# Patient Record
Sex: FEMALE | Race: WHITE | NOT HISPANIC OR LATINO | Employment: OTHER | URBAN - METROPOLITAN AREA
[De-identification: names, ages, dates, MRNs, and addresses within clinical notes are randomized per-mention and may not be internally consistent; named-entity substitution may affect disease eponyms.]

---

## 2017-04-04 ENCOUNTER — OFFICE VISIT (OUTPATIENT)
Dept: FAMILY MEDICINE | Facility: CLINIC | Age: 77
End: 2017-04-04
Payer: COMMERCIAL

## 2017-04-04 VITALS
TEMPERATURE: 97.6 F | HEIGHT: 63 IN | BODY MASS INDEX: 37.1 KG/M2 | DIASTOLIC BLOOD PRESSURE: 84 MMHG | HEART RATE: 72 BPM | WEIGHT: 209.4 LBS | SYSTOLIC BLOOD PRESSURE: 136 MMHG

## 2017-04-04 DIAGNOSIS — I83.92 VARICOSE VEINS OF LEFT LOWER EXTREMITY: Primary | ICD-10-CM

## 2017-04-04 DIAGNOSIS — I10 ESSENTIAL HYPERTENSION, BENIGN: ICD-10-CM

## 2017-04-04 PROCEDURE — 99213 OFFICE O/P EST LOW 20 MIN: CPT | Performed by: FAMILY MEDICINE

## 2017-04-04 RX ORDER — VALSARTAN AND HYDROCHLOROTHIAZIDE 160; 12.5 MG/1; MG/1
1 TABLET, FILM COATED ORAL DAILY
Qty: 90 TABLET | Refills: 2 | Status: SHIPPED | OUTPATIENT
Start: 2017-04-04 | End: 2017-10-27

## 2017-04-04 NOTE — MR AVS SNAPSHOT
After Visit Summary   4/4/2017    Rosie Blackman    MRN: [id number]           Patient Information     Date Of Birth          1940        Visit Information        Provider Department      4/4/2017 1:30 PM Laya Morales MD [facility]        Today's Diagnoses     Varicose veins of left lower extremity    -  1    Essential hypertension, benign           Follow-ups after your visit        Additional Services     VASCULAR SURGERY REFERRAL       Your provider has referred you to: [facility] vascular surgeons     Please be aware that coverage of these services is subject to the terms and limitations of your health insurance plan.  Call member services at your health plan with any benefit or coverage questions.      Please bring the following with you to your appointment:    (1) Any X-Rays, CTs or MRIs which have been performed.  Contact the facility where they were done to arrange for  prior to your scheduled appointment.    (2) List of current medications   (3) This referral request   (4) Any documents/labs given to you for this referral                  Who to contact     Normal or non-critical lab and imaging results will be communicated to you by Georgina Goodmanhart, letter or phone within 4 business days after the clinic has received the results. If you do not hear from us within 7 days, please contact the clinic through Georgina Goodmanhart or phone. If you have a critical or abnormal lab result, we will notify you by phone as soon as possible.  Submit refill requests through [employer] or call your pharmacy and they will forward the refill request to us. Please allow 3 business days for your refill to be completed.          If you need to speak with a  for additional information , please call: [phone]             Additional Information About Your Visit        Georgina Goodmanhart Information     [employer] gives you secure access to your electronic health record. If you see a primary care  "provider, you can also send messages to your care team and make appointments. If you have questions, please call your primary care clinic.  If you do not have a primary care provider, please call [phone] and they will assist you.        Care EveryWhere ID     This is your Care EveryWhere ID. This could be used by other organizations to access your [facility] medical records  [id number]        Your Vitals Were     Pulse Temperature Height BMI (Body Mass Index)          72 97.6  F (36.4  C) (Tympanic) 5' 3\" (1.6 m) 37.09 kg/m2         Blood Pressure from Last 3 Encounters:   04/04/17 (!) 132/94   10/25/16 124/78   11/16/15 120/55    Weight from Last 3 Encounters:   04/04/17 209 lb 6.4 oz (95 kg)   10/25/16 205 lb (93 kg)   11/16/15 195 lb (88.5 kg)              We Performed the Following     VASCULAR SURGERY REFERRAL          Where to get your medicines      These medications were sent to [facility]/pharmacy #[id number] - [place] [address] [place] [address], [facility] [address]     Phone:  [phone]     valsartan-hydrochlorothiazide 160-12.5 MG per tablet          Primary Care Provider Office Phone # Fax #    Laya Morales [phone]       [facility] [address] [facility] [address]        Thank you!     Thank you for choosing [facility]  for your care. Our goal is always to provide you with excellent care. Hearing back from our patients is one way we can continue to improve our services. Please take a few minutes to complete the written survey that you may receive in the mail after your visit with us. Thank you!             Your Updated Medication List - Protect others around you: Learn how to safely use, store and throw away your medicines at www.disposemymeds.org.          This list is accurate as of: 4/4/17  2:22 PM.  Always use your most recent med list.                   Brand Name Dispense Instructions for use    ALEVE PO          ASPIRIN NOT " PRESCRIBED    INTENTIONAL     Reported on 4/4/2017       estradiol 0.5 MG tablet    ESTRACE    90 tablet    Take 1/2 tablet daily       fish oil-omega-3 fatty acids 1000 MG capsule      Take 1 g by mouth daily.       pravastatin 80 MG tablet    PRAVACHOL    90 tablet    Take 1 tablet (80 mg) by mouth daily       THERATEARS 0.25 % Soln   Generic drug:  Carboxymethylcellulose Sodium      BID       triamcinolone 0.1 % cream    KENALOG    80 g    aaa 2 times per day for up to 2 weeks as needed       valsartan-hydrochlorothiazide 160-12.5 MG per tablet    DIOVAN HCT    90 tablet    Take 1 tablet by mouth daily       VIACTIV 500-100-40 MG-UNT-MCG Chew   Generic drug:  Calcium-Vitamin D-Vitamin K      once daily

## 2017-04-04 NOTE — PROGRESS NOTES
SUBJECTIVE:                                                    Rosie Blackman is a 77 year old female who presents to clinic today for the following health issues:      Musculoskeletal problem/pain      Duration: since 2014, worsening in the past 3 months    Description  Location: left calf    Intensity:  severe    Accompanying signs and symptoms: discoloration of left calf, pain is in area of discoloration    History  Previous similar problem: YES-venous closure surgery in August of 2014 at [facility] in [place], [place]  Previous evaluation:  Ultrasound August 2014    Precipitating or alleviating factors:  Trauma or overuse: no   Aggravating factors include: standing and walking    Therapies tried and outcome: compression stockings and Aleve help       Had vein sx 2014   She has had pain in the left leg since that time in the area where there is the spider areas . She does not have the compression socks on right now even though they do help . She is just not sure what she should be doing to help with the legg. It  Has not been acutely red or swollen. It  Is painful but only after wqalking or standing while resting it it fine. Improves with the aleve she does not take it that often.   Her hypertension is well controlled right now she has gretchen taking her medications without side effects no cough no headache no chest pain  No stroke sx      Problem list and histories reviewed & adjusted, as indicated.  Additional history: as documented    Patient Active Problem List   Diagnosis     Essential hypertension     GERD     Personal history of contact with and (suspected) exposure to asbestos     Donor of other blood     Irritable bowel syndrome     ALLERGIC RHINITIS      Chronic airway obstruction (H)     Need for prophylactic hormone replacement therapy (postmenopausal)     UTI (urinary tract infection)     Pes planus     Obesity     Diverticulosis of colon     Colon polyps     Prediabetes     HYPERLIPIDEMIA LDL  GOAL <130     Advanced directives, counseling/discussion     Plantar fasciitis     Health Care Home     Hiatal hernia     Past Surgical History:   Procedure Laterality Date     BREAST BIOPSY, CORE RT/LT       C ANESTH,KNEE VEINS SURGERY  2014     CHOLECYSTECTOMY       COLONOSCOPY  5/10    Diverticulosis, colon polyps; repeat 5 yrs     COLONOSCOPY N/A 2015    Procedure: COLONOSCOPY;  Surgeon: Alejandro Matos MD;  Location: [facility]     Colonoscopy, hyperplastic polyps, repeat in 5 yrs       ESOPHAGOSCOPY, GASTROSCOPY, DUODENOSCOPY (EGD), COMBINED  2013    Procedure: COMBINED ESOPHAGOSCOPY, GASTROSCOPY, DUODENOSCOPY (EGD), BIOPSY SINGLE OR MULTIPLE;  Gastroscopy;  Surgeon: Blaise Gill MD;  Location: [facility]     HC REMOVE TONSILS/ADENOIDS,12+ Y/O      T & A 13+y.o.     HYSTERECTOMY, PAP NO LONGER INDICATED       TVH for DUB, ovaries in         Social History   Substance Use Topics     Smoking status: Never Smoker     Smokeless tobacco: Never Used     Alcohol use No     Family History   Problem Relation Age of Onset     CANCER Mother      uterine cancer,      Respiratory Mother      COPD     Cardiovascular Mother      AAA  age 80     Breast Cancer Maternal Grandmother      CANCER Maternal Grandfather      cancer unknown type     Breast Cancer Sister      Eye Disorder Father      cataracts     Depression Father      Depression Son      Depression Son      Cancer - colorectal No family hx of            Reviewed and updated as needed this visit by clinical staff       Reviewed and updated as needed this visit by Provider     she still has pain but it now comes and goes. Now she has had pain in the lower calf for the last 3 months  She takes aleve and wears her compression stockings     ROS:  C: NEGATIVE for fever, chills, change in weight  E/M: NEGATIVE for ear, mouth and throat problems  R: NEGATIVE for significant cough or SOB  CV: NEGATIVE for chest pain, palpitations or peripheral  "edema    OBJECTIVE:                                                    BP (!) 132/94 (BP Location: Right arm, Patient Position: Chair, Cuff Size: Adult Large)  Pulse 72  Temp 97.6  F (36.4  C) (Tympanic)  Ht 5' 3\" (1.6 m)  Wt 209 lb 6.4 oz (95 kg)  BMI 37.09 kg/m2  Body mass index is 37.09 kg/(m^2).   GENERAL: healthy, alert, well nourished, well hydrated, no distress  HENT: ear canals- normal; TMs- normal; Nose- normal; Mouth- no ulcers, no lesions  NECK: no tenderness, no adenopathy, no asymmetry, no masses, no stiffness; thyroid- normal to palpation  RESP: lungs clear to auscultation - no rales, no rhonchi, no wheezes  CV: regular rates and rhythm, normal S1 S2, no S3 or S4 and no murmur, no click or rub -  ABDOMEN: soft, no tenderness, no  hepatosplenomegaly, no masses, normal bowel sounds  MS: extremities- no gross deformities noted, no edema varicose veins spider veins left leg no erythema non tender     Diagnostic test results:  Diagnostic Test Results:  none      ASSESSMENT/PLAN:                                                    1. Varicose veins of left lower extremity  Refer to vascular for further eval   - VASCULAR SURGERY REFERRAL    2. Essential hypertension, benign  Well controlled   - valsartan-hydrochlorothiazide (DIOVAN HCT) 160-12.5 MG per tablet; Take 1 tablet by mouth daily  Dispense: 90 tablet; Refill: 2        Laya Morales MD  [facility]    "

## 2017-04-04 NOTE — NURSING NOTE
"Chief Complaint   Patient presents with     Leg Pain       Initial BP (!) 132/94 (BP Location: Right arm, Patient Position: Chair, Cuff Size: Adult Large)  Pulse 72  Temp 97.6  F (36.4  C) (Tympanic)  Ht 5' 3\" (1.6 m)  Wt 209 lb 6.4 oz (95 kg)  BMI 37.09 kg/m2 Estimated body mass index is 37.09 kg/(m^2) as calculated from the following:    Height as of this encounter: 5' 3\" (1.6 m).    Weight as of this encounter: 209 lb 6.4 oz (95 kg).  Medication Reconciliation: complete     April LENNY Pressley      "

## 2017-05-11 DIAGNOSIS — I10 ESSENTIAL HYPERTENSION, BENIGN: ICD-10-CM

## 2017-05-11 NOTE — TELEPHONE ENCOUNTER
VALSARTAN-HCTZ 160-12.5 MG TAB        Last Written Prescription Date: 4/4/17  Last Fill Quantity: 90, # refills: 2  Last Office Visit with [facility], P or [facility] prescribing provider: 4/4/17       Potassium   Date Value Ref Range Status   10/19/2016 3.6 3.4 - 5.3 mmol/L Final     Creatinine   Date Value Ref Range Status   10/19/2016 0.75 0.52 - 1.04 mg/dL Final     BP Readings from Last 3 Encounters:   04/04/17 136/84   10/25/16 124/78   11/16/15 120/55

## 2017-05-12 RX ORDER — VALSARTAN AND HYDROCHLOROTHIAZIDE 160; 12.5 MG/1; MG/1
TABLET, FILM COATED ORAL
Qty: 90 TABLET | Refills: 0 | Status: SHIPPED | OUTPATIENT
Start: 2017-05-12 | End: 2017-10-27

## 2017-05-12 NOTE — TELEPHONE ENCOUNTER
Prescription approved per [facility].  Mary France RN

## 2017-06-13 ENCOUNTER — OFFICE VISIT - HEALTHEAST (OUTPATIENT)
Dept: VASCULAR SURGERY | Facility: CLINIC | Age: 77
End: 2017-06-13

## 2017-06-13 ENCOUNTER — RECORDS - HEALTHEAST (OUTPATIENT)
Dept: VASCULAR ULTRASOUND | Facility: CLINIC | Age: 77
End: 2017-06-13

## 2017-06-13 ENCOUNTER — RECORDS - HEALTHEAST (OUTPATIENT)
Dept: ADMINISTRATIVE | Facility: OTHER | Age: 77
End: 2017-06-13

## 2017-06-13 DIAGNOSIS — I83.812 VARICOSE VEINS OF LEFT LOWER EXTREMITIES WITH PAIN: ICD-10-CM

## 2017-06-13 DIAGNOSIS — M79.605 PAIN IN LEFT LEG: ICD-10-CM

## 2017-06-13 DIAGNOSIS — M79.605 LEFT LEG PAIN: ICD-10-CM

## 2017-08-07 ENCOUNTER — TELEPHONE (OUTPATIENT)
Dept: FAMILY MEDICINE | Facility: CLINIC | Age: 77
End: 2017-08-07

## 2017-10-02 ENCOUNTER — HOSPITAL ENCOUNTER (OUTPATIENT)
Dept: MAMMOGRAPHY | Facility: CLINIC | Age: 77
Discharge: HOME | End: 2017-10-02
Attending: FAMILY MEDICINE | Admitting: FAMILY MEDICINE
Payer: MEDICARE

## 2017-10-02 DIAGNOSIS — Z12.31 VISIT FOR SCREENING MAMMOGRAM: ICD-10-CM

## 2017-10-02 PROCEDURE — 77063 BREAST TOMOSYNTHESIS BI: CPT

## 2017-10-12 ENCOUNTER — MYC MEDICAL ADVICE (OUTPATIENT)
Dept: FAMILY MEDICINE | Facility: CLINIC | Age: 77
End: 2017-10-12

## 2017-10-12 DIAGNOSIS — I10 ESSENTIAL HYPERTENSION: ICD-10-CM

## 2017-10-12 DIAGNOSIS — K21.9 GASTROESOPHAGEAL REFLUX DISEASE, ESOPHAGITIS PRESENCE NOT SPECIFIED: ICD-10-CM

## 2017-10-12 DIAGNOSIS — R73.09 ABNORMAL GLUCOSE: ICD-10-CM

## 2017-10-12 DIAGNOSIS — E78.5 HYPERLIPIDEMIA LDL GOAL <130: Primary | ICD-10-CM

## 2017-10-12 DIAGNOSIS — R73.03 PREDIABETES: ICD-10-CM

## 2017-10-12 NOTE — LETTER
[facility]  [address] [address]  [phone]        February 15, 2018    Rosie Blackman  [address] [address] [address] [address]              Dear Rosie Blackman    This is to remind you that your NON FASTING is due.    You may call our office at [phone] to schedule an appointment.    Please disregard this notice if you have already had your labs drawn or made an appointment.        Sincerely,        Laya Morales MD

## 2017-10-12 NOTE — TELEPHONE ENCOUNTER
I have pended lipids and CMP. Did you want an A1C as well? Any other labs you would want for her yearly?  Mary France RN

## 2017-10-18 DIAGNOSIS — E78.5 HYPERLIPIDEMIA LDL GOAL <130: ICD-10-CM

## 2017-10-18 LAB
ALBUMIN SERPL-MCNC: 3.4 G/DL (ref 3.4–5)
ALP SERPL-CCNC: 54 U/L (ref 40–150)
ALT SERPL W P-5'-P-CCNC: 28 U/L (ref 0–50)
ANION GAP SERPL CALCULATED.3IONS-SCNC: 5 MMOL/L (ref 3–14)
AST SERPL W P-5'-P-CCNC: 18 U/L (ref 0–45)
BILIRUB SERPL-MCNC: 0.6 MG/DL (ref 0.2–1.3)
BUN SERPL-MCNC: 18 MG/DL (ref 7–30)
CALCIUM SERPL-MCNC: 8.8 MG/DL (ref 8.5–10.1)
CHLORIDE SERPL-SCNC: 105 MMOL/L (ref 94–109)
CHOLEST SERPL-MCNC: 197 MG/DL
CO2 SERPL-SCNC: 30 MMOL/L (ref 20–32)
CREAT SERPL-MCNC: 0.78 MG/DL (ref 0.52–1.04)
GFR SERPL CREATININE-BSD FRML MDRD: 72 ML/MIN/1.7M2
GLUCOSE SERPL-MCNC: 94 MG/DL (ref 70–99)
HDLC SERPL-MCNC: 99 MG/DL
LDLC SERPL CALC-MCNC: 86 MG/DL
NONHDLC SERPL-MCNC: 98 MG/DL
POTASSIUM SERPL-SCNC: 3.6 MMOL/L (ref 3.4–5.3)
PROT SERPL-MCNC: 7 G/DL (ref 6.8–8.8)
SODIUM SERPL-SCNC: 140 MMOL/L (ref 133–144)
TRIGL SERPL-MCNC: 61 MG/DL

## 2017-10-18 PROCEDURE — 36415 COLL VENOUS BLD VENIPUNCTURE: CPT | Performed by: FAMILY MEDICINE

## 2017-10-18 PROCEDURE — 80053 COMPREHEN METABOLIC PANEL: CPT | Performed by: FAMILY MEDICINE

## 2017-10-18 PROCEDURE — 80061 LIPID PANEL: CPT | Performed by: FAMILY MEDICINE

## 2017-10-27 ENCOUNTER — OFFICE VISIT (OUTPATIENT)
Dept: FAMILY MEDICINE | Facility: CLINIC | Age: 77
End: 2017-10-27
Payer: COMMERCIAL

## 2017-10-27 VITALS
BODY MASS INDEX: 37.39 KG/M2 | SYSTOLIC BLOOD PRESSURE: 132 MMHG | DIASTOLIC BLOOD PRESSURE: 74 MMHG | HEIGHT: 63 IN | TEMPERATURE: 97.6 F | WEIGHT: 211 LBS | HEART RATE: 72 BPM

## 2017-10-27 DIAGNOSIS — I10 ESSENTIAL HYPERTENSION, BENIGN: ICD-10-CM

## 2017-10-27 DIAGNOSIS — N95.1 SYMPTOMATIC MENOPAUSAL OR FEMALE CLIMACTERIC STATES: ICD-10-CM

## 2017-10-27 DIAGNOSIS — Z00.00 MEDICARE ANNUAL WELLNESS VISIT, SUBSEQUENT: Primary | ICD-10-CM

## 2017-10-27 DIAGNOSIS — N39.3 STRESS INCONTINENCE OF URINE: ICD-10-CM

## 2017-10-27 DIAGNOSIS — E78.5 HYPERLIPIDEMIA LDL GOAL <130: ICD-10-CM

## 2017-10-27 LAB
ALBUMIN UR-MCNC: NEGATIVE MG/DL
APPEARANCE UR: CLEAR
BILIRUB UR QL STRIP: NEGATIVE
COLOR UR AUTO: YELLOW
GLUCOSE UR STRIP-MCNC: NEGATIVE MG/DL
HGB UR QL STRIP: NEGATIVE
KETONES UR STRIP-MCNC: NEGATIVE MG/DL
LEUKOCYTE ESTERASE UR QL STRIP: NEGATIVE
NITRATE UR QL: NEGATIVE
PH UR STRIP: 6 PH (ref 5–7)
SOURCE: NORMAL
SP GR UR STRIP: 1.02 (ref 1–1.03)
UROBILINOGEN UR STRIP-ACNC: 0.2 EU/DL (ref 0.2–1)

## 2017-10-27 PROCEDURE — 99213 OFFICE O/P EST LOW 20 MIN: CPT | Mod: 25 | Performed by: FAMILY MEDICINE

## 2017-10-27 PROCEDURE — 99397 PER PM REEVAL EST PAT 65+ YR: CPT | Performed by: FAMILY MEDICINE

## 2017-10-27 PROCEDURE — 81003 URINALYSIS AUTO W/O SCOPE: CPT | Performed by: FAMILY MEDICINE

## 2017-10-27 RX ORDER — VALSARTAN AND HYDROCHLOROTHIAZIDE 160; 12.5 MG/1; MG/1
1 TABLET, FILM COATED ORAL DAILY
Qty: 90 TABLET | Refills: 3 | Status: SHIPPED | OUTPATIENT
Start: 2017-10-27 | End: 2018-11-17

## 2017-10-27 RX ORDER — ESTRADIOL 0.5 MG/1
TABLET ORAL
Qty: 90 TABLET | Refills: 3 | Status: SHIPPED | OUTPATIENT
Start: 2017-10-27 | End: 2018-07-03

## 2017-10-27 RX ORDER — PRAVASTATIN SODIUM 80 MG/1
80 TABLET ORAL DAILY
Qty: 90 TABLET | Refills: 3 | Status: SHIPPED | OUTPATIENT
Start: 2017-10-27 | End: 2018-11-17

## 2017-10-27 NOTE — PROGRESS NOTES
SUBJECTIVE:   Rosie Blackman is a 77 year old female who presents for Preventive Visit.  Are you in the first 12 months of your Medicare Part B coverage?  No    Healthy Habits:    Do you get at least three servings of calcium containing foods daily (dairy, green leafy vegetables, etc.)? yes    Amount of exercise or daily activities, outside of work: 0 day(s) per week    Problems taking medications regularly No    Medication side effects: No    Have you had an eye exam in the past two years? yes    Do you see a dentist twice per year? yes    Do you have sleep apnea, excessive snoring or daytime drowsiness?no    Leaky bladder coming on gradually, more when stands up with a full bladder.   Check spot on the labia, tried triamcinolone daily - helps.   Check spot on upper right thigh.       Reviewed and updated as needed this visit by clinical staff  Tobacco  Allergies  Meds  Med Hx  Surg Hx  Fam Hx  Soc Hx        Reviewed and updated as needed this visit by Provider        Social History   Substance Use Topics     Smoking status: Never Smoker     Smokeless tobacco: Never Used     Alcohol use No       The patient does not drink >3 drinks per day nor >7 drinks per week.    Today's PHQ-2 Score:   PHQ-2 ( 1999 [employer]) 10/27/2017 4/4/2017   Q1: Little interest or pleasure in doing things 0 0   Q2: Feeling down, depressed or hopeless 0 0   PHQ-2 Score 0 0   Q1: Little interest or pleasure in doing things - -   Q2: Feeling down, depressed or hopeless - -   PHQ-2 Score - -         Do you feel safe in your environment - Yes    Do you have a Health Care Directive?: Yes: Advance Directive has been received and scanned.    Current providers sharing in care for this patient include: Patient Care Team:  Laya Morales MD as PCP - General (Family Practice)      Hearing impairment: No    Ability to successfully perform activities of daily living: Yes, no assistance needed     Fall risk:  Fallen 2 or more times in the past  year?: No  Any fall with injury in the past year?: No      Home safety:  none identified    COGNITIVE SCREEN  1) Repeat 3 items (Banana, Sunrise, Chair)    2) Clock draw:  NORMAL  3) 3 item recall: Recalls 3 objects  Results: normal     Mini-CogTM Copyright NEGRITA Freedman. Licensed by the author for use in [facility]; reprinted with permission ([email or web address]). All rights reserved.          The following health maintenance items are reviewed in Epic and correct as of today:  Health Maintenance   Topic Date Due     INFLUENZA VACCINE (SYSTEM ASSIGNED)  09/01/2017     FALL RISK ASSESSMENT  10/25/2017     ADVANCE DIRECTIVE PLANNING Q5 YRS  01/02/2018     MAMMO Q1 YR  10/02/2018     BMP Q1 YR  10/18/2018     LIPID MONITORING Q1 YEAR  10/18/2018     COLONOSCOPY Q5 YR  11/16/2020     TETANUS IMMUNIZATION (SYSTEM ASSIGNED)  11/02/2022     DEXA SCAN SCREENING (SYSTEM ASSIGNED)  Completed     PNEUMOCOCCAL  Completed     BP Readings from Last 3 Encounters:   10/27/17 132/74   04/04/17 136/84   10/25/16 124/78    Wt Readings from Last 3 Encounters:   10/27/17 211 lb (95.7 kg)   04/04/17 209 lb 6.4 oz (95 kg)   10/25/16 205 lb (93 kg)                  Patient Active Problem List   Diagnosis     Essential hypertension     Personal history of contact with and (suspected) exposure to asbestos     Donor of other blood     Irritable bowel syndrome     ALLERGIC RHINITIS      Chronic airway obstruction (H)     Need for prophylactic hormone replacement therapy (postmenopausal)     UTI (urinary tract infection)     Pes planus     Obesity     Diverticulosis of colon     Colon polyps     Prediabetes     HYPERLIPIDEMIA LDL GOAL <130     Advanced directives, counseling/discussion     Plantar fasciitis     Health Care Home     Hiatal hernia     Gastroesophageal reflux disease, esophagitis presence not specified     Past Surgical History:   Procedure Laterality Date     BREAST BIOPSY, CORE RT/LT  1994     C ANESTH,KNEE VEINS SURGERY   2014     CHOLECYSTECTOMY       COLONOSCOPY  5/10    Diverticulosis, colon polyps; repeat 5 yrs     COLONOSCOPY N/A 2015    Procedure: COLONOSCOPY;  Surgeon: Alejandro Matos MD;  Location: [facility]     Colonoscopy, hyperplastic polyps, repeat in 5 yrs       ESOPHAGOSCOPY, GASTROSCOPY, DUODENOSCOPY (EGD), COMBINED  2013    Procedure: COMBINED ESOPHAGOSCOPY, GASTROSCOPY, DUODENOSCOPY (EGD), BIOPSY SINGLE OR MULTIPLE;  Gastroscopy;  Surgeon: Blaise Gill MD;  Location: [facility]     EYE SURGERY      R/L Cataracts     HC REMOVE TONSILS/ADENOIDS,12+ Y/O      T & A 13+y.o.     HYSTERECTOMY, PAP NO LONGER INDICATED       TVH for DUB, ovaries in       VASCULAR SURGERY      Taylor closure       Social History   Substance Use Topics     Smoking status: Never Smoker     Smokeless tobacco: Never Used     Alcohol use No     Family History   Problem Relation Age of Onset     CANCER Mother      uterine cancer,      Respiratory Mother      COPD     Cardiovascular Mother      AAA  age 80     Breast Cancer Maternal Grandmother      CANCER Maternal Grandfather      cancer unknown type     Breast Cancer Sister      Eye Disorder Father      cataracts     Depression Father      Depression Son      Depression Son      Cancer - colorectal No family hx of          Also has a spot on the labia that if she does not put the triamcinolone on the spot she gets burning but it does not go away . She has been having this for the last year. Has not gotten worse has not gotten better  Feels like it is burning if she misses a day or 2 if she does not use the TAC.   Also has a spot on the thigh has been there about 1 week just noticed this asymptomatic   May have been there longer she just noted it and wants to see if it is something to be concerned about  Saw the vein surgeon . Not a candidate for the vein surgery     Mammogram Screening: Patient over age 75, has elected to continue with mammography  "screening.  Leaky bladder she has been doing kegels she has leaking if she has been sitting for a while and gets up   She also will have this at times in the middle of the night   Empties bladder every 2-3 hours during the day   She has to get up once at night but there is times when she leaks before she gets to the restroom  ROS:  Constitutional, HEENT, cardiovascular, pulmonary, gi and gu systems are negative, except as otherwise noted.      OBJECTIVE:   /74  Pulse 72  Temp 97.6  F (36.4  C) (Tympanic)  Ht 5' 3\" (1.6 m)  Wt 211 lb (95.7 kg)  BMI 37.38 kg/m2 Estimated body mass index is 37.38 kg/(m^2) as calculated from the following:    Height as of this encounter: 5' 3\" (1.6 m).    Weight as of this encounter: 211 lb (95.7 kg).  EXAM:   GENERAL: healthy, alert and no distress  HENT: ear canals and TM's normal, nose and mouth without ulcers or lesions  NECK: no adenopathy, no asymmetry, masses, or scars and thyroid normal to palpation  RESP: lungs clear to auscultation - no rales, rhonchi or wheezes  BREAST: normal without masses, tenderness or nipple discharge and no palpable axillary masses or adenopathy  CV: regular rate and rhythm, normal S1 S2, no S3 or S4, no murmur, click or rub, no peripheral edema and peripheral pulses strong  ABDOMEN: soft, nontender, no hepatosplenomegaly, no masses and bowel sounds normal  MS: no gross musculoskeletal defects noted, no edema  SKIN: no suspicious lesions or rashes  NEURO: Normal strength and tone, mentation intact and speech normal  PSYCH: mentation appears normal, affect normal/bright    ASSESSMENT / PLAN:   1. Medicare annual wellness visit, subsequent      2. Essential hypertension, benign    - valsartan-hydrochlorothiazide (DIOVAN-HCT) 160-12.5 MG per tablet; Take 1 tablet by mouth daily  Dispense: 90 tablet; Refill: 3    3. Symptomatic menopausal or female climacteric states    - estradiol (ESTRACE) 0.5 MG tablet; Take 1/2 tablet daily  Dispense: 90 " "tablet; Refill: 3    4. Hyperlipidemia LDL goal <130    - pravastatin (PRAVACHOL) 80 MG tablet; Take 1 tablet (80 mg) by mouth daily  Dispense: 90 tablet; Refill: 3    5. Stress incontinence of urine  Recommend this first u/a was normal will have her start therapy. If not effective can start medicaiton   - *UA reflex to Microscopic and Culture ([place] [facility] (except [place] and [place])  - [facility] PT, HAND, AND CHIROPRACTIC REFERRAL    End of Life Planning:  Patient currently has an advanced directive: No.  I have verified the patient's ablity to prepare an advanced directive/make health care decisions.  Literature was provided to assist patient in preparing an advanced directive.    COUNSELING:  Reviewed preventive health counseling, as reflected in patient instructions        Estimated body mass index is 37.38 kg/(m^2) as calculated from the following:    Height as of this encounter: 5' 3\" (1.6 m).    Weight as of this encounter: 211 lb (95.7 kg).  Weight management plan: Discussed healthy diet and exercise guidelines and patient will follow up in 12 months in clinic to re-evaluate.   reports that she has never smoked. She has never used smokeless tobacco.        Appropriate preventive services were discussed with this patient, including applicable screening as appropriate for cardiovascular disease, diabetes, osteopenia/osteoporosis, and glaucoma.  As appropriate for age/gender, discussed screening for colorectal cancer, prostate cancer, breast cancer, and cervical cancer. Checklist reviewing preventive services available has been given to the patient.    Reviewed patients plan of care and provided an AVS. The Basic Care Plan (routine screening as documented in Health Maintenance) for Rosie meets the Care Plan requirement. This Care Plan has been established and reviewed with the Patient.    Counseling Resources:  ATP IV Guidelines  Pooled Cohorts Equation Calculator  Breast Cancer Risk " Calculator  FRAX Risk Assessment  ICSI Preventive Guidelines  Dietary Guidelines for Americans, 2010  USDA's MyPlate  ASA Prophylaxis  Lung CA Screening    Laya Morales MD  [facility]

## 2017-10-27 NOTE — MR AVS SNAPSHOT
After Visit Summary   10/27/2017    Rosie Blackman    MRN: [id number]           Patient Information     Date Of Birth          1940        Visit Information        Provider Department      10/27/2017 10:00 AM Laya Morales MD [facility] (formerly [facility])        Today's Diagnoses     Medicare annual wellness visit, subsequent    -  1    Essential hypertension, benign        Symptomatic menopausal or female climacteric states        Hyperlipidemia LDL goal <130        Stress incontinence of urine          Care Instructions      Preventive Health Recommendations    Female Ages 65 +    Yearly exam:     See your health care provider every year in order to  o Review health changes.   o Discuss preventive care.    o Review your medicines if your doctor has prescribed any.      You no longer need a yearly Pap test unless you've had an abnormal Pap test in the past 10 years. If you have vaginal symptoms, such as bleeding or discharge, be sure to talk with your provider about a Pap test.      Every 1 to 2 years, have a mammogram.  If you are over 69, talk with your health care provider about whether or not you want to continue having screening mammograms.      Every 10 years, have a colonoscopy. Or, have a yearly FIT test (stool test). These exams will check for colon cancer.       Have a cholesterol test every 5 years, or more often if your doctor advises it.       Have a diabetes test (fasting glucose) every three years. If you are at risk for diabetes, you should have this test more often.       At age 65, have a bone density scan (DEXA) to check for osteoporosis (brittle bone disease).    Shots:    Get a flu shot each year.    Get a tetanus shot every 10 years.    Talk to your doctor about your pneumonia vaccines. There are now two you should receive - Pneumovax (PPSV 23) and Prevnar (PCV 13).    Talk to your doctor about the shingles vaccine.    Talk to your doctor about the hepatitis B  vaccine.    Nutrition:     Eat at least 5 servings of fruits and vegetables each day.      Eat whole-grain bread, whole-wheat pasta and brown rice instead of white grains and rice.      Talk to your provider about Calcium and Vitamin D.     Lifestyle    Exercise at least 150 minutes a week (30 minutes a day, 5 days a week). This will help you control your weight and prevent disease.      Limit alcohol to one drink per day.      No smoking.       Wear sunscreen to prevent skin cancer.       See your dentist twice a year for an exam and cleaning.      See your eye doctor every 1 to 2 years to screen for conditions such as glaucoma, macular degeneration and cataracts.          Follow-ups after your visit        Additional Services     [facility] PT, HAND, AND CHIROPRACTIC REFERRAL       **This order will print in the [facility] Scheduling Office**    Physical Therapy, Hand Therapy and Chiropractic Care are available through:    *[facility]  *[facility]  *[facility]    Call one number to schedule at any of the above locations: [phone].    Your provider has referred you to: Physical Therapy at [facility] or [facility]    Indication/Reason for Referral: Women's Health (Please Complete Special Programs SmartList)  Onset of Illness:   Therapy Orders: Evaluate and Treat  Special Programs: Women's Health: Pelvic Floor Weakness: Incontinence: probably stress and    Special Request: None    Carmen Morales      Additional Comments for the Therapist or Chiropractor:     Please be aware that coverage of these services is subject to the terms and limitations of your health insurance plan.  Call member services at your health plan with any benefit or coverage questions.      Please bring the following to your appointment:    *Your personal calendar for scheduling future appointments  *Comfortable clothing                  Who to contact     Normal or non-critical lab and imaging results will be  "communicated to you by [employer], letter or phone within 4 business days after the clinic has received the results. If you do not hear from us within 7 days, please contact the clinic through [employer] or phone. If you have a critical or abnormal lab result, we will notify you by phone as soon as possible.  Submit refill requests through [employer] or call your pharmacy and they will forward the refill request to us. Please allow 3 business days for your refill to be completed.          If you need to speak with a  for additional information , please call: [phone]             Additional Information About Your Visit        [employer] Information     [employer] gives you secure access to your electronic health record. If you see a primary care provider, you can also send messages to your care team and make appointments. If you have questions, please call your primary care clinic.  If you do not have a primary care provider, please call [phone] and they will assist you.        Care EveryWhere ID     This is your Care EveryWhere ID. This could be used by other organizations to access your [facility] medical records  [id number]        Your Vitals Were     Pulse Temperature Height BMI (Body Mass Index)          72 97.6  F (36.4  C) (Tympanic) 5' 3\" (1.6 m) 37.38 kg/m2         Blood Pressure from Last 3 Encounters:   10/27/17 132/74   04/04/17 136/84   10/25/16 124/78    Weight from Last 3 Encounters:   10/27/17 211 lb (95.7 kg)   04/04/17 209 lb 6.4 oz (95 kg)   10/25/16 205 lb (93 kg)              We Performed the Following     *UA reflex to Microscopic and Culture ([place] and [facility] (except [place] and [place])     [facility] PT, HAND, AND CHIROPRACTIC REFERRAL          Today's Medication Changes          These changes are accurate as of: 10/27/17 11:15 AM.  If you have any questions, ask your nurse or doctor.               These medicines have changed or have updated prescriptions.        " Dose/Directions    valsartan-hydrochlorothiazide 160-12.5 MG per tablet   Commonly known as:  DIOVAN-HCT   This may have changed:  See the new instructions.   Used for:  Essential hypertension, benign   Changed by:  Laya Morales MD        Dose:  1 tablet   Take 1 tablet by mouth daily   Quantity:  90 tablet   Refills:  3            Where to get your medicines      These medications were sent to [facility]/pharmacy #[id number] - [place], [place] - [facility] [address]  [address], [facility] [address]     Phone:  [phone]     estradiol 0.5 MG tablet    pravastatin 80 MG tablet    valsartan-hydrochlorothiazide 160-12.5 MG per tablet                Primary Care Provider Office Phone # Fax #    Laya Morales [phone] [address] [address]        Equal Access to Services     [facility]: Hadii ronel ratliff hadasho Soomaali, waaxda luqadaha, qaybta kaalmada adeegyada, woodrow carrion hayedgar tesfaye . So [facility] [phone].    ATENCIÓN: Si habla español, tiene a castellanos disposición servicios gratuitos de asistencia lingüística. [facility] [phone].    We comply with applicable federal civil rights laws and [place] laws. We do not discriminate on the basis of race, color, national origin, age, disability, sex, sexual orientation, or gender identity.            Thank you!     Thank you for choosing [facility]  for your care. Our goal is always to provide you with excellent care. Hearing back from our patients is one way we can continue to improve our services. Please take a few minutes to complete the written survey that you may receive in the mail after your visit with us. Thank you!             Your Updated Medication List - Protect others around you: Learn how to safely use, store and throw away your medicines at www.disposemymeds.org.          This list is accurate as of: 10/27/17 11:15 AM.  Always use your most recent med list.                   Brand Name  Dispense Instructions for use Diagnosis    ALEVE PO           ASPIRIN NOT PRESCRIBED    INTENTIONAL     Reported on 4/4/2017        estradiol 0.5 MG tablet    ESTRACE    90 tablet    Take 1/2 tablet daily    Symptomatic menopausal or female climacteric states       fish oil-omega-3 fatty acids 1000 MG capsule      Take 1 g by mouth daily.        pravastatin 80 MG tablet    PRAVACHOL    90 tablet    Take 1 tablet (80 mg) by mouth daily    Hyperlipidemia LDL goal <130       THERATEARS 0.25 % Soln   Generic drug:  Carboxymethylcellulose Sodium      BID        triamcinolone 0.1 % cream    KENALOG    80 g    aaa 2 times per day for up to 2 weeks as needed    Eczema       valsartan-hydrochlorothiazide 160-12.5 MG per tablet    DIOVAN-HCT    90 tablet    Take 1 tablet by mouth daily    Essential hypertension, benign       VIACTIV 500-100-40 MG-UNT-MCG Chew   Generic drug:  Calcium-Vitamin D-Vitamin K      once daily

## 2017-11-09 DIAGNOSIS — N95.1 SYMPTOMATIC MENOPAUSAL OR FEMALE CLIMACTERIC STATES: ICD-10-CM

## 2017-11-10 RX ORDER — ESTRADIOL 0.5 MG/1
TABLET ORAL
Qty: 90 TABLET | Refills: 2 | Status: SHIPPED | OUTPATIENT
Start: 2017-11-10 | End: 2019-12-05

## 2017-11-15 ENCOUNTER — HOSPITAL ENCOUNTER (OUTPATIENT)
Dept: PHYSICAL THERAPY | Facility: CLINIC | Age: 77
Setting detail: THERAPIES SERIES
End: 2017-11-15
Attending: FAMILY MEDICINE
Payer: MEDICARE

## 2017-11-15 PROCEDURE — 97110 THERAPEUTIC EXERCISES: CPT | Mod: GP | Performed by: PHYSICAL THERAPIST

## 2017-11-15 PROCEDURE — G8987 SELF CARE CURRENT STATUS: HCPCS | Mod: GP,CL | Performed by: PHYSICAL THERAPIST

## 2017-11-15 PROCEDURE — 40000841 ZZH STATISTIC WOMEN'S HEALTH VISIT: Performed by: PHYSICAL THERAPIST

## 2017-11-15 PROCEDURE — 97161 PT EVAL LOW COMPLEX 20 MIN: CPT | Mod: GP | Performed by: PHYSICAL THERAPIST

## 2017-11-15 PROCEDURE — G8988 SELF CARE GOAL STATUS: HCPCS | Mod: GP,CI | Performed by: PHYSICAL THERAPIST

## 2017-11-15 PROCEDURE — 97535 SELF CARE MNGMENT TRAINING: CPT | Mod: GP | Performed by: PHYSICAL THERAPIST

## 2017-11-16 NOTE — PROGRESS NOTES
Physical Therapy Initial Pelvic Floor Muscle Evaluation   11/15/17 1300   General Information   Type of Visit Initial OP Ortho PT Evaluation   Start of Care Date 11/15/17   Referring Physician Laya Morales MD   Patient/Family Goals Statement Get rid of leaking of urine   Orders Evaluate and Treat   Date of Order 10/27/17   Insurance Type Medicare;Blue Cross   Medical Diagnosis Stress incontinence of urine   Surgical/Medical history reviewed Yes   Precautions/Limitations no known precautions/limitations   General Information Comments PMHx: HTN, Bladder Control, Hysterectomy, Gall Bladder   Body Part(s)   Body Part(s) Pelvic Floor Dysfunction   Presentation and Etiology   Pertinent history of current problem (include personal factors and/or comorbidities that impact the POC) Pt was in for a well check up, mentioned the leaking of urine and was sent to PT. Pt describes her leaking as every day. Notices leaking with rolling over in bed, and making it to the bathroom in the night if has an urge. Does also have occasional leaking with getting  up from chairs, with prolonged coughing (bad cold only).    Impairments F. Decreased strength and endurance;P. Bowel or bladder problems   Functional Limitations perform activities of daily living;perform desired leisure / sports activities   Symptom Location pelvic floor    How/Where did it occur From insidious onset   Onset date of current episode/exacerbation 10/27/17   Chronicity Chronic   Frequency of pain/symptoms B. Intermittent   Pain/symptoms are: Worse during the night  (d/t longer times between voids)   Pain/symptoms exacerbated by M. Other   Pain exacerbation comment strong urges at nite, bad cold, rolling over in bed, holding longer than 3 hours   Pain/symptoms eased by K. Other   Pain eased by comment frequent void times   Progression of symptoms since onset: Worsened   Current Level of Function   Current Community Support Family/friend caregiver   Patient  "role/employment history F. Retired   Living environment [facility]   Fall Risk Screen   Fall screen completed by PT   Have you fallen 2 or more times in the past year? No   Have you fallen and had an injury in the past year? No   Is patient a fall risk? No   Functional Scales   Q1: All nite not leaks   Q1(/10): 0/10   Q2: All Day no leak   Q2( /10): 210   Q3: Regular Void Times    Q3( /10): 510   Pelvic Floor Dysfunction Questions   Regular exercise No   Fluid intake-glasses/day (one glass/cup = 8oz Water = 60oz/day   Caffeinated beverages-glasses/day Occasional soda pop if going out   Alcoholic beverages - glasses/day None   Recent diet change? No   How long can you delay the need to urinate?  Sitting = 30 mins, Standing = immediate   How many times do you wake to urinate at night?   1   How often do you urinate during the day?   Every 1-4 hours   Can you stop the flow of urine when on the toilet?  No   Is the volume of urine passed usually  (varies)   Do you have the sensation that you need to go to the toilet?  Yes   Do you empty your bladder frequently, before you experience the urge to pass urine?  No   Do you have \"triggers\" that make you feel you can't wait to go to the toilet?  No   Number of bladder infections last year?  0   Frequency of bowel movements:  Daily   Consistency of stool?  Soft formed   Do you ignore the urge to defecate?  No   Women's Health Questions   Number of pregnancies  2   Number of vaginal deliveries  2   Number of  section deliveries  0   Weight of largest baby  7lbs 15.5oz   Number of episiotomies  0   Pelvic Floor Dysfunction Objective Findings   Type of Storage Problem stress incontinence;nocturial enuresis   Type of Emptying Problem postvoid dribbling;incomplete emptying   Protection needed None   Power (MMT at [facility]) Not formally assessed today d/t time constraints of session.    Planned Therapy Interventions   Planned Therapy Interventions joint " mobilization;manual therapy;motor coordination training;neuromuscular re-education;strengthening;stretching   Planned Modality Interventions   Planned Modality Interventions Biofeedback;Electrical stimulation   Clinical Impression   Criteria for Skilled Therapeutic Interventions Met yes, treatment indicated   PT Diagnosis Impaired function at the PFM   Influenced by the following impairments weakness (per subjective report of symptoms), poor knowledge of control/coordination of PFM   Functional limitations due to impairments Stress UI, urgency, double voiding   Clinical Presentation Stable/Uncomplicated   Clinical Presentation Rationale Pt states very gradual progression to present state with PFM and leaking. Predictable pattern to leaking. Minimal comorbiditites.   Clinical Decision Making (Complexity) Low complexity   Therapy Frequency 1 time/week   Predicted Duration of Therapy Intervention (days/wks) 8 weeks, weaning to every other week for up to 6 sessions   Risk & Benefits of therapy have been explained Yes   Patient, Family & other staff in agreement with plan of care Yes   Clinical Impression Comments Pt presents with 2+ yr h/o gradually worsening urinary leakage. Primarily leaking at nite when getting up to go to the bathroom. Additionally has mild dribble leaking during the day, and occasional double voids and post void dribbling. Pt could benefit from learning helpful patterns for voiding and fluid intake, and learn formal PFM exercise program to prevent leaking.    Education Assessment   Preferred Learning Style Listening;Reading;Demonstration;Pictures/video   Barriers to Learning No barriers   Ortho Goal 1   Goal Identifier STG   Goal Description 1) Pt will improve fluid intake to dilute urine and report 4/7 nites dry without leaking on way to toilet, in 4 weeks.   Target Date 12/13/17   Ortho Goal 2   Goal Identifier STG   Goal Description 2)Pt will report no postvoid dribbling in 4 weeks.   Target  Date 12/13/17   Ortho Goal 3   Goal Identifier LTG   Goal Description 3)Pt will report void times consistently for every 3-4 hours, in 6 weeks.   Target Date 12/27/17   Ortho Goal 4   Goal Identifier LTG   Goal Description 4)Pt will report no nighttime leaking in 8 weeks.   Target Date 01/10/18   Ortho Goal 5   Goal Identifier LTG   Goal Description 5)Pt will be indep in HEP to prevent return of symptoms in 8 weeks.    Target Date 01/10/18   Total Evaluation Time   Total Evaluation Time 35   Therapy Certification   Certification date from 11/15/17   Certification date to 01/10/18   Medical Diagnosis Female Stress Urinary Incontinence   Thank you for the referral of this patient.  Danni Stubbs, PT, MA  #[id number]

## 2017-11-16 NOTE — PROGRESS NOTES
[facility]          OUTPATIENT PHYSICAL THERAPY ORTHOPEDIC EVALUATION  PLAN OF TREATMENT FOR OUTPATIENT REHABILITATION  (COMPLETE FOR INITIAL CLAIMS ONLY)  Patient's Last Name, First Name, M.I.  YOB: 1940  YehudaRosie  C    Provider s Name:  [facility]   Medical Record No.  [id number]   Start of Care Date:  11/15/17   Onset Date:  10/27/17   Type:     _X__PT   ___OT   ___SLP Medical Diagnosis:  Female Stress Urinary Incontinence     PT Diagnosis:  Impaired function at the PFM   Visits from SOC:  1      _________________________________________________________________________________  Plan of Treatment/Functional Goals:  joint mobilization, manual therapy, motor coordination training, neuromuscular re-education, strengthening, stretching     Biofeedback, Electrical stimulation     Goals  Goal Identifier: STG  Goal Description: 1) Pt will improve fluid intake to dilute urine and report 4/7 nites dry without leaking on way to toilet, in 4 weeks.  Target Date: 12/13/17    Goal Identifier: STG  Goal Description: 2)Pt will report no postvoid dribbling in 4 weeks.  Target Date: 12/13/17    Goal Identifier: LTG  Goal Description: 3)Pt will report void times consistently for every 3-4 hours, in 6 weeks.  Target Date: 12/27/17    Goal Identifier: LTG  Goal Description: 4)Pt will report no nighttime leaking in 8 weeks.  Target Date: 01/10/18    Goal Identifier: LTG  Goal Description: 5)Pt will be indep in HEP to prevent return of symptoms in 8 weeks.   Target Date: 01/10/18       Therapy Frequency:  1 time/week  Predicted Duration of Therapy Intervention:  8 weeks, weaning to every other week for up to 6 sessions    Danni Stubbs, PT                 I CERTIFY THE NEED FOR THESE SERVICES FURNISHED UNDER        THIS PLAN OF TREATMENT AND WHILE UNDER MY CARE     (Physician co-signature of this document indicates review and certification of the therapy plan).                          Certification Date From:  11/15/17   Certification Date To:  01/10/18    Referring Provider:  Laya Morales MD    Initial Assessment        See Epic Evaluation Start of Care Date: 11/15/17

## 2017-11-27 ENCOUNTER — HOSPITAL ENCOUNTER (OUTPATIENT)
Dept: PHYSICAL THERAPY | Facility: CLINIC | Age: 77
Setting detail: THERAPIES SERIES
End: 2017-11-27
Attending: FAMILY MEDICINE
Payer: MEDICARE

## 2017-11-27 PROCEDURE — 90911 ZZHC PT BIOFEEDBACK (PFM): CPT | Mod: GP | Performed by: PHYSICAL THERAPIST

## 2017-11-27 PROCEDURE — 40000841 ZZH STATISTIC WOMEN'S HEALTH VISIT: Performed by: PHYSICAL THERAPIST

## 2017-11-27 PROCEDURE — 97110 THERAPEUTIC EXERCISES: CPT | Mod: GP,59 | Performed by: PHYSICAL THERAPIST

## 2017-12-05 ENCOUNTER — HOSPITAL ENCOUNTER (OUTPATIENT)
Dept: PHYSICAL THERAPY | Facility: CLINIC | Age: 77
Setting detail: THERAPIES SERIES
End: 2017-12-05
Attending: FAMILY MEDICINE
Payer: MEDICARE

## 2017-12-05 PROCEDURE — 97110 THERAPEUTIC EXERCISES: CPT | Mod: GP | Performed by: PHYSICAL THERAPIST

## 2017-12-05 PROCEDURE — 40000841 ZZH STATISTIC WOMEN'S HEALTH VISIT: Performed by: PHYSICAL THERAPIST

## 2017-12-05 PROCEDURE — 90911 ZZHC PT BIOFEEDBACK (PFM): CPT | Mod: GP | Performed by: PHYSICAL THERAPIST

## 2017-12-12 ENCOUNTER — HOSPITAL ENCOUNTER (OUTPATIENT)
Dept: PHYSICAL THERAPY | Facility: CLINIC | Age: 77
Setting detail: THERAPIES SERIES
End: 2017-12-12
Attending: FAMILY MEDICINE
Payer: MEDICARE

## 2017-12-12 PROCEDURE — 40000841 ZZH STATISTIC WOMEN'S HEALTH VISIT: Performed by: PHYSICAL THERAPIST

## 2017-12-12 PROCEDURE — 97110 THERAPEUTIC EXERCISES: CPT | Mod: GP,XU | Performed by: PHYSICAL THERAPIST

## 2017-12-12 PROCEDURE — 90911 ZZHC PT BIOFEEDBACK (PFM): CPT | Mod: GP | Performed by: PHYSICAL THERAPIST

## 2017-12-19 ENCOUNTER — HOSPITAL ENCOUNTER (OUTPATIENT)
Dept: PHYSICAL THERAPY | Facility: CLINIC | Age: 77
Setting detail: THERAPIES SERIES
End: 2017-12-19
Attending: FAMILY MEDICINE
Payer: MEDICARE

## 2017-12-19 PROCEDURE — 97110 THERAPEUTIC EXERCISES: CPT | Mod: GP | Performed by: PHYSICAL THERAPIST

## 2017-12-19 PROCEDURE — 90911 ZZHC PT BIOFEEDBACK (PFM): CPT | Mod: GP | Performed by: PHYSICAL THERAPIST

## 2017-12-19 PROCEDURE — 40000841 ZZH STATISTIC WOMEN'S HEALTH VISIT: Performed by: PHYSICAL THERAPIST

## 2017-12-26 ENCOUNTER — HOSPITAL ENCOUNTER (OUTPATIENT)
Dept: PHYSICAL THERAPY | Facility: CLINIC | Age: 77
Setting detail: THERAPIES SERIES
End: 2017-12-26
Attending: FAMILY MEDICINE
Payer: MEDICARE

## 2017-12-26 PROCEDURE — G8987 SELF CARE CURRENT STATUS: HCPCS | Mod: GP,CJ | Performed by: PHYSICAL THERAPIST

## 2017-12-26 PROCEDURE — 97110 THERAPEUTIC EXERCISES: CPT | Mod: GP | Performed by: PHYSICAL THERAPIST

## 2017-12-26 PROCEDURE — 40000841 ZZH STATISTIC WOMEN'S HEALTH VISIT: Performed by: PHYSICAL THERAPIST

## 2017-12-26 PROCEDURE — G8988 SELF CARE GOAL STATUS: HCPCS | Mod: GP,CI | Performed by: PHYSICAL THERAPIST

## 2017-12-26 PROCEDURE — 90911 ZZHC PT BIOFEEDBACK (PFM): CPT | Mod: GP | Performed by: PHYSICAL THERAPIST

## 2017-12-27 NOTE — PROGRESS NOTES
"Physical Therapy Progress Note and Medicare RECERTIFICATION    Rosie Blackman  1940    Session Number: 6/8 (, [facility]) since start of care.    Reasons for Continuing Treatment:   Pt continues to show steady progress in her symptom report, with less leaking and better control of her urges. Pt could benefit from further progression of HEP and training with BF in clinic to maximize performance of her PFM.    Frequency/Duration  1 times per week for 6 weeks, weaning to every other week, for a total of 3 visits.    Recertification Period  12/26/17 - 1/31/18    Physician Signature:    Date:    X_______________________________________________________    Physician Name: Laya Morales MD    I certify the need for these services furnished under this plan of treatment and while under my care. Physician co-signature of this document indicates review and certification of the therapy plan.  This signature may be written on paper, or electronically signed within EPIC.          12/26/17 1500   Signing Clinician's Name / Credentials   Signing clinician's name / credentials Danni Stubbs, PT MA #[id number]   Session Number   Session Number 6/8 (, [facility])   Progress Note/Recertification   Progress Note Due Date 01/10/18   Recertification Due Date 01/10/18   Self Care   Self Care Current Status,  (eval/re-eval & every progress note) CJ: 20-39% impairment   Current Self Care Modifier Rationale Professional Judgment and BETTY-6/AUA score   Self Care Goal,  (eval/re-eval, every progress note, & discharge) CI: 1-19% impairment   Ortho Goal 1   Goal Identifier STG   Goal Description 1) Pt will improve fluid intake to dilute urine and report 4/7 nites dry without leaking on way to toilet, in 4 weeks. Met: pt states \"no, I am not leaking on the way to the toilet any more.\"   (Cont for 2 more weeks)   Target Date 12/26/17   Date Met 12/26/17   Ortho Goal 2   Goal Identifier STG   Goal Description 2)Pt will report no postvoid " "dribbling in 4 weeks.  Met: not getting leaking/dribbles after she stands.   Target Date 12/13/17   Date Met 12/12/17   Ortho Goal 3   Goal Identifier LTG   Goal Description 3)Pt will report void times consistently for every 3-4 hours, in 6 weeks.  Not Met: is going about every 2-3 hours, unless goes in a car ride then will have to go right when gets to destination.   (Cont for 2 more weeks)   Target Date 01/09/18   Ortho Goal 4   Goal Identifier LTG   Goal Description 4)Pt will report no nighttime leaking in 8 weeks.  Met: it was really just leaking when I was getting to the bathroom at nite or in the early morning. Not happening anymore.   Target Date 01/10/18   Date Met 12/26/17   Ortho Goal 5   Goal Identifier LTG   Goal Description 5)Pt will be indep in HEP to prevent return of symptoms in 8 weeks.  Ongoing Goal: pt is improving, but exers are being adjusted and advanced every session.   Target Date 01/10/18   Subjective Report   Subjective Report Been having a tickly throat the last week and been coughing, so has had some leaking.  \"Not like it's been in the past.\"   Objective Measure 1   Objective Measure Leaking    Details With coughing still, but \"less.\"    Objective Measure 2   Objective Measure Biofeedback   Details Abdominals: not measured. PFM: Max = 32uV (\"I may have held my breath.\"), 24uV when breathing regularly. Avg = 16uV over 10 sec, 17-18uV with 3 sec repeats. Rest = 1uV, and endurance = 10 sec (only 2 reps then quickly fatigues after 4-5 sec.   Objective Measure 3   Objective Measure PFM Strength   Details 4/5   Biofeedback   Minutes 15    Skilled Intervention PFM BF   Patient Response Pt does better with visual cue for \"how good I am doing.\"    Treatment Detail Pt in supported supine position and vaginal probe and abdominal surface EMG electrodes placed. BF administered using the eBuddy MR20 device and Synergy 3D software. Pt guided through quick and hold contractions using BF to enhance " quality and control of muscle contractions.   Therapeutic Procedure/exercise   Minutes 30   Skilled Intervention Exer: strengthening, progression of HEP   Patient Response Pt asking very appropriate questions re: specifics of positioning and sequencing of new exer. Able to demo correct performance of TA in 4-pt.   Treatment Detail Reviewed all of current HEP. INstructed in TA in 4-pt.  Pt required extensive re-direction for fullest excursion of spine, keeping spine still during TA activation and to keep breathing during exer.  Educated on reasoning/purpose of new exer and expected outcomes of strengthening. Completed 3 reps of 10 sec holds with cues for breathing. Completed 10 reps of quicks with cues for breathing and correct peformance.    Plan   Home program Issued sheet on TA in quadruped.   Plan for next session See pt in 1.5 weeks.  Then pt will be on 2 week vacation. Will do 1-2 f/u sessions after vacation and possibly d/c to home program.    Total Session Time   Timed Code Treatment Minutes 30 (2TE)   Total Treatment Time (sum of timed and untimed services) 45 (BF, 2TE)   Thank you for the referral of this patient.  Danni Stubbs, PT, MA  #[id number]

## 2018-01-04 ENCOUNTER — HOSPITAL ENCOUNTER (OUTPATIENT)
Dept: PHYSICAL THERAPY | Facility: CLINIC | Age: 78
Setting detail: THERAPIES SERIES
End: 2018-01-04
Attending: FAMILY MEDICINE
Payer: MEDICARE

## 2018-01-04 PROCEDURE — 97110 THERAPEUTIC EXERCISES: CPT | Mod: GP | Performed by: PHYSICAL THERAPIST

## 2018-01-04 PROCEDURE — 40000841 ZZH STATISTIC WOMEN'S HEALTH VISIT: Performed by: PHYSICAL THERAPIST

## 2018-01-04 PROCEDURE — G8987 SELF CARE CURRENT STATUS: HCPCS | Mod: GP,CI | Performed by: PHYSICAL THERAPIST

## 2018-01-04 PROCEDURE — 90911 ZZHC PT BIOFEEDBACK (PFM): CPT | Mod: GP | Performed by: PHYSICAL THERAPIST

## 2018-01-04 PROCEDURE — G8988 SELF CARE GOAL STATUS: HCPCS | Mod: GP,CI | Performed by: PHYSICAL THERAPIST

## 2018-01-04 NOTE — PROGRESS NOTES
"Physical Therapy Progress Note and Medicare RECERTIFICATION    Rosie Blackman  1940    Session Number: 76/8 (, [facility]) since start of care.    Reasons for Continuing Treatment:   Pt is going on vacation and doesn't want to d/c in case her symptoms worsen with her vacation habits. Will do final recheck and possibly d/c at next session.     Frequency/Duration  1 times per every other week for 4 weeks for a total of 2 visits.    Recertification Period  1/10/18 - 2/10/18    Physician Signature:    Date:    X_______________________________________________________    Physician Name: Laya Morales MD    I certify the need for these services furnished under this plan of treatment and while under my care. Physician co-signature of this document indicates review and certification of the therapy plan.  This signature may be written on paper, or electronically signed within EPIC.          01/04/18 1000   Signing Clinician's Name / Credentials   Signing clinician's name / credentials Danni Stubbs, PT MA #[id number]   Session Number   Session Number 76/8 (, [facility])   Progress Note/Recertification   Progress Note Due Date 01/10/18   Progress Note Completed Date 01/04/18   Recertification Due Date 01/10/18   Self Care   Self Care Current Status,  (eval/re-eval & every progress note) CI: 1-19% impairment   Current Self Care Modifier Rationale Professional Judgment, BETTY-6 score, and AUA score.    Self Care Goal,  (eval/re-eval, every progress note, & discharge) CI: 1-19% impairment   Ortho Goal 1   Goal Identifier STG   Goal Description 1) Pt will improve fluid intake to dilute urine and report 4/7 nites dry without leaking on way to toilet, in 4 weeks. Met: pt states \"no, I am not leaking on the way to the toilet any more.\"   (Cont for 2 more weeks)   Target Date 12/26/17   Date Met 12/26/17   Ortho Goal 2   Goal Identifier STG   Goal Description 2)Pt will report no postvoid dribbling in 4 weeks.  Met: not getting " "leaking/dribbles after she stands.   Target Date 12/13/17   Date Met 12/12/17   Ortho Goal 3   Goal Identifier LTG   Goal Description 3)Pt will report void times consistently for every 3-4 hours, in 6 weeks.  Not Met: is going about every 2-3 hours, unless goes in a car ride then will have to go right when gets to destination.   (Cont for 2 more weeks)   Target Date 01/09/18   Ortho Goal 4   Goal Identifier LTG   Goal Description 4)Pt will report no nighttime leaking in 8 weeks.  Met: it was really just leaking when I was getting to the bathroom at nite or in the early morning. Not happening anymore.   Target Date 01/10/18   Date Met 12/26/17   Ortho Goal 5   Goal Identifier LTG   Goal Description 5)Pt will be indep in HEP to prevent return of symptoms in 8 weeks.  Ongoing Goal: pt is improving, but exers are being adjusted and advanced every session.   Target Date 01/10/18   Subjective Report   Subjective Report States she has definitely made progress, and her symptoms are better. \"Things are good.\" Still sleeping and waking with an urge, but is able to control it and get to the bathroom without a leak.   Objective Measure 1   Objective Measure Leaking    Details Not leaking during the day, her cough has been much better.  No longer have urge leak upon waking.    Objective Measure 2   Objective Measure Biofeedback   Details Abdominals: not monitored today - has shown good level control in past sessions. PFM: Max = 19uV, Avg = 15uV, rest = .1uV, and endurance = 7 sec   Objective Measure 3   Objective Measure PFM Recruitment Strength   Details 3+/5   Biofeedback   Minutes 15    Skilled Intervention PFM BF   Patient Response Pt had a little lower recruitment values today, but states she has been a little lacks on her exercises over the busy holidays. Values do not seem to affect her symptom control at this time.   Treatment Detail Pt in supported supine position and vaginal probe and abdominal surface EMG electrodes " "placed. BF administered using the [employer] MR20 device and Synergy 3D software. Pt guided through quick and hold contractions using BF to enhance quality and control of muscle contractions.   Therapeutic Procedure/exercise   Minutes 40   Skilled Intervention Exer: strengthening, isolation/awareness; progression of HEP   Patient Response Pt is very accurate in recalling her HEP.  Willing to try and make exers more functional, by doing in various positions throughout the day.    Treatment Detail Reviewed TA in 4-pt and upgraded clams to using RTB.  Taught and added standing plies to home program.  Completed quicks with cues to do with \"no detectable body mvmt.\"  (Pt was using more of her gluteals).  Completed 10 reps of 6 sec contractions and 5 reps or 7 sec contractions. Given adequate rest and monitored for correct performance.    Plan   Home program Sheet given for standing plies with PFM contraction.    Plan for next session Pt going on vacation for 2 weeks. Will see upon her return and possibly d/c to home program.   Total Session Time   Timed Code Treatment Minutes 40 (3TE)   Total Treatment Time (sum of timed and untimed services) 55 (BF,,3TE)   Thank you for the referral of this patient.  Danni Stubbs, PT, MA  #[id number]      "

## 2018-01-23 ENCOUNTER — OFFICE VISIT (OUTPATIENT)
Dept: DERMATOLOGY | Facility: CLINIC | Age: 78
End: 2018-01-23
Payer: COMMERCIAL

## 2018-01-23 VITALS — OXYGEN SATURATION: 98 % | SYSTOLIC BLOOD PRESSURE: 128 MMHG | DIASTOLIC BLOOD PRESSURE: 70 MMHG | HEART RATE: 75 BPM

## 2018-01-23 DIAGNOSIS — D22.9 MULTIPLE BENIGN NEVI: ICD-10-CM

## 2018-01-23 DIAGNOSIS — D18.01 CHERRY ANGIOMA: ICD-10-CM

## 2018-01-23 DIAGNOSIS — L57.0 AK (ACTINIC KERATOSIS): ICD-10-CM

## 2018-01-23 DIAGNOSIS — L82.1 SEBORRHEIC KERATOSIS: Primary | ICD-10-CM

## 2018-01-23 DIAGNOSIS — L81.4 LENTIGO: ICD-10-CM

## 2018-01-23 PROCEDURE — 17003 DESTRUCT PREMALG LES 2-14: CPT | Performed by: PHYSICIAN ASSISTANT

## 2018-01-23 PROCEDURE — 99213 OFFICE O/P EST LOW 20 MIN: CPT | Mod: 25 | Performed by: PHYSICIAN ASSISTANT

## 2018-01-23 PROCEDURE — 17000 DESTRUCT PREMALG LESION: CPT | Performed by: PHYSICIAN ASSISTANT

## 2018-01-23 NOTE — MR AVS SNAPSHOT
After Visit Summary   1/23/2018    Rosie Blackman    MRN: [id number]           Patient Information     Date Of Birth          1940        Visit Information        Provider Department      1/23/2018 9:40 AM Nguyen Haq PA-C [facility]        Care Instructions    WOUND CARE INSTRUCTIONS   FOR CRYOSURGERY   This area treated with liquid nitrogen will form a blister. You do not need to bandage the area until after the blister forms and breaks (which may be a few days). When the blister breaks, begin daily dressing changes as follows:   1) Clean and dry the area with tap water using clean Q-tip or sterile gauze pad.   2) Apply Polysporin ointment or Bacitracin ointment over entire wound. Do NOT use Neosporin ointment.   3) Cover the wound with a band-aid or sterile non-stick gauze pad and micropore paper tape.   REPEAT THESE INSTRUCTIONS AT LEAST ONCE A DAY UNTIL THE WOUND HAS COMPLETELY HEALED.   It is an old wives tale that a wound heals better when it is exposed to air and allowed to dry out. The wound will heal faster with a better cosmetic result if it is kept moist with ointment and covered with a bandage.   Do not let the wound dry out.   IMPORTANT INFORMATION ON REVERSE SIDE   Supplies Needed:   *Cotton tipped applicators (Q-tips)   *Polysporin ointment or Bacitracin ointment (NOT NEOSPORIN)   *Band-aids, or non stick gauze pads and micropore paper tape   PATIENT INFORMATION   During the healing process you will notice a number of changes. All wounds develop a small halo of redness surrounding the wound. This means healing is occurring. Severe itching with extensive redness usually indicates sensitivity to the ointment or bandage tape used to dress the wound. You should call our office if this develops.   Swelling and/or discoloration around your surgical site is common, particularly when performed around the eye.   All wounds normally drain. The larger the wound the  more drainage there will be. After 7-10 days, you will notice the wound beginning to shrink and new skin will begin to grow. The wound is healed when you can see skin has formed over the entire area. A healed wound has a healthy, shiny look to the surface and is red to dark pink in color to normalize. Wounds may take approximately 4-6 weeks to heal. Larger wounds may take 6-8 weeks. After the wound is healed you may discontinue dressing changes.   You may experience a sensation of tightness as your wound heals. This is normal and will gradually subside.   Your healed wound may be sensitive to temperature changes. This sensitivity improves with time, but if you re having a lot of discomfort, try to avoid temperature extremes.   Patients frequently experience itching after their wound appears to have healed because of the continue healing under the skin. Plain Vaseline will help relieve the itching.                 Follow-ups after your visit        Your next 10 appointments already scheduled     Jan 25, 2018 10:30 AM CST   Women's Health Treatment with Danni Stubbs PT   [facility] ([facility])    [address] [address]   [phone]            Feb 01, 2018 10:30 AM CST   Women's [facility] Treatment with Danni Stubbs PT   [facility] ([facility])    [address] [address]   [phone]              Who to contact     If you have questions or need follow up information about today's clinic visit or your schedule please contact [facility] directly at [phone].  Normal or non-critical lab and imaging results will be communicated to you by MyChart, letter or phone within 4 business days after the clinic has received the results. If you do not hear from us within 7 days, please contact the clinic through MyChart or phone. If you have a critical or abnormal lab result,  we will notify you by phone as soon as possible.  Submit refill requests through [facility] or call your pharmacy and they will forward the refill request to us. Please allow 3 business days for your refill to be completed.          Additional Information About Your Visit        [employer] Information     [employer] [facility] gives you secure access to your electronic health record. If you see a primary care provider, you can also send messages to your care team and make appointments. If you have questions, please call your primary care clinic.  If you do not have a primary care provider, please call [phone] and they will assist you.        Care EveryWhere ID     This is your Care EveryWhere ID. This could be used by other organizations to access your [place] medical records  [id number]        Your Vitals Were     Pulse Pulse Oximetry                75 98%           Blood Pressure from Last 3 Encounters:   01/23/18 128/70   10/27/17 132/74   04/04/17 136/84    Weight from Last 3 Encounters:   10/27/17 95.7 kg (211 lb)   04/04/17 95 kg (209 lb 6.4 oz)   10/25/16 93 kg (205 lb)              Today, you had the following     No orders found for display       Primary Care Provider Office Phone # Fax #    Laya Morales [phone] [id number] [facility] [address]        Equal Access to Services     RASTA BLANCAS AH: Hadii aad ku hadasho Soomaali, waaxda luqadaha, qaybta kaalmada adeegyada, waxeileen idiin hayaan rox tesfaye . So [facility] [phone].    ATENCIÓN: Si habla español, tiene a castellanos disposición servicios gratuitos de asistencia lingüística. Leifame al [phone].    We comply with applicable federal civil rights laws and [place] laws. We do not discriminate on the basis of race, color, national origin, age, disability, sex, sexual orientation, or gender identity.            Thank you!     Thank you for choosing [facility]  for your care. Our goal is always to provide you with  excellent care. Hearing back from our patients is one way we can continue to improve our services. Please take a few minutes to complete the written survey that you may receive in the mail after your visit with us. Thank you!             Your Updated Medication List - Protect others around you: Learn how to safely use, store and throw away your medicines at www.disposemymeds.org.          This list is accurate as of: 1/23/18 10:05 AM.  Always use your most recent med list.                   Brand Name Dispense Instructions for use Diagnosis    ALEVE PO           ASPIRIN NOT PRESCRIBED    INTENTIONAL     Reported on 4/4/2017        * estradiol 0.5 MG tablet    ESTRACE    90 tablet    Take 1/2 tablet daily    Symptomatic menopausal or female climacteric states       * estradiol 0.5 MG tablet    ESTRACE    90 tablet    TAKE 1/2 TABLET DAILY    Symptomatic menopausal or female climacteric states       fish oil-omega-3 fatty acids 1000 MG capsule      Take 1 g by mouth daily.        pravastatin 80 MG tablet    PRAVACHOL    90 tablet    Take 1 tablet (80 mg) by mouth daily    Hyperlipidemia LDL goal <130       THERATEARS 0.25 % Soln   Generic drug:  Carboxymethylcellulose Sodium      BID        triamcinolone 0.1 % cream    KENALOG    80 g    aaa 2 times per day for up to 2 weeks as needed    Eczema       valsartan-hydrochlorothiazide 160-12.5 MG per tablet    DIOVAN-HCT    90 tablet    Take 1 tablet by mouth daily    Essential hypertension, benign       VIACTIV 500-100-40 MG-UNT-MCG Chew   Generic drug:  Calcium-Vitamin D-Vitamin K      once daily        * Notice:  This list has 2 medication(s) that are the same as other medications prescribed for you. Read the directions carefully, and ask your doctor or other care provider to review them with you.

## 2018-01-23 NOTE — NURSING NOTE
"Initial /70  Pulse 75  SpO2 98% Estimated body mass index is 37.38 kg/(m^2) as calculated from the following:    Height as of 10/27/17: 1.6 m (5' 3\").    Weight as of 10/27/17: 95.7 kg (211 lb). .      "

## 2018-01-23 NOTE — LETTER
2018         RE: Rosie Blackman  [address] [address]        Dear Colleague,    Thank you for referring your patient, Rosie Blackman, to the [facility]. Please see a copy of my visit note below.    Rosie Blackman is a 78 year old year old female patient here today for skin check.  Patient rough area on nose. She reports that she has had precancerous spot on nose and face.  Patient has no other skin complaints today.  Remainder of the HPI, Meds, PMH, Allergies, FH, and SH was reviewed in chart.    Pertinent Hx:   No personal history of skin cancer  Past Medical History:   Diagnosis Date     Chronic airway obstruction (H)      Hypertension      Personal history of contact with and (suspected) exposure to asbestos        Past Surgical History:   Procedure Laterality Date     BREAST BIOPSY, CORE RT/LT       C ANESTH,KNEE VEINS SURGERY  2014     CHOLECYSTECTOMY       COLONOSCOPY  5/10    Diverticulosis, colon polyps; repeat 5 yrs     COLONOSCOPY N/A 2015    Procedure: COLONOSCOPY;  Surgeon: Alejandro Matos MD;  Location: [facility]     Colonoscopy, hyperplastic polyps, repeat in 5 yrs       ESOPHAGOSCOPY, GASTROSCOPY, DUODENOSCOPY (EGD), COMBINED  2013    Procedure: COMBINED ESOPHAGOSCOPY, GASTROSCOPY, DUODENOSCOPY (EGD), BIOPSY SINGLE OR MULTIPLE;  Gastroscopy;  Surgeon: Blaise Gill MD;  Location: [facility]     EYE SURGERY      R/L Cataracts     HC REMOVE TONSILS/ADENOIDS,12+ Y/O      T & A 13+y.o.     HYSTERECTOMY, PAP NO LONGER INDICATED       TVH for DUB, ovaries in       VASCULAR SURGERY      Taylor closure        Family History   Problem Relation Age of Onset     CANCER Mother      uterine cancer,      Respiratory Mother      COPD     Cardiovascular Mother      AAA  age 80     Breast Cancer Maternal Grandmother      CANCER Maternal Grandfather      cancer unknown type     Breast Cancer Sister      Eye Disorder Father       cataracts     Depression Father      Depression Son      Depression Son      Cancer - colorectal No family hx of        Social History     Social History     Marital status:      Spouse name: Nelson Blackman     Number of children: 2     Years of education: 13+     Occupational History           [employer] D[employer]     Social History Main Topics     Smoking status: Never Smoker     Smokeless tobacco: Never Used     Alcohol use No     Drug use: No     Sexual activity: No     Other Topics Concern     Parent/Sibling W/ Cabg, Mi Or Angioplasty Before 65f 55m? No     Social History Narrative       Outpatient Encounter Prescriptions as of 1/23/2018   Medication Sig Dispense Refill     estradiol (ESTRACE) 0.5 MG tablet TAKE 1/2 TABLET DAILY 90 tablet 2     valsartan-hydrochlorothiazide (DIOVAN-HCT) 160-12.5 MG per tablet Take 1 tablet by mouth daily 90 tablet 3     estradiol (ESTRACE) 0.5 MG tablet Take 1/2 tablet daily 90 tablet 3     pravastatin (PRAVACHOL) 80 MG tablet Take 1 tablet (80 mg) by mouth daily 90 tablet 3     Naproxen Sodium (ALEVE PO)        triamcinolone (KENALOG) 0.1 % cream aaa 2 times per day for up to 2 weeks as needed 80 g 2     fish oil-omega-3 fatty acids (OMEGA 3) 1000 MG capsule Take 1 g by mouth daily.       THERATEARS 0.25 % OP SOLN BID       ASPIRIN NOT PRESCRIBED (INTENTIONAL) Reported on 4/4/2017       VIACTIV 500-100-40 OR CHEW once daily       No facility-administered encounter medications on file as of 1/23/2018.              Review Of Systems  Skin: As above  Eyes: negative  Ears/Nose/Throat: negative  Respiratory: No shortness of breath, dyspnea on exertion, cough, or hemoptysis  Cardiovascular: negative  Gastrointestinal: negative  Genitourinary: negative  Musculoskeletal: negative  Neurologic: negative  Psychiatric: negative  Hematologic/Lymphatic/Immunologic: negative  Endocrine: negative      O:   NAD, WDWN, Alert & Oriented, Mood & Affect wnl, Vitals  stable   Here today alone   /70  Pulse 75  SpO2 98%   General appearance normal   Vitals stable   Alert, oriented and in no acute distress      Pink gritty papule on nose x 2   Pink gritty papule on left cheek x 2, right cheek x 2   Brown macules, brown stuck on papules, and red papules on torso and extremities   Brown papules and macules with regular pigment network and borders on torso and extremities       Eyes: Conjunctivae/lids:Normal     ENT: Lips    MSK:Normal    Pulm: Breathing Normal    Neuro/Psych: Orientation:Normal; Mood/Affect:Normal  A/P:  1. Actinic Keratoses on nose x 2, left cheek x 2 and right cheek x 2  LN2:  Treated with LN2 for 5s for 1-2 cycles. Warned risks of blistering, pain, pigment change, scarring, and incomplete resolution.  Advised patient to return if lesions do not completely resolve.  Wound care sheet given.  2. Seborrheic keratosis, lentigo, cherry angioma, benign nevi   BENIGN LESIONS DISCUSSED WITH PATIENT:  I discussed the specifics of tumor, prognosis, and genetics of benign lesions.  I explained that treatment of these lesions would be purely cosmetic and not medically neccessary.  I discussed with patient different removal options including excision, cautery and /or laser.      Nature and genetics of benign skin lesions dicussed with patient.  Signs and Symptoms of skin cancer discussed with patient.  ABCDEs of melanoma reviewed with patient.  Patient encouraged to perform monthly skin exams.  UV precautions reviewed with patient.  Skin care regimen reviewed with patient: Eliminate harsh soaps, i.e. Dial, zest, irsih spring; Mild soaps such as Cetaphil or Dove sensitive skin, avoid hot or cold showers, aggressive use of emollients including vanicream, cetaphil or cerave discussed with patient.    Risks of non-melanoma skin cancer discussed with patient   Return to clinic one year or sooner if needed.       Again, thank you for allowing me to participate in the care of  your patient.        Sincerely,        Nguyen Nieto PA-C

## 2018-01-25 ENCOUNTER — HOSPITAL ENCOUNTER (OUTPATIENT)
Dept: PHYSICAL THERAPY | Facility: CLINIC | Age: 78
Setting detail: THERAPIES SERIES
End: 2018-01-25
Attending: FAMILY MEDICINE
Payer: MEDICARE

## 2018-01-25 PROCEDURE — 90911 ZZHC PT BIOFEEDBACK (PFM): CPT | Mod: GP | Performed by: PHYSICAL THERAPIST

## 2018-01-25 PROCEDURE — 40000841 ZZH STATISTIC WOMEN'S HEALTH VISIT: Performed by: PHYSICAL THERAPIST

## 2018-01-25 PROCEDURE — 97110 THERAPEUTIC EXERCISES: CPT | Mod: GP,XU | Performed by: PHYSICAL THERAPIST

## 2018-02-21 ENCOUNTER — OFFICE VISIT (OUTPATIENT)
Dept: FAMILY MEDICINE | Facility: CLINIC | Age: 78
End: 2018-02-21
Payer: COMMERCIAL

## 2018-02-21 VITALS
DIASTOLIC BLOOD PRESSURE: 81 MMHG | OXYGEN SATURATION: 99 % | HEIGHT: 63 IN | HEART RATE: 86 BPM | TEMPERATURE: 97.5 F | SYSTOLIC BLOOD PRESSURE: 138 MMHG

## 2018-02-21 DIAGNOSIS — T14.8XXA BLISTER: Primary | ICD-10-CM

## 2018-02-21 PROCEDURE — 99213 OFFICE O/P EST LOW 20 MIN: CPT | Performed by: NURSE PRACTITIONER

## 2018-02-21 NOTE — NURSING NOTE
"Chief Complaint   Patient presents with     Toe Injury       Initial /81 (BP Location: Left arm)  Pulse 86  Temp 97.5  F (36.4  C) (Tympanic)  Ht 5' 3\" (1.6 m)  SpO2 99% Estimated body mass index is 37.38 kg/(m^2) as calculated from the following:    Height as of 10/27/17: 5' 3\" (1.6 m).    Weight as of 10/27/17: 211 lb (95.7 kg).  Medication Reconciliation: complete  "

## 2018-02-21 NOTE — PROGRESS NOTES
"  SUBJECTIVE:   Rosie Blackman is a 78 year old female who presents to clinic today for the following health issues:      Concern - large blister on left great toe  Onset: one week    Description:   Patient here today with a large blister on the top of her left great toe that is getting bigger  Has a smaller blister on the 2nd and 3rd toes    Intensity: mild    Progression of Symptoms:  worsening    Accompanying Signs & Symptoms:  Just a little tender  But feels tight    Previous history of similar problem:   no    Precipitating factors:   Worsened by: unknown  No injury  No new shoes  No extra walking - she can't think how she would've gotten the blisters.    Alleviating factors:  Improved by: nothing    Therapies Tried and outcome: nothing        Problem list and histories reviewed & adjusted, as indicated.  Additional history: as documented    Reviewed and updated as needed this visit by clinical staff  Tobacco  Allergies  Meds       Reviewed and updated as needed this visit by Provider         ROS:  Constitutional, HEENT, cardiovascular, pulmonary, gi and gu systems are negative, except as otherwise noted.    OBJECTIVE:     /81 (BP Location: Left arm)  Pulse 86  Temp 97.5  F (36.4  C) (Tympanic)  Ht 5' 3\" (1.6 m)  SpO2 99%  There is no height or weight on file to calculate BMI.  GENERAL: healthy, alert and no distress  SKIN: Left great toe - dorsal surface, below the nail, large superficial blister with clear fluid, similar smaller blisters on 2nd and 3rd toes.    Area cleaned with alcohol wipes. Punctured each blister once with a blade. Clear yellow fluid drained. Bandaids applied. Patient tolerated well without complications.      ASSESSMENT/PLAN:       ICD-10-CM    1. Blister T14.8XXA      Blisters drained in clinic.  Covered with bandaids.  Follow up in clinic if needed.      The risks, benefits and treatment options of prescribed medications or other treatments have been discussed with the " patient. The patient verbalized their understanding and should call or follow up if no improvement or if they develop further problems.    DELMY Jha [facility]

## 2018-02-21 NOTE — PATIENT INSTRUCTIONS
Thank you for choosing [facility].  You may be receiving a survey in the mail from Leticia Manzanares regarding your visit today.  Please take a few minutes to complete and return the survey to let us know how we are doing.      If you have questions or concerns, please contact us via [employer]leaf or you can contact your care team at [phone].    Our Clinic hours are:  Monday 6:40 am  to 7:00 pm  Tuesday -Friday 6:40 am to 5:00 pm    The [place] outpatient lab hours are:  Monday - Friday 6:10 am to 4:45 pm  Saturdays 7:00 am to 11:00 am  Appointments are required, call [phone]    If you have clinical questions after hours or would like to schedule an appointment,  call the clinic at [phone].

## 2018-02-21 NOTE — MR AVS SNAPSHOT
After Visit Summary   2/21/2018    Rosie Blackman    MRN: [id number]           Patient Information     Date Of Birth          1940        Visit Information        Provider Department      2/21/2018 7:40 AM Shannan Rawls APRN [facility]        Today's Diagnoses     Blister    -  1      Care Instructions          Thank you for choosing [facility].  You may be receiving a survey in the mail from [facility] [employer] regarding your visit today.  Please take a few minutes to complete and return the survey to let us know how we are doing.      If you have questions or concerns, please contact us via Float: [place] or you can contact your care team at [phone].    Our Clinic hours are:  Monday 6:40 am  to 7:00 pm  Tuesday -Friday 6:40 am to 5:00 pm    The [place] outpatient lab hours are:  Monday - Friday 6:10 am to 4:45 pm  Saturdays 7:00 am to 11:00 am  Appointments are required, call [phone]    If you have clinical questions after hours or would like to schedule an appointment,  call the clinic at [phone].            Follow-ups after your visit        Who to contact     If you have questions or need follow up information about today's clinic visit or your schedule please contact [facility] directly at [phone].  Normal or non-critical lab and imaging results will be communicated to you by hive01hart, letter or phone within 4 business days after the clinic has received the results. If you do not hear from us within 7 days, please contact the clinic through PlayMobst or phone. If you have a critical or abnormal lab result, we will notify you by phone as soon as possible.  Submit refill requests through Float: [place] or call your pharmacy and they will forward the refill request to us. Please allow 3 business days for your refill to be completed.          Additional Information About Your Visit        hive01[place] Information     [employer]: [place] gives you secure access to  "your electronic health record. If you see a primary care provider, you can also send messages to your care team and make appointments. If you have questions, please call your primary care clinic.  If you do not have a primary care provider, please call [phone] and they will assist you.        Care EveryWhere ID     This is your Care EveryWhere ID. This could be used by other organizations to access your [place] medical records  [id number]        Your Vitals Were     Pulse Temperature Height Pulse Oximetry          86 97.5  F (36.4  C) (Tympanic) 5' 3\" (1.6 m) 99%         Blood Pressure from Last 3 Encounters:   02/21/18 138/81   01/23/18 128/70   10/27/17 132/74    Weight from Last 3 Encounters:   10/27/17 211 lb (95.7 kg)   04/04/17 209 lb 6.4 oz (95 kg)   10/25/16 205 lb (93 kg)              Today, you had the following     No orders found for display       Primary Care Provider Office Phone # Fax #    Laya Morales [phone] [address] [address] [facility] [address]        Equal Access to Services     [facility]: Hadii aad ku hadasho Soomaali, waaxda luqadaha, qaybta kaalmada adeegyada, woodrow carrion haysandyn rox tesfaye . So [facility] [phone].    ATENCIÓN: Si habla español, tiene a castellanos disposición servicios gratuitos de asistencia lingüística. Llame al [phone].    We comply with applicable federal civil rights laws and [place] laws. We do not discriminate on the basis of race, color, national origin, age, disability, sex, sexual orientation, or gender identity.            Thank you!     Thank you for choosing [facility]  for your care. Our goal is always to provide you with excellent care. Hearing back from our patients is one way we can continue to improve our services. Please take a few minutes to complete the written survey that you may receive in the mail after your visit with us. Thank you!             Your Updated Medication List - Protect others " around you: Learn how to safely use, store and throw away your medicines at www.disposemymeds.org.          This list is accurate as of 2/21/18  9:00 AM.  Always use your most recent med list.                   Brand Name Dispense Instructions for use Diagnosis    ALEVE PO           ASPIRIN NOT PRESCRIBED    INTENTIONAL     Reported on 4/4/2017        * estradiol 0.5 MG tablet    ESTRACE    90 tablet    Take 1/2 tablet daily    Symptomatic menopausal or female climacteric states       * estradiol 0.5 MG tablet    ESTRACE    90 tablet    TAKE 1/2 TABLET DAILY    Symptomatic menopausal or female climacteric states       fish oil-omega-3 fatty acids 1000 MG capsule      Take 1 g by mouth daily.        pravastatin 80 MG tablet    PRAVACHOL    90 tablet    Take 1 tablet (80 mg) by mouth daily    Hyperlipidemia LDL goal <130       THERATEARS 0.25 % Soln   Generic drug:  Carboxymethylcellulose Sodium      BID        triamcinolone 0.1 % cream    KENALOG    80 g    aaa 2 times per day for up to 2 weeks as needed    Eczema       valsartan-hydrochlorothiazide 160-12.5 MG per tablet    DIOVAN-HCT    90 tablet    Take 1 tablet by mouth daily    Essential hypertension, benign       VIACTIV 500-100-40 MG-UNT-MCG Chew   Generic drug:  Calcium-Vitamin D-Vitamin K      once daily        * Notice:  This list has 2 medication(s) that are the same as other medications prescribed for you. Read the directions carefully, and ask your doctor or other care provider to review them with you.

## 2018-03-20 ENCOUNTER — TELEPHONE (OUTPATIENT)
Dept: LAB | Facility: CLINIC | Age: 78
End: 2018-03-20

## 2018-04-18 NOTE — PROGRESS NOTES
"PHYSICAL THERAPY DISCHARGE SUMMARY    DATE:  4/18/2018  NAME:  Rosie Blackman           MR#:  [id number]  YOB: 1940  Diagnosis:  Pelvic Floor Dysfunction  Referring Physician:  Laya Morales MD    Patient was seen from 11/15/17 to 1/25/18.    Session Number: 8/10 (DEE, SIRENA)      Subjective Report: \"I am doing very well. I am very pleased to where I have gotten.\"  Feels she is going longer time between voids, and not leaking with a strong urge.        Objective Measurements:  Objective Measure: Leaking  Details: Still having moments of leaking a few drops wiht coughing. Sneezind has not been a problem.   Objective Measure: Biofeedback  Details: Abdominals: 2uV at rest, and went up to 10uV (highest with PFM contraction). PFM: Max = 31uV (initial, but proceeded to decline with subsequent contractions to max of 17uV avg), rest = .1uV, and endurance = 6-7 seconds.  Objective Measure: Void Times  Details: Going about every 2-3 hours           Goals:    Goal Identifier STG   Goal Description 1) Pt will improve fluid intake to dilute urine and report 4/7 nites dry without leaking on way to toilet, in 4 weeks. Met: pt states \"no, I am not leaking on the way to the toilet any more.\"  (Cont for 2 more weeks)   Target Date 12/26/17   Date Met  12/26/17   Progress:     Goal Identifier STG   Goal Description 2)Pt will report no postvoid dribbling in 4 weeks.  Met: not getting leaking/dribbles after she stands.   Target Date 12/13/17   Date Met  12/12/17   Progress:     Goal Identifier LTG   Goal Description 3)Pt will report void times consistently for every 3-4 hours, in 6 weeks.  Not Met: is going about every 2-3 hours, unless goes in a car ride then will have to go right when gets to destination.  (Cont for 2 more weeks)   Target Date 01/09/18   Date Met      Progress:     Goal Identifier LTG   Goal Description 4)Pt will report no nighttime leaking in 8 weeks.  Met: it was really just leaking when I was " getting to the bathroom at nite or in the early morning. Not happening anymore.   Target Date 01/10/18   Date Met  12/26/17   Progress:     Goal Identifier LTG   Goal Description 5)Pt will be indep in HEP to prevent return of symptoms in 8 weeks.  Met   Target Date 01/10/18   Date Met          Progress Toward Goals:   Progress this reporting period: Met all goals, except she has continued higher frequency of voids.    Plan:  Discharge from therapy.  Cont with current HEP on her own.    Reason for Discharge:  Patient chooses to discontinue therapy.      Danni Stubbs, PT, MA  #[id number]

## 2018-04-18 NOTE — ADDENDUM NOTE
Encounter addended by: Danni Stubbs, PT on: 4/18/2018  2:47 PM<BR>     Actions taken: Sign clinical note, Flowsheet accepted, Episode resolved

## 2018-05-25 DIAGNOSIS — I10 ESSENTIAL HYPERTENSION, BENIGN: ICD-10-CM

## 2018-05-25 RX ORDER — VALSARTAN AND HYDROCHLOROTHIAZIDE 160; 12.5 MG/1; MG/1
TABLET, FILM COATED ORAL
Qty: 90 TABLET | Refills: 0 | OUTPATIENT
Start: 2018-05-25

## 2018-05-25 NOTE — TELEPHONE ENCOUNTER
"Requested Prescriptions   Pending Prescriptions Disp Refills     valsartan-hydrochlorothiazide (DIOVAN-HCT) 160-12.5 MG per tablet [Pharmacy Med Name: VALSARTAN-HCTZ 160-12.5 MG TAB] 90 tablet 0    Last Written Prescription Date:  10/27/17  Last Fill Quantity: 90,  # refills: 3   Last office visit: 2/21/2018 with prescribing provider:  2/21/18   Future Office Visit:     Sig: TAKE 1 TABLET BY MOUTH DAILY    Angiotensin-II Receptors Passed    5/25/2018  8:44 AM       Passed - Blood pressure under 140/90 in past 12 months    BP Readings from Last 3 Encounters:   02/21/18 138/81   01/23/18 128/70   10/27/17 132/74                Passed - Recent (12 mo) or future (30 days) visit within the authorizing provider's specialty    Patient had office visit in the last 12 months or has a visit in the next 30 days with authorizing provider or within the authorizing provider's specialty.  See \"Patient Info\" tab in inbasket, or \"Choose Columns\" in Meds & Orders section of the refill encounter.           Passed - Patient is age 18 or older       Passed - No active pregnancy on record       Passed - Normal serum creatinine on file in past 12 months    Recent Labs   Lab Test  10/18/17   0753   CR  0.78            Passed - Normal serum potassium on file in past 12 months    Recent Labs   Lab Test  10/18/17   0753   POTASSIUM  3.6                   Passed - No positive pregnancy test in past 12 months          "

## 2018-07-03 ENCOUNTER — OFFICE VISIT (OUTPATIENT)
Dept: FAMILY MEDICINE | Facility: CLINIC | Age: 78
End: 2018-07-03
Payer: COMMERCIAL

## 2018-07-03 VITALS
BODY MASS INDEX: 37.7 KG/M2 | TEMPERATURE: 97.9 F | WEIGHT: 212.8 LBS | SYSTOLIC BLOOD PRESSURE: 139 MMHG | HEART RATE: 60 BPM | DIASTOLIC BLOOD PRESSURE: 76 MMHG | HEIGHT: 63 IN

## 2018-07-03 DIAGNOSIS — R23.8 VENOUS LAKE: ICD-10-CM

## 2018-07-03 DIAGNOSIS — N95.1 SYMPTOMATIC MENOPAUSAL OR FEMALE CLIMACTERIC STATES: ICD-10-CM

## 2018-07-03 DIAGNOSIS — N89.8 VAGINAL DISCHARGE: Primary | ICD-10-CM

## 2018-07-03 DIAGNOSIS — N90.4 LICHEN SCLEROSUS OF FEMALE GENITALIA: ICD-10-CM

## 2018-07-03 LAB
SPECIMEN SOURCE: NORMAL
WET PREP SPEC: NORMAL

## 2018-07-03 PROCEDURE — 87210 SMEAR WET MOUNT SALINE/INK: CPT | Performed by: FAMILY MEDICINE

## 2018-07-03 PROCEDURE — 99213 OFFICE O/P EST LOW 20 MIN: CPT | Performed by: FAMILY MEDICINE

## 2018-07-03 RX ORDER — BETAMETHASONE DIPROPIONATE 0.5 MG/G
OINTMENT, AUGMENTED TOPICAL
Qty: 45 G | Refills: 0 | Status: SHIPPED | OUTPATIENT
Start: 2018-07-03 | End: 2018-11-28

## 2018-07-03 RX ORDER — ESTRADIOL 0.5 MG/1
TABLET ORAL
Qty: 90 TABLET | Refills: 3 | Status: CANCELLED | OUTPATIENT
Start: 2018-07-03

## 2018-07-03 RX ORDER — OMEGA-3-ACID ETHYL ESTERS 1 G/1
1 CAPSULE, LIQUID FILLED ORAL 2 TIMES DAILY
COMMUNITY
End: 2020-12-15

## 2018-07-03 NOTE — PROGRESS NOTES
SUBJECTIVE:                                                    Rosie Blackman is a 78 year old female who presents to clinic today for the following health issues:    Patient my chart appointment note:   Continued irritation of labia, perineum and new mole in this area      Vaginal Symptoms  Onset: On going, checked last visit 10/27/17    History:   Sexually active: no     Alleviating factors:   A and D - helping        Problem list and histories reviewed & adjusted, as indicated.  Additional history: she went to therapy for lower floor weakness and has had good success   She is now having some tenderness and irritation in the left labial area last time it was looked at there was not anything visible.  She then started having some burning   She has since been using a and d daily in the perineum   She is also having the labial irritation still   There is a dark area in the labia now that she was able to look at with the mirror   It is now irritating her       Patient Active Problem List   Diagnosis     Essential hypertension     Personal history of contact with and (suspected) exposure to asbestos     Donor of other blood     Irritable bowel syndrome     ALLERGIC RHINITIS      Chronic airway obstruction (H)     Need for prophylactic hormone replacement therapy (postmenopausal)     UTI (urinary tract infection)     Pes planus     Obesity     Diverticulosis of colon     Colon polyps     Prediabetes     HYPERLIPIDEMIA LDL GOAL <130     Advanced directives, counseling/discussion     Plantar fasciitis     Health Care Home     Hiatal hernia     Gastroesophageal reflux disease, esophagitis presence not specified     Lichen sclerosus of female genitalia     Past Surgical History:   Procedure Laterality Date     BREAST BIOPSY, CORE RT/LT  1994     C ANESTH,KNEE VEINS SURGERY  8/20/2014     CHOLECYSTECTOMY  1995     COLONOSCOPY  5/10    Diverticulosis, colon polyps; repeat 5 yrs     COLONOSCOPY N/A 11/16/2015    Procedure:  "COLONOSCOPY;  Surgeon: Alejandro Matos MD;  Location: [facility]     Colonoscopy, hyperplastic polyps, repeat in 5 yrs       ESOPHAGOSCOPY, GASTROSCOPY, DUODENOSCOPY (EGD), COMBINED  2013    Procedure: COMBINED ESOPHAGOSCOPY, GASTROSCOPY, DUODENOSCOPY (EGD), BIOPSY SINGLE OR MULTIPLE;  Gastroscopy;  Surgeon: Blasie Gill MD;  Location: [facility]     EYE SURGERY      R/L Cataracts     HC REMOVE TONSILS/ADENOIDS,12+ Y/O      T & A 13+y.o.     HYSTERECTOMY, PAP NO LONGER INDICATED       TVH for DUB, ovaries in       VASCULAR SURGERY      Taylor closure       Social History   Substance Use Topics     Smoking status: Never Smoker     Smokeless tobacco: Never Used     Alcohol use No     Family History   Problem Relation Age of Onset     Cancer Mother      uterine cancer,      Respiratory Mother      COPD     Cardiovascular Mother      AAA  age 80     Breast Cancer Maternal Grandmother      Cancer Maternal Grandfather      cancer unknown type     Breast Cancer Sister      Eye Disorder Father      cataracts     Depression Father      Depression Son      Depression Son      Cancer - colorectal No family hx of            ROS:  Constitutional, HEENT, cardiovascular, pulmonary, gi and gu systems are negative, except as otherwise noted.    OBJECTIVE:                                                    /76  Pulse 60  Temp 97.9  F (36.6  C) (Tympanic)  Ht 5' 3\" (1.6 m)  Wt 212 lb 12.8 oz (96.5 kg)  BMI 37.7 kg/m2 Body mass index is 37.7 kg/(m^2).   GENERAL APPEARANCE: healthy, alert and no distress  LYMPHATICS: inguinal: no adenopathy  ABDOMEN: soft, nontender, without hepatosplenomegaly or masses and bowel sounds normal   (female): left labia majora venous lake present  perineum where the prurutis is skin is whitish no lesions thinner skin no ulcers , no inflammation       ASSESSMENT/PLAN:                                                      1. Symptomatic menopausal or female climacteric " states      2. Vaginal discharge  Negative   - Wet prep    3. Lichen sclerosus of female genitalia  If not improving with this will add premarin cream also consider 3 times weekly   - augmented betamethasone dipropionate (DIPROLENE-AF) 0.05 % ointment; Apply sparingly to affected area twice daily as needed.  Do not apply to face.  Dispense: 45 g; Refill: 0    4. Venous lake  Reassurance      reports that she has never smoked. She has never used smokeless tobacco.      Weight management plan: Discussed healthy diet and exercise guidelines and patient will follow up in 12 months in clinic to re-evaluate.    Laya Morales M.D.  [facility]

## 2018-07-03 NOTE — MR AVS SNAPSHOT
"              After Visit Summary   7/3/2018    Rosie Blackman    MRN: [id number]           Patient Information     Date Of Birth          1940        Visit Information        Provider Department      7/3/2018 11:30 AM Laya Morales MD [facility]        Today's Diagnoses     Vaginal discharge    -  1    Symptomatic menopausal or female climacteric states        Lichen sclerosus of female genitalia        Venous lake           Follow-ups after your visit        Who to contact     Normal or non-critical lab and imaging results will be communicated to you by Telovationshart, letter or phone within 4 business days after the clinic has received the results. If you do not hear from us within 7 days, please contact the clinic through Appvancet or phone. If you have a critical or abnormal lab result, we will notify you by phone as soon as possible.  Submit refill requests through LeanMarket or call your pharmacy and they will forward the refill request to us. Please allow 3 business days for your refill to be completed.          If you need to speak with a  for additional information , please call: [phone]             Additional Information About Your Visit        TelovationsharKap[employer] Security Information     [employer] gives you secure access to your electronic health record. If you see a primary care provider, you can also send messages to your care team and make appointments. If you have questions, please call your primary care clinic.  If you do not have a primary care provider, please call [phone] and they will assist you.        Care EveryWhere ID     This is your Care EveryWhere ID. This could be used by other organizations to access your [place] medical records  [id number]        Your Vitals Were     Pulse Temperature Height BMI (Body Mass Index)          60 97.9  F (36.6  C) (Tympanic) 5' 3\" (1.6 m) 37.7 kg/m2         Blood Pressure from Last 3 Encounters:   07/03/18 139/76   02/21/18 138/81   01/23/18 " 128/70    Weight from Last 3 Encounters:   07/03/18 212 lb 12.8 oz (96.5 kg)   10/27/17 211 lb (95.7 kg)   04/04/17 209 lb 6.4 oz (95 kg)              We Performed the Following     Wet prep          Today's Medication Changes          These changes are accurate as of 7/3/18 12:38 PM.  If you have any questions, ask your nurse or doctor.               Start taking these medicines.        Dose/Directions    augmented betamethasone dipropionate 0.05 % ointment   Commonly known as:  DIPROLENE-AF   Used for:  Lichen sclerosus of female genitalia   Started by:  Laya Morales MD        Apply sparingly to affected area twice daily as needed.  Do not apply to face.   Quantity:  45 g   Refills:  0         Stop taking these medicines if you haven't already. Please contact your care team if you have questions.     fish oil-omega-3 fatty acids 1000 MG capsule   Stopped by:  Laya Morales MD                Where to get your medicines      These medications were sent to [facility]/pharmacy #[id number] - [address] [address]     Phone:  [phone]     augmented betamethasone dipropionate 0.05 % ointment                Primary Care Provider Office Phone # Fax #    Laya Morales [phone] [address] [facility] [address]        Equal Access to Services     [facility]: Hadii ronel ratilff hadkatieo Sokassandraali, waaxda luqadaha, qaybta kaalmada debora, woodrow hernandez. So [facility] [phone].    ATENCIÓN: Si habla español, tiene a castellanos disposición servicios gratuitos de asistencia lingüística. Daphne al [phone].    We comply with applicable federal civil rights laws and [place] laws. We do not discriminate on the basis of race, color, national origin, age, disability, sex, sexual orientation, or gender identity.            Thank you!     Thank you for choosing [facility]  for your care. Our goal is always to  provide you with excellent care. Hearing back from our patients is one way we can continue to improve our services. Please take a few minutes to complete the written survey that you may receive in the mail after your visit with us. Thank you!             Your Updated Medication List - Protect others around you: Learn how to safely use, store and throw away your medicines at www.disposemymeds.org.          This list is accurate as of 7/3/18 12:38 PM.  Always use your most recent med list.                   Brand Name Dispense Instructions for use Diagnosis    ALEVE PO           ASPIRIN NOT PRESCRIBED    INTENTIONAL     Reported on 4/4/2017        augmented betamethasone dipropionate 0.05 % ointment    DIPROLENE-AF    45 g    Apply sparingly to affected area twice daily as needed.  Do not apply to face.    Lichen sclerosus of female genitalia       estradiol 0.5 MG tablet    ESTRACE    90 tablet    TAKE 1/2 TABLET DAILY    Symptomatic menopausal or female climacteric states       omega-3 acid ethyl esters 1 g capsule    Lovaza     Take 1 g by mouth 2 times daily        pravastatin 80 MG tablet    PRAVACHOL    90 tablet    Take 1 tablet (80 mg) by mouth daily    Hyperlipidemia LDL goal <130       THERATEARS 0.25 % Soln   Generic drug:  Carboxymethylcellulose Sodium      BID        triamcinolone 0.1 % cream    KENALOG    80 g    aaa 2 times per day for up to 2 weeks as needed    Eczema       valsartan-hydrochlorothiazide 160-12.5 MG per tablet    DIOVAN-HCT    90 tablet    Take 1 tablet by mouth daily    Essential hypertension, benign       VIACTIV 500-100-40 MG-UNT-MCG Chew   Generic drug:  Calcium-Vitamin D-Vitamin K      once daily

## 2018-10-05 ENCOUNTER — HOSPITAL ENCOUNTER (OUTPATIENT)
Dept: MAMMOGRAPHY | Facility: CLINIC | Age: 78
Discharge: HOME | End: 2018-10-05
Attending: FAMILY MEDICINE | Admitting: FAMILY MEDICINE
Payer: MEDICARE

## 2018-10-05 DIAGNOSIS — Z12.31 VISIT FOR SCREENING MAMMOGRAM: ICD-10-CM

## 2018-10-05 PROCEDURE — 77067 SCR MAMMO BI INCL CAD: CPT

## 2018-10-13 ENCOUNTER — MYC MEDICAL ADVICE (OUTPATIENT)
Dept: FAMILY MEDICINE | Facility: CLINIC | Age: 78
End: 2018-10-13

## 2018-10-16 ENCOUNTER — E-VISIT (OUTPATIENT)
Dept: FAMILY MEDICINE | Facility: CLINIC | Age: 78
End: 2018-10-16
Payer: COMMERCIAL

## 2018-10-16 DIAGNOSIS — N90.4 LICHEN SCLEROSUS ET ATROPHICUS OF THE VULVA: Primary | ICD-10-CM

## 2018-10-16 PROCEDURE — 99444 ZZC PHYSICIAN ONLINE EVALUATION & MANAGEMENT SERVICE: CPT | Performed by: FAMILY MEDICINE

## 2018-10-16 RX ORDER — MOMETASONE FUROATE 1 MG/G
CREAM TOPICAL
Qty: 45 G | Refills: 0 | Status: SHIPPED | OUTPATIENT
Start: 2018-10-16 | End: 2020-12-15

## 2018-10-16 RX ORDER — MOMETASONE FUROATE 1 MG/G
CREAM TOPICAL
Qty: 45 G | Refills: 0 | Status: SHIPPED | OUTPATIENT
Start: 2018-10-16 | End: 2018-10-16

## 2018-10-17 ENCOUNTER — TELEPHONE (OUTPATIENT)
Dept: FAMILY MEDICINE | Facility: CLINIC | Age: 78
End: 2018-10-17

## 2018-10-17 NOTE — TELEPHONE ENCOUNTER
Central Prior Authorization Team   Phone: [phone]      PA Initiation    Medication: Estradiol  Insurance Company: [employer] - Phone [phone] Fax [phone]  Pharmacy Filling the Rx: [id number] - [place], [place] - [address] [place] [address]  Filling Pharmacy Phone: [phone]  Filling Pharmacy Fax:    Start Date: 10/17/2018

## 2018-10-17 NOTE — TELEPHONE ENCOUNTER
Prior Authorization Approval    Authorization Effective Date: 7/19/2018  Authorization Expiration Date: 10/17/2019  Medication: Estradiol  Approved Dose/Quantity:    Reference #:     Insurance Company: [employer] - [facility] [phone] Fax [phone]  Expected CoPay:       CoPay Card Available:      Foundation Assistance Needed:    Which Pharmacy is filling the prescription (Not needed for infusion/clinic administered): [id number] - [place] [address]  Pharmacy Notified: Yes  Patient Notified: Yes

## 2018-10-17 NOTE — TELEPHONE ENCOUNTER
Prior Authorization Retail Medication Request    Medication/Dose: Estradiol  ICD code (if different than what is on RX):  Symptomatic menopausal or female climacteric states [N95.1]  Previously Tried and Failed:    Rationale:      Insurance Name:  [employer]  TRX code: [id number]  I      Pharmacy Information (if different than what is on RX)  [facility]  Phone:  [phone]

## 2018-10-26 ENCOUNTER — TRANSFERRED RECORDS (OUTPATIENT)
Dept: HEALTH INFORMATION MANAGEMENT | Facility: CLINIC | Age: 78
End: 2018-10-26

## 2018-11-09 ENCOUNTER — TRANSFERRED RECORDS (OUTPATIENT)
Dept: HEALTH INFORMATION MANAGEMENT | Facility: CLINIC | Age: 78
End: 2018-11-09

## 2018-11-17 DIAGNOSIS — I10 ESSENTIAL HYPERTENSION, BENIGN: ICD-10-CM

## 2018-11-17 DIAGNOSIS — E78.5 HYPERLIPIDEMIA LDL GOAL <130: ICD-10-CM

## 2018-11-19 RX ORDER — PRAVASTATIN SODIUM 80 MG/1
80 TABLET ORAL DAILY
Qty: 30 TABLET | Refills: 0 | Status: SHIPPED | OUTPATIENT
Start: 2018-11-19 | End: 2018-12-17

## 2018-11-19 RX ORDER — VALSARTAN AND HYDROCHLOROTHIAZIDE 160; 12.5 MG/1; MG/1
1 TABLET, FILM COATED ORAL DAILY
Qty: 30 TABLET | Refills: 0 | Status: SHIPPED | OUTPATIENT
Start: 2018-11-19 | End: 2018-12-17

## 2018-11-19 NOTE — TELEPHONE ENCOUNTER
"PRAVASTATIN SODIUM 80 MG TAB        Last Written Prescription Date:  10/27/17  Last Fill Quantity: 90,   # refills: 3  Last Office Visit: 7/3/18  Future Office visit:       valsartan-hydrochlorothiazide (DIOVAN-HCT) 160-12.5 MG per tablet      Last Written Prescription Date:  10/27/17  Last Fill Quantity: 90,   # refills: 3  Last Office Visit: 7/3/18  Future Office visit:       Requested Prescriptions   Pending Prescriptions Disp Refills     pravastatin (PRAVACHOL) 80 MG tablet [Pharmacy Med Name: PRAVASTATIN SODIUM 80 MG TAB] 90 tablet 3     Sig: TAKE 1 TABLET (80 MG) BY MOUTH DAILY    Statins Protocol Failed    11/17/2018  1:57 AM       Failed - LDL on file in past 12 months    Recent Labs   Lab Test  10/18/17   0753   LDL  86            Passed - No abnormal creatine kinase in past 12 months    No lab results found.            Passed - Recent (12 mo) or future (30 days) visit within the authorizing provider's specialty    Patient had office visit in the last 12 months or has a visit in the next 30 days with authorizing provider or within the authorizing provider's specialty.  See \"Patient Info\" tab in inbasket, or \"Choose Columns\" in Meds & Orders section of the refill encounter.             Passed - Patient is age 18 or older       Passed - No active pregnancy on record       Passed - No positive pregnancy test in past 12 months        valsartan-hydrochlorothiazide (DIOVAN-HCT) 160-12.5 MG per tablet [Pharmacy Med Name: VALSARTAN-HCTZ 160-12.5 MG TAB] 90 tablet 1     Sig: TAKE 1 TABLET BY MOUTH DAILY    Angiotensin-II Receptors Failed    11/17/2018  1:57 AM       Failed - Normal serum creatinine on file in past 12 months    Recent Labs   Lab Test  10/18/17   0753   CR  0.78            Failed - Normal serum potassium on file in past 12 months    Recent Labs   Lab Test  10/18/17   0753   POTASSIUM  3.6                   Passed - Blood pressure under 140/90 in past 12 months    BP Readings from Last 3 Encounters: " "  07/03/18 139/76   02/21/18 138/81   01/23/18 128/70                Passed - Recent (12 mo) or future (30 days) visit within the authorizing provider's specialty    Patient had office visit in the last 12 months or has a visit in the next 30 days with authorizing provider or within the authorizing provider's specialty.  See \"Patient Info\" tab in inbasket, or \"Choose Columns\" in Meds & Orders section of the refill encounter.             Passed - Patient is age 18 or older       Passed - No active pregnancy on record       Passed - No positive pregnancy test in past 12 months          "

## 2018-11-23 ASSESSMENT — ENCOUNTER SYMPTOMS
PARESTHESIAS: 0
NERVOUS/ANXIOUS: 0
ABDOMINAL PAIN: 0
SHORTNESS OF BREATH: 0
WEAKNESS: 0
HEMATOCHEZIA: 0
COUGH: 0
CHILLS: 0
EYE PAIN: 0
JOINT SWELLING: 0
PALPITATIONS: 0
DIZZINESS: 0
FREQUENCY: 0
DYSURIA: 0
FEVER: 0
ARTHRALGIAS: 0
DIARRHEA: 0
SORE THROAT: 0
BREAST MASS: 0
HEARTBURN: 0
HEADACHES: 0
NAUSEA: 0
CONSTIPATION: 0
MYALGIAS: 0
HEMATURIA: 0

## 2018-11-23 ASSESSMENT — ACTIVITIES OF DAILY LIVING (ADL): CURRENT_FUNCTION: NO ASSISTANCE NEEDED

## 2018-11-27 ENCOUNTER — TELEPHONE (OUTPATIENT)
Dept: FAMILY MEDICINE | Facility: CLINIC | Age: 78
End: 2018-11-27

## 2018-11-27 DIAGNOSIS — I10 ESSENTIAL HYPERTENSION: Primary | ICD-10-CM

## 2018-11-27 DIAGNOSIS — I10 ESSENTIAL HYPERTENSION: ICD-10-CM

## 2018-11-27 DIAGNOSIS — E78.5 HYPERLIPIDEMIA LDL GOAL <130: ICD-10-CM

## 2018-11-27 DIAGNOSIS — K44.9 HIATAL HERNIA: ICD-10-CM

## 2018-11-27 DIAGNOSIS — R73.03 PREDIABETES: ICD-10-CM

## 2018-11-27 DIAGNOSIS — R73.09 ABNORMAL GLUCOSE: ICD-10-CM

## 2018-11-27 LAB
ANION GAP SERPL CALCULATED.3IONS-SCNC: 4 MMOL/L (ref 3–14)
BUN SERPL-MCNC: 16 MG/DL (ref 7–30)
CALCIUM SERPL-MCNC: 8.6 MG/DL (ref 8.5–10.1)
CHLORIDE SERPL-SCNC: 107 MMOL/L (ref 94–109)
CHOLEST SERPL-MCNC: 214 MG/DL
CO2 SERPL-SCNC: 29 MMOL/L (ref 20–32)
CREAT SERPL-MCNC: 0.75 MG/DL (ref 0.52–1.04)
ERYTHROCYTE [DISTWIDTH] IN BLOOD BY AUTOMATED COUNT: 13.6 % (ref 10–15)
GFR SERPL CREATININE-BSD FRML MDRD: 75 ML/MIN/1.7M2
GLUCOSE SERPL-MCNC: 107 MG/DL (ref 70–99)
HBA1C MFR BLD: 5.8 % (ref 0–5.6)
HCT VFR BLD AUTO: 43.9 % (ref 35–47)
HDLC SERPL-MCNC: 93 MG/DL
HGB BLD-MCNC: 14 G/DL (ref 11.7–15.7)
LDLC SERPL CALC-MCNC: 111 MG/DL
MCH RBC QN AUTO: 30.7 PG (ref 26.5–33)
MCHC RBC AUTO-ENTMCNC: 31.9 G/DL (ref 31.5–36.5)
MCV RBC AUTO: 96 FL (ref 78–100)
NONHDLC SERPL-MCNC: 121 MG/DL
PLATELET # BLD AUTO: 210 10E9/L (ref 150–450)
POTASSIUM SERPL-SCNC: 4 MMOL/L (ref 3.4–5.3)
RBC # BLD AUTO: 4.56 10E12/L (ref 3.8–5.2)
SODIUM SERPL-SCNC: 140 MMOL/L (ref 133–144)
TRIGL SERPL-MCNC: 49 MG/DL
WBC # BLD AUTO: 5 10E9/L (ref 4–11)

## 2018-11-27 PROCEDURE — 36415 COLL VENOUS BLD VENIPUNCTURE: CPT | Performed by: FAMILY MEDICINE

## 2018-11-27 PROCEDURE — 85027 COMPLETE CBC AUTOMATED: CPT | Performed by: FAMILY MEDICINE

## 2018-11-27 PROCEDURE — 80048 BASIC METABOLIC PNL TOTAL CA: CPT | Performed by: FAMILY MEDICINE

## 2018-11-27 PROCEDURE — 83036 HEMOGLOBIN GLYCOSYLATED A1C: CPT | Performed by: FAMILY MEDICINE

## 2018-11-27 PROCEDURE — 80061 LIPID PANEL: CPT | Performed by: FAMILY MEDICINE

## 2018-11-28 ENCOUNTER — OFFICE VISIT (OUTPATIENT)
Dept: FAMILY MEDICINE | Facility: CLINIC | Age: 78
End: 2018-11-28
Payer: COMMERCIAL

## 2018-11-28 VITALS
BODY MASS INDEX: 36.68 KG/M2 | TEMPERATURE: 97.7 F | DIASTOLIC BLOOD PRESSURE: 60 MMHG | HEART RATE: 80 BPM | SYSTOLIC BLOOD PRESSURE: 114 MMHG | WEIGHT: 207 LBS | HEIGHT: 63 IN

## 2018-11-28 DIAGNOSIS — R73.03 PREDIABETES: ICD-10-CM

## 2018-11-28 DIAGNOSIS — N90.4 LICHEN SCLEROSUS OF FEMALE GENITALIA: ICD-10-CM

## 2018-11-28 DIAGNOSIS — K21.9 GASTROESOPHAGEAL REFLUX DISEASE, ESOPHAGITIS PRESENCE NOT SPECIFIED: ICD-10-CM

## 2018-11-28 DIAGNOSIS — Z00.00 MEDICARE ANNUAL WELLNESS VISIT, SUBSEQUENT: Primary | ICD-10-CM

## 2018-11-28 DIAGNOSIS — I10 ESSENTIAL HYPERTENSION: ICD-10-CM

## 2018-11-28 PROCEDURE — 99397 PER PM REEVAL EST PAT 65+ YR: CPT | Performed by: FAMILY MEDICINE

## 2018-11-28 NOTE — PATIENT INSTRUCTIONS

## 2018-11-28 NOTE — PROGRESS NOTES
"  SUBJECTIVE:   Rosie Blackman is a 78 year old female who presents for Preventive Visit.  Are you in the first 12 months of your Medicare Part B coverage?  No    Answers for HPI/ROS submitted by the patient on 11/23/2018   Annual Exam:  In general, how would you rate your overall physical health?: good  Frequency of exercise:: 2-3 days/week  Do you usually eat at least 4 servings of fruit and vegetables a day, include whole grains & fiber, and avoid regularly eating high fat or \"junk\" foods? : Yes  Taking medications regularly:: Yes  Medication side effects:: None  Activities of Daily Living: no assistance needed  Home safety: no safety concerns identified  Hearing Impairment:: no hearing concerns  In the past 6 months, have you been bothered by leaking of urine?: No  abdominal pain: No  Blood in stool: No  Blood in urine: No  chest pain: No  chills: No  congestion: No  constipation: No  cough: No  diarrhea: No  dizziness: No  ear pain: No  eye pain: No  nervous/anxious: No  fever: No  frequency: No  genital sores: No  headaches: No  hearing loss: No  heartburn: No  arthralgias: No  joint swelling: No  peripheral edema: No  mood changes: No  myalgias: No  nausea: No  dysuria: No  palpitations: No  Skin sensation changes: No  sore throat: No  urgency: No  rash: No  shortness of breath: No  visual disturbance: No  weakness: No  pelvic pain: No  vaginal bleeding: No  vaginal discharge: No  tenderness: No  breast mass: No  breast discharge: No  In general, how would you rate your overall mental or emotional health?: excellent  Additional concerns today:: Yes  PHQ-2 Score: 0  Duration of exercise:: 15-30 minutes        Do you feel safe in your environment? Yes    Do you have a Health Care Directive? Yes: Patient states has Advance Directive and will bring in a copy to clinic.    Additional concerns to address?  YES    Fall risk:  Fallen 2 or more times in the past year?: No  Any fall with injury in the past year?: " No      Cognitive Screenin) Repeat 3 items (Leader, Season, Table)    2) Clock draw: NORMAL  3) 3 item recall: Recalls 2 objects   Results: 3 items recalled: COGNITIVE IMPAIRMENT LESS LIKELY    Mini-CogTM Copyright S Jasmina. Licensed by the author for use in [email or web address]; reprinted with permission (harshil@[facility]). All rights reserved.      Do you have sleep apnea, excessive snoring or daytime drowsiness?: no      Hyperlipidemia Follow-Up    Rate your low fat/cholesterol diet?: good    Taking statin?  Yes, no muscle aches from statin    Other lipid medications/supplements?:  none    BP Readings from Last 2 Encounters:   18 114/60   18 139/76     Hemoglobin A1C (%)   Date Value   2018 5.8 (H)   10/13/2014 5.7     LDL Cholesterol Calculated (mg/dL)   Date Value   2018 111 (H)   10/18/2017 86       Reviewed and updated as needed this visit by clinical staff  Tobacco  Allergies  Meds  Med Hx  Surg Hx  Fam Hx  Soc Hx        Reviewed and updated as needed this visit by Provider        Social History   Substance Use Topics     Smoking status: Never Smoker     Smokeless tobacco: Never Used     Alcohol use No                           Current providers sharing in care for this patient include:   Patient Care Team:  Laya Morales MD as PCP - General (Family Practice)    The following health maintenance items are reviewed in Epic and correct as of today:  Health Maintenance   Topic Date Due     SPIROMETRY ONETIME  1940     COPD ACTION PLAN Q1 YR  1940     ADVANCE DIRECTIVE PLANNING Q5 YRS  2018     INFLUENZA VACCINE (1) 2018     FALL RISK ASSESSMENT  10/27/2018     MAMMO Q1 YR  10/05/2019     BMP Q1 YR  2019     LIPID MONITORING Q1 YEAR  2019     PHQ-2 Q1 YR  2019     COLONOSCOPY Q5 YR  2020     TETANUS IMMUNIZATION (SYSTEM ASSIGNED)  2022     DEXA SCAN SCREENING (SYSTEM ASSIGNED)  Completed     PNEUMOCOCCAL  Completed     BP  "Readings from Last 3 Encounters:   11/28/18 114/60   07/03/18 139/76   02/21/18 138/81    Wt Readings from Last 3 Encounters:   11/28/18 207 lb (93.9 kg)   07/03/18 212 lb 12.8 oz (96.5 kg)   10/27/17 211 lb (95.7 kg)                  Mammogram Screening: Patient over age 75, has elected to continue with mammography screening.    ROS:  Constitutional, HEENT, cardiovascular, pulmonary, gi and gu systems are negative, except as otherwise noted.    OBJECTIVE:   /60  Pulse 80  Temp 97.7  F (36.5  C) (Tympanic)  Ht 5' 2.75\" (1.594 m)  Wt 207 lb (93.9 kg)  BMI 36.96 kg/m2 Estimated body mass index is 36.96 kg/(m^2) as calculated from the following:    Height as of this encounter: 5' 2.75\" (1.594 m).    Weight as of this encounter: 207 lb (93.9 kg).  EXAM:   GENERAL: healthy, alert and no distress  HENT: ear canals and TM's normal, nose and mouth without ulcers or lesions  NECK: no adenopathy, no asymmetry, masses, or scars and thyroid normal to palpation  RESP: lungs clear to auscultation - no rales, rhonchi or wheezes  CV: regular rate and rhythm, normal S1 S2, no S3 or S4, no murmur, click or rub, no peripheral edema and peripheral pulses strong  ABDOMEN: soft, nontender, no hepatosplenomegaly, no masses and bowel sounds normal   (female): normal female external genitalia, normal urethral meatus  and vaginal mucosal atrophy   MS: no gross musculoskeletal defects noted, no edema  NEURO: Normal strength and tone, mentation intact and speech normal  PSYCH: mentation appears normal, affect normal/bright  LYMPH: no cervical, supraclavicular, axillary, or inguinal adenopathy    Diagnostic Test Results:reviewed labs done yesterday   No results found for this or any previous visit (from the past 24 hour(s)).    ASSESSMENT / PLAN:   1. Medicare annual wellness visit, subsequent      2. Lichen sclerosus of female genitalia  cotn with the cream 1-3 times per week .     3. BMI 36.0-36.9,adult      4. Gastroesophageal " "reflux disease, esophagitis presence not specified  Well controlled     5. Prediabetes  a1c 5.8 andhas been stable for several years     6. Essential hypertension  Well controlled       End of Life Planning:  Patient currently has an advanced directive: Yes.  Practitioner is supportive of decision.    COUNSELING:  Reviewed preventive health counseling, as reflected in patient instructions    BP Readings from Last 1 Encounters:   11/28/18 114/60     Estimated body mass index is 36.96 kg/(m^2) as calculated from the following:    Height as of this encounter: 5' 2.75\" (1.594 m).    Weight as of this encounter: 207 lb (93.9 kg).      Weight management plan: Discussed healthy diet and exercise guidelines     reports that she has never smoked. She has never used smokeless tobacco.      Appropriate preventive services were discussed with this patient, including applicable screening as appropriate for cardiovascular disease, diabetes, osteopenia/osteoporosis, and glaucoma.  As appropriate for age/gender, discussed screening for colorectal cancer, prostate cancer, breast cancer, and cervical cancer. Checklist reviewing preventive services available has been given to the patient.    Reviewed patients plan of care and provided an AVS. The Basic Care Plan (routine screening as documented in Health Maintenance) for Rosie meets the Care Plan requirement. This Care Plan has been established and reviewed with the Patient.    Counseling Resources:  ATP IV Guidelines  Pooled Cohorts Equation Calculator  Breast Cancer Risk Calculator  FRAX Risk Assessment  ICSI Preventive Guidelines  Dietary Guidelines for Americans, 2010  USDA's MyPlate  ASA Prophylaxis  Lung CA Screening    Laya Morales MD  [facility]  "

## 2018-11-28 NOTE — MR AVS SNAPSHOT
After Visit Summary   11/28/2018    Rosie Blackman    MRN: [id number]           Patient Information     Date Of Birth          1940        Visit Information        Provider Department      11/28/2018 2:00 PM Laya Morales MD [facility]        Today's Diagnoses     Medicare annual wellness visit, subsequent    -  1      Care Instructions      Preventive Health Recommendations    See your health care provider every year to    Review health changes.     Discuss preventive care.      Review your medicines if your doctor has prescribed any.      You no longer need a yearly Pap test unless you've had an abnormal Pap test in the past 10 years. If you have vaginal symptoms, such as bleeding or discharge, be sure to talk with your provider about a Pap test.      Every 1 to 2 years, have a mammogram.  If you are over 69, talk with your health care provider about whether or not you want to continue having screening mammograms.      Every 10 years, have a colonoscopy. Or, have a yearly FIT test (stool test). These exams will check for colon cancer.       Have a cholesterol test every 5 years, or more often if your doctor advises it.       Have a diabetes test (fasting glucose) every three years. If you are at risk for diabetes, you should have this test more often.       At age 65, have a bone density scan (DEXA) to check for osteoporosis (brittle bone disease).    Shots:    Get a flu shot each year.    Get a tetanus shot every 10 years.    Talk to your doctor about your pneumonia vaccines. There are now two you should receive - Pneumovax (PPSV 23) and Prevnar (PCV 13).    Talk to your pharmacist about the shingles vaccine.    Talk to your doctor about the hepatitis B vaccine.    Nutrition:     Eat at least 5 servings of fruits and vegetables each day.      Eat whole-grain bread, whole-wheat pasta and brown rice instead of white grains and rice.      Get adequate Calcium and Vitamin D.      Lifestyle    Exercise at least 150 minutes a week (30 minutes a day, 5 days a week). This will help you control your weight and prevent disease.      Limit alcohol to one drink per day.      No smoking.       Wear sunscreen to prevent skin cancer.       See your dentist twice a year for an exam and cleaning.      See your eye doctor every 1 to 2 years to screen for conditions such as glaucoma, macular degeneration and cataracts.    Personalized Prevention Plan  You are due for the preventive services outlined below.  Your care team is available to assist you in scheduling these services.  If you have already completed any of these items, please share that information with your care team to update in your medical record.  Health Maintenance Due   Topic Date Due     Breathing Capacity Test  1940     COPD ACTION PLAN Q1 YR  1940     Discuss Advance Directive Planning  01/02/2018     Flu Vaccine (1) 09/01/2018     FALL RISK ASSESSMENT  10/27/2018             Follow-ups after your visit        Who to contact     Normal or non-critical lab and imaging results will be communicated to you by Intervolvet, letter or phone within 4 business days after the clinic has received the results. If you do not hear from us within 7 days, please contact the clinic through Intervolvet or phone. If you have a critical or abnormal lab result, we will notify you by phone as soon as possible.  Submit refill requests through uVore or call your pharmacy and they will forward the refill request to us. Please allow 3 business days for your refill to be completed.          If you need to speak with a  for additional information , please call: [phone]             Additional Information About Your Visit        uVore Information     uVore gives you secure access to your electronic health record. If you see a primary care provider, you can also send messages to your care team and make appointments. If you have  "questions, please call your primary care clinic.  If you do not have a primary care provider, please call [phone] and they will assist you.        Care EveryWhere ID     This is your Care EveryWhere ID. This could be used by other organizations to access your [facility] medical records  [id number]        Your Vitals Were     Pulse Temperature Height BMI (Body Mass Index)          80 97.7  F (36.5  C) (Tympanic) 5' 2.75\" (1.594 m) 36.96 kg/m2         Blood Pressure from Last 3 Encounters:   11/28/18 114/60   07/03/18 139/76   02/21/18 138/81    Weight from Last 3 Encounters:   11/28/18 207 lb (93.9 kg)   07/03/18 212 lb 12.8 oz (96.5 kg)   10/27/17 211 lb (95.7 kg)              Today, you had the following     No orders found for display         Today's Medication Changes          These changes are accurate as of 11/28/18  2:35 PM.  If you have any questions, ask your nurse or doctor.               Stop taking these medicines if you haven't already. Please contact your care team if you have questions.     augmented betamethasone dipropionate 0.05 % external ointment   Commonly known as:  DIPROLENE-AF   Stopped by:  Laya Morales MD           triamcinolone 0.1 % external cream   Commonly known as:  KENALOG   Stopped by:  Laya Morales MD                    Primary Care Provider Office Phone # Fax #    Laya Morales [phone] [id number] [facility] [address]        Equal Access to Services     MAIA BLANCAS AH: Hadii ronel ratliff hadasho Soomaali, waaxda luqadaha, qaybta kaalmada woodrow cazares. So [facility] [phone].    ATENCIÓN: Si habla español, tiene a castellanos disposición servicios gratuitos de asistencia lingüística. Llame al [phone].    We comply with applicable federal civil rights laws and [place] laws. We do not discriminate on the basis of race, color, national origin, age, disability, sex, sexual orientation, or gender " identity.            Thank you!     Thank you for choosing [facility]  for your care. Our goal is always to provide you with excellent care. Hearing back from our patients is one way we can continue to improve our services. Please take a few minutes to complete the written survey that you may receive in the mail after your visit with us. Thank you!             Your Updated Medication List - Protect others around you: Learn how to safely use, store and throw away your medicines at www.disposemymeds.org.          This list is accurate as of 11/28/18  2:35 PM.  Always use your most recent med list.                   Brand Name Dispense Instructions for use Diagnosis    ALEVE PO           ASPIRIN NOT PRESCRIBED    INTENTIONAL     Reported on 4/4/2017        estradiol 0.5 MG tablet    ESTRACE    90 tablet    TAKE 1/2 TABLET DAILY    Symptomatic menopausal or female climacteric states       mometasone 0.1 % external cream    ELOCON    45 g    Apply sparingly to affected area twice daily for 2 weeks then decrease to 1 time daily for 30 days then decrease to 2-3 times per week    Lichen sclerosus et atrophicus of the vulva       omega-3 acid ethyl esters 1 g capsule    Lovaza     Take 1 g by mouth 2 times daily        pravastatin 80 MG tablet    PRAVACHOL    30 tablet    Take 1 tablet (80 mg) by mouth daily (Needs follow-up appointment for this medication)    Hyperlipidemia LDL goal <130       THERATEARS 0.25 % Soln   Generic drug:  Carboxymethylcellulose Sodium      BID        valsartan-hydrochlorothiazide 160-12.5 MG per tablet    DIOVAN-HCT    30 tablet    Take 1 tablet by mouth daily (Needs follow-up appointment for this medication)    Essential hypertension, benign       VIACTIV 500-100-40 MG-UNT-MCG Chew   Generic drug:  Calcium-Vitamin D-Vitamin K      once daily

## 2018-12-17 DIAGNOSIS — E78.5 HYPERLIPIDEMIA LDL GOAL <130: ICD-10-CM

## 2018-12-17 DIAGNOSIS — I10 ESSENTIAL HYPERTENSION, BENIGN: ICD-10-CM

## 2018-12-17 NOTE — TELEPHONE ENCOUNTER
"Requested Prescriptions   Pending Prescriptions Disp Refills     valsartan-hydrochlorothiazide (DIOVAN HCT) 160-12.5 MG tablet [Pharmacy Med Name: VALSARTAN-HCTZ 160-12.5 MG TAB] 30 tablet 0     Sig: TAKE 1 TABLET BY MOUTH DAILY (NEEDS FOLLOW-UP APPOINTMENT FOR THIS MEDICATION)    Angiotensin-II Receptors Passed - 12/17/2018  5:03 PM       Last Written Prescription Date:  11/19/18  Last Fill Quantity: 30,  # refills: 0   Last office visit: 11/28/2018 with prescribing provider:     Future Office Visit:        Passed - Blood pressure under 140/90 in past 12 months    BP Readings from Last 3 Encounters:   11/28/18 114/60   07/03/18 139/76   02/21/18 138/81                Passed - Recent (12 mo) or future (30 days) visit within the authorizing provider's specialty    Patient had office visit in the last 12 months or has a visit in the next 30 days with authorizing provider or within the authorizing provider's specialty.  See \"Patient Info\" tab in inbasket, or \"Choose Columns\" in Meds & Orders section of the refill encounter.             Passed - Patient is age 18 or older       Passed - No active pregnancy on record       Passed - Normal serum creatinine on file in past 12 months    Recent Labs   Lab Test 11/27/18  0820   CR 0.75            Passed - Normal serum potassium on file in past 12 months    Recent Labs   Lab Test 11/27/18  0820   POTASSIUM 4.0                   Passed - No positive pregnancy test in past 12 months        pravastatin (PRAVACHOL) 80 MG tablet [Pharmacy Med Name: PRAVASTATIN SODIUM 80 MG TAB] 30 tablet 0     Sig: TAKE 1 TABLET (80 MG) BY MOUTH DAILY (NEEDS FOLLOW-UP APPOINTMENT FOR THIS MEDICATION)    Statins Protocol Passed - 12/17/2018  5:03 PM       Passed - LDL on file in past 12 months    Recent Labs   Lab Test 11/27/18  0820   *            Passed - No abnormal creatine kinase in past 12 months    No lab results found.            Passed - Recent (12 mo) or future (30 days) visit " "within the authorizing provider's specialty    Patient had office visit in the last 12 months or has a visit in the next 30 days with authorizing provider or within the authorizing provider's specialty.  See \"Patient Info\" tab in inbasket, or \"Choose Columns\" in Meds & Orders section of the refill encounter.             Passed - Patient is age 18 or older       Passed - No active pregnancy on record       Passed - No positive pregnancy test in past 12 months        Last Written Prescription Date:  11/19/18  Last Fill Quantity: 30,  # refills: 0   Last office visit: 11/28/2018 with prescribing provider:     Future Office Visit:      "

## 2018-12-18 RX ORDER — VALSARTAN AND HYDROCHLOROTHIAZIDE 160; 12.5 MG/1; MG/1
1 TABLET, FILM COATED ORAL DAILY
Qty: 30 TABLET | Refills: 0 | Status: SHIPPED | OUTPATIENT
Start: 2018-12-18 | End: 2019-01-21

## 2018-12-18 RX ORDER — PRAVASTATIN SODIUM 80 MG/1
80 TABLET ORAL DAILY
Qty: 30 TABLET | Refills: 0 | Status: SHIPPED | OUTPATIENT
Start: 2018-12-18 | End: 2019-01-21

## 2018-12-18 NOTE — TELEPHONE ENCOUNTER
Routing refill request to provider for review/approval because:  Drug interaction warning  Arvin Ayala, RN

## 2019-01-17 DIAGNOSIS — N95.1 SYMPTOMATIC MENOPAUSAL OR FEMALE CLIMACTERIC STATES: ICD-10-CM

## 2019-01-17 NOTE — TELEPHONE ENCOUNTER
"ESTRADIOL 0.5 MG TABLET      Last Written Prescription Date:  11/10/17  Last Fill Quantity: 90,   # refills: 2  Last Office Visit: 11/28/18  Future Office visit:       Requested Prescriptions   Pending Prescriptions Disp Refills     estradiol (ESTRACE) 0.5 MG tablet [Pharmacy Med Name: ESTRADIOL 0.5 MG TABLET] 90 tablet 2     Sig: TAKE 1/2 TABLET DAILY    Hormone Replacement Therapy Passed - 1/17/2019  4:15 AM       Passed - Blood pressure under 140/90 in past 12 months    BP Readings from Last 3 Encounters:   11/28/18 114/60   07/03/18 139/76   02/21/18 138/81                Passed - Recent (12 mo) or future (30 days) visit within the authorizing provider's specialty    Patient had office visit in the last 12 months or has a visit in the next 30 days with authorizing provider or within the authorizing provider's specialty.  See \"Patient Info\" tab in inbasket, or \"Choose Columns\" in Meds & Orders section of the refill encounter.             Passed - Medication is active on med list       Passed - Patient is 18 years of age or older       Passed - No active pregnancy on record       Passed - No positive pregnancy test on record in past 12 months          "

## 2019-01-18 RX ORDER — ESTRADIOL 0.5 MG/1
TABLET ORAL
Qty: 45 TABLET | Refills: 1 | Status: SHIPPED | OUTPATIENT
Start: 2019-01-18 | End: 2019-07-14

## 2019-01-21 DIAGNOSIS — E78.5 HYPERLIPIDEMIA LDL GOAL <130: ICD-10-CM

## 2019-01-21 DIAGNOSIS — I10 ESSENTIAL HYPERTENSION, BENIGN: ICD-10-CM

## 2019-01-21 NOTE — TELEPHONE ENCOUNTER
"PRAVASTATIN SODIUM 80 MG TAB      Last Written Prescription Date:  12/18/18  Last Fill Quantity: 30,   # refills: 0  Last Office Visit: 11/28/18  Future Office visit:       valsartan-hydrochlorothiazide (DIOVAN HCT) 160-12.5 MG tablet      Last Written Prescription Date:  12/18/18  Last Fill Quantity: 30,   # refills: 0  Last Office Visit: 11/28/18  Future Office visit:       Requested Prescriptions   Pending Prescriptions Disp Refills     pravastatin (PRAVACHOL) 80 MG tablet [Pharmacy Med Name: PRAVASTATIN SODIUM 80 MG TAB] 30 tablet 0     Sig: TAKE 1 TABLET (80 MG) BY MOUTH DAILY (NEEDS FOLLOW-UP APPOINTMENT FOR THIS MEDICATION)    Statins Protocol Passed - 1/21/2019  9:50 AM       Passed - LDL on file in past 12 months    Recent Labs   Lab Test 11/27/18  0820   *            Passed - No abnormal creatine kinase in past 12 months    No lab results found.            Passed - Recent (12 mo) or future (30 days) visit within the authorizing provider's specialty    Patient had office visit in the last 12 months or has a visit in the next 30 days with authorizing provider or within the authorizing provider's specialty.  See \"Patient Info\" tab in inbasket, or \"Choose Columns\" in Meds & Orders section of the refill encounter.             Passed - Medication is active on med list       Passed - Patient is age 18 or older       Passed - No active pregnancy on record       Passed - No positive pregnancy test in past 12 months        valsartan-hydrochlorothiazide (DIOVAN HCT) 160-12.5 MG tablet [Pharmacy Med Name: VALSARTAN-HCTZ 160-12.5 MG TAB] 30 tablet 0     Sig: TAKE 1 TABLET BY MOUTH DAILY (NEEDS FOLLOW-UP APPOINTMENT FOR THIS MEDICATION)    Angiotensin-II Receptors Passed - 1/21/2019  9:50 AM       Passed - Blood pressure under 140/90 in past 12 months    BP Readings from Last 3 Encounters:   11/28/18 114/60   07/03/18 139/76   02/21/18 138/81                Passed - Recent (12 mo) or future (30 days) visit within " "the authorizing provider's specialty    Patient had office visit in the last 12 months or has a visit in the next 30 days with authorizing provider or within the authorizing provider's specialty.  See \"Patient Info\" tab in inbasket, or \"Choose Columns\" in Meds & Orders section of the refill encounter.             Passed - Medication is active on med list       Passed - Patient is age 18 or older       Passed - No active pregnancy on record       Passed - Normal serum creatinine on file in past 12 months    Recent Labs   Lab Test 11/27/18  0820   CR 0.75            Passed - Normal serum potassium on file in past 12 months    Recent Labs   Lab Test 11/27/18  0820   POTASSIUM 4.0                   Passed - No positive pregnancy test in past 12 months          "

## 2019-01-22 RX ORDER — VALSARTAN AND HYDROCHLOROTHIAZIDE 160; 12.5 MG/1; MG/1
1 TABLET, FILM COATED ORAL DAILY
Qty: 90 TABLET | Refills: 1 | Status: SHIPPED | OUTPATIENT
Start: 2019-01-22 | End: 2019-07-14

## 2019-01-22 RX ORDER — PRAVASTATIN SODIUM 80 MG/1
80 TABLET ORAL DAILY
Qty: 90 TABLET | Refills: 1 | Status: SHIPPED | OUTPATIENT
Start: 2019-01-22 | End: 2019-07-14

## 2019-01-25 ENCOUNTER — TRANSFERRED RECORDS (OUTPATIENT)
Dept: HEALTH INFORMATION MANAGEMENT | Facility: CLINIC | Age: 79
End: 2019-01-25

## 2019-03-04 ENCOUNTER — TRANSFERRED RECORDS (OUTPATIENT)
Dept: HEALTH INFORMATION MANAGEMENT | Facility: CLINIC | Age: 79
End: 2019-03-04

## 2019-07-14 DIAGNOSIS — N95.1 SYMPTOMATIC MENOPAUSAL OR FEMALE CLIMACTERIC STATES: ICD-10-CM

## 2019-07-14 DIAGNOSIS — I10 ESSENTIAL HYPERTENSION, BENIGN: ICD-10-CM

## 2019-07-14 DIAGNOSIS — E78.5 HYPERLIPIDEMIA LDL GOAL <130: ICD-10-CM

## 2019-07-15 RX ORDER — PRAVASTATIN SODIUM 80 MG/1
TABLET ORAL
Qty: 90 TABLET | Refills: 1 | Status: SHIPPED | OUTPATIENT
Start: 2019-07-15 | End: 2020-01-08

## 2019-07-15 RX ORDER — ESTRADIOL 0.5 MG/1
TABLET ORAL
Qty: 45 TABLET | Refills: 1 | Status: SHIPPED | OUTPATIENT
Start: 2019-07-15 | End: 2020-04-07

## 2019-07-15 RX ORDER — VALSARTAN AND HYDROCHLOROTHIAZIDE 160; 12.5 MG/1; MG/1
TABLET, FILM COATED ORAL
Qty: 90 TABLET | Refills: 1 | Status: SHIPPED | OUTPATIENT
Start: 2019-07-15 | End: 2020-01-08

## 2019-07-15 NOTE — TELEPHONE ENCOUNTER
"ESTRADIOL 0.5 MG TABLET      Last Written Prescription Date:  1/18/19  Last Fill Quantity: 45,   # refills: 1  Last Office Visit: 11/28/18  Future Office visit:       valsartan-hydrochlorothiazide (DIOVAN HCT) 160-12.5 MG tablet      Last Written Prescription Date:  1/22/19  Last Fill Quantity: 90,   # refills: 1  Last Office Visit: 11/28/18  Future Office visit:       pravastatin (PRAVACHOL) 80 MG tablet      Last Written Prescription Date:  1/22/19  Last Fill Quantity: 90,   # refills: 1  Last Office Visit: 11/28/18  Future Office visit:       Requested Prescriptions   Pending Prescriptions Disp Refills     estradiol (ESTRACE) 0.5 MG tablet [Pharmacy Med Name: ESTRADIOL 0.5 MG TABLET] 45 tablet 1     Sig: TAKE 0.5 TABLET DAILY       Hormone Replacement Therapy Passed - 7/14/2019  7:37 AM        Passed - Blood pressure under 140/90 in past 12 months     BP Readings from Last 3 Encounters:   11/28/18 114/60   07/03/18 139/76   02/21/18 138/81                 Passed - Recent (12 mo) or future (30 days) visit within the authorizing provider's specialty     Patient had office visit in the last 12 months or has a visit in the next 30 days with authorizing provider or within the authorizing provider's specialty.  See \"Patient Info\" tab in inbasket, or \"Choose Columns\" in Meds & Orders section of the refill encounter.              Passed - Medication is active on med list        Passed - Patient is 18 years of age or older        Passed - No active pregnancy on record        Passed - No positive pregnancy test on record in past 12 months        valsartan-hydrochlorothiazide (DIOVAN HCT) 160-12.5 MG tablet [Pharmacy Med Name: VALSARTAN-HCTZ 160-12.5 MG TAB] 90 tablet 1     Sig: TAKE 1 TABLET BY MOUTH EVERY DAY       Angiotensin-II Receptors Passed - 7/14/2019  7:37 AM        Passed - Blood pressure under 140/90 in past 12 months     BP Readings from Last 3 Encounters:   11/28/18 114/60   07/03/18 139/76   02/21/18 138/81 " "                Passed - Recent (12 mo) or future (30 days) visit within the authorizing provider's specialty     Patient had office visit in the last 12 months or has a visit in the next 30 days with authorizing provider or within the authorizing provider's specialty.  See \"Patient Info\" tab in inbasket, or \"Choose Columns\" in Meds & Orders section of the refill encounter.              Passed - Medication is active on med list        Passed - Patient is age 18 or older        Passed - No active pregnancy on record        Passed - Normal serum creatinine on file in past 12 months     Recent Labs   Lab Test 11/27/18  0820   CR 0.75             Passed - Normal serum potassium on file in past 12 months     Recent Labs   Lab Test 11/27/18  0820   POTASSIUM 4.0                    Passed - No positive pregnancy test in past 12 months        pravastatin (PRAVACHOL) 80 MG tablet [Pharmacy Med Name: PRAVASTATIN SODIUM 80 MG TAB] 90 tablet 1     Sig: TAKE 1 TABLET BY MOUTH EVERY DAY       Statins Protocol Passed - 7/14/2019  7:37 AM        Passed - LDL on file in past 12 months     Recent Labs   Lab Test 11/27/18  0820   *             Passed - No abnormal creatine kinase in past 12 months     No lab results found.             Passed - Recent (12 mo) or future (30 days) visit within the authorizing provider's specialty     Patient had office visit in the last 12 months or has a visit in the next 30 days with authorizing provider or within the authorizing provider's specialty.  See \"Patient Info\" tab in inbasket, or \"Choose Columns\" in Meds & Orders section of the refill encounter.              Passed - Medication is active on med list        Passed - Patient is age 18 or older        Passed - No active pregnancy on record        Passed - No positive pregnancy test in past 12 months          "

## 2019-08-26 ENCOUNTER — TRANSFERRED RECORDS (OUTPATIENT)
Dept: HEALTH INFORMATION MANAGEMENT | Facility: CLINIC | Age: 79
End: 2019-08-26

## 2019-10-19 ENCOUNTER — HOSPITAL ENCOUNTER (OUTPATIENT)
Dept: MAMMOGRAPHY | Facility: CLINIC | Age: 79
Discharge: HOME | End: 2019-10-19
Attending: FAMILY MEDICINE | Admitting: FAMILY MEDICINE
Payer: MEDICARE

## 2019-10-19 DIAGNOSIS — Z12.31 VISIT FOR SCREENING MAMMOGRAM: ICD-10-CM

## 2019-10-19 PROCEDURE — 77063 BREAST TOMOSYNTHESIS BI: CPT

## 2019-11-03 ENCOUNTER — HEALTH MAINTENANCE LETTER (OUTPATIENT)
Age: 79
End: 2019-11-03

## 2019-11-19 ENCOUNTER — MYC MEDICAL ADVICE (OUTPATIENT)
Dept: FAMILY MEDICINE | Facility: CLINIC | Age: 79
End: 2019-11-19

## 2019-11-19 DIAGNOSIS — E78.5 HYPERLIPIDEMIA LDL GOAL <130: ICD-10-CM

## 2019-11-19 DIAGNOSIS — I10 ESSENTIAL HYPERTENSION: Primary | ICD-10-CM

## 2019-12-02 DIAGNOSIS — I10 ESSENTIAL HYPERTENSION: ICD-10-CM

## 2019-12-02 DIAGNOSIS — E78.5 HYPERLIPIDEMIA LDL GOAL <130: ICD-10-CM

## 2019-12-02 LAB
ANION GAP SERPL CALCULATED.3IONS-SCNC: <1 MMOL/L (ref 3–14)
BUN SERPL-MCNC: 21 MG/DL (ref 7–30)
CALCIUM SERPL-MCNC: 8.7 MG/DL (ref 8.5–10.1)
CHLORIDE SERPL-SCNC: 110 MMOL/L (ref 94–109)
CHOLEST SERPL-MCNC: 227 MG/DL
CO2 SERPL-SCNC: 30 MMOL/L (ref 20–32)
CREAT SERPL-MCNC: 0.72 MG/DL (ref 0.52–1.04)
GFR SERPL CREATININE-BSD FRML MDRD: 80 ML/MIN/{1.73_M2}
GLUCOSE SERPL-MCNC: 100 MG/DL (ref 70–99)
HDLC SERPL-MCNC: 107 MG/DL
LDLC SERPL CALC-MCNC: 109 MG/DL
NONHDLC SERPL-MCNC: 120 MG/DL
POTASSIUM SERPL-SCNC: 3.6 MMOL/L (ref 3.4–5.3)
SODIUM SERPL-SCNC: 140 MMOL/L (ref 133–144)
TRIGL SERPL-MCNC: 55 MG/DL

## 2019-12-02 PROCEDURE — 80048 BASIC METABOLIC PNL TOTAL CA: CPT | Performed by: FAMILY MEDICINE

## 2019-12-02 PROCEDURE — 80061 LIPID PANEL: CPT | Performed by: FAMILY MEDICINE

## 2019-12-02 PROCEDURE — 36415 COLL VENOUS BLD VENIPUNCTURE: CPT | Performed by: FAMILY MEDICINE

## 2019-12-02 ASSESSMENT — ENCOUNTER SYMPTOMS
PARESTHESIAS: 0
NERVOUS/ANXIOUS: 0
ABDOMINAL PAIN: 0
WEAKNESS: 0
NAUSEA: 0
BREAST MASS: 0
DYSURIA: 0
FREQUENCY: 0
CONSTIPATION: 0
SORE THROAT: 0
PALPITATIONS: 0
COUGH: 0
HEMATOCHEZIA: 0
HEADACHES: 0
EYE PAIN: 0
HEMATURIA: 0
MYALGIAS: 0
DIZZINESS: 0
CHILLS: 0
SHORTNESS OF BREATH: 0
FEVER: 0
DIARRHEA: 0
ARTHRALGIAS: 0
JOINT SWELLING: 0
HEARTBURN: 0

## 2019-12-02 ASSESSMENT — ACTIVITIES OF DAILY LIVING (ADL): CURRENT_FUNCTION: NO ASSISTANCE NEEDED

## 2019-12-05 ENCOUNTER — OFFICE VISIT (OUTPATIENT)
Dept: FAMILY MEDICINE | Facility: CLINIC | Age: 79
End: 2019-12-05
Payer: MEDICARE

## 2019-12-05 VITALS
TEMPERATURE: 98 F | HEART RATE: 74 BPM | DIASTOLIC BLOOD PRESSURE: 70 MMHG | WEIGHT: 214 LBS | SYSTOLIC BLOOD PRESSURE: 116 MMHG | BODY MASS INDEX: 37.92 KG/M2 | HEIGHT: 63 IN

## 2019-12-05 DIAGNOSIS — Z00.00 ENCOUNTER FOR MEDICARE ANNUAL WELLNESS EXAM: Primary | ICD-10-CM

## 2019-12-05 PROCEDURE — G0439 PPPS, SUBSEQ VISIT: HCPCS | Performed by: PHYSICIAN ASSISTANT

## 2019-12-05 ASSESSMENT — MIFFLIN-ST. JEOR: SCORE: 1415.86

## 2019-12-05 ASSESSMENT — PAIN SCALES - GENERAL: PAINLEVEL: NO PAIN (0)

## 2019-12-05 NOTE — PATIENT INSTRUCTIONS
Patient Education   Personalized Prevention Plan  You are due for the preventive services outlined below.  Your care team is available to assist you in scheduling these services.  If you have already completed any of these items, please share that information with your care team to update in your medical record.  Health Maintenance Due   Topic Date Due     Breathing Capacity Test  1940     COPD Action Plan  1940     Discuss Advance Care Planning  01/02/2018     Flu Vaccine (1) 09/01/2019     FALL RISK ASSESSMENT  11/28/2019     Annual Wellness Visit  11/28/2019

## 2019-12-05 NOTE — PROGRESS NOTES
"  SUBJECTIVE:   Rsoie Blackman is a 79 year old female who presents for Preventive Visit.    Are you in the first 12 months of your Medicare Part B coverage?  No    Answers for HPI/ROS submitted by the patient on 12/2/2019   Annual Exam:  In general, how would you rate your overall physical health?: excellent  Frequency of exercise:: 2-3 days/week  Do you usually eat at least 4 servings of fruit and vegetables a day, include whole grains & fiber, and avoid regularly eating high fat or \"junk\" foods? : Yes  Taking medications regularly:: Yes  Medication side effects:: None  Activities of Daily Living: no assistance needed  Home safety: no safety concerns identified  Hearing Impairment:: no hearing concerns  In the past 6 months, have you been bothered by leaking of urine?: No  abdominal pain: No  Blood in stool: No  Blood in urine: No  chest pain: No  chills: No  congestion: No  constipation: No  cough: No  diarrhea: No  dizziness: No  ear pain: No  eye pain: No  nervous/anxious: No  fever: No  frequency: No  genital sores: No  headaches: No  hearing loss: No  heartburn: No  arthralgias: No  joint swelling: No  peripheral edema: No  mood changes: No  myalgias: No  nausea: No  dysuria: No  palpitations: No  Skin sensation changes: No  sore throat: No  urgency: No  rash: No  shortness of breath: No  visual disturbance: No  weakness: No  pelvic pain: No  vaginal bleeding: No  vaginal discharge: No  tenderness: No  breast mass: No  breast discharge: No  In general, how would you rate your overall mental or emotional health?: excellent  Additional concerns today:: No  Duration of exercise:: 15-30 minutes      Do you feel safe in your environment? Yes    Have you ever done Advance Care Planning? (For example, a Health Directive, POLST, or a discussion with a medical provider or your loved ones about your wishes): Yes, advance care planning is on file.    Additional concerns to address?  No    Fall risk:  Fallen 2 or more " times in the past year?: No  Any fall with injury in the past year?: No    Cognitive Screenin) Repeat 3 items (Leader, Season, Table)    2) Clock draw: NORMAL  3) 3 item recall: Recalls 3 objects  Results: 3 items recalled: COGNITIVE IMPAIRMENT LESS LIKELY    Mini-CogTM Copyright NEGRITA Freedman. Licensed by the author for use in [facility]; reprinted with permission (harshil@[facility]). All rights reserved.                  Reviewed and updated as needed this visit by clinical staff  Tobacco  Allergies  Meds  Med Hx  Surg Hx  Fam Hx  Soc Hx        Reviewed and updated as needed this visit by Provider        Social History     Tobacco Use     Smoking status: Never Smoker     Smokeless tobacco: Never Used   Substance Use Topics     Alcohol use: No                           Current providers sharing in care for this patient include:   Patient Care Team:  Laya Morales MD as PCP - General (Family Practice)  Laya Morales MD as Assigned PCP    The following health maintenance items are reviewed in Epic and correct as of today:  Health Maintenance   Topic Date Due     SPIROMETRY  1940     COPD ACTION PLAN  1940     ADVANCE CARE PLANNING  2018     INFLUENZA VACCINE (1) 2019     FALL RISK ASSESSMENT  2019     MEDICARE ANNUAL WELLNESS VISIT  2019     MAMMO SCREENING  10/19/2020     COLONOSCOPY  2020     BMP  2020     LIPID  2020     DTAP/TDAP/TD IMMUNIZATION (3 - Td) 2022     DEXA  Completed     PHQ-2  Completed     PNEUMOCOCCAL IMMUNIZATION 65+ LOW/MEDIUM RISK  Completed     ZOSTER IMMUNIZATION  Completed     IPV IMMUNIZATION  Aged Out     MENINGITIS IMMUNIZATION  Aged Out     BP Readings from Last 3 Encounters:   19 116/70   18 114/60   18 139/76    Wt Readings from Last 3 Encounters:   19 97.1 kg (214 lb)   18 93.9 kg (207 lb)   18 96.5 kg (212 lb 12.8 oz)                  Pneumonia Vaccine: up to  "date    ROS:  Remainder of ROS obtained and found to be negative other than that which was documented above      OBJECTIVE:   /70   Pulse 74   Temp 98  F (36.7  C) (Tympanic)   Ht 1.594 m (5' 2.75\")   Wt 97.1 kg (214 lb)   BMI 38.21 kg/m   Estimated body mass index is 38.21 kg/m  as calculated from the following:    Height as of this encounter: 1.594 m (5' 2.75\").    Weight as of this encounter: 97.1 kg (214 lb).  EXAM:   GENERAL APPEARANCE: healthy, alert and no distress  EYES: Eyes grossly normal to inspection, PERRL and conjunctivae and sclerae normal  HENT: ear canals and TM's normal, nose and mouth without ulcers or lesions, oropharynx clear and oral mucous membranes moist  NECK: no adenopathy, no asymmetry, masses, or scars and thyroid normal to palpation  RESP: lungs clear to auscultation - no rales, rhonchi or wheezes  CV: regular rate and rhythm, normal S1 S2, no S3 or S4, no murmur, click or rub, no peripheral edema and peripheral pulses strong  ABDOMEN: soft, nontender, no hepatosplenomegaly, no masses and bowel sounds normal  MS: no musculoskeletal defects are noted and gait is age appropriate without ataxia  SKIN: no suspicious lesions or rashes  NEURO: Normal strength and tone, sensory exam grossly normal, mentation intact and speech normal  PSYCH: mentation appears normal and affect normal/bright    Diagnostic Test Results:  Reviewed recent labs    ASSESSMENT / PLAN:       ICD-10-CM    1. Encounter for Medicare annual wellness exam Z00.00        COUNSELING:  Reviewed preventive health counseling, as reflected in patient instructions       Regular exercise       Healthy diet/nutrition    Estimated body mass index is 38.21 kg/m  as calculated from the following:    Height as of this encounter: 1.594 m (5' 2.75\").    Weight as of this encounter: 97.1 kg (214 lb).         reports that she has never smoked. She has never used smokeless tobacco.      Appropriate preventive services were discussed " with this patient, including applicable screening as appropriate for cardiovascular disease, diabetes, osteopenia/osteoporosis, and glaucoma.  As appropriate for age/gender, discussed screening for colorectal cancer, prostate cancer, breast cancer, and cervical cancer. Checklist reviewing preventive services available has been given to the patient.    Reviewed patients plan of care and provided an AVS. The Basic Care Plan (routine screening as documented in Health Maintenance) for Rosie meets the Care Plan requirement. This Care Plan has been established and reviewed with the Patient.    Counseling Resources:  ATP IV Guidelines  Pooled Cohorts Equation Calculator  Breast Cancer Risk Calculator  FRAX Risk Assessment  ICSI Preventive Guidelines  Dietary Guidelines for Americans, 2010  USDA's MyPlate  ASA Prophylaxis  Lung CA Screening    Ximena Flor PA-C  [facility] ([facility])

## 2020-01-08 DIAGNOSIS — E78.5 HYPERLIPIDEMIA LDL GOAL <130: ICD-10-CM

## 2020-01-08 DIAGNOSIS — I10 ESSENTIAL HYPERTENSION, BENIGN: ICD-10-CM

## 2020-01-08 RX ORDER — VALSARTAN AND HYDROCHLOROTHIAZIDE 160; 12.5 MG/1; MG/1
TABLET, FILM COATED ORAL
Qty: 90 TABLET | Refills: 3 | Status: SHIPPED | OUTPATIENT
Start: 2020-01-08 | End: 2020-05-05 | Stop reason: ALTCHOICE

## 2020-01-08 RX ORDER — PRAVASTATIN SODIUM 80 MG/1
TABLET ORAL
Qty: 90 TABLET | Refills: 1 | Status: SHIPPED | OUTPATIENT
Start: 2020-01-08 | End: 2020-04-07

## 2020-01-08 NOTE — TELEPHONE ENCOUNTER
"VALSARTAN-HCTZ 160-12.5 MG TAB      Last Written Prescription Date:  7/15/19  Last Fill Quantity: 90,   # refills: 1  Last Office Visit: 12/5/19  Future Office visit:       pravastatin (PRAVACHOL) 80 MG tablet      Last Written Prescription Date:  7/15/19  Last Fill Quantity: 90,   # refills: 1  Last Office Visit: 12/5/19  Future Office visit:       Requested Prescriptions   Pending Prescriptions Disp Refills     valsartan-hydrochlorothiazide (DIOVAN HCT) 160-12.5 MG tablet [Pharmacy Med Name: VALSARTAN-HCTZ 160-12.5 MG TAB] 90 tablet 1     Sig: TAKE 1 TABLET BY MOUTH EVERY DAY       Angiotensin-II Receptors Passed - 1/8/2020  2:54 AM        Passed - Last blood pressure under 140/90 in past 12 months     BP Readings from Last 3 Encounters:   12/05/19 116/70   11/28/18 114/60   07/03/18 139/76                 Passed - Recent (12 mo) or future (30 days) visit within the authorizing provider's specialty     Patient has had an office visit with the authorizing provider or a provider within the authorizing providers department within the previous 12 mos or has a future within next 30 days. See \"Patient Info\" tab in inbasket, or \"Choose Columns\" in Meds & Orders section of the refill encounter.              Passed - Medication is active on med list        Passed - Patient is age 18 or older        Passed - No active pregnancy on record        Passed - Normal serum creatinine on file in past 12 months     Recent Labs   Lab Test 12/02/19  0735   CR 0.72             Passed - Normal serum potassium on file in past 12 months     Recent Labs   Lab Test 12/02/19  0735   POTASSIUM 3.6                    Passed - No positive pregnancy test in past 12 months        pravastatin (PRAVACHOL) 80 MG tablet [Pharmacy Med Name: PRAVASTATIN SODIUM 80 MG TAB] 90 tablet 1     Sig: TAKE 1 TABLET BY MOUTH EVERY DAY       Statins Protocol Passed - 1/8/2020  2:54 AM        Passed - LDL on file in past 12 months     Recent Labs   Lab Test " "12/02/19  0735   *             Passed - No abnormal creatine kinase in past 12 months     No lab results found.             Passed - Recent (12 mo) or future (30 days) visit within the authorizing provider's specialty     Patient has had an office visit with the authorizing provider or a provider within the authorizing providers department within the previous 12 mos or has a future within next 30 days. See \"Patient Info\" tab in inbasket, or \"Choose Columns\" in Meds & Orders section of the refill encounter.              Passed - Medication is active on med list        Passed - Patient is age 18 or older        Passed - No active pregnancy on record        Passed - No positive pregnancy test in past 12 months          "

## 2020-04-06 DIAGNOSIS — N95.1 SYMPTOMATIC MENOPAUSAL OR FEMALE CLIMACTERIC STATES: ICD-10-CM

## 2020-04-06 DIAGNOSIS — E78.5 HYPERLIPIDEMIA LDL GOAL <130: ICD-10-CM

## 2020-04-06 NOTE — TELEPHONE ENCOUNTER
"PRAVASTATIN SODIUM 80 MG TAB      Last Written Prescription Date:  1/8/20  Last Fill Quantity: 90,   # refills: 1  Last Office Visit: 12/5/19  Future Office visit:       estradiol (ESTRACE) 0.5 MG tablet      Last Written Prescription Date:  7/15/19  Last Fill Quantity: 45,   # refills: 1  Last Office Visit: 12/5/19  Future Office visit:       Requested Prescriptions   Pending Prescriptions Disp Refills     pravastatin (PRAVACHOL) 80 MG tablet [Pharmacy Med Name: PRAVASTATIN SODIUM 80 MG TAB] 90 tablet 1     Sig: TAKE 1 TABLET BY MOUTH EVERY DAY       Statins Protocol Passed - 4/6/2020 10:12 AM        Passed - LDL on file in past 12 months     Recent Labs   Lab Test 12/02/19  0735   *             Passed - No abnormal creatine kinase in past 12 months     No lab results found.             Passed - Recent (12 mo) or future (30 days) visit within the authorizing provider's specialty     Patient has had an office visit with the authorizing provider or a provider within the authorizing providers department within the previous 12 mos or has a future within next 30 days. See \"Patient Info\" tab in inbasket, or \"Choose Columns\" in Meds & Orders section of the refill encounter.              Passed - Medication is active on med list        Passed - Patient is age 18 or older        Passed - No active pregnancy on record        Passed - No positive pregnancy test in past 12 months           estradiol (ESTRACE) 0.5 MG tablet [Pharmacy Med Name: ESTRADIOL 0.5 MG TABLET] 45 tablet 1     Sig: TAKE 0.5 TABLET DAILY       Hormone Replacement Therapy Passed - 4/6/2020 10:12 AM        Passed - Blood pressure under 140/90 in past 12 months     BP Readings from Last 3 Encounters:   12/05/19 116/70   11/28/18 114/60   07/03/18 139/76                 Passed - Recent (12 mo) or future (30 days) visit within the authorizing provider's specialty     Patient has had an office visit with the authorizing provider or a provider within the " "authorizing providers department within the previous 12 mos or has a future within next 30 days. See \"Patient Info\" tab in inbasket, or \"Choose Columns\" in Meds & Orders section of the refill encounter.              Passed - Medication is active on med list        Passed - Patient is 18 years of age or older        Passed - No active pregnancy on record        Passed - No positive pregnancy test on record in past 12 months               "

## 2020-04-07 RX ORDER — ESTRADIOL 0.5 MG/1
TABLET ORAL
Qty: 45 TABLET | Refills: 1 | Status: ON HOLD | OUTPATIENT
Start: 2020-04-07 | End: 2020-07-26

## 2020-04-07 RX ORDER — PRAVASTATIN SODIUM 80 MG/1
TABLET ORAL
Qty: 90 TABLET | Refills: 1 | Status: SHIPPED | OUTPATIENT
Start: 2020-04-07 | End: 2020-07-23

## 2020-04-16 ENCOUNTER — TELEPHONE (OUTPATIENT)
Dept: FAMILY MEDICINE | Facility: CLINIC | Age: 80
End: 2020-04-16

## 2020-04-16 DIAGNOSIS — I10 ESSENTIAL HYPERTENSION: Primary | ICD-10-CM

## 2020-04-16 RX ORDER — IRBESARTAN AND HYDROCHLOROTHIAZIDE 300; 12.5 MG/1; MG/1
1 TABLET, FILM COATED ORAL DAILY
Qty: 90 TABLET | Refills: 3 | Status: SHIPPED | OUTPATIENT
Start: 2020-04-16 | End: 2020-05-11 | Stop reason: SINTOL

## 2020-04-16 NOTE — TELEPHONE ENCOUNTER
I sent in an alternative she has been on in the past. Please ask her to check her blood pressure at home for the next few weeks to see how the change is working for her.   She can mychart them in . Laya Morales M.D.

## 2020-04-17 NOTE — TELEPHONE ENCOUNTER
If her blood pressure is well controlled with this medication , I don't see a reason to switch back. The only reason she had it changed in the past was because it wasn't on her insurance formulary. That's why I think this should work Laya Morales M.D.

## 2020-04-17 NOTE — TELEPHONE ENCOUNTER
Called and notified patient of message from provider below. Patient is wondering if she should anticipate a change with blood pressure? Also patient is wondering when the Valsartan comes back in stock should she switch back? Provider please advise.   Leslie Sen LPN

## 2020-05-04 ENCOUNTER — MYC MEDICAL ADVICE (OUTPATIENT)
Dept: FAMILY MEDICINE | Facility: CLINIC | Age: 80
End: 2020-05-04

## 2020-05-05 ENCOUNTER — TELEPHONE (OUTPATIENT)
Dept: FAMILY MEDICINE | Facility: CLINIC | Age: 80
End: 2020-05-05

## 2020-05-05 ENCOUNTER — ALLIED HEALTH/NURSE VISIT (OUTPATIENT)
Dept: FAMILY MEDICINE | Facility: CLINIC | Age: 80
End: 2020-05-05
Payer: MEDICARE

## 2020-05-05 VITALS — HEART RATE: 80 BPM | SYSTOLIC BLOOD PRESSURE: 136 MMHG | DIASTOLIC BLOOD PRESSURE: 84 MMHG

## 2020-05-05 DIAGNOSIS — I10 ESSENTIAL HYPERTENSION: Primary | ICD-10-CM

## 2020-05-05 PROCEDURE — 99207 ZZC NO CHARGE NURSE ONLY: CPT

## 2020-05-05 NOTE — TELEPHONE ENCOUNTER
Rosie notified of all of Dr. Friend's instructions as noted below. Rosie agreed with plans. She said that she will contact her pharmacist and get a monitor. She can report by "[employer]t.  Arvin Ayala RN

## 2020-05-05 NOTE — TELEPHONE ENCOUNTER
Blood pressure reviewed.  Continue with irbsartan-hydrochlorothiazide dosage.  Is there any way for patient to check her blood pressures at home?  (Over the counter blood pressure monitor).  I would like her to monitor it 3 times per week.

## 2020-05-05 NOTE — NURSING NOTE
Rosie Blackman is a 80 year old year old patient who comes in today for a Blood Pressure check because of medication change.  Valsartan/hctz had become difficult for pharmacy to get.  Pt was changed to irbesartan/hctz, 300/12.5, on 4/16/20.  Pt returns today for RN blood pressure recheck.    Vital Signs as repeated by RN:  Pt is at goal today with 136/84, pulse of 80    Patient is taking medication as prescribed  Patient is tolerating medications well.  Patient is not monitoring Blood Pressure at home. No home monitor and no drug store option currently due to COVID19.    Current complaints: none  Disposition:  Routed updated reading to provider.  This is a follow up reading due medication change as noted above.    Cora Romano RN

## 2020-05-11 ENCOUNTER — MYC MEDICAL ADVICE (OUTPATIENT)
Dept: FAMILY MEDICINE | Facility: CLINIC | Age: 80
End: 2020-05-11

## 2020-05-11 DIAGNOSIS — I10 ESSENTIAL HYPERTENSION, BENIGN: ICD-10-CM

## 2020-05-11 RX ORDER — VALSARTAN AND HYDROCHLOROTHIAZIDE 160; 12.5 MG/1; MG/1
1 TABLET, FILM COATED ORAL DAILY
Qty: 90 TABLET | Refills: 1 | Status: SHIPPED | OUTPATIENT
Start: 2020-05-11 | End: 2020-12-15

## 2020-06-04 ENCOUNTER — OFFICE VISIT (OUTPATIENT)
Dept: FAMILY MEDICINE | Facility: CLINIC | Age: 80
End: 2020-06-04
Payer: MEDICARE

## 2020-06-04 VITALS
RESPIRATION RATE: 16 BRPM | TEMPERATURE: 97.4 F | BODY MASS INDEX: 37.21 KG/M2 | OXYGEN SATURATION: 97 % | DIASTOLIC BLOOD PRESSURE: 70 MMHG | HEART RATE: 79 BPM | SYSTOLIC BLOOD PRESSURE: 132 MMHG | WEIGHT: 210 LBS | HEIGHT: 63 IN

## 2020-06-04 DIAGNOSIS — E78.5 HYPERLIPIDEMIA LDL GOAL <130: ICD-10-CM

## 2020-06-04 DIAGNOSIS — M54.41 ACUTE RIGHT-SIDED LOW BACK PAIN WITH RIGHT-SIDED SCIATICA: ICD-10-CM

## 2020-06-04 DIAGNOSIS — M70.62 TROCHANTERIC BURSITIS OF BOTH HIPS: Primary | ICD-10-CM

## 2020-06-04 DIAGNOSIS — M70.61 TROCHANTERIC BURSITIS OF BOTH HIPS: Primary | ICD-10-CM

## 2020-06-04 DIAGNOSIS — I83.812 VARICOSE VEINS OF LEFT LOWER EXTREMITY WITH PAIN: ICD-10-CM

## 2020-06-04 DIAGNOSIS — M21.41 ACQUIRED PES PLANUS OF BOTH FEET: ICD-10-CM

## 2020-06-04 DIAGNOSIS — M21.42 ACQUIRED PES PLANUS OF BOTH FEET: ICD-10-CM

## 2020-06-04 DIAGNOSIS — E66.01 MORBID OBESITY (H): ICD-10-CM

## 2020-06-04 PROCEDURE — 99214 OFFICE O/P EST MOD 30 MIN: CPT | Performed by: INTERNAL MEDICINE

## 2020-06-04 ASSESSMENT — MIFFLIN-ST. JEOR: SCORE: 1388.74

## 2020-06-04 NOTE — PATIENT INSTRUCTIONS
1. Stop the pravastatin x 1 month to see if muscle pains improve go away  2. Follow-up with Dr Morales in 1 month to determine if you should restart the pravastatin  3. Left lower leg pain is likely related to remaining varicose veins and the flat feet.  4. There is swelling in both lower legs.  Wear compression stockings up to 20 mm of compression.  5. There does not appear to be arthritis as the cause of the pain.  It appears you have right sided sciatica +/- bursitis and left sided bursitis.  6. Referral to Sports Medicine for consideration of injection.  7. Referral to physical therapy  8. Work on diet and exercise to help with weight loss for improved joint mobility and less leg swelling       Dr. Escalera's Tips for a Healthy Diet    1. Add more fresh fruits and vegetables to your diet.  The more colorful with the fruit or vegetable (think berries, spinach, carrots, peppers) the healthier it tends to be.  Juice is not a fruit.  Prepare the vegetables in a healthy way - steam, bake.  Avoid batter/breading, butter/oil to cook.  2. Add more fiber to your diet.  Swap out white bread, white rice, white pasta for whole grain versions.  Reduce the portion size and frequency of carbohydrates/starches.  3. Choose healthier fats such as nuts, olive oil, avocado, etc. Stay away from lard, butter.  4. Decrease the frequency and portion size of 'junk food' -pizza, candy, cookies, potato chips, etc.  5. Watch liquid calories such as coffee drinks, juice, soda, teas.  There tends to be excessive sugar in these beverages.  6. Increase protein in your diet.  Eggs, cheese, yogurt, nuts, lentils, chicken, fish are good healthy choices.  Protein keeps you eddy longer, and you are less likely to have blood sugar spikes  7. Eat healthy at least 80% of the time.  It is ok for a special treat (Mom's spaghetti dinner, cake on your birthday) every once in a while, just not every day.  When are you going to indulge (think [employer]  time), be sure you are eating extra healthy the day before and after.      Resources:    1. [facility] for Health and Wellness:https://www.takingcharge.[facility] [place]/dig-yes-foods    2.   [facility] Ways To Wellness:    https://www.[facility]org/services/ways-to-wellness  Ways to Wellness offers:    Nutrition and weight management     Corrective exercise and fitness training     Lifestyle and behavioral change     Healing services  Our team includes registered dietitians, certified personal trainers, life coaches, as well as a Culinary Educator.    3. [facility] for Cardiovascular Disease Prevention  Consider making an appointment at the [facility] if either of the following describes you:    You are an adult who has risk factors for cardiovascular disease. Risk factors include cholesterol elevations, diabetes, high blood pressure, elevated weight, inactive living, and history of tobacco use.     You don t have traditional risk factors but have a strong family history of cardiovascular disease, which may mean you have a higher of risk of cardiovascular disease.

## 2020-06-04 NOTE — PROGRESS NOTES
Subjective     Rosie Blackman is a 80 year old female who presents to clinic today for the following health issues:    HPI   Hypertension Follow-up      Do you check your blood pressure regularly outside of the clinic? Yes 132/70    Are you following a low salt diet? Yes    Are your blood pressures ever more than 140 on the top number (systolic) OR more than 90 on the bottom number (diastolic), for example 140/90? No   Current problem: Been on Valsartan many years, and April/2020 - pharmacy didn't have the medication (shortage), was rx irbesartan than developed a severe back thigh pain, told the PCP and was discontinue the irbesartan and go back to Varsatan, did helped at time but now the pain is MUCH worst, unable to sleep the left side, try to sleep on her back, but uncomfortable . Last Monday could barely walk. Waking up in pain. Severe. Pt is not doing any stretching or exercises.  - Question: Could this be bursitis on the hip, or stenosis taking 2 Aleve (2 tablets) in the Morning and it will help her go through the am and part of the day.    Valsartan switched to irbesartan due to shortage.  Irbesartan caused significant myalgias, so switched back to valsartan    Right Buttock Pain:    However, right buttock pain resumed despite changing back    Right buttock pain radiates down the leg to above the knee    Difficult to sit for any time on a non-padded chair    Left Hip Pain: difficult to lay on left hip at night due to pain.    Ankle pain/flat feet: Reports she torn tendons in both ankles many years ago and have flat feet, over the year had many orthotics inserts problem, and its been painful, had customized ortho cushion and nothing has worked so far. Doesn't wear currently because they worsened the pain.  Has been in [place] ortho specialist    Varicose Veins: Aug/2014 has a vascular surgeon, however still having pain in the lower left leg and went back to the surgeon and was told that it was not a vein  "problem causing the left calf pain.  --wonders if flat feet is worsening left leg pain  --Using heat pad in the legs to help with the pain.      Joint Pain    Onset: weeks    Description:   Location: left hip pain  Character: Sharp, Dull ache and Stabbing    Intensity: severe    Progression of Symptoms: worse    Accompanying Signs & Symptoms:  Other symptoms: radiation of pain to gluteus and back to the knee    History:   Previous similar pain: YES      Precipitating factors:   Trauma or overuse: no     Alleviating factors:Improved by: heat    Therapies Tried and outcome: na    Saw podiatry 8/2019 for right ankle pain - injection had helped, given another.  Given orthotic        Current Outpatient Medications   Medication Sig Dispense Refill     estradiol (ESTRACE) 0.5 MG tablet TAKE 0.5 TABLET DAILY 45 tablet 1     mometasone (ELOCON) 0.1 % cream Apply sparingly to affected area twice daily for 2 weeks then decrease to 1 time daily for 30 days then decrease to 2-3 times per week 45 g 0     Naproxen Sodium (ALEVE PO)        omega-3 acid ethyl esters (LOVAZA) 1 g capsule Take 1 g by mouth 2 times daily       pravastatin (PRAVACHOL) 80 MG tablet TAKE 1 TABLET BY MOUTH EVERY DAY 90 tablet 1     THERATEARS 0.25 % OP SOLN BID       valsartan-hydrochlorothiazide (DIOVAN HCT) 160-12.5 MG tablet Take 1 tablet by mouth daily 90 tablet 1     VIACTIV 500-100-40 OR CHEW once daily       ASPIRIN NOT PRESCRIBED (INTENTIONAL) Reported on 4/4/2017           Reviewed and updated as needed this visit by Provider  Problems         Review of Systems   Constitutional, HEENT, cardiovascular, pulmonary, gi and gu systems are negative, except as otherwise noted.      Objective    /70   Pulse 79   Temp 97.4  F (36.3  C) (Tympanic)   Resp 16   Ht 1.588 m (5' 2.5\")   Wt 95.3 kg (210 lb)   SpO2 97%   Breastfeeding No   BMI 37.80 kg/m    Body mass index is 37.8 kg/m .  Physical Exam   GENERAL APPEARANCE: healthy, alert, no " distress and over weight  ORTHO: Hip Exam: Palpation: Tender:   left greater trochanter, right greater trochanter  Non-tender:  left gluteus medius, right gluteus medius, left ASIS, right ASIS  Range of Motion:  Full ROM, both hips  Strength:  full strength    Lower Leg Exam: Inspection; 1+ pitting edema bilaterally to mid shins.  Varicose veins bilaterally, left greater than right.  Dilated varicose veins superficially over the focal area of pain in the left lateral calf  Palpation:   Non-tender: proximal 1/3 tibia, middle 1/3 tibia, distal 1/3 tibia, proximal fibula, distal fibula, gastroc soleus muscle, anterior tibialis muscle  Strength: All normal    Lumber/Thoracic Spine Exam: Tender:  right sciatic notch  Non-tender:  thoracic spinous processes, left parathoracic muscles, right parathoracic muscles, lumbar spinous processes, left para lumbar muscles, right para lumbar muscles  Range of Motion:  All normal  Strength:  able to heel walk, able to toe walk.  Core muscles weak.  Needs to use arms to go from lying to seated position and seated to standing position  Special tests:  positive right straight leg raise    Bilateral severe pes planus          Assessment & Plan     1. Trochanteric bursitis of both hips = probable based on description and on exam with exquisite tenderness over the greater trochanter.  However due to morbid obesity, exam is limited.  Consider injections and discussed PT.  Continue with home care she has been doing  - Orthopedic & Spine  Referral; Future  - PHYSICAL THERAPY REFERRAL; Future    2. Acute right-sided low back pain with right-sided sciatica -probable sciatica based on symptoms and exam.  - Orthopedic & Spine  Referral; Future  - PHYSICAL THERAPY REFERRAL; Future    3. Varicose veins of left lower extremity with pain -discussed low-salt diet, elevation and compression    4. Acquired pes planus of both feet -likely contributing to the left lower leg pain.  She  "has tried and failed multiple orthotics    5. Hyperlipidemia LDL goal <130 -stop the statin x1 month just to see if this is contributing at all to her multiple pains.  Follow-up with virtual visit in 1 month    6. Morbid obesity (H) -discussed diet and exercise to help leg swelling and overall joint pain       BMI:   Estimated body mass index is 37.8 kg/m  as calculated from the following:    Height as of this encounter: 1.588 m (5' 2.5\").    Weight as of this encounter: 95.3 kg (210 lb).   Weight management plan: Discussed healthy diet and exercise guidelines        Patient Instructions   1. Stop the pravastatin x 1 month to see if muscle pains improve go away  2. Follow-up with Dr Morales in 1 month to determine if you should restart the pravastatin  3. Left lower leg pain is likely related to remaining varicose veins and the flat feet.  4. There is swelling in both lower legs.  Wear compression stockings up to 20 mm of compression.  5. There does not appear to be arthritis as the cause of the pain.  It appears you have right sided sciatica +/- bursitis and left sided bursitis.  6. Referral to Sports Medicine for consideration of injection.  7. Referral to physical therapy  8. Work on diet and exercise to help with weight loss for improved joint mobility and less leg swelling       Dr. Escalera's Tips for a Healthy Diet    1. Add more fresh fruits and vegetables to your diet.  The more colorful with the fruit or vegetable (think berries, spinach, carrots, peppers) the healthier it tends to be.  Juice is not a fruit.  Prepare the vegetables in a healthy way - steam, bake.  Avoid batter/breading, butter/oil to cook.  2. Add more fiber to your diet.  Swap out white bread, white rice, white pasta for whole grain versions.  Reduce the portion size and frequency of carbohydrates/starches.  3. Choose healthier fats such as nuts, olive oil, avocado, etc. Stay away from lard, butter.  4. Decrease the frequency and portion " size of 'junk food' -pizza, candy, cookies, potato chips, etc.  5. Watch liquid calories such as coffee drinks, juice, soda, teas.  There tends to be excessive sugar in these beverages.  6. Increase protein in your diet.  Eggs, cheese, yogurt, nuts, lentils, chicken, fish are good healthy choices.  Protein keeps you eddy longer, and you are less likely to have blood sugar spikes  7. Eat healthy at least 80% of the time.  It is ok for a special treat (Mom's spaghetti dinner, cake on your birthday) every once in a while, just not every day.  When are you going to indulge (think [employer] time), be sure you are eating extra healthy the day before and after.      Resources:    1. [facility] for Health and Wellness:https://www.[employer].edu/dig-yes-foods    2.   [facility] To Well[facility]:    https://www.[facility].org/services/ways-to-wellness  Ways to Wellness offers:    Nutrition and weight management     Corrective exercise and fitness training     Lifestyle and behavioral change     Healing services  Our team includes registered dietitians, certified personal trainers, life coaches, as well as a Culinary Educator.    3. [facility] for Cardiovascular Disease Prevention  Consider making an appointment at the [facility] if either of the following describes you:    You are an adult who has risk factors for cardiovascular disease. Risk factors include cholesterol elevations, diabetes, high blood pressure, elevated weight, inactive living, and history of tobacco use.     You don t have traditional risk factors but have a strong family history of cardiovascular disease, which may mean you have a higher of risk of cardiovascular disease.             Return in about 1 month (around 7/4/2020) for If symptoms don't improve or if they worsen.    Kathya Escalera, DO  [facility]

## 2020-06-11 ENCOUNTER — HOSPITAL ENCOUNTER (OUTPATIENT)
Dept: PHYSICAL THERAPY | Facility: CLINIC | Age: 80
Setting detail: THERAPIES SERIES
End: 2020-06-11
Attending: INTERNAL MEDICINE
Payer: MEDICARE

## 2020-06-11 DIAGNOSIS — M70.62 TROCHANTERIC BURSITIS OF BOTH HIPS: ICD-10-CM

## 2020-06-11 DIAGNOSIS — M70.61 TROCHANTERIC BURSITIS OF BOTH HIPS: ICD-10-CM

## 2020-06-11 DIAGNOSIS — M54.41 ACUTE RIGHT-SIDED LOW BACK PAIN WITH RIGHT-SIDED SCIATICA: ICD-10-CM

## 2020-06-11 PROCEDURE — 97161 PT EVAL LOW COMPLEX 20 MIN: CPT | Mod: GP | Performed by: PHYSICAL THERAPIST

## 2020-06-11 PROCEDURE — 97110 THERAPEUTIC EXERCISES: CPT | Mod: GP | Performed by: PHYSICAL THERAPIST

## 2020-06-12 NOTE — PROGRESS NOTES
"Physical Therapy Initial (B) Hips Evaluation     06/11/20 0800   General Information   Type of Visit Initial OP Ortho PT Evaluation   Start of Care Date 06/11/20   Referring Physician Kathya Escalera MD   Patient/Family Goals Statement Get rid of pains   Orders Evaluate and Treat   Date of Order 06/04/20   Certification Required? Yes   Medical Diagnosis Acute right-sided low back pain with right-sided sciatica; Trochanteric Bursitis of (B) hips   Surgical/Medical history reviewed Yes   Precautions/Limitations no known precautions/limitations   General Information Comments PMHx: HTN, bladder control; ankle injury (R) \"many years ago\", venous stripping for \"similar pain\" 2014   Body Part(s)   Body Part(s) Hip;Lumbar Spine/SI   Presentation and Etiology   Pertinent history of current problem (include personal factors and/or comorbidities that impact the POC) Pt immediately states, \"I don't think this is my back, but more my hips and bursistis.\"  Pt has had long h/o pes planus and has used a lot of orthotics with poor results. So, does not wear orthotics like she was told.  More recently, pt states she was doing okay, and went to refill cholesterol medication, but could not get same kind and was put on a different med.  Her Leg pain increased after this change.  Finally went back on old med and didn't get better.  Is now abstaining from this med for one month to see if the pains will get better.  Was not having bursa pain until this med change. This hip pain is worse if lies on her side to sleep.  Pain is deep in HS/posterior thigh and additionally tender to touch particularly over (B) greater trochanters, but additionally tender at both ant and post thighs (B).  Mentioned pain to PCP and referred to PT.   Impairments A. Pain   Functional Limitations perform activities of daily living;perform desired leisure / sports activities   Symptom Location (B) Posterior thighs   How/Where did it occur From insidious onset  (after " "cholesterol med change)   Onset date of current episode/exacerbation 06/04/20   Chronicity New   Best (/10) 3   Worst (/10) 8   Pain quality A. Sharp;B. Dull;C. Aching   Frequency of pain/symptoms A. Constant   Pain/symptoms are: Worse in the morning   Pain/symptoms exacerbated by A. Sitting;B. Walking;E. Rest;F. Nothing;G. Certain positions   Pain/symptoms eased by A. Sitting;D. Nothing;E. Changing positions;H. Cold;I. OTC medication(s)  (takes 2 Aleve every am)   Progression of symptoms since onset: Improved  (because lying on back to sleep now)   Prior Level of Function   Functional Level Prior Comment Was able to sleep in any positions   Current Level of Function   Current Community Support Family/friend caregiver   Patient role/employment history F. Retired   Living environment [place]   Fall Risk Screen   Fall screen completed by PT   Have you fallen 2 or more times in the past year? No   Have you fallen and had an injury in the past year? No   Is patient a fall risk? No   Abuse Screen (yes response referral indicated)   Feels Unsafe at Home or Work/School no   Feels Threatened by Someone no   Does Anyone Try to Keep You From Having Contact with Others or Doing Things Outside Your Home? no   Physical Signs of Abuse Present no   Hip Objective Findings   Side (if bilateral, select both right and left) Right;Left   Observation Pt is slow in general transitional mvmts and \"thinks\" about all movement in supine before completing.  Sit to stand is labored.   Integumentary  (B) ankle edema with (R) visibly larger than (L); pt states this has been this way for many years   Posture IC's level, moderate to severe pes planus, genu valgus mildly worse (R) vs (L)   Gait/Locomotion Decreased step lengths (B), decreased hip rotation   Hip ROM Comments ROM Symmetrical, no focal asymmetry noted   Lumbar ROM Fingertips 6 inches from floor   Gluteal Tendopathy Test Pain with both glute med and bursitis positions (B)  (?) "   Straight Leg Raise Test Tight (B) no LBP or radicular symptoms   IMELDA Test Tight symmetrically no c/o pain   Neurological Testing Comments Deferred based on symptoms   Palpation Highly tender over (B) greater troch's and ITB's   Right Hip Flexion PROM Tissue approximation (B) at 115*   Right Hip ER PROM 30* (B)   Right Hip IR PROM 10* (B)   Right Hip Flexion Strength 5   Right Knee Flexion Strength 5   Right Knee Extension Strength 5   Obers/ITB Flexibility Tight (B)   Right Hamstring Flexibility 70* (B)   Right Piriformis Flexibility Tight at midline (B)   Left Hip Flexion Strength 5   Left Knee Flexion Strength 5   Left Knee Extension Strength 5   Planned Therapy Interventions   Planned Therapy Interventions balance training;gait training;joint mobilization;manual therapy;motor coordination training;neuromuscular re-education;strengthening;stretching   Planned Modality Interventions   Planned Modality Interventions Cryotherapy;Hot packs   Clinical Impression   Criteria for Skilled Therapeutic Interventions Met yes, treatment indicated   PT Diagnosis Impaired function at (B) hips and ankles, kinetic chain issue   Influenced by the following impairments weakness, pain, decreased flexibility, improper footwear   Functional limitations due to impairments poor ambulatory status on level and stairs, inability to sleep in SL, decreased bending and squatting ability for ADL's, decreased ability to sit for extended periods   Clinical Presentation Stable/Uncomplicated   Clinical Presentation Rationale newer onset of symptoms since med change, predictable patterns to pains, multiple comorbidities   Clinical Decision Making (Complexity) Low complexity   Therapy Frequency 1 time/week   Predicted Duration of Therapy Intervention (days/wks) 8 weeks for up to 8 sessions   Risk & Benefits of therapy have been explained Yes   Patient, Family & other staff in agreement with plan of care Yes   Clinical Impression Comments Pt  presents to PT with c/o (B) hip and Leg/thigh pain that began after changing her cholesterol meds.  Painful to touch in (B) ant/post thighs and highly tender over greater Trochanters (B).  Pt could benefit from skilled PT to improve LE flexibility, ankle strength/support and gait/posture training to reduce pain.   Education Assessment   Preferred Learning Style Listening;Reading;Demonstration;Pictures/video   Barriers to Learning No barriers   Ortho Goal 1   Goal Identifier STG   Goal Description 1)Pt will improve flexibility to allow her to lie on side for exer without pain in Grtr Troch's in 2 weeks.    Target Date 06/25/20   Ortho Goal 2   Goal Identifier STG   Goal Description 2)Pt will report sitting for car rides of up to 1 hour painfree, in 4 weeks.    Target Date 07/09/20   Ortho Goal 3   Goal Identifier LTG   Goal Description 3)Pt will report walking for shopping without pain in 8 weeks.    Target Date 08/06/20   Ortho Goal 4   Goal Identifier LTG   Goal Description 4)Pt will report sleeping in SL without pain waking, in 8 weeks.    Target Date 08/06/20   Ortho Goal 5   Goal Identifier LTG   Goal Description 5)Pt will be indep in HEP to prevent return of (B) hip/thigh pain, in 8 weeks.   Target Date 08/06/20   Total Evaluation Time   PT Eval, Low Complexity Minutes (11416) 30   Therapy Certification   Certification date from 06/11/20   Certification date to 08/06/20   Medical Diagnosis Acute right-sided low back pain with right-sided sciatica; Trochanteric Bursitis of (B) hips   Thank you for the referral of this patient.  Danni Stubbs, PT, MA  #[id number]

## 2020-06-12 NOTE — PROGRESS NOTES
[facility]          OUTPATIENT PHYSICAL THERAPY ORTHOPEDIC EVALUATION  PLAN OF TREATMENT FOR OUTPATIENT REHABILITATION  (COMPLETE FOR INITIAL CLAIMS ONLY)  Patient's Last Name, First Name, M.I.  YOB: 1940  Rosie Blackman    Provider s Name:  [facility]   Medical Record No.  [id number]   Start of Care Date:  06/11/20   Onset Date:  06/04/20   Type:     _X__PT   ___OT   ___SLP Medical Diagnosis:  Acute right-sided low back pain with right-sided sciatica; Trochanteric Bursitis of (B) hips     PT Diagnosis:  Impaired function at (B) hips and ankles, kinetic chain issue   Visits from SOC:  1      _________________________________________________________________________________  Plan of Treatment/Functional Goals:  balance training, gait training, joint mobilization, manual therapy, motor coordination training, neuromuscular re-education, strengthening, stretching     Cryotherapy, Hot packs     Goals  Goal Identifier: STG  Goal Description: 1)Pt will improve flexibility to allow her to lie on side for exer without pain in Grtr Troch's in 2 weeks.   Target Date: 06/25/20    Goal Identifier: STG  Goal Description: 2)Pt will report sitting for car rides of up to 1 hour painfree, in 4 weeks.   Target Date: 07/09/20    Goal Identifier: LTG  Goal Description: 3)Pt will report walking for shopping without pain in 8 weeks.   Target Date: 08/06/20    Goal Identifier: LTG  Goal Description: 4)Pt will report sleeping in SL without pain waking, in 8 weeks.   Target Date: 08/06/20    Goal Identifier: LTG  Goal Description: 5)Pt will be indep in HEP to prevent return of (B) hip/thigh pain, in 8 weeks.  Target Date: 08/06/20          Therapy Frequency:  1 time/week  Predicted Duration of Therapy Intervention:  8 weeks for up to 8 sessions    Danni Stubbs, PT                 I CERTIFY THE NEED FOR THESE SERVICES FURNISHED UNDER        THIS PLAN OF TREATMENT AND WHILE UNDER  MY CARE     (Physician co-signature of this document indicates review and certification of the therapy plan).                       Certification Date From:  06/11/20   Certification Date To:  08/06/20    Referring Provider:  Kathya Escalera MD    Initial Assessment        See Epic Evaluation Start of Care Date: 06/11/20

## 2020-06-18 ENCOUNTER — HOSPITAL ENCOUNTER (OUTPATIENT)
Dept: PHYSICAL THERAPY | Facility: CLINIC | Age: 80
Setting detail: THERAPIES SERIES
End: 2020-06-18
Attending: INTERNAL MEDICINE
Payer: MEDICARE

## 2020-06-18 PROCEDURE — 97110 THERAPEUTIC EXERCISES: CPT | Mod: GP | Performed by: PHYSICAL THERAPIST

## 2020-06-25 ENCOUNTER — HOSPITAL ENCOUNTER (OUTPATIENT)
Dept: PHYSICAL THERAPY | Facility: CLINIC | Age: 80
Setting detail: THERAPIES SERIES
End: 2020-06-25
Attending: INTERNAL MEDICINE
Payer: MEDICARE

## 2020-06-25 PROCEDURE — 97140 MANUAL THERAPY 1/> REGIONS: CPT | Mod: GP | Performed by: PHYSICAL THERAPIST

## 2020-06-25 PROCEDURE — 97110 THERAPEUTIC EXERCISES: CPT | Mod: GP | Performed by: PHYSICAL THERAPIST

## 2020-06-26 ENCOUNTER — OFFICE VISIT (OUTPATIENT)
Dept: ORTHOPEDICS | Facility: CLINIC | Age: 80
End: 2020-06-26
Attending: INTERNAL MEDICINE
Payer: MEDICARE

## 2020-06-26 VITALS
SYSTOLIC BLOOD PRESSURE: 142 MMHG | DIASTOLIC BLOOD PRESSURE: 78 MMHG | HEIGHT: 63 IN | WEIGHT: 210 LBS | BODY MASS INDEX: 37.21 KG/M2

## 2020-06-26 DIAGNOSIS — M70.61 TROCHANTERIC BURSITIS OF BOTH HIPS: ICD-10-CM

## 2020-06-26 DIAGNOSIS — M54.41 ACUTE RIGHT-SIDED LOW BACK PAIN WITH RIGHT-SIDED SCIATICA: ICD-10-CM

## 2020-06-26 DIAGNOSIS — M70.62 TROCHANTERIC BURSITIS OF BOTH HIPS: ICD-10-CM

## 2020-06-26 PROCEDURE — 20611 DRAIN/INJ JOINT/BURSA W/US: CPT | Mod: LT | Performed by: FAMILY MEDICINE

## 2020-06-26 PROCEDURE — 99203 OFFICE O/P NEW LOW 30 MIN: CPT | Mod: 25 | Performed by: FAMILY MEDICINE

## 2020-06-26 RX ORDER — BETAMETHASONE SODIUM PHOSPHATE AND BETAMETHASONE ACETATE 3; 3 MG/ML; MG/ML
6 INJECTION, SUSPENSION INTRA-ARTICULAR; INTRALESIONAL; INTRAMUSCULAR; SOFT TISSUE
Status: DISCONTINUED | OUTPATIENT
Start: 2020-06-26 | End: 2020-07-26

## 2020-06-26 RX ORDER — ROPIVACAINE HYDROCHLORIDE 5 MG/ML
2 INJECTION, SOLUTION EPIDURAL; INFILTRATION; PERINEURAL
Status: DISCONTINUED | OUTPATIENT
Start: 2020-06-26 | End: 2020-07-26

## 2020-06-26 RX ADMIN — ROPIVACAINE HYDROCHLORIDE 2 ML: 5 INJECTION, SOLUTION EPIDURAL; INFILTRATION; PERINEURAL at 11:00

## 2020-06-26 RX ADMIN — BETAMETHASONE SODIUM PHOSPHATE AND BETAMETHASONE ACETATE 6 MG: 3; 3 INJECTION, SUSPENSION INTRA-ARTICULAR; INTRALESIONAL; INTRAMUSCULAR; SOFT TISSUE at 11:00

## 2020-06-26 ASSESSMENT — MIFFLIN-ST. JEOR: SCORE: 1388.74

## 2020-06-26 NOTE — LETTER
6/26/2020         RE: Rosie Blackman  [address] [facility] [id number]        Dear Colleague,    Thank you for referring your patient, Rosie Blackman, to the [facility]. Please see a copy of my visit note below.    ASSESSMENT & PLAN  Rosie was seen today for pain and pain.    Diagnoses and all orders for this visit:    Trochanteric bursitis of both hips  -     Orthopedic & Spine  Referral    Acute right-sided low back pain with right-sided sciatica  -     Orthopedic & Spine  Referral    Other orders  -     Large Joint Injection/Arthocentesis: L greater trochanteric bursa      Patient is a 80 year old female presenting for evaluation of   Chief Complaint   Patient presents with     Right Hip - Pain     Left Hip - Pain      # Lumbar stenosis: Symptoms noted over the past 3 to 4 months with pain going down the legs bilaterally worse with standing/walking improved with sitting.  No significant findings on exam today.  Etiology likely lumbar spinal stenosis.  Patient to continue physical therapy and follow-up if symptoms not improve can consider further imaging for possible interventions such as JEFF.    # Glute tendinopathy: Notably on the left side with pain worse with standing and laying on affected side.  Patient has tenderness to palpation over the greater trochanteric region with pain with ipsilateral 1 legged stand over the greater trochanteric area.  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  Given symptoms are affecting her sleep a GT steroid injection was completed today as below.  Plan otherwise as below follow-up as needed.    Treatment: Activities as tolerated  Physical Therapy continue physical therapy for lumbar stenosis as well as glue tendinopathy, home exercises given today as well   Injection greater trochanteric steroid injection on the left side  Medications  Limited NSAIDs/Tylenol    Concerning signs/sx that would warrant urgent  evaluation were discussed.  All questions were answered, patient understands and agrees with plan.      Return if symptoms worsen or fail to improve.    -----    SUBJECTIVE  Rosie Blackman is a/an 80 year old female who is seen in consultation at the request of  Kathya Escalera D.O. for evaluation of bilateral hip pain. The patient is seen by themselves.    Onset: 3-4 month(s) ago, worsening over last 2 months. Reports insidious onset without acute precipitating event. Saw PCP 6/4/20 who referred her to Sports Medicine and PT.   Location of Pain: bilateral posterior thigh pain, left lateral hip pain radiating to lower leg leg   Rating of Pain at worst: 8/10  Rating of Pain Currently: 5/10  Worsened by: trying to get out of bed, walking   Better with: Aleve  Treatments tried: Aleve,  3 visits of PT  Quality: aching, dull, intense   Associated symptoms: no distal numbness or tingling; denies swelling or warmth  Orthopedic history: YES - Date: steroid injections x4 last one last year hx of pes planus (has tried custom orthotics)   Relevant surgical history: NO  Past Medical History:   Diagnosis Date     Chronic airway obstruction (H)      Hypertension      Personal history of contact with and (suspected) exposure to asbestos      Social History     Socioeconomic History     Marital status:      Spouse name: Nelson Blackman     Number of children: 2     Years of education: 13+     Highest education level: None   Occupational History     Occupation:      Comment: [employer]   Social Needs     Financial resource strain: None     Food insecurity     Worry: None     Inability: None     Transportation needs     Medical: None     Non-medical: None   Tobacco Use     Smoking status: Never Smoker     Smokeless tobacco: Never Used   Substance and Sexual Activity     Alcohol use: No     Drug use: No     Sexual activity: Never     Partners: Male     Birth control/protection: Surgical   Lifestyle  "    Physical activity     Days per week: None     Minutes per session: None     Stress: None   Relationships     Social connections     Talks on phone: None     Gets together: None     Attends Sikh service: None     Active member of club or organization: None     Attends meetings of clubs or organizations: None     Relationship status: None     Intimate partner violence     Fear of current or ex partner: None     Emotionally abused: None     Physically abused: None     Forced sexual activity: None   Other Topics Concern     Parent/sibling w/ CABG, MI or angioplasty before 65F 55M? No   Social History Narrative     None     Patient's past medical, surgical, social, and family histories were reviewed today and no changes are noted.  No family history pertinent to the patient's problem today     REVIEW OF SYSTEMS:  10 point ROS is negative other than symptoms noted above in HPI, Past Medical History or as stated below  Constitutional: NEGATIVE for fever, chills, change in weight  Skin: NEGATIVE for worrisome rashes, moles or lesions  GI/: NEGATIVE for bowel or bladder changes  Neuro: NEGATIVE for weakness, dizziness or paresthesias    OBJECTIVE:  BP (!) 142/78   Ht 1.588 m (5' 2.5\")   Wt 95.3 kg (210 lb)   BMI 37.80 kg/m     General: healthy, alert and in no distress  HEENT: no scleral icterus or conjunctival erythema  Skin: no suspicious lesions or rash. No jaundice.  CV: no pedal edema  Resp: normal respiratory effort without conversational dyspnea   Psych: normal mood and affect  Gait: normal steady gait with appropriate coordination and balance  Neuro: Normal light sensory exam of lower extremity, Achilles and Patella reflexes 1+ bilateral/symmetric  MSK:  BILATERAL HIP  Inspection:    No obvious deformity or asymmetry, level pelvis  Palpation:    Tender about the greater trochanteric region Lt>Rt. Otherwise all other landmarks are nontender.  Active Range of Motion:     Flexion full, IR full, ER  " full  Strength:    Flexion 5/5, adduction 5/5, abduction 5/5  Special Tests:    Positive: Pain over     Negative: Logroll, IMELDA, anterior impingement (FADIR)    THORACIC/LUMBAR SPINE  Inspection:    No redness, swelling, overlying skin change, gross deformity/asymmetry, scapular winging  Palpation:  Nontender.  Range of Motion:     Lumbar flexion limited by tightness    Lumbar extension full    Right side bend full    Left side bend full    Right rotation full    Left rotation full  Strength:    5/5 - quadriceps, hamstrings, tibialis anterior, gastrocsoleus, and extensor hallicus longus  Special Tests:    Positive: None  Negative: straight leg raise (bilateral), slump test (bilateral), IMELDA (bilateral), FADIR (bilateral)          Independent visualization of the below image:  No results found for this or any previous visit (from the past 24 hour(s)).      Large Joint Injection/Arthocentesis: L greater trochanteric bursa    Date/Time: 6/26/2020 11:00 AM  Performed by: Mehran Howell MD  Authorized by: Mehran Howell MD     Indications:  Pain  Needle Size:  22 G  Guidance: ultrasound    Approach:  Lateral  Location:  Hip      Site:  L greater trochanteric bursa  Medications:  6 mg betamethasone acet & sod phos 6 (3-3) MG/ML; 2 mL ropivacaine 5 MG/ML  Outcome:  Tolerated well, no immediate complications  Procedure discussed: discussed risks, benefits, and alternatives    Consent Given by:  Patient  Timeout: timeout called immediately prior to procedure    Prep: patient was prepped and draped in usual sterile fashion     Patient reported no significant improvement of pain after left GT steroid injection.  Aftercare instructions given to patient.  Plan to follow-up as discussed above.     Mehran Howell MD [facility]          Mehran Howell MD, [facility]      Again, thank you for allowing me to participate in the care of your patient.         Sincerely,        Mehran Howell MD

## 2020-06-26 NOTE — PATIENT INSTRUCTIONS
Rosie to follow up with Primary Care provider regarding elevated blood pressure.    Diagnosis: Left gluteal tendinopathy, lumbar stenosis  Image Findings: none  Treatment: Left greater trochanter steroid injection, continue physical therapy/home exercises  Medications: Limited tylenol/ibuprofen for pain for 1-2 weeks  Follow-up: As needed if not improved in 1-2 mon, sooner if worsening    It was great seeing you today!    Mehran Howell    [facility] Injection Discharge Instructions    Procedure: Left greater trochanter steroid injection      You may shower, however avoid swimming, tub baths or hot tubs for 24 hours following your procedure    You may have a mild to moderate increase in pain for several days following the injection.    It may take up to 14 days for the steroid medication to start working although you may feel the effect as early as a few days after the procedure.    You may use ice packs for 10-15 minutes, 3 to 4 times a day at the injection site for comfort    You may use anti-inflammatory medications (such as Ibuprofen or Aleve or Advil) or Tylenol for pain control if necessary    If you were fasting, you may resume your normal diet and medications after the procedure    If you have diabetes, check your blood sugar more frequently than usual as your blood sugar may be higher than normal for 10-14 days following a steroid injection. Contact your doctor who manages your diabetes if your blood sugar is higher than usual      If you experience any of the following, call [facility] @ [phone] or [phone]  -Fever over 100 degree F  -Swelling, bleeding, redness, drainage, warmth at the injection site  - New or worsening pain

## 2020-06-26 NOTE — LETTER
Good Morning Tia,    Saw Rosie today, suspect glute tendinopathy as well as lumbar stenosis.  Gave some glute exercises, f/u with you for further guidance for this and lumbar stenosis.    Thank you for the referral!    Mehran Howell MD

## 2020-07-02 ENCOUNTER — HOSPITAL ENCOUNTER (OUTPATIENT)
Dept: PHYSICAL THERAPY | Facility: CLINIC | Age: 80
Setting detail: THERAPIES SERIES
End: 2020-07-02
Attending: INTERNAL MEDICINE
Payer: MEDICARE

## 2020-07-02 PROCEDURE — 97110 THERAPEUTIC EXERCISES: CPT | Mod: GP | Performed by: PHYSICAL THERAPIST

## 2020-07-03 NOTE — PROGRESS NOTES
"Rosie Blackman is a 80 year old female who is being evaluated via a billable video visit.      The patient has been notified of following:     \"This video visit will be conducted via a call between you and your physician/provider. We have found that certain health care needs can be provided without the need for an in-person physical exam.  This service lets us provide the care you need with a video conversation.  If a prescription is necessary we can send it directly to your pharmacy.  If lab work is needed we can place an order for that and you can then stop by our lab to have the test done at a later time.    Video visits are billed at different rates depending on your insurance coverage.  Please reach out to your insurance provider with any questions.    If during the course of the call the physician/provider feels a video visit is not appropriate, you will not be charged for this service.\"    Patient has given verbal consent for Video visit? {YES-NO  Default Yes:4444::\"Yes\"}  How would you like to obtain your AVS? {AVS Preference:939773}  Patient would like the video invitation sent by: {video visit invitation:682524}  Will anyone else be joining your video visit? {:578363}  {If patient encounters technical issues they should call [phone] :804760}    Subjective     Rosie Blackman is a 80 year old female who presents today via video visit for the following health issues:    HPI  *** Pain    Symptoms ***, states no injury/incident.     States ***.     ***    Increased pain with ***.     Has tried ***. States not helpful.    Rates pain at  ***/10 while at rest, ***/10 at worst.    {PEDS Chronic and Acute Problems (Optional):431477}     Video Start Time: {video visit start/end time for provider to select:756099}    {additonal problems for provider to add (Optional):913463}    {HIST REVIEW/ LINKS 2 (Optional):527497}    Reviewed and updated as needed this visit by Provider         Review of Systems   {ROS " "COMP (Optional):212836}      Objective             Physical Exam     {video visit exam brief selected:675833::\"GENERAL: Healthy, alert and no distress\",\"EYES: Eyes grossly normal to inspection.  No discharge or erythema, or obvious scleral/conjunctival abnormalities.\",\"RESP: No audible wheeze, cough, or visible cyanosis.  No visible retractions or increased work of breathing.  \",\"SKIN: Visible skin clear. No significant rash, abnormal pigmentation or lesions.\",\"NEURO: Cranial nerves grossly intact.  Mentation and speech appropriate for age.\",\"PSYCH: Mentation appears normal, affect normal/bright, judgement and insight intact, normal speech and appearance well-groomed.\"}      {Diagnostic Test Results (Optional):373715::\"Diagnostic Test Results:\",\"Labs reviewed in Epic\"}        {PROVIDER CHARTING PREFERENCE:917514}      Video-Visit Details    Type of service:  Video Visit    Video End Time:{video visit start/end time for provider to select:517326}    Originating Location (pt. Location): {video visit patient location:756877::\"Home\"}    Distant Location (provider location):  [facility]     Platform used for Video Visit: {Virtual Visit Platforms:572580::\"Next Gen Illumination\"}    No follow-ups on file.       {signature options:784609}      "

## 2020-07-07 ENCOUNTER — VIRTUAL VISIT (OUTPATIENT)
Dept: FAMILY MEDICINE | Facility: CLINIC | Age: 80
End: 2020-07-07
Payer: MEDICARE

## 2020-07-07 DIAGNOSIS — E78.5 HYPERLIPIDEMIA LDL GOAL <130: Primary | ICD-10-CM

## 2020-07-07 DIAGNOSIS — M70.62 TROCHANTERIC BURSITIS OF BOTH HIPS: ICD-10-CM

## 2020-07-07 DIAGNOSIS — M54.42 ACUTE LEFT-SIDED LOW BACK PAIN WITH LEFT-SIDED SCIATICA: ICD-10-CM

## 2020-07-07 DIAGNOSIS — M70.61 TROCHANTERIC BURSITIS OF BOTH HIPS: ICD-10-CM

## 2020-07-07 PROCEDURE — 99441 ZZC PHYSICIAN TELEPHONE EVALUATION 5-10 MIN: CPT | Performed by: INTERNAL MEDICINE

## 2020-07-07 RX ORDER — PRAVASTATIN SODIUM 80 MG/1
80 TABLET ORAL DAILY
Qty: 90 TABLET | Refills: 3 | Status: SHIPPED | OUTPATIENT
Start: 2020-07-07 | End: 2020-12-15

## 2020-07-07 NOTE — PROGRESS NOTES
"Rosie Blackman is a 80 year old female who is being evaluated via a billable telephone visit.      The patient has been notified of following:     \"This telephone visit will be conducted via a call between you and your physician/provider. We have found that certain health care needs can be provided without the need for a physical exam.  This service lets us provide the care you need with a short phone conversation.  If a prescription is necessary we can send it directly to your pharmacy.  If lab work is needed we can place an order for that and you can then stop by our lab to have the test done at a later time.    Telephone visits are billed at different rates depending on your insurance coverage. During this emergency period, for some insurers they may be billed the same as an in-person visit.  Please reach out to your insurance provider with any questions.    If during the course of the call the physician/provider feels a telephone visit is not appropriate, you will not be charged for this service.\"    Patient has given verbal consent for Telephone visit?  Yes      How would you like to obtain your AVS? MyChart    Subjective     Rosie Blackman is a 80 year old female who presents via phone visit today for the following health issues:    HPI  Leg Pain  Follow up from 6/04/20 evaluation.    Symptoms 2 months, states no injury/incident.     States that thighs and hip pain continues, was evaluated by Dr. Escalera and Sports Medicine. Has bilateral bursitis in hips (left is severe).    Pain has not decreased since stopping simvastatin.    Has been going to physical therapy and sports medicine for this issue.    States bilateral posterior thigh pain now radiating into left lateral lower leg (does not go into foot or toes). States pain started radiating down leg after steroid injection with Sports Medicine. Has not notified Dr. Howell of this.    Increased pain with weightbearing and going up/down stairs.     Pain is " better when sitting or laying down.    Has tried aleve, physical therapy for 3-4 weeks, steroid injection into left hip with Sports Medicine. States not helpful.    Rates pain at  7/10 while at rest, 9/10 at worst.    --today, she reports physical therapy has not been helpful  --left bursal injection provided minor relief. Near total relief x 1 day, then wore off.  However, the pain is now radiating down into left leg.  It worsens with walking  --2 aleve last all day  --unable to sleep on either side due to pain.           Current Outpatient Medications   Medication Sig Dispense Refill     estradiol (ESTRACE) 0.5 MG tablet TAKE 0.5 TABLET DAILY 45 tablet 1     mometasone (ELOCON) 0.1 % cream Apply sparingly to affected area twice daily for 2 weeks then decrease to 1 time daily for 30 days then decrease to 2-3 times per week 45 g 0     Naproxen Sodium (ALEVE PO)        omega-3 acid ethyl esters (LOVAZA) 1 g capsule Take 1 g by mouth 2 times daily       pravastatin (PRAVACHOL) 80 MG tablet TAKE 1 TABLET BY MOUTH EVERY DAY 90 tablet 1     THERATEARS 0.25 % OP SOLN BID       valsartan-hydrochlorothiazide (DIOVAN HCT) 160-12.5 MG tablet Take 1 tablet by mouth daily 90 tablet 1     VIACTIV 500-100-40 OR CHEW once daily       ASPIRIN NOT PRESCRIBED (INTENTIONAL) Reported on 4/4/2017         Reviewed and updated as needed this visit by Provider         Review of Systems   Constitutional, HEENT, cardiovascular, pulmonary, gi and gu systems are negative, except as otherwise noted.       Objective   Reported vitals:  There were no vitals taken for this visit.   healthy, alert and no distress  PSYCH: Alert and oriented times 3; coherent speech, normal   rate and volume, able to articulate logical thoughts, able   to abstract reason, no tangential thoughts, no hallucinations   or delusions  Her affect is normal  RESP: No cough, no audible wheezing, able to talk in full sentences  Remainder of exam unable to be completed due to  telephone visits          Assessment/Plan:  1. Hyperlipidemia LDL goal <130  - resume med since no improvement in pain off statin  - pravastatin (PRAVACHOL) 80 MG tablet; Take 1 tablet (80 mg) by mouth daily  Dispense: 90 tablet; Refill: 3    2. Trochanteric bursitis of both hips - get MRI - failing conservative management.  May have areas for target injection in the spine.  Would get Dr. FAYE luther involved  - MR Lumbar Spine w/o Contrast; Future  - MR Hip Bilateral; Future    3. Acute left-sided low back pain with left-sided sciatica  - MR Lumbar Spine w/o Contrast; Future  - MR Hip Bilateral; Future    No follow-ups on file.      Phone call duration:  10 minutes    Kathya Escalera DO

## 2020-07-08 ENCOUNTER — HOSPITAL ENCOUNTER (OUTPATIENT)
Dept: MRI IMAGING | Facility: CLINIC | Age: 80
End: 2020-07-08
Attending: INTERNAL MEDICINE
Payer: MEDICARE

## 2020-07-08 DIAGNOSIS — M70.62 TROCHANTERIC BURSITIS OF BOTH HIPS: ICD-10-CM

## 2020-07-08 DIAGNOSIS — M70.61 TROCHANTERIC BURSITIS OF BOTH HIPS: ICD-10-CM

## 2020-07-08 DIAGNOSIS — M54.42 ACUTE LEFT-SIDED LOW BACK PAIN WITH LEFT-SIDED SCIATICA: ICD-10-CM

## 2020-07-08 PROCEDURE — 72148 MRI LUMBAR SPINE W/O DYE: CPT

## 2020-07-08 PROCEDURE — 73721 MRI JNT OF LWR EXTRE W/O DYE: CPT | Mod: 50

## 2020-07-09 ENCOUNTER — TELEPHONE (OUTPATIENT)
Dept: INTERNAL MEDICINE | Facility: CLINIC | Age: 80
End: 2020-07-09

## 2020-07-09 ENCOUNTER — OFFICE VISIT (OUTPATIENT)
Dept: FAMILY MEDICINE | Facility: CLINIC | Age: 80
End: 2020-07-09
Payer: MEDICARE

## 2020-07-09 ENCOUNTER — HOSPITAL ENCOUNTER (OUTPATIENT)
Dept: CT IMAGING | Facility: CLINIC | Age: 80
Discharge: HOME | End: 2020-07-09
Attending: INTERNAL MEDICINE | Admitting: INTERNAL MEDICINE
Payer: MEDICARE

## 2020-07-09 VITALS
BODY MASS INDEX: 37.8 KG/M2 | OXYGEN SATURATION: 98 % | WEIGHT: 210 LBS | HEART RATE: 79 BPM | RESPIRATION RATE: 16 BRPM | DIASTOLIC BLOOD PRESSURE: 80 MMHG | TEMPERATURE: 97.5 F | SYSTOLIC BLOOD PRESSURE: 134 MMHG

## 2020-07-09 VITALS
BODY MASS INDEX: 37.82 KG/M2 | HEART RATE: 79 BPM | TEMPERATURE: 97.5 F | SYSTOLIC BLOOD PRESSURE: 134 MMHG | WEIGHT: 210.1 LBS | DIASTOLIC BLOOD PRESSURE: 80 MMHG | OXYGEN SATURATION: 98 % | RESPIRATION RATE: 16 BRPM

## 2020-07-09 DIAGNOSIS — R19.00 PELVIC MASS: Primary | ICD-10-CM

## 2020-07-09 DIAGNOSIS — M48.061 SPINAL STENOSIS OF LUMBAR REGION, UNSPECIFIED WHETHER NEUROGENIC CLAUDICATION PRESENT: ICD-10-CM

## 2020-07-09 DIAGNOSIS — E78.5 HYPERLIPIDEMIA LDL GOAL <130: ICD-10-CM

## 2020-07-09 DIAGNOSIS — E66.01 CLASS 2 SEVERE OBESITY DUE TO EXCESS CALORIES WITH SERIOUS COMORBIDITY AND BODY MASS INDEX (BMI) OF 37.0 TO 37.9 IN ADULT (H): ICD-10-CM

## 2020-07-09 DIAGNOSIS — S76.019A TEAR OF GLUTEUS MEDIUS TENDON: ICD-10-CM

## 2020-07-09 DIAGNOSIS — I10 ESSENTIAL HYPERTENSION: ICD-10-CM

## 2020-07-09 DIAGNOSIS — Z01.818 PREOP GENERAL PHYSICAL EXAM: Primary | ICD-10-CM

## 2020-07-09 DIAGNOSIS — M16.11 PRIMARY OSTEOARTHRITIS OF RIGHT HIP: ICD-10-CM

## 2020-07-09 DIAGNOSIS — K21.9 GASTROESOPHAGEAL REFLUX DISEASE, ESOPHAGITIS PRESENCE NOT SPECIFIED: ICD-10-CM

## 2020-07-09 DIAGNOSIS — N83.8 OVARIAN MASS: ICD-10-CM

## 2020-07-09 DIAGNOSIS — R97.8 OTHER ABNORMAL TUMOR MARKERS: ICD-10-CM

## 2020-07-09 DIAGNOSIS — E66.812 CLASS 2 SEVERE OBESITY DUE TO EXCESS CALORIES WITH SERIOUS COMORBIDITY AND BODY MASS INDEX (BMI) OF 37.0 TO 37.9 IN ADULT (H): ICD-10-CM

## 2020-07-09 DIAGNOSIS — R19.09 OTHER INTRA-ABDOMINAL AND PELVIC SWELLING, MASS AND LUMP: ICD-10-CM

## 2020-07-09 LAB
ALBUMIN SERPL-MCNC: 3.4 G/DL (ref 3.4–5)
ALP SERPL-CCNC: 51 U/L (ref 40–150)
ALT SERPL W P-5'-P-CCNC: 22 U/L (ref 0–50)
ANION GAP SERPL CALCULATED.3IONS-SCNC: 3 MMOL/L (ref 3–14)
AST SERPL W P-5'-P-CCNC: 15 U/L (ref 0–45)
BASOPHILS # BLD AUTO: 0 10E9/L (ref 0–0.2)
BASOPHILS NFR BLD AUTO: 0.6 %
BILIRUB SERPL-MCNC: 0.7 MG/DL (ref 0.2–1.3)
BUN SERPL-MCNC: 16 MG/DL (ref 7–30)
CALCIUM SERPL-MCNC: 9.2 MG/DL (ref 8.5–10.1)
CANCER AG125 SERPL-ACNC: 108 U/ML (ref 0–30)
CEA SERPL-MCNC: 0.9 UG/L (ref 0–2.5)
CHLORIDE SERPL-SCNC: 110 MMOL/L (ref 94–109)
CO2 SERPL-SCNC: 30 MMOL/L (ref 20–32)
CREAT BLD-MCNC: 0.7 MG/DL (ref 0.52–1.04)
CREAT SERPL-MCNC: 0.64 MG/DL (ref 0.52–1.04)
DIFFERENTIAL METHOD BLD: NORMAL
EOSINOPHIL # BLD AUTO: 0.3 10E9/L (ref 0–0.7)
EOSINOPHIL NFR BLD AUTO: 5.8 %
ERYTHROCYTE [DISTWIDTH] IN BLOOD BY AUTOMATED COUNT: 13.7 % (ref 10–15)
GFR SERPL CREATININE-BSD FRML MDRD: 81 ML/MIN/{1.73_M2}
GFR SERPL CREATININE-BSD FRML MDRD: 85 ML/MIN/{1.73_M2}
GLUCOSE SERPL-MCNC: 129 MG/DL (ref 70–99)
HCT VFR BLD AUTO: 45.1 % (ref 35–47)
HGB BLD-MCNC: 15.1 G/DL (ref 11.7–15.7)
LYMPHOCYTES # BLD AUTO: 0.9 10E9/L (ref 0.8–5.3)
LYMPHOCYTES NFR BLD AUTO: 17.9 %
MCH RBC QN AUTO: 31.6 PG (ref 26.5–33)
MCHC RBC AUTO-ENTMCNC: 33.5 G/DL (ref 31.5–36.5)
MCV RBC AUTO: 94 FL (ref 78–100)
MONOCYTES # BLD AUTO: 0.4 10E9/L (ref 0–1.3)
MONOCYTES NFR BLD AUTO: 7.8 %
NEUTROPHILS # BLD AUTO: 3.3 10E9/L (ref 1.6–8.3)
NEUTROPHILS NFR BLD AUTO: 67.9 %
PLATELET # BLD AUTO: 206 10E9/L (ref 150–450)
POTASSIUM SERPL-SCNC: 3.6 MMOL/L (ref 3.4–5.3)
PROT SERPL-MCNC: 6.9 G/DL (ref 6.8–8.8)
RADIOLOGIST FLAGS: ABNORMAL
RADIOLOGIST FLAGS: NORMAL
RBC # BLD AUTO: 4.78 10E12/L (ref 3.8–5.2)
SODIUM SERPL-SCNC: 143 MMOL/L (ref 133–144)
WBC # BLD AUTO: 4.9 10E9/L (ref 4–11)

## 2020-07-09 PROCEDURE — 25000128 H RX IP 250 OP 636: Performed by: RADIOLOGY

## 2020-07-09 PROCEDURE — 25000125 ZZHC RX 250: Performed by: RADIOLOGY

## 2020-07-09 PROCEDURE — 36415 COLL VENOUS BLD VENIPUNCTURE: CPT | Performed by: INTERNAL MEDICINE

## 2020-07-09 PROCEDURE — 80053 COMPREHEN METABOLIC PANEL: CPT | Performed by: INTERNAL MEDICINE

## 2020-07-09 PROCEDURE — 99207 ZZC NO BILLABLE SERVICE THIS VISIT: CPT | Performed by: INTERNAL MEDICINE

## 2020-07-09 PROCEDURE — 99215 OFFICE O/P EST HI 40 MIN: CPT | Performed by: INTERNAL MEDICINE

## 2020-07-09 PROCEDURE — 86304 IMMUNOASSAY TUMOR CA 125: CPT | Performed by: INTERNAL MEDICINE

## 2020-07-09 PROCEDURE — 85025 COMPLETE CBC W/AUTO DIFF WBC: CPT | Performed by: INTERNAL MEDICINE

## 2020-07-09 PROCEDURE — 93000 ELECTROCARDIOGRAM COMPLETE: CPT | Performed by: INTERNAL MEDICINE

## 2020-07-09 PROCEDURE — 82378 CARCINOEMBRYONIC ANTIGEN: CPT | Performed by: INTERNAL MEDICINE

## 2020-07-09 PROCEDURE — 74177 CT ABD & PELVIS W/CONTRAST: CPT

## 2020-07-09 PROCEDURE — 82565 ASSAY OF CREATININE: CPT

## 2020-07-09 RX ORDER — IOPAMIDOL 755 MG/ML
100 INJECTION, SOLUTION INTRAVASCULAR ONCE
Status: COMPLETED | OUTPATIENT
Start: 2020-07-09 | End: 2020-07-09

## 2020-07-09 RX ADMIN — SODIUM CHLORIDE 66 ML: 9 INJECTION, SOLUTION INTRAVENOUS at 11:06

## 2020-07-09 RX ADMIN — IOPAMIDOL 100 ML: 755 INJECTION, SOLUTION INTRAVENOUS at 11:06

## 2020-07-09 NOTE — PATIENT INSTRUCTIONS
1. Will discuss w/your daughter  2. Will let you know about referral to Gyn/Onc  3. Will eventually refer to Neurosurgery  4. Alarm symptoms of the back (spinal stenosis) - foot drop (weakness of foot, slapping of foot), progressive numbness, loss of bowel or bladder control, new leg weakness.  5. We discussed stronger pain medications.  Let dr. Escalera know if pain is worsening.

## 2020-07-09 NOTE — PATIENT INSTRUCTIONS
Before Your Surgery      Call your surgeon if there is any change in your health. This includes signs of a cold or flu (such as a sore throat, runny nose, cough, rash or fever).    Do not smoke, drink alcohol or take over the counter medicine (unless your surgeon or primary care doctor tells you to) for the 24 hours before and after surgery.    If you take prescribed drugs: Follow your doctor s orders about which medicines to take and which to stop until after surgery.    Eating and drinking prior to surgery: follow the instructions from your surgeon    Take a shower or bath the night before surgery. Use the soap your surgeon gave you to gently clean your skin. If you do not have soap from your surgeon, use your regular soap. Do not shave or scrub the surgery site.  Wear clean pajamas and have clean sheets on your bed.     1. Stop the fish oil now  2. Do not take the blood pressure medication valsartan/ hydrochlorothiazide within 24 hours of surgery (so skip the night before or AM of surgery depending on what time of day you take it)  3. Do not take the aleve within 3 days of surgery.  Can use Tylenol up through surgery

## 2020-07-09 NOTE — TELEPHONE ENCOUNTER
RN received call on URGENT findings on MRIs yesterday.    1.  MRI Lumbar Spine w/o contrast = severe spinal stenosis at L3-L4.  Also the below (#2) finding notes on this MRI in better detail.    2.  MRI RIGHT hip w/o contrast = Incompletely visualized cystic structure in the low pelvis. Small amount of free fluid in the pelvis.  Incompletely visualized large cystic structure in the low pelvis,  better seen on lumbar spine MRI same-day. Recommend CT abdomen pelvis with IV contrast for further evaluation.    Patient has appt today.  CT has already been ordered by provider.    Appears you have seen this already?    Routing to provider.  Gifty BOSWELL RN

## 2020-07-09 NOTE — TELEPHONE ENCOUNTER
Left VM for patient to call back ASAP about imaging results from yesterday.  Needs stat CT today and clinic eval with me right after.  CMA is calling radiology.

## 2020-07-09 NOTE — PROGRESS NOTES
[facility]  [address] [address]  [phone]  Dept: [phone]    PRE-OP EVALUATION:  Today's date: 2020    Rosie Blackman (: 1940) presents for pre-operative evaluation assessment as requested by Dr. MOSES.  She requires evaluation and anesthesia risk assessment prior to undergoing surgery/procedure for treatment of TBD .    Proposed Surgery/ Procedure: tbd - ex lap with removal of ovarian mass  Date of Surgery/ Procedure: carline  Time of Surgery/ Procedure: carline  Hospital/Surgical Facility: [facility]  Fax number for surgical facility: [facility]  Primary Physician: Laya Morales  Type of Anesthesia Anticipated: to be determined    Patient has a Health Care Directive or Living Will:  YES In Epic Full code    1. YES - Do you have a history of heart attack, stroke, stent, bypass or surgery on an artery in the head, neck, heart or legs?  2. NO - Do you ever have any pain or discomfort in your chest?  3. NO - Do you have a history of  Heart Failure?  4. NO - Are you troubled by shortness of breath when: walking on the level, up a slight hill or at night?  5. NO - Do you currently have a cold, bronchitis or other respiratory infection?  6. NO - Do you have a cough, shortness of breath or wheezing?  7. YES  - Do you sometimes get pains in the calves of your legs when you walk?  8. NO - Do you or anyone in your family have previous history of blood clots?  9. NO - Do you or does anyone in your family have a serious bleeding problem such as prolonged bleeding following surgeries or cuts?  10. NO - Have you ever had problems with anemia or been told to take iron pills?  11. NO - Have you had any abnormal blood loss such as black, tarry or bloody stools, or abnormal vaginal bleeding?  12. NO - Have you ever had a blood transfusion?  13. NO - Have you or any of your relatives ever had problems with anesthesia?  14. NO - Do you have sleep apnea, excessive snoring or daytime  "drowsiness?  15. NO - Do you have any prosthetic heart valves?  16. NO - Do you have prosthetic joints?  17. NO - Is there any chance that you may be pregnant?      HPI:     HPI related to upcoming procedure: Newly discovered pelvic mass needing surgery      See problem list for active medical problems.  Problems all longstanding and stable, except as noted/documented.  See ROS for pertinent symptoms related to these conditions.      MEDICAL HISTORY:     Patient Active Problem List    Diagnosis Date Noted     Tear of gluteus medius tendon 07/09/2020     Priority: Medium     Primary osteoarthritis of right hip 07/09/2020     Priority: Medium     Spinal stenosis of lumbar region, unspecified whether neurogenic claudication present 07/09/2020     Priority: Medium     Obesity (BMI 35.0-39.9) with comorbidity (H) 11/28/2018     Priority: Medium     Lichen sclerosus of female genitalia 07/03/2018     Priority: Medium     Gastroesophageal reflux disease, esophagitis presence not specified 10/12/2017     Priority: Medium     Hiatal hernia 10/21/2013     Priority: Medium     Large; found on gastroscopy 10/2013; No GERD sx; chronic throat clearing; + H pylori       Plantar fasciitis 04/02/2012     Priority: Medium     Advanced directives, counseling/discussion 12/08/2011     Priority: Medium     Advance Care Planning:   ACP Review and Resources Provided: Reviewed chart for advance care plan. Rosie Chaidezrossana has an up to date advance care plan on file. See additional documentation in Problem List and click on code status for document details. Confirmed/documented designated decision maker(s). Added by Shima Cruz on 12/08/2011         HYPERLIPIDEMIA LDL GOAL <130 10/31/2010     Priority: Medium     Prediabetes 09/03/2010     Priority: Medium     Blood sugar 122 9/10 working on; has already been to nutrition, weight and wellness and is reading \"healthy for life\"       Diverticulosis of colon 05/24/2010     Priority: Medium    "  Noted on colonoscopy 5/10       Colon polyps 05/24/2010     Priority: Medium     Obesity      Priority: Medium     Problem list name updated by automated process. Provider to review       Pes planus      Priority: Medium     Problem list name updated by automated process. Provider to review       Personal history of contact with and (suspected) exposure to asbestos      Priority: Medium             exposed 12 years in childhood       Donor of other blood      Priority: Medium           has false + syphyllis  Problem list name updated by automated process. Provider to review       Irritable bowel syndrome      Priority: Medium     ALLERGIC RHINITIS       Priority: Medium     Resolving with dietary changes; stopping gluten       Need for prophylactic hormone replacement therapy (postmenopausal)      Priority: Medium     Weaned down to Premarin 0.3 mg. Cannot DC at this point. 4/07       UTI (urinary tract infection)      Priority: Medium             recurrent  Problem list name updated by automated process. Provider to review       Essential hypertension      Priority: Medium           neg celia prot 1997,  Problem list name updated by automated process. Provider to review        Past Medical History:   Diagnosis Date     Chronic airway obstruction (H)      Hypertension      Personal history of contact with and (suspected) exposure to asbestos      Primary osteoarthritis of right hip 7/9/2020     Past Surgical History:   Procedure Laterality Date     BREAST BIOPSY, CORE RT/LT  1994     C ANESTH,KNEE VEINS SURGERY  8/20/2014     CHOLECYSTECTOMY  1995     COLONOSCOPY  5/10    Diverticulosis, colon polyps; repeat 5 yrs     COLONOSCOPY N/A 11/16/2015    Procedure: COLONOSCOPY;  Surgeon: Alejandro Matos MD;  Location: [facility]     Colonoscopy, hyperplastic polyps, repeat in 5 yrs  2004     ESOPHAGOSCOPY, GASTROSCOPY, DUODENOSCOPY (EGD), COMBINED  9/30/2013    Procedure: COMBINED ESOPHAGOSCOPY, GASTROSCOPY, DUODENOSCOPY (EGD),  BIOPSY SINGLE OR MULTIPLE;  Gastroscopy;  Surgeon: Blaise Gill MD;  Location: [facility]     EYE SURGERY  2012    R/L Cataracts     HC REMOVE TONSILS/ADENOIDS,13+ Y/O      T & A 13+y.o.     HYSTERECTOMY, PAP NO LONGER INDICATED       TVH for DUB, ovaries in       VASCULAR SURGERY  2014    Venus closure     Current Outpatient Medications   Medication Sig Dispense Refill     ASPIRIN NOT PRESCRIBED (INTENTIONAL) Reported on 4/4/2017       estradiol (ESTRACE) 0.5 MG tablet TAKE 0.5 TABLET DAILY 45 tablet 1     mometasone (ELOCON) 0.1 % cream Apply sparingly to affected area twice daily for 2 weeks then decrease to 1 time daily for 30 days then decrease to 2-3 times per week 45 g 0     Naproxen Sodium (ALEVE PO)        omega-3 acid ethyl esters (LOVAZA) 1 g capsule Take 1 g by mouth 2 times daily       pravastatin (PRAVACHOL) 80 MG tablet Take 1 tablet (80 mg) by mouth daily 90 tablet 3     pravastatin (PRAVACHOL) 80 MG tablet TAKE 1 TABLET BY MOUTH EVERY DAY 90 tablet 1     THERATEARS 0.25 % OP SOLN BID       valsartan-hydrochlorothiazide (DIOVAN HCT) 160-12.5 MG tablet Take 1 tablet by mouth daily 90 tablet 1     VIACTIV 500-100-40 OR CHEW once daily       OTC products: None, except as noted above    Allergies   Allergen Reactions     Ezetimibe Other (See Comments)     Severe muscle aches     Lisinopril      Prilosec [Omeprazole]      Zestril [Ace Inhibitors] Cough     Atorvastatin Other (See Comments)     Muscle Pain     Irbesartan Cramps     Rosuvastatin Other (See Comments)     Muscle Pain      Latex Allergy: NO    Social History     Tobacco Use     Smoking status: Never Smoker     Smokeless tobacco: Never Used   Substance Use Topics     Alcohol use: No     History   Drug Use No       REVIEW OF SYSTEMS:   Constitutional, neuro, ENT, endocrine, pulmonary, cardiac, gastrointestinal, genitourinary, musculoskeletal, integument and psychiatric systems are negative, except as otherwise noted.    EXAM:   /80    Pulse 79   Temp 97.5  F (36.4  C) (Tympanic)   Resp 16   Wt 95.3 kg (210 lb 1.6 oz)   SpO2 98%   BMI 37.82 kg/m      GENERAL APPEARANCE: healthy, alert and no distress     EYES: EOMI, PERRL     HENT: ear canals and TM's normal and nose and mouth without ulcers or lesions     NECK: no adenopathy, no asymmetry, masses, or scars and thyroid normal to palpation     RESP: lungs clear to auscultation - no rales, rhonchi or wheezes     CV: regular rates and rhythm, normal S1 S2, no S3 or S4 and no murmur, click or rub     ABDOMEN:  soft, nontender, no HSM or masses and bowel sounds normal     SKIN: no suspicious lesions or rashes     PSYCH: mentation appears normal. and affect normal/bright     LYMPHATICS: No cervical adenopathy    DIAGNOSTICS:   EKG: appears normal, NSR, unchanged from previous tracings    Recent Labs   Lab Test 07/09/20  1017 12/02/19  0735 11/27/18  0820  10/13/14  0750   HGB 15.1  --  14.0  --  14.1     --  210  --  223    140 140   < > Unsatisfactory specimen - hemolyzed   Called to  OUTPATIENT BLOOD DRAW FOR REDRAW     POTASSIUM 3.6 3.6 4.0   < > Unsatisfactory specimen - hemolyzed   Called to  OUTPATIENT BLOOD DRAW FOR REDRAW     CR 0.64 0.72 0.75   < > Unsatisfactory specimen - hemolyzed   Called to  OUTPATIENT BLOOD DRAW FOR REDRAW     A1C  --   --  5.8*  --  5.7    < > = values in this interval not displayed.        IMPRESSION:   Reason for surgery/procedure: ovarian tumor removal  Diagnosis/reason for consult: pre-op evaluation    The proposed surgical procedure is considered INTERMEDIATE risk.    REVISED CARDIAC RISK INDEX  The patient has the following serious cardiovascular risks for perioperative complications such as (MI, PE, VFib and 3  AV Block):  No serious cardiac risks  INTERPRETATION: 0 risks: Class I (very low risk - 0.4% complication rate)    The patient has the following additional risks for perioperative complications:  Morbid obesity      ICD-10-CM    1. Preop  general physical exam  Z01.818 EKG 12-lead complete w/read - Clinics   2. Essential hypertension  I10    3. Class 2 severe obesity due to excess calories with serious comorbidity and body mass index (BMI) of 37.0 to 37.9 in adult (H)  E66.01     Z68.37    4. Hyperlipidemia LDL goal <130  E78.5    5. Gastroesophageal reflux disease, esophagitis presence not specified  K21.9    6. Spinal stenosis of lumbar region, unspecified whether neurogenic claudication present  M48.061        RECOMMENDATIONS:     --Consult hospital rounder / IM to assist post-op medical management    Obstructive Sleep Apnea (or suspected sleep apnea)  Hospital staff are advised to monitor for sleep related oxygen desaturations due to suspicion of LALA        Anticoagulant or Antiplatelet Medication Use  NSAIDS: Naproxen (Naprosyn):   Stop 2-3 days prior to surgery        ACE Inhibitor or Angiotensin Receptor Blocker (ARB) Use  Ace inhibitor or Angiotensin Receptor Blocker (ARB) and should HOLD this medication for the 24 hours prior to surgery.      APPROVAL GIVEN to proceed with proposed procedure, without further diagnostic evaluation       Signed Electronically by: Kathya Escalera DO    Copy of this evaluation report is provided to requesting physician.    [facility] Preop Guidelines    Revised Cardiac Risk Index

## 2020-07-09 NOTE — PROGRESS NOTES
Subjective     Rosie Blackman is a 80 year old female who presents to clinic today for the following health issues:    HPI     Chief Complaint   Patient presents with     Results     Pelvic Mass:  --she would like me to call her daughter in law who is OB/GYN    CT  IMPRESSION:   1.  Complex cystic pelvic mass measuring up to 14.5 cm and extending into the right lower quadrant. A cystic ovarian neoplasm is a primary differential consideration. GYN consultation is recommended.  2.  No CT evidence of metastatic disease in the abdomen or pelvis.    Bilateral hip/back pain/Spine MRI:  --having tingling traveling down the left leg, started in the last week or so.  --no leg weakness  --has not noted foot drop  --having mild occasional constipation - treats with prunes  --no other bowel or bladder dysfunction.  --did bladder physical therapy 1 year or so, and that helped with urinary urgency, and stress incontinence.   --no saddle anesthesia.       MRI lumbar spine 7/8 IMPRESSION:  1. Multilevel degenerative disc disease and facet arthropathy of the  lumbar spine, as described. The most significant finding is seen at  the L3-L4 level, where the constellation of degenerative  anterolisthesis of L3 on L4, diffuse disc bulge, severe facet  arthropathy and ligamentum flavum thickening results in severe spinal  stenosis with compression of the traversing cauda equina nerve roots.  2. Large fluid-filled tubular structure with possible complex soft  tissue along its superior margin that is partially visualized in the  right paramedian pelvis. Correlating with the hip MRIs of same date,  there appears to be complete compression of the urinary bladder due to  external mass effect from this structure. This may represent a complex  adnexal mass (in this patient who is status post hysterectomy) versus  a dilated abnormal bowel loop, but this is incompletely evaluated. CT  of the abdomen and pelvis with contrast is recommended for  further  Characterization.    MRI left hip:  Impression:  1. Partial tear of the insertion of the gluteus medius and proximal  attachment of the vastus lateralis on the greater trochanter. Mild  hamstring tendinosis.     2. No MR evidence of significant left hip osteoarthrosis.    MRI right hip  Impression:  1. Incompletely visualized large cystic structure in the low pelvis,  better seen on lumbar spine MRI same-day. Recommend CT abdomen pelvis  with IV contrast for further evaluation.     2. Mild osteoarthrosis of the right hip.     3. Moderate grade tearing of the gluteus minimus and mild tearing of  the gluteus medius at the trochanteric attachments. Partial-thickness  tearing of the proximal hamstrings.  Current Outpatient Medications   Medication Sig Dispense Refill     ASPIRIN NOT PRESCRIBED (INTENTIONAL) Reported on 4/4/2017       estradiol (ESTRACE) 0.5 MG tablet TAKE 0.5 TABLET DAILY 45 tablet 1     mometasone (ELOCON) 0.1 % cream Apply sparingly to affected area twice daily for 2 weeks then decrease to 1 time daily for 30 days then decrease to 2-3 times per week 45 g 0     Naproxen Sodium (ALEVE PO)        omega-3 acid ethyl esters (LOVAZA) 1 g capsule Take 1 g by mouth 2 times daily       pravastatin (PRAVACHOL) 80 MG tablet Take 1 tablet (80 mg) by mouth daily 90 tablet 3     pravastatin (PRAVACHOL) 80 MG tablet TAKE 1 TABLET BY MOUTH EVERY DAY 90 tablet 1     THERATEARS 0.25 % OP SOLN BID       valsartan-hydrochlorothiazide (DIOVAN HCT) 160-12.5 MG tablet Take 1 tablet by mouth daily 90 tablet 1     VIACTIV 500-100-40 OR CHEW once daily           Reviewed and updated as needed this visit by Provider         Review of Systems   Constitutional, HEENT, cardiovascular, pulmonary, GI, , musculoskeletal, neuro, skin, endocrine and psych systems are negative, except as otherwise noted.      Objective    /80   Pulse 79   Temp 97.5  F (36.4  C) (Tympanic)   Resp 16   Wt 95.3 kg (210 lb)   SpO2 98%    Breastfeeding No   BMI 37.80 kg/m    Body mass index is 37.8 kg/m .  Physical Exam   GENERAL APPEARANCE: healthy, alert, no distress and over weight  RESP: lungs clear to auscultation - no rales, rhonchi or wheezes  CV: regular rates and rhythm, normal S1 S2, no S3 or S4 and no murmur, click or rub  ABDOMEN: soft, nontender, without hepatosplenomegaly or masses, bowel sounds normal and obese  Comprehensive back pain exam:  Tenderness of bilat hips over bursa, Pain limits the following motions: flex/ext, Lower extremity strength functional and equal on both sides, Lower extremity reflexes within normal limits bilaterally, Lower extremity sensation normal and equal on both sides and Straight leg positive on  left, indicating possible ipsilateral radiculopathy    Diagnostic Test Results:  Labs reviewed in Epic  Results for orders placed or performed in visit on 07/09/20 (from the past 24 hour(s))   **Comprehensive metabolic panel FUTURE anytime   Result Value Ref Range    Sodium 143 133 - 144 mmol/L    Potassium 3.6 3.4 - 5.3 mmol/L    Chloride 110 (H) 94 - 109 mmol/L    Carbon Dioxide 30 20 - 32 mmol/L    Anion Gap 3 3 - 14 mmol/L    Glucose 129 (H) 70 - 99 mg/dL    Urea Nitrogen 16 7 - 30 mg/dL    Creatinine 0.64 0.52 - 1.04 mg/dL    GFR Estimate 85 >60 mL/min/[1.73_m2]    GFR Estimate If Black >90 >60 mL/min/[1.73_m2]    Calcium 9.2 8.5 - 10.1 mg/dL    Bilirubin Total 0.7 0.2 - 1.3 mg/dL    Albumin 3.4 3.4 - 5.0 g/dL    Protein Total 6.9 6.8 - 8.8 g/dL    Alkaline Phosphatase 51 40 - 150 U/L    ALT 22 0 - 50 U/L    AST 15 0 - 45 U/L   CBC with platelets and differential   Result Value Ref Range    WBC 4.9 4.0 - 11.0 10e9/L    RBC Count 4.78 3.8 - 5.2 10e12/L    Hemoglobin 15.1 11.7 - 15.7 g/dL    Hematocrit 45.1 35.0 - 47.0 %    MCV 94 78 - 100 fl    MCH 31.6 26.5 - 33.0 pg    MCHC 33.5 31.5 - 36.5 g/dL    RDW 13.7 10.0 - 15.0 %    Platelet Count 206 150 - 450 10e9/L    % Neutrophils 67.9 %    % Lymphocytes 17.9  %    % Monocytes 7.8 %    % Eosinophils 5.8 %    % Basophils 0.6 %    Absolute Neutrophil 3.3 1.6 - 8.3 10e9/L    Absolute Lymphocytes 0.9 0.8 - 5.3 10e9/L    Absolute Monocytes 0.4 0.0 - 1.3 10e9/L    Absolute Eosinophils 0.3 0.0 - 0.7 10e9/L    Absolute Basophils 0.0 0.0 - 0.2 10e9/L    Diff Method Automated Method            Assessment & Plan     1. Pelvic mass - probable ovarian cancer.  Needs to see Gyn/Onc.  Will discuss w/her daughter in law who is OB/GYN about preferences for providers. [facility] Gyn[facility]c  - JUST IN CASE  - **Comprehensive metabolic panel FUTURE anytime  - CBC with platelets and differential    2. Tear of gluteus medius tendon     3. Primary osteoarthritis of right hip    4. Spinal stenosis of lumbar region, unspecified whether neurogenic claudication present - severe.  Likely the cause of her radicular symptoms.  No cauda equina symptoms on history or exam.    5. Ovarian mass - needs gyn/onc  -   - Cancer antigen 19-9  - CEA    6. Other intra-abdominal and pelvic swelling, mass and lump   -     7. Other abnormal tumor markers   - Cancer antigen 19-9  - CEA       Greater than 45 minutes was spent face-to-face with the patient by the provider.  Greater than 50% was spent in counseling or coordinating care for this patient.      No follow-ups on file.    Kathya Escalera,   [facility]

## 2020-07-10 ENCOUNTER — TELEPHONE (OUTPATIENT)
Dept: ONCOLOGY | Facility: CLINIC | Age: 80
End: 2020-07-10

## 2020-07-10 LAB — CANCER AG19-9 SERPL-ACNC: 15 U/ML (ref 0–37)

## 2020-07-10 NOTE — TELEPHONE ENCOUNTER
I called Rosie to schedule a gyn onc appt as requested via [employer]. When I spoke with Rosie she stated that she received a phone call from a nurse stating she has an appt scheduled for Thursday 7/16/20 with Dr Corona. I do not see any upcoming appts scheduled within our system. Please advise?

## 2020-07-14 ENCOUNTER — ONCOLOGY VISIT (OUTPATIENT)
Dept: ONCOLOGY | Facility: CLINIC | Age: 80
End: 2020-07-14
Attending: FAMILY MEDICINE
Payer: MEDICARE

## 2020-07-14 ENCOUNTER — ANCILLARY PROCEDURE (OUTPATIENT)
Dept: GENERAL RADIOLOGY | Facility: CLINIC | Age: 80
End: 2020-07-14
Attending: OBSTETRICS & GYNECOLOGY
Payer: MEDICARE

## 2020-07-14 ENCOUNTER — DOCUMENTATION ONLY (OUTPATIENT)
Dept: ONCOLOGY | Facility: CLINIC | Age: 80
End: 2020-07-14

## 2020-07-14 VITALS
SYSTOLIC BLOOD PRESSURE: 136 MMHG | HEART RATE: 79 BPM | TEMPERATURE: 98.6 F | RESPIRATION RATE: 15 BRPM | HEIGHT: 63 IN | DIASTOLIC BLOOD PRESSURE: 81 MMHG | WEIGHT: 209.7 LBS | BODY MASS INDEX: 37.16 KG/M2 | OXYGEN SATURATION: 97 %

## 2020-07-14 DIAGNOSIS — Z11.59 ENCOUNTER FOR SCREENING FOR OTHER VIRAL DISEASES: Primary | ICD-10-CM

## 2020-07-14 DIAGNOSIS — N83.8 OVARIAN MASS: ICD-10-CM

## 2020-07-14 DIAGNOSIS — R97.8 OTHER ABNORMAL TUMOR MARKERS: ICD-10-CM

## 2020-07-14 PROCEDURE — G0463 HOSPITAL OUTPT CLINIC VISIT: HCPCS | Mod: ZF

## 2020-07-14 PROCEDURE — 99205 OFFICE O/P NEW HI 60 MIN: CPT | Mod: ZP | Performed by: OBSTETRICS & GYNECOLOGY

## 2020-07-14 RX ORDER — CEFAZOLIN SODIUM 1 G/50ML
1 INJECTION, SOLUTION INTRAVENOUS SEE ADMIN INSTRUCTIONS
Status: CANCELLED | OUTPATIENT
Start: 2020-07-14

## 2020-07-14 RX ORDER — PHENAZOPYRIDINE HYDROCHLORIDE 200 MG/1
200 TABLET, FILM COATED ORAL ONCE
Status: CANCELLED | OUTPATIENT
Start: 2020-07-14 | End: 2020-07-14

## 2020-07-14 RX ORDER — CEFAZOLIN SODIUM 2 G/50ML
2 SOLUTION INTRAVENOUS
Status: CANCELLED | OUTPATIENT
Start: 2020-07-14

## 2020-07-14 ASSESSMENT — PAIN SCALES - GENERAL: PAINLEVEL: NO PAIN (0)

## 2020-07-14 ASSESSMENT — MIFFLIN-ST. JEOR: SCORE: 1387.38

## 2020-07-14 NOTE — NURSING NOTE
Pre Op Nurse Teaching Template    Relevant Diagnosis: pelvic mass    Teaching Topic:  Laparoscopic removal of pelvic mass, bilateral fallopian tubes and ovaries possible open abdomen and cancer staging    Person(s) involved in teaching :  Patient  & spouse  Motivation Level:  Asks Questions:    Yes      Eager to Learn:     Yes     Cooperative:          Yes    Receptive (willing. Able to accept information):    Yes      Patient and those who are listed above demonstrates understanding of the following:   Reason for the appointment, diagnosis and treatment plan:   Yes   Knowledge of proper use of medications and conditions for which they are ordered (with special attention to potential side effects or drug interactions): Yes   Which situations necessitate calling provider and whom to contact: Yes         Nutritional needs and diet plan:  Yes      Pain management techniques:     Yes, Pain Scale   Diet:   Yes, [facility] Diet Instructions    Teaching Concerns addressed: Yes    Infection Prevention:  Patient and those who are listed above demonstrate understanding of the following:  Pre-Op CHG Bathing Instructions: Yes  Surgical procedure site care taught:   Yes   Signs and symptoms of infection taught: Yes       Instructional Materials Used/Given:  [facility] Before You Surgery Booklet  Showering or Bathing before Surgery Instructions & CHG Product  Hysterectomy Guidelines  Pain Assessment Tool   Home Care after Major Abdominal or Vaginal Surgery  Map  Accommodations Brochure  Phone numbers for [facility] and [facility]  Copy of Surgical Consent    Done Today:   Chest Xray,   Preop Visit not needed   Tests Ordered:  Post Op Visit Scheduled:8/10  Comments:  Discussed need for covid testing 48-72 hours prior to surgery date  Surgery date/time:7/24    Consent to file in medical records  .

## 2020-07-14 NOTE — LETTER
2020         RE: Rosie Blackman  [address] [facility] [id number]        Dear Colleague,    Thank you for referring your patient, Rosie Blackman, to the [facility]. Please see a copy of my visit note below.                            Consult Notes on Referred Patient         Dr. Kathya Escalera, DO  [address] [place] [address]       RE: Rosie Blackman  : 1940  ALEJANDRO: 2020    Dear Dr. Kathya Escalera:    I had the pleasure of seeing your patient Rosie Blackman here at the Gynecologic Cancer Clinic at the [facility] on 2020.  As you know she is a very pleasant 80 year old woman with a recent diagnosis of  Pelvic mass.  Given these findings she was subsequently sent to the Gynecologic Cancer Clinic for new patient consultation.     She recently presented with a complaint of hip pain.  The work-up of this included an MRI which has demonstrated a large pelvic mass.  The patient has been asymptomatic, but her  is elevated (108, CEA 19-9 were normal)      Review of Systems:    Systemic           no weight changes; no fever; no chills; no night sweats; no appetite changes  Skin           no rashes, or lesions  Eye           no irritation; no changes in vision  Isabelle-Laryngeal           no dysphagia; no hoarseness   Pulmonary    no cough; no shortness of breath  Cardiovascular    no chest pain; no palpitations  Gastrointestinal    no diarrhea; no constipation; no abdominal pain; no changes in bowel  habits; no blood in stool  Genitourinary   no urinary frequency; no urinary urgency; no dysuria; no pain; no abnormal vaginal discharge; no abnormal vaginal bleeding  Breast   no breast discharge; no breast changes; no breast pain  Musculoskeletal    no myalgias; no arthralgias; no back pain  Psychiatric           no depressed mood; no anxiety    Hematologic           no tender lymph nodes; no noticeable swellings or lumps   Endocrine     no hot flashes; no heat/cold intolerance         Neurological   no tremor; no numbness and tingling; no headaches; no difficulty  sleeping      Past Medical History:    Past Medical History:   Diagnosis Date     Chronic airway obstruction (H)      Hypertension      Personal history of contact with and (suspected) exposure to asbestos      Primary osteoarthritis of right hip 7/9/2020         Past Surgical History:    Past Surgical History:   Procedure Laterality Date     BREAST BIOPSY, CORE RT/LT  1994     C ANESTH,KNEE VEINS SURGERY  8/20/2014     CHOLECYSTECTOMY  1995     COLONOSCOPY  5/10    Diverticulosis, colon polyps; repeat 5 yrs     COLONOSCOPY N/A 11/16/2015    Procedure: COLONOSCOPY;  Surgeon: Alejandro Matos MD;  Location: [facility]     Colonoscopy, hyperplastic polyps, repeat in 5 yrs  2004     ESOPHAGOSCOPY, GASTROSCOPY, DUODENOSCOPY (EGD), COMBINED  9/30/2013    Procedure: COMBINED ESOPHAGOSCOPY, GASTROSCOPY, DUODENOSCOPY (EGD), BIOPSY SINGLE OR MULTIPLE;  Gastroscopy;  Surgeon: Blaise Gill MD;  Location: [facility]     EYE SURGERY  2012    R/L Cataracts     HC REMOVE TONSILS/ADENOIDS,12+ Y/O      T & A 13+y.o.     HYSTERECTOMY, PAP NO LONGER INDICATED       TVH for DUB, ovaries in       VASCULAR SURGERY  2014    [place]gs closure         Health Maintenance:  There are no preventive care reminders to display for this patient.    Last Pap Smear: None since hysterectomy age 41    Last Mammogram: 10/2019 BIRADS 1    Last Colonoscopy: 2015 (diverticulosis without polyps or cancer)                       Current Medications:     has a current medication list which includes the following prescription(s): aspirin not prescribed, estradiol, mometasone, naproxen sodium, omega-3 acid ethyl esters, pravastatin, pravastatin, theratears, valsartan-hydrochlorothiazide, and viactiv, and the following Facility-Administered Medications: betamethasone acet & sod phos and ropivacaine.       Allergies:        Allergies  "  Allergen Reactions     Ezetimibe Other (See Comments)     Severe muscle aches     Lisinopril      Prilosec [Omeprazole]      Zestril [Ace Inhibitors] Cough     Atorvastatin Other (See Comments)     Muscle Pain     Irbesartan Cramps     Rosuvastatin Other (See Comments)     Muscle Pain         Social History:     Social History     Tobacco Use     Smoking status: Never Smoker     Smokeless tobacco: Never Used   Substance Use Topics     Alcohol use: No       History   Drug Use No           Family History:     The patient's family history is notable for .    Family History   Problem Relation Age of Onset     Cancer Mother         uterine cancer,      Respiratory Mother         COPD     Cardiovascular Mother         AAA  age 80     Breast Cancer Maternal Grandmother      Cancer Maternal Grandfather         cancer unknown type     Breast Cancer Sister      Eye Disorder Father         cataracts     Depression Father      Depression Son      Depression Son      Breast Cancer Sister         X2     Cancer - colorectal No family hx of          Physical Exam:     PS 0  VS: /81   Pulse 79   Temp 98.6  F (37  C) (Oral)   Resp 15   Ht 1.588 m (5' 2.5\")   Wt 95.1 kg (209 lb 11.2 oz)   SpO2 97%   BMI 37.74 kg/m        General Appearance: healthy and alert, no distress     HEENT:  no thyromegaly, no palpable nodules or masses        Cardiovascular: regular rate and rhythm, no gallops, rubs or murmurs     Respiratory: lungs clear, no rales, rhonchi or wheezes, normal diaphragmatic excursion    Musculoskeletal: extremities non tender and without edema    Skin: no lesions or rashes     Neurological: normal gait, no gross defects     Psychiatric: appropriate mood and affect                               Hematological: normal cervical, supraclavicular and inguinal lymph nodes     Gastrointestinal:       abdomen soft, non-tender, non-distended, no organomegaly or masses    Genitourinary: External genitalia and urethral " meatus appears normal.  Vagina is smooth without nodularity or masses.  Cervix appears normal and without lesions.  Bimanual exam reveal no masses, nodularity or fullness.  Recto-vaginal exam confirms these findings.      Assessment:    Rosie Blackman is a 80 year old woman with a new diagnosis of pelvic mass.     A total of 60 minutes was spent with the patient, 40 minutes of which were spent in counseling the patient and/or treatment planning.      Plan:     1.)   We have discussed the clinical and radiologic findings.  I have instructed the patient that malignancy is not excluded.  We have discussed options and she is prepared for a resection of pelvic mass via open surgery or debulking in the setting of malignancy.  Given the findings I think she is potentially eligible for laparoscopy, followed by closed decompression and removal.  I have recommended removal of both tubes and ovaries, and she is prepared for midline laparotomy in the event of malignancy or technical problems (adhsions).  Will begin the pre-operative processes today.      2.) Genetic risk factors were assessed and the patient does not meet the qualifications for a referral.      3.) Labs and/or tests ordered include:  Pre-op CXR; she has other pre-op labs and ecg already done.     4.) Health maintenance issues addressed today include none.    5.) Pre-op teaching was completed today.  Risks of surgery were discussed to include: bleeding, transfusion, infection, unintentional injury to surrounding organs/structures.      Thank you for allowing us to participate in the care of your patient.         Sincerely,        Patient Care Team:  Laya Morales MD as PCP - General (Family Practice)  Murali Escalera,  as Assigned PCP  MURALI ESCALERA      Again, thank you for allowing me to participate in the care of your patient.        Sincerely,        Felipe Corona MD

## 2020-07-14 NOTE — NURSING NOTE
"Oncology Rooming Note    July 14, 2020 7:34 AM   Rosie Blackman is a 80 year old female who presents for:    Chief Complaint   Patient presents with     Oncology Clinic Visit     New; Pelvic Mass     Initial Vitals: /81   Pulse 79   Temp 98.6  F (37  C) (Oral)   Resp 15   Ht 1.588 m (5' 2.5\")   Wt 95.1 kg (209 lb 11.2 oz)   SpO2 97%   BMI 37.74 kg/m   Estimated body mass index is 37.74 kg/m  as calculated from the following:    Height as of this encounter: 1.588 m (5' 2.5\").    Weight as of this encounter: 95.1 kg (209 lb 11.2 oz). Body surface area is 2.05 meters squared.  No Pain (0) Comment: Data Unavailable   No LMP recorded. Patient has had a hysterectomy.  Allergies reviewed: Yes  Medications reviewed: Yes    Medications: Medication refills not needed today.  Pharmacy name entered into [facility]:    [id number] - [place], [place] - [address] [id number] - [place], [place] - [address] [place] [address] - MAIL ORDER MAIN MEDS - NON-EPRESCRIBE    Clinical concerns: No concerns today.        Jade Foote [facility]              "

## 2020-07-14 NOTE — PROGRESS NOTES
Surgery is scheduled with Dr. Corona on 7/24 at [place].  Scheduled per availability.    H&P: to be completed by Dr. Corona.  POST-OP: Will be scheduled by patient/clinic.    The RN completed the education regarding the surgery.     The surgery packet was provided that contains surgical instructions and a map.    Pt is aware that they will be contacted to schedule a COVID-19 test within 3 days of surgery.     They are aware that they will receive a call  ~2 days prior to the scheduled procedure and will be given an exact arrival/start time.    Per communication - I did not need to call the patient to provide any extra additional information. I will follow up if anything changes.

## 2020-07-14 NOTE — PROGRESS NOTES
Consult Notes on Referred Patient         Dr. Kathya Escalera, DO  [address] [place] [address]       RE: Rosie Blackman  : 1940  ALEJANDRO: 2020    Dear Dr. Kathya Escalera:    I had the pleasure of seeing your patient Rosie Blackman here at the Gynecologic Cancer Clinic at the [facility] on 2020.  As you know she is a very pleasant 80 year old woman with a recent diagnosis of  Pelvic mass.  Given these findings she was subsequently sent to the Gynecologic Cancer Clinic for new patient consultation.     She recently presented with a complaint of hip pain.  The work-up of this included an MRI which has demonstrated a large pelvic mass.  The patient has been asymptomatic, but her  is elevated (108, CEA 19-9 were normal)      Review of Systems:    Systemic           no weight changes; no fever; no chills; no night sweats; no appetite changes  Skin           no rashes, or lesions  Eye           no irritation; no changes in vision  Isabelle-Laryngeal           no dysphagia; no hoarseness   Pulmonary    no cough; no shortness of breath  Cardiovascular    no chest pain; no palpitations  Gastrointestinal    no diarrhea; no constipation; no abdominal pain; no changes in bowel  habits; no blood in stool  Genitourinary   no urinary frequency; no urinary urgency; no dysuria; no pain; no abnormal vaginal discharge; no abnormal vaginal bleeding  Breast   no breast discharge; no breast changes; no breast pain  Musculoskeletal    no myalgias; no arthralgias; no back pain  Psychiatric           no depressed mood; no anxiety    Hematologic           no tender lymph nodes; no noticeable swellings or lumps   Endocrine    no hot flashes; no heat/cold intolerance         Neurological   no tremor; no numbness and tingling; no headaches; no difficulty  sleeping      Past Medical History:    Past Medical History:   Diagnosis Date     Chronic airway obstruction (H)       Hypertension      Personal history of contact with and (suspected) exposure to asbestos      Primary osteoarthritis of right hip 7/9/2020         Past Surgical History:    Past Surgical History:   Procedure Laterality Date     BREAST BIOPSY, CORE RT/LT  1994     C ANESTH,KNEE VEINS SURGERY  8/20/2014     CHOLECYSTECTOMY  1995     COLONOSCOPY  5/10    Diverticulosis, colon polyps; repeat 5 yrs     COLONOSCOPY N/A 11/16/2015    Procedure: COLONOSCOPY;  Surgeon: Alejandro Matos MD;  Location: [facility]     Colonoscopy, hyperplastic polyps, repeat in 5 yrs  2004     ESOPHAGOSCOPY, GASTROSCOPY, DUODENOSCOPY (EGD), COMBINED  9/30/2013    Procedure: COMBINED ESOPHAGOSCOPY, GASTROSCOPY, DUODENOSCOPY (EGD), BIOPSY SINGLE OR MULTIPLE;  Gastroscopy;  Surgeon: Blaise Gill MD;  Location: [facility]     EYE SURGERY  2012    R/L Cataracts     HC REMOVE TONSILS/ADENOIDS,12+ Y/O      T & A 13+y.o.     HYSTERECTOMY, PAP NO LONGER INDICATED       TVH for DUB, ovaries in       VASCULAR SURGERY  2014    [place] closure         Health Maintenance:  There are no preventive care reminders to display for this patient.    Last Pap Smear: None since hysterectomy age 41    Last Mammogram: 10/2019 BIRADS 1    Last Colonoscopy: 2015 (diverticulosis without polyps or cancer)                       Current Medications:     has a current medication list which includes the following prescription(s): aspirin not prescribed, estradiol, mometasone, naproxen sodium, omega-3 acid ethyl esters, pravastatin, pravastatin, theratears, valsartan-hydrochlorothiazide, and viactiv, and the following Facility-Administered Medications: betamethasone acet & sod phos and ropivacaine.       Allergies:        Allergies   Allergen Reactions     Ezetimibe Other (See Comments)     Severe muscle aches     Lisinopril      Prilosec [Omeprazole]      Zestril [Ace Inhibitors] Cough     Atorvastatin Other (See Comments)     Muscle Pain     Irbesartan Cramps     Rosuvastatin  "Other (See Comments)     Muscle Pain         Social History:     Social History     Tobacco Use     Smoking status: Never Smoker     Smokeless tobacco: Never Used   Substance Use Topics     Alcohol use: No       History   Drug Use No           Family History:     The patient's family history is notable for .    Family History   Problem Relation Age of Onset     Cancer Mother         uterine cancer,      Respiratory Mother         COPD     Cardiovascular Mother         AAA  age 80     Breast Cancer Maternal Grandmother      Cancer Maternal Grandfather         cancer unknown type     Breast Cancer Sister      Eye Disorder Father         cataracts     Depression Father      Depression Son      Depression Son      Breast Cancer Sister         X2     Cancer - colorectal No family hx of          Physical Exam:     PS 0  VS: /81   Pulse 79   Temp 98.6  F (37  C) (Oral)   Resp 15   Ht 1.588 m (5' 2.5\")   Wt 95.1 kg (209 lb 11.2 oz)   SpO2 97%   BMI 37.74 kg/m        General Appearance: healthy and alert, no distress     HEENT:  no thyromegaly, no palpable nodules or masses        Cardiovascular: regular rate and rhythm, no gallops, rubs or murmurs     Respiratory: lungs clear, no rales, rhonchi or wheezes, normal diaphragmatic excursion    Musculoskeletal: extremities non tender and without edema    Skin: no lesions or rashes     Neurological: normal gait, no gross defects     Psychiatric: appropriate mood and affect                               Hematological: normal cervical, supraclavicular and inguinal lymph nodes     Gastrointestinal:       abdomen soft, non-tender, non-distended, no organomegaly or masses    Genitourinary: External genitalia and urethral meatus appears normal.  Vagina is smooth without nodularity or masses.  Cervix appears normal and without lesions.  Bimanual exam reveal no masses, nodularity or fullness.  Recto-vaginal exam confirms these findings.      Assessment:    Rosie CUTLER" Yehuda is a 80 year old woman with a new diagnosis of pelvic mass.     A total of 60 minutes was spent with the patient, 40 minutes of which were spent in counseling the patient and/or treatment planning.      Plan:     1.)   We have discussed the clinical and radiologic findings.  I have instructed the patient that malignancy is not excluded.  We have discussed options and she is prepared for a resection of pelvic mass via open surgery or debulking in the setting of malignancy.  Given the findings I think she is potentially eligible for laparoscopy, followed by closed decompression and removal.  I have recommended removal of both tubes and ovaries, and she is prepared for midline laparotomy in the event of malignancy or technical problems (adhsions).  Will begin the pre-operative processes today.      2.) Genetic risk factors were assessed and the patient does not meet the qualifications for a referral.      3.) Labs and/or tests ordered include:  Pre-op CXR; she has other pre-op labs and ecg already done.     4.) Health maintenance issues addressed today include none.    5.) Pre-op teaching was completed today.  Risks of surgery were discussed to include: bleeding, transfusion, infection, unintentional injury to surrounding organs/structures.      Thank you for allowing us to participate in the care of your patient.         Sincerely,        Patient Care Team:  Laya Morales MD as PCP - General (Family Practice)  Kathya Schrader DO as Assigned PCP  KATHYA SCHRADER

## 2020-07-17 NOTE — PATIENT INSTRUCTIONS
Surgery scheduled on 7/24/2020  Post operative appointment scheduled on 8/10/2020  Chest xray done today, will be notified of test results

## 2020-07-21 DIAGNOSIS — Z11.59 ENCOUNTER FOR SCREENING FOR OTHER VIRAL DISEASES: ICD-10-CM

## 2020-07-21 PROCEDURE — U0003 INFECTIOUS AGENT DETECTION BY NUCLEIC ACID (DNA OR RNA); SEVERE ACUTE RESPIRATORY SYNDROME CORONAVIRUS 2 (SARS-COV-2) (CORONAVIRUS DISEASE [COVID-19]), AMPLIFIED PROBE TECHNIQUE, MAKING USE OF HIGH THROUGHPUT TECHNOLOGIES AS DESCRIBED BY CMS-2020-01-R: HCPCS | Performed by: OBSTETRICS & GYNECOLOGY

## 2020-07-22 LAB
SARS-COV-2 RNA SPEC QL NAA+PROBE: NOT DETECTED
SPECIMEN SOURCE: NORMAL

## 2020-07-23 ENCOUNTER — ANESTHESIA EVENT (OUTPATIENT)
Dept: SURGERY | Facility: CLINIC | Age: 80
DRG: 737 | End: 2020-07-23
Payer: MEDICARE

## 2020-07-23 RX ORDER — ACETAMINOPHEN 500 MG
500-1000 TABLET ORAL EVERY 4 HOURS PRN
Status: ON HOLD | COMMUNITY
End: 2020-07-26

## 2020-07-24 ENCOUNTER — ANCILLARY PROCEDURE (OUTPATIENT)
Dept: ULTRASOUND IMAGING | Facility: CLINIC | Age: 80
End: 2020-07-24
Payer: MEDICARE

## 2020-07-24 ENCOUNTER — HOSPITAL ENCOUNTER (INPATIENT)
Facility: CLINIC | Age: 80
LOS: 3 days | Discharge: HOME | DRG: 737 | End: 2020-07-27
Attending: OBSTETRICS & GYNECOLOGY | Admitting: OBSTETRICS & GYNECOLOGY
Payer: MEDICARE

## 2020-07-24 ENCOUNTER — ANESTHESIA (OUTPATIENT)
Dept: SURGERY | Facility: CLINIC | Age: 80
DRG: 737 | End: 2020-07-24
Payer: MEDICARE

## 2020-07-24 DIAGNOSIS — N83.8 OVARIAN MASS: ICD-10-CM

## 2020-07-24 DIAGNOSIS — Z98.890 S/P EXPLORATORY LAPAROTOMY: Primary | ICD-10-CM

## 2020-07-24 LAB
ABO + RH BLD: NORMAL
ABO + RH BLD: NORMAL
BLD GP AB SCN SERPL QL: NORMAL
BLOOD BANK CMNT PATIENT-IMP: NORMAL
CREAT SERPL-MCNC: 0.76 MG/DL (ref 0.52–1.04)
GFR SERPL CREATININE-BSD FRML MDRD: 75 ML/MIN/{1.73_M2}
GLUCOSE BLDC GLUCOMTR-MCNC: 90 MG/DL (ref 70–99)
HGB BLD-MCNC: 14.9 G/DL (ref 11.7–15.7)
POTASSIUM SERPL-SCNC: 3.4 MMOL/L (ref 3.4–5.3)
SPECIMEN EXP DATE BLD: NORMAL

## 2020-07-24 PROCEDURE — 25000128 H RX IP 250 OP 636: Performed by: STUDENT IN AN ORGANIZED HEALTH CARE EDUCATION/TRAINING PROGRAM

## 2020-07-24 PROCEDURE — 00000155 ZZHCL STATISTIC H-CELL BLOCK W/STAIN: Performed by: OBSTETRICS & GYNECOLOGY

## 2020-07-24 PROCEDURE — 88305 TISSUE EXAM BY PATHOLOGIST: CPT | Performed by: OBSTETRICS & GYNECOLOGY

## 2020-07-24 PROCEDURE — 00000146 ZZHCL STATISTIC GLUCOSE BY METER IP

## 2020-07-24 PROCEDURE — 88307 TISSUE EXAM BY PATHOLOGIST: CPT | Performed by: OBSTETRICS & GYNECOLOGY

## 2020-07-24 PROCEDURE — 12000001 ZZH R&B MED SURG/OB UMMC

## 2020-07-24 PROCEDURE — 86850 RBC ANTIBODY SCREEN: CPT | Performed by: ANESTHESIOLOGY

## 2020-07-24 PROCEDURE — 37000009 ZZH ANESTHESIA TECHNICAL FEE, EACH ADDTL 15 MIN: Performed by: OBSTETRICS & GYNECOLOGY

## 2020-07-24 PROCEDURE — 07BD0ZX EXCISION OF AORTIC LYMPHATIC, OPEN APPROACH, DIAGNOSTIC: ICD-10-PCS | Performed by: OBSTETRICS & GYNECOLOGY

## 2020-07-24 PROCEDURE — 86900 BLOOD TYPING SEROLOGIC ABO: CPT | Performed by: ANESTHESIOLOGY

## 2020-07-24 PROCEDURE — 25000132 ZZH RX MED GY IP 250 OP 250 PS 637: Mod: GY | Performed by: OBSTETRICS & GYNECOLOGY

## 2020-07-24 PROCEDURE — 25000125 ZZHC RX 250: Performed by: NURSE ANESTHETIST, CERTIFIED REGISTERED

## 2020-07-24 PROCEDURE — 25000566 ZZH SEVOFLURANE, EA 15 MIN: Performed by: OBSTETRICS & GYNECOLOGY

## 2020-07-24 PROCEDURE — 71000015 ZZH RECOVERY PHASE 1 LEVEL 2 EA ADDTL HR: Performed by: OBSTETRICS & GYNECOLOGY

## 2020-07-24 PROCEDURE — 71000014 ZZH RECOVERY PHASE 1 LEVEL 2 FIRST HR: Performed by: OBSTETRICS & GYNECOLOGY

## 2020-07-24 PROCEDURE — 25800030 ZZH RX IP 258 OP 636: Performed by: NURSE ANESTHETIST, CERTIFIED REGISTERED

## 2020-07-24 PROCEDURE — 25000128 H RX IP 250 OP 636: Performed by: OBSTETRICS & GYNECOLOGY

## 2020-07-24 PROCEDURE — 82565 ASSAY OF CREATININE: CPT | Performed by: ANESTHESIOLOGY

## 2020-07-24 PROCEDURE — 85018 HEMOGLOBIN: CPT | Performed by: ANESTHESIOLOGY

## 2020-07-24 PROCEDURE — 88309 TISSUE EXAM BY PATHOLOGIST: CPT | Performed by: OBSTETRICS & GYNECOLOGY

## 2020-07-24 PROCEDURE — 88331 PATH CONSLTJ SURG 1 BLK 1SPC: CPT | Performed by: OBSTETRICS & GYNECOLOGY

## 2020-07-24 PROCEDURE — 25800030 ZZH RX IP 258 OP 636: Performed by: ANESTHESIOLOGY

## 2020-07-24 PROCEDURE — 36415 COLL VENOUS BLD VENIPUNCTURE: CPT | Performed by: ANESTHESIOLOGY

## 2020-07-24 PROCEDURE — 86901 BLOOD TYPING SEROLOGIC RH(D): CPT | Performed by: ANESTHESIOLOGY

## 2020-07-24 PROCEDURE — 37000008 ZZH ANESTHESIA TECHNICAL FEE, 1ST 30 MIN: Performed by: OBSTETRICS & GYNECOLOGY

## 2020-07-24 PROCEDURE — 84132 ASSAY OF SERUM POTASSIUM: CPT | Performed by: ANESTHESIOLOGY

## 2020-07-24 PROCEDURE — 88342 IMHCHEM/IMCYTCHM 1ST ANTB: CPT | Performed by: OBSTETRICS & GYNECOLOGY

## 2020-07-24 PROCEDURE — 36000057 ZZH SURGERY LEVEL 3 1ST 30 MIN - UMMC: Performed by: OBSTETRICS & GYNECOLOGY

## 2020-07-24 PROCEDURE — 0UT70ZZ RESECTION OF BILATERAL FALLOPIAN TUBES, OPEN APPROACH: ICD-10-PCS | Performed by: OBSTETRICS & GYNECOLOGY

## 2020-07-24 PROCEDURE — 0DBU0ZZ EXCISION OF OMENTUM, OPEN APPROACH: ICD-10-PCS | Performed by: OBSTETRICS & GYNECOLOGY

## 2020-07-24 PROCEDURE — 25000132 ZZH RX MED GY IP 250 OP 250 PS 637: Mod: GY | Performed by: STUDENT IN AN ORGANIZED HEALTH CARE EDUCATION/TRAINING PROGRAM

## 2020-07-24 PROCEDURE — 0UT20ZZ RESECTION OF BILATERAL OVARIES, OPEN APPROACH: ICD-10-PCS | Performed by: OBSTETRICS & GYNECOLOGY

## 2020-07-24 PROCEDURE — 88341 IMHCHEM/IMCYTCHM EA ADD ANTB: CPT | Performed by: OBSTETRICS & GYNECOLOGY

## 2020-07-24 PROCEDURE — 25000128 H RX IP 250 OP 636: Performed by: NURSE ANESTHETIST, CERTIFIED REGISTERED

## 2020-07-24 PROCEDURE — 27210794 ZZH OR GENERAL SUPPLY STERILE: Performed by: OBSTETRICS & GYNECOLOGY

## 2020-07-24 PROCEDURE — 36000059 ZZH SURGERY LEVEL 3 EA 15 ADDTL MIN UMMC: Performed by: OBSTETRICS & GYNECOLOGY

## 2020-07-24 PROCEDURE — 25000128 H RX IP 250 OP 636: Performed by: ANESTHESIOLOGY

## 2020-07-24 PROCEDURE — 25000125 ZZHC RX 250: Performed by: STUDENT IN AN ORGANIZED HEALTH CARE EDUCATION/TRAINING PROGRAM

## 2020-07-24 PROCEDURE — 88112 CYTOPATH CELL ENHANCE TECH: CPT | Performed by: OBSTETRICS & GYNECOLOGY

## 2020-07-24 PROCEDURE — 40000170 ZZH STATISTIC PRE-PROCEDURE ASSESSMENT II: Performed by: OBSTETRICS & GYNECOLOGY

## 2020-07-24 PROCEDURE — 07BC0ZX EXCISION OF PELVIS LYMPHATIC, OPEN APPROACH, DIAGNOSTIC: ICD-10-PCS | Performed by: OBSTETRICS & GYNECOLOGY

## 2020-07-24 RX ORDER — FLUMAZENIL 0.1 MG/ML
0.2 INJECTION, SOLUTION INTRAVENOUS
Status: DISCONTINUED | OUTPATIENT
Start: 2020-07-24 | End: 2020-07-24 | Stop reason: HOSPADM

## 2020-07-24 RX ORDER — CEFAZOLIN SODIUM 1 G/3ML
1 INJECTION, POWDER, FOR SOLUTION INTRAMUSCULAR; INTRAVENOUS SEE ADMIN INSTRUCTIONS
Status: DISCONTINUED | OUTPATIENT
Start: 2020-07-24 | End: 2020-07-24 | Stop reason: HOSPADM

## 2020-07-24 RX ORDER — LIDOCAINE 40 MG/G
CREAM TOPICAL
Status: DISCONTINUED | OUTPATIENT
Start: 2020-07-24 | End: 2020-07-27 | Stop reason: HOSPADM

## 2020-07-24 RX ORDER — CEFAZOLIN SODIUM 2 G/100ML
2 INJECTION, SOLUTION INTRAVENOUS
Status: COMPLETED | OUTPATIENT
Start: 2020-07-24 | End: 2020-07-24

## 2020-07-24 RX ORDER — NALOXONE HYDROCHLORIDE 0.4 MG/ML
.1-.4 INJECTION, SOLUTION INTRAMUSCULAR; INTRAVENOUS; SUBCUTANEOUS
Status: DISCONTINUED | OUTPATIENT
Start: 2020-07-24 | End: 2020-07-24 | Stop reason: HOSPADM

## 2020-07-24 RX ORDER — AMOXICILLIN 250 MG
2 CAPSULE ORAL 2 TIMES DAILY PRN
Status: DISCONTINUED | OUTPATIENT
Start: 2020-07-24 | End: 2020-07-27

## 2020-07-24 RX ORDER — DEXAMETHASONE SODIUM PHOSPHATE 10 MG/ML
INJECTION, SOLUTION INTRAMUSCULAR; INTRAVENOUS PRN
Status: DISCONTINUED | OUTPATIENT
Start: 2020-07-24 | End: 2020-07-24

## 2020-07-24 RX ORDER — ACETAMINOPHEN 325 MG/1
650 TABLET ORAL EVERY 6 HOURS
Status: DISCONTINUED | OUTPATIENT
Start: 2020-07-24 | End: 2020-07-27 | Stop reason: HOSPADM

## 2020-07-24 RX ORDER — PROPOFOL 10 MG/ML
INJECTION, EMULSION INTRAVENOUS PRN
Status: DISCONTINUED | OUTPATIENT
Start: 2020-07-24 | End: 2020-07-24

## 2020-07-24 RX ORDER — SODIUM CHLORIDE, SODIUM LACTATE, POTASSIUM CHLORIDE, CALCIUM CHLORIDE 600; 310; 30; 20 MG/100ML; MG/100ML; MG/100ML; MG/100ML
INJECTION, SOLUTION INTRAVENOUS CONTINUOUS
Status: DISCONTINUED | OUTPATIENT
Start: 2020-07-24 | End: 2020-07-24 | Stop reason: HOSPADM

## 2020-07-24 RX ORDER — HYDROMORPHONE HCL/0.9% NACL/PF 0.2MG/0.2
0.2 SYRINGE (ML) INTRAVENOUS
Status: DISCONTINUED | OUTPATIENT
Start: 2020-07-24 | End: 2020-07-25

## 2020-07-24 RX ORDER — DIPHENHYDRAMINE HYDROCHLORIDE 50 MG/ML
12.5 INJECTION INTRAMUSCULAR; INTRAVENOUS EVERY 6 HOURS PRN
Status: DISCONTINUED | OUTPATIENT
Start: 2020-07-24 | End: 2020-07-27 | Stop reason: HOSPADM

## 2020-07-24 RX ORDER — ONDANSETRON 4 MG/1
4 TABLET, ORALLY DISINTEGRATING ORAL EVERY 30 MIN PRN
Status: DISCONTINUED | OUTPATIENT
Start: 2020-07-24 | End: 2020-07-24 | Stop reason: HOSPADM

## 2020-07-24 RX ORDER — LIDOCAINE HYDROCHLORIDE 20 MG/ML
INJECTION, SOLUTION INFILTRATION; PERINEURAL PRN
Status: DISCONTINUED | OUTPATIENT
Start: 2020-07-24 | End: 2020-07-24

## 2020-07-24 RX ORDER — LABETALOL 20 MG/4 ML (5 MG/ML) INTRAVENOUS SYRINGE
10
Status: DISCONTINUED | OUTPATIENT
Start: 2020-07-24 | End: 2020-07-24 | Stop reason: HOSPADM

## 2020-07-24 RX ORDER — DEXAMETHASONE SODIUM PHOSPHATE 4 MG/ML
INJECTION, SOLUTION INTRA-ARTICULAR; INTRALESIONAL; INTRAMUSCULAR; INTRAVENOUS; SOFT TISSUE PRN
Status: DISCONTINUED | OUTPATIENT
Start: 2020-07-24 | End: 2020-07-24

## 2020-07-24 RX ORDER — SODIUM CHLORIDE, SODIUM LACTATE, POTASSIUM CHLORIDE, CALCIUM CHLORIDE 600; 310; 30; 20 MG/100ML; MG/100ML; MG/100ML; MG/100ML
INJECTION, SOLUTION INTRAVENOUS CONTINUOUS
Status: DISCONTINUED | OUTPATIENT
Start: 2020-07-24 | End: 2020-07-25

## 2020-07-24 RX ORDER — NALOXONE HYDROCHLORIDE 0.4 MG/ML
.1-.4 INJECTION, SOLUTION INTRAMUSCULAR; INTRAVENOUS; SUBCUTANEOUS
Status: DISCONTINUED | OUTPATIENT
Start: 2020-07-24 | End: 2020-07-25

## 2020-07-24 RX ORDER — CALCIUM CARBONATE 500 MG/1
1000 TABLET, CHEWABLE ORAL 4 TIMES DAILY PRN
Status: DISCONTINUED | OUTPATIENT
Start: 2020-07-24 | End: 2020-07-27 | Stop reason: HOSPADM

## 2020-07-24 RX ORDER — ONDANSETRON 2 MG/ML
INJECTION INTRAMUSCULAR; INTRAVENOUS PRN
Status: DISCONTINUED | OUTPATIENT
Start: 2020-07-24 | End: 2020-07-24

## 2020-07-24 RX ORDER — FENTANYL CITRATE 50 UG/ML
INJECTION, SOLUTION INTRAMUSCULAR; INTRAVENOUS PRN
Status: DISCONTINUED | OUTPATIENT
Start: 2020-07-24 | End: 2020-07-24

## 2020-07-24 RX ORDER — NALOXONE HYDROCHLORIDE 0.4 MG/ML
.1-.4 INJECTION, SOLUTION INTRAMUSCULAR; INTRAVENOUS; SUBCUTANEOUS
Status: DISCONTINUED | OUTPATIENT
Start: 2020-07-24 | End: 2020-07-27 | Stop reason: HOSPADM

## 2020-07-24 RX ORDER — ONDANSETRON 4 MG/1
4 TABLET, ORALLY DISINTEGRATING ORAL EVERY 8 HOURS PRN
Status: DISCONTINUED | OUTPATIENT
Start: 2020-07-25 | End: 2020-07-27 | Stop reason: HOSPADM

## 2020-07-24 RX ORDER — FENTANYL CITRATE 50 UG/ML
25-50 INJECTION, SOLUTION INTRAMUSCULAR; INTRAVENOUS
Status: DISCONTINUED | OUTPATIENT
Start: 2020-07-24 | End: 2020-07-24 | Stop reason: HOSPADM

## 2020-07-24 RX ORDER — DIPHENHYDRAMINE HCL 12.5MG/5ML
12.5 LIQUID (ML) ORAL EVERY 6 HOURS PRN
Status: DISCONTINUED | OUTPATIENT
Start: 2020-07-24 | End: 2020-07-27 | Stop reason: HOSPADM

## 2020-07-24 RX ORDER — HYDROMORPHONE HYDROCHLORIDE 1 MG/ML
.3-.5 INJECTION, SOLUTION INTRAMUSCULAR; INTRAVENOUS; SUBCUTANEOUS EVERY 5 MIN PRN
Status: DISCONTINUED | OUTPATIENT
Start: 2020-07-24 | End: 2020-07-24 | Stop reason: HOSPADM

## 2020-07-24 RX ORDER — ONDANSETRON 4 MG/1
4 TABLET, ORALLY DISINTEGRATING ORAL EVERY 8 HOURS
Status: COMPLETED | OUTPATIENT
Start: 2020-07-24 | End: 2020-07-25

## 2020-07-24 RX ORDER — BUPIVACAINE HYDROCHLORIDE 2.5 MG/ML
INJECTION, SOLUTION EPIDURAL; INFILTRATION; INTRACAUDAL PRN
Status: DISCONTINUED | OUTPATIENT
Start: 2020-07-24 | End: 2020-07-24

## 2020-07-24 RX ORDER — SIMETHICONE 80 MG
80 TABLET,CHEWABLE ORAL 4 TIMES DAILY PRN
Status: DISCONTINUED | OUTPATIENT
Start: 2020-07-24 | End: 2020-07-27 | Stop reason: HOSPADM

## 2020-07-24 RX ORDER — ONDANSETRON 2 MG/ML
4 INJECTION INTRAMUSCULAR; INTRAVENOUS EVERY 30 MIN PRN
Status: DISCONTINUED | OUTPATIENT
Start: 2020-07-24 | End: 2020-07-24 | Stop reason: HOSPADM

## 2020-07-24 RX ORDER — PRAVASTATIN SODIUM 40 MG
80 TABLET ORAL DAILY
Status: DISCONTINUED | OUTPATIENT
Start: 2020-07-25 | End: 2020-07-27 | Stop reason: HOSPADM

## 2020-07-24 RX ORDER — EPHEDRINE SULFATE 50 MG/ML
INJECTION, SOLUTION INTRAMUSCULAR; INTRAVENOUS; SUBCUTANEOUS PRN
Status: DISCONTINUED | OUTPATIENT
Start: 2020-07-24 | End: 2020-07-24

## 2020-07-24 RX ORDER — LIDOCAINE 40 MG/G
CREAM TOPICAL
Status: DISCONTINUED | OUTPATIENT
Start: 2020-07-24 | End: 2020-07-24 | Stop reason: HOSPADM

## 2020-07-24 RX ORDER — PHENAZOPYRIDINE HYDROCHLORIDE 200 MG/1
200 TABLET, FILM COATED ORAL ONCE
Status: COMPLETED | OUTPATIENT
Start: 2020-07-24 | End: 2020-07-24

## 2020-07-24 RX ORDER — BISACODYL 10 MG
10 SUPPOSITORY, RECTAL RECTAL DAILY
Status: DISCONTINUED | OUTPATIENT
Start: 2020-07-25 | End: 2020-07-27 | Stop reason: HOSPADM

## 2020-07-24 RX ORDER — AMOXICILLIN 250 MG
1 CAPSULE ORAL 2 TIMES DAILY PRN
Status: DISCONTINUED | OUTPATIENT
Start: 2020-07-24 | End: 2020-07-27

## 2020-07-24 RX ORDER — OXYCODONE HYDROCHLORIDE 5 MG/1
5-10 TABLET ORAL EVERY 4 HOURS PRN
Status: DISCONTINUED | OUTPATIENT
Start: 2020-07-24 | End: 2020-07-27 | Stop reason: HOSPADM

## 2020-07-24 RX ADMIN — BUPIVACAINE HYDROCHLORIDE 60 ML: 2.5 INJECTION, SOLUTION EPIDURAL; INFILTRATION; INTRACAUDAL; PERINEURAL at 08:58

## 2020-07-24 RX ADMIN — FENTANYL CITRATE 50 MCG: 50 INJECTION, SOLUTION INTRAMUSCULAR; INTRAVENOUS at 13:39

## 2020-07-24 RX ADMIN — FENTANYL CITRATE 50 MCG: 50 INJECTION, SOLUTION INTRAMUSCULAR; INTRAVENOUS at 10:57

## 2020-07-24 RX ADMIN — FENTANYL CITRATE 25 MCG: 50 INJECTION, SOLUTION INTRAMUSCULAR; INTRAVENOUS at 14:07

## 2020-07-24 RX ADMIN — Medication 40 MCG: at 08:58

## 2020-07-24 RX ADMIN — PHENYLEPHRINE HYDROCHLORIDE 100 MCG: 10 INJECTION INTRAVENOUS at 13:19

## 2020-07-24 RX ADMIN — OXYCODONE HYDROCHLORIDE 5 MG: 5 TABLET ORAL at 14:47

## 2020-07-24 RX ADMIN — SUGAMMADEX 200 MG: 100 INJECTION, SOLUTION INTRAVENOUS at 13:25

## 2020-07-24 RX ADMIN — ROCURONIUM BROMIDE 20 MG: 10 INJECTION INTRAVENOUS at 10:08

## 2020-07-24 RX ADMIN — ACETAMINOPHEN 650 MG: 325 TABLET, FILM COATED ORAL at 20:40

## 2020-07-24 RX ADMIN — SODIUM CHLORIDE, POTASSIUM CHLORIDE, SODIUM LACTATE AND CALCIUM CHLORIDE: 600; 310; 30; 20 INJECTION, SOLUTION INTRAVENOUS at 12:10

## 2020-07-24 RX ADMIN — ACETAMINOPHEN 650 MG: 325 TABLET, FILM COATED ORAL at 14:47

## 2020-07-24 RX ADMIN — ONDANSETRON 4 MG: 4 TABLET, ORALLY DISINTEGRATING ORAL at 23:13

## 2020-07-24 RX ADMIN — BENZOCAINE AND MENTHOL 1 LOZENGE: 15; 3.6 LOZENGE ORAL at 23:13

## 2020-07-24 RX ADMIN — PHENYLEPHRINE HYDROCHLORIDE 100 MCG: 10 INJECTION INTRAVENOUS at 11:57

## 2020-07-24 RX ADMIN — FENTANYL CITRATE 100 MCG: 50 INJECTION, SOLUTION INTRAMUSCULAR; INTRAVENOUS at 09:42

## 2020-07-24 RX ADMIN — LIDOCAINE HYDROCHLORIDE 100 MG: 20 INJECTION, SOLUTION INFILTRATION; PERINEURAL at 09:42

## 2020-07-24 RX ADMIN — MAGNESIUM HYDROXIDE 15 ML: 400 SUSPENSION ORAL at 17:51

## 2020-07-24 RX ADMIN — DEXAMETHASONE SODIUM PHOSPHATE 8 MG: 4 INJECTION, SOLUTION INTRA-ARTICULAR; INTRALESIONAL; INTRAMUSCULAR; INTRAVENOUS; SOFT TISSUE at 09:50

## 2020-07-24 RX ADMIN — PHENYLEPHRINE HYDROCHLORIDE 100 MCG: 10 INJECTION INTRAVENOUS at 11:15

## 2020-07-24 RX ADMIN — FENTANYL CITRATE 50 MCG: 50 INJECTION, SOLUTION INTRAMUSCULAR; INTRAVENOUS at 08:40

## 2020-07-24 RX ADMIN — SODIUM CHLORIDE, POTASSIUM CHLORIDE, SODIUM LACTATE AND CALCIUM CHLORIDE: 600; 310; 30; 20 INJECTION, SOLUTION INTRAVENOUS at 09:32

## 2020-07-24 RX ADMIN — PHENAZOPYRIDINE HYDROCHLORIDE 200 MG: 200 TABLET, FILM COATED ORAL at 08:33

## 2020-07-24 RX ADMIN — OXYCODONE HYDROCHLORIDE 5 MG: 5 TABLET ORAL at 18:41

## 2020-07-24 RX ADMIN — FENTANYL CITRATE 25 MCG: 50 INJECTION, SOLUTION INTRAMUSCULAR; INTRAVENOUS at 15:01

## 2020-07-24 RX ADMIN — PHENYLEPHRINE HYDROCHLORIDE 100 MCG: 10 INJECTION INTRAVENOUS at 12:03

## 2020-07-24 RX ADMIN — PROPOFOL 140 MG: 10 INJECTION, EMULSION INTRAVENOUS at 09:42

## 2020-07-24 RX ADMIN — PHENYLEPHRINE HYDROCHLORIDE 200 MCG: 10 INJECTION INTRAVENOUS at 12:07

## 2020-07-24 RX ADMIN — Medication 10 MG: at 10:21

## 2020-07-24 RX ADMIN — ROCURONIUM BROMIDE 10 MG: 10 INJECTION INTRAVENOUS at 12:50

## 2020-07-24 RX ADMIN — PHENYLEPHRINE HYDROCHLORIDE 100 MCG: 10 INJECTION INTRAVENOUS at 11:52

## 2020-07-24 RX ADMIN — SODIUM CHLORIDE, POTASSIUM CHLORIDE, SODIUM LACTATE AND CALCIUM CHLORIDE: 600; 310; 30; 20 INJECTION, SOLUTION INTRAVENOUS at 12:00

## 2020-07-24 RX ADMIN — CEFAZOLIN 1 G: 1 INJECTION, POWDER, FOR SOLUTION INTRAMUSCULAR; INTRAVENOUS at 11:50

## 2020-07-24 RX ADMIN — ROCURONIUM BROMIDE 10 MG: 10 INJECTION INTRAVENOUS at 11:25

## 2020-07-24 RX ADMIN — Medication 10 MG: at 10:00

## 2020-07-24 RX ADMIN — Medication 2 G: at 09:50

## 2020-07-24 RX ADMIN — OXYCODONE HYDROCHLORIDE 5 MG: 5 TABLET ORAL at 23:13

## 2020-07-24 RX ADMIN — ROCURONIUM BROMIDE 20 MG: 10 INJECTION INTRAVENOUS at 10:36

## 2020-07-24 RX ADMIN — ONDANSETRON 4 MG: 2 INJECTION INTRAMUSCULAR; INTRAVENOUS at 09:48

## 2020-07-24 RX ADMIN — ONDANSETRON 4 MG: 4 TABLET, ORALLY DISINTEGRATING ORAL at 16:41

## 2020-07-24 RX ADMIN — Medication 5 MG: at 10:18

## 2020-07-24 RX ADMIN — ROCURONIUM BROMIDE 50 MG: 10 INJECTION INTRAVENOUS at 09:42

## 2020-07-24 RX ADMIN — ROCURONIUM BROMIDE 20 MG: 10 INJECTION INTRAVENOUS at 11:56

## 2020-07-24 RX ADMIN — DEXAMETHASONE SODIUM PHOSPHATE 2 MG: 10 INJECTION, SOLUTION INTRAMUSCULAR; INTRAVENOUS at 08:58

## 2020-07-24 RX ADMIN — FENTANYL CITRATE 50 MCG: 50 INJECTION, SOLUTION INTRAMUSCULAR; INTRAVENOUS at 11:04

## 2020-07-24 ASSESSMENT — MIFFLIN-ST. JEOR: SCORE: 1368.13

## 2020-07-24 ASSESSMENT — ACTIVITIES OF DAILY LIVING (ADL)
ADLS_ACUITY_SCORE: 13
ADLS_ACUITY_SCORE: 13

## 2020-07-24 ASSESSMENT — PAIN DESCRIPTION - DESCRIPTORS
DESCRIPTORS: TENDER;SORE
DESCRIPTORS: TENDER;SORE

## 2020-07-24 ASSESSMENT — COPD QUESTIONNAIRES: COPD: 0

## 2020-07-24 NOTE — PROGRESS NOTES
Gynecologic Oncology Postoperative Check Note  7/24/2020    S: Patient reports she is doing well postoperatively. Pain is controlled with PO and IV pain medications. Has not ambulated. Lam in place. Tolerating water, no nausea/vomiting. Denies CP, SOB. Events of surgery explained by Dr. Corona, no further questions for me.     O:  Patient Vitals for the past 4 hrs:   BP Temp Temp src Pulse Heart Rate Resp SpO2   07/24/20 1547 (!) 140/61 95.7  F (35.4  C) Oral 104 104 18 94 %   07/24/20 1500 112/59 98.1  F (36.7  C) Oral 89 74 21 94 %   07/24/20 1445 126/71 -- -- -- 102 16 97 %   07/24/20 1439 -- -- -- -- 86 15 98 %   07/24/20 1430 123/59 -- -- 75 82 20 98 %   07/24/20 1415 118/60 -- -- -- 79 8 98 %   07/24/20 1400 116/61 -- -- 72 75 15 98 %   07/24/20 1350 125/65 -- -- 86 85 21 95 %   07/24/20 1345 125/65 -- -- -- 71 -- 97 %   07/24/20 1339 116/70 97.7  F (36.5  C) Axillary 88 86 10 99 %       Gen: NAD  Cardio: RRR, S1/S2, no murmurs  Resp: CTAB, no wheezing or crackles  Abdomen: soft, appropriately tender, incision covered with bandage  Extremities: Non-tender, trace edema    A/P: 80 year old POD#0 s/p Dx LSC, BSO converted to XL, omentectomy, PPALND. Doing as expected post-operatively.    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  Pain: Tylenol, oxycodone, IV dilaudid PRN  Heme: Hgb 14.9> >  CV: HTN: valsartan-HCTZ [HELD]. HLD: pravastatin.  Pulm: No issues  GI: Hiatal hernia, GERD: TUMs PRN. Bowel regimen and antiemetics PRN.  : lam  ID: S/p pre-op antibiotics.   Endo/Psych/Neuro/MSK: No issues  PPX: SCDs, IS  Dispo: Home when meeting goals  Drains/Lines: PIV x2, lam      Dispo: Admitted to     Aliyah Rodriguez MD  Gynecology Oncology PGY4  7/24/2020 4:58 PM

## 2020-07-24 NOTE — ANESTHESIA POSTPROCEDURE EVALUATION
Anesthesia POST Procedure Evaluation    Patient: Rosie Blackman   MRN:     [id number] Gender:   female   Age:    80 year old :      1940        Preoperative Diagnosis: Ovarian mass [N83.8]   Procedure(s):  Laparoscopy, removal or pelvic mass, both tubes and ovaries, omentectomy, anterior culvectomy, pelvic and para aortic lymph node dissection, laparotomy  possible open surgery, possible cancer operation   Postop Comments: No value filed.     Anesthesia Type: General       Disposition: Admission   Postop Pain Control: Uneventful            Sign Out: Well controlled pain   PONV: No   Neuro/Psych: Uneventful            Sign Out: Acceptable/Baseline neuro status   Airway/Respiratory: Uneventful            Sign Out: Acceptable/Baseline resp. status   CV/Hemodynamics: Uneventful            Sign Out: Acceptable CV status   Other NRE: NONE   DID A NON-ROUTINE EVENT OCCUR? No         Last Anesthesia Record Vitals:  CRNA VITALS  2020 1305 - 2020 1405      2020             Pulse:  89    SpO2:  97 %    Resp Rate (observed):  (!) 1          Last PACU Vitals:  Vitals Value Taken Time   /63 2020  2:40 PM   Temp 36.5  C (97.7  F) 2020  1:39 PM   Pulse 85 2020  2:40 PM   Resp 15 2020  2:39 PM   SpO2 96 % 2020  2:48 PM   Temp src     NIBP     Pulse     SpO2     Resp     Temp     Ht Rate     Temp 2     Vitals shown include unvalidated device data.      Electronically Signed By: Wesly Boothe MD, 2020, 2:49 PM

## 2020-07-24 NOTE — ANESTHESIA CARE TRANSFER NOTE
Patient: Rosie Blackman    Procedure(s):  Laparoscopy, removal or pelvic mass, both tubes and ovaries  possible open surgery, possible cancer operation    Diagnosis: Ovarian mass [N83.8]  Diagnosis Additional Information: No value filed.    Anesthesia Type:   General     Note:  Airway :Face Mask  Patient transferred to:PACU  Comments: To PACU on supplemental O2 with + air exchange, placed on monitor. Report given to RN, questions answered, VSS, patient alert and comfortable, pain treated prior to transfer of care.Handoff Report: Identifed the Patient, Identified the Reponsible Provider, Reviewed the pertinent medical history, Discussed the surgical course, Reviewed Intra-OP anesthesia mangement and issues during anesthesia, Set expectations for post-procedure period and Allowed opportunity for questions and acknowledgement of understanding      Vitals: (Last set prior to Anesthesia Care Transfer)    CRNA VITALS  7/24/2020 1305 - 7/24/2020 1341      7/24/2020             Pulse:  89    SpO2:  97 %    Resp Rate (observed):  (!) 1            Electronically Signed By: DELMY Jacobs CRNA  July 24, 2020  1:41 PM

## 2020-07-24 NOTE — ANESTHESIA PREPROCEDURE EVALUATION
"Anesthesia Pre-Procedure Evaluation    Patient: Rosie Blackman   MRN:     [id number] Gender:   female   Age:    80 year old :      1940        Preoperative Diagnosis: Ovarian mass [N83.8]   Procedure(s):  Laparoscopy, removal or pelvic mass, both tubes and ovaries  possible open surgery, possible cancer operation     LABS:  CBC:   Lab Results   Component Value Date    WBC 4.9 2020    WBC 5.0 2018    HGB 14.9 2020    HGB 15.1 2020    HCT 45.1 2020    HCT 43.9 2018     2020     2018     BMP:   Lab Results   Component Value Date     2020     2019    POTASSIUM 3.6 2020    POTASSIUM 3.6 2019    CHLORIDE 110 (H) 2020    CHLORIDE 110 (H) 2019    CO2 30 2020    CO2 30 2019    BUN 16 2020    BUN 21 2019    CR 0.64 2020    CR 0.72 2019     (H) 2020     (H) 2019     COAGS: No results found for: PTT, INR, FIBR  POC:   Lab Results   Component Value Date    BGM 90 2020     OTHER:   Lab Results   Component Value Date    A1C 5.8 (H) 2018    ANISA 9.2 2020    ALBUMIN 3.4 2020    PROTTOTAL 6.9 2020    ALT 22 2020    AST 15 2020    ALKPHOS 51 2020    BILITOTAL 0.7 2020    TSH 3.85 10/01/2004    T4 0.79 10/01/2004    SED 18 10/01/2004        Preop Vitals    BP Readings from Last 3 Encounters:   20 (!) 142/93   20 136/81   20 134/80    Pulse Readings from Last 3 Encounters:   20 99   20 79   20 79      Resp Readings from Last 3 Encounters:   20 16   20 15   20 16    SpO2 Readings from Last 3 Encounters:   20 100%   20 97%   20 98%      Temp Readings from Last 1 Encounters:   20 36.4  C (97.6  F) (Oral)    Ht Readings from Last 1 Encounters:   20 1.6 m (5' 3\")      Wt Readings from Last 1 Encounters:   20 92.4 kg (203 lb " "11.3 oz)    Estimated body mass index is 36.08 kg/m  as calculated from the following:    Height as of this encounter: 1.6 m (5' 3\").    Weight as of this encounter: 92.4 kg (203 lb 11.3 oz).     LDA:        Past Medical History:   Diagnosis Date     Chronic airway obstruction (H)      Gastroesophageal reflux disease      Hiatal hernia      Hypertension      Personal history of contact with and (suspected) exposure to asbestos      Primary osteoarthritis of right hip 7/9/2020      Past Surgical History:   Procedure Laterality Date     BREAST BIOPSY, CORE RT/LT  1994     C ANESTH,KNEE VEINS SURGERY  8/20/2014     CHOLECYSTECTOMY  1995     COLONOSCOPY  5/10    Diverticulosis, colon polyps; repeat 5 yrs     COLONOSCOPY N/A 11/16/2015    Procedure: COLONOSCOPY;  Surgeon: Alejandro Matos MD;  Location: [facility]     Colonoscopy, hyperplastic polyps, repeat in 5 yrs  2004     ESOPHAGOSCOPY, GASTROSCOPY, DUODENOSCOPY (EGD), COMBINED  9/30/2013    Procedure: COMBINED ESOPHAGOSCOPY, GASTROSCOPY, DUODENOSCOPY (EGD), BIOPSY SINGLE OR MULTIPLE;  Gastroscopy;  Surgeon: Blaise Gill MD;  Location: [facility]     EYE SURGERY  2012    R/L Cataracts     HC REMOVE TONSILS/ADENOIDS,13+ Y/O      T & A 13+y.o.     HYSTERECTOMY, PAP NO LONGER INDICATED       TVH for DUB, ovaries in       VASCULAR SURGERY  2014    [place] closure      Allergies   Allergen Reactions     Ezetimibe Other (See Comments)     Severe muscle aches     Lisinopril      Prilosec [Omeprazole]      Zestril [Ace Inhibitors] Cough     Atorvastatin Other (See Comments)     Muscle Pain     Irbesartan Cramps     Rosuvastatin Other (See Comments)     Muscle Pain        Anesthesia Evaluation     . Pt has had prior anesthetic. Type: General    No history of anesthetic complications          ROS/MED HX    ENT/Pulmonary:      (-) COPD and sleep apnea   Neurologic:     (+)other neuro mild LLE radiculopathy, tingling back of hip and thiigh. NO weakness    Cardiovascular:     (+) " hypertension----. : . . . :. . Previous cardiac testing date:results:date: results:ECG reviewed date: results: date: results:         (-) CAD   METS/Exercise Tolerance:  >4 METS   Hematologic:        (-) history of blood clots   Musculoskeletal:   (+) arthritis,  -       GI/Hepatic:     (+) GERD Asymptomatic on medication, hiatal hernia,       Renal/Genitourinary:         Endo:     (+) Obesity, .   (-) Type I DM   Psychiatric:        (-) psychiatric history   Infectious Disease:        (-) Recent Fever   Malignancy:         Other:                         PHYSICAL EXAM:   Mental Status/Neuro: A/A/O   Airway: Facies: Feasible  Mallampati: II  Mouth/Opening: Full  TM distance: > 6 cm  Neck ROM: Full   Respiratory: Auscultation: CTAB      CV: Rhythm: Regular   Comments:      Dental: Normal Dentition                Assessment:   ASA SCORE: 2    H&P: History and physical reviewed and following examination; no interval change.   Smoking Status:  Non-Smoker/Unknown   NPO Status: NPO Appropriate     Plan:   Anes. Type:  General   Pre-Medication: None   Induction:  IV (Standard)   Airway: ETT; Oral   Access/Monitoring: PIV; 2nd PIV   Maintenance: Balanced     Postop Plan:   Postop Pain: Opioids  Postop Sedation/Airway: Not planned  Disposition: Inpatient/Admit     PONV Management:   Adult Risk Factors: Female, Non-Smoker, Postop Opioids   Prevention: Ondansetron     CONSENT: Direct conversation   Plan and risks discussed with: Patient   Blood Products: Consented (ALL Blood Products)       Comments for Plan/Consent:  GA, rsi hiatal hernia even though no symptoms. NPO appropriate. 2nd IV.     Risk fo mace, stroke, n/v, sore throat, pn injury, blood tx all discussed                 Wesly Boothe MD

## 2020-07-24 NOTE — BRIEF OP NOTE
[facility], [place]    Brief Operative Note    Pre-operative diagnosis: Ovarian mass  Post-operative diagnosis: Same, malignant on frozen section    Procedure: Laparoscopy, removal or pelvic mass, both tubes and ovaries, omentectomy, anterior culvectomy, pelvic and para aortic lymph node dissection, laparotomy  Surgeon: Surgeon(s) and Role:     * Felipe Corona MD - Primary     * Aliyah Rodriguez MD - Resident - Assisting     * Teri Benitez MD - Fellow - Assisting  Anesthesia: Combined General with Block   Estimated blood loss: 150mL  Drains: Damian  Specimens:   ID Type Source Tests Collected by Time Destination   1 :  [place] Pelvis CYTOLOGY NON GYN Felipe Corona MD 7/24/2020 10:18 AM    A : LEFT AND RIGHT ADNEXA Tissue Other SURGICAL PATHOLOGY EXAM Felipe Corona MD 7/24/2020 11:01 AM    B : mesenteric biopsy Tissue Mesentary SURGICAL PATHOLOGY EXAM Felipe Corona MD 7/24/2020 11:55 AM    C : omentum Tissue Omentum SURGICAL PATHOLOGY EXAM Felipe Corona MD 7/24/2020 11:59 AM    D : anterior cul-de-sac biopsy Tissue Other SURGICAL PATHOLOGY EXAM Felipe Corona MD 7/24/2020 12:09 PM    E : posterior cul-de-sac biopsy Tissue Other SURGICAL PATHOLOGY EXAM Felipe Corona MD 7/24/2020 12:11 PM    F : left pelvic lymph node Tissue Lymph Node, Left Pelvic SURGICAL PATHOLOGY EXAM Felipe Corona MD 7/24/2020 12:23 PM    G : right pelvic lymph node  Tissue Lymph Node, Right Pelvic SURGICAL PATHOLOGY EXAM Felipe Corona MD 7/24/2020 12:41 PM    H : right para aortic lymph node  Tissue Lymph Node, Right Para Aortic SURGICAL PATHOLOGY EXAM Felipe Corona MD 7/24/2020 12:44 PM      Findings: On bimanual exam, large mobile mass palpated, oblong and spanning from left to right side of pelvis. On laparoscopy, large multicystic mass arising from left ovary. Right ovary small and normal  appearing. Mass unable to be removed with endocatch bag. Laparotomy performed. Frozen section consistent with malignancy. Omentectomy and pelvic and paraaortic lymph node dissection performed.   Complications: None.    Aliyah Rodriguez MD  OBGYN Resident PGY4  07/24/2020 2:02 PM

## 2020-07-24 NOTE — DISCHARGE SUMMARY
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    Patient Active Problem List   Diagnosis     Essential hypertension     Personal history of contact with and (suspected) exposure to asbestos     Donor of other blood     Irritable bowel syndrome     ALLERGIC RHINITIS      Need for prophylactic hormone replacement therapy (postmenopausal)     UTI (urinary tract infection)     Pes planus     Obesity     Diverticulosis of colon     Colon polyps     Prediabetes     HYPERLIPIDEMIA LDL GOAL <130     Advanced directives, counseling/discussion     Plantar fasciitis     Hiatal hernia     Gastroesophageal reflux disease, esophagitis presence not specified     Lichen sclerosus of female genitalia     Obesity (BMI 35.0-39.9) with comorbidity (H)     Tear of gluteus medius tendon     Primary osteoarthritis of right hip     Spinal stenosis of lumbar region, unspecified whether neurogenic claudication present     Ovarian mass     S/P exploratory laparotomy       Procedures:   - Laparoscopy, removal or pelvic mass, both tubes and ovaries, omentectomy, anterior culvectomy, pelvic and para aortic lymph node dissection, laparotomy  - Peripheral nerve block  Prior to Admission Medications:  Facility-Administered Medications Prior to Admission   Medication Dose Route Frequency Provider Last Rate Last Dose     [DISCONTINUED] betamethasone acet & sod phos (CELESTONE) injection 6 mg  6 mg   Mehran Howell MD   6 mg at 06/26/20 1100     [DISCONTINUED] ropivacaine (NAROPIN) injection 2 mL  2 mL   Mehran Howell MD   2 mL at 06/26/20 1100     Medications Prior to Admission   Medication Sig Dispense Refill Last Dose     mometasone (ELOCON) 0.1 %  cream Apply sparingly to affected area twice daily for 2 weeks then decrease to 1 time daily for 30 days then decrease to 2-3 times per week 45 g 0 Past Week at Unknown time     Naproxen Sodium (ALEVE PO)    Past Week at Unknown time     omega-3 acid ethyl esters (LOVAZA) 1 g capsule Take 1 g by mouth 2 times daily   Past Week at Unknown time     pravastatin (PRAVACHOL) 80 MG tablet Take 1 tablet (80 mg) by mouth daily 90 tablet 3 7/23/2020 at 0800     THERATEARS 0.25 % OP SOLN BID   7/24/2020 at 0400     valsartan-hydrochlorothiazide (DIOVAN HCT) 160-12.5 MG tablet Take 1 tablet by mouth daily 90 tablet 1 7/23/2020 at 0800     VIACTIV 500-100-40 OR CHEW once daily   Past Week at Unknown time     [DISCONTINUED] acetaminophen (TYLENOL) 500 MG tablet Take 500-1,000 mg by mouth every 4 hours as needed for mild pain   7/24/2020 at 0400     [DISCONTINUED] estradiol (ESTRACE) 0.5 MG tablet TAKE 0.5 TABLET DAILY 45 tablet 1 7/24/2020 at 0430       Discharge Medications:     Review of your medicines      START taking      Dose / Directions   enoxaparin ANTICOAGULANT 40 MG/0.4ML syringe  Commonly known as:  LOVENOX  Used for:  Ovarian mass      Dose:  40 mg  Inject 0.4 mLs (40 mg) Subcutaneous every 24 hours  Quantity:  25 Syringe  Refills:  0     oxyCODONE 5 MG tablet  Commonly known as:  ROXICODONE      Dose:  5 mg  Take 1 tablet (5 mg) by mouth every 6 hours as needed for pain  Quantity:  10 tablet  Refills:  0     senna-docusate 8.6-50 MG tablet  Commonly known as:  SENOKOT-S/PERICOLACE      Dose:  1 tablet  Take 1 tablet by mouth 2 times daily as needed for constipation  Quantity:  30 tablet  Refills:  0        CONTINUE these medicines which may have CHANGED, or have new prescriptions. If we are uncertain of the size of tablets/capsules you have at home, strength may be listed as something that might have changed.      Dose / Directions   acetaminophen 325 MG tablet  Commonly known as:  TYLENOL  This may have changed:       medication strength    how much to take    when to take this      Dose:  650 mg  Take 2 tablets (650 mg) by mouth every 6 hours as needed for mild pain  Quantity:  48 tablet  Refills:  0        CONTINUE these medicines which have NOT CHANGED      Dose / Directions   ALEVE PO      Refills:  0     mometasone 0.1 % external cream  Commonly known as:  ELOCON  Used for:  Lichen sclerosus et atrophicus of the vulva      Apply sparingly to affected area twice daily for 2 weeks then decrease to 1 time daily for 30 days then decrease to 2-3 times per week  Quantity:  45 g  Refills:  0     omega-3 acid ethyl esters 1 g capsule  Commonly known as:  LOVAZA      Dose:  1 g  Take 1 g by mouth 2 times daily  Refills:  0     pravastatin 80 MG tablet  Commonly known as:  PRAVACHOL  Used for:  Hyperlipidemia LDL goal <130      Dose:  80 mg  Take 1 tablet (80 mg) by mouth daily  Quantity:  90 tablet  Refills:  3     Theratears 0.25 % Soln  Generic drug:  Carboxymethylcellulose Sodium      BID  Refills:  0     valsartan-hydrochlorothiazide 160-12.5 MG tablet  Commonly known as:  Diovan HCT  Used for:  Essential hypertension, benign      Dose:  1 tablet  Take 1 tablet by mouth daily  Quantity:  90 tablet  Refills:  1     Viactiv 500-100-40 MG-UNT-MCG Chew  Generic drug:  Calcium-Vitamin D-Vitamin K      once daily  Refills:  0        STOP taking    estradiol 0.5 MG tablet  Commonly known as:  ESTRACE              Where to get your medicines      These medications were sent to [facility] [place], [place] - [address] [address]    Phone:  [phone]     acetaminophen 325 MG tablet    enoxaparin ANTICOAGULANT 40 MG/0.4ML syringe    senna-docusate 8.6-50 MG tablet     Some of these will need a paper prescription and others can be bought over the counter. Ask your nurse if you have questions.    Bring a paper prescription for each of these medications    oxyCODONE 5 MG  tablet       Consultations:  -Anesthesiology    Brief History of Illness:  From H&P: She initially presented with a complaint of hip pain.  The work-up of this included an MRI which has demonstrated a large pelvic mass.  The patient has been asymptomatic, but her  is elevated (108, CEA 19-9 were normal). She was admitted for laparoscopic BSO and possible cancer staging.    Hospital Course:  Dz:   - Preoperative diagnosis was pelvic mass.  Frozen section at the time of the surgery showed adenocarcinoma, non-mucinous, favoring endometrioid.  Final pathology is pending at the time of discharge.  She will follow-up postoperatively for a care plan.  FEN:   - She was maintained on IVF until POD#1, when her diet was slowly advanced.  By discharge, she was tolerating a regular diet without nausea and vomiting and able to maintain her hydration without IVF supplementation.  Pain:   - Her pain was initially controlled on IV pain medications.  Once tolerating PO pain meds, she was transitioned to a PO pain regimen.  Her pain was well controlled on this and she was discharged home with these medications.  CV:   - She has a history of hypertension and hyperlipidemia. Her antihypertensive was held while in-house and resumed at discharge. Her statin was resumed postoperatively.  Her vital signs were stable while in house and she had no acute CV issues.  PULM:   - She has no history of pulmonary issues.  She was initially given O2 supplementation in to maintain her O2 sats in the immediate postop period and was transitioned off of this without difficulty.  By discharge, her O2 sats were greater than 94% on RA.  She was encouraged to use her bedside IS while in house.  She had no acute pulmonary issues while in house.  HEME:   - She had an appropriate drop in her hemoglobin after surgery and it was stable at the time of discharge.  She had no acute heme issues while in house.  GI:   - She has a history of hiatal hernia and GERD  for which she takes tums as needed. She was made NPO prior to the procedure.  On POD#0, her diet was advanced slowly as tolerated.  At the time of discharge, she was tolerating a regular diet without nausea and vomiting with tums available as needed. She was passing flatus and had a BM prior to discharge. She will be discharged with a bowel regimen to prevent constipation in the postoperative period.  She had no acute GI issues while in house.   :    - A lam catheter was placed at the time of the surgery.  Once ambulating unassisted, the lam catheter was removed.  Prior to discharge, the patient was voiding spontaneously without difficulty.  She had no acute  issues while in house.  ID:   - The patient was AF during her hospitalization.  She received standard preoperative antibiotics without incident.    ENDO:   - No issues  PSYCH/NEURO:   - No issues   PPX:    -  She was given SCDs, IS, and lovenox during her hospital course.  She tolerated these prophylactic interventions without incident. Lovenox was continued for a 28-day course postoperatively, and the other measures were discontinued at the time of her discharge.      Discharge Instructions and Follow up:  Ms. Rosie Blackman was discharged from the hospital with follow up for post-operative appointment and staples removal.    Discharge Diet: Regular  Discharge Activity: Lifting restricted to 15 pounds, no driving while on narcotics, nothing per vagina for 6 weeks.  Discharge Follow up:   8/10/2020 with Dr. Corona  [place] removal to be arranged    Discharge Disposition:  Discharged to home    Discharge Staff: MD Dinora Castellanos MD  Obstetrics & Gynecology, PGY-2  7/27/2020 6:57 AM

## 2020-07-24 NOTE — ANESTHESIA PROCEDURE NOTES
Peripheral Nerve Block Procedure Note  Staff -   Anesthesiologist:  Gary Kuhn MD  Resident/Fellow: Barbara Trent MD    Performed By: resident  Procedure performed by resident/CRNA in presence of a teaching physician.        Location: Pre-op  Procedure Start/Stop TImes:      7/24/2020 8:45 AM     7/24/2020 8:58 AM    patient identified, IV checked, site marked, risks and benefits discussed, informed consent, monitors and equipment checked, pre-op evaluation, at physician/surgeon's request and post-op pain management      Correct Patient: Yes      Correct Position: Yes      Correct Site: Yes      Correct Procedure: Yes      Correct Laterality:  Yes    Site Marked:  Yes  Procedure details:     Procedure:  TAP    Diagnosis:  Post operative pain    Laterality:  Bilateral    Position:  Supine    Sterile Prep: chloraprep, mask and sterile gloves      Local skin infiltration:  None    Needle:  Short bevel    Needle gauge:  21    Needle length (mm):  110    Ultrasound: Yes      Ultrasound used to identify targeted nerve, plexus, or vascular structure and placed a needle adjacent to it      Permanent Image entered into patiient's record      Abnormal pain on injection: No      Blood Aspirated: No      Paresthesias:  No    Bleeding at site: No      Bolus via:  Needle    Infusion Method:  Single Shot    Complications:  None  Assessment/Narrative:     Injection made incrementally with aspirations every (mL):  5     Bilateral classic TAP block

## 2020-07-24 NOTE — PROGRESS NOTES
0840 Patient consented to receiving pre-procedure Block; Block began at 0845, Timeout was completed, Anesthesia MD(s) at bedside as well as RN(writer); Patient received 50mcg Fentanyl IVP, Block completed at 0900; Patient tolerated procedure well; will continue to monitor.

## 2020-07-24 NOTE — PLAN OF CARE
Arrived from PACU around 1545. VSS on 2 LPM oxygen. Pain controlled with tylenol and oxycodone. Denies nausea. No appetite yet. Damian in place with adequate urine output. Midline incision and lap sites covered with primapore dressings. Small amount of drainage marked on midline dressing. Abdominal binder on. Has not been out of bed yet.

## 2020-07-24 NOTE — PROGRESS NOTES
Admitted/transferred from: PACU  2 RN full skin assessment completed by Jane Cotter, CLEMENTINA and Luisa Sutton, RN.  Skin assessment finding: issues found : blanchable erythema behind both ears from oxygen tubing, hard red spot on arch of right foot where patient said she shaved off a callus  Interventions/actions: skin interventions : ordered foam ear protectors     Will continue to monitor.

## 2020-07-25 ENCOUNTER — APPOINTMENT (OUTPATIENT)
Dept: OCCUPATIONAL THERAPY | Facility: CLINIC | Age: 80
DRG: 737 | End: 2020-07-25
Attending: OBSTETRICS & GYNECOLOGY
Payer: MEDICARE

## 2020-07-25 LAB
ANION GAP SERPL CALCULATED.3IONS-SCNC: 4 MMOL/L (ref 3–14)
BUN SERPL-MCNC: 12 MG/DL (ref 7–30)
CALCIUM SERPL-MCNC: 8.3 MG/DL (ref 8.5–10.1)
CHLORIDE SERPL-SCNC: 108 MMOL/L (ref 94–109)
CO2 SERPL-SCNC: 27 MMOL/L (ref 20–32)
CREAT SERPL-MCNC: 0.61 MG/DL (ref 0.52–1.04)
ERYTHROCYTE [DISTWIDTH] IN BLOOD BY AUTOMATED COUNT: 13.9 % (ref 10–15)
GFR SERPL CREATININE-BSD FRML MDRD: 86 ML/MIN/{1.73_M2}
GLUCOSE BLDC GLUCOMTR-MCNC: 103 MG/DL (ref 70–99)
GLUCOSE SERPL-MCNC: 144 MG/DL (ref 70–99)
HCT VFR BLD AUTO: 38.2 % (ref 35–47)
HGB BLD-MCNC: 12.5 G/DL (ref 11.7–15.7)
MCH RBC QN AUTO: 31.6 PG (ref 26.5–33)
MCHC RBC AUTO-ENTMCNC: 32.7 G/DL (ref 31.5–36.5)
MCV RBC AUTO: 97 FL (ref 78–100)
PLATELET # BLD AUTO: 210 10E9/L (ref 150–450)
POTASSIUM SERPL-SCNC: 3.5 MMOL/L (ref 3.4–5.3)
RBC # BLD AUTO: 3.95 10E12/L (ref 3.8–5.2)
SODIUM SERPL-SCNC: 139 MMOL/L (ref 133–144)
WBC # BLD AUTO: 10.4 10E9/L (ref 4–11)

## 2020-07-25 PROCEDURE — 25800030 ZZH RX IP 258 OP 636: Performed by: STUDENT IN AN ORGANIZED HEALTH CARE EDUCATION/TRAINING PROGRAM

## 2020-07-25 PROCEDURE — 12000001 ZZH R&B MED SURG/OB UMMC

## 2020-07-25 PROCEDURE — 80048 BASIC METABOLIC PNL TOTAL CA: CPT | Performed by: STUDENT IN AN ORGANIZED HEALTH CARE EDUCATION/TRAINING PROGRAM

## 2020-07-25 PROCEDURE — 00000146 ZZHCL STATISTIC GLUCOSE BY METER IP

## 2020-07-25 PROCEDURE — 97535 SELF CARE MNGMENT TRAINING: CPT | Mod: GO | Performed by: OCCUPATIONAL THERAPIST

## 2020-07-25 PROCEDURE — 40000893 ZZH STATISTIC PT IP EVAL DEFER

## 2020-07-25 PROCEDURE — 85027 COMPLETE CBC AUTOMATED: CPT | Performed by: STUDENT IN AN ORGANIZED HEALTH CARE EDUCATION/TRAINING PROGRAM

## 2020-07-25 PROCEDURE — 25000128 H RX IP 250 OP 636: Performed by: STUDENT IN AN ORGANIZED HEALTH CARE EDUCATION/TRAINING PROGRAM

## 2020-07-25 PROCEDURE — 36415 COLL VENOUS BLD VENIPUNCTURE: CPT | Performed by: STUDENT IN AN ORGANIZED HEALTH CARE EDUCATION/TRAINING PROGRAM

## 2020-07-25 PROCEDURE — 99024 POSTOP FOLLOW-UP VISIT: CPT | Performed by: OBSTETRICS & GYNECOLOGY

## 2020-07-25 PROCEDURE — 97165 OT EVAL LOW COMPLEX 30 MIN: CPT | Mod: GO | Performed by: OCCUPATIONAL THERAPIST

## 2020-07-25 PROCEDURE — 25000132 ZZH RX MED GY IP 250 OP 250 PS 637: Mod: GY | Performed by: STUDENT IN AN ORGANIZED HEALTH CARE EDUCATION/TRAINING PROGRAM

## 2020-07-25 RX ADMIN — ACETAMINOPHEN 650 MG: 325 TABLET, FILM COATED ORAL at 09:08

## 2020-07-25 RX ADMIN — BISACODYL 10 MG: 10 SUPPOSITORY RECTAL at 13:13

## 2020-07-25 RX ADMIN — ONDANSETRON 4 MG: 4 TABLET, ORALLY DISINTEGRATING ORAL at 07:35

## 2020-07-25 RX ADMIN — ACETAMINOPHEN 650 MG: 325 TABLET, FILM COATED ORAL at 15:21

## 2020-07-25 RX ADMIN — MAGNESIUM HYDROXIDE 15 ML: 400 SUSPENSION ORAL at 09:08

## 2020-07-25 RX ADMIN — PRAVASTATIN SODIUM 80 MG: 40 TABLET ORAL at 07:35

## 2020-07-25 RX ADMIN — OXYCODONE HYDROCHLORIDE 5 MG: 5 TABLET ORAL at 21:11

## 2020-07-25 RX ADMIN — ACETAMINOPHEN 650 MG: 325 TABLET, FILM COATED ORAL at 03:02

## 2020-07-25 RX ADMIN — OXYCODONE HYDROCHLORIDE 5 MG: 5 TABLET ORAL at 22:23

## 2020-07-25 RX ADMIN — ENOXAPARIN SODIUM 40 MG: 40 INJECTION SUBCUTANEOUS at 12:47

## 2020-07-25 RX ADMIN — OXYCODONE HYDROCHLORIDE 5 MG: 5 TABLET ORAL at 12:47

## 2020-07-25 RX ADMIN — OXYCODONE HYDROCHLORIDE 5 MG: 5 TABLET ORAL at 17:10

## 2020-07-25 RX ADMIN — OXYCODONE HYDROCHLORIDE 5 MG: 5 TABLET ORAL at 07:35

## 2020-07-25 RX ADMIN — SODIUM CHLORIDE 500 ML: 9 INJECTION, SOLUTION INTRAVENOUS at 17:58

## 2020-07-25 RX ADMIN — ACETAMINOPHEN 650 MG: 325 TABLET, FILM COATED ORAL at 19:55

## 2020-07-25 RX ADMIN — OXYCODONE HYDROCHLORIDE 5 MG: 5 TABLET ORAL at 03:02

## 2020-07-25 ASSESSMENT — PAIN DESCRIPTION - DESCRIPTORS
DESCRIPTORS: SORE;CRAMPING
DESCRIPTORS: SORE
DESCRIPTORS: SORE;CRAMPING
DESCRIPTORS: SORE
DESCRIPTORS: SORE
DESCRIPTORS: SORE;CRAMPING
DESCRIPTORS: ACHING

## 2020-07-25 ASSESSMENT — ACTIVITIES OF DAILY LIVING (ADL)
ADLS_ACUITY_SCORE: 15
PREVIOUS_RESPONSIBILITIES: MEAL PREP;HOUSEKEEPING;LAUNDRY;SHOPPING;MEDICATION MANAGEMENT;FINANCES;DRIVING
ADLS_ACUITY_SCORE: 17

## 2020-07-25 NOTE — PROGRESS NOTES
Gynecologic Oncology Daily Progress Note  2020    Rosie Blackman  : 1940  MRN: [id number]    POD#2 s/p LSC BSO > XL, omentectomy, PLND, R PALND, peritoneal & mesenteric biopsies  Disease:  Pelvic mass, adenocarcinoma on frozen    24 hour events:   - Damian removed. Low urine output, resolved  - Lovenox started    Subjective: Patient reports poor pain control yesterday. Unable to move much due to pain. This morning feels slightly better, but has not moved much. Voiding spontaneously. Passed flatus, had a small BM but feels distended and complains of gas pain. Ambulating. Denies dizziness, chest pain, shortness of breath or other complaints. She denies pulmonary history, no smoking, asthma, cough or shortness of breath.     Objective:  Patient Vitals for the past 24 hrs:   BP Temp Temp src Heart Rate Resp SpO2   20 2241 133/52 98.1  F (36.7  C) Oral 72 16 93 %   20 1955 -- -- -- -- -- 96 %   20 1527 129/55 97.8  F (36.6  C) Axillary 72 16 97 %   20 0915 -- -- -- -- -- 93 %   20 0913 -- -- -- -- -- (!) 89 %   20 0753 128/56 97.3  F (36.3  C) Oral 99 18 92 %   20 0700 -- -- -- -- -- 92 %     GEN - NAD, sitting comfortably in bed  CV - RRR  PULM - CTAB, no wheezing  ABD - soft, non-distended, appropriately tender  INC - c/d/i. Staples in place. Mild surrounding ecchymoses   EXT - no edema, non-tender, SCDs in place  L/D - PIV x2    I/O last 3 completed shifts:  In: 1460 [P.O.:960; IV Piggyback:500]  Out: 935 [Urine:935]    I/O this shift:  In: -   Out: 100 [Urine:100]    LABS:  BMP  Recent Labs   Lab 20  0350 20  0821     --    POTASSIUM 3.5 3.4   CHLORIDE 108  --    ANISA 8.3*  --    CO2 27  --    BUN 12  --    CR 0.61 0.76   *  --      CBC  Recent Labs   Lab 20  0541 20  0350 20  0821   WBC 8.1 10.4  --    RBC 3.90 3.95  --    HGB 12.0 12.5 14.9   HCT 38.6 38.2  --    MCV 99 97  --    MCH 30.8 31.6  --    MCHC 31.1* 32.7   --    RDW 14.1 13.9  --     210  --      INRNo lab results found in last 7 days.  LFTs No lab results found in last 7 days.    Assessment: Rosie Blackman is a 80 year old POD#2 s/p LSC BSO > XL, omentectomy, PLND, R PALND, peritoneal & mesenteric biopsies. Working on pain control.     Dz: Pelvic mass, adenocarcinoma on frozen  FEN: Reg diet  Pain: Tylenol, oxycodone, IV dilaudid PRN. S/p TAP.  Heme: Hgb 14.9> >12.5> 12.0, lovenox ppx  CV: HTN: valsartan-HCTZ [HELD]. HLD: pravastatin  Pulm: No issues, will remove O2 as she is asymptomatic and was an issue with the capnography machine as when compared with another pulse ox she was >95% while on capno was reporting sat at 89%. On RA.  GI: Hiatal hernia, GERD: TUMs PRN. Bowel regimen and antiemetics PRN.  : s/p lam  ID: S/p pre-op antibiotics.   Endo/Psych/Neuro/MSK: No issues  PPX: SCDs, IS  Dispo: Home when meeting goals likely 7/27  Drains/Lines: PIV x2    Hannah Marcano MD  Gynecology Oncology PGY-2  Pager: [phone]  07/26/2020. 6:08 AM        Attestation:  Physician Attestation   I personally examined the patient and  agree with the resident's findings and plan of care as documented in the note.       I personally reviewed vital signs, medications, labs and imaging.     Teri Benitez MD  Gyn Onc Fellow  [facility]

## 2020-07-25 NOTE — PLAN OF CARE
Assumed care from [id number].  POD1 s/p ex lap followed by laparotomy, BSO, omentectomy, pelvic and periaortic lymph node dissection, anterior culdectomy.     VSS on 4L NC, alert and oriented x4. Capno in place. Dangled and stood at bedside with assist x2, tolerated well. Abd incision midline and lap sites x3 covered with dressings, midline with small amount of marked dried drainage. Complained of abdominal soreness, managed with scheduled tylenol and PRN oxycodone. Damian patent with adequate UOP. On regular diet, denies nausea. No BM or gas since surgery. Right PIV infusing MIVF, left PIV saline locked. PCDs in use. Continue with POC.    Addendum: IVMF discontinued, pt now saline locked on both IVs. Capno discontinued, pt on RA.

## 2020-07-25 NOTE — OP NOTE
Procedure Date: 07/24/2020      PREOPERATIVE DIAGNOSIS:  Pelvic mass.      POSTOPERATIVE DIAGNOSIS:  Ovarian cancer.      PROCEDURES:  Exploratory laparoscopy followed by laparotomy, bilateral salpingo-oophorectomy, omentectomy, pelvic and periaortic lymph node dissection, anterior culdectomy.      ATTENDING:  Felipe Corona MD      ASSISTANT:  Teri Benitez MD      ANESTHESIA:  ET.      ESTIMATED BLOOD LOSS:  150 mL.      IV FLUIDS:  1100 crystalloid.      URINE OUTPUT:  75 mL.      COMPLICATIONS:  None overt.      INTRAOPERATIVE FINDINGS:  Include enlarged left ovary extending across the cul-de-sac into the right ovary.  No clear upper abdominal disease.  Omentum was grossly normal.  There were some inflammatory-appearing changes possibly consistent with small metastasis in the anterior cul-de-sac.      DESCRIPTION OF PROCEDURE:  After obtaining informed consent, the patient was brought to the operating room, placed in supine position.  She underwent induction of general anesthesia, was placed in dorsal lithotomy position.  She was examined under anesthesia and noted to have a large mass with limited mobility.  The abdomen was prepped and draped in the usual sterile fashion.  The umbilicus was everted, incised, and a Veress needle was passed.  Unfortunately, confirmation of position could not be performed, and for this reason, a left upper quadrant incision was made, and the port was placed.  The abdomen was insufflated to 14 mmHg after confirming adequate placement.  The camera was introduced, and the above findings were noted.  Additional ports were then placed in the umbilicus and right and left lower quadrant, and the mass was excised.  This was done by identifying the left IP ligament and transecting this after identifying the ureter.  The left broad ligament was then widely incised, and using the slow sharp dissection, the mass could be moved from the left side.  This was densely adherent to the  vagina, and the right round ligament was embedded within the mass.  Using sharp dissection, the anterior cul-de-sac as well as the specimen was then reflected posteriorly.  Attention was then turned to the right side.  With improved mobility, the right ovary could be identified.  The right ureter was identified.  The IP ligament was isolated, transected and cauterized.  At this point, the mass was entirely free, and a small midline incision was made approximately 3 inches long, extended down to the level of the fascia, and we attempted to place the mass within an EndoCatch bag.  Unfortunately, the oblong shape of the mass did not fit into even our largest bag, and for this reason, the incision was extended for a laparotomy.  The mass was then delivered intact and sent for frozen section.  This unfortunately returned as endometrioid adenocarcinoma, and therefore, the anterior cul-de-sac was removed as mentioned, and the lymph node dissection was performed by incising lateral to the external iliac vessels, reflecting them medially to expose the external iliac vessels as well as the obturator spaces.  The nodes were then peeled in their entirety.  There were enlarged lymph nodes on both sides.  These were not conclusively malignant.  The periaortic areas were then examined by extending the incisions laterally and reflecting the tissue medially.  There were enlarged nodes on the right side but none on the left side.  The right side was then excised.  The left side was not.  The omentectomy was performed in the usual fashion by reflecting the omentum superiorly and dissecting off the transverse colon using the LigaSure device.  The specimen was then delivered, and at this point there was no evidence of disease, gross or microscopic, elsewhere.  The abdomen was copiously irrigated.  There was a small amount of bleeding from the left obturator space, which was controlled with 2 clips and some Surgicel.  At this point, the  sponges were removed, counted and were correct x 2.  The fascia was closed using a PDS suture, subcutaneous tissue was closed using Vicryl, and the skin was closed using staples.  The ports were closed at the level of the skin using 4-0, and the 12 mm port was closed internally with deep stitches of Vicryl prior to closing the abdomen.  The patient was placed back in supine position.         KIKO FLOWER MD             D: 2020   T: 2020   MT: LOR      Name:     CARLITA ROJO   MRN:      [id number]        Account:        [id number]   :      1940           Procedure Date: 2020      Document: [id number]

## 2020-07-25 NOTE — PROGRESS NOTES
Gynecologic Oncology Daily Progress Note  2020    Rosie Blackman  : 1940  MRN: [id number]    POD#1 s/p LSC BSO > XL, omentectomy, PLND, R PALND, peritoneal & mesenteric biopsies  Disease:  Pelvic mass, adenocarcinoma on frozen    24 hour events:   -Surgery   -On 4L overnight due to capnography reading error, discontinued this morning     Subjective: Patient reports she feels well, slept ok. She has not eaten anything yet since surgery since she lately does not have much of an appetite. Will eat something today. She states she passed flatus, no BM. Pain is well controlled, hurts with movement. Stood at bedside and dangled yesterday, Denies dizziness, chest pain, shortness of breath or other complaints. She denies pulmonary history, no smoking, asthma, cough or shortness of breath.     Objective:  Patient Vitals for the past 24 hrs:   BP Temp Temp src Pulse Heart Rate Resp SpO2   20 0700 -- -- -- -- -- -- 92 %   20 0358 114/45 97.7  F (36.5  C) Oral -- 86 23 92 %   20 0023 -- -- -- -- -- -- 92 %   20 2313 -- -- -- -- -- -- 95 %   20 2216 123/53 96.5  F (35.8  C) Oral -- 106 22 93 %   20 1852 133/57 -- -- -- 102 15 91 %   20 1840 126/54 -- -- -- 109 21 91 %   20 1740 (!) 169/61 -- -- -- 109 22 90 %   20 1640 (!) 149/61 -- -- -- 100 19 94 %   20 1610 (!) 158/68 -- -- -- 99 18 93 %   20 1547 (!) 140/61 95.7  F (35.4  C) Oral 104 104 18 94 %   20 1500 112/59 98.1  F (36.7  C) Oral 89 74 21 94 %   20 1445 126/71 -- -- -- 102 16 97 %   20 1439 -- -- -- -- 86 15 98 %   20 1430 123/59 -- -- 75 82 20 98 %   20 1415 118/60 -- -- -- 79 8 98 %   20 1400 116/61 -- -- 72 75 15 98 %   20 1350 125/65 -- -- 86 85 21 95 %   20 1345 125/65 -- -- -- 71 -- 97 %   20 1339 116/70 97.7  F (36.5  C) Axillary 88 86 10 99 %   20 0910 (!) 144/84 -- -- 95 95 18 --   20 0900 (!) 144/75 -- -- 92 92 18 99  %   07/24/20 0855 (!) 147/86 -- -- 87 87 16 99 %   07/24/20 0850 (!) 144/84 -- -- 90 90 16 98 %   07/24/20 0849 (!) 142/93 -- -- 91 91 18 --   07/24/20 0845 (!) 147/73 -- -- 91 91 16 100 %   07/24/20 0822 -- -- -- -- -- -- 100 %       GEN - NAD, sitting comfortably in bed  CV - RRR  PULM - CTAB, no wheezing  ABD - soft, non-distended, appropriately tender  INC - c/d/i  EXT - no edema, non-tender, SCDs in place  L/D - PIV x2 and lam     I/O last 3 completed shifts:  In: 1400 [I.V.:1400]  Out: 860 [Urine:710; Blood:150]    No intake/output data recorded.    UOP: 235 ml since midnight     LABS:  BMP  Recent Labs   Lab 07/25/20  0350 07/24/20  0821     --    POTASSIUM 3.5 3.4   CHLORIDE 108  --    ANISA 8.3*  --    CO2 27  --    BUN 12  --    CR 0.61 0.76   *  --      CBC  Recent Labs   Lab 07/25/20  0350 07/24/20  0821   WBC 10.4  --    RBC 3.95  --    HGB 12.5 14.9   HCT 38.2  --    MCV 97  --    MCH 31.6  --    MCHC 32.7  --    RDW 13.9  --      --      INRNo lab results found in last 7 days.  LFTs No lab results found in last 7 days.      Assessment: Rosie Blackman is a 80 year old POD#1 s/p LSC BSO > XL, omentectomy, PLND, R PALND, peritoneal & mesenteric biopsies    Dz: Pelvic mass, adenocarcinoma on frozen  FEN: Reg diet, 100m/h  Pain: Tylenol, oxycodone, IV dilaudid PRN. S/p TAP.  Heme: Hgb 14.9> >12.5, start lovenox today   CV: HTN: valsartan-HCTZ [HELD]. HLD: pravastatin  Pulm: No issues, will remove O2 as she is asymptomatic and was an issue with the capnography machine as when compared with another pulse ox she was >95% while on capno was reporting sat at 89%  GI: Hiatal hernia, GERD: TUMs PRN. Bowel regimen and antiemetics PRN.  : lam - TOV this morning   ID: S/p pre-op antibiotics.   Endo/Psych/Neuro/MSK: No issues  PPX: SCDs, IS  Dispo: Home when meeting goals  Drains/Lines: PIV dominic, JENNIFER Hoskins, MD  Gynecology Oncology PGY-2  Pager: [phone]  07/25/2020  7:46 AM      Provider Disclosure:   I agree with above History, Review of Systems, Physical exam and Plan. I have reviewed the content of the documentation and have edited it as needed. I have seen and personally performed the services documented here and the documentation accurately represents those services and the decisions I have made.     Electronically signed by:   Harish Francisco MD   Gynecologic Oncology   [facility]

## 2020-07-25 NOTE — PLAN OF CARE
OT 7C    Discharge Planner OT   Patient plan for discharge: Home with assist from   Current status: Evaluation completed, treatment initiated. Pt educated in abdominal precautions and implications for ADL/IADL performance. Pt educated in log roll for bed mobility, demonstrated supine > seated EOB with verbal cues for sequencing. SBA for sit <> stand and ambulating x10 ft <> bathroom with one-hand support on IV pole. Pt steady on her feet. Pt stood at sink x5 min for g/h ADLs, with good balance. SpO2 hovering around 85-90% at rest and during activity on RA. Pt cued for PLB and use of inspirometer and SpO2 up to 97% within 30 sec. Pt encouraged to ambulate with RN x4/day.   Barriers to return to prior living situation: medical status, post-surgical precautions, activity tolerance  Recommendations for discharge: Home with assist for heavier IADLs  Rationale for recommendations: Pt's ADL performance currently below baseline. Pt will benefit from skilled OT to progress activity tolerance and independence, within precautions.        Entered by: Chloe Hernandez 07/25/2020 9:33 AM

## 2020-07-25 NOTE — PROGRESS NOTES
07/25/20 0900   Quick Adds   Type of Visit Initial Occupational Therapy Evaluation   Living Environment   Lives With spouse   Living Arrangements house   Home Accessibility stairs to enter home;stairs within home   Number of Stairs, Main Entrance 4   Number of Stairs, Within Home, Primary   (one flight)   Stair Railings, Within Home, Primary railings safe and in good condition   Transportation Anticipated car, drives self;family or friend will provide   Living Environment Comment Pt lives in a walk-out rambler with her . 4 steps to enter, then pt with needs met on main floor. Pt has a walk-in shower with no AE   Self-Care   Usual Activity Tolerance good   Current Activity Tolerance moderate   Regular Exercise No   Equipment Currently Used at Home walker, standard;cane, straight;raised toilet  (reacher, sock aid. )   Activity/Exercise/Self-Care Comment Pt is IND with all ADL/IADLs. Pt and  share IADL responsibility and he is available to provide assist as needed. Pt has AE above, but does not use at baseline. Pt is retired, enjoys gardening. Reports being an active person, though no formal exercise routine   Functional Level   Ambulation 0-->independent   Transferring 0-->independent   Toileting 0-->independent   Bathing 0-->independent   Dressing 0-->independent   Eating 0-->independent   Communication 0-->understands/communicates without difficulty   Swallowing 0-->swallows foods/liquids without difficulty   Cognition 0 - no cognition issues reported   Fall history within last six months no   Which of the above functional risks had a recent onset or change? none   Prior Functional Level Comment IND with ADL/IADLs and functional mobility   General Information   Onset of Illness/Injury or Date of Surgery - Date 07/24/20   Referring Physician Aliyah Rodriguez MD    Patient/Family Goals Statement To discharge home with    Precautions/Limitations abdominal precautions   Weight-Bearing Status - SHIRAZ  partial weight-bearing (% in comments)  (10 lb restriction)   Weight-Bearing Status - RUE partial weight-bearing (% in comments)  (10 lb restriction)   General Observations Pt pleasant and agreeable to session   General Info Comments Activity: Up in chair, Ambulate with assist, Ambulate   Cognitive Status Examination   Orientation orientation to person, place and time   Cognitive Comment No acute deficits noted   Visual Perception   Visual Perception Wears glasses   Visual Perception Comments No acute concerns   Sensory Examination   Sensory Comments No deficits noted   Pain Assessment   Patient Currently in Pain   (Pt reports soreness in lower abdomen with movement)   Integumentary/Edema   Integumentary/Edema Comments No deficits noted   Range of Motion (ROM)   ROM Comment BUE WFL   Strength   Strength Comments BUE WFL, not formally assesed due to precautions   Hand Strength   Hand Strength Comments WFL   Coordination   Coordination Comments WFL for ADLs   Mobility   Bed Mobility Comments SBA for supine > seated EOB using logroll   Transfer Skill: Bed to Chair/Chair to Bed   Level of [place]: Bed to Chair stand-by assist   Physical Assist/Nonphysical Assist: Bed to Chair verbal cues   Transfer Skill: Sit to Stand   Level of [place]: Sit/Stand stand-by assist   Balance   Balance Comments No acute concerns   Lower Body Dressing   Level of [place]: Dress Lower Body maximum assist (25% patients effort)  (to don socks)   Grooming   Level of [place]: Grooming stand-by assist   Eating/Self Feeding   Level of [place]: Eating independent   Instrumental Activities of Daily Living (IADL)   Previous Responsibilities meal prep;housekeeping;laundry;shopping;medication management;finances;driving   IADL Comments Pt and  share IADL responsibility   Activities of Daily Living Analysis   Impairments Contributing to Impaired Activities of Daily Living post surgical precautions;pain  (Increased O2  "needs)   General Therapy Interventions   Planned Therapy Interventions ADL retraining;IADL retraining;strengthening;transfer training;home program guidelines;progressive activity/exercise;bed mobility training   Clinical Impression   Criteria for Skilled Therapeutic Interventions Met yes, treatment indicated   OT Diagnosis Pt below ADL baseline   Influenced by the following impairments post surgical precautions, pain, decreased activity tolerance   Assessment of Occupational Performance 1-3 Performance Deficits   Identified Performance Deficits LE dressing, bathing, housekeeping   Clinical Decision Making (Complexity) Low complexity   Therapy Frequency Daily   Predicted Duration of Therapy Intervention (days/wks) 4 days   Anticipated Equipment Needs at Discharge   (TBD)   Anticipated Discharge Disposition Home with Assist   Risks and Benefits of Treatment have been explained. Yes   Patient, Family & other staff in agreement with plan of care Yes   Clinical Impression Comments Pt presents today with ADL performance limited by post-surgical precautions, pain, and decreased activity tolerance. Pt will benefit from skilled OT to address above barriers and to maximize ADL/IADL IND and safety within precautions.   [facility] AM-PAC  \"6 Clicks\" Daily Activity Inpatient Short Form   1. Putting on and taking off regular lower body clothing? 2 - A Lot   2. Bathing (including washing, rinsing, drying)? 2 - A Lot   3. Toileting, which includes using toilet, bedpan or urinal? 2 - A Lot   4. Putting on and taking off regular upper body clothing? 3 - A Little   5. Taking care of personal grooming such as brushing teeth? 4 - None   6. Eating meals? 4 - None   Daily Activity Raw Score (Score out of 24.Lower scores equate to lower levels of function) 17   Total Evaluation Time   Total Evaluation Time (Minutes) 10     "

## 2020-07-25 NOTE — PLAN OF CARE
PT Defer-Per discussion with OT, pt SBA for mobility with IV pole for support. No balance deficits noted. No acute PT needs at this time, OT will continue to follow for endurance. PT orders will be completed at this time  Discharge Planner PT   Current status: SBA with IV pole for ambulation  Barriers to return to prior living situation: medical status, decreased activity tolerance  Recommendations for discharge: Defer to OT  Rationale for recommendations: Defer to OT  Entered by: Socorro Schmidt 07/25/2020 9:36 AM

## 2020-07-26 ENCOUNTER — APPOINTMENT (OUTPATIENT)
Dept: OCCUPATIONAL THERAPY | Facility: CLINIC | Age: 80
DRG: 737 | End: 2020-07-26
Attending: OBSTETRICS & GYNECOLOGY
Payer: MEDICARE

## 2020-07-26 LAB
ANION GAP SERPL CALCULATED.3IONS-SCNC: 5 MMOL/L (ref 3–14)
BUN SERPL-MCNC: 13 MG/DL (ref 7–30)
CALCIUM SERPL-MCNC: 8.1 MG/DL (ref 8.5–10.1)
CHLORIDE SERPL-SCNC: 106 MMOL/L (ref 94–109)
CO2 SERPL-SCNC: 26 MMOL/L (ref 20–32)
CREAT SERPL-MCNC: 0.68 MG/DL (ref 0.52–1.04)
ERYTHROCYTE [DISTWIDTH] IN BLOOD BY AUTOMATED COUNT: 14.1 % (ref 10–15)
GFR SERPL CREATININE-BSD FRML MDRD: 83 ML/MIN/{1.73_M2}
GLUCOSE SERPL-MCNC: 104 MG/DL (ref 70–99)
HCT VFR BLD AUTO: 38.6 % (ref 35–47)
HGB BLD-MCNC: 12 G/DL (ref 11.7–15.7)
MCH RBC QN AUTO: 30.8 PG (ref 26.5–33)
MCHC RBC AUTO-ENTMCNC: 31.1 G/DL (ref 31.5–36.5)
MCV RBC AUTO: 99 FL (ref 78–100)
PLATELET # BLD AUTO: 217 10E9/L (ref 150–450)
POTASSIUM SERPL-SCNC: 3.4 MMOL/L (ref 3.4–5.3)
RBC # BLD AUTO: 3.9 10E12/L (ref 3.8–5.2)
SODIUM SERPL-SCNC: 137 MMOL/L (ref 133–144)
WBC # BLD AUTO: 8.1 10E9/L (ref 4–11)

## 2020-07-26 PROCEDURE — 25000128 H RX IP 250 OP 636: Performed by: STUDENT IN AN ORGANIZED HEALTH CARE EDUCATION/TRAINING PROGRAM

## 2020-07-26 PROCEDURE — 80048 BASIC METABOLIC PNL TOTAL CA: CPT | Performed by: STUDENT IN AN ORGANIZED HEALTH CARE EDUCATION/TRAINING PROGRAM

## 2020-07-26 PROCEDURE — 97530 THERAPEUTIC ACTIVITIES: CPT | Mod: GO

## 2020-07-26 PROCEDURE — 25000132 ZZH RX MED GY IP 250 OP 250 PS 637: Mod: GY | Performed by: STUDENT IN AN ORGANIZED HEALTH CARE EDUCATION/TRAINING PROGRAM

## 2020-07-26 PROCEDURE — 12000001 ZZH R&B MED SURG/OB UMMC

## 2020-07-26 PROCEDURE — 36415 COLL VENOUS BLD VENIPUNCTURE: CPT | Performed by: STUDENT IN AN ORGANIZED HEALTH CARE EDUCATION/TRAINING PROGRAM

## 2020-07-26 PROCEDURE — 85027 COMPLETE CBC AUTOMATED: CPT | Performed by: STUDENT IN AN ORGANIZED HEALTH CARE EDUCATION/TRAINING PROGRAM

## 2020-07-26 RX ORDER — OXYCODONE HYDROCHLORIDE 5 MG/1
5 TABLET ORAL EVERY 6 HOURS PRN
Qty: 10 TABLET | Refills: 0 | Status: SHIPPED | OUTPATIENT
Start: 2020-07-26 | End: 2020-07-27

## 2020-07-26 RX ORDER — ACETAMINOPHEN 325 MG/1
650 TABLET ORAL EVERY 6 HOURS PRN
Qty: 48 TABLET | Refills: 0 | Status: SHIPPED | OUTPATIENT
Start: 2020-07-26 | End: 2020-12-15

## 2020-07-26 RX ORDER — AMOXICILLIN 250 MG
1 CAPSULE ORAL 2 TIMES DAILY PRN
Qty: 30 TABLET | Refills: 0 | Status: SHIPPED | OUTPATIENT
Start: 2020-07-26 | End: 2020-10-05

## 2020-07-26 RX ADMIN — ACETAMINOPHEN 650 MG: 325 TABLET, FILM COATED ORAL at 08:37

## 2020-07-26 RX ADMIN — ACETAMINOPHEN 650 MG: 325 TABLET, FILM COATED ORAL at 14:31

## 2020-07-26 RX ADMIN — ACETAMINOPHEN 650 MG: 325 TABLET, FILM COATED ORAL at 02:56

## 2020-07-26 RX ADMIN — OXYCODONE HYDROCHLORIDE 10 MG: 5 TABLET ORAL at 08:37

## 2020-07-26 RX ADMIN — ACETAMINOPHEN 650 MG: 325 TABLET, FILM COATED ORAL at 20:04

## 2020-07-26 RX ADMIN — OXYCODONE HYDROCHLORIDE 5 MG: 5 TABLET ORAL at 22:49

## 2020-07-26 RX ADMIN — CALCIUM CARBONATE (ANTACID) CHEW TAB 500 MG 1000 MG: 500 CHEW TAB at 14:36

## 2020-07-26 RX ADMIN — ENOXAPARIN SODIUM 40 MG: 40 INJECTION SUBCUTANEOUS at 12:47

## 2020-07-26 RX ADMIN — OXYCODONE HYDROCHLORIDE 5 MG: 5 TABLET ORAL at 18:31

## 2020-07-26 RX ADMIN — OXYCODONE HYDROCHLORIDE 5 MG: 5 TABLET ORAL at 12:59

## 2020-07-26 RX ADMIN — OXYCODONE HYDROCHLORIDE 5 MG: 5 TABLET ORAL at 02:56

## 2020-07-26 RX ADMIN — PRAVASTATIN SODIUM 80 MG: 40 TABLET ORAL at 08:37

## 2020-07-26 ASSESSMENT — ACTIVITIES OF DAILY LIVING (ADL)
ADLS_ACUITY_SCORE: 15
ADLS_ACUITY_SCORE: 17
ADLS_ACUITY_SCORE: 17
ADLS_ACUITY_SCORE: 15
ADLS_ACUITY_SCORE: 17
ADLS_ACUITY_SCORE: 17

## 2020-07-26 ASSESSMENT — PAIN DESCRIPTION - DESCRIPTORS
DESCRIPTORS: SORE

## 2020-07-26 ASSESSMENT — MIFFLIN-ST. JEOR: SCORE: 1383.08

## 2020-07-26 NOTE — PLAN OF CARE
VSS on room air. Pain controlled with tylenol and oxycodone. Denies nausea but has poor appetite. Damian removed at 0945, was unable to void at 1500. Encouraged fluids and gave 500 ml bolus. Voided 400 ml afterwards. Midline incision stapled, lap sites covered with dressings. Abdominal binder on. Ambulating with SBA and gait belt.

## 2020-07-26 NOTE — PROGRESS NOTES
"Brief progress note    S: Patient does not feel ready to go home.  She said that she did not tolerate much of her food yesterday because of the \"nasty\" cramping abdominal pain that she was having with eating.  She also does not really have appetite.  Denied nausea or vomiting.  Has passed flatus and a very small bowel movement.  Pain is currently well controlled, but pain was very poorly controlled yesterday.  Asked questions about the timing of her spinal stenosis surgery.    O:  Vitals:    07/25/20 1527 07/25/20 1955 07/25/20 2241 07/26/20 0720   BP: 129/55  133/52 111/48   BP Location: Left arm  Left arm Left arm   Pulse:    95   Resp: 16  16 18   Temp: 97.8  F (36.6  C)  98.1  F (36.7  C) 96.2  F (35.7  C)   TempSrc: Axillary  Oral Oral   SpO2: 97% 96% 93% 92%   Weight:       Height:       Gen: Sitting in chair, not in acute distress  CV: Well perfused  Pulm: Normal respiratory efforts    A/P: 80 year old POD#2 s/p laparoscopy converted to XL, omentectomy, PLND, R PALND, peritoneal & mesenteric biopsies.  Doing well and meeting most postoperative goals. Will continue to work on attempting to eat regular diet. Likely discharge to home tomorrow.     Crystal Rojas MD ([email or web address])  N OBGYN PGY-3  Gyn Onc resident pager: [phone]  07/26/2020         "

## 2020-07-26 NOTE — PLAN OF CARE
VSS on room air. Ambulates with SBA. Complain cramping pain, managed with scheduled tylenol and PRN oxycodone. Abd midline covered with dressing, minimal drainage. Abdominal binder in place. On regular diet, denies nausea. Voids spontaneously with adequate UOP. Had small BM overnight and passing flatus, but continues to reports feeling like she still needs to poop. Left hand PIV saline locked. Continue with POC.

## 2020-07-26 NOTE — PLAN OF CARE
VSS on room air. Pain controlled with tylenol and oxycodone. Still has poor appetite, did not eat anything yet today but did order some dinner. Voiding with adequate urine output. Passing gas but no bowel movement. Midline incision stapled. Abdominal binder on. Ambulating with SBA and gait belt. Possible discharge tomorrow.

## 2020-07-27 VITALS
BODY MASS INDEX: 36.68 KG/M2 | TEMPERATURE: 98.5 F | DIASTOLIC BLOOD PRESSURE: 54 MMHG | WEIGHT: 207 LBS | HEART RATE: 86 BPM | SYSTOLIC BLOOD PRESSURE: 127 MMHG | OXYGEN SATURATION: 90 % | RESPIRATION RATE: 16 BRPM | HEIGHT: 63 IN

## 2020-07-27 LAB — COPATH REPORT: NORMAL

## 2020-07-27 PROCEDURE — 25000132 ZZH RX MED GY IP 250 OP 250 PS 637: Mod: GY | Performed by: STUDENT IN AN ORGANIZED HEALTH CARE EDUCATION/TRAINING PROGRAM

## 2020-07-27 RX ORDER — OXYCODONE HYDROCHLORIDE 5 MG/1
5 TABLET ORAL EVERY 6 HOURS PRN
Qty: 16 TABLET | Refills: 0 | Status: SHIPPED | OUTPATIENT
Start: 2020-07-27 | End: 2020-10-05

## 2020-07-27 RX ORDER — AMOXICILLIN 250 MG
2 CAPSULE ORAL 2 TIMES DAILY
Status: DISCONTINUED | OUTPATIENT
Start: 2020-07-27 | End: 2020-07-27 | Stop reason: HOSPADM

## 2020-07-27 RX ADMIN — OXYCODONE HYDROCHLORIDE 5 MG: 5 TABLET ORAL at 05:08

## 2020-07-27 RX ADMIN — OXYCODONE HYDROCHLORIDE 5 MG: 5 TABLET ORAL at 08:35

## 2020-07-27 RX ADMIN — OXYCODONE HYDROCHLORIDE 5 MG: 5 TABLET ORAL at 10:41

## 2020-07-27 RX ADMIN — ACETAMINOPHEN 650 MG: 325 TABLET, FILM COATED ORAL at 02:12

## 2020-07-27 RX ADMIN — ACETAMINOPHEN 650 MG: 325 TABLET, FILM COATED ORAL at 08:30

## 2020-07-27 RX ADMIN — PRAVASTATIN SODIUM 80 MG: 40 TABLET ORAL at 08:30

## 2020-07-27 ASSESSMENT — PAIN DESCRIPTION - DESCRIPTORS
DESCRIPTORS: SORE

## 2020-07-27 ASSESSMENT — ACTIVITIES OF DAILY LIVING (ADL)
ADLS_ACUITY_SCORE: 15

## 2020-07-27 NOTE — PROGRESS NOTES
Gynecologic Oncology Daily Progress Note  2020  Rosie Blackman  : 1940  MRN: [id number]    POD#3 s/p LSC BSO > XL, omentectomy, PLND, R PALND, peritoneal & mesenteric biopsies  Disease:  Pelvic mass, adenocarcinoma on frozen    24 hour events:   No acute events    Subjective: Patient is feeling well this morning. Was able to get some sleep. She spent most of yesterday up in a chair or walking around. Denies lightheadedness or dizziness. She had her first meal yesterday, soup and tolerated it well. Denies nausea or vomiting. Has had some burping. Has passed gas and had a small bowel movement a few days ago. Voiding spontaneously. Denies bleeding.      Objective:  Patient Vitals for the past 24 hrs:   BP Temp Temp src Pulse Heart Rate Resp SpO2 Weight   20 1609 129/52 99.1  F (37.3  C) Oral -- 72 16 94 % --   20 1156 -- -- -- -- -- -- -- 93.9 kg (207 lb)   20 0720 111/48 96.2  F (35.7  C) Oral 95 -- 18 92 % --   20 2241 133/52 98.1  F (36.7  C) Oral -- 72 16 93 % --   20 1955 -- -- -- -- -- -- 96 % --     GEN - NAD, sitting comfortably in bed  CV - RRR  PULM - CTAB, no wheezing  ABD - soft, non-distended, appropriately tender  INC - c/d/i. Staples in place. Mild ecchymoses surrounding umbilicus. No drainage.  EXT - no edema, non-tender, SCDs in place  L/D - PIV x2    I/Os  (Yesterday // Since Midnight)  PO 720cc // 0 cc  IVF 0cc // 0cc  Urine 800cc // 500cc      LABS:  None    Assessment: Rosie Blackman is a 80 year old POD#3 s/p LSC BSO > XL, omentectomy, PLND, R PALND, peritoneal & mesenteric biopsies. Meeting goals for discharge.    Pelvic mass, adenocarcinoma on frozen  - Outpatient management  - Final pathology pending    Post-operative state:  - Meetings goals for discharge today  - Pain controlled with PO tylenol, oxycodone/ S/p TAP block  - PPX: SCDs, IS, lovenox    Hyperlipidemia:  - Continue PTA pravastatin    Hypertension:  - PTA Valsartan - hydrochlorothiazide  held, will restart at time of discharge    Hiatal hernia, GERD:  - TUMs prn  - Bowel regimen and antiemetics prn    Dispo: Home today. Follow up appointment with Dr. Corona on 8/10/2020    Dinora Hyman MD  Obstetrics & Gynecology, PGY-3  7/27/2020 6:07 AM        I saw and evaluated the patient. I agree with the findings and the plan of care as documented in Dr. Thomas 's note.     Rosalba Ulrich MD  Gynecologic Oncology  Pager [phone]

## 2020-07-27 NOTE — PLAN OF CARE
VSS on room air. Ambulates with SBA. Abd midline incision C/D/I, abdominal binder in place. Pain controlled with scheduled tylenol and PRN oxycodone. Passing flatus, had small BM 7/26. Voids spontaneously with adequate UOP. Left PIV saline locked and was removed prior to discharge. Tolerating regular diet, ate all of dinner last night, denies nausea- is waiting to eat and shower when she gets home. Reviewed all discharge instruction with patient, she verbalized understanding. All belongings sent with pt. Transport took pt to discharge pharmacy via wheelchair, then to front door where spouse is waiting. All education completed.

## 2020-07-27 NOTE — PLAN OF CARE
Occupational Therapy Discharge Summary    Reason for therapy discharge:    Discharged to home.    Progress towards therapy goal(s). See goals on Care Plan in [facility] electronic health record for goal details.  Goals partially met.  Barriers to achieving goals:   discharge from facility.    Therapy recommendation(s):    No further therapy is recommended.

## 2020-07-27 NOTE — PLAN OF CARE
Assumed care from [id number]. VSS on room air. Ambulates with SBA. Abd midline incision C/D/I, abdominal binder in place. Pain controlled with scheduled tylenol and PRN oxycodone. Passing flatus, had small BM 7/26. Voids spontaneously with adequate UOP. Left PIV saline locked. Tolerating regular diet, ate all of dinner last night, denies nausea. Continue with POC. Possible discharge today.

## 2020-07-28 ENCOUNTER — TELEPHONE (OUTPATIENT)
Dept: INTERNAL MEDICINE | Facility: CLINIC | Age: 80
End: 2020-07-28

## 2020-07-28 ENCOUNTER — PATIENT OUTREACH (OUTPATIENT)
Dept: ONCOLOGY | Facility: CLINIC | Age: 80
End: 2020-07-28

## 2020-07-28 NOTE — TELEPHONE ENCOUNTER
Ovarian Mass, S/P Exploratory Laparotomy  [facility]    ED/UC/IP follow up phone call:    RN please call to follow up.    Number of ED visits in past 12 months = 0    Hattie CAT

## 2020-07-28 NOTE — TELEPHONE ENCOUNTER
"ED / Discharge Outreach Protocol    Patient Contact    Attempt # 1    Was call answered?  Yes.  \"May I please speak with <patient name>\"  Is patient available?   Yes    Hospital/TCU/ED for chronic condition Discharge Protocol    \"Hi, my name is Cora Romano RN, a registered nurse, and I am calling from [facility].  I am calling to follow up and see how things are going for you after your recent emergency visit/hospital/TCU stay.\"    Tell me how you are doing now that you are home?\" I think I am making a good recovery.  Surgery went well.  Cancerous mass was encapsulated and rated stage 1.  Waiting for further pathology studies to result.      Discharge Instructions    \"Let's review your discharge instructions.  What is/are the follow-up recommendations?  Reviewed in detail with pt.  Pt. Response: None.  Staples to removed at her Aug 10 appt with surgeon.    \"Has an appointment with your primary care provider been scheduled?\"  No, appt not needed with PCP.   Yes. (confirm), pt has Aug 10 appt pending with surgeon    \"When you see the provider, I would recommend that you bring your medications with you.\"    Medications    \"Tell me what changed about your medicines when you discharged?\"    Changes to chronic meds?    0-1    \"What questions do you have about your medications?\"    None     New diagnoses of heart failure, COPD, diabetes, or MI?    No              Post Discharge Medication Reconciliation Status: discharge medications reconciled, continue medications without change.    Was MTM referral placed (*Make sure to put transitions as reason for referral)?   No    Call Summary    \"What questions or concerns do you have about your recent visit and your follow-up care?\"     none    \"If you have questions or things don't continue to improve, we encourage you contact us through the main clinic number (give number).  Even if the clinic is not open, triage nurses are available 24/7 to help you.     We would like " "you to know that our clinic has extended hours (provide information).  We also have urgent care (provide details on closest location and hours/contact info)\"      \"Thank you for your time and take care!\"               "

## 2020-07-30 DIAGNOSIS — Z98.890 S/P EXPLORATORY LAPAROTOMY: ICD-10-CM

## 2020-07-30 LAB — COPATH REPORT: NORMAL

## 2020-07-30 RX ORDER — OXYCODONE HYDROCHLORIDE 5 MG/1
5 TABLET ORAL EVERY 6 HOURS PRN
Refills: 0 | OUTPATIENT
Start: 2020-07-30

## 2020-07-30 NOTE — TELEPHONE ENCOUNTER
"Refill Request    Date of most recent appointment:  7/24-7/27 In-Pt  Next upcoming appointment:   8/3/20    Medication requested:  oxyCODONE (ROXICODONE) 5 MG tablet  Quantity:  Previously Rx'd 16  Last fill date:  7/27/20  Person requesting refill:  Pt  Notes:  Pt states she has 6 left. Is taking 1 q 6 h. Pt is taking tylenol q 6 h as well. Is concerned about \"going into the weekend\", feels pain is currently well managed.    Prescribing provider(s):  Dr Corona  Refill approved/denied:  Denied    Disposition of prescription:  [facility] #[id number]    "

## 2020-08-03 ENCOUNTER — ALLIED HEALTH/NURSE VISIT (OUTPATIENT)
Dept: ONCOLOGY | Facility: CLINIC | Age: 80
End: 2020-08-03
Attending: OBSTETRICS & GYNECOLOGY
Payer: MEDICARE

## 2020-08-03 VITALS — TEMPERATURE: 97.9 F

## 2020-08-03 DIAGNOSIS — Z98.890 S/P EXPLORATORY LAPAROTOMY: Primary | ICD-10-CM

## 2020-08-03 PROCEDURE — 40001009 ZZH VIDEO/TELEPHONE VISIT; NO CHARGE

## 2020-08-03 NOTE — NURSING NOTE
Pt here for staple removal  Feeling well but a little weapy/emotional at times and pt does not know why  Per pt she had been on estrogen daily but was told to stop this before surgery   reassured pt this is not unusual and likely due to surgery and loss of hormones    Staples removed, incision site looks healthy no signs of infection  Steri strips applied  Discussed with pt how to care for incision    appt for post op is 8/10

## 2020-08-07 ENCOUNTER — MYC MEDICAL ADVICE (OUTPATIENT)
Dept: FAMILY MEDICINE | Facility: CLINIC | Age: 80
End: 2020-08-07

## 2020-08-10 ENCOUNTER — ONCOLOGY VISIT (OUTPATIENT)
Dept: ONCOLOGY | Facility: CLINIC | Age: 80
End: 2020-08-10
Attending: INTERNAL MEDICINE
Payer: MEDICARE

## 2020-08-10 VITALS
BODY MASS INDEX: 37.12 KG/M2 | WEIGHT: 209.5 LBS | HEIGHT: 63 IN | RESPIRATION RATE: 16 BRPM | OXYGEN SATURATION: 98 % | SYSTOLIC BLOOD PRESSURE: 171 MMHG | DIASTOLIC BLOOD PRESSURE: 83 MMHG | TEMPERATURE: 98.4 F | HEART RATE: 76 BPM

## 2020-08-10 DIAGNOSIS — L03.316 CELLULITIS OF UMBILICUS: Primary | ICD-10-CM

## 2020-08-10 PROCEDURE — G0463 HOSPITAL OUTPT CLINIC VISIT: HCPCS

## 2020-08-10 PROCEDURE — 99214 OFFICE O/P EST MOD 30 MIN: CPT | Mod: 24 | Performed by: OBSTETRICS & GYNECOLOGY

## 2020-08-10 RX ORDER — CEPHALEXIN 500 MG/1
500 CAPSULE ORAL 4 TIMES DAILY
Qty: 10 CAPSULE | Refills: 0 | Status: SHIPPED | OUTPATIENT
Start: 2020-08-10 | End: 2020-08-20

## 2020-08-10 ASSESSMENT — PAIN SCALES - GENERAL: PAINLEVEL: MILD PAIN (2)

## 2020-08-10 ASSESSMENT — MIFFLIN-ST. JEOR: SCORE: 1394.29

## 2020-08-10 NOTE — NURSING NOTE
"Oncology Rooming Note    August 10, 2020 9:03 AM   Rosie Blackman is a 80 year old female who presents for:    Chief Complaint   Patient presents with     Oncology Clinic Visit     Return; Ovarian Mass     Initial Vitals: BP (!) 171/83 (BP Location: Right arm, Patient Position: Sitting, Cuff Size: Adult Regular)   Pulse 76   Temp 98.4  F (36.9  C) (Oral)   Resp 16   Ht 1.6 m (5' 2.99\")   Wt 95 kg (209 lb 8 oz)   SpO2 98%   BMI 37.12 kg/m   Estimated body mass index is 37.12 kg/m  as calculated from the following:    Height as of this encounter: 1.6 m (5' 2.99\").    Weight as of this encounter: 95 kg (209 lb 8 oz). Body surface area is 2.05 meters squared.  Mild Pain (2) Comment: Data Unavailable   No LMP recorded. Patient has had a hysterectomy.  Allergies reviewed: Yes  Medications reviewed: Yes    Medications: Medication refills not needed today.  Pharmacy name entered into [employer]:    [id number] - [place], [place] - [address] - MAIL ORDER MAIN MEDS - NON-EPRESCRIBE  [facility] - [place], [place] - [address]    Clinical concerns: Patient states she has trouble controlling her pain. She states that she is currently alternating ibuprofen and tylenol and wants to know if she should continue this. She also states she has a lump on her vulva that she would like examined.       Hannah Simmons, [facility]              "

## 2020-08-10 NOTE — PROGRESS NOTES
Consult Notes on Referred Patient         Dr. Kathya Escalera, DO  [address] [place] [address]       RE: Rosie Blackman  : 1940  ALEJANDRO: 8/10/2020     Dear Dr. Kathya Escalera:    I had the pleasure of seeing your patient Rosie Blackman here at the Gynecologic Cancer Clinic at the [facility] on 2020.  As you know she is a very pleasant 80 year old woman with a recent diagnosis of  Pelvic mass.  Given these findings she was subsequently sent to the Gynecologic Cancer Clinic for new patient consultation.     She recently presented with a complaint of hip pain.  The work-up of this included an MRI which has demonstrated a large pelvic mass.  The patient has been asymptomatic, but her  is elevated (108, CEA 19-9 were normal).    She underwent surgical staging on 20    PATH:  FINAL DIAGNOSIS:   A. OVARIES, LEFT AND RIGHT, BILATERAL OOPHORECTOMY:   - Right ovary with ENDOMETRIOID ADENOCARCINOMA, FIGO grade 1   - Left ovary with benign inclusion cysts, negative for malignancy   - Unremarkable bilateral fallopian tubes, negative for malignancy     B. MESENTERY, BIOPSY:   - Fibrovascular adhesions, negative for malignancy     C. OMENTUM, OMENTECTOMY:   - Adipose tissue, negative for malignancy     D. ANTERIOR CUL-DE-SAC, BIOPSY:   - Fibroadipose tissue with foci of endometriosis   - Negative for malignancy     E. POSTERIOR CUL-DE-SAC, BIOPSY:   - Fibrovascular tissue, negative for malignancy     F. LYMPH NODE, LEFT PELVIC, EXCISION:   - Eleven reactive lymph nodes, negative for malignancy (0/11)     G. LYMPH NODE, RIGHT PELVIC, EXCISION:   - Nine reactive lymph nodes, negative for malignancy (0/9)     H. LYMPH NODE, RIGHT PARA AORTIC, EXCISION:   - Three reactive lymph nodes, negative for malignancy (0/3)       Synoptic Report:     SPECIMEN     Procedure:         - Bilateral salpingo-oophorectomy         - Omentectomy         - Peritoneal  biopsies          - Peritoneal washing     Specimen Integrity of Right Ovary:         - Capsule intact     TUMOR     Tumor Site:         - Right ovary     Histologic Type:         - Endometrioid carcinoma     Histologic Grade:         - G1: Well differentiated     Tumor Size: 24.5 Centimeters (cm)     Ovarian Surface Involvement:         - Absent     Fallopian Tube Surface Involvement:         - Absent     Other Tissue / Organ Involvement:         - Not identified     Peritoneal Ascitic Fluid:         - Negative for malignancy (normal / benign)     Pleural Fluid:         - Not submitted / unknown     Treatment Effect:         - No known presurgical therapy     LYMPH NODES     Regional Lymph Nodes:         - All lymph nodes negative for tumor cells     Number of Lymph Nodes Examined: 23     Site(s):         - Right pelvic         - Left pelvic         - Right para-aortic       FIGO STAGE     FIGO Stage:         - IA     ADDITIONAL FINDINGS     Additional Findings:         - Endometriosis       Review of Systems:    Systemic           no weight changes; no fever; no chills; no night sweats; no appetite changes  Skin           no rashes, or lesions  Eye           no irritation; no changes in vision  Isabelle-Laryngeal           no dysphagia; no hoarseness   Pulmonary    no cough; no shortness of breath  Cardiovascular    no chest pain; no palpitations  Gastrointestinal    no diarrhea; no constipation; no abdominal pain; no changes in bowel  habits; no blood in stool  Genitourinary   no urinary frequency; no urinary urgency; no dysuria; no pain; no abnormal vaginal discharge; no abnormal vaginal bleeding  Breast   no breast discharge; no breast changes; no breast pain  Musculoskeletal    no myalgias; no arthralgias; no back pain  Psychiatric           no depressed mood; no anxiety    Hematologic           no tender lymph nodes; no noticeable swellings or lumps   Endocrine    no hot flashes; no heat/cold intolerance          Neurological   no tremor; no numbness and tingling; no headaches; no difficulty  sleeping      Past Medical History:    Past Medical History:   Diagnosis Date     Chronic airway obstruction (H)      Gastroesophageal reflux disease      Hiatal hernia      Hypertension      Personal history of contact with and (suspected) exposure to asbestos      Primary osteoarthritis of right hip 7/9/2020         Past Surgical History:    Past Surgical History:   Procedure Laterality Date     BREAST BIOPSY, CORE RT/LT  1994     C ANESTH,KNEE VEINS SURGERY  8/20/2014     CHOLECYSTECTOMY  1995     COLONOSCOPY  5/10    Diverticulosis, colon polyps; repeat 5 yrs     COLONOSCOPY N/A 11/16/2015    Procedure: COLONOSCOPY;  Surgeon: Alejandro Matos MD;  Location: [facility]     Colonoscopy, hyperplastic polyps, repeat in 5 yrs  2004     ESOPHAGOSCOPY, GASTROSCOPY, DUODENOSCOPY (EGD), COMBINED  9/30/2013    Procedure: COMBINED ESOPHAGOSCOPY, GASTROSCOPY, DUODENOSCOPY (EGD), BIOPSY SINGLE OR MULTIPLE;  Gastroscopy;  Surgeon: Blaise Gill MD;  Location: [facility]     EYE SURGERY  2012    R/L Cataracts     HC REMOVE TONSILS/ADENOIDS,13+ Y/O      T & A 13+y.o.     HYSTERECTOMY, PAP NO LONGER INDICATED       LAPAROSCOPIC SALPINGO-OOPHORECTOMY N/A 7/24/2020    Procedure: Laparoscopy, removal or pelvic mass, both tubes and ovaries, omentectomy, anterior culvectomy, pelvic and para aortic lymph node dissection, laparotomy;  Surgeon: Felipe Corona MD;  Location: [facility]     [facility] for DUB, ovaries in       VASCULAR SURGERY  2014    Taylor closure         Health Maintenance:  Last Pap Smear: None since hysterectomy age 41    Last Mammogram: 10/2019 BIRADS 1    Last Colonoscopy: 2015 (diverticulosis without polyps or cancer)                       Current Medications:     has a current medication list which includes the following prescription(s): cephalexin, acetaminophen, enoxaparin anticoagulant, mometasone, naproxen sodium, omega-3 acid  "ethyl esters, oxycodone, pravastatin, senna-docusate, theratears, valsartan-hydrochlorothiazide, and viactiv.       Allergies:     Allergies   Allergen Reactions     Ezetimibe Other (See Comments)     Severe muscle aches     Lisinopril      Prilosec [Omeprazole]      Zestril [Ace Inhibitors] Cough     Atorvastatin Other (See Comments)     Muscle Pain     Irbesartan Cramps     Rosuvastatin Other (See Comments)     Muscle Pain         Social History:     Social History     Tobacco Use     Smoking status: Never Smoker     Smokeless tobacco: Never Used   Substance Use Topics     Alcohol use: No       History   Drug Use No           Family History:     The patient's family history is notable for .    Family History   Problem Relation Age of Onset     Cancer Mother         uterine cancer,      Respiratory Mother         COPD     Cardiovascular Mother         AAA  age 80     Breast Cancer Maternal Grandmother      Cancer Maternal Grandfather         cancer unknown type     Breast Cancer Sister      Eye Disorder Father         cataracts     Depression Father      Depression Son      Depression Son      Breast Cancer Sister         X2     Cancer - colorectal No family hx of         no ovarian cancer         Physical Exam:     PS 0  VS: BP (!) 171/83 (BP Location: Right arm, Patient Position: Sitting, Cuff Size: Adult Regular)   Pulse 76   Temp 98.4  F (36.9  C) (Oral)   Resp 16   Ht 1.6 m (5' 2.99\")   Wt 95 kg (209 lb 8 oz)   SpO2 98%   BMI 37.12 kg/m        General Appearance: healthy and alert, no distress     HEENT:  no thyromegaly, no palpable nodules or masses        Cardiovascular: regular rate and rhythm, no gallops, rubs or murmurs     Respiratory: lungs clear, no rales, rhonchi or wheezes, normal diaphragmatic excursion    Musculoskeletal: extremities non tender and without edema    Skin: no lesions or rashes; incision with small area of erythema and induration at the superior edge.  No pus, no " tenderness    Neurological: normal gait, no gross defects     Psychiatric: appropriate mood and affect                               Hematological: normal cervical, supraclavicular and inguinal lymph nodes     Gastrointestinal:       abdomen soft, non-tender, non-distended, no organomegaly or masses    Genitourinary: deferred    Assessment:    Rosie Blackman is a 80 year old woman with a new diagnosis stage I grade 1 OVCA     A total of 25 minutes was spent with the patient, 20 minutes of which were spent in counseling the patient and/or treatment planning.      Plan:     1.)   OVCA - we have discussed the findings and options from observation to chemotherapy.  She is aware that her prognosis is favorable, but not guaranteed in any setting.  I have discussed with her the NCCN guidelines which recommend no additional therapy at this time.  WE have discussed the large size, attachments, and associated endometriosis as at least slightly worrisome co-features, but nevertheless I have recommended observation based on her overall risk profile.  After a discussion of the options she is inclined to observe as well.  Plan for follow-up q3 months for 2 years.  She is aware that recurrence is possible    2.) Possible early cellulitis: I have outlined the area of erythema and prescribed Keflex.  She and her  will relate inf the area of induration worsens, or she develops pain or fever.    3.) Genetic risk factors were assessed and the patient does meet the qualifications for a referral referral placed.      4.) Labs and/or tests ordered include:  Pre-op CXR; she has other pre-op labs and ecg already done.     5.) Health maintenance issues addressed today include none.        Thank you for allowing us to participate in the care of your patient.         Sincerely,    Felipe Corona MD    CC  Patient Care Team:  Kathya Escalera DO as PCP - General (Internal Medicine)  Kathya Escalera DO as Assigned  MURALI MAHMOOD

## 2020-08-10 NOTE — LETTER
8/10/2020         RE: Rosie Blackman  [address] [facility] [address]        Dear Colleague,    Thank you for referring your patient, Rosie Blackman, to the [facility]. Please see a copy of my visit note below.                            Consult Notes on Referred Patient         Dr. Kathya Escalera, DO  [address] [place] [address]       RE: Rosie Blackman  : 1940  ALEJANDRO: 8/10/2020     Dear Dr. Kathya Escalera:    I had the pleasure of seeing your patient Rosie Blackman here at the Gynecologic Cancer Clinic at the [facility] on 2020.  As you know she is a very pleasant 80 year old woman with a recent diagnosis of  Pelvic mass.  Given these findings she was subsequently sent to the Gynecologic Cancer Clinic for new patient consultation.     She recently presented with a complaint of hip pain.  The work-up of this included an MRI which has demonstrated a large pelvic mass.  The patient has been asymptomatic, but her  is elevated (108, CEA 19-9 were normal).    She underwent surgical staging on 20    PATH:  FINAL DIAGNOSIS:   A. OVARIES, LEFT AND RIGHT, BILATERAL OOPHORECTOMY:   - Right ovary with ENDOMETRIOID ADENOCARCINOMA, FIGO grade 1   - Left ovary with benign inclusion cysts, negative for malignancy   - Unremarkable bilateral fallopian tubes, negative for malignancy     B. MESENTERY, BIOPSY:   - Fibrovascular adhesions, negative for malignancy     C. OMENTUM, OMENTECTOMY:   - Adipose tissue, negative for malignancy     D. ANTERIOR CUL-DE-SAC, BIOPSY:   - Fibroadipose tissue with foci of endometriosis   - Negative for malignancy     E. POSTERIOR CUL-DE-SAC, BIOPSY:   - Fibrovascular tissue, negative for malignancy     F. LYMPH NODE, LEFT PELVIC, EXCISION:   - Eleven reactive lymph nodes, negative for malignancy (0/11)     G. LYMPH NODE, RIGHT PELVIC, EXCISION:   - Nine reactive lymph nodes, negative for malignancy (0/9)      H. LYMPH NODE, RIGHT PARA AORTIC, EXCISION:   - Three reactive lymph nodes, negative for malignancy (0/3)       Synoptic Report:     SPECIMEN     Procedure:         - Bilateral salpingo-oophorectomy         - Omentectomy         - Peritoneal  biopsies         - Peritoneal washing     Specimen Integrity of Right Ovary:         - Capsule intact     TUMOR     Tumor Site:         - Right ovary     Histologic Type:         - Endometrioid carcinoma     Histologic Grade:         - G1: Well differentiated     Tumor Size: 24.5 Centimeters (cm)     Ovarian Surface Involvement:         - Absent     Fallopian Tube Surface Involvement:         - Absent     Other Tissue / Organ Involvement:         - Not identified     Peritoneal Ascitic Fluid:         - Negative for malignancy (normal / benign)     Pleural Fluid:         - Not submitted / unknown     Treatment Effect:         - No known presurgical therapy     LYMPH NODES     Regional Lymph Nodes:         - All lymph nodes negative for tumor cells     Number of Lymph Nodes Examined: 23     Site(s):         - Right pelvic         - Left pelvic         - Right para-aortic       FIGO STAGE     FIGO Stage:         - IA     ADDITIONAL FINDINGS     Additional Findings:         - Endometriosis       Review of Systems:    Systemic           no weight changes; no fever; no chills; no night sweats; no appetite changes  Skin           no rashes, or lesions  Eye           no irritation; no changes in vision  Isabelle-Laryngeal           no dysphagia; no hoarseness   Pulmonary    no cough; no shortness of breath  Cardiovascular    no chest pain; no palpitations  Gastrointestinal    no diarrhea; no constipation; no abdominal pain; no changes in bowel  habits; no blood in stool  Genitourinary   no urinary frequency; no urinary urgency; no dysuria; no pain; no abnormal vaginal discharge; no abnormal vaginal bleeding  Breast   no breast discharge; no breast changes; no breast  pain  Musculoskeletal    no myalgias; no arthralgias; no back pain  Psychiatric           no depressed mood; no anxiety    Hematologic           no tender lymph nodes; no noticeable swellings or lumps   Endocrine    no hot flashes; no heat/cold intolerance         Neurological   no tremor; no numbness and tingling; no headaches; no difficulty  sleeping      Past Medical History:    Past Medical History:   Diagnosis Date     Chronic airway obstruction (H)      Gastroesophageal reflux disease      Hiatal hernia      Hypertension      Personal history of contact with and (suspected) exposure to asbestos      Primary osteoarthritis of right hip 7/9/2020         Past Surgical History:    Past Surgical History:   Procedure Laterality Date     BREAST BIOPSY, CORE RT/LT  1994     C ANESTH,KNEE VEINS SURGERY  8/20/2014     CHOLECYSTECTOMY  1995     COLONOSCOPY  5/10    Diverticulosis, colon polyps; repeat 5 yrs     COLONOSCOPY N/A 11/16/2015    Procedure: COLONOSCOPY;  Surgeon: Alejandro Matos MD;  Location: [facility]     Colonoscopy, hyperplastic polyps, repeat in 5 yrs  2004     ESOPHAGOSCOPY, GASTROSCOPY, DUODENOSCOPY (EGD), COMBINED  9/30/2013    Procedure: COMBINED ESOPHAGOSCOPY, GASTROSCOPY, DUODENOSCOPY (EGD), BIOPSY SINGLE OR MULTIPLE;  Gastroscopy;  Surgeon: Blaise Gill MD;  Location: [facility]     EYE SURGERY  2012    R/L Cataracts     HC REMOVE TONSILS/ADENOIDS,13+ Y/O      T & A 13+y.o.     HYSTERECTOMY, PAP NO LONGER INDICATED       LAPAROSCOPIC SALPINGO-OOPHORECTOMY N/A 7/24/2020    Procedure: Laparoscopy, removal or pelvic mass, both tubes and ovaries, omentectomy, anterior culvectomy, pelvic and para aortic lymph node dissection, laparotomy;  Surgeon: Felipe Corona MD;  Location: [facility]     [facility] for DUB, ovaries in       VASCULAR SURGERY  2014    Taylor closure         Health Maintenance:  Last Pap Smear: None since hysterectomy age 41    Last Mammogram: 10/2019 BIRADS 1    Last Colonoscopy: 2015  "(diverticulosis without polyps or cancer)                       Current Medications:     has a current medication list which includes the following prescription(s): cephalexin, acetaminophen, enoxaparin anticoagulant, mometasone, naproxen sodium, omega-3 acid ethyl esters, oxycodone, pravastatin, senna-docusate, theratears, valsartan-hydrochlorothiazide, and viactiv.       Allergies:     Allergies   Allergen Reactions     Ezetimibe Other (See Comments)     Severe muscle aches     Lisinopril      Prilosec [Omeprazole]      Zestril [Ace Inhibitors] Cough     Atorvastatin Other (See Comments)     Muscle Pain     Irbesartan Cramps     Rosuvastatin Other (See Comments)     Muscle Pain         Social History:     Social History     Tobacco Use     Smoking status: Never Smoker     Smokeless tobacco: Never Used   Substance Use Topics     Alcohol use: No       History   Drug Use No           Family History:     The patient's family history is notable for .    Family History   Problem Relation Age of Onset     Cancer Mother         uterine cancer,      Respiratory Mother         COPD     Cardiovascular Mother         AAA  age 80     Breast Cancer Maternal Grandmother      Cancer Maternal Grandfather         cancer unknown type     Breast Cancer Sister      Eye Disorder Father         cataracts     Depression Father      Depression Son      Depression Son      Breast Cancer Sister         X2     Cancer - colorectal No family hx of         no ovarian cancer         Physical Exam:     PS 0  VS: BP (!) 171/83 (BP Location: Right arm, Patient Position: Sitting, Cuff Size: Adult Regular)   Pulse 76   Temp 98.4  F (36.9  C) (Oral)   Resp 16   Ht 1.6 m (5' 2.99\")   Wt 95 kg (209 lb 8 oz)   SpO2 98%   BMI 37.12 kg/m        General Appearance: healthy and alert, no distress     HEENT:  no thyromegaly, no palpable nodules or masses        Cardiovascular: regular rate and rhythm, no gallops, rubs or " murmurs     Respiratory: lungs clear, no rales, rhonchi or wheezes, normal diaphragmatic excursion    Musculoskeletal: extremities non tender and without edema    Skin: no lesions or rashes; incision with small area of erythema and induration at the superior edge.  No pus, no tenderness    Neurological: normal gait, no gross defects     Psychiatric: appropriate mood and affect                               Hematological: normal cervical, supraclavicular and inguinal lymph nodes     Gastrointestinal:       abdomen soft, non-tender, non-distended, no organomegaly or masses    Genitourinary: deferred    Assessment:    Rosie Blackman is a 80 year old woman with a new diagnosis stage I grade 1 OVCA     A total of 25 minutes was spent with the patient, 20 minutes of which were spent in counseling the patient and/or treatment planning.      Plan:     1.)   OVCA - we have discussed the findings and options from observation to chemotherapy.  She is aware that her prognosis is favorable, but not guaranteed in any setting.  I have discussed with her the NCCN guidelines which recommend no additional therapy at this time.  WE have discussed the large size, attachments, and associated endometriosis as at least slightly worrisome co-features, but nevertheless I have recommended observation based on her overall risk profile.  After a discussion of the options she is inclined to observe as well.  Plan for follow-up q3 months for 2 years.  She is aware that recurrence is possible    2.) Possible early cellulitis: I have outlined the area of erythema and prescribed Keflex.  She and her  will relate inf the area of induration worsens, or she develops pain or fever.    3.) Genetic risk factors were assessed and the patient does meet the qualifications for a referral referral placed.      4.) Labs and/or tests ordered include:  Pre-op CXR; she has other pre-op labs and ecg already done.     5.) Health maintenance issues  addressed today include none.        Thank you for allowing us to participate in the care of your patient.         Sincerely,    Felipe Corona MD      Patient Care Team:  Kathya Schrader, DO as PCP - General (Internal Medicine)  Kathya Schrader,  as Assigned PCP  KATHYA SCHRADER      Again, thank you for allowing me to participate in the care of your patient.        Sincerely,        Felipe Corona MD

## 2020-08-13 ENCOUNTER — PATIENT OUTREACH (OUTPATIENT)
Dept: CARE COORDINATION | Facility: CLINIC | Age: 80
End: 2020-08-13

## 2020-08-13 NOTE — PROGRESS NOTES
Oncology Distress Screening Follow-up  Clinical Social Work  [facility]    Identified Concern and Score From Distress Screening:  3. How concerned are you about feeling depressed or very sad?   6Abnormal              4. How concerned are you about feeling anxious or very scared?   6Abnormal                     Date of Distress Screenin/10/2020      Data: Rosie Blackman is a 80 year old woman with a new diagnosis stage I grade 1 OVCA       Intervention/Education Provided:: Phone call to patient about distress screening. No answer - message left. Provided contact information in order to reach writer.       Follow-up Required: Will remain available for support and await patient's return call.          Rosalie BROOKS, LGAVNI  - Oncology  Phone : [phone]  Pager: [phone]

## 2020-08-17 NOTE — PROGRESS NOTES
"PHYSICAL THERAPY DISCHARGE SUMMARY    DATE:  8/17/2020  NAME:  Rosie Blackman           MR#:  [id number]  YOB: 1940  Diagnosis:  LBP/Hip pain  Referring Physician:  Kathya Escalera MD    Patient was seen from 6/11/20 to 7/2/20.    Session Number: 4/8 (DEE, SIRENA)      Subjective Report: Pt states \"not really seeing much change yet with the therapy.\" States MD gave her new exers  States still very sore in (B) LEs with waking in am.  Reports she got the injection in the (L) hip and \"may be a little bit better\" in the hip, but now is noticing a \"raw feeling\" at her lateral (L) low leg/calf.  This pain comes on \"anytime\", saying with walking or sitting. States she has tried a couple of the exers suggested by Dr Howell.  Reports she cannot do the standing ITB stretch as is too painful on her (R) ankle, and she cannot do the crossover fb stretch at all, \"I cannot get back up.\"       Objective Measurements:  Objective Measure: Lumbar ROM  Details: Decreased pelvic tilting/lumbar flex/ext ability in supine and in 4-pt.  Objective Measure: Pain  Details: Not rated, still sore and stiff throughout LE's every am.  Reports \"less, maybe\" (L) hip pain, and \"raw feeling\" intermittently at (L) lateral low leg.        Goals:    Goal Identifier STG   Goal Description 1)Pt will improve flexibility to allow her to lie on side for exer without pain in [place] [place]'s in 2 weeks.    Target Date 06/25/20   Date Met      Progress:     Goal Identifier STG   Goal Description 2)Pt will report sitting for car rides of up to 1 hour painfree, in 4 weeks.    Target Date 07/09/20   Date Met      Progress:     Goal Identifier LTG   Goal Description 3)Pt will report walking for shopping without pain in 8 weeks.    Target Date 08/06/20   Date Met      Progress:     Goal Identifier LTG   Goal Description 4)Pt will report sleeping in SL without pain waking, in 8 weeks.    Target Date 08/06/20   Date Met      Progress:     Goal Identifier " "LTG   Goal Description 5)Pt will be indep in HEP to prevent return of (B) hip/thigh pain, in 8 weeks.   Target Date 08/06/20   Date Met          Progress Toward Goals:   Progress this reporting period: Unable to chart progress, as pt's care was interrupted by \"major abdominal surgery for pelvic mass.\"  Pt will return to PT with new referral when healed from surgery.  She will be re-evaluated at that time.      Plan:  Discharge from therapy.    Reason for Discharge:  Change in medical status.      Danni Stubbs, PT, MA  #[id number]          "

## 2020-08-21 ENCOUNTER — MYC MEDICAL ADVICE (OUTPATIENT)
Dept: FAMILY MEDICINE | Facility: CLINIC | Age: 80
End: 2020-08-21

## 2020-08-25 ENCOUNTER — ONCOLOGY VISIT (OUTPATIENT)
Dept: ONCOLOGY | Facility: CLINIC | Age: 80
End: 2020-08-25
Attending: OBSTETRICS & GYNECOLOGY
Payer: MEDICARE

## 2020-08-25 VITALS
WEIGHT: 201.6 LBS | TEMPERATURE: 97.8 F | HEART RATE: 75 BPM | SYSTOLIC BLOOD PRESSURE: 149 MMHG | DIASTOLIC BLOOD PRESSURE: 82 MMHG | OXYGEN SATURATION: 98 % | BODY MASS INDEX: 35.72 KG/M2

## 2020-08-25 DIAGNOSIS — L03.316 CELLULITIS OF UMBILICUS: Primary | ICD-10-CM

## 2020-08-25 PROCEDURE — G0463 HOSPITAL OUTPT CLINIC VISIT: HCPCS | Mod: ZF

## 2020-08-25 PROCEDURE — 99024 POSTOP FOLLOW-UP VISIT: CPT | Mod: ZP | Performed by: OBSTETRICS & GYNECOLOGY

## 2020-08-25 ASSESSMENT — PAIN SCALES - GENERAL: PAINLEVEL: NO PAIN (0)

## 2020-08-25 NOTE — NURSING NOTE
"Oncology Rooming Note    August 25, 2020 2:27 PM   Rosie Blackman is a 80 year old female who presents for:    Chief Complaint   Patient presents with     RECHECK     S/P exploratory laparotomy     Initial Vitals: BP (!) 149/82   Pulse 75   Temp 97.8  F (36.6  C)   Wt 91.4 kg (201 lb 9.6 oz)   SpO2 98%   BMI 35.72 kg/m   Estimated body mass index is 35.72 kg/m  as calculated from the following:    Height as of 8/10/20: 1.6 m (5' 2.99\").    Weight as of this encounter: 91.4 kg (201 lb 9.6 oz). Body surface area is 2.02 meters squared.  No Pain (0) Comment: Data Unavailable   No LMP recorded. Patient has had a hysterectomy.  Allergies reviewed: Yes  Medications reviewed: Yes    Medications: Medication refills not needed today.  Pharmacy name entered into [employer]:    [id number] - [place], [place] - [address] - MAIL ORDER MAIN MEDS - NON-EPRESCRIBE  [facility] - [place], [place] - [address]  [id number] - [place], [place] - [address] [address]    Clinical concerns: Patient states her incision site has been leaking every since she had surgery. Dr. Corona was notified.      Dain Pulido MA            "

## 2020-08-25 NOTE — LETTER
"    8/25/2020         RE: Rosie Blackman  [address] [facility] [place] [address]        Dear Colleague,    Thank you for referring your patient, Rosie Blackman, to the [facility]. Please see a copy of my visit note below.    Rosie presented to [facility] for evaluation fo her wound.  She reports that yesterday a small amount of fluid egressed from the incision (clear yellow and then a small clot.  Nothing has passed since then and she reports \" she feels a little silly being here\"    BP (!) 149/82   Pulse 75   Temp 97.8  F (36.6  C)   Wt 91.4 kg (201 lb 9.6 oz)   SpO2 98%   BMI 35.72 kg/m      Incision: CDI, there eis a small amount of persistent erythema, but this is is much improved from previous with essentially absent induration.      I have given her instructions from local cares and directions for when/if to return is the problem recurs/woresens.    Felipe Corona MD    Total time 15 mins   time 10 mins        "

## 2020-08-26 ENCOUNTER — TRANSFERRED RECORDS (OUTPATIENT)
Dept: HEALTH INFORMATION MANAGEMENT | Facility: CLINIC | Age: 80
End: 2020-08-26

## 2020-08-27 NOTE — PROGRESS NOTES
"Rosie presented to day for evaluation fo her wound.  She reports that yesterday a small amount of fluid egressed from the incision (clear yellow and then a small clot.  Nothing has passed since then and she reports \" she feels a little silly being here\"    BP (!) 149/82   Pulse 75   Temp 97.8  F (36.6  C)   Wt 91.4 kg (201 lb 9.6 oz)   SpO2 98%   BMI 35.72 kg/m      Incision: CDI, there eis a small amount of persistent erythema, but this is is much improved from previous with essentially absent induration.      I have given her instructions from local cares and directions for when/if to return is the problem recurs/woresens.    Felipe Corona MD    Total time 15 mins   time 10 mins      "

## 2020-09-23 ENCOUNTER — TRANSFERRED RECORDS (OUTPATIENT)
Dept: HEALTH INFORMATION MANAGEMENT | Facility: CLINIC | Age: 80
End: 2020-09-23

## 2020-09-28 ENCOUNTER — TRANSFERRED RECORDS (OUTPATIENT)
Dept: HEALTH INFORMATION MANAGEMENT | Facility: CLINIC | Age: 80
End: 2020-09-28

## 2020-10-05 ENCOUNTER — OFFICE VISIT (OUTPATIENT)
Dept: FAMILY MEDICINE | Facility: CLINIC | Age: 80
End: 2020-10-05
Payer: MEDICARE

## 2020-10-05 VITALS
TEMPERATURE: 97.8 F | RESPIRATION RATE: 13 BRPM | WEIGHT: 199.8 LBS | OXYGEN SATURATION: 95 % | BODY MASS INDEX: 35.4 KG/M2 | SYSTOLIC BLOOD PRESSURE: 130 MMHG | HEART RATE: 99 BPM | DIASTOLIC BLOOD PRESSURE: 72 MMHG

## 2020-10-05 DIAGNOSIS — I10 ESSENTIAL HYPERTENSION: ICD-10-CM

## 2020-10-05 DIAGNOSIS — M99.53 INTERVERTEBRAL DISC STENOSIS OF NEURAL CANAL OF LUMBAR REGION: ICD-10-CM

## 2020-10-05 DIAGNOSIS — D69.8 OTHER SPECIFIED HEMORRHAGIC CONDITIONS (H): ICD-10-CM

## 2020-10-05 DIAGNOSIS — M54.16 LUMBAR RADICULOPATHY: ICD-10-CM

## 2020-10-05 DIAGNOSIS — Z01.818 PRE-OP EXAM: Primary | ICD-10-CM

## 2020-10-05 LAB
HGB BLD-MCNC: 14.5 G/DL (ref 11.7–15.7)
INR PPP: 1.07 (ref 0.86–1.14)

## 2020-10-05 PROCEDURE — 36415 COLL VENOUS BLD VENIPUNCTURE: CPT | Performed by: FAMILY MEDICINE

## 2020-10-05 PROCEDURE — 85610 PROTHROMBIN TIME: CPT | Performed by: FAMILY MEDICINE

## 2020-10-05 PROCEDURE — 85018 HEMOGLOBIN: CPT | Performed by: FAMILY MEDICINE

## 2020-10-05 PROCEDURE — 99215 OFFICE O/P EST HI 40 MIN: CPT | Performed by: FAMILY MEDICINE

## 2020-10-05 RX ORDER — OMEGA-3 FATTY ACIDS/FISH OIL 300-1000MG
1 CAPSULE ORAL DAILY
COMMUNITY
End: 2023-09-21 | Stop reason: ALTCHOICE

## 2020-10-05 RX ORDER — EVE PRIMROSE/LINOLEIC/G-LENIC 1000 MG
1 CAPSULE ORAL DAILY
Status: ON HOLD | COMMUNITY
End: 2024-07-03

## 2020-10-05 NOTE — PROGRESS NOTES
[facility]  [address] [address]  Phone: [phone]  Primary Provider: Kathya Escalera  Pre-op Performing Provider: JACKLYN GONZALEZ    PREOPERATIVE EVALUATION:  Today's date: 10/5/2020    Rosie Blackman is a 80 year old female who presents for a preoperative evaluation.    Surgical Information:  Surgery Details 10/5/2020   Surgery/Procedure: TLIF, posterior instrumental fusion and decompression of the back   Surgery Location: [facility]   Surgeon: Chavo Patel   Surgery Date: 10/13/20   Time of Surgery: Arrive at 8:30, procedure start 10:30   Where patient plans to recover: Other   Additional recovery plan details: TCU or home with family, depending on what the surgery     Fax number for surgical facility: [phone] ( Dr. Patel) and [facility] [phone]  Type of Anesthesia Anticipated: to be determined    Subjective     HPI related to upcoming procedure: TLIF, posterior instrumental fusion land decompression    Patient is a 80 yr old female here for a pre op evaluation. She is having a lumbar decompression and fusion . She has had chronic low back pain for many months with radiation into the lower extremities. She has hypertension which is well controlled on antihypertensives. She had recent surgery to remove an ovarian tumor. She did well after anesthesia. Patient reports no known CAD, she has had no history of DVTS. Allergy medication list was reviewed.   Preop Questions 10/5/2020   1. Have you ever had a heart attack or stroke? No   2. Have you ever had surgery on your heart or blood vessels, such as a stent placement, a coronary artery bypass, or surgery on an artery in your head, neck, heart, or legs? No   3. Do you have chest pain with activity? No   4. Do you have a history of  heart failure? No   5. Do you currently have a cold, bronchitis or symptoms of other infection? No   6. Do you have a cough, shortness of breath, or wheezing? No   7.  Do you or anyone in your family have previous history of blood clots? No   8. Do you or does anyone in your family have a serious bleeding problem such as prolonged bleeding following surgeries or cuts? No   9. Have you ever had problems with anemia or been told to take iron pills? No   10. Have you had any abnormal blood loss such as black, tarry or bloody stools, or abnormal vaginal bleeding? No   11. Have you ever had a blood transfusion? No   Are you willing to have a blood transfusion if it is medically needed before, during, or after your surgery? Yes   13. Have you or any of your relatives ever had problems with anesthesia? No   14. Do you have sleep apnea, excessive snoring or daytime drowsiness? No   15. Do you have any artifical heart valves or other implanted medical devices like a pacemaker, defibrillator, or continuous glucose monitor? No   16. Do you have artificial joints? No   17. Are you allergic to latex? No   18. Is there any chance that you may be pregnant? No       RX monitoring [place] ([place]) reviewed:  reviewed- no concerns    HYPERTENSION - Patient has longstanding history of HTN , currently denies any symptoms referable to elevated blood pressure. Specifically denies chest pain, palpitations, dyspnea, orthopnea, PND or peripheral edema. Blood pressure readings have been in normal range. Current medication regimen is as listed below. Patient denies any side effects of medication.       Review of Systems  CONSTITUTIONAL: NEGATIVE for fever, chills, change in weight  INTEGUMENTARY/SKIN: NEGATIVE for worrisome rashes, moles or lesions  EYES: NEGATIVE for vision changes or irritation  ENT/MOUTH: NEGATIVE for ear, mouth and throat problems  RESP: NEGATIVE for significant cough or SOB  CV: NEGATIVE for chest pain, palpitations or peripheral edema  GI: NEGATIVE for nausea, abdominal pain, heartburn, or change in bowel habits  : NEGATIVE for frequency, dysuria, or  "hematuria  MUSCULOSKELETAL:POSITIVE  for back pain   NEURO: NEGATIVE for weakness, dizziness or paresthesias  HEME: NEGATIVE for bleeding problems  PSYCHIATRIC: NEGATIVE for changes in mood or affect    Patient Active Problem List    Diagnosis Date Noted     S/P exploratory laparotomy 07/24/2020     Priority: Medium     Ovarian mass 07/14/2020     Priority: Medium     Added automatically from request for surgery [id number]       Tear of gluteus medius tendon 07/09/2020     Priority: Medium     Primary osteoarthritis of right hip 07/09/2020     Priority: Medium     Spinal stenosis of lumbar region, unspecified whether neurogenic claudication present 07/09/2020     Priority: Medium     Obesity (BMI 35.0-39.9) with comorbidity (H) 11/28/2018     Priority: Medium     Lichen sclerosus of female genitalia 07/03/2018     Priority: Medium     Gastroesophageal reflux disease, esophagitis presence not specified 10/12/2017     Priority: Medium     IMO Regulatory Load OCT 2020       Hiatal hernia 10/21/2013     Priority: Medium     Large; found on gastroscopy 10/2013; No GERD sx; chronic throat clearing; + H pylori       Plantar fasciitis 04/02/2012     Priority: Medium     Advanced directives, counseling/discussion 12/08/2011     Priority: Medium     Advance Care Planning:   ACP Review and Resources Provided: Reviewed chart for advance care plan. Rosie Blackman has an up to date advance care plan on file. See additional documentation in Problem List and click on code status for document details. Confirmed/documented designated decision maker(s). Added by Shima Cruz on 12/08/2011         HYPERLIPIDEMIA LDL GOAL <130 10/31/2010     Priority: Medium     Prediabetes 09/03/2010     Priority: Medium     Blood sugar 122 9/10 working on; has already been to nutrition, weight and wellness and is reading \"healthy for life\"       Diverticulosis of colon 05/24/2010     Priority: Medium     Noted on colonoscopy 5/10       Colon polyps " 05/24/2010     Priority: Medium     Obesity      Priority: Medium     Problem list name updated by automated process. Provider to review       Pes planus      Priority: Medium     Problem list name updated by automated process. Provider to review       Personal history of contact with and (suspected) exposure to asbestos      Priority: Medium             exposed 12 years in childhood       Donor of other blood      Priority: Medium           has false + syphyllis  Problem list name updated by automated process. Provider to review       Irritable bowel syndrome      Priority: Medium     ALLERGIC RHINITIS       Priority: Medium     Resolving with dietary changes; stopping gluten       Need for prophylactic hormone replacement therapy (postmenopausal)      Priority: Medium     Weaned down to Premarin 0.3 mg. Cannot DC at this point. 4/07       UTI (urinary tract infection)      Priority: Medium             recurrent  Problem list name updated by automated process. Provider to review       Essential hypertension      Priority: Medium           neg celia prot 1997,  Problem list name updated by automated process. Provider to review        Past Medical History:   Diagnosis Date     Chronic airway obstruction (H)      Gastroesophageal reflux disease      Hiatal hernia      Hypertension      Personal history of contact with and (suspected) exposure to asbestos      Primary osteoarthritis of right hip 7/9/2020     Past Surgical History:   Procedure Laterality Date     BREAST BIOPSY, CORE RT/LT  1994     C ANESTH,KNEE VEINS SURGERY  8/20/2014     CHOLECYSTECTOMY  1995     COLONOSCOPY  5/10    Diverticulosis, colon polyps; repeat 5 yrs     COLONOSCOPY N/A 11/16/2015    Procedure: COLONOSCOPY;  Surgeon: Alejandro Matos MD;  Location: [facility]     Colonoscopy, hyperplastic polyps, repeat in 5 yrs  2004     ESOPHAGOSCOPY, GASTROSCOPY, DUODENOSCOPY (EGD), COMBINED  9/30/2013    Procedure: COMBINED ESOPHAGOSCOPY, GASTROSCOPY,  DUODENOSCOPY (EGD), BIOPSY SINGLE OR MULTIPLE;  Gastroscopy;  Surgeon: Blaise Gill MD;  Location: [facility]     EYE SURGERY  2012    R/L Cataracts     HC REMOVE TONSILS/ADENOIDS,13+ Y/O      T & A 13+y.o.     HYSTERECTOMY, PAP NO LONGER INDICATED       LAPAROSCOPIC SALPINGO-OOPHORECTOMY N/A 7/24/2020    Procedure: Laparoscopy, removal or pelvic mass, both tubes and ovaries, omentectomy, anterior culvectomy, pelvic and para aortic lymph node dissection, laparotomy;  Surgeon: Felipe Corona MD;  Location:  OR     [facility] for DUB, ovaries in       VASCULAR SURGERY  2014    Venus closure     Current Outpatient Medications   Medication Sig Dispense Refill     acetaminophen (TYLENOL) 325 MG tablet Take 2 tablets (650 mg) by mouth every 6 hours as needed for mild pain 48 tablet 0     Evening Primrose Oil 1000 MG CAPS One daily       mometasone (ELOCON) 0.1 % cream Apply sparingly to affected area twice daily for 2 weeks then decrease to 1 time daily for 30 days then decrease to 2-3 times per week 45 g 0     omega 3 1000 MG CAPS        pravastatin (PRAVACHOL) 80 MG tablet Take 1 tablet (80 mg) by mouth daily 90 tablet 3     THERATEARS 0.25 % OP SOLN BID       valsartan-hydrochlorothiazide (DIOVAN HCT) 160-12.5 MG tablet Take 1 tablet by mouth daily 90 tablet 1     VIACTIV 500-100-40 OR CHEW once daily       Naproxen Sodium (ALEVE PO)        omega-3 acid ethyl esters (LOVAZA) 1 g capsule Take 1 g by mouth 2 times daily         Allergies   Allergen Reactions     Ezetimibe Other (See Comments)     Severe muscle aches     Lisinopril      Prilosec [Omeprazole]      Zestril [Ace Inhibitors] Cough     Atorvastatin Other (See Comments)     Muscle Pain     Irbesartan Cramps     Rosuvastatin Other (See Comments)     Muscle Pain        Social History     Tobacco Use     Smoking status: Never Smoker     Smokeless tobacco: Never Used   Substance Use Topics     Alcohol use: No     Family History   Problem Relation Age of  Onset     Cancer Mother         uterine cancer,      Respiratory Mother         COPD     Cardiovascular Mother         AAA  age 80     Breast Cancer Maternal Grandmother      Cancer Maternal Grandfather         cancer unknown type     Breast Cancer Sister      Eye Disorder Father         cataracts     Depression Father      Depression Son      Depression Son      Breast Cancer Sister         X2     Cancer - colorectal No family hx of         no ovarian cancer     History   Drug Use No            Objective   /72   Pulse 99   Temp 97.8  F (36.6  C) (Tympanic)   Resp 13   Wt 90.6 kg (199 lb 12.8 oz)   SpO2 95%   BMI 35.40 kg/m    Physical Exam    GENERAL APPEARANCE: healthy, alert and no distress     EYES: EOMI, PERRL     HENT: ear canals and TM's normal and nose and mouth without ulcers or lesions     NECK: no adenopathy, no asymmetry, masses, or scars and thyroid normal to palpation     RESP: lungs clear to auscultation - no rales, rhonchi or wheezes     CV: regular rates and rhythm, normal S1 S2, no S3 or S4 and no murmur, click or rub     ABDOMEN:  soft, nontender, no HSM or masses and bowel sounds normal     MS: extremities normal- no gross deformities noted, no evidence of inflammation in joints, FROM in all extremities.     SKIN: no suspicious lesions or rashes     PSYCH: mentation appears normal. and affect normal/bright     LYMPHATICS: No cervical adenopathy    Recent Labs   Lab Test 20  0541 20  0350 18  0820 18  0820   HGB 12.0 12.5   < > 14.0    210   < > 210    139   < > 140   POTASSIUM 3.4 3.5   < > 4.0   CR 0.68 0.61   < > 0.75   A1C  --   --   --  5.8*    < > = values in this interval not displayed.        PRE-OP Diagnostics:  Recent Results (from the past 24 hour(s))   Hemoglobin    Collection Time: 10/05/20  1:40 PM   Result Value Ref Range    Hemoglobin 14.5 11.7 - 15.7 g/dL   INR    Collection Time: 10/05/20  1:40 PM   Result Value Ref Range     INR 1.07 0.86 - 1.14     EKG: appears normal, NSR, normal axis, normal intervals, no acute ST/T changes c/w ischemia, no LVH by voltage criteria, unchanged from previous tracings done in July of 2020         Assessment & Plan   The proposed surgical procedure is considered INTERMEDIATE risk.    REVISED CARDIAC RISK INDEX  The patient has the following serious cardiovascular risks for perioperative complications:  No serious cardiac risks = 0 points    INTERPRETATION: 0 points: Class I (very low risk - 0.4% complication rate)       Rosie was seen today for pre-op exam.    Diagnoses and all orders for this visit:    Pre-op exam  -     Hemoglobin  -     INR  -     Patient will be notified of results    Other specified hemorrhagic conditions (H)   -     INR    Lumbar radiculopathy  Patient cleared for surgery   Intervertebral disc stenosis of neural canal of lumbar region        The patient has the following additional risks and recommendations for perioperative complications:      CARDIOVASCULAR:   - no cardiovascular risk factors      MEDICATION INSTRUCTIONS:    CARDIAC Medications:   - ACE/ARB: HOLD due to exceptional risk of hypotension during surgery.     NSAIDS:   - IBUPROFEN (Advil, Motrin): HOLD 1 day before surgery.    - NAPROXEN (Aleve, Naprosyn): HOLD 4 days before surgery.     RECOMMENDATION:  APPROVAL GIVEN to proceed with proposed procedure, without further diagnostic evaluation.    Return in about 4 weeks (around 11/2/2020) for Routine preventive.    Signed Electronically by: Ana Porras MD    Copy of this evaluation report is provided to requesting physician.    [facility] Guidelines    Revised Cardiac Risk Index

## 2020-10-05 NOTE — PATIENT INSTRUCTIONS
Hold Valsartan -hydrochlorothiazide on the morning of surgery    You can take the Pravastatin with a tiny sip of water on the morning of surgery    Avoid Ibuprofen/Advil/Alelve /Aspirin 3-5 days before surgery.        Patient Education     Presurgery Checklist  You are scheduled to have surgery. The healthcare staff will try to make your stay comfortable. Use the guidelines below to remind yourself what to do before surgery. Be sure to follow any specific pre-op instructions from your surgeon or nurse.  Preparing for surgery    If you are having abdominal surgery, ask what you need to do to clear your bowel.    Tell your surgeon if you have allergies to any medicines, latex, or foods.    Ask your surgeon if you might need a blood transfusion during surgery and if so, how to prepare for it. In some cases, you can donate blood before surgery. If needed, this blood can be given back (transfused) to you during or after surgery.    Arrange for an adult family member or friend to drive you home after surgery. If possible, have someone ready to help you at home as you recover.    Call the surgeon if you get a cold, fever, sore throat, diarrhea, or other health problem just before surgery. Your surgeon can decide whether or not to postpone the surgery.  Medicines    Tell your surgeon about all medicines you take, including prescription and over-the-counter products such as herbal remedies and vitamins. Ask if you should continue taking them.    If you take ibuprofen, naproxen, or blood thinners (anticoagulants) such as aspirin, clopidogrel, or warfarin, ask your surgeon whether you should stop taking them and how long before surgery you should stop.    You may be told to take antibiotics just before surgery to prevent infection. If so, follow instructions carefully on how to take them.    If you are told to take blood thinners to help prevent blood clots after surgery, be sure to follow the instructions on how to take  them.  Stop smoking  If you smoke, healing may take longer. So at least 2 week(s) before surgery, stop smoking.  Bathing or showering before surgery    If instructed, wash with antibacterial soap. Afterward, do not use lotions, oils, or powders.    If you are having surgery on the head, you may be asked to shampoo with antibacterial soap. Follow instructions for doing so.  Do not remove hair from the surgery site  Do not shave hair from the incision site, unless you are given specific instructions to do so. Usually, if hair needs to be removed, it will be done at the hospital right before surgery.  Don t eat or drink    Follow any directions you are given for not eating or drinking before surgery. If you don't follow instructions about when to stop eating and drinking, your procedure may be postponed or rescheduled for another day. This is a safety issue.    You can brush your teeth and rinse your mouth, but don t swallow any water.  Day of surgery    Don't wear makeup. Don't use perfume, deodorant, or hairspray. Remove nail polish and artificial nails.    Leave jewelry (including rings), watches, and other valuables at home.    Be sure to bring health insurance cards or forms and a photo ID.    Bring a list of your medicines (include the name, dose, how often you take them, and the time last dose was taken).    Arrive on time at the hospital or surgery facility.  Date Last Reviewed: 12/1/2016 2000-2018 [employer]. [address] [place] [address], [place] [address]. All rights reserved. This information is not intended as a substitute for professional medical care. Always follow your healthcare professional's instructions.

## 2020-10-05 NOTE — PROGRESS NOTES
[facility]  [address] [facility] [address]  Phone: [phone]  Primary Provider: Kathya Escalera  {FV AMB Performing Provider (Optional):797238}    PREOPERATIVE EVALUATION:  Today's date: 10/5/2020    Rosie Blackman is a 80 year old female who presents for a preoperative evaluation.    Surgical Information:  No flowsheet data found.  Fax number for surgical facility: {SURGERY FAX NUMBER:[id number]}  Type of Anesthesia Anticipated: {ANESTHESIA:261916}    Subjective     HPI related to upcoming procedure: ***  No flowsheet data found.    RX monitoring program (MNPMP) reviewed: {Mnpmpreport:903730}  {Review MNPMP for all patients per ICSI [email or web address]}  {Chronic problem details (Optional):521232}    Review of Systems  {ROS Preop Choices:300150}    Patient Active Problem List    Diagnosis Date Noted     S/P exploratory laparotomy 07/24/2020     Priority: Medium     Ovarian mass 07/14/2020     Priority: Medium     Added automatically from request for surgery [id number]       Tear of gluteus medius tendon 07/09/2020     Priority: Medium     Primary osteoarthritis of right hip 07/09/2020     Priority: Medium     Spinal stenosis of lumbar region, unspecified whether neurogenic claudication present 07/09/2020     Priority: Medium     Obesity (BMI 35.0-39.9) with comorbidity (H) 11/28/2018     Priority: Medium     Lichen sclerosus of female genitalia 07/03/2018     Priority: Medium     Gastroesophageal reflux disease, esophagitis presence not specified 10/12/2017     Priority: Medium     IMO Regulatory Load OCT 2020       Hiatal hernia 10/21/2013     Priority: Medium     Large; found on gastroscopy 10/2013; No GERD sx; chronic throat clearing; + H pylori       Plantar fasciitis 04/02/2012     Priority: Medium     Advanced directives, counseling/discussion 12/08/2011     Priority: Medium     Advance Care Planning:   ACP Review and Resources Provided:  "Reviewed chart for advance care plan. Rosie Blackman has an up to date advance care plan on file. See additional documentation in Problem List and click on code status for document details. Confirmed/documented designated decision maker(s). Added by Shima Cruz on 12/08/2011         HYPERLIPIDEMIA LDL GOAL <130 10/31/2010     Priority: Medium     Prediabetes 09/03/2010     Priority: Medium     Blood sugar 122 9/10 working on; has already been to nutrition, weight and wellness and is reading \"healthy for life\"       Diverticulosis of colon 05/24/2010     Priority: Medium     Noted on colonoscopy 5/10       Colon polyps 05/24/2010     Priority: Medium     Obesity      Priority: Medium     Problem list name updated by automated process. Provider to review       Pes planus      Priority: Medium     Problem list name updated by automated process. Provider to review       Personal history of contact with and (suspected) exposure to asbestos      Priority: Medium             exposed 12 years in childhood       Donor of other blood      Priority: Medium           has false + syphyllis  Problem list name updated by automated process. Provider to review       Irritable bowel syndrome      Priority: Medium     ALLERGIC RHINITIS       Priority: Medium     Resolving with dietary changes; stopping gluten       Need for prophylactic hormone replacement therapy (postmenopausal)      Priority: Medium     Weaned down to Premarin 0.3 mg. Cannot DC at this point. 4/07       UTI (urinary tract infection)      Priority: Medium             recurrent  Problem list name updated by automated process. Provider to review       Essential hypertension      Priority: Medium           neg celia prot 1997,  Problem list name updated by automated process. Provider to review        Past Medical History:   Diagnosis Date     Chronic airway obstruction (H)      Gastroesophageal reflux disease      Hiatal hernia      Hypertension      Personal " history of contact with and (suspected) exposure to asbestos      Primary osteoarthritis of right hip 7/9/2020     Past Surgical History:   Procedure Laterality Date     BREAST BIOPSY, CORE RT/LT  1994     C ANESTH,KNEE VEINS SURGERY  8/20/2014     CHOLECYSTECTOMY  1995     COLONOSCOPY  5/10    Diverticulosis, colon polyps; repeat 5 yrs     COLONOSCOPY N/A 11/16/2015    Procedure: COLONOSCOPY;  Surgeon: Alejandro Matos MD;  Location: [facility]     Colonoscopy, hyperplastic polyps, repeat in 5 yrs  2004     ESOPHAGOSCOPY, GASTROSCOPY, DUODENOSCOPY (EGD), COMBINED  9/30/2013    Procedure: COMBINED ESOPHAGOSCOPY, GASTROSCOPY, DUODENOSCOPY (EGD), BIOPSY SINGLE OR MULTIPLE;  Gastroscopy;  Surgeon: Blaise Gill MD;  Location: [facility]     EYE SURGERY  2012    R/L Cataracts     HC REMOVE TONSILS/ADENOIDS,13+ Y/O      T & A 13+y.o.     HYSTERECTOMY, PAP NO LONGER INDICATED       LAPAROSCOPIC SALPINGO-OOPHORECTOMY N/A 7/24/2020    Procedure: Laparoscopy, removal or pelvic mass, both tubes and ovaries, omentectomy, anterior culvectomy, pelvic and para aortic lymph node dissection, laparotomy;  Surgeon: Felipe Corona MD;  Location: [facility]     [facility] for DUB, ovaries in       VASCULAR SURGERY  2014    [place] closure     Current Outpatient Medications   Medication Sig Dispense Refill     acetaminophen (TYLENOL) 325 MG tablet Take 2 tablets (650 mg) by mouth every 6 hours as needed for mild pain 48 tablet 0     enoxaparin ANTICOAGULANT (LOVENOX) 40 MG/0.4ML syringe Inject 0.4 mLs (40 mg) Subcutaneous every 24 hours 25 Syringe 0     mometasone (ELOCON) 0.1 % cream Apply sparingly to affected area twice daily for 2 weeks then decrease to 1 time daily for 30 days then decrease to 2-3 times per week 45 g 0     Naproxen Sodium (ALEVE PO)        omega-3 acid ethyl esters (LOVAZA) 1 g capsule Take 1 g by mouth 2 times daily       oxyCODONE (ROXICODONE) 5 MG tablet Take 1 tablet (5 mg) by mouth every 6 hours as needed for pain  "16 tablet 0     pravastatin (PRAVACHOL) 80 MG tablet Take 1 tablet (80 mg) by mouth daily 90 tablet 3     senna-docusate (SENOKOT-S/PERICOLACE) 8.6-50 MG tablet Take 1 tablet by mouth 2 times daily as needed for constipation 30 tablet 0     THERATEARS 0.25 % OP SOLN BID       valsartan-hydrochlorothiazide (DIOVAN HCT) 160-12.5 MG tablet Take 1 tablet by mouth daily 90 tablet 1     VIACTIV 500-100-40 OR CHEW once daily         Allergies   Allergen Reactions     Ezetimibe Other (See Comments)     Severe muscle aches     Lisinopril      Prilosec [Omeprazole]      Zestril [Ace Inhibitors] Cough     Atorvastatin Other (See Comments)     Muscle Pain     Irbesartan Cramps     Rosuvastatin Other (See Comments)     Muscle Pain        Social History     Tobacco Use     Smoking status: Never Smoker     Smokeless tobacco: Never Used   Substance Use Topics     Alcohol use: No     {FAMILY HISTORY (Optional):102403044}  History   Drug Use No            Objective   There were no vitals taken for this visit.  Physical Exam  {EXAM Preop Choices:158370}    Recent Labs   Lab Test 20  0541 20  0350 18  0820 18  0820   HGB 12.0 12.5   < > 14.0    210   < > 210    139   < > 140   POTASSIUM 3.4 3.5   < > 4.0   CR 0.68 0.61   < > 0.75   A1C  --   --   --  5.8*    < > = values in this interval not displayed.        PRE-OP Diagnostics:  {LABS:838137}  {EK}    {Provider  Link to PREOP SmartSet :211836}     Assessment & Plan   The proposed surgical procedure is considered {HIGH=major cardiovascular or procedures requiring prolonged anesthesia >4 hours or large fluid shifts;    INTERMEDIATE=abdominal, most orthopedic and intrathoracic surgery; LOW= endoscopy, cataract and breast surgery:784917} risk.    REVISED CARDIAC RISK INDEX  The patient has the following serious cardiovascular risks for perioperative complications:  {PREOP REVISED CARDIAC RISK INDEX (RCRI):834543::\"No serious cardiac risks = 0 " "points\"}    INTERPRETATION: {REVISED CARDIAC RISK INTERPRETATION:922115}    {Diag Picklist :978224}    The patient has the following additional risks and recommendations for perioperative complications:    {IMPORTANT - Conditions - complete carefully!!:470216}     MEDICATION INSTRUCTIONS:  {IMPORTANT - Medications:194050}    RECOMMENDATION:  {IMPORTANT - Approval:258638::\"APPROVAL GIVEN to proceed with proposed procedure, without further diagnostic evaluation.\"}    No follow-ups on file.    Signed Electronically by: Ana Porras MD    Copy of this evaluation report is provided to requesting physician.    [facility] Guidelines    Revised Cardiac Risk Index  "

## 2020-10-06 NOTE — RESULT ENCOUNTER NOTE
Please inform patient that test result was within normal parameters.   Thank you.     Ana Porras M.D.

## 2020-11-08 NOTE — PROGRESS NOTES
Consult Notes on Referred Patient         Dr. Kathya Escalera, DO  [address] [place] [address]       RE: Rosie Blackman  : 1940  ALEJANDRO: 2020     Dear Dr. Kathya Escalera:    I had the pleasure of seeing your patient Rosie Blackman here at the Gynecologic Cancer Clinic at the [facility] on 2020.  As you know she is a very pleasant 80 year old woman with a diagnosis IAG1 OVCA.  Given these findings she was subsequently sent to the Gynecologic Cancer Clinic for new patient consultation.     She recently presented with a complaint of hip pain.  The work-up of this included an MRI which has demonstrated a large pelvic mass.  The patient has been asymptomatic, but her  is elevated (108, CEA 19-9 were normal).    She underwent surgical staging on 20    PATH:  FINAL DIAGNOSIS:   A. OVARIES, LEFT AND RIGHT, BILATERAL OOPHORECTOMY:   - Right ovary with ENDOMETRIOID ADENOCARCINOMA, FIGO grade 1   - Left ovary with benign inclusion cysts, negative for malignancy   - Unremarkable bilateral fallopian tubes, negative for malignancy     B. MESENTERY, BIOPSY:   - Fibrovascular adhesions, negative for malignancy     C. OMENTUM, OMENTECTOMY:   - Adipose tissue, negative for malignancy     D. ANTERIOR CUL-DE-SAC, BIOPSY:   - Fibroadipose tissue with foci of endometriosis   - Negative for malignancy     E. POSTERIOR CUL-DE-SAC, BIOPSY:   - Fibrovascular tissue, negative for malignancy     F. LYMPH NODE, LEFT PELVIC, EXCISION:   - Eleven reactive lymph nodes, negative for malignancy (0/11)     G. LYMPH NODE, RIGHT PELVIC, EXCISION:   - Nine reactive lymph nodes, negative for malignancy (0/9)     H. LYMPH NODE, RIGHT PARA AORTIC, EXCISION:   - Three reactive lymph nodes, negative for malignancy (0/3)       Synoptic Report:     SPECIMEN     Procedure:         - Bilateral salpingo-oophorectomy         - Omentectomy         - Peritoneal  biopsies         -  Peritoneal washing     Specimen Integrity of Right Ovary:         - Capsule intact     TUMOR     Tumor Site:         - Right ovary     Histologic Type:         - Endometrioid carcinoma     Histologic Grade:         - G1: Well differentiated     Tumor Size: 24.5 Centimeters (cm)     Ovarian Surface Involvement:         - Absent     Fallopian Tube Surface Involvement:         - Absent     Other Tissue / Organ Involvement:         - Not identified     Peritoneal Ascitic Fluid:         - Negative for malignancy (normal / benign)     Pleural Fluid:         - Not submitted / unknown     Treatment Effect:         - No known presurgical therapy     LYMPH NODES     Regional Lymph Nodes:         - All lymph nodes negative for tumor cells     Number of Lymph Nodes Examined: 23     Site(s):         - Right pelvic         - Left pelvic         - Right para-aortic       She has been in observation since that time.  She recently underwent     Review of Systems:    Systemic           no weight changes; no fever; no chills; no night sweats; no appetite changes  Skin           no rashes, or lesions  Eye           no irritation; no changes in vision  Isabelle-Laryngeal           no dysphagia; no hoarseness   Pulmonary    no cough; no shortness of breath  Cardiovascular    no chest pain; no palpitations  Gastrointestinal    no diarrhea; no constipation; no abdominal pain; no changes in bowel  habits; no blood in stool  Genitourinary   no urinary frequency; no urinary urgency; no dysuria; no pain; no abnormal vaginal discharge; no abnormal vaginal bleeding  Breast   no breast discharge; no breast changes; no breast pain  Musculoskeletal    no myalgias; no arthralgias; no back pain  Psychiatric           no depressed mood; no anxiety    Hematologic           no tender lymph nodes; no noticeable swellings or lumps   Endocrine    no hot flashes; no heat/cold intolerance         Neurological   no tremor; no numbness and tingling; no headaches;  no difficulty  sleeping      Past Medical History:    Past Medical History:   Diagnosis Date     Chronic airway obstruction (H)      Gastroesophageal reflux disease      Hiatal hernia      Hypertension      Personal history of contact with and (suspected) exposure to asbestos      Primary osteoarthritis of right hip 7/9/2020         Past Surgical History:    Past Surgical History:   Procedure Laterality Date     BREAST BIOPSY, CORE RT/LT  1994     C ANESTH,KNEE VEINS SURGERY  8/20/2014     CHOLECYSTECTOMY  1995     COLONOSCOPY  5/10    Diverticulosis, colon polyps; repeat 5 yrs     COLONOSCOPY N/A 11/16/2015    Procedure: COLONOSCOPY;  Surgeon: Alejandro Matos MD;  Location: [facility]     Colonoscopy, hyperplastic polyps, repeat in 5 yrs  2004     ESOPHAGOSCOPY, GASTROSCOPY, DUODENOSCOPY (EGD), COMBINED  9/30/2013    Procedure: COMBINED ESOPHAGOSCOPY, GASTROSCOPY, DUODENOSCOPY (EGD), BIOPSY SINGLE OR MULTIPLE;  Gastroscopy;  Surgeon: Blaise Gill MD;  Location: [facility]     EYE SURGERY  2012    R/L Cataracts     HC REMOVE TONSILS/ADENOIDS,13+ Y/O      T & A 13+y.o.     HYSTERECTOMY, PAP NO LONGER INDICATED       LAPAROSCOPIC SALPINGO-OOPHORECTOMY N/A 7/24/2020    Procedure: Laparoscopy, removal or pelvic mass, both tubes and ovaries, omentectomy, anterior culvectomy, pelvic and para aortic lymph node dissection, laparotomy;  Surgeon: Felipe Corona MD;  Location:  OR     [facility] for DUB, ovaries in       VASCULAR SURGERY  2014    [place] closure         Health Maintenance:  Last Pap Smear: None since hysterectomy age 41    Last Mammogram: 10/2019 BIRADS 1    Last Colonoscopy: 2015 (diverticulosis without polyps or cancer)                       Current Medications:     has a current medication list which includes the following prescription(s): acetaminophen, evening primrose oil, mometasone, naproxen sodium, omega 3, omega-3 acid ethyl esters, pravastatin, theratears, valsartan-hydrochlorothiazide, and  "viactiv.       Allergies:     Allergies   Allergen Reactions     Ezetimibe Other (See Comments)     Severe muscle aches     Lisinopril      Prilosec [Omeprazole]      Zestril [Ace Inhibitors] Cough     Atorvastatin Other (See Comments)     Muscle Pain     Irbesartan Cramps     Rosuvastatin Other (See Comments)     Muscle Pain         Social History:     Social History     Tobacco Use     Smoking status: Never Smoker     Smokeless tobacco: Never Used   Substance Use Topics     Alcohol use: No       History   Drug Use No           Family History:     The patient's family history is notable for .    Family History   Problem Relation Age of Onset     Cancer Mother         uterine cancer,      Respiratory Mother         COPD     Cardiovascular Mother         AAA  age 80     Breast Cancer Maternal Grandmother      Cancer Maternal Grandfather         cancer unknown type     Breast Cancer Sister      Eye Disorder Father         cataracts     Depression Father      Depression Son      Depression Son      Breast Cancer Sister         X2     Cancer - colorectal No family hx of         no ovarian cancer         Physical Exam:     PS 0  VS: BP (!) 145/83 (BP Location: Left arm, Patient Position: Sitting, Cuff Size: Adult Regular)   Pulse 69   Temp 98.5  F (36.9  C) (Tympanic)   Ht 1.61 m (5' 3.39\")   Wt 89.4 kg (197 lb)   SpO2 98%   BMI 34.47 kg/m       General Appearance: healthy and alert, no distress     HEENT:  no thyromegaly, no palpable nodules or masses        Cardiovascular: regular rate and rhythm, no gallops, rubs or murmurs     Respiratory: lungs clear, no rales, rhonchi or wheezes, normal diaphragmatic excursion    Musculoskeletal: extremities non tender and without edema    Skin: no lesions or rashes; incision with small area of erythema and induration at the superior edge.  No pus, no tenderness    Neurological: normal gait, no gross defects     Psychiatric: appropriate mood and affect                     "           Hematological: normal cervical, supraclavicular and inguinal lymph nodes     Gastrointestinal:       abdomen soft, non-tender, non-distended, no organomegaly or masses    Genitourinary: deferred    Assessment:    Rosie Blackman is a 80 year old woman with a new diagnosis stage I grade 1 OVCA     A total of 25 minutes was spent with the patient, 20 minutes of which were spent in counseling the patient and/or treatment planning.    Plan:     1.)   OVCA - we have discussed the findings and options from observation to chemotherapy.  She is aware that her prognosis is favorable, but not guaranteed in any setting.  I have discussed with her the NCCN guidelines which recommend no additional therapy at this time.      2.) Genetic risk factors were assessed and the patient does meet the qualifications for a referral referral placed.      3.) Labs and/or tests ordered include:  CA-125     4.) Health maintenance issues addressed today include none.      Thank you for allowing us to participate in the care of your patient.         Sincerely,    Felipe Corona MD    CC  Patient Care Team:  Murali Escalera DO as PCP - General (Internal Medicine)  Murali Escalera DO as Assigned PCP  Mehran Howell MD as Assigned Musculoskeletal Provider  Felipe Corona MD as Assigned Cancer Care Provider  MURALI ESCALERA    Answers for HPI/ROS submitted by the patient on 11/9/2020   General Symptoms: No  Skin Symptoms: No  HENT Symptoms: No  EYE SYMPTOMS: No  HEART SYMPTOMS: No  LUNG SYMPTOMS: No  INTESTINAL SYMPTOMS: No  URINARY SYMPTOMS: No  GYNECOLOGIC SYMPTOMS: No  BREAST SYMPTOMS: No  SKELETAL SYMPTOMS: Yes  BLOOD SYMPTOMS: No  NERVOUS SYSTEM SYMPTOMS: No  MENTAL HEALTH SYMPTOMS: Yes

## 2020-11-09 ENCOUNTER — ONCOLOGY VISIT (OUTPATIENT)
Dept: ONCOLOGY | Facility: CLINIC | Age: 80
End: 2020-11-09
Attending: OBSTETRICS & GYNECOLOGY
Payer: MEDICARE

## 2020-11-09 VITALS
WEIGHT: 197 LBS | SYSTOLIC BLOOD PRESSURE: 145 MMHG | HEART RATE: 69 BPM | HEIGHT: 63 IN | DIASTOLIC BLOOD PRESSURE: 83 MMHG | OXYGEN SATURATION: 98 % | TEMPERATURE: 98.5 F | BODY MASS INDEX: 34.91 KG/M2

## 2020-11-09 DIAGNOSIS — C56.9 OVARIAN CANCER, UNSPECIFIED LATERALITY (H): ICD-10-CM

## 2020-11-09 DIAGNOSIS — C56.9 OVARIAN CANCER, UNSPECIFIED LATERALITY (H): Primary | ICD-10-CM

## 2020-11-09 LAB — CANCER AG125 SERPL-ACNC: 20 U/ML (ref 0–30)

## 2020-11-09 PROCEDURE — 86304 IMMUNOASSAY TUMOR CA 125: CPT | Performed by: PATHOLOGY

## 2020-11-09 PROCEDURE — 99214 OFFICE O/P EST MOD 30 MIN: CPT | Performed by: OBSTETRICS & GYNECOLOGY

## 2020-11-09 PROCEDURE — G0463 HOSPITAL OUTPT CLINIC VISIT: HCPCS

## 2020-11-09 PROCEDURE — 36415 COLL VENOUS BLD VENIPUNCTURE: CPT | Performed by: PATHOLOGY

## 2020-11-09 RX ORDER — GABAPENTIN 300 MG/1
300 CAPSULE ORAL AT BEDTIME
COMMUNITY
End: 2020-12-08

## 2020-11-09 ASSESSMENT — PAIN SCALES - GENERAL: PAINLEVEL: NO PAIN (0)

## 2020-11-09 ASSESSMENT — MIFFLIN-ST. JEOR: SCORE: 1343.84

## 2020-11-09 NOTE — PATIENT INSTRUCTIONS
Follow up appointment in 3 months, scheduling will call you to help make an appointment  ca125 done today, you will be notified via my chart/telephone with test results  Referral for genetic counseling, scheduling will call you to help make an appointment

## 2020-11-09 NOTE — NURSING NOTE
"Oncology Rooming Note    November 9, 2020 8:13 AM   Rosie Blackman is a 80 year old female who presents for:    Chief Complaint   Patient presents with     Oncology Clinic Visit     ovarian cancer     Initial Vitals: BP (!) 145/83 (BP Location: Left arm, Patient Position: Sitting, Cuff Size: Adult Regular)   Pulse 69   Temp 98.5  F (36.9  C) (Tympanic)   Ht 1.61 m (5' 3.39\")   Wt 89.4 kg (197 lb)   BMI 34.47 kg/m   Estimated body mass index is 34.47 kg/m  as calculated from the following:    Height as of this encounter: 1.61 m (5' 3.39\").    Weight as of this encounter: 89.4 kg (197 lb). Body surface area is 2 meters squared.  No Pain (0) Comment: Data Unavailable   No LMP recorded. Patient has had a hysterectomy.  Allergies reviewed: Yes  Medications reviewed: Yes    Medications: Medication refills not needed today.  Pharmacy name entered into WhoSay:    [id number] - [place], [place] - [address] - MAIL ORDER MAINT MEDS - NON-EPRESCRIBE  [facility] - [place], [place] - [address]/[id number] - [place], [place] - [address] [address]    Clinical concerns: none         Kim Cabrera RN              "

## 2020-11-09 NOTE — LETTER
2020         RE: Roise Blackman  [address] [facility] [id number]        Dear Colleague,    Thank you for referring your patient, Rosie Blackman, to the [facility]. Please see a copy of my visit note below.                            Consult Notes on Referred Patient         Dr. Kathya Escalera, DO  [address] [place] [address]       RE: Rosie Blackman  : 1940  ALEJANDRO: 2020     Dear Dr. Kathya Escalera:    I had the pleasure of seeing your patient Rosie Blackman here at the Gynecologic Cancer Clinic at the [facility] on 2020.  As you know she is a very pleasant 80 year old woman with a diagnosis IAG1 OVCA.  Given these findings she was subsequently sent to the Gynecologic Cancer Clinic for new patient consultation.     She recently presented with a complaint of hip pain.  The work-up of this included an MRI which has demonstrated a large pelvic mass.  The patient has been asymptomatic, but her  is elevated (108, CEA 19-9 were normal).    She underwent surgical staging on 20    PATH:  FINAL DIAGNOSIS:   A. OVARIES, LEFT AND RIGHT, BILATERAL OOPHORECTOMY:   - Right ovary with ENDOMETRIOID ADENOCARCINOMA, FIGO grade 1   - Left ovary with benign inclusion cysts, negative for malignancy   - Unremarkable bilateral fallopian tubes, negative for malignancy     B. MESENTERY, BIOPSY:   - Fibrovascular adhesions, negative for malignancy     C. OMENTUM, OMENTECTOMY:   - Adipose tissue, negative for malignancy     D. ANTERIOR CUL-DE-SAC, BIOPSY:   - Fibroadipose tissue with foci of endometriosis   - Negative for malignancy     E. POSTERIOR CUL-DE-SAC, BIOPSY:   - Fibrovascular tissue, negative for malignancy     F. LYMPH NODE, LEFT PELVIC, EXCISION:   - Eleven reactive lymph nodes, negative for malignancy (0/11)     G. LYMPH NODE, RIGHT PELVIC, EXCISION:   - Nine reactive lymph nodes, negative for malignancy (0/9)      H. LYMPH NODE, RIGHT PARA AORTIC, EXCISION:   - Three reactive lymph nodes, negative for malignancy (0/3)       Synoptic Report:     SPECIMEN     Procedure:         - Bilateral salpingo-oophorectomy         - Omentectomy         - Peritoneal  biopsies         - Peritoneal washing     Specimen Integrity of Right Ovary:         - Capsule intact     TUMOR     Tumor Site:         - Right ovary     Histologic Type:         - Endometrioid carcinoma     Histologic Grade:         - G1: Well differentiated     Tumor Size: 24.5 Centimeters (cm)     Ovarian Surface Involvement:         - Absent     Fallopian Tube Surface Involvement:         - Absent     Other Tissue / Organ Involvement:         - Not identified     Peritoneal Ascitic Fluid:         - Negative for malignancy (normal / benign)     Pleural Fluid:         - Not submitted / unknown     Treatment Effect:         - No known presurgical therapy     LYMPH NODES     Regional Lymph Nodes:         - All lymph nodes negative for tumor cells     Number of Lymph Nodes Examined: 23     Site(s):         - Right pelvic         - Left pelvic         - Right para-aortic       She has been in observation since that time.  She recently underwent     Review of Systems:    Systemic           no weight changes; no fever; no chills; no night sweats; no appetite changes  Skin           no rashes, or lesions  Eye           no irritation; no changes in vision  Isabelle-Laryngeal           no dysphagia; no hoarseness   Pulmonary    no cough; no shortness of breath  Cardiovascular    no chest pain; no palpitations  Gastrointestinal    no diarrhea; no constipation; no abdominal pain; no changes in bowel  habits; no blood in stool  Genitourinary   no urinary frequency; no urinary urgency; no dysuria; no pain; no abnormal vaginal discharge; no abnormal vaginal bleeding  Breast   no breast discharge; no breast changes; no breast pain  Musculoskeletal    no myalgias; no arthralgias; no back  pain  Psychiatric           no depressed mood; no anxiety    Hematologic           no tender lymph nodes; no noticeable swellings or lumps   Endocrine    no hot flashes; no heat/cold intolerance         Neurological   no tremor; no numbness and tingling; no headaches; no difficulty  sleeping      Past Medical History:    Past Medical History:   Diagnosis Date     Chronic airway obstruction (H)      Gastroesophageal reflux disease      Hiatal hernia      Hypertension      Personal history of contact with and (suspected) exposure to asbestos      Primary osteoarthritis of right hip 7/9/2020         Past Surgical History:    Past Surgical History:   Procedure Laterality Date     BREAST BIOPSY, CORE RT/LT  1994     C ANESTH,KNEE VEINS SURGERY  8/20/2014     CHOLECYSTECTOMY  1995     COLONOSCOPY  5/10    Diverticulosis, colon polyps; repeat 5 yrs     COLONOSCOPY N/A 11/16/2015    Procedure: COLONOSCOPY;  Surgeon: Alejandro Matos MD;  Location: [facility]     Colonoscopy, hyperplastic polyps, repeat in 5 yrs  2004     ESOPHAGOSCOPY, GASTROSCOPY, DUODENOSCOPY (EGD), COMBINED  9/30/2013    Procedure: COMBINED ESOPHAGOSCOPY, GASTROSCOPY, DUODENOSCOPY (EGD), BIOPSY SINGLE OR MULTIPLE;  Gastroscopy;  Surgeon: Blaise Gill MD;  Location: [facility]     EYE SURGERY  2012    R/L Cataracts     HC REMOVE TONSILS/ADENOIDS,13+ Y/O      T & A 13+y.o.     HYSTERECTOMY, PAP NO LONGER INDICATED       LAPAROSCOPIC SALPINGO-OOPHORECTOMY N/A 7/24/2020    Procedure: Laparoscopy, removal or pelvic mass, both tubes and ovaries, omentectomy, anterior culvectomy, pelvic and para aortic lymph node dissection, laparotomy;  Surgeon: Felipe Corona MD;  Location: [facility]     TV for DUB, ovaries in       VASCULAR SURGERY  2014    [place] closure         Health Maintenance:  Last Pap Smear: None since hysterectomy age 41    Last Mammogram: 10/2019 BIRADS 1    Last Colonoscopy: 2015 (diverticulosis without polyps or cancer)                 "       Current Medications:     has a current medication list which includes the following prescription(s): acetaminophen, evening primrose oil, mometasone, naproxen sodium, omega 3, omega-3 acid ethyl esters, pravastatin, theratears, valsartan-hydrochlorothiazide, and viactiv.       Allergies:     Allergies   Allergen Reactions     Ezetimibe Other (See Comments)     Severe muscle aches     Lisinopril      Prilosec [Omeprazole]      Zestril [Ace Inhibitors] Cough     Atorvastatin Other (See Comments)     Muscle Pain     Irbesartan Cramps     Rosuvastatin Other (See Comments)     Muscle Pain         Social History:     Social History     Tobacco Use     Smoking status: Never Smoker     Smokeless tobacco: Never Used   Substance Use Topics     Alcohol use: No       History   Drug Use No           Family History:     The patient's family history is notable for .    Family History   Problem Relation Age of Onset     Cancer Mother         uterine cancer,      Respiratory Mother         COPD     Cardiovascular Mother         AAA  age 80     Breast Cancer Maternal Grandmother      Cancer Maternal Grandfather         cancer unknown type     Breast Cancer Sister      Eye Disorder Father         cataracts     Depression Father      Depression Son      Depression Son      Breast Cancer Sister         X2     Cancer - colorectal No family hx of         no ovarian cancer         Physical Exam:     PS 0  VS: BP (!) 145/83 (BP Location: Left arm, Patient Position: Sitting, Cuff Size: Adult Regular)   Pulse 69   Temp 98.5  F (36.9  C) (Tympanic)   Ht 1.61 m (5' 3.39\")   Wt 89.4 kg (197 lb)   SpO2 98%   BMI 34.47 kg/m       General Appearance: healthy and alert, no distress     HEENT:  no thyromegaly, no palpable nodules or masses        Cardiovascular: regular rate and rhythm, no gallops, rubs or murmurs     Respiratory: lungs clear, no rales, rhonchi or wheezes, normal diaphragmatic " excursion    Musculoskeletal: extremities non tender and without edema    Skin: no lesions or rashes; incision with small area of erythema and induration at the superior edge.  No pus, no tenderness    Neurological: normal gait, no gross defects     Psychiatric: appropriate mood and affect                               Hematological: normal cervical, supraclavicular and inguinal lymph nodes     Gastrointestinal:       abdomen soft, non-tender, non-distended, no organomegaly or masses    Genitourinary: deferred    Assessment:    Rosie Blackman is a 80 year old woman with a new diagnosis stage I grade 1 OVCA     A total of 25 minutes was spent with the patient, 20 minutes of which were spent in counseling the patient and/or treatment planning.    Plan:     1.)   OVCA - we have discussed the findings and options from observation to chemotherapy.  She is aware that her prognosis is favorable, but not guaranteed in any setting.  I have discussed with her the NCCN guidelines which recommend no additional therapy at this time.      2.) Genetic risk factors were assessed and the patient does meet the qualifications for a referral referral placed.      3.) Labs and/or tests ordered include:  CA-125     4.) Health maintenance issues addressed today include none.      Thank you for allowing us to participate in the care of your patient.         Sincerely,    Felipe Corona MD    CC  Patient Care Team:  Murali Escalera DO as PCP - General (Internal Medicine)  Murali Escalera DO as Assigned PCP  Mehran Howell MD as Assigned Musculoskeletal Provider  Felipe Corona MD as Assigned Cancer Care Provider  MURALI ESCALERA    Answers for HPI/ROS submitted by the patient on 11/9/2020   General Symptoms: No  Skin Symptoms: No  HENT Symptoms: No  EYE SYMPTOMS: No  HEART SYMPTOMS: No  LUNG SYMPTOMS: No  INTESTINAL SYMPTOMS: No  URINARY SYMPTOMS: No  GYNECOLOGIC SYMPTOMS: No  BREAST SYMPTOMS:  No  SKELETAL SYMPTOMS: Yes  BLOOD SYMPTOMS: No  NERVOUS SYSTEM SYMPTOMS: No  MENTAL HEALTH SYMPTOMS: Yes        Again, thank you for allowing me to participate in the care of your patient.        Sincerely,        Felipe Corona MD

## 2020-11-16 ENCOUNTER — HEALTH MAINTENANCE LETTER (OUTPATIENT)
Age: 80
End: 2020-11-16

## 2020-11-25 ENCOUNTER — TRANSFERRED RECORDS (OUTPATIENT)
Dept: HEALTH INFORMATION MANAGEMENT | Facility: CLINIC | Age: 80
End: 2020-11-25

## 2020-12-07 NOTE — PROGRESS NOTES
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  She is s/p BSO, PPLND 2020 which served as her treatment.  She is here today for an acute visit.     Oncology History:  She originally presented with a complaint of hip pain.  The work-up of this included an MRI which has demonstrated a large pelvic mass, her  was elevated (108, CEA 19-9 were normal).     2020: Exploratory laparoscopy followed by laparotomy, bilateral salpingo-oophorectomy, omentectomy, pelvic and periaortic lymph node dissection, anterior culdectomy.    FINAL DIAGNOSIS:   A. OVARIES, LEFT AND RIGHT, BILATERAL OOPHORECTOMY:   - Right ovary with ENDOMETRIOID ADENOCARCINOMA, FIGO grade 1   - Left ovary with benign inclusion cysts, negative for malignancy   - Unremarkable bilateral fallopian tubes, negative for malignancy     B. MESENTERY, BIOPSY:   - Fibrovascular adhesions, negative for malignancy     C. OMENTUM, OMENTECTOMY:   - Adipose tissue, negative for malignancy     D. ANTERIOR CUL-DE-SAC, BIOPSY:   - Fibroadipose tissue with foci of endometriosis   - Negative for malignancy     E. POSTERIOR CUL-DE-SAC, BIOPSY:   - Fibrovascular tissue, negative for malignancy     F. LYMPH NODE, LEFT PELVIC, EXCISION:   - Eleven reactive lymph nodes, negative for malignancy (0/11)     G. LYMPH NODE, RIGHT PELVIC, EXCISION:   - Nine reactive lymph nodes, negative for malignancy (0/9)     H. LYMPH NODE, RIGHT PARA AORTIC, EXCISION:   - Three reactive lymph nodes, negative for malignancy (0/3)     2020:  20.           Today she comes to clinic feeling well but she has developed a lump on the top of her incision that she is concerned about.  She reports that she first noticed the lump about 3 weeks ago  "and it seems to have grown since that time.  Right now it is about the size of an orange.  It is not painful but she can feel that it is there when she moves.  It will flatten when she lays on her back.  It is not hard but is firm.  She has noticed that she seems more \"gassy\" but is having normal bowel movements.  She denies any changes in her bladder habits, no nausea/emesis, no lower extremity edema, and no difficulties eating or sleeping. She denies any abdominal discomfort/bloating, no fevers or chills. She had a L3-L4 spinal fusion and decompression 6 weeks ago and she recently started physical therapy.          Health Maintenance  Colonoscopy-11/16/2015, repeat in 10 years  Mammogram-11/19/2019, scheduled 12/29/2020  Annual physical-12/5/2019, scheduled 12/15/2020      Review of Systems:    Systemic           no weight changes; no fever; no chills; no night sweats; no appetite changes  Skin           no rashes, or lesions  Eye           no irritation; no changes in vision  Isabelle-Laryngeal           no dysphagia; no hoarseness   Pulmonary    no cough; no shortness of breath  Cardiovascular    no chest pain; no palpitations  Gastrointestinal    no diarrhea; no constipation; no abdominal pain; no changes in bowel habits; no blood in stool  Genitourinary   no urinary frequency; no urinary urgency; no dysuria; no pain; no abnormal vaginal discharge; no abnormal vaginal bleeding; +lump by incision  Breast   no breast discharge; no breast changes; no breast pain  Musculoskeletal    no myalgias; no arthralgias; no back pain  Psychiatric           no depressed mood; no anxiety    Hematologic   no tender lymph nodes; no noticeable swellings or lumps   Endocrine    no hot flashes; no heat/cold intolerance         Neurological   no tremor; no numbness and tingling; no headaches; no difficulty sleeping      Past Medical History:    Past Medical History:   Diagnosis Date     Chronic airway obstruction (H)      " Gastroesophageal reflux disease      Hiatal hernia      Hypertension      Personal history of contact with and (suspected) exposure to asbestos      Primary osteoarthritis of right hip 7/9/2020         Past Surgical History:    Past Surgical History:   Procedure Laterality Date     BREAST BIOPSY, CORE RT/LT  1994     C ANESTH,KNEE VEINS SURGERY  8/20/2014     CHOLECYSTECTOMY  1995     COLONOSCOPY  5/10    Diverticulosis, colon polyps; repeat 5 yrs     COLONOSCOPY N/A 11/16/2015    Procedure: COLONOSCOPY;  Surgeon: Alejandro Matos MD;  Location: [facility]     Colonoscopy, hyperplastic polyps, repeat in 5 yrs  2004     ESOPHAGOSCOPY, GASTROSCOPY, DUODENOSCOPY (EGD), COMBINED  9/30/2013    Procedure: COMBINED ESOPHAGOSCOPY, GASTROSCOPY, DUODENOSCOPY (EGD), BIOPSY SINGLE OR MULTIPLE;  Gastroscopy;  Surgeon: Blaise Gill MD;  Location: [facility]     EYE SURGERY  2012    R/L Cataracts     HC REMOVE TONSILS/ADENOIDS,12+ Y/O      T & A 13+y.o.     HYSTERECTOMY, PAP NO LONGER INDICATED       LAPAROSCOPIC SALPINGO-OOPHORECTOMY N/A 7/24/2020    Procedure: Laparoscopy, removal or pelvic mass, both tubes and ovaries, omentectomy, anterior culvectomy, pelvic and para aortic lymph node dissection, laparotomy;  Surgeon: Felipe Corona MD;  Location: [facility]     TVH for DUB, ovaries in       VASCULAR SURGERY  2014    [place] closure         Health Maintenance Due   Topic Date Due     HEPATITIS C SCREENING  01/04/1959     LIPID  12/02/2020     FALL RISK ASSESSMENT  12/05/2020     MAMMO SCREENING  10/19/2020     COLORECTAL CANCER SCREENING  11/16/2020     MEDICARE ANNUAL WELLNESS VISIT  12/05/2020       Current Medications:     Current Outpatient Medications   Medication Sig Dispense Refill     Evening Primrose Oil 1000 MG CAPS One daily       mometasone (ELOCON) 0.1 % cream Apply sparingly to affected area twice daily for 2 weeks then decrease to 1 time daily for 30 days then decrease to 2-3 times per week 45 g 0     Naproxen  "Sodium (ALEVE PO)        omega-3 acid ethyl esters (LOVAZA) 1 g capsule Take 1 g by mouth 2 times daily       pravastatin (PRAVACHOL) 80 MG tablet Take 1 tablet (80 mg) by mouth daily 90 tablet 3     THERATEARS 0.25 % OP SOLN BID       valsartan-hydrochlorothiazide (DIOVAN HCT) 160-12.5 MG tablet Take 1 tablet by mouth daily 90 tablet 1     VIACTIV 500-100-40 OR CHEW once daily       acetaminophen (TYLENOL) 325 MG tablet Take 2 tablets (650 mg) by mouth every 6 hours as needed for mild pain (Patient not taking: Reported on 2020) 48 tablet 0     omega 3 1000 MG CAPS            Allergies:        Allergies   Allergen Reactions     Ezetimibe Other (See Comments)     Severe muscle aches     Lisinopril      Omeprazole      Other reaction(s): *Unknown     Prilosec [Omeprazole]      Zestril [Ace Inhibitors] Cough     Atorvastatin Other (See Comments)     Muscle Pain     Irbesartan Cramps     Rosuvastatin Other (See Comments)     Muscle Pain        Social History:     Social History     Tobacco Use     Smoking status: Never Smoker     Smokeless tobacco: Never Used   Substance Use Topics     Alcohol use: No       History   Drug Use No         Family History:     The patient's family history is notable for:    Family History   Problem Relation Age of Onset     Cancer Mother         uterine cancer,      Respiratory Mother         COPD     Cardiovascular Mother         AAA  age 80     Breast Cancer Maternal Grandmother      Cancer Maternal Grandfather         cancer unknown type     Breast Cancer Sister      Eye Disorder Father         cataracts     Depression Father      Depression Son      Depression Son      Breast Cancer Sister         X2     Cancer - colorectal No family hx of         no ovarian cancer         Physical Exam:     BP (!) 148/79   Pulse 73   Temp 97.7  F (36.5  C) (Tympanic)   Resp 18   Ht 1.6 m (5' 3\")   Wt 91 kg (200 lb 9.6 oz)   SpO2 100%   BMI 35.53 kg/m    Body mass index is 35.53 " kg/m .    General Appearance: healthy and alert, no distress     HEENT: no palpable nodules or masses        Musculoskeletal: extremities non tender and without edema    Skin: no lesions or rashes     Neurological: normal gait, no gross defects     Psychiatric: appropriate mood and affect                               Hematological: normal cervical and supraclavicular lymph nodes     Gastrointestinal:       abdomen soft, non-tender, non-distended, 5-6 cm mass midline extending upward from just below the umbilicus.  Firm while patient is standing but soft and easily reducible while laying down.  No erythema, induration, or ecchymosis present.    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  She is s/p BSO, PPLND 7/24/2020 which served as her treatment.  She is here today for an acute visit.     15 minutes were spent with this patient, over 50% of that time was spent in symptom management, treatment planning and in counseling and coordination of care.       Plan:     1.) Ventral hernia: Discussed with patient that her symptoms and exam are consistent with a reducible ventral hernia. Discussed that the concern with a hernia is that bowel become trapped leading to bowel strangulation and tissue death.  This is less of a concern for her because it is not painful and the hernia is soft and easily reducible.  Would recommend an abdominal CT and follow up with general surgery for their recommendations.  Due to COVID this may need to be delayed until spring or the cases in the community decrease.  She has her annual physical next week and she would prefer to have her PCP order this and refer her to general surgery so she can follow up closer to home if this isn't urgent.  Provided her with an abdominal binder to help support the hernia and keep it reduced.  Recommended maintaining abdominal precautions and wearing  the binder while she is active.  She will also share with her physical therapist that she has a likely hernia so they can make recommendations based on that while she is getting therapy.  If she develops fevers, chills, the site becomes more painful, she develops nausea, vomiting, or stops having bowel movements to report to the ED for further evaluation.     2.) RTC in 2 months with provider visit with Dr. Corona, this is already scheduled.  Plan for surveillance visitsevery 3 months for the first 2 years post treatment (7/2022), then every 6 months for 3 years thereafter (7/2025), then annually.  Reviewed signs and symptoms for when she should contact the clinic or seek additional care.  Patient to contact the clinic with any questions or concerns in the interim.      3.) Genetic risk factors were assessed and she does meet qualifications for referral.  This needs to be scheduled.    4.) Labs and/or tests ordered include:  None.     5.) Health maintenance issues addressed today include annual health maintenance and non-gynecologic issues with PCP.       Kerry Sinclair, DNP, APRN, FNP-C  Nurse Practitioner  Division of Gynecologic Oncology  Pager: [phone]     CC  Patient Care Team:  Kathya Escalera DO as PCP - General (Internal Medicine)  Kathya Escalera DO as Assigned PCP  Mehran Howell MD as Assigned Musculoskeletal Provider  Felipe Corona MD as Assigned Cancer Care Provider

## 2020-12-08 ENCOUNTER — HOSPITAL ENCOUNTER (OUTPATIENT)
Dept: PHYSICAL THERAPY | Facility: CLINIC | Age: 80
Setting detail: THERAPIES SERIES
End: 2020-12-08
Attending: PHYSICIAN ASSISTANT
Payer: MEDICARE

## 2020-12-08 ENCOUNTER — ONCOLOGY VISIT (OUTPATIENT)
Dept: ONCOLOGY | Facility: CLINIC | Age: 80
End: 2020-12-08
Attending: INTERNAL MEDICINE
Payer: MEDICARE

## 2020-12-08 VITALS
SYSTOLIC BLOOD PRESSURE: 148 MMHG | HEIGHT: 63 IN | BODY MASS INDEX: 35.54 KG/M2 | OXYGEN SATURATION: 100 % | RESPIRATION RATE: 18 BRPM | WEIGHT: 200.6 LBS | TEMPERATURE: 97.7 F | DIASTOLIC BLOOD PRESSURE: 79 MMHG | HEART RATE: 73 BPM

## 2020-12-08 DIAGNOSIS — K43.9 VENTRAL HERNIA WITHOUT OBSTRUCTION OR GANGRENE: Primary | ICD-10-CM

## 2020-12-08 PROCEDURE — G0463 HOSPITAL OUTPT CLINIC VISIT: HCPCS

## 2020-12-08 PROCEDURE — 97161 PT EVAL LOW COMPLEX 20 MIN: CPT | Mod: GP | Performed by: PHYSICAL MEDICINE & REHABILITATION

## 2020-12-08 PROCEDURE — 99213 OFFICE O/P EST LOW 20 MIN: CPT | Performed by: NURSE PRACTITIONER

## 2020-12-08 PROCEDURE — 97110 THERAPEUTIC EXERCISES: CPT | Mod: GP | Performed by: PHYSICAL MEDICINE & REHABILITATION

## 2020-12-08 ASSESSMENT — MIFFLIN-ST. JEOR: SCORE: 1354.05

## 2020-12-08 ASSESSMENT — PAIN SCALES - GENERAL: PAINLEVEL: NO PAIN (0)

## 2020-12-08 NOTE — PROGRESS NOTES
[facility]          OUTPATIENT PHYSICAL THERAPY ORTHOPEDIC EVALUATION  PLAN OF TREATMENT FOR OUTPATIENT REHABILITATION  (COMPLETE FOR INITIAL CLAIMS ONLY)  Patient's Last Name, First Name, M.I.  YOB: 1940  Rosie Blackman    Provider s Name:  [facility]   Medical Record No.  [id number]   Start of Care Date:  12/08/20   Onset Date:  10/13/20   Type:     _X__PT   ___OT   ___SLP Medical Diagnosis:  s/p lumbar fusion     PT Diagnosis:  LBP s/p lumbar fusion   Visits from SOC:  1      _________________________________________________________________________________  Plan of Treatment/Functional Goals:  ADL retraining, balance training, bed mobility training, gait training, joint mobilization, manual therapy, motor coordination training, neuromuscular re-education, ROM, strengthening, stretching, transfer training     Cryotherapy, Electrical stimulation, Hot packs, TENS, Traction, Ultrasound  only as needed  Goals  Goal Identifier: 1  Goal Description: Pt will be able to put on socks/shoes without increase in pain.   Target Date: 12/22/20    Goal Identifier: 2  Goal Description: Pt will be able to get into bed without step stool.  Target Date: 01/05/21    Goal Identifier: 3  Goal Description: Pt will be able to ascend/descend stairs without increase in sx in order to decrease difficulty with home navigation.   Target Date: 01/19/21    Goal Identifier: 4  Goal Description: Pt will be independent with HEP in order to self manage symptoms.   Target Date: 02/05/21          Therapy Frequency:  2 times/Week  Predicted Duration of Therapy Intervention:  8 weeks    Temi Leonard PT                 I CERTIFY THE NEED FOR THESE SERVICES FURNISHED UNDER        THIS PLAN OF TREATMENT AND WHILE UNDER MY CARE .             Physician Signature               Date    X_____________________________________________________                             Certification Date From:   12/08/20   Certification Date To:  02/05/21    Referring Provider:  Shaan Rowe PA-C    Initial Assessment        See Epic Evaluation Start of Care Date: 12/08/20

## 2020-12-08 NOTE — PROGRESS NOTES
"   12/08/20 0700   General Information   Type of Visit Initial OP Ortho PT Evaluation   Start of Care Date 12/08/20   Referring Physician Shaan Rowe PA-C   Patient/Family Goals Statement \"return to getting into bed without step stool, be stable on my feet, be able to go up and down stairs easier, be able to put on socks and shoes more easily, decrease soreness, decrease stiffness\"   Orders Evaluate and Treat   Date of Order 11/25/20   Certification Required? Yes  (MC)   Medical Diagnosis s/p lumbar fusion   Surgical/Medical history reviewed Yes   Precautions/Limitations other (see comments)  (hold lifting over 10#)   General Information Comments PMHx per pt report: cancer, high BP, stenosis, tumor   Body Part(s)   Body Part(s) Lumbar Spine/SI   Presentation and Etiology   Pertinent history of current problem (include personal factors and/or comorbidities that impact the POC) Pt arrived to PT today for LBP s/p L3/4 and L4/5 spinal fusion. Notes she was having LE pain prior to surgery but has since gone away. Notes she does now have a constant low back ache that wasn't present prior to surgery, but notes surgeon said this is normal for first 6 months. In the last year has had ovarian cancer and also notes she has an appt later today to discuss bulge found in belly (possible hernia).    Impairments A. Pain;D. Decreased ROM;E. Decreased flexibility;F. Decreased strength and endurance;G. Impaired balance;H. Impaired gait   Functional Limitations perform activities of daily living;perform desired leisure / sports activities   Symptom Location LBP (L>R)   How/Where did it occur Other  (s/p fusion)   Onset date of current episode/exacerbation 10/13/20   Chronicity New   Pain rating (0-10 point scale) Best (/10);Worst (/10)   Best (/10) 3   Worst (/10) 4   Pain quality B. Dull;C. Aching   Frequency of pain/symptoms C. With activity   Pain/symptoms are: Worse during the day   Pain/symptoms exacerbated by B. Walking;C. " "Lifting;D. Carrying;I. Bending   Pain/symptoms eased by A. Sitting;C. Rest   Progression of symptoms since onset: Improved   Current / Previous Interventions   Diagnostic Tests:   (see Epic)   Prior Level of Function   Prior Level of Function-Mobility independent; but states she has a walker at night near bed if she needs it   Prior Level of Function-ADLs independent   Current Level of Function   Current Community Support Family/friend caregiver   Patient role/employment history F. Retired   Living environment House/townhome   Home/community accessibility notes stairs within home but does not have to use them, 3 JANEEN from garage. Has railings   Current equipment-Gait/Locomotion None;Other  (notes she feels like she would need a SPC for long distance)   Fall Risk Screen   Fall screen completed by PT   Have you fallen 2 or more times in the past year? No   Have you fallen and had an injury in the past year? No   Is patient a fall risk? No   Abuse Screen (yes response referral indicated)   Feels Unsafe at Home or Work/School no   Feels Threatened by Someone no   Does Anyone Try to Keep You From Having Contact with Others or Doing Things Outside Your Home? no   Physical Signs of Abuse Present no   System Outcome Measures   Outcome Measures Low Back Pain (see Oswestry and Carmen)   Lumbar Spine/SI Objective Findings   Posture rounded shoulders and forward head   Gait/Locomotion pt amb with increased pes planus, short stride length, decreased veronica, toe out. Pt ascends/descends stairs reciprocally with use of B handrails: B hip drop noted.   Balance/Proprioception (Single Leg Stance) unable   Flexion ROM distal thigh   Right Side Bending ROM mid thigh   Left Side Bending ROM mid thigh   Lumbar ROM Comment noted \"I can feel it with ROM, but not painful\"   Hip Flexion (L2) Strength 4+/5 B   Hip Abduction Strength seated: 5/5 B   Hip Adduction Strength seated: 5/5 B   Knee Flexion Strength 5/5 B   Knee Extension (L3) " Strength 4+/5 B   Ankle Dorsiflexion (L4) Strength 5/5 B   Great Toe Extension (L5) Strength 5/5 B   Ankle Plantar Flexion (S1) Strength 5/5 B   Hamstring Flexibility min limitation B   Hip Flexor Flexibility min limitation B   Quadricep Flexibility min limitation B   Piriformis Flexibility limited IR   Lumbar/SI Flexibility Comments no pain with PROM   SLR no pain   Crossover SLR no pain   Segmental Mobility fusion L2/3, L4/5   Sensation Testing normal dull touch sensation amongst B LE in dermatomal pattern   Palpation noted increased tenderness over L2-L5 spinous processes, paraspinals   Planned Therapy Interventions   Planned Therapy Interventions ADL retraining;balance training;bed mobility training;gait training;joint mobilization;manual therapy;motor coordination training;neuromuscular re-education;ROM;strengthening;stretching;transfer training   Planned Modality Interventions   Planned Modality Interventions Cryotherapy;Electrical stimulation;Hot packs;TENS;Traction;Ultrasound   Planned Modality Interventions Comments only as needed   Clinical Impression   Criteria for Skilled Therapeutic Interventions Met yes, treatment indicated   PT Diagnosis LBP s/p lumbar fusion   Influenced by the following impairments decreased ROM, decreased strength, impaired gait, pain   Functional limitations due to impairments walking, stairs, bed mobility, dressing   Clinical Presentation Stable/Uncomplicated   Clinical Presentation Rationale ALEIDA, Carmen, ROM, strength, gait, clinical judgement   Clinical Decision Making (Complexity) Low complexity   Therapy Frequency 2 times/Week   Predicted Duration of Therapy Intervention (days/wks) 8 weeks   Risk & Benefits of therapy have been explained Yes   Patient, Family & other staff in agreement with plan of care Yes   Clinical Impression Comments Pt is a 80 y.o. female who presented to the PT clinic today with a rehab diagnosis of LBP s/p lumbar fusion as evidenced by decreased ROM,  decreased strength, impaired gait, and pain. Pt is appropriate for skilled PT to address previously listed impairments in order to decrease difficulty with walking, stairs, bed mobility and dressing.    Education Assessment   Preferred Learning Style Listening;Reading;Demonstration;Pictures/video   Barriers to Learning No barriers   ORTHO GOALS   PT Ortho Eval Goals 1;2;3;4   Ortho Goal 1   Goal Identifier 1   Goal Description Pt will be able to put on socks/shoes without increase in pain.    Target Date 12/22/20   Ortho Goal 2   Goal Identifier 2   Goal Description Pt will be able to get into bed without step stool.   Target Date 01/05/21   Ortho Goal 3   Goal Identifier 3   Goal Description Pt will be able to ascend/descend stairs without increase in sx in order to decrease difficulty with home navigation.    Target Date 01/19/21   Ortho Goal 4   Goal Identifier 4   Goal Description Pt will be independent with HEP in order to self manage symptoms.    Target Date 02/05/21   Total Evaluation Time   PT Torsten Low Complexity Minutes (30387) 25   Therapy Certification   Certification date from 12/08/20   Certification date to 02/05/21   Medical Diagnosis s/p lumbar fusion       Please contact me with any questions or concerns.    Thank you for your referral,     Temi Leonard, PT, DPT  Physical Therapist  [address] [address] [phone]

## 2020-12-08 NOTE — LETTER
2020         RE: Rosie Blackman  [id number] [facility] [id number]        Dear Colleague,    Thank you for referring your patient, Rosie Blackman, to the [facility]. Please see a copy of my visit note below.    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  She is s/p BSO, PPLND 2020 which served as her treatment.  She is here today for an acute visit.     Oncology History:  She originally presented with a complaint of hip pain.  The work-up of this included an MRI which has demonstrated a large pelvic mass, her  was elevated (108, CEA 19-9 were normal).     2020: Exploratory laparoscopy followed by laparotomy, bilateral salpingo-oophorectomy, omentectomy, pelvic and periaortic lymph node dissection, anterior culdectomy.    FINAL DIAGNOSIS:   A. OVARIES, LEFT AND RIGHT, BILATERAL OOPHORECTOMY:   - Right ovary with ENDOMETRIOID ADENOCARCINOMA, FIGO grade 1   - Left ovary with benign inclusion cysts, negative for malignancy   - Unremarkable bilateral fallopian tubes, negative for malignancy     B. MESENTERY, BIOPSY:   - Fibrovascular adhesions, negative for malignancy     C. OMENTUM, OMENTECTOMY:   - Adipose tissue, negative for malignancy     D. ANTERIOR CUL-DE-SAC, BIOPSY:   - Fibroadipose tissue with foci of endometriosis   - Negative for malignancy     E. POSTERIOR CUL-DE-SAC, BIOPSY:   - Fibrovascular tissue, negative for malignancy     F. LYMPH NODE, LEFT PELVIC, EXCISION:   - Eleven reactive lymph nodes, negative for malignancy (0/11)     G. LYMPH NODE, RIGHT PELVIC, EXCISION:   - Nine reactive lymph nodes, negative for malignancy (0/9)     H. LYMPH NODE, RIGHT PARA AORTIC, EXCISION:   - Three reactive  "lymph nodes, negative for malignancy (0/3)     11/9/2020:  20.           Today she comes to clinic feeling well but she has developed a lump on the top of her incision that she is concerned about.  She reports that she first noticed the lump about 3 weeks ago and it seems to have grown since that time.  Right now it is about the size of an orange.  It is not painful but she can feel that it is there when she moves.  It will flatten when she lays on her back.  It is not hard but is firm.  She has noticed that she seems more \"gassy\" but is having normal bowel movements.  She denies any changes in her bladder habits, no nausea/emesis, no lower extremity edema, and no difficulties eating or sleeping. She denies any abdominal discomfort/bloating, no fevers or chills. She had a L3-L4 spinal fusion and decompression 6 weeks ago and she recently started physical therapy.          Health Maintenance  Colonoscopy-11/16/2015, repeat in 10 years  Mammogram-11/19/2019, scheduled 12/29/2020  Annual physical-12/5/2019, scheduled 12/15/2020      Review of Systems:    Systemic           no weight changes; no fever; no chills; no night sweats; no appetite changes  Skin           no rashes, or lesions  Eye           no irritation; no changes in vision  Isabelle-Laryngeal           no dysphagia; no hoarseness   Pulmonary    no cough; no shortness of breath  Cardiovascular    no chest pain; no palpitations  Gastrointestinal    no diarrhea; no constipation; no abdominal pain; no changes in bowel habits; no blood in stool  Genitourinary   no urinary frequency; no urinary urgency; no dysuria; no pain; no abnormal vaginal discharge; no abnormal vaginal bleeding; +lump by incision  Breast   no breast discharge; no breast changes; no breast pain  Musculoskeletal    no myalgias; no arthralgias; no back pain  Psychiatric           no depressed mood; no anxiety    Hematologic   no tender lymph nodes; no noticeable swellings or lumps   Endocrine "    no hot flashes; no heat/cold intolerance         Neurological   no tremor; no numbness and tingling; no headaches; no difficulty sleeping      Past Medical History:    Past Medical History:   Diagnosis Date     Chronic airway obstruction (H)      Gastroesophageal reflux disease      Hiatal hernia      Hypertension      Personal history of contact with and (suspected) exposure to asbestos      Primary osteoarthritis of right hip 7/9/2020         Past Surgical History:    Past Surgical History:   Procedure Laterality Date     BREAST BIOPSY, CORE RT/LT  1994     C ANESTH,KNEE VEINS SURGERY  8/20/2014     CHOLECYSTECTOMY  1995     COLONOSCOPY  5/10    Diverticulosis, colon polyps; repeat 5 yrs     COLONOSCOPY N/A 11/16/2015    Procedure: COLONOSCOPY;  Surgeon: Alejandro Matos MD;  Location: [facility]     Colonoscopy, hyperplastic polyps, repeat in 5 yrs  2004     ESOPHAGOSCOPY, GASTROSCOPY, DUODENOSCOPY (EGD), COMBINED  9/30/2013    Procedure: COMBINED ESOPHAGOSCOPY, GASTROSCOPY, DUODENOSCOPY (EGD), BIOPSY SINGLE OR MULTIPLE;  Gastroscopy;  Surgeon: Blaise Gill MD;  Location: [facility]     EYE SURGERY  2012    R/L Cataracts     HC REMOVE TONSILS/ADENOIDS,12+ Y/O      T & A 13+y.o.     HYSTERECTOMY, PAP NO LONGER INDICATED       LAPAROSCOPIC SALPINGO-OOPHORECTOMY N/A 7/24/2020    Procedure: Laparoscopy, removal or pelvic mass, both tubes and ovaries, omentectomy, anterior culvectomy, pelvic and para aortic lymph node dissection, laparotomy;  Surgeon: Felipe Corona MD;  Location:  OR     [facility] for DUB, ovaries in       VASCULAR SURGERY  2014    Kathleen closure         Health Maintenance Due   Topic Date Due     HEPATITIS C SCREENING  01/04/1958     LIPID  12/02/2020     FALL RISK ASSESSMENT  12/05/2020     MAMMO SCREENING  10/19/2020     COLORECTAL CANCER SCREENING  11/16/2020     MEDICARE ANNUAL WELLNESS VISIT  12/05/2020       Current Medications:     Current Outpatient Medications   Medication Sig  Dispense Refill     Evening Primrose Oil 1000 MG CAPS One daily       mometasone (ELOCON) 0.1 % cream Apply sparingly to affected area twice daily for 2 weeks then decrease to 1 time daily for 30 days then decrease to 2-3 times per week 45 g 0     Naproxen Sodium (ALEVE PO)        omega-3 acid ethyl esters (LOVAZA) 1 g capsule Take 1 g by mouth 2 times daily       pravastatin (PRAVACHOL) 80 MG tablet Take 1 tablet (80 mg) by mouth daily 90 tablet 3     THERATEARS 0.25 % OP SOLN BID       valsartan-hydrochlorothiazide (DIOVAN HCT) 160-12.5 MG tablet Take 1 tablet by mouth daily 90 tablet 1     VIACTIV 500-100-40 OR CHEW once daily       acetaminophen (TYLENOL) 325 MG tablet Take 2 tablets (650 mg) by mouth every 6 hours as needed for mild pain (Patient not taking: Reported on 2020) 48 tablet 0     omega 3 1000 MG CAPS            Allergies:        Allergies   Allergen Reactions     Ezetimibe Other (See Comments)     Severe muscle aches     Lisinopril      Omeprazole      Other reaction(s): *Unknown     Prilosec [Omeprazole]      Zestril [Ace Inhibitors] Cough     Atorvastatin Other (See Comments)     Muscle Pain     Irbesartan Cramps     Rosuvastatin Other (See Comments)     Muscle Pain        Social History:     Social History     Tobacco Use     Smoking status: Never Smoker     Smokeless tobacco: Never Used   Substance Use Topics     Alcohol use: No       History   Drug Use No         Family History:     The patient's family history is notable for:    Family History   Problem Relation Age of Onset     Cancer Mother         uterine cancer,      Respiratory Mother         COPD     Cardiovascular Mother         AAA  age 80     Breast Cancer Maternal Grandmother      Cancer Maternal Grandfather         cancer unknown type     Breast Cancer Sister      Eye Disorder Father         cataracts     Depression Father      Depression Son      Depression Son      Breast Cancer Sister         X2     Cancer - colorectal  "No family hx of         no ovarian cancer         Physical Exam:     BP (!) 148/79   Pulse 73   Temp 97.7  F (36.5  C) (Tympanic)   Resp 18   Ht 1.6 m (5' 3\")   Wt 91 kg (200 lb 9.6 oz)   SpO2 100%   BMI 35.53 kg/m    Body mass index is 35.53 kg/m .    General Appearance: healthy and alert, no distress     HEENT: no palpable nodules or masses        Musculoskeletal: extremities non tender and without edema    Skin: no lesions or rashes     Neurological: normal gait, no gross defects     Psychiatric: appropriate mood and affect                               Hematological: normal cervical and supraclavicular lymph nodes     Gastrointestinal:       abdomen soft, non-tender, non-distended, 5-6 cm mass midline extending upward from just below the umbilicus.  Firm while patient is standing but soft and easily reducible while laying down.  No erythema, induration, or ecchymosis present.    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  She is s/p BSO, PPLND 7/24/2020 which served as her treatment.  She is here today for an acute visit.     15 minutes were spent with this patient, over 50% of that time was spent in symptom management, treatment planning and in counseling and coordination of care.       Plan:     1.) Ventral hernia: Discussed with patient that her symptoms and exam are consistent with a reducible ventral hernia. Discussed that the concern with a hernia is that bowel become trapped leading to bowel strangulation and tissue death.  This is less of a concern for her because it is not painful and the hernia is soft and easily reducible.  Would recommend an abdominal CT and follow up with general surgery for their recommendations.  Due to COVID this may need to be delayed until spring or the cases in the community decrease.  She has her annual physical next week and she would prefer to have her PCP " order this and refer her to general surgery so she can follow up closer to home if this isn't urgent.  Provided her with an abdominal binder to help support the hernia and keep it reduced.  Recommended maintaining abdominal precautions and wearing the binder while she is active.  She will also share with her physical therapist that she has a likely hernia so they can make recommendations based on that while she is getting therapy.  If she develops fevers, chills, the site becomes more painful, she develops nausea, vomiting, or stops having bowel movements to report to the ED for further evaluation.     2.) RTC in 2 months with provider visit with Dr. Corona, this is already scheduled.  Plan for surveillance visitsevery 3 months for the first 2 years post treatment (7/2022), then every 6 months for 3 years thereafter (7/2025), then annually.  Reviewed signs and symptoms for when she should contact the clinic or seek additional care.  Patient to contact the clinic with any questions or concerns in the interim.      3.) Genetic risk factors were assessed and she does meet qualifications for referral.  This needs to be scheduled.    4.) Labs and/or tests ordered include:  None.     5.) Health maintenance issues addressed today include annual health maintenance and non-gynecologic issues with PCP.       Kerry Sinclair, DNP, APRN, FNP-C  Nurse Practitioner  Division of Gynecologic Oncology  Pager: [phone]     CC  Patient Care Team:  Kathya Escalera DO as PCP - General (Internal Medicine)  Mehran Howell MD as Assigned Musculoskeletal Provider  Felipe Corona MD as Assigned Cancer Care Provider

## 2020-12-08 NOTE — NURSING NOTE
"Oncology Rooming Note    December 8, 2020 1:48 PM   Rosie Blackman is a 80 year old female who presents for:    Chief Complaint   Patient presents with     Oncology Clinic Visit     WOUND CHECK- POSSIBLE HERNIA      Initial Vitals: BP (!) 148/79   Pulse 73   Temp 97.7  F (36.5  C) (Tympanic)   Resp 18   Wt 91 kg (200 lb 9.6 oz)   SpO2 100%   BMI 35.10 kg/m   Estimated body mass index is 35.1 kg/m  as calculated from the following:    Height as of 11/9/20: 1.61 m (5' 3.39\").    Weight as of this encounter: 91 kg (200 lb 9.6 oz). Body surface area is 2.02 meters squared.  No Pain (0) Comment: Data Unavailable   No LMP recorded. Patient has had a hysterectomy.  Allergies reviewed: Yes  Medications reviewed: Yes    Medications: Medication refills not needed today.  Pharmacy name entered into [employer]:    [id number] - [place], [place] - [address] - MAIL ORDER MAIN MEDS - NON-EPRESCRIBE  [facility] - [place], [place] - [address]  [id number] - [place], [place] - [address] [address]    Clinical concerns: Patient states she has a bulge above naval. Patient first noticed it three weeks ago.       Shannan Carter MA            "

## 2020-12-11 ENCOUNTER — HOSPITAL ENCOUNTER (OUTPATIENT)
Dept: PHYSICAL THERAPY | Facility: CLINIC | Age: 80
Setting detail: THERAPIES SERIES
End: 2020-12-11
Attending: PHYSICIAN ASSISTANT
Payer: MEDICARE

## 2020-12-11 PROCEDURE — 97110 THERAPEUTIC EXERCISES: CPT | Mod: GP | Performed by: PHYSICAL MEDICINE & REHABILITATION

## 2020-12-15 ENCOUNTER — HOSPITAL ENCOUNTER (OUTPATIENT)
Dept: PHYSICAL THERAPY | Facility: CLINIC | Age: 80
Setting detail: THERAPIES SERIES
End: 2020-12-15
Attending: PHYSICIAN ASSISTANT
Payer: MEDICARE

## 2020-12-15 ENCOUNTER — OFFICE VISIT (OUTPATIENT)
Dept: FAMILY MEDICINE | Facility: CLINIC | Age: 80
End: 2020-12-15
Payer: MEDICARE

## 2020-12-15 VITALS
HEART RATE: 78 BPM | OXYGEN SATURATION: 97 % | WEIGHT: 201 LBS | BODY MASS INDEX: 34.31 KG/M2 | HEIGHT: 64 IN | RESPIRATION RATE: 16 BRPM | SYSTOLIC BLOOD PRESSURE: 128 MMHG | DIASTOLIC BLOOD PRESSURE: 62 MMHG | TEMPERATURE: 98.3 F

## 2020-12-15 DIAGNOSIS — E78.5 HYPERLIPIDEMIA LDL GOAL <130: ICD-10-CM

## 2020-12-15 DIAGNOSIS — K43.9 VENTRAL HERNIA WITHOUT OBSTRUCTION OR GANGRENE: Primary | ICD-10-CM

## 2020-12-15 DIAGNOSIS — Z13.220 SCREENING FOR HYPERLIPIDEMIA: ICD-10-CM

## 2020-12-15 DIAGNOSIS — N90.4 LICHEN SCLEROSUS ET ATROPHICUS OF THE VULVA: ICD-10-CM

## 2020-12-15 DIAGNOSIS — R45.86 EMOTIONAL LABILITY: ICD-10-CM

## 2020-12-15 DIAGNOSIS — I10 ESSENTIAL HYPERTENSION, BENIGN: ICD-10-CM

## 2020-12-15 DIAGNOSIS — R73.03 PREDIABETES: ICD-10-CM

## 2020-12-15 DIAGNOSIS — Z11.59 NEED FOR HEPATITIS C SCREENING TEST: ICD-10-CM

## 2020-12-15 LAB
ANION GAP SERPL CALCULATED.3IONS-SCNC: 3 MMOL/L (ref 3–14)
BUN SERPL-MCNC: 16 MG/DL (ref 7–30)
CALCIUM SERPL-MCNC: 9.5 MG/DL (ref 8.5–10.1)
CHLORIDE SERPL-SCNC: 104 MMOL/L (ref 94–109)
CHOLEST SERPL-MCNC: 215 MG/DL
CO2 SERPL-SCNC: 32 MMOL/L (ref 20–32)
CREAT SERPL-MCNC: 0.74 MG/DL (ref 0.52–1.04)
GFR SERPL CREATININE-BSD FRML MDRD: 77 ML/MIN/{1.73_M2}
GLUCOSE SERPL-MCNC: 92 MG/DL (ref 70–99)
HBA1C MFR BLD: 5.6 % (ref 0–5.6)
HDLC SERPL-MCNC: 107 MG/DL
LDLC SERPL CALC-MCNC: 82 MG/DL
NONHDLC SERPL-MCNC: 108 MG/DL
POTASSIUM SERPL-SCNC: 3.5 MMOL/L (ref 3.4–5.3)
SODIUM SERPL-SCNC: 139 MMOL/L (ref 133–144)
TRIGL SERPL-MCNC: 131 MG/DL

## 2020-12-15 PROCEDURE — 36415 COLL VENOUS BLD VENIPUNCTURE: CPT | Performed by: INTERNAL MEDICINE

## 2020-12-15 PROCEDURE — 99214 OFFICE O/P EST MOD 30 MIN: CPT | Performed by: INTERNAL MEDICINE

## 2020-12-15 PROCEDURE — 97110 THERAPEUTIC EXERCISES: CPT | Mod: GP | Performed by: PHYSICAL MEDICINE & REHABILITATION

## 2020-12-15 PROCEDURE — 80061 LIPID PANEL: CPT | Performed by: INTERNAL MEDICINE

## 2020-12-15 PROCEDURE — 80048 BASIC METABOLIC PNL TOTAL CA: CPT | Performed by: INTERNAL MEDICINE

## 2020-12-15 PROCEDURE — 86803 HEPATITIS C AB TEST: CPT | Performed by: INTERNAL MEDICINE

## 2020-12-15 PROCEDURE — 83036 HEMOGLOBIN GLYCOSYLATED A1C: CPT | Performed by: INTERNAL MEDICINE

## 2020-12-15 RX ORDER — PRAVASTATIN SODIUM 80 MG/1
80 TABLET ORAL DAILY
Qty: 90 TABLET | Refills: 3 | Status: SHIPPED | OUTPATIENT
Start: 2020-12-15 | End: 2022-01-13

## 2020-12-15 RX ORDER — VALSARTAN AND HYDROCHLOROTHIAZIDE 160; 12.5 MG/1; MG/1
1 TABLET, FILM COATED ORAL DAILY
Qty: 90 TABLET | Refills: 3 | Status: SHIPPED | OUTPATIENT
Start: 2020-12-15 | End: 2021-11-24

## 2020-12-15 RX ORDER — MOMETASONE FUROATE 1 MG/G
CREAM TOPICAL
Qty: 45 G | Refills: 11 | Status: SHIPPED | OUTPATIENT
Start: 2020-12-15 | End: 2023-01-24

## 2020-12-15 ASSESSMENT — ANXIETY QUESTIONNAIRES
1. FEELING NERVOUS, ANXIOUS, OR ON EDGE: NOT AT ALL
6. BECOMING EASILY ANNOYED OR IRRITABLE: NOT AT ALL
IF YOU CHECKED OFF ANY PROBLEMS ON THIS QUESTIONNAIRE, HOW DIFFICULT HAVE THESE PROBLEMS MADE IT FOR YOU TO DO YOUR WORK, TAKE CARE OF THINGS AT HOME, OR GET ALONG WITH OTHER PEOPLE: NOT DIFFICULT AT ALL
2. NOT BEING ABLE TO STOP OR CONTROL WORRYING: NOT AT ALL
7. FEELING AFRAID AS IF SOMETHING AWFUL MIGHT HAPPEN: NOT AT ALL
3. WORRYING TOO MUCH ABOUT DIFFERENT THINGS: NOT AT ALL
GAD7 TOTAL SCORE: 0
5. BEING SO RESTLESS THAT IT IS HARD TO SIT STILL: NOT AT ALL

## 2020-12-15 ASSESSMENT — PATIENT HEALTH QUESTIONNAIRE - PHQ9
SUM OF ALL RESPONSES TO PHQ QUESTIONS 1-9: 0
5. POOR APPETITE OR OVEREATING: NOT AT ALL

## 2020-12-15 ASSESSMENT — MIFFLIN-ST. JEOR: SCORE: 1363.79

## 2020-12-15 NOTE — PROGRESS NOTES
Subjective     Rosie Blackman is a 80 year old female who presents to clinic today for the following health issues:    HPI         Leg pain and back pain still there (80% better) but pain because fusion      Hot flashes still ongoing. - Use primarose for it      Hyperlipidemia Follow-Up      Are you regularly taking any medication or supplement to lower your cholesterol?   Yes- pravastatin 80 mt    Are you having muscle aches or other side effects that you think could be caused by your cholesterol lowering medication?  No    Hypertension Follow-up      Do you check your blood pressure regularly outside of the clinic? Yes     Are you following a low salt diet? Yes    Are your blood pressures ever more than 140 on the top number (systolic) OR more   than 90 on the bottom number (diastolic), for example 140/90? No    Elevated heart rate - 66 - [facility] told her that she had an elevated heart rate, no palpitations     Muscular: Swelling ankles, torn both ankles years ago and sometimes get swelling ( normal in the morning), since torn it has some aches can be related to other issues listed on the problem list.   --history of flat feet, plantar fascitis.  Long standing history of ankle swelling since injury years ago.  Has seen ortho and worn orthotics.    Breast exam today: no pain but wants to have an exam.  Has appointment for mammo end of month.  Wants routine breast exam today.    Mental health: having a hard time, doesn't want medication.  Feeling more and tearful recently.  --can cry with thinking about grandchildren, or singing a hymm at [employer].  Is bothered by this because it is embarrassing.  Does not feel stressed, anxious or depressed.  This started before her ovarian cancer diagnosis - ongoing for several years      Back surgery 10/13/20 [facility] - ongoing physical therapy.    Surgery done Hysterectomy: 7/9/2020      Ventral Hernia: Need direction about a hernia, doesn't know what is the next step. Was told to  "use a belt, using for support, but was also told that this could cause BM blockage, BM normal. - wonder if that is true.  --the brace is helping  --oncology recommended CT and surgery follow-up.  --mass is only present with standing, but not laying flat      Review of Systems   Constitutional, HEENT, cardiovascular, pulmonary, GI, , musculoskeletal, neuro, skin, endocrine and psych systems are negative, except as otherwise noted.      Objective    /62   Pulse 78   Temp 98.3  F (36.8  C) (Tympanic)   Resp 16   Ht 1.613 m (5' 3.5\")   Wt 91.2 kg (201 lb)   SpO2 97%   Breastfeeding No   BMI 35.05 kg/m    Body mass index is 35.05 kg/m .  Physical Exam   GENERAL APPEARANCE: healthy, alert, no distress and over weight  BREAST: normal without masses, tenderness or nipple discharge and no palpable axillary masses or adenopathy  LYMPHATICS: 1+ pitting edema bilateral legs to knees  ABDOMEN: soft, obese, large ventral hernia easily reducible, above umbilicus   MS: flat feet            Assessment & Plan     Ventral hernia without obstruction or gangrene -continue binder.  Further work-up with CT and surgery evaluation.  - CT Abdomen Pelvis w Contrast; Future  - GENERAL SURG ADULT REFERRAL; Future    Emotional lability - Discussed that this is not pathologic and medications do not typically help with this.  Talk therapy may be beneficial, referral placed    Lichen sclerosus et atrophicus of the vulva -  - stable, refill provided  - mometasone (ELOCON) 0.1 % external cream; Apply sparingly to affected area twice daily for 2 weeks then decrease to 1 time daily for 30 days then decrease to 2-3 times per week    Hyperlipidemia LDL goal <130- stable, refill provided  - pravastatin (PRAVACHOL) 80 MG tablet; Take 1 tablet (80 mg) by mouth daily    Essential hypertension, benign- stable, refill provided  - valsartan-hydrochlorothiazide (DIOVAN HCT) 160-12.5 MG tablet; Take 1 tablet by mouth daily    Screening for " "hyperlipidemia  - Lipid panel reflex to direct LDL Non-fasting  - Basic metabolic panel    Need for hepatitis C screening test  - Hepatitis C Screen Reflex to HCV RNA Quant and Genotype    Prediabetes  - Hemoglobin A1c     BMI:   Estimated body mass index is 35.05 kg/m  as calculated from the following:    Height as of this encounter: 1.613 m (5' 3.5\").    Weight as of this encounter: 91.2 kg (201 lb).   Weight management plan: Discussed healthy diet and exercise guidelines         Patient Instructions   Medications:  1. Refills sent  2. Non-fasting blood work today    Weepin. Consider follow-up with talk therapist.  I am referring you to [facility] [place] for emotions; please make your initial appointment for 1 hour.  The phone number is [phone] or you can schedule at the  on your way out.    Hernia:   1. Order for CT scan  2. Referral to surgeon  3. Wear binding  4. Be seen if severe pain, redness or severe tenderness over hernia site, severe constipation and abdominal pain.    Ankle Swellin. Monitor, no treatment for now.  Continue compression.  2. Watch for cellulitis and non-healing wounds.          Return in about 53 weeks (around 2021) for Annual Wellness Visit.    Kathya Escalera DO  [facility]    "

## 2020-12-15 NOTE — LETTER
December 16, 2020      Rosie Blackman  [address] [address] [address]        Dear ,    We are writing to inform you of your test results.    Cholesterol is improved from 1 year ago.   Kidney function, blood sugar, electrolytes are normal.    The hemoglobin A1c is in the nondiabetic range which is good.    Resulted Orders   Lipid panel reflex to direct LDL Non-fasting   Result Value Ref Range    Cholesterol 215 (H) <200 mg/dL      Comment:      Desirable:       <200 mg/dl    Triglycerides 131 <150 mg/dL    HDL Cholesterol 107 >49 mg/dL    LDL Cholesterol Calculated 82 <100 mg/dL      Comment:      Desirable:       <100 mg/dl    Non HDL Cholesterol 108 <130 mg/dL   Basic metabolic panel   Result Value Ref Range    Sodium 139 133 - 144 mmol/L    Potassium 3.5 3.4 - 5.3 mmol/L    Chloride 104 94 - 109 mmol/L    Carbon Dioxide 32 20 - 32 mmol/L    Anion Gap 3 3 - 14 mmol/L    Glucose 92 70 - 99 mg/dL    Urea Nitrogen 16 7 - 30 mg/dL    Creatinine 0.74 0.52 - 1.04 mg/dL    GFR Estimate 77 >60 mL/min/[1.73_m2]      Comment:      Non  GFR Calc  Starting 12/18/2018, serum creatinine based estimated GFR (eGFR) will be   calculated using the Chronic Kidney Disease Epidemiology Collaboration   (CKD-EPI) equation.      GFR Estimate If Black 89 >60 mL/min/[1.73_m2]      Comment:       GFR Calc  Starting 12/18/2018, serum creatinine based estimated GFR (eGFR) will be   calculated using the Chronic Kidney Disease Epidemiology Collaboration   (CKD-EPI) equation.      Calcium 9.5 8.5 - 10.1 mg/dL   Hemoglobin A1c   Result Value Ref Range    Hemoglobin A1C 5.6 0 - 5.6 %      Comment:      Normal <5.7% Prediabetes 5.7-6.4%  Diabetes 6.5% or higher - adopted from ADA   consensus guidelines.         If you have any questions or concerns, please call the clinic at the number listed above.       Sincerely,      Kathya Escalera, DO

## 2020-12-15 NOTE — PATIENT INSTRUCTIONS
Medications:  1. Refills sent  2. Non-fasting blood work today    Weepin. Consider follow-up with talk therapist.  I am referring you to [facility] [place] for emotions; please make your initial appointment for 1 hour.  The phone number is [phone] or you can schedule at the  on your way out.    Hernia:   1. Order for CT scan  2. Referral to surgeon  3. Wear binding  4. Be seen if severe pain, redness or severe tenderness over hernia site, severe constipation and abdominal pain.    Ankle Swellin. Monitor, no treatment for now.  Continue compression.  2. Watch for cellulitis and non-healing wounds.

## 2020-12-16 LAB — HCV AB SERPL QL IA: NONREACTIVE

## 2020-12-16 ASSESSMENT — ANXIETY QUESTIONNAIRES: GAD7 TOTAL SCORE: 0

## 2020-12-17 ENCOUNTER — HOSPITAL ENCOUNTER (OUTPATIENT)
Dept: CT IMAGING | Facility: CLINIC | Age: 80
Discharge: HOME | End: 2020-12-17
Attending: INTERNAL MEDICINE | Admitting: INTERNAL MEDICINE
Payer: MEDICARE

## 2020-12-17 DIAGNOSIS — K43.9 VENTRAL HERNIA WITHOUT OBSTRUCTION OR GANGRENE: ICD-10-CM

## 2020-12-17 PROCEDURE — 250N000009 HC RX 250: Performed by: INTERNAL MEDICINE

## 2020-12-17 PROCEDURE — 74177 CT ABD & PELVIS W/CONTRAST: CPT

## 2020-12-17 PROCEDURE — 250N000011 HC RX IP 250 OP 636: Performed by: INTERNAL MEDICINE

## 2020-12-17 RX ORDER — IOPAMIDOL 755 MG/ML
98 INJECTION, SOLUTION INTRAVASCULAR ONCE
Status: COMPLETED | OUTPATIENT
Start: 2020-12-17 | End: 2020-12-17

## 2020-12-17 RX ADMIN — SODIUM CHLORIDE 65 ML: 9 INJECTION, SOLUTION INTRAVENOUS at 16:35

## 2020-12-17 RX ADMIN — IOPAMIDOL 98 ML: 755 INJECTION, SOLUTION INTRAVENOUS at 16:35

## 2020-12-18 ENCOUNTER — HOSPITAL ENCOUNTER (OUTPATIENT)
Dept: PHYSICAL THERAPY | Facility: CLINIC | Age: 80
Setting detail: THERAPIES SERIES
End: 2020-12-18
Attending: PHYSICIAN ASSISTANT
Payer: MEDICARE

## 2020-12-18 PROCEDURE — 97110 THERAPEUTIC EXERCISES: CPT | Mod: GP | Performed by: PHYSICAL MEDICINE & REHABILITATION

## 2020-12-21 ENCOUNTER — OFFICE VISIT (OUTPATIENT)
Dept: SURGERY | Facility: CLINIC | Age: 80
End: 2020-12-21
Attending: INTERNAL MEDICINE
Payer: MEDICARE

## 2020-12-21 VITALS
BODY MASS INDEX: 34.31 KG/M2 | DIASTOLIC BLOOD PRESSURE: 70 MMHG | HEIGHT: 64 IN | HEART RATE: 76 BPM | SYSTOLIC BLOOD PRESSURE: 131 MMHG | TEMPERATURE: 98.6 F | WEIGHT: 201 LBS

## 2020-12-21 DIAGNOSIS — K43.2 INCISIONAL HERNIA, WITHOUT OBSTRUCTION OR GANGRENE: Primary | ICD-10-CM

## 2020-12-21 DIAGNOSIS — K43.9 VENTRAL HERNIA WITHOUT OBSTRUCTION OR GANGRENE: ICD-10-CM

## 2020-12-21 PROCEDURE — 99204 OFFICE O/P NEW MOD 45 MIN: CPT | Performed by: SURGERY

## 2020-12-21 ASSESSMENT — MIFFLIN-ST. JEOR: SCORE: 1363.79

## 2020-12-21 NOTE — LETTER
12/21/2020         RE: Rosie Blackman  [address] [address]        Dear Colleague,    Thank you for referring your patient, Rosie Blackman, to the [facility]. Please see a copy of my visit note below.    PCP:  Kathya Escalera    Chief complaint: Incisional hernia    History of Present Illness: Rosie is a 80-year-old female who presents to see me with an incisional hernia that she is known about for about 1 month.  She has had a fairly remarkable year beginning in July when she had an MRI scan of her spine and was discovered to have a large right ovarian mass.  She was seen by gynecology and underwent a laparoscopic bilateral salpingo-oophorectomy which had to be converted to a laparotomy just to get the specimen removed.    The tumor was found to be a stage Ia ovarian cancer.  No further treatment was recommended.  As part of surgery she had an omentectomy and pelvic lymph nodes as well as periaortic lymph nodes sampled.    She recovered from this surgery only to go on and have back surgery.  She had fusion of 3 spinal levels in October and is still on the mend from that surgery.    About a month later she was found to have a hernia that she had not appreciated before she felt a lump that came out around her umbilicus.  A subsequent CT scan revealed a midline ventral hernia.  The patient wears an elastic binder because it makes her feel better.  Otherwise, the hernia does not cause her any pain nor has it caused her any bowel difficulties.  The hernia contains small bowel and has a fairly wide neck of about 4 cm.  Incidentally noted was also a left inguinal hernia.    She is here for consideration of repair.    Histories:  Past Medical History:   Diagnosis Date     Chronic airway obstruction (H)      Gastroesophageal reflux disease      Hiatal hernia      Hypertension      Personal history of contact with and (suspected) exposure to asbestos      Primary  osteoarthritis of right hip 2020       Past Surgical History:   Procedure Laterality Date     BREAST BIOPSY, CORE RT/LT       C ANESTH,KNEE VEINS SURGERY  2014     CHOLECYSTECTOMY       COLONOSCOPY  2010    Diverticulosis, colon polyps; repeat 5 yrs     COLONOSCOPY N/A 2015    Procedure: COLONOSCOPY;  Surgeon: Alejandro Matos MD;  Location: [facility]     Colonoscopy, hyperplastic polyps, repeat in 5 yrs       ESOPHAGOSCOPY, GASTROSCOPY, DUODENOSCOPY (EGD), COMBINED  2013    Procedure: COMBINED ESOPHAGOSCOPY, GASTROSCOPY, DUODENOSCOPY (EGD), BIOPSY SINGLE OR MULTIPLE;  Gastroscopy;  Surgeon: Blaise Gill MD;  Location: [facility]     EYE SURGERY      R/L Cataracts     HC REMOVE TONSILS/ADENOIDS,12+ Y/O      T & A 12+y.o.     HYSTERECTOMY, PAP NO LONGER INDICATED       LAPAROSCOPIC SALPINGO-OOPHORECTOMY N/A 2020    Procedure: Laparoscopy, removal or pelvic mass, both tubes and ovaries, omentectomy, anterior culvectomy, pelvic and para aortic lymph node dissection, laparotomy;  Surgeon: Felipe Corona MD;  Location: [facility] OR     UT ANESTH,LUMBAR SPINE,CORD SURGERY  10/2020    L3-4 L4-5 TRANSFORAMINAL INTERBODY FUSION L3-5, POSTERIOR INSTRUMENTED FUSION AND DECOMPRESSION     TVH for DUB, ovaries in       VASCULAR SURGERY      [place] closure       Family History   Problem Relation Age of Onset     Cancer Mother         uterine cancer,      Respiratory Mother         COPD     Cardiovascular Mother         AAA  age 80     Breast Cancer Maternal Grandmother      Cancer Maternal Grandfather         cancer unknown type     Breast Cancer Sister      Eye Disorder Father         cataracts     Depression Father      Depression Son      Depression Son      Breast Cancer Sister         X2     Cancer - colorectal No family hx of         no ovarian cancer       Social History     Tobacco Use     Smoking status: Never Smoker     Smokeless tobacco: Never Used   Substance Use  Topics     Alcohol use: No       Current Outpatient Medications   Medication Sig Dispense Refill     Evening Primrose Oil 1000 MG CAPS One daily       mometasone (ELOCON) 0.1 % external cream Apply sparingly to affected area twice daily for 2 weeks then decrease to 1 time daily for 30 days then decrease to 2-3 times per week 45 g 11     omega 3 1000 MG CAPS Take by mouth daily        pravastatin (PRAVACHOL) 80 MG tablet Take 1 tablet (80 mg) by mouth daily 90 tablet 3     THERATEARS 0.25 % OP SOLN BID       valsartan-hydrochlorothiazide (DIOVAN HCT) 160-12.5 MG tablet Take 1 tablet by mouth daily 90 tablet 3     VIACTIV 500-100-40 OR CHEW once daily       Naproxen Sodium (ALEVE PO)          Allergies   Allergen Reactions     Ezetimibe Other (See Comments)     Severe muscle aches     Lisinopril      Omeprazole      Other reaction(s): *Unknown     Prilosec [Omeprazole]      Zestril [Ace Inhibitors] Cough     Atorvastatin Other (See Comments)     Muscle Pain     Irbesartan Cramps     Rosuvastatin Other (See Comments)     Muscle Pain       Images:  Recent Results (from the past 744 hour(s))   CT Abdomen Pelvis w Contrast    Narrative    CT ABDOMEN PELVIS W CONTRAST 12/17/2020 4:53 PM    HISTORY: Ventral hernia. Prior ovarian malignancy. Pain.    CONTRAST:  98 ml [id number].    TECHNIQUE: CT of the abdomen and pelvis is performed with IV contrast.    Routine assessed structures include the liver, spleen, pancreas,  adrenal glands, and kidneys. Other assessed structures include the  retroperitoneum, abdominal aorta, visualized gastrointestinal tract,  and abdominal wall. Intrapelvic anatomy is also assessed.    Radiation dose for this scan is reduced using automated exposure  control, adjustment of the mA and/or kV according to patient size, or  iterative reconstruction technique.    COMPARISON: 7/9/2020.    FINDINGS:    Abdomen: At the superior margin all the umbilicus a ventral hernia is  present that contains multiple  "small bowel loops. This is not causing  bowel obstruction at this time. Fatty infiltration all the liver is  present. A 0.9 cm right lobe liver lesion on axial image 13 is stable.  Its stability is supportive evidence for a benign entity. A 2.8 cm  benign cyst is present in the left kidney.    Pelvis:  Sigmoid diverticulosis is present. A fat-containing left  inguinal hernia is unchanged. There is a modest free pelvic fluid, not  present on the prior study. Subcentimeter in short axis dimension  right groin lymph nodes are stable and not discretely enlarged by CT  criteria.      Impression    IMPRESSION:    1. A ventral hernia is present, as described above.  2. There is modest free pelvic fluid which is nonspecific.    ODETTE WINCHESTER MD       Labs:  No results found for any visits on 12/21/20.    ROS:  Constitutional - Denies fevers, weight loss, malaise, lethargy  Neuro - Denies tremors or seizures  Pulmon - Denies SOB, dyspnea, hemoptysis, chronic cough or use of an inhaler  CV - Denies CP, SOB  GI - Denies hematemesis, BRBPR, melena or epigastric pain   - Denies hematuria, difficulty voiding  Hematology - Denies blood clotting disorders, chronic anemias  Dermatology - No melanomas or skin cancers  Rheumatology - No h/o RA  Pysch - Denies depression, bipolar d/o or schizophrenia    /70 (BP Location: Right arm, Patient Position: Sitting, Cuff Size: Adult Regular)   Pulse 76   Temp 98.6  F (37  C) (Tympanic)   Ht 1.613 m (5' 3.5\")   Wt 91.2 kg (201 lb)   BMI 35.05 kg/m      Exam:  General - Alert and Oriented X4, NAD, well nourished  HEENT - Normocephalic, atraumatic  Neck - supple, no LAD  Lungs-respirations on labored, chest wall excursion normal  CV - Heart RRR  Abdomen - Soft, non-tender, healing infraumbilical midline incision with palpable hernia in the retroumbilical area.  Even while standing I could reduce the hernia.  I estimated the defect about 4 to 5 cm.    Neuro -  intact  Extremities - No " cyanosis, clubbing or edema.      Assessment and Plan: Rosie has a modest sized incisional hernia.  It is easily reduced and fairly asymptomatic especially while she is wearing her elastic binder.  She has had no symptoms of bowel obstruction.  I think the likelihood of incarceration is small and given all the other events in her life recently, I think that we can hold off on repairing this for the time being.  Its fairly soon after her ovarian surgery and we know that her omentum is gone.  Therefore, I expect there to be a fair amount of adhesions to the anterior abdominal wall.    In short, she has an asymptomatic low risk hernia at this point and repair, in my opinion, carries a higher risk.  I think it is fine to wait and I would like to see her back in about 2 months to see how she is feeling and also to see how she is doing in terms of her malignancy.  I would hate to put her through the trauma of a repair only to have her have recurrent disease.    She is comfortable with that plan.  I have asked her to call me if there is any questions or problems, or if she becomes symptomatic.  Otherwise, use the elastic band day and night as needed for comfort.  I will see her in 2 months.    Aayush George MD FACS        Aayush George MD               Again, thank you for allowing me to participate in the care of your patient.        Sincerely,        Aayush George MD     Yes

## 2020-12-21 NOTE — PATIENT INSTRUCTIONS
Per physician instructions      If you have questions or concerns on any instructions given to you by your provider today or if you need to schedule an appointment, you can reach us at [phone].

## 2020-12-21 NOTE — NURSING NOTE
"Initial /70 (BP Location: Right arm, Patient Position: Sitting, Cuff Size: Adult Regular)   Pulse 76   Temp 98.6  F (37  C) (Tympanic)   Ht 1.613 m (5' 3.5\")   Wt 91.2 kg (201 lb)   BMI 35.05 kg/m   Estimated body mass index is 35.05 kg/m  as calculated from the following:    Height as of this encounter: 1.613 m (5' 3.5\").    Weight as of this encounter: 91.2 kg (201 lb). .    Krista Francois CMA    "

## 2020-12-21 NOTE — PROGRESS NOTES
PCP:  Kathya Escalera    Chief complaint: Incisional hernia    History of Present Illness: Rosie is a 80-year-old female who presents to see me with an incisional hernia that she is known about for about 1 month.  She has had a fairly remarkable year beginning in July when she had an MRI scan of her spine and was discovered to have a large right ovarian mass.  She was seen by gynecology and underwent a laparoscopic bilateral salpingo-oophorectomy which had to be converted to a laparotomy just to get the specimen removed.    The tumor was found to be a stage Ia ovarian cancer.  No further treatment was recommended.  As part of surgery she had an omentectomy and pelvic lymph nodes as well as periaortic lymph nodes sampled.    She recovered from this surgery only to go on and have back surgery.  She had fusion of 3 spinal levels in October and is still on the mend from that surgery.    About a month later she was found to have a hernia that she had not appreciated before she felt a lump that came out around her umbilicus.  A subsequent CT scan revealed a midline ventral hernia.  The patient wears an elastic binder because it makes her feel better.  Otherwise, the hernia does not cause her any pain nor has it caused her any bowel difficulties.  The hernia contains small bowel and has a fairly wide neck of about 4 cm.  Incidentally noted was also a left inguinal hernia.    She is here for consideration of repair.    Histories:  Past Medical History:   Diagnosis Date     Chronic airway obstruction (H)      Gastroesophageal reflux disease      Hiatal hernia      Hypertension      Personal history of contact with and (suspected) exposure to asbestos      Primary osteoarthritis of right hip 7/9/2020       Past Surgical History:   Procedure Laterality Date     BREAST BIOPSY, CORE RT/LT  1994     C ANESTH,KNEE VEINS SURGERY  08/20/2014     CHOLECYSTECTOMY  1995     COLONOSCOPY  05/2010    Diverticulosis, colon polyps;  repeat 5 yrs     COLONOSCOPY N/A 2015    Procedure: COLONOSCOPY;  Surgeon: Alejandro Matos MD;  Location: [facility]     Colonoscopy, hyperplastic polyps, repeat in 5 yrs       ESOPHAGOSCOPY, GASTROSCOPY, DUODENOSCOPY (EGD), COMBINED  2013    Procedure: COMBINED ESOPHAGOSCOPY, GASTROSCOPY, DUODENOSCOPY (EGD), BIOPSY SINGLE OR MULTIPLE;  Gastroscopy;  Surgeon: Blaise Gill MD;  Location: [facility]     EYE SURGERY      R/L Cataracts     HC REMOVE TONSILS/ADENOIDS,12+ Y/O      T & A 13+y.o.     HYSTERECTOMY, PAP NO LONGER INDICATED       LAPAROSCOPIC SALPINGO-OOPHORECTOMY N/A 2020    Procedure: Laparoscopy, removal or pelvic mass, both tubes and ovaries, omentectomy, anterior culvectomy, pelvic and para aortic lymph node dissection, laparotomy;  Surgeon: Felipe Corona MD;  Location: [facility]     [place] ANESTH,LUMBAR SPINE,CORD SURGERY  10/2020    L3-4 L4-5 TRANSFORAMINAL INTERBODY FUSION L3-5, POSTERIOR INSTRUMENTED FUSION AND DECOMPRESSION     TVH for DUB, ovaries in       VASCULAR SURGERY      Taylor closure       Family History   Problem Relation Age of Onset     Cancer Mother         uterine cancer,      Respiratory Mother         COPD     Cardiovascular Mother         AAA  age 80     Breast Cancer Maternal Grandmother      Cancer Maternal Grandfather         cancer unknown type     Breast Cancer Sister      Eye Disorder Father         cataracts     Depression Father      Depression Son      Depression Son      Breast Cancer Sister         X2     Cancer - colorectal No family hx of         no ovarian cancer       Social History     Tobacco Use     Smoking status: Never Smoker     Smokeless tobacco: Never Used   Substance Use Topics     Alcohol use: No       Current Outpatient Medications   Medication Sig Dispense Refill     Evening Primrose Oil 1000 MG CAPS One daily       mometasone (ELOCON) 0.1 % external cream Apply sparingly to affected area twice daily for 2 weeks then  decrease to 1 time daily for 30 days then decrease to 2-3 times per week 45 g 11     omega 3 1000 MG CAPS Take by mouth daily        pravastatin (PRAVACHOL) 80 MG tablet Take 1 tablet (80 mg) by mouth daily 90 tablet 3     THERATEARS 0.25 % OP SOLN BID       valsartan-hydrochlorothiazide (DIOVAN HCT) 160-12.5 MG tablet Take 1 tablet by mouth daily 90 tablet 3     VIACTIV 500-100-40 OR CHEW once daily       Naproxen Sodium (ALEVE PO)          Allergies   Allergen Reactions     Ezetimibe Other (See Comments)     Severe muscle aches     Lisinopril      Omeprazole      Other reaction(s): *Unknown     Prilosec [Omeprazole]      Zestril [Ace Inhibitors] Cough     Atorvastatin Other (See Comments)     Muscle Pain     Irbesartan Cramps     Rosuvastatin Other (See Comments)     Muscle Pain       Images:  Recent Results (from the past 744 hour(s))   CT Abdomen Pelvis w Contrast    Narrative    CT ABDOMEN PELVIS W CONTRAST 12/17/2020 4:53 PM    HISTORY: Ventral hernia. Prior ovarian malignancy. Pain.    CONTRAST:  98 ml Isovue-[id number].    TECHNIQUE: CT of the abdomen and pelvis is performed with IV contrast.    Routine assessed structures include the liver, spleen, pancreas,  adrenal glands, and kidneys. Other assessed structures include the  retroperitoneum, abdominal aorta, visualized gastrointestinal tract,  and abdominal wall. Intrapelvic anatomy is also assessed.    Radiation dose for this scan is reduced using automated exposure  control, adjustment of the mA and/or kV according to patient size, or  iterative reconstruction technique.    COMPARISON: 7/9/2020.    FINDINGS:    Abdomen: At the superior margin all the umbilicus a ventral hernia is  present that contains multiple small bowel loops. This is not causing  bowel obstruction at this time. Fatty infiltration all the liver is  present. A 0.9 cm right lobe liver lesion on axial image 13 is stable.  Its stability is supportive evidence for a benign entity. A 2.8  "cm  benign cyst is present in the left kidney.    Pelvis:  Sigmoid diverticulosis is present. A fat-containing left  inguinal hernia is unchanged. There is a modest free pelvic fluid, not  present on the prior study. Subcentimeter in short axis dimension  right groin lymph nodes are stable and not discretely enlarged by CT  criteria.      Impression    IMPRESSION:    1. A ventral hernia is present, as described above.  2. There is modest free pelvic fluid which is nonspecific.    ODETTE WINCHESTER MD       Labs:  No results found for any visits on 12/21/20.    ROS:  Constitutional - Denies fevers, weight loss, malaise, lethargy  Neuro - Denies tremors or seizures  Pulmon - Denies SOB, dyspnea, hemoptysis, chronic cough or use of an inhaler  CV - Denies CP, SOB  GI - Denies hematemesis, BRBPR, melena or epigastric pain   - Denies hematuria, difficulty voiding  Hematology - Denies blood clotting disorders, chronic anemias  Dermatology - No melanomas or skin cancers  Rheumatology - No h/o RA  Pysch - Denies depression, bipolar d/o or schizophrenia    /70 (BP Location: Right arm, Patient Position: Sitting, Cuff Size: Adult Regular)   Pulse 76   Temp 98.6  F (37  C) (Tympanic)   Ht 1.613 m (5' 3.5\")   Wt 91.2 kg (201 lb)   BMI 35.05 kg/m      Exam:  General - Alert and Oriented X4, NAD, well nourished  HEENT - Normocephalic, atraumatic  Neck - supple, no LAD  Lungs-respirations on labored, chest wall excursion normal  CV - Heart RRR  Abdomen - Soft, non-tender, healing infraumbilical midline incision with palpable hernia in the retroumbilical area.  Even while standing I could reduce the hernia.  I estimated the defect about 4 to 5 cm.    Neuro -  intact  Extremities - No cyanosis, clubbing or edema.      Assessment and Plan: Rosie has a modest sized incisional hernia.  It is easily reduced and fairly asymptomatic especially while she is wearing her elastic binder.  She has had no symptoms of bowel obstruction.  " I think the likelihood of incarceration is small and given all the other events in her life recently, I think that we can hold off on repairing this for the time being.  Its fairly soon after her ovarian surgery and we know that her omentum is gone.  Therefore, I expect there to be a fair amount of adhesions to the anterior abdominal wall.    In short, she has an asymptomatic low risk hernia at this point and repair, in my opinion, carries a higher risk.  I think it is fine to wait and I would like to see her back in about 2 months to see how she is feeling and also to see how she is doing in terms of her malignancy.  I would hate to put her through the trauma of a repair only to have her have recurrent disease.    She is comfortable with that plan.  I have asked her to call me if there is any questions or problems, or if she becomes symptomatic.  Otherwise, use the elastic band day and night as needed for comfort.  I will see her in 2 months.    Aayush George MD FACS        Aayush George MD

## 2020-12-22 ENCOUNTER — HOSPITAL ENCOUNTER (OUTPATIENT)
Dept: PHYSICAL THERAPY | Facility: CLINIC | Age: 80
Setting detail: THERAPIES SERIES
End: 2020-12-22
Attending: PHYSICIAN ASSISTANT
Payer: MEDICARE

## 2020-12-22 ENCOUNTER — TELEPHONE (OUTPATIENT)
Dept: ONCOLOGY | Facility: CLINIC | Age: 80
End: 2020-12-22

## 2020-12-22 PROCEDURE — 97110 THERAPEUTIC EXERCISES: CPT | Mod: GP | Performed by: PHYSICAL MEDICINE & REHABILITATION

## 2020-12-22 NOTE — TELEPHONE ENCOUNTER
Called patient to let her know that the fluid seen on her abdominal CT was reviewed with Dr. Corona who feels this isn't a concern.  Patient was appreciative of phone call.  DELMY Gagnon CNP

## 2020-12-29 ENCOUNTER — HOSPITAL ENCOUNTER (OUTPATIENT)
Dept: PHYSICAL THERAPY | Facility: CLINIC | Age: 80
Setting detail: THERAPIES SERIES
End: 2020-12-29
Attending: PHYSICIAN ASSISTANT
Payer: MEDICARE

## 2020-12-29 ENCOUNTER — HOSPITAL ENCOUNTER (OUTPATIENT)
Dept: MAMMOGRAPHY | Facility: CLINIC | Age: 80
Discharge: HOME | End: 2020-12-29
Attending: INTERNAL MEDICINE | Admitting: INTERNAL MEDICINE
Payer: MEDICARE

## 2020-12-29 DIAGNOSIS — Z12.31 VISIT FOR SCREENING MAMMOGRAM: ICD-10-CM

## 2020-12-29 PROCEDURE — 97110 THERAPEUTIC EXERCISES: CPT | Mod: GP | Performed by: PHYSICAL MEDICINE & REHABILITATION

## 2020-12-29 PROCEDURE — 77063 BREAST TOMOSYNTHESIS BI: CPT

## 2021-01-03 NOTE — PLAN OF CARE
Discharge Planner OT   Patient plan for discharge: Home w/assist from    Current status: Able to recall 2/3 precautions IND'ly. SBA donning socks w/figure-four technique while seated. Ambulated ~200ft w/CGA and no AE, unsteady on feet and intermittent furniture walking/ Pt ascended/descended 4 steps w/SBA, no LOB noted.   Barriers to return to prior living situation: medical status, post-surgical precautions, activity tolerance  Recommendations for discharge: Home w/Assist for heavier IADLs   Rationale for recommendations: Pt's ADL performance currently below baseline. Pt will benefit from skilled OT to progress activity tolerance and independence, within precautions.        Entered by: Maricruz Moffett 07/26/2020 11:53 AM        yes

## 2021-01-04 ENCOUNTER — VIRTUAL VISIT (OUTPATIENT)
Dept: BEHAVIORAL HEALTH | Facility: CLINIC | Age: 81
End: 2021-01-04
Payer: MEDICARE

## 2021-01-04 DIAGNOSIS — F43.23 ADJUSTMENT DISORDER WITH MIXED ANXIETY AND DEPRESSED MOOD: Primary | ICD-10-CM

## 2021-01-04 PROCEDURE — 90834 PSYTX W PT 45 MINUTES: CPT | Mod: 95 | Performed by: SOCIAL WORKER

## 2021-01-04 NOTE — PROGRESS NOTES
"Rosie Blackman is a 81 year old female who is being evaluated via a telephone visit.      The patient has been notified of the following:     \"We have found that certain health care needs can be provided without the need for a face to face visit.  This service lets us provide the care you need with a short phone conversation.      I will have full access to your [place] medical record during this entire phone call.   I will be taking notes for your medical record.     Since this is like an office visit, we will bill your insurance company for this service.  Please check with your medical insurance if this type of telephone visit/virtual care is covered.  You may be responsible for the cost of this service if insurance coverage is denied.      There are potential benefits and risks of telephone visits (e.g. limits to patient confidentiality) that differ from in-person visits.?  Confidentiality still applies for telephone services, and nobody will record the visit.  It is important to be in a quiet, private space that is free of distractions (including cell phone or other devices) during the visit.??     If during the course of the call I believe a telephone visit is not appropriate, you will not be charged for this service\"    Consent has been obtained for this service by care team member: yes.    Start time: 240 pm    End time: 330 pm    Started with video switched to telephone due to connection issues    [facility] Primary Care: Integrated Behavioral Health  January 4, 2021      Behavioral Health Clinician Progress Note    Patient Name: Rosie Blackman           Service Type: Phone Visit      Service Location:  at the patient's home      Session Length: 38 - 52      Attendees: Patient    Visit Activities (Refresh list every visit): [facility] Only    Diagnostic Assessment Date: not completed  Treatment Plan Review Date: not completed  See Flowsheets for today's PHQ-9 and GEE-7 results  Previous " PHQ-9:   PHQ-9 SCORE 10/25/2016 12/15/2020 1/4/2021   PHQ-9 Total Score 0 0 0     Previous GEE-7:   GEE-7 SCORE 12/15/2020   Total Score 0       BAILEY LEVEL:  BAILEY Score (Last Two) 4/1/2011   BAILEY Raw Score 41   Activation Score 63.2   BAILEY Level 3       DATA  Extended Session (60+ minutes): No  Interactive Complexity: No  Crisis: No    Treatment Objective(s) Addressed in This Session:  Target Behavior(s): disease management/lifestyle changes decrease intensity of emotional responses - when does not fit event    Adjustment Difficulties: will develop coping/problem-solving skills to facilitate more adaptive adjustment  Psychological distress related to Chronic Disease Management    Current Stressors / Issues:  First session with patient. She  Is referred by PCP. Patient reports an increase in uncomfortable emotional responses to what is perceived as non stressful triggers. Patient reports getting tears in her eyes with different memories and mention of different people in her family. Patient has had numerous health issues over the past months and has had surgery. She reports no hx of mental health issues or suicidal/homicidal ideation or plan.  Focused on symptoms using CBT approach. Patient will return in two weeks.       Progress on Treatment Objective(s) / Homework:  none established    Motivational Interviewing    MI Intervention: Expressed Empathy/Understanding, Supported Autonomy, Collaboration, Evocation, Open-ended questions, Reflections: simple and complex, Change talk (evoked) and Reframe     Change Talk Expressed by the Patient: Desire to change Reasons to change Need to change Committment to change    Provider Response to Change Talk: E - Evoked more info from patient about behavior change, A - Affirmed patient's thoughts, decisions, or attempts at behavior change, R - Reflected patient's change talk and S - Summarized patient's change talk statements      Care Plan review completed: No    Medication  Review:  No current psychiatric medications prescribed    Medication Compliance:  NA    Changes in Health Issues:   Yes: Chronic disease management, Associated Psychological Distress    Chemical Use Review:   Substance Use: Chemical use reviewed, no active concerns identified      Tobacco Use: No current tobacco use.      Assessment: Current Emotional / Mental Status (status of significant symptoms):  Risk status (Self / Other harm or suicidal ideation)  Patient denies a history of suicidal ideation, suicide attempts, self-injurious behavior, homicidal ideation, homicidal behavior and and other safety concerns  Patient denies current fears or concerns for personal safety.  Patient denies current or recent suicidal ideation or behaviors.  Patient denies current or recent homicidal ideation or behaviors.  Patient denies current or recent self injurious behavior or ideation.  Patient denies other safety concerns.  A safety and risk management plan has not been developed at this time, however patient was encouraged to call [place] [phone] should there be a change in any of these risk factors.    Appearance:   phone visit   Eye Contact:   phone visit   Psychomotor Behavior: phone visit   Attitude:   Cooperative  Interested Friendly Pleasant Attentive Virgil  Orientation:   All  Speech   Rate / Production: Normal/ Responsive Normal    Volume:  Normal   Mood:    Normal Sad   Affect:    phone visit   Thought Content:  Clear   Thought Form:  Coherent  Logical   Insight:    Good     Diagnoses:  1. Adjustment disorder with mixed anxiety and depressed mood        Collateral Reports Completed:  Communicated with: PCP as needed    Plan: (Homework, other):  Patient was given information about behavioral services and encouraged to schedule a follow up appointment with the clinic [facility] in 2 weeks.  She was also given deep Breathing Strategies to practice when experiencing anxiety and depression.  CD Recommendations: No indications  of CD issues. Tony)Gian,LICSW,[facility]

## 2021-01-05 ENCOUNTER — HOSPITAL ENCOUNTER (OUTPATIENT)
Dept: PHYSICAL THERAPY | Facility: CLINIC | Age: 81
Setting detail: THERAPIES SERIES
End: 2021-01-05
Attending: PHYSICIAN ASSISTANT
Payer: MEDICARE

## 2021-01-05 PROCEDURE — 97110 THERAPEUTIC EXERCISES: CPT | Mod: GP | Performed by: PHYSICAL THERAPIST

## 2021-01-05 ASSESSMENT — COLUMBIA-SUICIDE SEVERITY RATING SCALE - C-SSRS
ATTEMPT LIFETIME: NO
1. IN THE PAST MONTH, HAVE YOU WISHED YOU WERE DEAD OR WISHED YOU COULD GO TO SLEEP AND NOT WAKE UP?: NO
1. IN THE PAST MONTH, HAVE YOU WISHED YOU WERE DEAD OR WISHED YOU COULD GO TO SLEEP AND NOT WAKE UP?: NO
2. HAVE YOU ACTUALLY HAD ANY THOUGHTS OF KILLING YOURSELF?: NO
TOTAL  NUMBER OF ABORTED OR SELF INTERRUPTED ATTEMPTS PAST LIFETIME: NO
6. HAVE YOU EVER DONE ANYTHING, STARTED TO DO ANYTHING, OR PREPARED TO DO ANYTHING TO END YOUR LIFE?: NO
ATTEMPT PAST THREE MONTHS: NO
2. HAVE YOU ACTUALLY HAD ANY THOUGHTS OF KILLING YOURSELF LIFETIME?: NO
TOTAL  NUMBER OF INTERRUPTED ATTEMPTS PAST 3 MONTHS: NO
TOTAL  NUMBER OF ABORTED OR SELF INTERRUPTED ATTEMPTS PAST 3 MONTHS: NO
6. HAVE YOU EVER DONE ANYTHING, STARTED TO DO ANYTHING, OR PREPARED TO DO ANYTHING TO END YOUR LIFE?: NO
TOTAL  NUMBER OF INTERRUPTED ATTEMPTS LIFETIME: NO

## 2021-01-05 ASSESSMENT — PATIENT HEALTH QUESTIONNAIRE - PHQ9
SUM OF ALL RESPONSES TO PHQ QUESTIONS 1-9: 0
5. POOR APPETITE OR OVEREATING: NOT AT ALL

## 2021-01-05 ASSESSMENT — ANXIETY QUESTIONNAIRES
6. BECOMING EASILY ANNOYED OR IRRITABLE: NOT AT ALL
GAD7 TOTAL SCORE: 0
2. NOT BEING ABLE TO STOP OR CONTROL WORRYING: NOT AT ALL
7. FEELING AFRAID AS IF SOMETHING AWFUL MIGHT HAPPEN: NOT AT ALL
IF YOU CHECKED OFF ANY PROBLEMS ON THIS QUESTIONNAIRE, HOW DIFFICULT HAVE THESE PROBLEMS MADE IT FOR YOU TO DO YOUR WORK, TAKE CARE OF THINGS AT HOME, OR GET ALONG WITH OTHER PEOPLE: NOT DIFFICULT AT ALL
1. FEELING NERVOUS, ANXIOUS, OR ON EDGE: NOT AT ALL
3. WORRYING TOO MUCH ABOUT DIFFERENT THINGS: NOT AT ALL
5. BEING SO RESTLESS THAT IT IS HARD TO SIT STILL: NOT AT ALL

## 2021-01-06 ASSESSMENT — ANXIETY QUESTIONNAIRES: GAD7 TOTAL SCORE: 0

## 2021-01-08 ENCOUNTER — HOSPITAL ENCOUNTER (OUTPATIENT)
Dept: PHYSICAL THERAPY | Facility: CLINIC | Age: 81
Setting detail: THERAPIES SERIES
End: 2021-01-08
Attending: PHYSICIAN ASSISTANT
Payer: MEDICARE

## 2021-01-08 PROCEDURE — 97110 THERAPEUTIC EXERCISES: CPT | Mod: GP | Performed by: PHYSICAL THERAPIST

## 2021-01-15 ENCOUNTER — HOSPITAL ENCOUNTER (OUTPATIENT)
Dept: PHYSICAL THERAPY | Facility: CLINIC | Age: 81
Setting detail: THERAPIES SERIES
End: 2021-01-15
Attending: PHYSICIAN ASSISTANT
Payer: MEDICARE

## 2021-01-15 PROCEDURE — 97110 THERAPEUTIC EXERCISES: CPT | Mod: GP | Performed by: PHYSICAL THERAPIST

## 2021-01-18 ENCOUNTER — VIRTUAL VISIT (OUTPATIENT)
Dept: BEHAVIORAL HEALTH | Facility: CLINIC | Age: 81
End: 2021-01-18
Payer: MEDICARE

## 2021-01-18 DIAGNOSIS — F43.23 ADJUSTMENT DISORDER WITH MIXED ANXIETY AND DEPRESSED MOOD: Primary | ICD-10-CM

## 2021-01-18 PROCEDURE — 90834 PSYTX W PT 45 MINUTES: CPT | Mod: 95 | Performed by: SOCIAL WORKER

## 2021-01-18 NOTE — PROGRESS NOTES
"Rosie Blackman is a 81 year old female who is being evaluated via a telephone visit.      The patient has been notified of the following:     \"We have found that certain health care needs can be provided without the need for a face to face visit.  This service lets us provide the care you need with a short phone conversation.      I will have full access to your [place] medical record during this entire phone call.   I will be taking notes for your medical record.     Since this is like an office visit, we will bill your insurance company for this service.  Please check with your medical insurance if this type of telephone visit/virtual care is covered.  You may be responsible for the cost of this service if insurance coverage is denied.      There are potential benefits and risks of telephone visits (e.g. limits to patient confidentiality) that differ from in-person visits.?  Confidentiality still applies for telephone services, and nobody will record the visit.  It is important to be in a quiet, private space that is free of distractions (including cell phone or other devices) during the visit.??     If during the course of the call I believe a telephone visit is not appropriate, you will not be charged for this service\"    Consent has been obtained for this service by care team member: yes.    Start time: 230 pm    End time: 315 pm        [facility] Primary Care: Integrated Behavioral Health  January 18, 2021      Behavioral Health Clinician Progress Note    Patient Name: Rosie Blackman           Service Type: Phone Visit      Service Location:  at the patient's home      Session Length: 38 - 52      Attendees: Patient    Visit Activities (Refresh list every visit): [facility] Only    Diagnostic Assessment Date: not completed  Treatment Plan Review Date: not completed  See Flowsheets for today's PHQ-9 and GEE-7 results  Previous PHQ-9:   PHQ-9 SCORE 12/15/2020 1/4/2021 1/5/2021   PHQ-9 Total Score 0 " Family has been updated.   0 0     Previous GEE-7:   GEE-7 SCORE 12/15/2020 1/5/2021   Total Score 0 0       BAILEY LEVEL:  BAILEY Score (Last Two) 4/1/2011   BAILEY Raw Score 41   Activation Score 63.2   BAILEY Level 3       DATA  Extended Session (60+ minutes): No  Interactive Complexity: No  Crisis: No    Treatment Objective(s) Addressed in This Session:  Target Behavior(s): disease management/lifestyle changes decrease intensity of emotional responses - when does not fit event    Adjustment Difficulties: will develop coping/problem-solving skills to facilitate more adaptive adjustment  Psychological distress related to Chronic Disease Management    Current Stressors / Issues:  Patient reports she continues to experience uncomfortable emotional responses/tearfulness when her response  does not fit the event.   Focused on symptoms using CBT approach. Patient will return in 1 month      Progress on Treatment Objective(s) / Homework:  New Objective established this session - PREPARATION (Decided to change - considering how); Intervened by negotiating a change plan and determining options / strategies for behavior change, identifying triggers, exploring social supports, and working towards setting a date to begin behavior change    Motivational Interviewing    MI Intervention: Expressed Empathy/Understanding, Supported Autonomy, Collaboration, Evocation, Open-ended questions, Reflections: simple and complex, Change talk (evoked) and Reframe     Change Talk Expressed by the Patient: Desire to change Reasons to change Need to change Committment to change    Provider Response to Change Talk: E - Evoked more info from patient about behavior change, A - Affirmed patient's thoughts, decisions, or attempts at behavior change, R - Reflected patient's change talk and S - Summarized patient's change talk statements      Care Plan review completed: No    Medication Review:  No current psychiatric medications prescribed    Medication Compliance:  NA    Changes in  Health Issues:   Yes: Chronic disease management, Associated Psychological Distress    Chemical Use Review:   Substance Use: Chemical use reviewed, no active concerns identified      Tobacco Use: No current tobacco use.      Assessment: Current Emotional / Mental Status (status of significant symptoms):  Risk status (Self / Other harm or suicidal ideation)  Patient denies a history of suicidal ideation, suicide attempts, self-injurious behavior, homicidal ideation, homicidal behavior and and other safety concerns  Patient denies current fears or concerns for personal safety.  Patient denies current or recent suicidal ideation or behaviors.  Patient denies current or recent homicidal ideation or behaviors.  Patient denies current or recent self injurious behavior or ideation.  Patient denies other safety concerns.  A safety and risk management plan has not been developed at this time, however patient was encouraged to call [place] [phone] should there be a change in any of these risk factors.    Appearance:   phone visit   Eye Contact:   phone visit   Psychomotor Behavior: phone visit   Attitude:   Cooperative  Interested Friendly Pleasant Attentive [place]gil  Orientation:   All  Speech   Rate / Production: Normal/ Responsive Normal    Volume:  Normal   Mood:    Normal Sad   Affect:    phone visit   Thought Content:  Clear   Thought Form:  Coherent  Logical   Insight:    Good     Diagnoses:  1. Adjustment disorder with mixed anxiety and depressed mood        Collateral Reports Completed:  Communicated with: PCP as needed    Plan: (Homework, other):  Patient was given information about behavioral services and encouraged to schedule a follow up appointment with the clinic [facility] in 1 month.  She was also given deep Breathing Strategies to practice when experiencing anxiety and depression.  CD Recommendations: No indications of CD issues. White (Mindy),AMIRA,[facility]

## 2021-01-22 ENCOUNTER — HOSPITAL ENCOUNTER (OUTPATIENT)
Dept: PHYSICAL THERAPY | Facility: CLINIC | Age: 81
Setting detail: THERAPIES SERIES
End: 2021-01-22
Attending: PHYSICIAN ASSISTANT
Payer: MEDICARE

## 2021-01-22 PROCEDURE — 97110 THERAPEUTIC EXERCISES: CPT | Mod: GP | Performed by: PHYSICAL THERAPIST

## 2021-01-30 NOTE — PROGRESS NOTES
Consult Notes on Referred Patient         Dr. Kathya Escalera, DO  [address] [place] [address]       RE: Rosie Blackman  : 1940  ALEJANDRO: 2021     Dear Dr. Kathya Escalera:    I had the pleasure of seeing your patient Rosie Blackman here at the Gynecologic Cancer Clinic at the [facility] on 2020.  As you know she is a very pleasant 80 year old woman with a diagnosis IAG1 OVCA.  Given these findings she was subsequently sent to the Gynecologic Cancer Clinic for new patient consultation.     She recently presented with a complaint of hip pain.  The work-up of this included an MRI which has demonstrated a large pelvic mass.  The patient has been asymptomatic, but her  is elevated (108, CEA 19-9 were normal).    She underwent surgical staging on 20    PATH:  FINAL DIAGNOSIS:   A. OVARIES, LEFT AND RIGHT, BILATERAL OOPHORECTOMY:   - Right ovary with ENDOMETRIOID ADENOCARCINOMA, FIGO grade 1   - Left ovary with benign inclusion cysts, negative for malignancy   - Unremarkable bilateral fallopian tubes, negative for malignancy     B. MESENTERY, BIOPSY:   - Fibrovascular adhesions, negative for malignancy     C. OMENTUM, OMENTECTOMY:   - Adipose tissue, negative for malignancy     D. ANTERIOR CUL-DE-SAC, BIOPSY:   - Fibroadipose tissue with foci of endometriosis   - Negative for malignancy     E. POSTERIOR CUL-DE-SAC, BIOPSY:   - Fibrovascular tissue, negative for malignancy     F. LYMPH NODE, LEFT PELVIC, EXCISION:   - Eleven reactive lymph nodes, negative for malignancy (0/11)     G. LYMPH NODE, RIGHT PELVIC, EXCISION:   - Nine reactive lymph nodes, negative for malignancy (0/9)     H. LYMPH NODE, RIGHT PARA AORTIC, EXCISION:   - Three reactive lymph nodes, negative for malignancy (0/3)       Synoptic Report:     SPECIMEN     Procedure:         - Bilateral salpingo-oophorectomy         - Omentectomy         - Peritoneal  biopsies         -  Peritoneal washing     Specimen Integrity of Right Ovary:         - Capsule intact     TUMOR     Tumor Site:         - Right ovary     Histologic Type:         - Endometrioid carcinoma     Histologic Grade:         - G1: Well differentiated     Tumor Size: 24.5 Centimeters (cm)     Ovarian Surface Involvement:         - Absent     Fallopian Tube Surface Involvement:         - Absent     Other Tissue / Organ Involvement:         - Not identified     Peritoneal Ascitic Fluid:         - Negative for malignancy (normal / benign)     Pleural Fluid:         - Not submitted / unknown     Treatment Effect:         - No known presurgical therapy     LYMPH NODES     Regional Lymph Nodes:         - All lymph nodes negative for tumor cells     Number of Lymph Nodes Examined: 23     Site(s):         - Right pelvic         - Left pelvic         - Right para-aortic       She has been in observation since that time.     Component      Latest Ref Rng & Units 7/9/2020 11/9/2020         0 - 30 U/mL 108 (H) 20     Review of Systems:    Systemic           no weight changes; no fever; no chills; no night sweats; no appetite changes  Skin           no rashes, or lesions  Eye           no irritation; no changes in vision  Isabelle-Laryngeal           no dysphagia; no hoarseness   Pulmonary    no cough; no shortness of breath  Cardiovascular    no chest pain; no palpitations  Gastrointestinal    no diarrhea; no constipation; no abdominal pain; no changes in bowel  habits; no blood in stool  Genitourinary   no urinary frequency; no urinary urgency; no dysuria; no pain; no abnormal vaginal discharge; no abnormal vaginal bleeding  Breast   no breast discharge; no breast changes; no breast pain  Musculoskeletal    no myalgias; no arthralgias; no back pain  Psychiatric           no depressed mood; no anxiety    Hematologic           no tender lymph nodes; no noticeable swellings or lumps   Endocrine    no hot flashes; no heat/cold intolerance          Neurological   no tremor; no numbness and tingling; no headaches; no difficulty  sleeping      Past Medical History:    Past Medical History:   Diagnosis Date     Chronic airway obstruction (H)      Gastroesophageal reflux disease      Hiatal hernia      Hypertension      Personal history of contact with and (suspected) exposure to asbestos      Primary osteoarthritis of right hip 7/9/2020         Past Surgical History:    Past Surgical History:   Procedure Laterality Date     BREAST BIOPSY, CORE RT/LT  1994     C ANESTH,KNEE VEINS SURGERY  08/20/2014     CHOLECYSTECTOMY  1995     COLONOSCOPY  05/2010    Diverticulosis, colon polyps; repeat 5 yrs     COLONOSCOPY N/A 11/16/2015    Procedure: COLONOSCOPY;  Surgeon: Alejandro Matos MD;  Location: [facility]     Colonoscopy, hyperplastic polyps, repeat in 5 yrs  2004     ESOPHAGOSCOPY, GASTROSCOPY, DUODENOSCOPY (EGD), COMBINED  09/30/2013    Procedure: COMBINED ESOPHAGOSCOPY, GASTROSCOPY, DUODENOSCOPY (EGD), BIOPSY SINGLE OR MULTIPLE;  Gastroscopy;  Surgeon: Blaise Gill MD;  Location: [facility]     EYE SURGERY  2012    R/L Cataracts     HC REMOVE TONSILS/ADENOIDS,12+ Y/O      T & A 13+y.o.     HYSTERECTOMY, PAP NO LONGER INDICATED       LAPAROSCOPIC SALPINGO-OOPHORECTOMY N/A 07/24/2020    Procedure: Laparoscopy, removal or pelvic mass, both tubes and ovaries, omentectomy, anterior culvectomy, pelvic and para aortic lymph node dissection, laparotomy;  Surgeon: Felipe Corona MD;  Location: [facility]     DE ANESTH,LUMBAR SPINE,CORD SURGERY  10/2020    L3-4 L4-5 TRANSFORAMINAL INTERBODY FUSION L3-5, POSTERIOR INSTRUMENTED FUSION AND DECOMPRESSION     TVH for DUB, ovaries in       VASCULAR SURGERY  2014    [place] closure         Health Maintenance:  Last Pap Smear: None since hysterectomy age 41    Last Mammogram: 10/2019 BIRADS 1    Last Colonoscopy: 2015 (diverticulosis without polyps or cancer)                       Current Medications:     has a current  "medication list which includes the following prescription(s): evening primrose oil, mometasone, naproxen sodium, omega 3, pravastatin, theratears, valsartan-hydrochlorothiazide, and viactiv.       Allergies:     Allergies   Allergen Reactions     Ezetimibe Other (See Comments)     Severe muscle aches     Lisinopril      Omeprazole      Other reaction(s): *Unknown     Prilosec [Omeprazole]      Zestril [Ace Inhibitors] Cough     Atorvastatin Other (See Comments)     Muscle Pain     Irbesartan Cramps     Rosuvastatin Other (See Comments)     Muscle Pain         Social History:     Social History     Tobacco Use     Smoking status: Never Smoker     Smokeless tobacco: Never Used   Substance Use Topics     Alcohol use: No       History   Drug Use No           Family History:     The patient's family history is notable for .    Family History   Problem Relation Age of Onset     Cancer Mother         uterine cancer,      Respiratory Mother         COPD     Cardiovascular Mother         AAA  age 80     Breast Cancer Maternal Grandmother      Cancer Maternal Grandfather         cancer unknown type     Breast Cancer Sister      Eye Disorder Father         cataracts     Depression Father      Depression Son      Depression Son      Breast Cancer Sister         X2     Cancer - colorectal No family hx of         no ovarian cancer         Physical Exam:     PS 0  VS: BP (!) 155/84   Pulse 74   Temp 98.1  F (36.7  C) (Oral)   Resp 18   Ht 1.613 m (5' 3.5\")   Wt 92.7 kg (204 lb 4.8 oz)   SpO2 98%   BMI 35.62 kg/m       General Appearance: healthy and alert, no distress     HEENT:  no thyromegaly, no palpable nodules or masses        Cardiovascular: regular rate and rhythm, no gallops, rubs or murmurs     Respiratory: lungs clear, no rales, rhonchi or wheezes, normal diaphragmatic excursion    Musculoskeletal: extremities non tender and without edema    Skin: no lesions or rashes; incision with small area of erythema and " induration at the superior edge.  No pus, no tenderness    Neurological: normal gait, no gross defects     Psychiatric: appropriate mood and affect                               Hematological: normal cervical, supraclavicular and inguinal lymph nodes     Gastrointestinal:       abdomen soft, non-tender, non-distended, she has an easily reducible hernia which is about 2-3 cm at the os.    Genitourinary: Normal post-hyst vaginal and rectal examination no evidence of cancer    Assessment:    Rosie Blackman is a woman with a new diagnosis stage I grade 1 OVCA     A total of 25 minutes was spent with the patient, 20 minutes of which were spent in counseling the patient and/or treatment planning.    Plan:     1.)   OVCA - we have discussed the findings and options from observation to chemotherapy.  She is aware that her prognosis is favorable, but not guaranteed in any setting.  I have discussed with her the NCCN guidelines which recommend no additional therapy at this time.      2.) Genetic risk factors were assessed and the patient does meet the qualifications for a referral placed (this was previously done, but there was an apparent disconnect betweent he clinic and patient - we assisted in formalizing the visit today)..      3.) Labs and/or tests ordered include:  CA-125     4.) Health maintenance issues addressed today include hernia discussion.  She has been in evaluation for hernia repair.  There is no incarceration but the mass effect has been progressively bothering her and now that her back surgery has been successful she is ready to approach the hernia.    Thank you for allowing us to participate in the care of your patient.         Sincerely,    Felipe Corona MD    CC  Patient Care Team:  Kathya Escalera DO as PCP - General (Internal Medicine)  Kathya Escalera DO as Assigned PCP  Mehran Howell MD as Assigned Musculoskeletal Provider  Felipe Corona MD as Assigned  Cancer Care Provider  Aayush George MD as Assigned Surgical Provider  MURALI SCHRADER

## 2021-02-01 ENCOUNTER — ONCOLOGY VISIT (OUTPATIENT)
Dept: ONCOLOGY | Facility: CLINIC | Age: 81
End: 2021-02-01
Attending: OBSTETRICS & GYNECOLOGY
Payer: MEDICARE

## 2021-02-01 VITALS
HEIGHT: 64 IN | SYSTOLIC BLOOD PRESSURE: 155 MMHG | HEART RATE: 74 BPM | OXYGEN SATURATION: 98 % | DIASTOLIC BLOOD PRESSURE: 84 MMHG | RESPIRATION RATE: 18 BRPM | TEMPERATURE: 98.1 F | BODY MASS INDEX: 34.88 KG/M2 | WEIGHT: 204.3 LBS

## 2021-02-01 DIAGNOSIS — Z85.43 HISTORY OF OVARIAN CANCER: ICD-10-CM

## 2021-02-01 DIAGNOSIS — C56.9 OVARIAN CANCER, UNSPECIFIED LATERALITY (H): Primary | ICD-10-CM

## 2021-02-01 DIAGNOSIS — Z85.43 HISTORY OF OVARIAN CANCER: Primary | ICD-10-CM

## 2021-02-01 LAB — CANCER AG125 SERPL-ACNC: 8 U/ML (ref 0–30)

## 2021-02-01 PROCEDURE — 99214 OFFICE O/P EST MOD 30 MIN: CPT | Performed by: OBSTETRICS & GYNECOLOGY

## 2021-02-01 PROCEDURE — 86304 IMMUNOASSAY TUMOR CA 125: CPT | Performed by: OBSTETRICS & GYNECOLOGY

## 2021-02-01 PROCEDURE — G0463 HOSPITAL OUTPT CLINIC VISIT: HCPCS

## 2021-02-01 PROCEDURE — 36415 COLL VENOUS BLD VENIPUNCTURE: CPT

## 2021-02-01 ASSESSMENT — MIFFLIN-ST. JEOR: SCORE: 1373.76

## 2021-02-01 ASSESSMENT — PAIN SCALES - GENERAL: PAINLEVEL: NO PAIN (0)

## 2021-02-01 NOTE — PATIENT INSTRUCTIONS
Follow up appointment in 3 months, scheduling will call you to help make an appointment  referral to genetic counseling, scheduling will call you to help make an appointment  ca125 done today, you will be notified via my chart/telephone with test results

## 2021-02-01 NOTE — LETTER
2/1/2021         RE: Rosie Blackman  [address] [address]        Dear Colleague,    Thank you for referring your patient, Rosie Blackman, to the [facility]. Please see a copy of my visit note below.    Chief Complaint   Patient presents with     Blood Draw     VPT blood draw only from left arm             Again, thank you for allowing me to participate in the care of your patient.        Sincerely,        [facility] Lab Draw

## 2021-02-01 NOTE — LETTER
2021         RE: Rosie Blackman  [address] [address] [id number]        Dear Colleague,    Thank you for referring your patient, Rosie Blackman, to the [facility] CANCER CLINIC. Please see a copy of my visit note below.                            Consult Notes on Referred Patient         Dr. Kathya Escalera, DO  [address] [facility] [place] [address]       RE: Rosie Blackman  : 1940  ALEJANDRO: 2021     Dear Dr. Kathya Escalera:    I had the pleasure of seeing your patient Rosie Blackman here at the Gynecologic Cancer Clinic at the [facility] on 2020.  As you know she is a very pleasant 80 year old woman with a diagnosis IAG1 OVCA.  Given these findings she was subsequently sent to the Gynecologic Cancer Clinic for new patient consultation.     She recently presented with a complaint of hip pain.  The work-up of this included an MRI which has demonstrated a large pelvic mass.  The patient has been asymptomatic, but her  is elevated (108, CEA 19-9 were normal).    She underwent surgical staging on 20    PATH:  FINAL DIAGNOSIS:   A. OVARIES, LEFT AND RIGHT, BILATERAL OOPHORECTOMY:   - Right ovary with ENDOMETRIOID ADENOCARCINOMA, FIGO grade 1   - Left ovary with benign inclusion cysts, negative for malignancy   - Unremarkable bilateral fallopian tubes, negative for malignancy     B. MESENTERY, BIOPSY:   - Fibrovascular adhesions, negative for malignancy     C. OMENTUM, OMENTECTOMY:   - Adipose tissue, negative for malignancy     D. ANTERIOR CUL-DE-SAC, BIOPSY:   - Fibroadipose tissue with foci of endometriosis   - Negative for malignancy     E. POSTERIOR CUL-DE-SAC, BIOPSY:   - Fibrovascular tissue, negative for malignancy     F. LYMPH NODE, LEFT PELVIC, EXCISION:   - Eleven reactive lymph nodes, negative for malignancy (0/11)     G. LYMPH NODE, RIGHT PELVIC, EXCISION:   - Nine reactive lymph nodes, negative for malignancy (0/9)     H.  LYMPH NODE, RIGHT PARA AORTIC, EXCISION:   - Three reactive lymph nodes, negative for malignancy (0/3)       Synoptic Report:     SPECIMEN     Procedure:         - Bilateral salpingo-oophorectomy         - Omentectomy         - Peritoneal  biopsies         - Peritoneal washing     Specimen Integrity of Right Ovary:         - Capsule intact     TUMOR     Tumor Site:         - Right ovary     Histologic Type:         - Endometrioid carcinoma     Histologic Grade:         - G1: Well differentiated     Tumor Size: 24.5 Centimeters (cm)     Ovarian Surface Involvement:         - Absent     Fallopian Tube Surface Involvement:         - Absent     Other Tissue / Organ Involvement:         - Not identified     Peritoneal Ascitic Fluid:         - Negative for malignancy (normal / benign)     Pleural Fluid:         - Not submitted / unknown     Treatment Effect:         - No known presurgical therapy     LYMPH NODES     Regional Lymph Nodes:         - All lymph nodes negative for tumor cells     Number of Lymph Nodes Examined: 23     Site(s):         - Right pelvic         - Left pelvic         - Right para-aortic       She has been in observation since that time.     Component      Latest Ref Rng & Units 7/9/2020 11/9/2020         0 - 30 U/mL 108 (H) 20     Review of Systems:    Systemic           no weight changes; no fever; no chills; no night sweats; no appetite changes  Skin           no rashes, or lesions  Eye           no irritation; no changes in vision  Isabelle-Laryngeal           no dysphagia; no hoarseness   Pulmonary    no cough; no shortness of breath  Cardiovascular    no chest pain; no palpitations  Gastrointestinal    no diarrhea; no constipation; no abdominal pain; no changes in bowel  habits; no blood in stool  Genitourinary   no urinary frequency; no urinary urgency; no dysuria; no pain; no abnormal vaginal discharge; no abnormal vaginal bleeding  Breast   no breast discharge; no breast changes; no breast  pain  Musculoskeletal    no myalgias; no arthralgias; no back pain  Psychiatric           no depressed mood; no anxiety    Hematologic           no tender lymph nodes; no noticeable swellings or lumps   Endocrine    no hot flashes; no heat/cold intolerance         Neurological   no tremor; no numbness and tingling; no headaches; no difficulty  sleeping      Past Medical History:    Past Medical History:   Diagnosis Date     Chronic airway obstruction (H)      Gastroesophageal reflux disease      Hiatal hernia      Hypertension      Personal history of contact with and (suspected) exposure to asbestos      Primary osteoarthritis of right hip 7/9/2020         Past Surgical History:    Past Surgical History:   Procedure Laterality Date     BREAST BIOPSY, CORE RT/LT  1994     C ANESTH,KNEE VEINS SURGERY  08/20/2014     CHOLECYSTECTOMY  1995     COLONOSCOPY  05/2010    Diverticulosis, colon polyps; repeat 5 yrs     COLONOSCOPY N/A 11/16/2015    Procedure: COLONOSCOPY;  Surgeon: Alejandro Matos MD;  Location: [facility]     Colonoscopy, hyperplastic polyps, repeat in 5 yrs  2004     ESOPHAGOSCOPY, GASTROSCOPY, DUODENOSCOPY (EGD), COMBINED  09/30/2013    Procedure: COMBINED ESOPHAGOSCOPY, GASTROSCOPY, DUODENOSCOPY (EGD), BIOPSY SINGLE OR MULTIPLE;  Gastroscopy;  Surgeon: Blaise Gill MD;  Location: [facility]     EYE SURGERY  2012    R/L Cataracts     HC REMOVE TONSILS/ADENOIDS,12+ Y/O      T & A 13+y.o.     HYSTERECTOMY, PAP NO LONGER INDICATED       LAPAROSCOPIC SALPINGO-OOPHORECTOMY N/A 07/24/2020    Procedure: Laparoscopy, removal or pelvic mass, both tubes and ovaries, omentectomy, anterior culvectomy, pelvic and para aortic lymph node dissection, laparotomy;  Surgeon: Felipe Corona MD;  Location: [facility]     MS ANESTH,LUMBAR SPINE,CORD SURGERY  10/2020    L3-4 L4-5 TRANSFORAMINAL INTERBODY FUSION L3-5, POSTERIOR INSTRUMENTED FUSION AND DECOMPRESSION     TVH for DUB, ovaries in       VASCULAR SURGERY  2014     "[place] closure         Health Maintenance:  Last Pap Smear: None since hysterectomy age 41    Last Mammogram: 10/2019 BIRADS 1    Last Colonoscopy:  (diverticulosis without polyps or cancer)                       Current Medications:     has a current medication list which includes the following prescription(s): evening primrose oil, mometasone, naproxen sodium, omega 3, pravastatin, theratears, valsartan-hydrochlorothiazide, and viactiv.       Allergies:     Allergies   Allergen Reactions     Ezetimibe Other (See Comments)     Severe muscle aches     Lisinopril      Omeprazole      Other reaction(s): *Unknown     Prilosec [Omeprazole]      Zestril [Ace Inhibitors] Cough     Atorvastatin Other (See Comments)     Muscle Pain     Irbesartan Cramps     Rosuvastatin Other (See Comments)     Muscle Pain         Social History:     Social History     Tobacco Use     Smoking status: Never Smoker     Smokeless tobacco: Never Used   Substance Use Topics     Alcohol use: No       History   Drug Use No           Family History:     The patient's family history is notable for .    Family History   Problem Relation Age of Onset     Cancer Mother         uterine cancer,      Respiratory Mother         COPD     Cardiovascular Mother         AAA  age 80     Breast Cancer Maternal Grandmother      Cancer Maternal Grandfather         cancer unknown type     Breast Cancer Sister      Eye Disorder Father         cataracts     Depression Father      Depression Son      Depression Son      Breast Cancer Sister         X2     Cancer - colorectal No family hx of         no ovarian cancer         Physical Exam:     PS 0  VS: BP (!) 155/84   Pulse 74   Temp 98.1  F (36.7  C) (Oral)   Resp 18   Ht 1.613 m (5' 3.5\")   Wt 92.7 kg (204 lb 4.8 oz)   SpO2 98%   BMI 35.62 kg/m       General Appearance: healthy and alert, no distress     HEENT:  no thyromegaly, no palpable nodules or masses        Cardiovascular: regular rate and " rhythm, no gallops, rubs or murmurs     Respiratory: lungs clear, no rales, rhonchi or wheezes, normal diaphragmatic excursion    Musculoskeletal: extremities non tender and without edema    Skin: no lesions or rashes; incision with small area of erythema and induration at the superior edge.  No pus, no tenderness    Neurological: normal gait, no gross defects     Psychiatric: appropriate mood and affect                               Hematological: normal cervical, supraclavicular and inguinal lymph nodes     Gastrointestinal:       abdomen soft, non-tender, non-distended, she has an easily reducible hernia which is about 2-3 cm at the os.    Genitourinary: Normal post-hyst vaginal and rectal examination no evidence of cancer    Assessment:    Rosie Blackman is a woman with a new diagnosis stage I grade 1 OVCA     A total of 25 minutes was spent with the patient, 20 minutes of which were spent in counseling the patient and/or treatment planning.    Plan:     1.)   OVCA - we have discussed the findings and options from observation to chemotherapy.  She is aware that her prognosis is favorable, but not guaranteed in any setting.  I have discussed with her the NCCN guidelines which recommend no additional therapy at this time.      2.) Genetic risk factors were assessed and the patient does meet the qualifications for a referral placed (this was previously done, but there was an apparent disconnect betweent he clinic and patient - we assisted in formalizing the visit today)..      3.) Labs and/or tests ordered include:  CA-125     4.) Health maintenance issues addressed today include hernia discussion.  She has been in evaluation for hernia repair.  There is no incarceration but the mass effect has been progressively bothering her and now that her back surgery has been successful she is ready to approach the hernia.    Thank you for allowing us to participate in the care of your patient.         Sincerely,    Felipe  Juan Corona MD    CC  Patient Care Team:  Kathya Escalera DO as PCP - General (Internal Medicine)  Mehran Howell MD as Assigned Musculoskeletal Provider  Aayush George MD as Assigned Surgical Provider

## 2021-02-01 NOTE — NURSING NOTE
"Oncology Rooming Note    February 1, 2021 10:35 AM   Rosie Blackman is a 81 year old female who presents for:    Chief Complaint   Patient presents with     Oncology Clinic Visit     PELVIC MASS      Initial Vitals: BP (!) 160/85   Pulse 74   Temp 98.1  F (36.7  C) (Oral)   Resp 18   Ht 1.613 m (5' 3.5\")   Wt 92.7 kg (204 lb 4.8 oz)   SpO2 98%   BMI 35.62 kg/m   Estimated body mass index is 35.62 kg/m  as calculated from the following:    Height as of this encounter: 1.613 m (5' 3.5\").    Weight as of this encounter: 92.7 kg (204 lb 4.8 oz). Body surface area is 2.04 meters squared.  No Pain (0) Comment: Data Unavailable   No LMP recorded. Patient has had a hysterectomy.  Allergies reviewed: Yes  Medications reviewed: Yes    Medications: Medication refills not needed today.  Pharmacy name entered into [employer]:    [id number] - [place], [place] - [address] - MAIL ORDER MAIN MEDS - NON-EPRESCRIBE  [facility] - [place], [place] - [address]  [id number] - [place], [place] - [address] [address]    Clinical concerns: Pt states she will like to discuss the new hernia she has.      Shannan Carter MA            "

## 2021-02-01 NOTE — PROGRESS NOTES
Chief Complaint   Patient presents with     Blood Draw     VPT blood draw only from left arm

## 2021-02-04 ENCOUNTER — TRANSCRIBE ORDERS (OUTPATIENT)
Dept: OTHER | Age: 81
End: 2021-02-04

## 2021-02-04 ENCOUNTER — IMMUNIZATION (OUTPATIENT)
Dept: FAMILY MEDICINE | Facility: CLINIC | Age: 81
End: 2021-02-04
Payer: MEDICARE

## 2021-02-04 DIAGNOSIS — Z85.43 HISTORY OF OVARIAN CANCER: Primary | ICD-10-CM

## 2021-02-04 PROCEDURE — 91300 PR COVID VAC PFIZER DIL RECON 30 MCG/0.3 ML IM: CPT

## 2021-02-04 PROCEDURE — 0001A PR COVID VAC PFIZER DIL RECON 30 MCG/0.3 ML IM: CPT

## 2021-02-05 ENCOUNTER — PRE VISIT (OUTPATIENT)
Dept: ONCOLOGY | Facility: CLINIC | Age: 81
End: 2021-02-05

## 2021-02-05 NOTE — TELEPHONE ENCOUNTER
ONCOLOGY INTAKE: Records Information      APPT INFORMATION:  Referring provider:  Dr. Corona  Referring provider s clinic:  [facility]  Reason for visit/diagnosis:  history of ovarian cancer  Has patient been notified of appointment date and time?: yes    RECORDS INFORMATION:  Were the records received with the referral (via [employer])? no    Has patient been seen for any external appt for this diagnosis? no    If yes, where? n/a    Has patient had any imaging or procedures outside of [facility] for this condition? no      If Yes, where? n/a    ADDITIONAL INFORMATION:  none

## 2021-02-15 ENCOUNTER — VIRTUAL VISIT (OUTPATIENT)
Dept: ONCOLOGY | Facility: CLINIC | Age: 81
End: 2021-02-15
Attending: GENETIC COUNSELOR, MS
Payer: MEDICARE

## 2021-02-15 DIAGNOSIS — Z80.3 FAMILY HISTORY OF MALIGNANT NEOPLASM OF BREAST: ICD-10-CM

## 2021-02-15 DIAGNOSIS — Z80.41 FAMILY HISTORY OF MALIGNANT NEOPLASM OF OVARY: ICD-10-CM

## 2021-02-15 DIAGNOSIS — Z85.43 PERSONAL HISTORY OF OVARIAN CANCER: Primary | ICD-10-CM

## 2021-02-15 PROCEDURE — 96040 HC GENETIC COUNSELING, EACH 30 MINUTES: CPT | Mod: GT | Performed by: GENETIC COUNSELOR, MS

## 2021-02-15 NOTE — PROGRESS NOTES
"2/15/2021    Rosie is a 81 year old who is being evaluated via a billable video visit.      How would you like to obtain your AVS? MyChart  If the video visit is dropped, the invitation should be resent by: Text to cell phone: [phone]  Will anyone else be joining your video visit? No    Video-Visit Details  Type of service:  Video Visit  Video Start Time: 9:00am (switched to video + telephone at 9:27am due to poor audio)  Video End Time:10:12am  Originating Location (pt. Location): Home  Distant Location (provider location):  [facility]   Platform used for Video Visit: ChangeYourFlight    Referring Provider: Felipe Corona MD    Presenting Information:   Given concerns regarding the potential for COVID-19 exposure during a clinic visit, Rosie elected for a video genetic counseling visit through the Cancer Risk Management Program to discuss her personal and family history of ovarian and breast cancer. We reviewed this history, cancer screening recommendations, and available genetic testing options.    Personal History:  Rosie is a 81 year old female. 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 She previously had surgery to remove her uterus at age 41 to address endometriosis.    She had her first menstrual period at age 11, her first child at age 22, and went through menopause around age 54. She reports that she used \"very low dose\" hormone replacement therapy from her early 50's until age 80.      Her most recent CA-125 in February 2021 was normal (8). She has regular mammograms and her most recent mammogram in December 2020 was normal. She reports having a prior benign biopsy of her left breast in 1996. Her most recent colonoscopy in November 2015 was normal and follow-up was recommended in 10 years. Prior colonoscopies have identified two tubular adenomas, three hyperplastic polyps, " and two polyps containing normal colonic mucosa with hyperplastic changes. She does not regularly do any other cancer screening at this time.    Family History: (Please see scanned pedigree for detailed family history information)    Rosie's sister is 76 and was diagnosed with breast cancer at age 40, treated with a mastectomy and chemotherapy. She was then diagnosed with a new primary breast cancer at age 46, treated with another surgery and chemotherapy. She has not pursued genetic testing.    Rosie's mother was diagnosed with uterine cancer at age 70 and passed away at age 82.    Her mother was diagnosed with and passed away from breast cancer (specifically surgery complications) in her 40's.    Her father may have had a cancer before passing away in his 70/80's, but further details are unknown.    Rosie's father passed away at age 80 and never had a cancer.    One of his sisters was diagnosed with and passed away from ovarian cancer in her 70/80's.    One of his sisters was diagnosed with breast cancer in her 90's and passed away shortly after.    One of his sisters did not have a cancer, but her daughter was diagnosed with breast cancer in her 50/6's.    One of his sisters did not have a cancer, but two of her daughters were diagnosed with breast cancer in their 50/60's.    One of his brothers did not have a cancer, but his daughter was diagnosed with breast cancer in her 50/60's.    Her maternal ethnicity is Western  and . Her paternal ethnicity is Scandinavian. There is no known Ashkenazi Mandaen ancestry on either side of her family.    Discussion:    Rosie's personal and family history of gynecologic and breast cancers is suggestive of a hereditary cancer syndrome.    We reviewed the features of sporadic, familial, and hereditary cancers. 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    We discussed the natural history and genetics of several hereditary cancer syndromes, including Hereditary Breast and Ovarian Cancer (HBOC) syndrome.     We reviewed that the most common cause of hereditary epithelial ovarian cancer rand breast cancer is HBOC syndrome, which is caused by mutations in the BRCA1 and BRCA2 genes. Individuals with HBOC syndrome are at increased risk for several different cancers, including breast, ovarian, male breast, prostate, melanoma, pancreatic, and possibly uterine cancer.    We discussed that there are additional genes that could cause increased risk for gynecologic, breast, and related cancers. As many of these genes present with overlapping features in a family and accurate cancer risk cannot always be established based upon the pedigree analysis alone, it would be reasonable for Rosie to consider panel genetic testing to analyze multiple genes at once.    Based on her personal and family history, Rosie meets current National Comprehensive Cancer Network (NCCN) criteria for genetic testing of BRCA1, BRCA2, and other high penetrance ovarian and/or breast cancer genes (i.e. ADALID, BRIP1, CHEK2, CDH1, PALB2, RAD51C, RAD51D, PTEN, TP53, etc.).      A detailed handout regarding these genes/syndromes and the information we discussed was provided to Rosie via [employer] and can be found in the after visit summary. Topics included: inheritance pattern, cancer risks, cancer screening recommendations, and also risks, benefits and limitations of testing.    We reviewed genetic testing options for hereditary breast and gynecologic cancers: actionable breast and gynecologic cancer panel (Hereditary Breast/Gyn Cancers Actionable Panel, 19 genes) and expanded breast and  gynecologic cancer panel (Hereditary Breast/Gyn Cancers Expanded Panel, 25 genes). Rosie expressed interest in learning as much information as possible from the testing. She opted for the Hereditary Breast/Gyn Cancers Expanded Panel.    Genetic testing is available for 25 genes associated with hereditary gynecologic, breast, and related cancers: ADALID, BARD1, BRCA1, BRCA2, BRIP1, CDH1, CHEK2, DICER1, EPCAM, MLH1, MRE11, MSH2, MSH6, MUTYH, NBN, NF1, PALB2, PMS2, PTEN, RAD50, RAD51C, RAD51D, SMARCA4, STK11, and TP53.    We discussed that many of the genes in the panel are associated with specific hereditary cancer syndromes and published management guidelines: Hereditary Breast and Ovarian Cancer syndrome (BRCA1, BRCA2), Dowell syndrome (MLH1, MSH2, MSH6, PMS2, EPCAM), Hereditary Diffuse Gastric Cancer (CDH1), Cowden syndrome (PTEN), Li Fraumeni syndrome (TP53), Peutz-Jeghers syndrome (STK11), MUTYH Associated Polyposis (MUTYH), and Neurofibromatosis type 1 (NF1).      Risk-reducing salpingo-oophorectomy can be considered in women with mutations in BRIP1, RAD51C, or RAD51D. Breast and/or other cancer risk management guidelines are available for ADALID, CHEK2, PALB2, and NBN.    The remaining genes (BARD1, DICER1, MRE11, RAD50, and SMARCA4) are associated with increased cancer risk and may allow us to make medical recommendations when mutations are identified.      Consent was obtained over the video. Rosie elected to have her blood drawn on 2/22/2021, as she is already scheduled to be in clinic that day. Once her blood is drawn, genetic testing using the Hereditary Breast/Gyn Cancers Expanded Panel will be sent to the [facility]. Turn around time: 4-6 weeks after insurance pre-authorization is obtained.    Medical Management: For Rosie, we reviewed that the information from genetic testing may determine:    additional cancer screening for which Rosie may qualify (i.e. mammogram and  breast MRI, more frequent colonoscopies, more frequent dermatologic exams, etc.),    options for risk reducing surgeries Rosie could consider (i.e. bilateral mastectomy, etc.),      and targeted chemotherapies if she were to develop certain cancers in the future (i.e. immunotherapy for individuals with Dowell syndrome, PARP inhibitors, etc.).     These recommendations and possible targeted chemotherapies will be discussed in detail once genetic testing is completed.     Plan:  1) Today Rosie elected to proceed with genetic testing using the Hereditary Breast/Gyn Cancers Expanded Panel offered by the [facility]. Her blood will be drawn on 2/22/2021.  2) The results should be available in 4-6 weeks after insurance pre-authorization is obtained.  3) Rosie will be called to discuss the results.    Tiffanie Carter MS, [facility]  Licensed Genetic Counselor  Office: [phone]  Pager: [phone]

## 2021-02-15 NOTE — LETTER
"    2/15/2021         RE: Rosie Blackman  [address] [address] [address]        Dear Colleague,    Thank you for referring your patient, Rosie Blackman, to the [facility]. Please see a copy of my visit note below.    2/15/2021    Rosie is a 81 year old who is being evaluated via a billable video visit.      How would you like to obtain your AVS? MyChart  If the video visit is dropped, the invitation should be resent by: Text to cell phone: [phone]  Will anyone else be joining your video visit? No    Video-Visit Details  Type of service:  Video Visit  Video Start Time: 9:00am (switched to video + telephone at 9:27am due to poor audio)  Video End Time:10:12am  Originating Location (pt. Location): Home  Distant Location (provider location):  [facility]   Platform used for Video Visit: Wikia    Referring Provider: Felipe Corona MD    Presenting Information:   Given concerns regarding the potential for COVID-19 exposure during a clinic visit, Rosie elected for a video genetic counseling visit through the Cancer Risk Management Program to discuss her personal and family history of ovarian and breast cancer. We reviewed this history, cancer screening recommendations, and available genetic testing options.    Personal History:  Rosie is a 81 year old female. 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 She previously had surgery to remove her uterus at age 41 to address endometriosis.    She had her first menstrual period at age 11, her first child at age 22, and went through menopause around age 54. She reports that she used \"very low dose\" hormone replacement therapy from her early 50's until age 80.      Her most recent CA-125 in February 2021 was normal (8). She has regular mammograms and her most recent mammogram in December 2020 was " normal. She reports having a prior benign biopsy of her left breast in 1996. Her most recent colonoscopy in November 2015 was normal and follow-up was recommended in 10 years. Prior colonoscopies have identified two tubular adenomas, three hyperplastic polyps, and two polyps containing normal colonic mucosa with hyperplastic changes. She does not regularly do any other cancer screening at this time.    Family History: (Please see scanned pedigree for detailed family history information)    Rosie's sister is 76 and was diagnosed with breast cancer at age 40, treated with a mastectomy and chemotherapy. She was then diagnosed with a new primary breast cancer at age 46, treated with another surgery and chemotherapy. She has not pursued genetic testing.    Rosie's mother was diagnosed with uterine cancer at age 70 and passed away at age 82.    Her mother was diagnosed with and passed away from breast cancer (specifically surgery complications) in her 40's.    Her father may have had a cancer before passing away in his 70/80's, but further details are unknown.    Rosie's father passed away at age 80 and never had a cancer.    One of his sisters was diagnosed with and passed away from ovarian cancer in her 70/80's.    One of his sisters was diagnosed with breast cancer in her 90's and passed away shortly after.    One of his sisters did not have a cancer, but her daughter was diagnosed with breast cancer in her 50/6's.    One of his sisters did not have a cancer, but two of her daughters were diagnosed with breast cancer in their 50/60's.    One of his brothers did not have a cancer, but his daughter was diagnosed with breast cancer in her 50/60's.    Her maternal ethnicity is Western  and . Her paternal ethnicity is Scandinavian. There is no known Ashkenazi Advent ancestry on either side of her family.    Discussion:    Rosie's personal and family history of gynecologic and breast cancers is  suggestive of a hereditary cancer syndrome.    We reviewed the features of sporadic, familial, and hereditary cancers. In looking at Rosie's family history, it is possible that a cancer susceptibility gene is present as Rosie has been diagnosed with an epithelial ovarian cancer and she has multiple close relatives on both sides of her family diagnosed with related cancers in three generations; several of her relatives were also diagnosed under age 50 and/or with multiple primary cancers.    We discussed the natural history and genetics of several hereditary cancer syndromes, including Hereditary Breast and Ovarian Cancer (HBOC) syndrome.     We reviewed that the most common cause of hereditary epithelial ovarian cancer rand breast cancer is HBOC syndrome, which is caused by mutations in the BRCA1 and BRCA2 genes. Individuals with HBOC syndrome are at increased risk for several different cancers, including breast, ovarian, male breast, prostate, melanoma, pancreatic, and possibly uterine cancer.    We discussed that there are additional genes that could cause increased risk for gynecologic, breast, and related cancers. As many of these genes present with overlapping features in a family and accurate cancer risk cannot always be established based upon the pedigree analysis alone, it would be reasonable for Rosie to consider panel genetic testing to analyze multiple genes at once.    Based on her personal and family history, Rosie meets current National Comprehensive Cancer Network (NCCN) criteria for genetic testing of BRCA1, BRCA2, and other high penetrance ovarian and/or breast cancer genes (i.e. ADALID, BRIP1, CHEK2, CDH1, PALB2, RAD51C, RAD51D, PTEN, TP53, etc.).      A detailed handout regarding these genes/syndromes and the information we discussed was provided to Rosie via [employer] and can be found in the after visit summary. Topics included: inheritance pattern, cancer risks, cancer screening recommendations, and  also risks, benefits and limitations of testing.    We reviewed genetic testing options for hereditary breast and gynecologic cancers: actionable breast and gynecologic cancer panel (Hereditary Breast/Gyn Cancers Actionable Panel, 19 genes) and expanded breast and gynecologic cancer panel (Hereditary Breast/Gyn Cancers Expanded Panel, 25 genes). Rosie expressed interest in learning as much information as possible from the testing. She opted for the Hereditary Breast/Gyn Cancers Expanded Panel.    Genetic testing is available for 25 genes associated with hereditary gynecologic, breast, and related cancers: ADALID, BARD1, BRCA1, BRCA2, BRIP1, CDH1, CHEK2, DICER1, EPCAM, MLH1, MRE11, MSH2, MSH6, MUTYH, NBN, NF1, PALB2, PMS2, PTEN, RAD50, RAD51C, RAD51D, SMARCA4, STK11, and TP53.    We discussed that many of the genes in the panel are associated with specific hereditary cancer syndromes and published management guidelines: Hereditary Breast and Ovarian Cancer syndrome (BRCA1, BRCA2), Dowell syndrome (MLH1, MSH2, MSH6, PMS2, EPCAM), Hereditary Diffuse Gastric Cancer (CDH1), Cowden syndrome (PTEN), Li Fraumeni syndrome (TP53), Peutz-Jeghers syndrome (STK11), MUTYH Associated Polyposis (MUTYH), and Neurofibromatosis type 1 (NF1).      Risk-reducing salpingo-oophorectomy can be considered in women with mutations in BRIP1, RAD51C, or RAD51D. Breast and/or other cancer risk management guidelines are available for ADALID, CHEK2, PALB2, and NBN.    The remaining genes (BARD1, DICER1, MRE11, RAD50, and SMARCA4) are associated with increased cancer risk and may allow us to make medical recommendations when mutations are identified.      Consent was obtained over the video. Rosie elected to have her blood drawn on 2/22/2021, as she is already scheduled to be in clinic that day. Once her blood is drawn, genetic testing using the Hereditary Breast/Gyn Cancers Expanded Panel will be sent to the [facility]. Turn around time: 4-6 weeks after insurance pre-authorization is obtained.    Medical Management: For Rosie, we reviewed that the information from genetic testing may determine:    additional cancer screening for which Rosie may qualify (i.e. mammogram and breast MRI, more frequent colonoscopies, more frequent dermatologic exams, etc.),    options for risk reducing surgeries Rosie could consider (i.e. bilateral mastectomy, etc.),      and targeted chemotherapies if she were to develop certain cancers in the future (i.e. immunotherapy for individuals with Dowell syndrome, PARP inhibitors, etc.).     These recommendations and possible targeted chemotherapies will be discussed in detail once genetic testing is completed.     Plan:  1) Today Rosie elected to proceed with genetic testing using the Hereditary Breast/Gyn Cancers Expanded Panel offered by the [facility]. Her blood will be drawn on 2/22/2021.  2) The results should be available in 4-6 weeks after insurance pre-authorization is obtained.  3) Rosie will be called to discuss the results.    Tiffanie Carter MS, [facility]  Licensed Genetic Counselor  Office: [phone]  Pager: [phone]      Again, thank you for allowing me to participate in the care of your patient.        Sincerely,        Tiffanie Carter GC

## 2021-02-18 ENCOUNTER — VIRTUAL VISIT (OUTPATIENT)
Dept: BEHAVIORAL HEALTH | Facility: CLINIC | Age: 81
End: 2021-02-18
Payer: MEDICARE

## 2021-02-18 ENCOUNTER — TELEPHONE (OUTPATIENT)
Dept: INTERNAL MEDICINE | Facility: CLINIC | Age: 81
End: 2021-02-18

## 2021-02-18 DIAGNOSIS — F43.23 ADJUSTMENT DISORDER WITH MIXED ANXIETY AND DEPRESSED MOOD: Primary | ICD-10-CM

## 2021-02-18 NOTE — TELEPHONE ENCOUNTER
Patient Quality Outreach Summary      Summary:    Patient is due/failing the following:   BP check    Type of outreach:    Sent [employer] message.    Questions for provider review:    None                                                                                                                    Leydi Lujan CMA (MAIDA)   (aka: Kami Lujan)       Chart routed to Care Team.

## 2021-02-22 ENCOUNTER — HOSPITAL ENCOUNTER (OUTPATIENT)
Dept: LAB | Facility: CLINIC | Age: 81
Discharge: HOME | End: 2021-02-22
Attending: GENETIC COUNSELOR, MS | Admitting: COLON & RECTAL SURGERY
Payer: MEDICARE

## 2021-02-22 ENCOUNTER — TELEPHONE (OUTPATIENT)
Dept: INTERNAL MEDICINE | Facility: CLINIC | Age: 81
End: 2021-02-22

## 2021-02-22 ENCOUNTER — OFFICE VISIT (OUTPATIENT)
Dept: SURGERY | Facility: CLINIC | Age: 81
End: 2021-02-22
Payer: MEDICARE

## 2021-02-22 VITALS — SYSTOLIC BLOOD PRESSURE: 134 MMHG | DIASTOLIC BLOOD PRESSURE: 75 MMHG | TEMPERATURE: 98.2 F | HEART RATE: 84 BPM

## 2021-02-22 DIAGNOSIS — Z85.43 PERSONAL HISTORY OF OVARIAN CANCER: ICD-10-CM

## 2021-02-22 DIAGNOSIS — Z80.41 FAMILY HISTORY OF MALIGNANT NEOPLASM OF OVARY: ICD-10-CM

## 2021-02-22 DIAGNOSIS — Z80.3 FAMILY HISTORY OF MALIGNANT NEOPLASM OF BREAST: ICD-10-CM

## 2021-02-22 DIAGNOSIS — K43.2 INCISIONAL HERNIA OF ANTERIOR ABDOMINAL WALL WITHOUT OBSTRUCTION OR GANGRENE: Primary | ICD-10-CM

## 2021-02-22 PROCEDURE — 36415 COLL VENOUS BLD VENIPUNCTURE: CPT | Performed by: COLON & RECTAL SURGERY

## 2021-02-22 PROCEDURE — 999N001104 HC STATISTIC DNA ISOL HIGH PURITY: Performed by: COLON & RECTAL SURGERY

## 2021-02-22 PROCEDURE — 99214 OFFICE O/P EST MOD 30 MIN: CPT | Mod: CS | Performed by: SURGERY

## 2021-02-22 NOTE — PROGRESS NOTES
Rosie is in for a recheck.  I saw her on December 21 for a consultation regarding an incisional hernia.  We did not schedule anything at that time and instead had her return today for a recheck and to find out if she was interested in proceeding with repair.  Please refer to my note dated December 21 for details of her history.    Exam today was unchanged.    She has had a lot of time to think about this and would like to proceed with repair.  She wears a binder but would rather not have to do that.  I spent about 30 minutes with her discussing the techniques of repair.  My plan would be to approach this from an anterior incision and reduce it from above.  Hopefully I can easily undermine the fascia for placement of mesh.  Then I would close the fascia over the top of the mesh.  This would be done as an outpatient hopefully.  I would keep her overnight, if necessary.    Risks, benefits, and alternatives were discussed in detail.  We have tentatively scheduled her for March 24.  She will obtain Covid testing and an H&P prior to surgery.  All of her questions were answered.  I told her to feel free to call with any further questions.    Time spent with patient was 30 minutes.    Aayush George MD FACS

## 2021-02-22 NOTE — NURSING NOTE
"Initial /75 (BP Location: Right arm, Patient Position: Sitting, Cuff Size: Adult Regular)   Pulse 84   Temp 98.2  F (36.8  C) (Tympanic)  Estimated body mass index is 35.62 kg/m  as calculated from the following:    Height as of 2/1/21: 1.613 m (5' 3.5\").    Weight as of 2/1/21: 92.7 kg (204 lb 4.8 oz). .    Anushka Peña MA on 2/22/2021 at 9:58 AM    "

## 2021-02-22 NOTE — LETTER
2/22/2021         RE: Rosie Blackman  [address] [address]        Dear Colleague,    Thank you for referring your patient, Rosie Blackman, to the [facility]. Please see a copy of my visit note below.    Rosie is in for a recheck.  I saw her on December 21 for a consultation regarding an incisional hernia.  We did not schedule anything at that time and instead had her return today for a recheck and to find out if she was interested in proceeding with repair.  Please refer to my note dated December 21 for details of her history.    Exam today was unchanged.    She has had a lot of time to think about this and would like to proceed with repair.  She wears a binder but would rather not have to do that.  I spent about 30 minutes with her discussing the techniques of repair.  My plan would be to approach this from an anterior incision and reduce it from above.  Hopefully I can easily undermine the fascia for placement of mesh.  Then I would close the fascia over the top of the mesh.  This would be done as an outpatient hopefully.  I would keep her overnight, if necessary.    Risks, benefits, and alternatives were discussed in detail.  We have tentatively scheduled her for March 24.  She will obtain Covid testing and an H&P prior to surgery.  All of her questions were answered.  I told her to feel free to call with any further questions.    Time spent with patient was 30 minutes.    Aayush George MD FACS            Again, thank you for allowing me to participate in the care of your patient.        Sincerely,        Aayush George MD

## 2021-02-22 NOTE — TELEPHONE ENCOUNTER
"----- Message from Anushka Peña MA sent at 2/22/2021  9:58 AM CST -----  Regarding: BP  Initial /75 (BP Location: Right arm, Patient Position: Sitting, Cuff Size: Adult Regular)   Pulse 84   Temp 98.2  F (36.8  C) (Tympanic)   Resp 18   Ht 1.613 m (5' 3.5\")   Wt 92.7 kg (204 lb 4.8 oz)   SpO2 98%   Breastfeeding No   BMI 35.62 kg/m   Estimated body mass index is 35.62 kg/m  as calculated from the following:    Height as of 2/1/21: 1.613 m (5' 3.5\").    Weight as of 2/1/21: 92.7 kg (204 lb 4.8 oz).     She wanted me to send this to you instead of doing the nurse visit for a BP check.    "

## 2021-02-23 NOTE — TELEPHONE ENCOUNTER
Patient notified of provider recommendations below with understanding voiced.  It appears BP value below that MA took is entered into Epic, so closing encounter at this time.     Rachel Hathaway RN  [facility]

## 2021-02-23 NOTE — PATIENT INSTRUCTIONS
Assessing Cancer Risk  Only about 5-10% of cancers are thought to be due to an inherited cancer susceptibility gene.    These families often have:    Several people with the same or related types of cancer    Cancers diagnosed at a young age (before age 50)    Individuals with more than one primary cancer    Multiple generations of the family affected with cancer    Some people may be candidates for genetic testing of more than one gene.  For these families, genetic testing using a cancer panel may be offered.  These panels will test different genes known to increase the risk for breast, ovarian, uterine, and/or other cancers. All of the genes discussed below have published clinical management guidelines for individuals who are found to carry a mutation. The purpose of this handout is to serve as a brief summary of the genes analyzed by the panels used to inquire about hereditary breast and gynecologic cancer:  ADALID, BRCA1, BRCA2, BRIP1, CDH1, CHEK2, MLH1, MSH2, MSH6, PMS2, EPCAM, PTEN, PALB2, RAD51C, RAD51D, and TP53.  ______________________________________________________________________________  Hereditary Breast and Ovarian Cancer Syndrome   (BRCA1 and BRCA2)  A single mutation in one of the copies of BRCA1 or BRCA2 increases the risk for breast and ovarian cancer, among others.  The risk for pancreatic cancer and melanoma may also be slightly increased in some families.  The chart below shows the chance that someone with a BRCA mutation would develop cancer in his or her lifetime1,2,3,4.        A person s ethnic background is also important to consider, as individuals of [employer]kenazi [employer] ancestry have a higher chance of having a BRCA gene mutation.  There are three BRCA mutations that occur more frequently in this population.    Dowell Syndrome   (MLH1, MSH2, MSH6, PMS2, and EPCAM)  Currently five genes are known to cause Dowell Syndrome: MLH1, MSH2, MSH6, PMS2, and EPCAM.  A single mutation in one of the  Dowell Syndrome genes increases the risk for colon, endometrial, ovarian, and stomach cancers.  Other cancers that occur less commonly in Dowell Syndrome include urinary tract, skin, and brain cancers.  The chart below shows the chance that a person with Dowell syndrome would develop cancer in his or her lifetime5.      *Cancer risk varies depending on Dowell syndrome gene found    Cowden Syndrome   (PTEN)  Cowden syndrome is a hereditary condition that increases the risk for breast, thyroid, endometrial, colon, and kidney cancer.  Cowden syndrome is caused by a mutation in the PTEN gene.  A single mutation in one of the copies of PTEN causes Cowden syndrome and increases cancer risk.  The chart below shows the chance that someone with a PTEN mutation would develop cancer in their lifetime6,7.  Other benign features seen in some individuals with Cowden syndrome include benign skin lesions (facial papules, keratoses, lipomas), learning disability, autism, thyroid nodules, colon polyps, and larger head size.      *One recent study found breast cancer risk to be increased to 85%    Li-Fraumeni Syndrome   (TP53)  Li-Fraumeni Syndrome (LFS) is a cancer predisposition syndrome caused by a mutation in the TP53 gene. A single mutation in one of the copies of TP53 increases the risk for multiple cancers. Individuals with LFS are at an increased risk for developing cancer at a young age. The lifetime risk for development of a LFS-associated cancer is 50% by age 30 and 90% by age 60.   Core Cancers: Sarcomas, Breast, Brain, Lung, Leukemias/Lymphomas, Adrenocortical carcinomas  Other Cancers: Gastrointestinal, Thyroid, Skin, Genitourinary    Hereditary Diffuse Gastric Cancer   (CDH1)  Currently, one gene is known to cause hereditary diffuse gastric cancer (HDGC): CDH1.  Individuals with HDGC are at increased risk for diffuse gastric cancer and lobular breast cancer. Of people diagnosed with HDGC, 30-50% have a mutation in the CDH1  gene.  This suggests there are likely other genes that may cause HDGC that have not been identified yet.      Lifetime Cancer Risks    General Population HDGC    Diffuse Gastric  <1% ~80%   Breast 12% 39-52%         Additional Genes  ADALID  ADALID is a moderate-risk breast cancer gene. Women who have a mutation in ADALID can have between a 2-4 fold increased risk for breast cancer compared to the general population8. ADALID mutations have also been associated with increased risk for pancreatic cancer, however an estimate of this cancer risk is not well understood9. Individuals who inherit two ADALID mutations have a condition called ataxia-telangiectasia (AT).  This rare autosomal recessive condition affects the nervous system and immune system, and is associated with progressive cerebellar ataxia beginning in childhood.  Individuals with ataxia-telangiectasia often have a weakened immune system and have an increased risk for childhood cancers.    PALB2  Mutations in PALB2 have been shown to increase the risk of breast cancer up to 33-58% in some families; where individuals fall within this risk range is dependent upon family [id number]. PALB2 mutations have also been associated with increased risk for pancreatic cancer, although this risk has not been quantified yet.  Individuals who inherit two PALB2 mutations--one from their mother and one from their father--have a condition called Fanconi Anemia.  This rare autosomal recessive condition is associated with short stature, developmental delay, bone marrow failure, and increased risk for childhood cancers.    CHEK2   CHEK2 is a moderate-risk breast cancer gene.  Women who have a mutation in CHEK2 have around a 2-fold increased risk for breast cancer compared to the general population, and this risk may be higher depending upon family history.11,12,13 Mutations in CHEK2 have also been shown to increase the risk of a number of other cancers, including colon and prostate, however  these cancer risks are currently not well understood.    BRIP1, RAD51C and RAD51D  Mutations in BRIP1, RAD51C, and RAD51D have been shown to increase the risk of ovarian cancer and possibly female breast cancer as well14,15 .       Lifetime Cancer Risk    General Population BRIP1 RAD51C RAD51D   Ovarian 1-2% ~5-8% ~5-9% ~7-15%           Inheritance  All of the cancer syndromes reviewed above are inherited in an autosomal dominant pattern.  This means that if a parent has a mutation, each of his or her children will have a 50% chance of inheriting that same mutation.  Therefore, each child--male or female--would have a 50% chance of being at increased risk for developing cancer.      Image obtained from Genetics Home Reference, 2013     Mutations in some genes can occur de adelia, which means that a person s mutation occurred for the first time in them and was not inherited from a parent.  Now that they have the mutation, however, it can be passed on to future generations.    Genetic Testing  Genetic testing involves a blood test and will look at the genetic information in the ADALID, BRCA1, BRCA2, BRIP1, CDH1, CHEK2, MLH1, MSH2, MSH6, PMS2, EPCAM, PTEN, PALB2, RAD51C, RAD51D, and TP53 genes for any harmful mutations that are associated with increased cancer risk.  If possible, it is recommended that the person(s) who has had cancer be tested before other family members.  That person will give us the most useful information about whether or not a specific gene is associated with the cancer in the family.    Results  There are three possible results of genetic testing:    Positive--a harmful mutation was identified in one or more of the genes    Negative--no mutation was identified in any of the genes on this panel    Variant of unknown significance--a variation in one of the genes was identified, but it is unclear how this impacts cancer risk in the family    Advantages and Disadvantages   There are advantages and  disadvantages to genetic testing.    Advantages    May clarify your cancer risk    Can help you make medical decisions    May explain the cancers in your family    May give useful information to your family members (if you share your results)    Disadvantages    Possible negative emotional impact of learning about inherited cancer risk    Uncertainty in interpreting a negative test result in some situations    Possible genetic discrimination concerns (see below)    Genetic Information Nondiscrimination Act (GENA)  GENA is a federal law that protects individuals from health insurance or employment discrimination based on a genetic test result alone.  Although rare, there are currently no legal discrimination protections in terms of life insurance, long term care, or disability insurances.  Visit the [employer] [facility] website to learn more.    Reducing Cancer Risk  All of the genes described above have nationally recognized cancer screening guidelines that would be recommended for individuals who test positive.  In addition to increased cancer screening, surgeries may be offered or recommended to reduce cancer risk.  Recommendations are based upon an individual s genetic test result as well as their personal and family history of cancer.    Questions to Think About Regarding Genetic Testing:    What effect will the test result have on me and my relationship with my family members if I have an inherited gene mutation?  If I don t have a gene mutation?    Should I share my test results, and how will my family react to this news, which may also affect them?    Are my children ready to learn new information that may one day affect their own health?    Hereditary Cancer Resources    FORCE: Facing Our Risk of Cancer Empowered facingourrisk.org   Bright Pink bebrightpink.org   Li-Fraumeni Syndrome Association lfsassociation.org   PTEN World PTENworld.com   No stomach for cancer, Inc.  nostomachforcancer.org   Stomach cancer relief network Scrnet.org   Collaborative Group of the Americas on Inherited Colorectal Cancer (CGA) cgaicc.com    Cancer Care cancercare.org   American Cancer Society (ACS) cancer.org   National Cancer [place] (NCI) cancer.gov     Please call us if you have any questions or concerns.   Cancer Risk Management Program [phone] ([phone])  ? Ariel Savage, MS, [facility] [phone]  ? Dora Bennett, MS, [facility]  [phone]  ? Jade Boo, MS, [facility]  [phone]  ? Tiffanie Carter, MS, [facility] [phone]  ? Esperanza Svetlana, MS, [facility] [phone]  ? Romy Gunn, MS, [facility]  [phone]    References  1. Monica A, Sharon PDP, Sailaja S, Lauren NICOLAS, Quiana JE, Alma JL, Lidia N, Amanda H, Kaylen O, Cassandra A, Vonda B, Zora P, Mannathaly S, Jorge A DM, Lorenzana N, Sergei E, Angeles H, Jalil E, Faizan J, Gronhenri J, Noemi B, Madison H, Thorlacius S, Eerola H, Katharine H, Keiry K, Dusty OP. Average risks of breast and ovarian cancer associated with BRCA1 or BRCA2 mutations detected in case series unselected for family history: a combined analysis of 222 studies. Am J Hum Carmela. 2003;72:1117-30.  2. Cecilia HUSTON, Amanda M, Jose G.  BRCA1 and BRCA2 Hereditary Breast and Ovarian Cancer. Gene Reviews online. 2013.  3. James YC, Forrest S, Crissy G, Rojas S. Breast cancer risk among male BRCA1 and BRCA2 mutation carriers. J Natl Cancer Inst. 2007;99:1811-4.  4. Wyatt GUARDADO, Vernon I, Yong J, Cassia E, Paul ER, Nguyen F. Risk of breast cancer in male BRCA2 carriers. J Med Carmela. 2010;47:710-1.  5. National Comprehensive Cancer Network. Clinical practice guidelines in oncology, colorectal cancer screening. Available online (registration required). 2015.  6. Mariano CAROLINA, Deidra J, Chelly J, Giacomo LA, Kumar RAINEY, Joe C. Lifetime cancer risks in individuals with germline PTEN mutations. Clin Cancer Res. 2012;18:400-7.  7. Venkata SANCHEZ. Cowden Syndrome: A Critical Review of the  Clinical Literature. J Carmela . 2009:18:13-27.  8. Beto A, Alejandro D, Cyn S, Rosalie P, Linh T, Denise M, Marquise B, Keirsten H, Alison R, Marielle K, Stephanie L, Wyatt DG, Jorge A D, Nilesh DF, Daija MR, The Breast Cancer Susceptibility Collaboration () & Jean HUSTON. ADALID mutations that cause ataxia-telangiectasia are breast cancer susceptibility alleles. Nature Genetics. 2006;38:873-875  9. Simón N , Yesenia Y, Pao J, Crys L, Deepika GM , Rachael ML, Gallinger S, Hopkins AG, Syngal S, Jaci ML, Bryan J , Kalina R, Padimni SZ, Estela JR, Carmen VE, Delfina M, Vobg B, Ambar N, Bill RH, Kamla KW, and Rin AP. ADALID mutations in patients with hereditary pancreatic cancer. Cancer Discover. 2012;2:41-46  10. Monica MEDINA, et al. Breast-Cancer Risk in Families with Mutations in PALB2. NEJM. 2014; 371(6):497-506.  11. CHEK2 Breast Cancer Case-Control Consortium. CHEK2*1100delC and susceptibility to breast cancer: A collaborative analysis involving 10,860 breast cancer cases and 9,065 controls from 10 studies. Am J Hum Carmela, 74 (2004), pp. 7581-2455  12. Saba T, Amando S, Laura K, et al. Spectrum of Mutations in BRCA1, BRCA2, CHEK2, and TP53 in Families at High Risk of Breast Cancer. YAEL. 2006;295(12):6909-0206.   13. Lorena C, Steve D, Digna A, et al. Risk of breast cancer in women with a CHEK2 mutation with and without a family history of breast cancer. J Clin Oncol. 2011;29:0790-6180.  14. Albert H, Alexandr E, Kevin SJ, et al. Contribution of germline mutations in the RAD51B, RAD51C, and RAD51D genes to ovarian cancer in the population. J Clin Oncol. 2015;33(26):0206-2660. Doi:10.1200/JCO.2015.61.2408.  15. Grace T, Hazel MARRERO, Modesto P, et al. Mutations in BRIP1 confer high risk of ovarian cancer. Mague Carmela. 2011;43(11):7082-6053. doi:10.1038/ng.955.

## 2021-02-24 NOTE — PROGRESS NOTES
"   OUTPATIENT PHYSICAL THERAPY DISCHARGE SUMMARY   Shaan Rowe PA-C 12/8/20 to 01/22/21 1000   Signing Clinician's Name / Credentials   Signing clinician's name / credentials Renee Bazanbouchra, PT [id number]   Session Number   Session Number 10    Ortho Goal 1   Goal Identifier 1   Goal Description Pt will be able to put on socks/shoes without increase in pain.   1/22/21 puts on socks/ shoes but does not tie shoes--pain 4/10   Target Date 12/22/20   Ortho Goal 2   Goal Identifier 2   Goal Description Pt will be able to get into bed without step stool.   Target Date 01/05/21   Date Met 12/22/20   Ortho Goal 3   Goal Identifier 3   Goal Description Pt will be able to ascend/descend stairs without increase in sx in order to decrease difficulty with home navigation.   1/22/21 Pt notes she goes slow on stairs , marked time notes that is her normal for 20 years.   Target Date 01/19/21   Ortho Goal 4   Goal Identifier 4   Goal Description Pt will be independent with HEP in order to self manage symptoms.   1/15/21 MET   Target Date 02/05/21   Subjective Report   Subjective Report Pt states she is doing good.   Pt states she doesn't move @ night so stiff in am.  Back pain 1-2/10.  Pt notes back irritation w/ having arms up overhead to do her hair   Objective Measures    LROM flex 90%    MMT:  Hip abd R 5-/5,  L 4+/5 (tested 1/15/21)    slight limp w/o device.     Therapeutic Procedure/exercise   Treatment Detail   Seated TA set (cuing to avoid shoulder hike).  Supine TA set w/ march x 12 B.  Clams X 15 B   sit to stand from chair w/o UE assist X 10.   Modified tandem stand B.  6 \" fwd step up w/ B UE support X 15 B.  sidestepping w/ Green tubing 2 steps X 15 B.     Plan   Homework see exercises above   Plan  Pt to cont on own w/ HEP.  Discharge from physical therapy   Comments   Comments Progress towards goals as noted above     "

## 2021-02-25 ENCOUNTER — IMMUNIZATION (OUTPATIENT)
Dept: FAMILY MEDICINE | Facility: CLINIC | Age: 81
End: 2021-02-25
Attending: FAMILY MEDICINE
Payer: MEDICARE

## 2021-02-25 PROCEDURE — 0002A PR COVID VAC PFIZER DIL RECON 30 MCG/0.3 ML IM: CPT

## 2021-02-25 PROCEDURE — 91300 PR COVID VAC PFIZER DIL RECON 30 MCG/0.3 ML IM: CPT

## 2021-02-25 NOTE — PROGRESS NOTES
Called patient regarding today's appointment. Patient reports she is doing well and   Focusing on other issues at this time.  She set up another appt as needed.  White (Mindy),MALKA,[facility]

## 2021-03-02 ENCOUNTER — TELEPHONE (OUTPATIENT)
Dept: CONSULT | Facility: CLINIC | Age: 81
End: 2021-03-02

## 2021-03-02 LAB — COPATH REPORT: NORMAL

## 2021-03-02 NOTE — TELEPHONE ENCOUNTER
Notified Rosie that no prior authorization is required for genetic testing. But she meets  medical necessity guidelines and criteria.     Explained that insurance benefits may still apply, therefore, there could be an out of pocket cost. Provided Rosie with estimated out of pocket cost.     Rosie expressed understanding and stated that she wants to proceed with testing. We will call when results are available. Rosie had no further questions.        JANETH Denise  Genomics Billing    [facility]   Molecular Diagnostic Lab  [address] [place] [id number] [phone]

## 2021-03-09 DIAGNOSIS — Z80.41 FAMILY HISTORY OF MALIGNANT NEOPLASM OF OVARY: ICD-10-CM

## 2021-03-09 DIAGNOSIS — Z80.3 FAMILY HISTORY OF MALIGNANT NEOPLASM OF BREAST: ICD-10-CM

## 2021-03-09 DIAGNOSIS — Z85.43 PERSONAL HISTORY OF OVARIAN CANCER: Primary | ICD-10-CM

## 2021-03-09 PROCEDURE — 81408 MOPATH PROCEDURE LEVEL 9: CPT | Mod: 59 | Performed by: COLON & RECTAL SURGERY

## 2021-03-09 PROCEDURE — 81403 MOPATH PROCEDURE LEVEL 4: CPT | Performed by: COLON & RECTAL SURGERY

## 2021-03-09 PROCEDURE — 81300 MSH6 GENE DUP/DELETE VARIANT: CPT | Performed by: COLON & RECTAL SURGERY

## 2021-03-09 PROCEDURE — 81351 TP53 GENE FULL GENE SEQUENCE: CPT | Performed by: COLON & RECTAL SURGERY

## 2021-03-09 PROCEDURE — 81479 UNLISTED MOLECULAR PATHOLOGY: CPT | Performed by: COLON & RECTAL SURGERY

## 2021-03-09 PROCEDURE — 81292 MLH1 GENE FULL SEQ: CPT | Performed by: COLON & RECTAL SURGERY

## 2021-03-09 PROCEDURE — 81317 PMS2 GENE FULL SEQ ANALYSIS: CPT | Performed by: COLON & RECTAL SURGERY

## 2021-03-09 PROCEDURE — 81297 MSH2 GENE DUP/DELETE VARIANT: CPT | Performed by: COLON & RECTAL SURGERY

## 2021-03-09 PROCEDURE — G0452 MOLECULAR PATHOLOGY INTERPR: HCPCS | Mod: 26 | Performed by: PATHOLOGY

## 2021-03-09 PROCEDURE — 81162 BRCA1&2 GEN FULL SEQ DUP/DEL: CPT | Performed by: COLON & RECTAL SURGERY

## 2021-03-09 PROCEDURE — 81321 PTEN GENE FULL SEQUENCE: CPT | Performed by: COLON & RECTAL SURGERY

## 2021-03-09 PROCEDURE — 81405 MOPATH PROCEDURE LEVEL 6: CPT | Mod: GZ | Performed by: COLON & RECTAL SURGERY

## 2021-03-09 PROCEDURE — 81307 PALB2 GENE FULL GENE SEQ: CPT | Performed by: COLON & RECTAL SURGERY

## 2021-03-09 PROCEDURE — 81404 MOPATH PROCEDURE LEVEL 5: CPT | Mod: GZ | Performed by: COLON & RECTAL SURGERY

## 2021-03-09 PROCEDURE — 81323 PTEN GENE DUP/DELET VARIANT: CPT | Performed by: COLON & RECTAL SURGERY

## 2021-03-09 PROCEDURE — 81406 MOPATH PROCEDURE LEVEL 7: CPT | Performed by: COLON & RECTAL SURGERY

## 2021-03-09 PROCEDURE — 81294 MLH1 GENE DUP/DELETE VARIANT: CPT | Mod: XU | Performed by: COLON & RECTAL SURGERY

## 2021-03-09 PROCEDURE — 81319 PMS2 GENE DUP/DELET VARIANTS: CPT | Performed by: COLON & RECTAL SURGERY

## 2021-03-09 PROCEDURE — 81295 MSH2 GENE FULL SEQ: CPT | Performed by: COLON & RECTAL SURGERY

## 2021-03-09 PROCEDURE — 81408 MOPATH PROCEDURE LEVEL 9: CPT | Performed by: COLON & RECTAL SURGERY

## 2021-03-09 PROCEDURE — 81298 MSH6 GENE FULL SEQ: CPT | Performed by: COLON & RECTAL SURGERY

## 2021-03-10 ENCOUNTER — OFFICE VISIT (OUTPATIENT)
Dept: FAMILY MEDICINE | Facility: CLINIC | Age: 81
End: 2021-03-10
Payer: MEDICARE

## 2021-03-10 VITALS
BODY MASS INDEX: 34.66 KG/M2 | WEIGHT: 203 LBS | OXYGEN SATURATION: 95 % | HEART RATE: 72 BPM | HEIGHT: 64 IN | DIASTOLIC BLOOD PRESSURE: 78 MMHG | SYSTOLIC BLOOD PRESSURE: 126 MMHG | RESPIRATION RATE: 16 BRPM | TEMPERATURE: 98.2 F

## 2021-03-10 DIAGNOSIS — K21.9 GASTROESOPHAGEAL REFLUX DISEASE WITHOUT ESOPHAGITIS: ICD-10-CM

## 2021-03-10 DIAGNOSIS — K58.0 IRRITABLE BOWEL SYNDROME WITH DIARRHEA: ICD-10-CM

## 2021-03-10 DIAGNOSIS — E66.812 CLASS 2 SEVERE OBESITY DUE TO EXCESS CALORIES WITH SERIOUS COMORBIDITY AND BODY MASS INDEX (BMI) OF 37.0 TO 37.9 IN ADULT (H): ICD-10-CM

## 2021-03-10 DIAGNOSIS — C56.9 OVARIAN CANCER, UNSPECIFIED LATERALITY (H): ICD-10-CM

## 2021-03-10 DIAGNOSIS — K43.2 INCISIONAL HERNIA OF ANTERIOR ABDOMINAL WALL WITHOUT OBSTRUCTION OR GANGRENE: ICD-10-CM

## 2021-03-10 DIAGNOSIS — E66.01 CLASS 2 SEVERE OBESITY DUE TO EXCESS CALORIES WITH SERIOUS COMORBIDITY AND BODY MASS INDEX (BMI) OF 37.0 TO 37.9 IN ADULT (H): ICD-10-CM

## 2021-03-10 DIAGNOSIS — I10 ESSENTIAL HYPERTENSION: ICD-10-CM

## 2021-03-10 DIAGNOSIS — Z01.818 PREOP GENERAL PHYSICAL EXAM: Primary | ICD-10-CM

## 2021-03-10 DIAGNOSIS — Z12.11 SCREEN FOR COLON CANCER: ICD-10-CM

## 2021-03-10 PROBLEM — N83.8 OVARIAN MASS: Status: RESOLVED | Noted: 2020-07-14 | Resolved: 2021-03-10

## 2021-03-10 LAB
ANION GAP SERPL CALCULATED.3IONS-SCNC: 4 MMOL/L (ref 3–14)
BUN SERPL-MCNC: 16 MG/DL (ref 7–30)
CALCIUM SERPL-MCNC: 9.4 MG/DL (ref 8.5–10.1)
CHLORIDE SERPL-SCNC: 105 MMOL/L (ref 94–109)
CO2 SERPL-SCNC: 32 MMOL/L (ref 20–32)
CREAT SERPL-MCNC: 0.68 MG/DL (ref 0.52–1.04)
GFR SERPL CREATININE-BSD FRML MDRD: 82 ML/MIN/{1.73_M2}
GLUCOSE SERPL-MCNC: 88 MG/DL (ref 70–99)
POTASSIUM SERPL-SCNC: 3.6 MMOL/L (ref 3.4–5.3)
SODIUM SERPL-SCNC: 141 MMOL/L (ref 133–144)

## 2021-03-10 PROCEDURE — 99214 OFFICE O/P EST MOD 30 MIN: CPT | Performed by: INTERNAL MEDICINE

## 2021-03-10 PROCEDURE — 93000 ELECTROCARDIOGRAM COMPLETE: CPT | Performed by: INTERNAL MEDICINE

## 2021-03-10 PROCEDURE — 80048 BASIC METABOLIC PNL TOTAL CA: CPT | Performed by: INTERNAL MEDICINE

## 2021-03-10 PROCEDURE — 36415 COLL VENOUS BLD VENIPUNCTURE: CPT | Performed by: INTERNAL MEDICINE

## 2021-03-10 ASSESSMENT — MIFFLIN-ST. JEOR: SCORE: 1367.86

## 2021-03-10 NOTE — PROGRESS NOTES
[facility]  [address] [address]  Phone: [phone]  Primary Provider: Murali Schrader  Pre-op Performing Provider: MURALI SCHRADER      PREOPERATIVE EVALUATION:  Today's date: 3/10/2021    Rosie Blackman is a 81 year old female who presents for a preoperative evaluation.    Surgical Information:  Surgery/Procedure: Incisional hernia of anterior abdominal wall without obstruction or gangrene  Surgery Location: [facility]  Surgeon: Aayush George MD  Surgery Date: 3/24/21  Time of Surgery: 7:30 am  Where patient plans to recover: At home with family  Fax number for surgical facility: Note does not need to be faxed, will be available electronically in Epic.    Type of Anesthesia Anticipated: General    Assessment & Plan     The proposed surgical procedure is considered INTERMEDIATE risk.    Preop general physical exam    Incisional hernia of anterior abdominal wall without obstruction or gangrene    Essential hypertension - skip med day of surgery  - Basic metabolic panel  - EKG 12-lead complete w/read - Clinics    Ovarian cancer, unspecified laterality (H)    Class 2 severe obesity due to excess calories with serious comorbidity and body mass index (BMI) of 37.0 to 37.9 in adult (H)      Screen for colon cancer    - GASTROENTEROLOGY ADULT REF PROCEDURE ONLY; Future    Gastroesophageal reflux disease without esophagitis    Irritable bowel syndrome with diarrhea           Risks and Recommendations:  The patient has the following additional risks and recommendations for perioperative complications:   - No identified additional risk factors other than previously addressed    Medication Instructions:  Patient is to take all scheduled medications on the day of surgery EXCEPT for modifications listed below:   - ACE/ARB: HOLD due to risk of hypotension during surgery.     RECOMMENDATION:  APPROVAL GIVEN to proceed with proposed  procedure, without further diagnostic evaluation.      1. Stop over the counter pills 1 week before surgery.  Ok to continue eye drops  2. Skip blood pressure med the AM of surgery  3. Blood work today  4. EKG today      Subjective     HPI related to upcoming procedure: Incisional hernia of anterior abdominal wall without obstruction or gangrene      Preop Questions 3/10/2021   1. Have you ever had a heart attack or stroke? No   2. Have you ever had surgery on your heart or blood vessels, such as a stent placement, a coronary artery bypass, or surgery on an artery in your head, neck, heart, or legs? YES - vein stripping in legs   3. Do you have chest pain with activity? No   4. Do you have a history of  heart failure? No   5. Do you currently have a cold, bronchitis or symptoms of other infection? No   6. Do you have a cough, shortness of breath, or wheezing? No   7. Do you or anyone in your family have previous history of blood clots? No   8. Do you or does anyone in your family have a serious bleeding problem such as prolonged bleeding following surgeries or cuts? No   9. Have you ever had problems with anemia or been told to take iron pills? No   10. Have you had any abnormal blood loss such as black, tarry or bloody stools, or abnormal vaginal bleeding? No   11. Have you ever had a blood transfusion? No   12. Are you willing to have a blood transfusion if it is medically needed before, during, or after your surgery? Yes   13. Have you or any of your relatives ever had problems with anesthesia? YES - mother had problems waking up with anesthesia.   14. Do you have sleep apnea, excessive snoring or daytime drowsiness? No   15. Do you have any artifical heart valves or other implanted medical devices like a pacemaker, defibrillator, or continuous glucose monitor? No   16. Do you have artificial joints? No   17. Are you allergic to latex? No   18. Is there any chance that you may be pregnant? -     Health Care  Directive:  Patient has a Health Care Directive on file      Preoperative Review of :   reviewed - controlled substances reflected in medication list.      Status of Chronic Conditions:  HYPERTENSION - Patient has longstanding history of HTN , currently denies any symptoms referable to elevated blood pressure. Specifically denies chest pain, palpitations, dyspnea, orthopnea, PND or peripheral edema. Blood pressure readings have been in normal range. Current medication regimen is as listed below. Patient denies any side effects of medication.       Review of Systems  Constitutional, neuro, ENT, endocrine, pulmonary, cardiac, gastrointestinal, genitourinary, musculoskeletal, integument and psychiatric systems are negative, except as otherwise noted.    Patient Active Problem List    Diagnosis Date Noted     Ovarian cancer, unspecified laterality (H) 03/10/2021     Priority: Medium     Incisional hernia of anterior abdominal wall without obstruction or gangrene 02/22/2021     Priority: Medium     Added automatically from request for surgery [id number]       S/P exploratory laparotomy 07/24/2020     Priority: Medium     Tear of gluteus medius tendon 07/09/2020     Priority: Medium     Primary osteoarthritis of right hip 07/09/2020     Priority: Medium     Spinal stenosis of lumbar region, unspecified whether neurogenic claudication present 07/09/2020     Priority: Medium     Obesity (BMI 35.0-39.9) with comorbidity (H) 11/28/2018     Priority: Medium     Lichen sclerosus of female genitalia 07/03/2018     Priority: Medium     Gastroesophageal reflux disease, esophagitis presence not specified 10/12/2017     Priority: Medium     Hiatal hernia 10/21/2013     Priority: Medium     Large; found on gastroscopy 10/2013; No GERD sx; chronic throat clearing; + H pylori       Plantar fasciitis 04/02/2012     Priority: Medium     Advanced directives, counseling/discussion 12/08/2011     Priority: Medium     Advance Care  "Planning:   ACP Review and Resources Provided: Reviewed chart for advance care plan. Rosie Blackman has an up to date advance care plan on file. See additional documentation in Problem List and click on code status for document details. Confirmed/documented designated decision maker(s). Added by Shima Cruz on 12/08/2011         HYPERLIPIDEMIA LDL GOAL <130 10/31/2010     Priority: Medium     Prediabetes 09/03/2010     Priority: Medium     Blood sugar 122 9/10 working on; has already been to nutrition, weight and wellness and is reading \"healthy for life\"       Diverticulosis of colon 05/24/2010     Priority: Medium     Noted on colonoscopy 5/10       Colon polyps 05/24/2010     Priority: Medium     Pes planus      Priority: Medium     Problem list name updated by automated process. Provider to review       Personal history of contact with and (suspected) exposure to asbestos      Priority: Medium             exposed 12 years in childhood       Donor of other blood      Priority: Medium           has false + syphyllis  Problem list name updated by automated process. Provider to review       Irritable bowel syndrome      Priority: Medium     ALLERGIC RHINITIS       Priority: Medium     Resolving with dietary changes; stopping gluten       Need for prophylactic hormone replacement therapy (postmenopausal)      Priority: Medium     Weaned down to Premarin 0.3 mg. Cannot DC at this point. 4/07       UTI (urinary tract infection)      Priority: Medium             recurrent  Problem list name updated by automated process. Provider to review       Essential hypertension      Priority: Medium      Past Medical History:   Diagnosis Date     Chronic airway obstruction (H)      Gastroesophageal reflux disease      Hiatal hernia      Hypertension      Ovarian cancer, unspecified laterality (H) 3/10/2021     Personal history of contact with and (suspected) exposure to asbestos      Primary osteoarthritis of right hip 7/9/2020 "     Past Surgical History:   Procedure Laterality Date     BREAST BIOPSY, CORE RT/LT  1994     C ANESTH,KNEE VEINS SURGERY  08/20/2014     CHOLECYSTECTOMY  1995     COLONOSCOPY  05/2010    Diverticulosis, colon polyps; repeat 5 yrs     COLONOSCOPY N/A 11/16/2015    Procedure: COLONOSCOPY;  Surgeon: Alejandro Matos MD;  Location: [facility]     Colonoscopy, hyperplastic polyps, repeat in 5 yrs  2004     ESOPHAGOSCOPY, GASTROSCOPY, DUODENOSCOPY (EGD), COMBINED  09/30/2013    Procedure: COMBINED ESOPHAGOSCOPY, GASTROSCOPY, DUODENOSCOPY (EGD), BIOPSY SINGLE OR MULTIPLE;  Gastroscopy;  Surgeon: Blaise Gill MD;  Location: [facility]     EYE SURGERY  2012    R/L Cataracts     HC REMOVE TONSILS/ADENOIDS,13+ Y/O      T & A 13+y.o.     HYSTERECTOMY, PAP NO LONGER INDICATED       LAPAROSCOPIC SALPINGO-OOPHORECTOMY N/A 07/24/2020    Procedure: Laparoscopy, removal or pelvic mass, both tubes and ovaries, omentectomy, anterior culvectomy, pelvic and para aortic lymph node dissection, laparotomy;  Surgeon: Felipe Corona MD;  Location: [facility]     VT ANESTH,LUMBAR SPINE,CORD SURGERY  10/2020    L3-4 L4-5 TRANSFORAMINAL INTERBODY FUSION L3-5, POSTERIOR INSTRUMENTED FUSION AND DECOMPRESSION     TVH for DUB, ovaries in       VASCULAR SURGERY  2014    Venus closure     Current Outpatient Medications   Medication Sig Dispense Refill     Evening Primrose Oil 1000 MG CAPS One daily       mometasone (ELOCON) 0.1 % external cream Apply sparingly to affected area twice daily for 2 weeks then decrease to 1 time daily for 30 days then decrease to 2-3 times per week 45 g 11     omega 3 1000 MG CAPS Take by mouth daily        pravastatin (PRAVACHOL) 80 MG tablet Take 1 tablet (80 mg) by mouth daily 90 tablet 3     THERATEARS 0.25 % OP SOLN BID       valsartan-hydrochlorothiazide (DIOVAN HCT) 160-12.5 MG tablet Take 1 tablet by mouth daily 90 tablet 3     VIACTIV 500-100-40 OR CHEW once daily         Allergies   Allergen Reactions      "Ezetimibe Other (See Comments)     Severe muscle aches     Lisinopril      Omeprazole      Other reaction(s): *Unknown     Prilosec [Omeprazole]      Zestril [Ace Inhibitors] Cough     Atorvastatin Other (See Comments)     Muscle Pain     Irbesartan Cramps     Rosuvastatin Other (See Comments)     Muscle Pain        Social History     Tobacco Use     Smoking status: Never Smoker     Smokeless tobacco: Never Used   Substance Use Topics     Alcohol use: No     Family History   Problem Relation Age of Onset     Cancer Mother         uterine cancer,      Respiratory Mother         COPD     Cardiovascular Mother         AAA  age 80     Breast Cancer Maternal Grandmother      Cancer Maternal Grandfather         cancer unknown type     Breast Cancer Sister      Eye Disorder Father         cataracts     Depression Father      Depression Son      Depression Son      Breast Cancer Sister         X2     Cancer - colorectal No family hx of         no ovarian cancer     History   Drug Use No         Objective     /78   Pulse 72   Temp 98.2  F (36.8  C) (Tympanic)   Resp 16   Ht 1.613 m (5' 3.5\")   Wt 92.1 kg (203 lb)   SpO2 95%   Breastfeeding No   BMI 35.40 kg/m      Physical Exam    GENERAL APPEARANCE: healthy, alert and no distress     EYES: EOMI, PERRL     HENT: ear canals and TM's normal and nose and mouth without ulcers or lesions     NECK: no adenopathy, no asymmetry, masses, or scars and thyroid normal to palpation     RESP: lungs clear to auscultation - no rales, rhonchi or wheezes     CV: no murmur, click or rub and frequent extasystoles with compensatory pause     ABDOMEN: soft, nontender and hernia present     MS: extremities normal- no gross deformities noted, no evidence of inflammation in joints, FROM in all extremities.     SKIN: no suspicious lesions or rashes     NEURO: Normal strength and tone, sensory exam grossly normal, mentation intact and speech normal     PSYCH: mentation appears " normal. and affect normal/bright     LYMPHATICS: no cervical adenopathy  1+ bilateral pitting edema    Recent Labs   Lab Test 12/15/20  1515 10/05/20  1340 07/26/20  0541 07/25/20  0350   HGB  --  14.5 12.0 12.5   PLT  --   --  217 210   INR  --  1.07  --   --      --  137 139   POTASSIUM 3.5  --  3.4 3.5   CR 0.74  --  0.68 0.61   A1C 5.6  --   --   --         Diagnostics:  Labs pending at this time.  Results will be reviewed when available.   EKG: appears normal, NSR, normal axis, normal intervals, no acute ST/T changes c/w ischemia, no LVH by voltage criteria, Premature Atrial Contractions (PAC) noted, unchanged from previous tracings    Revised Cardiac Risk Index (RCRI):  The patient has the following serious cardiovascular risks for perioperative complications:   - No serious cardiac risks = 0 points     RCRI Interpretation: 0 points: Class I (very low risk - 0.4% complication rate)           Signed Electronically by: Kathya Escalera DO  Copy of this evaluation report is provided to requesting physician.

## 2021-03-10 NOTE — PATIENT INSTRUCTIONS

## 2021-03-10 NOTE — H&P (VIEW-ONLY)
[facility]  [address] [address]  Phone: [phone]  Primary Provider: Murali Schrader  Pre-op Performing Provider: MURALI SCHRADER      PREOPERATIVE EVALUATION:  Today's date: 3/10/2021    Rosie Blackman is a 81 year old female who presents for a preoperative evaluation.    Surgical Information:  Surgery/Procedure: Incisional hernia of anterior abdominal wall without obstruction or gangrene  Surgery Location: [facility]  Surgeon: Aayush George MD  Surgery Date: 3/24/21  Time of Surgery: 7:30 am  Where patient plans to recover: At home with family  Fax number for surgical facility: Note does not need to be faxed, will be available electronically in Epic.    Type of Anesthesia Anticipated: General    Assessment & Plan     The proposed surgical procedure is considered INTERMEDIATE risk.    Preop general physical exam    Incisional hernia of anterior abdominal wall without obstruction or gangrene    Essential hypertension - skip med day of surgery  - Basic metabolic panel  - EKG 12-lead complete w/read - Clinics    Ovarian cancer, unspecified laterality (H)    Class 2 severe obesity due to excess calories with serious comorbidity and body mass index (BMI) of 37.0 to 37.9 in adult (H)      Screen for colon cancer    - GASTROENTEROLOGY ADULT REF PROCEDURE ONLY; Future    Gastroesophageal reflux disease without esophagitis    Irritable bowel syndrome with diarrhea           Risks and Recommendations:  The patient has the following additional risks and recommendations for perioperative complications:   - No identified additional risk factors other than previously addressed    Medication Instructions:  Patient is to take all scheduled medications on the day of surgery EXCEPT for modifications listed below:   - ACE/ARB: HOLD due to risk of hypotension during surgery.     RECOMMENDATION:  APPROVAL GIVEN to proceed with proposed  procedure, without further diagnostic evaluation.      1. Stop over the counter pills 1 week before surgery.  Ok to continue eye drops  2. Skip blood pressure med the AM of surgery  3. Blood work today  4. EKG today      Subjective     HPI related to upcoming procedure: Incisional hernia of anterior abdominal wall without obstruction or gangrene      Preop Questions 3/10/2021   1. Have you ever had a heart attack or stroke? No   2. Have you ever had surgery on your heart or blood vessels, such as a stent placement, a coronary artery bypass, or surgery on an artery in your head, neck, heart, or legs? YES - vein stripping in legs   3. Do you have chest pain with activity? No   4. Do you have a history of  heart failure? No   5. Do you currently have a cold, bronchitis or symptoms of other infection? No   6. Do you have a cough, shortness of breath, or wheezing? No   7. Do you or anyone in your family have previous history of blood clots? No   8. Do you or does anyone in your family have a serious bleeding problem such as prolonged bleeding following surgeries or cuts? No   9. Have you ever had problems with anemia or been told to take iron pills? No   10. Have you had any abnormal blood loss such as black, tarry or bloody stools, or abnormal vaginal bleeding? No   11. Have you ever had a blood transfusion? No   12. Are you willing to have a blood transfusion if it is medically needed before, during, or after your surgery? Yes   13. Have you or any of your relatives ever had problems with anesthesia? YES - mother had problems waking up with anesthesia.   14. Do you have sleep apnea, excessive snoring or daytime drowsiness? No   15. Do you have any artifical heart valves or other implanted medical devices like a pacemaker, defibrillator, or continuous glucose monitor? No   16. Do you have artificial joints? No   17. Are you allergic to latex? No   18. Is there any chance that you may be pregnant? -     Health Care  Directive:  Patient has a Health Care Directive on file      Preoperative Review of :   reviewed - controlled substances reflected in medication list.      Status of Chronic Conditions:  HYPERTENSION - Patient has longstanding history of HTN , currently denies any symptoms referable to elevated blood pressure. Specifically denies chest pain, palpitations, dyspnea, orthopnea, PND or peripheral edema. Blood pressure readings have been in normal range. Current medication regimen is as listed below. Patient denies any side effects of medication.       Review of Systems  Constitutional, neuro, ENT, endocrine, pulmonary, cardiac, gastrointestinal, genitourinary, musculoskeletal, integument and psychiatric systems are negative, except as otherwise noted.    Patient Active Problem List    Diagnosis Date Noted     Ovarian cancer, unspecified laterality (H) 03/10/2021     Priority: Medium     Incisional hernia of anterior abdominal wall without obstruction or gangrene 02/22/2021     Priority: Medium     Added automatically from request for surgery [id number]       S/P exploratory laparotomy 07/24/2020     Priority: Medium     Tear of gluteus medius tendon 07/09/2020     Priority: Medium     Primary osteoarthritis of right hip 07/09/2020     Priority: Medium     Spinal stenosis of lumbar region, unspecified whether neurogenic claudication present 07/09/2020     Priority: Medium     Obesity (BMI 35.0-39.9) with comorbidity (H) 11/28/2018     Priority: Medium     Lichen sclerosus of female genitalia 07/03/2018     Priority: Medium     Gastroesophageal reflux disease, esophagitis presence not specified 10/12/2017     Priority: Medium     Hiatal hernia 10/21/2013     Priority: Medium     Large; found on gastroscopy 10/2013; No GERD sx; chronic throat clearing; + H pylori       Plantar fasciitis 04/02/2012     Priority: Medium     Advanced directives, counseling/discussion 12/08/2011     Priority: Medium     Advance Care  "Planning:   ACP Review and Resources Provided: Reviewed chart for advance care plan. Rosie Blackman has an up to date advance care plan on file. See additional documentation in Problem List and click on code status for document details. Confirmed/documented designated decision maker(s). Added by Shima Cruz on 12/08/2011         HYPERLIPIDEMIA LDL GOAL <130 10/31/2010     Priority: Medium     Prediabetes 09/03/2010     Priority: Medium     Blood sugar 122 9/10 working on; has already been to nutrition, weight and wellness and is reading \"healthy for life\"       Diverticulosis of colon 05/24/2010     Priority: Medium     Noted on colonoscopy 5/10       Colon polyps 05/24/2010     Priority: Medium     Pes planus      Priority: Medium     Problem list name updated by automated process. Provider to review       Personal history of contact with and (suspected) exposure to asbestos      Priority: Medium             exposed 12 years in childhood       Donor of other blood      Priority: Medium           has false + syphyllis  Problem list name updated by automated process. Provider to review       Irritable bowel syndrome      Priority: Medium     ALLERGIC RHINITIS       Priority: Medium     Resolving with dietary changes; stopping gluten       Need for prophylactic hormone replacement therapy (postmenopausal)      Priority: Medium     Weaned down to Premarin 0.3 mg. Cannot DC at this point. 4/07       UTI (urinary tract infection)      Priority: Medium             recurrent  Problem list name updated by automated process. Provider to review       Essential hypertension      Priority: Medium      Past Medical History:   Diagnosis Date     Chronic airway obstruction (H)      Gastroesophageal reflux disease      Hiatal hernia      Hypertension      Ovarian cancer, unspecified laterality (H) 3/10/2021     Personal history of contact with and (suspected) exposure to asbestos      Primary osteoarthritis of right hip 7/9/2020 "     Past Surgical History:   Procedure Laterality Date     BREAST BIOPSY, CORE RT/LT  1994     C ANESTH,KNEE VEINS SURGERY  08/20/2014     CHOLECYSTECTOMY  1995     COLONOSCOPY  05/2010    Diverticulosis, colon polyps; repeat 5 yrs     COLONOSCOPY N/A 11/16/2015    Procedure: COLONOSCOPY;  Surgeon: Alejandro Matos MD;  Location: [facility]     Colonoscopy, hyperplastic polyps, repeat in 5 yrs  2004     ESOPHAGOSCOPY, GASTROSCOPY, DUODENOSCOPY (EGD), COMBINED  09/30/2013    Procedure: COMBINED ESOPHAGOSCOPY, GASTROSCOPY, DUODENOSCOPY (EGD), BIOPSY SINGLE OR MULTIPLE;  Gastroscopy;  Surgeon: Blaise Gill MD;  Location: [facility]     EYE SURGERY  2012    R/L Cataracts     HC REMOVE TONSILS/ADENOIDS,13+ Y/O      T & A 13+y.o.     HYSTERECTOMY, PAP NO LONGER INDICATED       LAPAROSCOPIC SALPINGO-OOPHORECTOMY N/A 07/24/2020    Procedure: Laparoscopy, removal or pelvic mass, both tubes and ovaries, omentectomy, anterior culvectomy, pelvic and para aortic lymph node dissection, laparotomy;  Surgeon: Felipe Corona MD;  Location: [facility]     MT ANESTH,LUMBAR SPINE,CORD SURGERY  10/2020    L3-4 L4-5 TRANSFORAMINAL INTERBODY FUSION L3-5, POSTERIOR INSTRUMENTED FUSION AND DECOMPRESSION     TVH for DUB, ovaries in       VASCULAR SURGERY  2014    Venus closure     Current Outpatient Medications   Medication Sig Dispense Refill     Evening Primrose Oil 1000 MG CAPS One daily       mometasone (ELOCON) 0.1 % external cream Apply sparingly to affected area twice daily for 2 weeks then decrease to 1 time daily for 30 days then decrease to 2-3 times per week 45 g 11     omega 3 1000 MG CAPS Take by mouth daily        pravastatin (PRAVACHOL) 80 MG tablet Take 1 tablet (80 mg) by mouth daily 90 tablet 3     THERATEARS 0.25 % OP SOLN BID       valsartan-hydrochlorothiazide (DIOVAN HCT) 160-12.5 MG tablet Take 1 tablet by mouth daily 90 tablet 3     VIACTIV 500-100-40 OR CHEW once daily         Allergies   Allergen Reactions      "Ezetimibe Other (See Comments)     Severe muscle aches     Lisinopril      Omeprazole      Other reaction(s): *Unknown     Prilosec [Omeprazole]      Zestril [Ace Inhibitors] Cough     Atorvastatin Other (See Comments)     Muscle Pain     Irbesartan Cramps     Rosuvastatin Other (See Comments)     Muscle Pain        Social History     Tobacco Use     Smoking status: Never Smoker     Smokeless tobacco: Never Used   Substance Use Topics     Alcohol use: No     Family History   Problem Relation Age of Onset     Cancer Mother         uterine cancer,      Respiratory Mother         COPD     Cardiovascular Mother         AAA  age 80     Breast Cancer Maternal Grandmother      Cancer Maternal Grandfather         cancer unknown type     Breast Cancer Sister      Eye Disorder Father         cataracts     Depression Father      Depression Son      Depression Son      Breast Cancer Sister         X2     Cancer - colorectal No family hx of         no ovarian cancer     History   Drug Use No         Objective     /78   Pulse 72   Temp 98.2  F (36.8  C) (Tympanic)   Resp 16   Ht 1.613 m (5' 3.5\")   Wt 92.1 kg (203 lb)   SpO2 95%   Breastfeeding No   BMI 35.40 kg/m      Physical Exam    GENERAL APPEARANCE: healthy, alert and no distress     EYES: EOMI, PERRL     HENT: ear canals and TM's normal and nose and mouth without ulcers or lesions     NECK: no adenopathy, no asymmetry, masses, or scars and thyroid normal to palpation     RESP: lungs clear to auscultation - no rales, rhonchi or wheezes     CV: no murmur, click or rub and frequent extasystoles with compensatory pause     ABDOMEN: soft, nontender and hernia present     MS: extremities normal- no gross deformities noted, no evidence of inflammation in joints, FROM in all extremities.     SKIN: no suspicious lesions or rashes     NEURO: Normal strength and tone, sensory exam grossly normal, mentation intact and speech normal     PSYCH: mentation appears " normal. and affect normal/bright     LYMPHATICS: no cervical adenopathy  1+ bilateral pitting edema    Recent Labs   Lab Test 12/15/20  1515 10/05/20  1340 07/26/20  0541 07/25/20  0350   HGB  --  14.5 12.0 12.5   PLT  --   --  217 210   INR  --  1.07  --   --      --  137 139   POTASSIUM 3.5  --  3.4 3.5   CR 0.74  --  0.68 0.61   A1C 5.6  --   --   --         Diagnostics:  Labs pending at this time.  Results will be reviewed when available.   EKG: appears normal, NSR, normal axis, normal intervals, no acute ST/T changes c/w ischemia, no LVH by voltage criteria, Premature Atrial Contractions (PAC) noted, unchanged from previous tracings    Revised Cardiac Risk Index (RCRI):  The patient has the following serious cardiovascular risks for perioperative complications:   - No serious cardiac risks = 0 points     RCRI Interpretation: 0 points: Class I (very low risk - 0.4% complication rate)           Signed Electronically by: Kathya Escalera DO  Copy of this evaluation report is provided to requesting physician.

## 2021-03-13 ENCOUNTER — MYC MEDICAL ADVICE (OUTPATIENT)
Dept: FAMILY MEDICINE | Facility: CLINIC | Age: 81
End: 2021-03-13

## 2021-03-21 DIAGNOSIS — K43.2 INCISIONAL HERNIA OF ANTERIOR ABDOMINAL WALL WITHOUT OBSTRUCTION OR GANGRENE: ICD-10-CM

## 2021-03-21 LAB
COPATH REPORT: NORMAL
SARS-COV-2 RNA RESP QL NAA+PROBE: NORMAL
SPECIMEN SOURCE: NORMAL

## 2021-03-21 PROCEDURE — U0005 INFEC AGEN DETEC AMPLI PROBE: HCPCS | Performed by: SURGERY

## 2021-03-21 PROCEDURE — U0003 INFECTIOUS AGENT DETECTION BY NUCLEIC ACID (DNA OR RNA); SEVERE ACUTE RESPIRATORY SYNDROME CORONAVIRUS 2 (SARS-COV-2) (CORONAVIRUS DISEASE [COVID-19]), AMPLIFIED PROBE TECHNIQUE, MAKING USE OF HIGH THROUGHPUT TECHNOLOGIES AS DESCRIBED BY CMS-2020-01-R: HCPCS | Performed by: SURGERY

## 2021-03-22 LAB
LABORATORY COMMENT REPORT: NORMAL
SARS-COV-2 RNA RESP QL NAA+PROBE: NEGATIVE
SPECIMEN SOURCE: NORMAL

## 2021-03-23 ENCOUNTER — ANESTHESIA EVENT (OUTPATIENT)
Dept: SURGERY | Facility: CLINIC | Age: 81
End: 2021-03-23
Payer: MEDICARE

## 2021-03-24 ENCOUNTER — HOSPITAL ENCOUNTER (OUTPATIENT)
Facility: CLINIC | Age: 81
Discharge: HOME | End: 2021-03-24
Attending: SURGERY | Admitting: SURGERY
Payer: MEDICARE

## 2021-03-24 ENCOUNTER — ANESTHESIA (OUTPATIENT)
Dept: SURGERY | Facility: CLINIC | Age: 81
End: 2021-03-24
Payer: MEDICARE

## 2021-03-24 VITALS
RESPIRATION RATE: 16 BRPM | WEIGHT: 203 LBS | OXYGEN SATURATION: 93 % | SYSTOLIC BLOOD PRESSURE: 137 MMHG | HEIGHT: 64 IN | TEMPERATURE: 97.9 F | DIASTOLIC BLOOD PRESSURE: 58 MMHG | BODY MASS INDEX: 34.66 KG/M2 | HEART RATE: 80 BPM

## 2021-03-24 DIAGNOSIS — K43.2 INCISIONAL HERNIA OF ANTERIOR ABDOMINAL WALL WITHOUT OBSTRUCTION OR GANGRENE: ICD-10-CM

## 2021-03-24 PROCEDURE — 250N000013 HC RX MED GY IP 250 OP 250 PS 637: Performed by: NURSE ANESTHETIST, CERTIFIED REGISTERED

## 2021-03-24 PROCEDURE — 250N000009 HC RX 250: Performed by: SURGERY

## 2021-03-24 PROCEDURE — 49568 PR REPAIR HERNIA WITH MESH: CPT | Mod: AS | Performed by: PHYSICIAN ASSISTANT

## 2021-03-24 PROCEDURE — 360N000075 HC SURGERY LEVEL 2, PER MIN: Performed by: SURGERY

## 2021-03-24 PROCEDURE — 250N000011 HC RX IP 250 OP 636: Performed by: NURSE ANESTHETIST, CERTIFIED REGISTERED

## 2021-03-24 PROCEDURE — 88304 TISSUE EXAM BY PATHOLOGIST: CPT | Mod: TC | Performed by: SURGERY

## 2021-03-24 PROCEDURE — 258N000003 HC RX IP 258 OP 636: Performed by: SURGERY

## 2021-03-24 PROCEDURE — 370N000017 HC ANESTHESIA TECHNICAL FEE, PER MIN: Performed by: SURGERY

## 2021-03-24 PROCEDURE — 49560 PR REPAIR INCISIONAL HERNIA,REDUCIBLE: CPT | Performed by: SURGERY

## 2021-03-24 PROCEDURE — 272N000001 HC OR GENERAL SUPPLY STERILE: Performed by: SURGERY

## 2021-03-24 PROCEDURE — 88302 TISSUE EXAM BY PATHOLOGIST: CPT | Mod: 26 | Performed by: PATHOLOGY

## 2021-03-24 PROCEDURE — 250N000009 HC RX 250: Performed by: NURSE ANESTHETIST, CERTIFIED REGISTERED

## 2021-03-24 PROCEDURE — 49568 PR REPAIR HERNIA WITH MESH: CPT | Performed by: SURGERY

## 2021-03-24 PROCEDURE — 999N000141 HC STATISTIC PRE-PROCEDURE NURSING ASSESSMENT: Performed by: SURGERY

## 2021-03-24 PROCEDURE — 710N000012 HC RECOVERY PHASE 2, PER MINUTE: Performed by: SURGERY

## 2021-03-24 PROCEDURE — 49560 PR REPAIR INCISIONAL HERNIA,REDUCIBLE: CPT | Mod: AS | Performed by: PHYSICIAN ASSISTANT

## 2021-03-24 PROCEDURE — C1781 MESH (IMPLANTABLE): HCPCS | Performed by: SURGERY

## 2021-03-24 PROCEDURE — 250N000011 HC RX IP 250 OP 636: Performed by: SURGERY

## 2021-03-24 DEVICE — MESH VENTRALEX HERNIA 3.2" CIRCLE LG W/STRAP 5950009: Type: IMPLANTABLE DEVICE | Site: ABDOMEN | Status: FUNCTIONAL

## 2021-03-24 RX ORDER — NALOXONE HYDROCHLORIDE 0.4 MG/ML
0.4 INJECTION, SOLUTION INTRAMUSCULAR; INTRAVENOUS; SUBCUTANEOUS
Status: DISCONTINUED | OUTPATIENT
Start: 2021-03-24 | End: 2021-03-24 | Stop reason: HOSPADM

## 2021-03-24 RX ORDER — GLYCOPYRROLATE 0.2 MG/ML
INJECTION, SOLUTION INTRAMUSCULAR; INTRAVENOUS PRN
Status: DISCONTINUED | OUTPATIENT
Start: 2021-03-24 | End: 2021-03-24

## 2021-03-24 RX ORDER — ONDANSETRON 2 MG/ML
4 INJECTION INTRAMUSCULAR; INTRAVENOUS EVERY 30 MIN PRN
Status: DISCONTINUED | OUTPATIENT
Start: 2021-03-24 | End: 2021-03-24 | Stop reason: HOSPADM

## 2021-03-24 RX ORDER — ONDANSETRON 2 MG/ML
INJECTION INTRAMUSCULAR; INTRAVENOUS PRN
Status: DISCONTINUED | OUTPATIENT
Start: 2021-03-24 | End: 2021-03-24

## 2021-03-24 RX ORDER — NALOXONE HYDROCHLORIDE 0.4 MG/ML
0.2 INJECTION, SOLUTION INTRAMUSCULAR; INTRAVENOUS; SUBCUTANEOUS
Status: DISCONTINUED | OUTPATIENT
Start: 2021-03-24 | End: 2021-03-24 | Stop reason: HOSPADM

## 2021-03-24 RX ORDER — DEXAMETHASONE SODIUM PHOSPHATE 10 MG/ML
4 INJECTION, SOLUTION INTRAMUSCULAR; INTRAVENOUS ONCE
Status: DISCONTINUED | OUTPATIENT
Start: 2021-03-24 | End: 2021-03-24 | Stop reason: HOSPADM

## 2021-03-24 RX ORDER — MEPERIDINE HYDROCHLORIDE 25 MG/ML
12.5 INJECTION INTRAMUSCULAR; INTRAVENOUS; SUBCUTANEOUS
Status: DISCONTINUED | OUTPATIENT
Start: 2021-03-24 | End: 2021-03-24 | Stop reason: HOSPADM

## 2021-03-24 RX ORDER — FENTANYL CITRATE 50 UG/ML
INJECTION, SOLUTION INTRAMUSCULAR; INTRAVENOUS PRN
Status: DISCONTINUED | OUTPATIENT
Start: 2021-03-24 | End: 2021-03-24

## 2021-03-24 RX ORDER — CEFAZOLIN SODIUM 2 G/100ML
2 INJECTION, SOLUTION INTRAVENOUS SEE ADMIN INSTRUCTIONS
Status: DISCONTINUED | OUTPATIENT
Start: 2021-03-24 | End: 2021-03-24 | Stop reason: HOSPADM

## 2021-03-24 RX ORDER — DEXAMETHASONE SODIUM PHOSPHATE 4 MG/ML
INJECTION, SOLUTION INTRA-ARTICULAR; INTRALESIONAL; INTRAMUSCULAR; INTRAVENOUS; SOFT TISSUE PRN
Status: DISCONTINUED | OUTPATIENT
Start: 2021-03-24 | End: 2021-03-24

## 2021-03-24 RX ORDER — PROPOFOL 10 MG/ML
INJECTION, EMULSION INTRAVENOUS CONTINUOUS PRN
Status: DISCONTINUED | OUTPATIENT
Start: 2021-03-24 | End: 2021-03-24

## 2021-03-24 RX ORDER — SODIUM CHLORIDE, SODIUM LACTATE, POTASSIUM CHLORIDE, CALCIUM CHLORIDE 600; 310; 30; 20 MG/100ML; MG/100ML; MG/100ML; MG/100ML
INJECTION, SOLUTION INTRAVENOUS CONTINUOUS
Status: DISCONTINUED | OUTPATIENT
Start: 2021-03-24 | End: 2021-03-24 | Stop reason: HOSPADM

## 2021-03-24 RX ORDER — KETOROLAC TROMETHAMINE 30 MG/ML
INJECTION, SOLUTION INTRAMUSCULAR; INTRAVENOUS PRN
Status: DISCONTINUED | OUTPATIENT
Start: 2021-03-24 | End: 2021-03-24

## 2021-03-24 RX ORDER — OXYCODONE HYDROCHLORIDE 5 MG/1
5 TABLET ORAL EVERY 4 HOURS PRN
Status: DISCONTINUED | OUTPATIENT
Start: 2021-03-24 | End: 2021-03-24 | Stop reason: HOSPADM

## 2021-03-24 RX ORDER — LIDOCAINE HYDROCHLORIDE 10 MG/ML
INJECTION, SOLUTION INFILTRATION; PERINEURAL PRN
Status: DISCONTINUED | OUTPATIENT
Start: 2021-03-24 | End: 2021-03-24

## 2021-03-24 RX ORDER — HYDROCODONE BITARTRATE AND ACETAMINOPHEN 5; 325 MG/1; MG/1
1 TABLET ORAL EVERY 4 HOURS PRN
Qty: 15 TABLET | Refills: 0 | Status: SHIPPED | OUTPATIENT
Start: 2021-03-24 | End: 2022-01-04

## 2021-03-24 RX ORDER — ONDANSETRON 4 MG/1
4 TABLET, ORALLY DISINTEGRATING ORAL EVERY 30 MIN PRN
Status: DISCONTINUED | OUTPATIENT
Start: 2021-03-24 | End: 2021-03-24 | Stop reason: HOSPADM

## 2021-03-24 RX ORDER — HYDROMORPHONE HYDROCHLORIDE 1 MG/ML
.3-.5 INJECTION, SOLUTION INTRAMUSCULAR; INTRAVENOUS; SUBCUTANEOUS EVERY 10 MIN PRN
Status: DISCONTINUED | OUTPATIENT
Start: 2021-03-24 | End: 2021-03-24 | Stop reason: HOSPADM

## 2021-03-24 RX ORDER — ACETAMINOPHEN 325 MG/1
975 TABLET ORAL ONCE
Status: DISCONTINUED | OUTPATIENT
Start: 2021-03-24 | End: 2021-03-24 | Stop reason: HOSPADM

## 2021-03-24 RX ORDER — BUPIVACAINE HYDROCHLORIDE AND EPINEPHRINE 5; 5 MG/ML; UG/ML
INJECTION, SOLUTION PERINEURAL PRN
Status: DISCONTINUED | OUTPATIENT
Start: 2021-03-24 | End: 2021-03-24 | Stop reason: HOSPADM

## 2021-03-24 RX ORDER — PROPOFOL 10 MG/ML
INJECTION, EMULSION INTRAVENOUS PRN
Status: DISCONTINUED | OUTPATIENT
Start: 2021-03-24 | End: 2021-03-24

## 2021-03-24 RX ORDER — CEFAZOLIN SODIUM 2 G/100ML
2 INJECTION, SOLUTION INTRAVENOUS
Status: COMPLETED | OUTPATIENT
Start: 2021-03-24 | End: 2021-03-24

## 2021-03-24 RX ADMIN — DEXAMETHASONE SODIUM PHOSPHATE 4 MG: 4 INJECTION, SOLUTION INTRA-ARTICULAR; INTRALESIONAL; INTRAMUSCULAR; INTRAVENOUS; SOFT TISSUE at 07:45

## 2021-03-24 RX ADMIN — GLYCOPYRROLATE 0.1 MG: 0.2 INJECTION, SOLUTION INTRAMUSCULAR; INTRAVENOUS at 07:39

## 2021-03-24 RX ADMIN — SODIUM CHLORIDE, POTASSIUM CHLORIDE, SODIUM LACTATE AND CALCIUM CHLORIDE 1000 ML: 600; 310; 30; 20 INJECTION, SOLUTION INTRAVENOUS at 07:33

## 2021-03-24 RX ADMIN — FENTANYL CITRATE 50 MCG: 50 INJECTION, SOLUTION INTRAMUSCULAR; INTRAVENOUS at 07:41

## 2021-03-24 RX ADMIN — MIDAZOLAM HYDROCHLORIDE 1 MG: 1 INJECTION, SOLUTION INTRAMUSCULAR; INTRAVENOUS at 08:28

## 2021-03-24 RX ADMIN — MIDAZOLAM HYDROCHLORIDE 1 MG: 1 INJECTION, SOLUTION INTRAMUSCULAR; INTRAVENOUS at 07:44

## 2021-03-24 RX ADMIN — GLYCOPYRROLATE 0.1 MG: 0.2 INJECTION, SOLUTION INTRAMUSCULAR; INTRAVENOUS at 07:41

## 2021-03-24 RX ADMIN — HYDROMORPHONE HYDROCHLORIDE 0.5 MG: 1 INJECTION, SOLUTION INTRAMUSCULAR; INTRAVENOUS; SUBCUTANEOUS at 09:15

## 2021-03-24 RX ADMIN — PROPOFOL 50 MCG/KG/MIN: 10 INJECTION, EMULSION INTRAVENOUS at 07:44

## 2021-03-24 RX ADMIN — PROPOFOL 20 MG: 10 INJECTION, EMULSION INTRAVENOUS at 08:22

## 2021-03-24 RX ADMIN — ONDANSETRON 4 MG: 2 INJECTION INTRAMUSCULAR; INTRAVENOUS at 07:45

## 2021-03-24 RX ADMIN — MIDAZOLAM HYDROCHLORIDE 1 MG: 1 INJECTION, SOLUTION INTRAMUSCULAR; INTRAVENOUS at 07:39

## 2021-03-24 RX ADMIN — MIDAZOLAM HYDROCHLORIDE 1 MG: 1 INJECTION, SOLUTION INTRAMUSCULAR; INTRAVENOUS at 07:41

## 2021-03-24 RX ADMIN — MIDAZOLAM HYDROCHLORIDE 1 MG: 1 INJECTION, SOLUTION INTRAMUSCULAR; INTRAVENOUS at 08:14

## 2021-03-24 RX ADMIN — OXYCODONE HYDROCHLORIDE 5 MG: 5 TABLET ORAL at 11:19

## 2021-03-24 RX ADMIN — CEFAZOLIN SODIUM 2 G: 2 INJECTION, SOLUTION INTRAVENOUS at 07:39

## 2021-03-24 RX ADMIN — KETOROLAC TROMETHAMINE 15 MG: 30 INJECTION, SOLUTION INTRAMUSCULAR at 08:49

## 2021-03-24 RX ADMIN — PROPOFOL 20 MG: 10 INJECTION, EMULSION INTRAVENOUS at 08:14

## 2021-03-24 RX ADMIN — PROPOFOL 20 MG: 10 INJECTION, EMULSION INTRAVENOUS at 08:46

## 2021-03-24 RX ADMIN — LIDOCAINE HYDROCHLORIDE 100 MG: 10 INJECTION, SOLUTION INFILTRATION; PERINEURAL at 07:44

## 2021-03-24 RX ADMIN — FENTANYL CITRATE 50 MCG: 50 INJECTION, SOLUTION INTRAMUSCULAR; INTRAVENOUS at 07:45

## 2021-03-24 RX ADMIN — HYDROMORPHONE HYDROCHLORIDE 0.5 MG: 1 INJECTION, SOLUTION INTRAMUSCULAR; INTRAVENOUS; SUBCUTANEOUS at 09:21

## 2021-03-24 ASSESSMENT — MIFFLIN-ST. JEOR: SCORE: 1367.86

## 2021-03-24 ASSESSMENT — COPD QUESTIONNAIRES
COPD: 1
CAT_SEVERITY: MILD

## 2021-03-24 NOTE — BRIEF OP NOTE
"[facility]    Brief Operative Note    Pre-operative diagnosis: Incisional hernia of anterior abdominal wall without obstruction or gangrene [K43.2]  Post-operative diagnosis Five cm midline ventral hernia, primary mesh repair (Ventralex ST patch), EBL minimal    Procedure: Procedure(s):  HERNIORRHAPHY, INCISIONAL, OPEN WITH MESH  Surgeon: Surgeon(s) and Role:     * Aayush George MD - Primary     * Steffen Prakash PA-C - Assisting  Anesthesia: Combined MAC with Local   Estimated blood loss: Minimal  Drains: Jere-Ernandez  Specimens:   ID Type Source Tests Collected by Time Destination   A :  Tissue Hernia Sac SURGICAL PATHOLOGY EXAM Aayush George MD 3/24/2021  8:23 AM      Findings:   Five centimeter midline hernia containing small bowel.  Sac excised, umbilicus saved. JOSE drain.  Complications: None.  Implants:   Implant Name Type Inv. Item Serial No.  Lot No. LRB No. Used Action   MESH VENTRALEX HERNIA 3.2\" [employer] W/STRAP [id number] Mesh MESH VENTRALEX HERNIA 3.2\" [employer] W/STRAP [id number]  CR [employer] [facility] [id number] N/A 1 Implanted           "

## 2021-03-24 NOTE — DISCHARGE INSTRUCTIONS
Same Day Surgery Discharge Instructions  Special Precautions After Surgery - Adult    1. It is not unusual to feel lightheaded or faint, up to 24 hours after surgery or while taking pain medication.  If you have these symptoms; sit for a few minutes before standing and have someone assist you when getting up.  2. You should rest and relax for the next 24 hours and must have someone stay with you for at least 24 hours after your discharge.  3. DO NOT DRIVE any vehicle or operate mechanical equipment for 24 hours following the end of your surgery.  DO NOT DRIVE while taking narcotic pain medications that have been prescribed by your physician.  If you had a limb operated on, you must be able to use it fully to drive.  4. DO NOT drink alcoholic beverages for 24 hours following surgery or while taking prescription pain medication.  5. Drink clear liquids (apple juice, ginger ale, broth, 7-Up, etc.).  Progress to your regular diet as you feel able.  6. Any questions call your physician and do not make important decisions for 24 hours.    ACTIVITY  ? Resume activity as tolerated  ? No lifting more than 30 pounds until seen by dr. coto     INCISIONAL CARE  ? May shower starting tomorrow  ? Wear abdominal binder for support  ? May use ice for comfort  ? Empty drain every 8 hours, sooner if bulb is full and record amount, bring this record to appointment with dr coto on Monday  Be alert for signs of infection: fever , foul smelling or pus like drainage from incisionsWhen to call your healthcare provider  Call if you have any of the following:  You can t keep food or fluids down  You have not urinated within the time your healthcare team noted  You have not had a bowel movement within the time your healthcare team noted  Your bandage soaks through (some bleeding and leakage is normal)  Your pain gets worse and is not eased by pain medicine  Call if you have any of these signs of infection:  Fever of  100.4 F (38 C) or higher, or as directed  Bleeding or swelling that increases  Bad smell, warmth, or green or yellow discharge from the cut (incision)  A red, hard, hot, or painful area around the cut or on your legs  Shortness of breath  ? Chest pain     Call for an appointment to return to the clinic    call to make a postop appointment for Monday, march 29 with dr. coto    Medications:  ? Oxycodone 5mg taken at 11:00 next pain medicine could be taken at 3:00pm  ? Ibuprofen next dose could be taken at 2:30 pm  ? Stool softeners daily to prevent constipation from narcotics     __________________________________________________________________________________________________________________________________  IMPORTANT NUMBERS:    [facility] [place]:  [phone], [phone]  Pharmacy:  [phone]  Same Day Surgery:  [phone], Monday - Friday until 8:30 p.m.  Urgent Care:  [phone]  Emergency Room:  [phone]      [facility]:  [phone]                                                                             [facility]:  [phone] option 1  [facility]:  [phone]     [facility]:  [phone]   [facility]:  [phone]   Home Medical Equipment: [phone]  [facility]:  [phone]    Wash incision daily with soap and water. Some mild redness or swelling is expected. If draining, cover with dry gauze.  Avoid getting the drain site wet.  Sponge-bathe only until drain is removed.    No heavy lifting (less than 30 pounds) for 1 month. Use abdominal binder for comfort.      Okay to use ice pack over wound as necessary for comfort.    Use pain medication as necessary but avoid constipating side effects. Ibuprofen okay but avoid Tylenol as your pain medication already contains this.    Diet as tolerated. No restrictions.    Follow up with Dr George in 5 days (March 29th).    Do not drive while taking narcotic pain  medications.

## 2021-03-24 NOTE — ANESTHESIA CARE TRANSFER NOTE
Patient: Rosie Blackman    Procedure(s):  HERNIORRHAPHY, INCISIONAL, OPEN WITH MESH    Diagnosis: Incisional hernia of anterior abdominal wall without obstruction or gangrene [K43.2]  Diagnosis Additional Information: No value filed.    Anesthesia Type:   MAC     Note:    Oropharynx: oropharynx clear of all foreign objects  Level of Consciousness: drowsy  Oxygen Supplementation: nasal cannula    Independent Airway: airway patency satisfactory and stable  Dentition: dentition unchanged  Vital Signs Stable: post-procedure vital signs reviewed and stable  Report to RN Given: handoff report given  Patient transferred to: Phase II    Handoff Report: Identifed the Patient, Identified the Reponsible Provider, Reviewed the pertinent medical history, Discussed the surgical course, Reviewed Intra-OP anesthesia mangement and issues during anesthesia, Set expectations for post-procedure period and Allowed opportunity for questions and acknowledgement of understanding      Vitals: (Last set prior to Anesthesia Care Transfer)  CRNA VITALS  3/24/2021 0852 - 3/24/2021 0937      3/24/2021             Pulse:  59    SpO2:  99 %        Electronically Signed By: DELMY Chavez CRNA  March 24, 2021  9:37 AM

## 2021-03-24 NOTE — PROGRESS NOTES
"SPIRITUAL HEALTH SERVICES  [facility]    Referral Source: Pt request during pre-op phone call    - Rosie was sitting in her chair after waking up from surgery for hernia repair.  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  She has had three recent surgeries and is hoping this is the last one.    - She spoke of her son (57 yr) recently having COVID after his 16 year old daughter brought it home from school.  He had to be hospitalized and intubated but thankfully is OK and back home.  She also spoke of her grandson in [employer] school going through \"[employer] school\" this week, in the water with the snakes and alligators.    - Rosie expressed appreciation for her  and the care he provides for her while she's healing.  She welcomed prayer also for the healing process and for her family.     Plan: No follow up because of discharge today.    Steffen Arriola M.A., Psychiatric  Lead   [employer]  Office: [phone]    "

## 2021-03-24 NOTE — ANESTHESIA POSTPROCEDURE EVALUATION
Patient: Rosie Blackman    Procedure(s):  HERNIORRHAPHY, INCISIONAL, OPEN WITH MESH    Diagnosis:Incisional hernia of anterior abdominal wall without obstruction or gangrene [K43.2]  Diagnosis Additional Information: No value filed.    Anesthesia Type:  MAC    Note:  Disposition: Outpatient   Postop Pain Control: Uneventful            Sign Out: Well controlled pain   PONV: No   Neuro/Psych: Uneventful            Sign Out: Acceptable/Baseline neuro status   Airway/Respiratory: Uneventful            Sign Out: Acceptable/Baseline resp. status   CV/Hemodynamics: Uneventful            Sign Out: Acceptable CV status   Other NRE: NONE   DID A NON-ROUTINE EVENT OCCUR?          Last vitals:  Vitals:    03/24/21 1125 03/24/21 1130 03/24/21 1150   BP:      Pulse:      Resp: 16 16    Temp:      SpO2: 90% 94% 93%       Last vitals prior to Anesthesia Care Transfer:  CRNA VITALS  3/24/2021 0852 - 3/24/2021 0952      3/24/2021             Pulse:  59    SpO2:  99 %          Electronically Signed By: DELMY Saldana CRNA  March 24, 2021  3:22 PM

## 2021-03-24 NOTE — ANESTHESIA PREPROCEDURE EVALUATION
Anesthesia Pre-Procedure Evaluation    Patient: Rosie Blackman   MRN: [id number] : 1940        Preoperative Diagnosis: Incisional hernia of anterior abdominal wall without obstruction or gangrene [K43.2]   Procedure : Procedure(s):  HERNIORRHAPHY, INCISIONAL, OPEN WITH MESH     Past Medical History:   Diagnosis Date     Chronic airway obstruction (H)      Gastroesophageal reflux disease      Hiatal hernia      Hypertension      Ovarian cancer, unspecified laterality (H) 3/10/2021     Personal history of contact with and (suspected) exposure to asbestos      Primary osteoarthritis of right hip 2020      Past Surgical History:   Procedure Laterality Date     BREAST BIOPSY, CORE RT/LT       C ANESTH,KNEE VEINS SURGERY  2014     CHOLECYSTECTOMY       COLONOSCOPY  2010    Diverticulosis, colon polyps; repeat 5 yrs     COLONOSCOPY N/A 2015    Procedure: COLONOSCOPY;  Surgeon: Alejandro Matos MD;  Location: [facility]     Colonoscopy, hyperplastic polyps, repeat in 5 yrs       ESOPHAGOSCOPY, GASTROSCOPY, DUODENOSCOPY (EGD), COMBINED  2013    Procedure: COMBINED ESOPHAGOSCOPY, GASTROSCOPY, DUODENOSCOPY (EGD), BIOPSY SINGLE OR MULTIPLE;  Gastroscopy;  Surgeon: Blaise Gill MD;  Location: [facility]     EYE SURGERY      R/L Cataracts     HC REMOVE TONSILS/ADENOIDS,13+ Y/O      T & A 13+y.o.     HYSTERECTOMY, PAP NO LONGER INDICATED       LAPAROSCOPIC SALPINGO-OOPHORECTOMY N/A 2020    Procedure: Laparoscopy, removal or pelvic mass, both tubes and ovaries, omentectomy, anterior culvectomy, pelvic and para aortic lymph node dissection, laparotomy;  Surgeon: Felipe Corona MD;  Location: [facility]     ME ANESTH,LUMBAR SPINE,CORD SURGERY  10/2020    L3-4 L4-5 TRANSFORAMINAL INTERBODY FUSION L3-5, POSTERIOR INSTRUMENTED FUSION AND DECOMPRESSION     TVH for DUB, ovaries in       VASCULAR SURGERY      [place] closure      Allergies   Allergen Reactions     Ezetimibe  Other (See Comments)     Severe muscle aches     Lisinopril      Omeprazole      Other reaction(s): *Unknown     Prilosec [Omeprazole]      Zestril [Ace Inhibitors] Cough     Atorvastatin Other (See Comments)     Muscle Pain     Irbesartan Cramps     Rosuvastatin Other (See Comments)     Muscle Pain      Social History     Tobacco Use     Smoking status: Never Smoker     Smokeless tobacco: Never Used   Substance Use Topics     Alcohol use: No      Wt Readings from Last 1 Encounters:   03/24/21 92.1 kg (203 lb)        Anesthesia Evaluation   Pt has had prior anesthetic. Type: MAC and General.    No history of anesthetic complications       ROS/MED HX  ENT/Pulmonary: Comment: Asbestos exposure    (+) allergic rhinitis, mild,  COPD,     Neurologic:  - neg neurologic ROS     Cardiovascular:     (+) Dyslipidemia hypertension-----Previous cardiac testing   Echo: Date: Results:    Stress Test: Date: Results:    ECG Reviewed: Date: 3-2021 Results:  Sinus Rhythm - occasional PAC     # PACs = 1.  WITHIN NORMAL LIMITS    Cath: Date: Results:      METS/Exercise Tolerance:     Hematologic:  - neg hematologic  ROS     Musculoskeletal: Comment: S/p lumbar fusion  (+) arthritis,     GI/Hepatic: Comment: IBS  diverticulosis    (+) GERD, Other, hiatal hernia,     Renal/Genitourinary:  - neg Renal ROS     Endo: Comment: Prediabetes  Morbid obesity    (+) Obesity,     Psychiatric/Substance Use:  - neg psychiatric ROS     Infectious Disease:       Malignancy:   (+) Malignancy, History of Other.Other CA ovarian status post Surgery.    Other:  - neg other ROS          Physical Exam    Airway        Mallampati: II   TM distance: > 3 FB   Neck ROM: limited   Mouth opening: < 3 cm    Respiratory Devices and Support         Dental       (+) caps and lower retainer      Cardiovascular   cardiovascular exam normal          Pulmonary   pulmonary exam normal                OUTSIDE LABS:  CBC:   Lab Results   Component Value Date    WBC 8.1  07/26/2020    WBC 10.4 07/25/2020    HGB 14.5 10/05/2020    HGB 12.0 07/26/2020    HCT 38.6 07/26/2020    HCT 38.2 07/25/2020     07/26/2020     07/25/2020     BMP:   Lab Results   Component Value Date     03/10/2021     12/15/2020    POTASSIUM 3.6 03/10/2021    POTASSIUM 3.5 12/15/2020    CHLORIDE 105 03/10/2021    CHLORIDE 104 12/15/2020    CO2 32 03/10/2021    CO2 32 12/15/2020    BUN 16 03/10/2021    BUN 16 12/15/2020    CR 0.68 03/10/2021    CR 0.74 12/15/2020    GLC 88 03/10/2021    GLC 92 12/15/2020     COAGS:   Lab Results   Component Value Date    INR 1.07 10/05/2020     POC:   Lab Results   Component Value Date     (H) 07/25/2020     HEPATIC:   Lab Results   Component Value Date    ALBUMIN 3.4 07/09/2020    PROTTOTAL 6.9 07/09/2020    ALT 22 07/09/2020    AST 15 07/09/2020    ALKPHOS 51 07/09/2020    BILITOTAL 0.7 07/09/2020     OTHER:   Lab Results   Component Value Date    A1C 5.6 12/15/2020    ANISA 9.4 03/10/2021    TSH 3.85 10/01/2004    T4 0.79 10/01/2004    SED 18 10/01/2004       Anesthesia Plan    ASA Status:  3   NPO Status:  NPO Appropriate    Anesthesia Type: MAC.     - Reason for MAC: straight local not clinically adequate      Maintenance: Balanced.        Consents    Anesthesia Plan(s) and associated risks, benefits, and realistic alternatives discussed. Questions answered and patient/representative(s) expressed understanding.     - Discussed with:  Patient         Postoperative Care    Pain management: IV analgesics, Oral pain medications.   PONV prophylaxis: Ondansetron (or other 5HT-3), Dexamethasone or Solumedrol     Comments:                DELMY Chavez CRNA

## 2021-03-24 NOTE — OP NOTE
Procedure Date: 03/24/2021      PREOPERATIVE DIAGNOSIS:  Midline incisional hernia.      POSTOPERATIVE DIAGNOSIS:  Midline incisional hernia.      PROCEDURE PERFORMED:  Open repair of incisional hernia with mesh.      SURGEON:  Aayush George MD      ASSISTANT:  FOUZIA Cameron.  His assistance was necessary for retraction, hemostasis, and dealing with prosthetic materials.      ANESTHESIA:  Local MAC.      INDICATIONS FOR PROCEDURE:  This 81-year-old female has recently been treated for ovarian cancer and that involved a hysterectomy with omentectomy.  This was done through a midline incision.  About a month later, she was found to have hernia, likely due to the surgery.  She presented to my office and asked about repair.  Prior to the surgery, I discussed in detail the risks, benefits and alternatives of surgery including but not limited to bleeding, infection, recurrence, bowel injury, etc.  She agreed to proceed.  All of her questions were answered.      DESCRIPTION OF PROCEDURE:  The patient was brought to the operating room and placed on the table in the supine position.  After sedation by Anesthesia, the patient's abdomen was prepped and draped in the usual sterile fashion.  A surgical timeout was performed to identify both the patient and the proposed procedure.  All present were in agreement.      A midline incision was marked on the skin coming down the left side of the umbilicus essentially following the tract of her previous incision.  This incision line was then infiltrated with local anesthetic, being very careful to not go through the skin because we knew small bowel was directly underneath.  We started on the superior end of the marked incision and opened it with a knife.  Then we went carefully inferiorly until we saw what we believed to be a sac.  The sac was opened and that revealed the small bowel that we knew was present.  There appeared to be no adhesions to the anterior abdominal wall or to  the sac itself.  The rest of the incision was then widely opened, knowing that we could stay away from bowel.  The sac was excised by retracting it away from the skin all the way back to the fascia.  This was done with cautery and then the sac was sent for pathology.  This left us with about a 5 cm hernia.  There was no bowel adhesed to the abdominal wall circumferentially.  We elected to use a piece of Ventralex ST patch to do the repair.  The mesh was brought into the field (8 cm diameter) and soaked in irrigation.  The mesh was oriented and then we started placing sutures.  Using 0 Ethibond sutures, I went back about 2-3 cm from the edge of the fascia and went full thickness through the abdominal wall and then went through the mesh, came back out through the mesh and then back through the abdominal wall.  Sutures were placed at 12, 3, 6 and 9 o'clock positions.  They were not tied at this point.  Additional sutures were placed at 1:30, 4:30, 7:30 and 10:30.  This left gaps of about 1 cm, which I thought was adequate.  Once all the sutures were placed, the mesh was placed in the intraperitoneal location and very carefully the sutures were tied, ensuring that there was no bowel getting underneath.  We checked before we tied each suture.  A total of 8 sutures were placed.  The wound was then irrigated copiously with saline.  No bleeding was seen.  We then placed a small Jere-Ernandez drain through a stab incision in the right lower quadrant.  The drain was secured with nylon.  We then closed the fascia over the mesh with a series of interrupted figure-of-eight 0 Vicryl sutures.  In this way, we used 2 layers of tissue to fix the defect.  The umbilicus was spared and so we used 1 suture to tie the umbilicus back down to the fascia.  We then used 2-0 Vicryl sutures to approximate the skin throughout the length of the incision.  Then, the skin was closed with a running 4-0 Monocryl suture placed in a subcuticular  fashion.  Surgical glue was applied to this incision.  A drain sponge was applied to the drain.  The patient was then awakened from her sedation and taken back to the same-day surgery area.  At the time of this dictation, she was awake, alert and in stable condition.  She tolerated the procedure well.  There were no complications.  Needle, sponge, and instrument counts were correct x2.      ESTIMATED BLOOD LOSS:  Less than 5 mL         DANIELLA COLEMAN MD             D: 2021   T: 2021   MT: MISHA      Name:     CARLITA ROJO   MRN:      [id number]        Account:        [id number]   :      1940           Procedure Date: 2021      Document: [id number]

## 2021-03-25 LAB — COPATH REPORT: NORMAL

## 2021-03-26 ENCOUNTER — TELEPHONE (OUTPATIENT)
Dept: SURGERY | Facility: CLINIC | Age: 81
End: 2021-03-26

## 2021-03-26 ENCOUNTER — OFFICE VISIT (OUTPATIENT)
Dept: SURGERY | Facility: CLINIC | Age: 81
End: 2021-03-26
Payer: MEDICARE

## 2021-03-26 VITALS
RESPIRATION RATE: 20 BRPM | HEART RATE: 76 BPM | SYSTOLIC BLOOD PRESSURE: 138 MMHG | DIASTOLIC BLOOD PRESSURE: 74 MMHG | TEMPERATURE: 97.8 F

## 2021-03-26 DIAGNOSIS — Z98.890 POSTOPERATIVE STATE: Primary | ICD-10-CM

## 2021-03-26 PROCEDURE — 99024 POSTOP FOLLOW-UP VISIT: CPT | Performed by: SURGERY

## 2021-03-26 NOTE — CONFIDENTIAL NOTE
Spoke with patient and she had abdominal surgery hernia repair on Wednesday and she woke up this morning and had bloody drainage from where the drain is coming out of her skin. She changed the bandage and it is now wet again with bloody drainage.Last night the drain had 10 mls that was removed. Drain was empty this morning and now has maybe a teaspoon in the drain.  Pt is wondering if she needs to come to clinic today. Please advise.  Baylee SANTIAGO RN BSN PHN  [facility]

## 2021-03-26 NOTE — PROGRESS NOTES
Chief Complaint   Patient presents with     Surgical Followup     Drain having minimal drainage, blood noted on sheet this am, and leaking around drain       Vitals:    03/26/21 1305   BP: 138/74   BP Location: Right arm   Patient Position: Sitting   Cuff Size: Adult Regular   Pulse: 76   Resp: 20   Temp: 97.8  F (36.6  C)   TempSrc: Tympanic     Wt Readings from Last 1 Encounters:   03/24/21 92.1 kg (203 lb)       Sukhjinder HERNANDEZ RN   [facility]ty Clin[facility]

## 2021-03-26 NOTE — TELEPHONE ENCOUNTER
Reason for call:  Patient reporting a symptom    Symptom or request: Pt had surgery on Wed - having a lot of blood from the area where the drain goes in - feels like it needs attention.  Changed the gauze and now wet and bloody again.    Duration (how long have symptoms been present): this morning    Have you been treated for this before? No    Additional comments:     Phone Number patient can be reached at:  Home number on file [phone] (home)    Best Time:      Can we leave a detailed message on this number:  YES    Call taken on 3/26/2021 at 11:05 AM by Sarahy Ruth

## 2021-03-26 NOTE — LETTER
3/26/2021         RE: Rosie Blackman  [address] [address]        Dear Colleague,    Thank you for referring your patient, Rosie Blackman, to the [facility]. Please see a copy of my visit note below.    Chief Complaint   Patient presents with     Surgical Followup     Drain having minimal drainage, blood noted on sheet this am, and leaking around drain       Vitals:    03/26/21 1305   BP: 138/74   BP Location: Right arm   Patient Position: Sitting   Cuff Size: Adult Regular   Pulse: 76   Resp: 20   Temp: 97.8  F (36.6  C)   TempSrc: Tympanic     Wt Readings from Last 1 Encounters:   03/24/21 92.1 kg (203 lb)       Sukhjinder HERNANDEZ RN   Specialty Clinics       Rosie is postop day #2 from a ventral hernia repair.  She phoned this morning saying that she had excessive drainage from her wound drain site in the right lower quadrant.  She saturated a couple of dressings within an hour.  I advised her to come in and be seen today.  She reports no other fevers and is feeling pretty good otherwise.    On exam: The wound is healing nicely without sign of seroma formation.  All of the dressings were removed and the drain was stripped several times and some clots were removed.  At that point all I saw in the drain was serosanguineous fluid.  No further drainage was seen from the drain site.    I obtained another abdominal binder for her so that she could wash the first 1.  I replaced all of her dressings.  I would like her to keep her appointment on Monday and contact me over the weekend if there are any questions or problems.    Aayush George MD FACS      Again, thank you for allowing me to participate in the care of your patient.        Sincerely,        Aayush George MD

## 2021-03-26 NOTE — TELEPHONE ENCOUNTER
Aayush George MD Riley, Patricia, RN; P Tiara Anaya Rn Pool   Caller: Unspecified (Today, 11:04 AM)             Have her come at 1:00.   Thanks

## 2021-03-26 NOTE — PROGRESS NOTES
Rosie is postop day #2 from a ventral hernia repair.  She phoned this morning saying that she had excessive drainage from her wound drain site in the right lower quadrant.  She saturated a couple of dressings within an hour.  I advised her to come in and be seen today.  She reports no other fevers and is feeling pretty good otherwise.    On exam: The wound is healing nicely without sign of seroma formation.  All of the dressings were removed and the drain was stripped several times and some clots were removed.  At that point all I saw in the drain was serosanguineous fluid.  No further drainage was seen from the drain site.    I obtained another abdominal binder for her so that she could wash the first 1.  I replaced all of her dressings.  I would like her to keep her appointment on Monday and contact me over the weekend if there are any questions or problems.    Aayush George MD FACS

## 2021-03-29 ENCOUNTER — OFFICE VISIT (OUTPATIENT)
Dept: SURGERY | Facility: CLINIC | Age: 81
End: 2021-03-29
Payer: MEDICARE

## 2021-03-29 VITALS
HEIGHT: 64 IN | HEART RATE: 80 BPM | BODY MASS INDEX: 34.66 KG/M2 | SYSTOLIC BLOOD PRESSURE: 139 MMHG | TEMPERATURE: 98.1 F | DIASTOLIC BLOOD PRESSURE: 65 MMHG | WEIGHT: 203 LBS

## 2021-03-29 DIAGNOSIS — Z98.890 POSTOPERATIVE STATE: Primary | ICD-10-CM

## 2021-03-29 PROCEDURE — 99024 POSTOP FOLLOW-UP VISIT: CPT | Performed by: SURGERY

## 2021-03-29 ASSESSMENT — MIFFLIN-ST. JEOR: SCORE: 1367.86

## 2021-03-29 NOTE — NURSING NOTE
"Initial /65 (BP Location: Right arm, Patient Position: Sitting, Cuff Size: Adult Regular)   Pulse 80   Temp 98.1  F (36.7  C) (Tympanic)   Ht 1.613 m (5' 3.5\")   Wt 92.1 kg (203 lb)   BMI 35.40 kg/m   Estimated body mass index is 35.4 kg/m  as calculated from the following:    Height as of this encounter: 1.613 m (5' 3.5\").    Weight as of this encounter: 92.1 kg (203 lb). .    Krista Francois CMA    "

## 2021-03-29 NOTE — PATIENT INSTRUCTIONS
Per physician instructions      If you have questions or concerns on any instructions given to you by your provider today or if you need to schedule an appointment, you can reach us at [phone].

## 2021-03-29 NOTE — LETTER
3/29/2021         RE: Rosie Blackman  [address] [id number]        Dear Colleague,    Thank you for referring your patient, Rosie Blackman, to the [facility]. Please see a copy of my visit note below.    Rosie comes in for a recheck.  Over the weekend the drain actually fell out, but she has had no problems because of it.  The drain site itself is dry and the suture was removed.  The incision is clean dry and intact without evidence of seroma formation.    She reports no other symptoms and is gradually getting better.  She feels like the binder is helping so I advised her to keep using that as long as she feels it is useful.    I would like to see her back in 2 weeks, or sooner if she has any problems or concerns.    Aayush George MD FACS      Again, thank you for allowing me to participate in the care of your patient.        Sincerely,        Aayush George MD

## 2021-03-29 NOTE — PROGRESS NOTES
Rosie comes in for a recheck.  Over the weekend the drain actually fell out, but she has had no problems because of it.  The drain site itself is dry and the suture was removed.  The incision is clean dry and intact without evidence of seroma formation.    She reports no other symptoms and is gradually getting better.  She feels like the binder is helping so I advised her to keep using that as long as she feels it is useful.    I would like to see her back in 2 weeks, or sooner if she has any problems or concerns.    Aayush George MD FACS

## 2021-03-31 ENCOUNTER — MYC MEDICAL ADVICE (OUTPATIENT)
Dept: FAMILY MEDICINE | Facility: CLINIC | Age: 81
End: 2021-03-31

## 2021-04-01 ENCOUNTER — VIRTUAL VISIT (OUTPATIENT)
Dept: ONCOLOGY | Facility: CLINIC | Age: 81
End: 2021-04-01
Attending: GENETIC COUNSELOR, MS
Payer: MEDICARE

## 2021-04-01 DIAGNOSIS — Z80.3 FAMILY HISTORY OF MALIGNANT NEOPLASM OF BREAST: ICD-10-CM

## 2021-04-01 DIAGNOSIS — Z85.43 PERSONAL HISTORY OF OVARIAN CANCER: Primary | ICD-10-CM

## 2021-04-01 DIAGNOSIS — Z80.41 FAMILY HISTORY OF MALIGNANT NEOPLASM OF OVARY: ICD-10-CM

## 2021-04-01 PROCEDURE — 96040 HC GENETIC COUNSELING, EACH 30 MINUTES: CPT | Mod: GT | Performed by: GENETIC COUNSELOR, MS

## 2021-04-01 NOTE — LETTER
4/1/2021         RE: Rosie Blackman  [address] [facility] [address]        Dear Colleague,    Thank you for referring your patient, Rosie Blackman, to the [facility]. Please see a copy of my visit note below.    4/1/2021    Rosie is a 81 year old who is being evaluated via a billable video visit.      How would you like to obtain your AVS? MyChart  If the video visit is dropped, the invitation should be resent by: Text to cell phone: [phone]  Will anyone else be joining your video visit? No    Video-Visit Details  Type of service:  Video Visit  Video Start Time: 10:15am (patient connected to video visit at 10:22am)  Video End Time:10:41am  Originating Location (pt. Location): Home  Distant Location (provider location):  [facility]   Platform used for Video Visit: Sweeper[employer]    Referring Provider: Felipe Corona MD    Presenting Information:  I spoke to Rosie by video today to discuss her genetic testing results. We first met on 2/15/2021 and her blood was drawn on 2/22/2021. The Hereditary Breast and Gyn Cancers Actionable Panel was ordered from the [facility]. This testing was done because of Rosie's personal and family history of ovarian, breast, and uterine cancer.    Genetic Testing Result: NEGATIVE  Rosie is negative for mutations in the ADALID, BRCA1, BRCA2, BRIP1, CDH1, CHEK2, EPCAM, MLH1, MSH2, MSH6, NBN, NF1, PALB2, PMS2, PTEN, RAD51C, RAD51D, STK11, and TP53 genes. No mutations were found in any of the 19 genes analyzed. This test involved sequencing and deletion/duplication analysis of all genes with the exception of EPCAM (deletions/duplications only).    Testing did not detect an identifiable mutation associated with Hereditary Breast and Ovarian Cancer syndrome (BRCA1, BRCA2), Hereditary Diffuse Gastric Cancer (CDH1), Dowell syndrome (EPCAM, MLH1, MSH2, MSH6, PMS2), Li Fraumeni syndrome  "(TP53), Peutz-Jeghers syndrome (STK11), Neurofibromatosis type 1 (NF1), or Cowden syndrome (PTEN).      A copy of the test report can be found in the Laboratory tab, dated 3/9/2021, and named \"HEREDITARY CANCER BREAST GYN ACTIONABLE\".     Interpretation:  We discussed several different interpretations of this negative test result.    1. One explanation may be that there is a different gene or combination of genes and environment that are associated with the cancers in this family that are not identifiable using this test. As such, Rosie is encouraged to contact me regularly to review any new genetic testing options that may be appropriate for her.  2. Another explanation may be that her other relatives with cancer, such as her sister and/or extended relatives with cancer, do have a mutation in one of these 19 genes and Rosie did not inherit it. If that is the case, Georgess ovarian cancer may be sporadic and caused by random cellular changes.  3. There is also a small possibility that there is a mutation in one of these genes, and the testing laboratory could not find it with their current testing methods.       Screening:  Based on this negative test result, it is important for Rosie and her relatives to refer back to the family history for appropriate cancer screening.      Rosie should continue to follow all ovarian cancer treatment and follow-up recommendations as made by her medical providers.     Due to Rosie's personal and family history of ovarian cancer, her close female relatives remain at slightly increased risk for ovarian cancer. We discussed available ovarian cancer screening (pelvic exams, CA-125 blood tests, and transvaginal ultrasounds) as well as the significant limitations of this screening. As such, this screening is not typically recommended. That being said, women in this family should discuss this screening and the signs and symptoms of ovarian cancer with their primary OB/GYN provider, as they " may have individualized recommendations.    Rosie and her close female relatives also remain at increased risk for breast cancer given their family history. Breast cancer screening is generally recommended to begin approximately 10 years younger than the earliest age of breast cancer diagnosis in the family, or at age 40, whichever comes first. In this family, screening may begin at age 40. Breast screening options should be discussed with an individual's primary care provider and a genetic counselor, to determine at what age to begin screening, what screening is appropriate, and if additional screening (such as breast MRI) is necessary based on personal/family history factors. Rosie herself should continue with regular breast screening as recommended by her primary care provider.    Other population cancer screening options, such as those recommended by the American Cancer Society and the National Comprehensive Cancer Network (NCCN), are also appropriate for Rosie and her family. These screening recommendations may change if there are changes to Rosie's personal and/or family history of cancer. Final screening recommendations should be made by each individual's managing physician.     Inheritance:  We reviewed the autosomal dominant inheritance of mutations in these 19 genes. We discussed that Rosie did not pass on an identifiable mutation in these genes to her children based on this test result. Mutations in these genes do not skip generations.      Additional Testing Considerations:  It is possible Rosie does carry a currently identifiable gene or combination of genes and environment that increase her risk for certain cancers. We again reviewed the option of larger gene panels covering more moderate risk genes associated with hereditary cancer. Rosie is encouraged to contact me if she wishes to readdress these larger gene panel options.     Also, although Rosie's genetic testing result was negative, other  relatives may still carry a gene mutation associated with hereditary cancer. Genetic counseling is recommended for her sister and paternal cousins with cancer to discuss their genetic testing options. Rosie's other maternal and paternal extended relatives could also consider meeting with a genetic counselor. If any of these relatives do pursue genetic testing, Rosie is encouraged to contact me so that we may review the impact of their test results on her.    Summary:  We do not have an explanation for Rosie's ovarian cancer. While no genetic changes were identified, Rosie may still be at risk for certain cancers due to family history, environmental factors, or other genetic causes not identified by this test.  Because of that, it is important that she continue with cancer screening based on her personal and family history as discussed above.    Genetic testing is rapidly advancing, and new cancer susceptibility genes will most likely be identified in the future. Therefore, I encouraged Rosie to contact me regularly or if there are changes in her personal or family history. This may change how we assess her cancer risk, screening, and the testing we would offer.    Plan:  1.  I provided Rosie with a copy of her test results via [facility].  2. She plans to follow-up with her medical providers, as needed.  3. She should contact me regularly or sooner if her family history changes.    If Rosie has any further questions, I encouraged her to contact me at [phone].    Tiffanie Carter MS, [facility]  Licensed Genetic Counselor  Office: [phone]  Pager: [phone]    Addendum: I called Rosie on 4/23/2021 to discuss her updated genetic testing results. I explained that her prior testing did not include six genes (BARD1, DICER1, MRE11, MUTYH, RAD50, SMARCA4) that we had previously discussed testing for when we first met on 2/15/2021. As such, the laboratory completed testing of those additional genes.    This testing was  negative. No mutations were identified in these six additional genes. Given that this testing also did not identify any mutations, the results will not change the interpretation of her test results, follow-up recommendations, implications for family members, or cancer screening recommendations as discussed on 4/1/2021. Rosie verbalized understanding.    We reviewed that it remains important for Rosie to contact me regularly, if she has any questions, and if there are any updates to her personal/family history of cancer. Rosie again verbalized understanding and denied having any other questions at this time.      Again, thank you for allowing me to participate in the care of your patient.        Sincerely,        Tiffanie Carter, GC

## 2021-04-01 NOTE — PROGRESS NOTES
"4/1/2021    Rosie is a 81 year old who is being evaluated via a billable video visit.      How would you like to obtain your AVS? MyChart  If the video visit is dropped, the invitation should be resent by: Text to cell phone: [phone]  Will anyone else be joining your video visit? No    Video-Visit Details  Type of service:  Video Visit  Video Start Time: 10:15am (patient connected to video visit at 10:22am)  Video End Time:10:41am  Originating Location (pt. Location): Home  Distant Location (provider location):  [facility]   Platform used for Video Visit: [employer]    Referring Provider: Felipe Corona MD    Presenting Information:  I spoke to Rosie by video today to discuss her genetic testing results. We first met on 2/15/2021 and her blood was drawn on 2/22/2021. The Hereditary Breast and Gyn Cancers Actionable Panel was ordered from the [facility]. This testing was done because of Rosie's personal and family history of ovarian, breast, and uterine cancer.    Genetic Testing Result: NEGATIVE  Rosie is negative for mutations in the ADALID, BRCA1, BRCA2, BRIP1, CDH1, CHEK2, EPCAM, MLH1, MSH2, MSH6, NBN, NF1, PALB2, PMS2, PTEN, RAD51C, RAD51D, STK11, and TP53 genes. No mutations were found in any of the 19 genes analyzed. This test involved sequencing and deletion/duplication analysis of all genes with the exception of EPCAM (deletions/duplications only).    Testing did not detect an identifiable mutation associated with Hereditary Breast and Ovarian Cancer syndrome (BRCA1, BRCA2), Hereditary Diffuse Gastric Cancer (CDH1), Dowell syndrome (EPCAM, MLH1, MSH2, MSH6, PMS2), Li Fraumeni syndrome (TP53), Peutz-Jeghers syndrome (STK11), Neurofibromatosis type 1 (NF1), or Cowden syndrome (PTEN).      A copy of the test report can be found in the Laboratory tab, dated 3/9/2021, and named \"HEREDITARY CANCER BREAST GYN ACTIONABLE\".     Interpretation:  We " discussed several different interpretations of this negative test result.    1. One explanation may be that there is a different gene or combination of genes and environment that are associated with the cancers in this family that are not identifiable using this test. As such, Rosie is encouraged to contact me regularly to review any new genetic testing options that may be appropriate for her.  2. Another explanation may be that her other relatives with cancer, such as her sister and/or extended relatives with cancer, do have a mutation in one of these 19 genes and Rosie did not inherit it. If that is the case, Rosie's ovarian cancer may be sporadic and caused by random cellular changes.  3. There is also a small possibility that there is a mutation in one of these genes, and the testing laboratory could not find it with their current testing methods.       Screening:  Based on this negative test result, it is important for Rosie and her relatives to refer back to the family history for appropriate cancer screening.      Rosie should continue to follow all ovarian cancer treatment and follow-up recommendations as made by her medical providers.     Due to Rosie's personal and family history of ovarian cancer, her close female relatives remain at slightly increased risk for ovarian cancer. We discussed available ovarian cancer screening (pelvic exams, CA-125 blood tests, and transvaginal ultrasounds) as well as the significant limitations of this screening. As such, this screening is not typically recommended. That being said, women in this family should discuss this screening and the signs and symptoms of ovarian cancer with their primary OB/GYN provider, as they may have individualized recommendations.    Rosie and her close female relatives also remain at increased risk for breast cancer given their family history. Breast cancer screening is generally recommended to begin approximately 10 years younger than the  earliest age of breast cancer diagnosis in the family, or at age 40, whichever comes first. In this family, screening may begin at age 40. Breast screening options should be discussed with an individual's primary care provider and a genetic counselor, to determine at what age to begin screening, what screening is appropriate, and if additional screening (such as breast MRI) is necessary based on personal/family history factors. Rosie herself should continue with regular breast screening as recommended by her primary care provider.    Other population cancer screening options, such as those recommended by the American Cancer Society and the National Comprehensive Cancer Network (NCCN), are also appropriate for Rosie and her family. These screening recommendations may change if there are changes to Rosie's personal and/or family history of cancer. Final screening recommendations should be made by each individual's managing physician.     Inheritance:  We reviewed the autosomal dominant inheritance of mutations in these 19 genes. We discussed that Rosie did not pass on an identifiable mutation in these genes to her children based on this test result. Mutations in these genes do not skip generations.      Additional Testing Considerations:  It is possible Rosie does carry a currently identifiable gene or combination of genes and environment that increase her risk for certain cancers. We again reviewed the option of larger gene panels covering more moderate risk genes associated with hereditary cancer. Rosie is encouraged to contact me if she wishes to readdress these larger gene panel options.     Also, although Rosie's genetic testing result was negative, other relatives may still carry a gene mutation associated with hereditary cancer. Genetic counseling is recommended for her sister and paternal cousins with cancer to discuss their genetic testing options. Rosie's other maternal and paternal extended relatives could  also consider meeting with a genetic counselor. If any of these relatives do pursue genetic testing, Rosie is encouraged to contact me so that we may review the impact of their test results on her.    Summary:  We do not have an explanation for Rosie's ovarian cancer. While no genetic changes were identified, Rosie may still be at risk for certain cancers due to family history, environmental factors, or other genetic causes not identified by this test.  Because of that, it is important that she continue with cancer screening based on her personal and family history as discussed above.    Genetic testing is rapidly advancing, and new cancer susceptibility genes will most likely be identified in the future. Therefore, I encouraged Rosie to contact me regularly or if there are changes in her personal or family history. This may change how we assess her cancer risk, screening, and the testing we would offer.    Plan:  1.  I provided Rosie with a copy of her test results via [employer].  2. She plans to follow-up with her medical providers, as needed.  3. She should contact me regularly or sooner if her family history changes.    If Rosie has any further questions, I encouraged her to contact me at [phone].    Tiffanie Carter MS, [facility]  Licensed Genetic Counselor  Office: [phone]  Pager: [phone]    Addendum: I called Rosie on 4/23/2021 to discuss her updated genetic testing results. I explained that her prior testing did not include six genes (BARD1, DICER1, MRE11, MUTYH, RAD50, SMARCA4) that we had previously discussed testing for when we first met on 2/15/2021. As such, the laboratory completed testing of those additional genes.    This testing was negative. No mutations were identified in these six additional genes. Given that this testing also did not identify any mutations, the results will not change the interpretation of her test results, follow-up recommendations, implications for family members, or  cancer screening recommendations as discussed on 4/1/2021. Rosie verbalized understanding.    We reviewed that it remains important for Rosie to contact me regularly, if she has any questions, and if there are any updates to her personal/family history of cancer. Rosie again verbalized understanding and denied having any other questions at this time.

## 2021-04-01 NOTE — LETTER
Cancer Risk Management  Program Locations    [facility]  [facility]  [facility]  [facility]  Mailing Address  Cancer Risk Management Program  [facility]  [address]  [place] [address]    New patient appointments  [phone]  April 23, 2021    Rosie Blackman  [address] [address]      Dear Rosie,    It was a pleasure speaking with you recently regarding your genetic testing results. Here is a copy of the progress note summarizing our conversation. If you have any additional questions, please feel free to call.    4/1/2021    Rosie is a 81 year old who is being evaluated via a billable video visit.      How would you like to obtain your AVS? MyChart  If the video visit is dropped, the invitation should be resent by: Text to cell phone: [phone]  Will anyone else be joining your video visit? No    Video-Visit Details  Type of service:  Video Visit  Video Start Time: 10:15am (patient connected to video visit at 10:22am)  Video End Time:10:41am  Originating Location (pt. Location): Home  Distant Location (provider location):  [facility]   Platform used for Video Visit: Fatuma    Referring Provider: Felipe Corona MD    Presenting Information:  I spoke to Rosie by video today to discuss her genetic testing results. We first met on 2/15/2021 and her blood was drawn on 2/22/2021. The Hereditary Breast and Gyn Cancers Actionable Panel was ordered from the [facility]. This testing was done because of Rosie's personal and family history of ovarian, breast, and uterine cancer.    Genetic Testing Result: NEGATIVE  Rosie is negative for mutations in the ADALID, BRCA1, BRCA2, BRIP1, CDH1, CHEK2, EPCAM, MLH1, MSH2, MSH6, NBN, NF1, PALB2, PMS2, PTEN, RAD51C,  "RAD51D, STK11, and TP53 genes. No mutations were found in any of the 19 genes analyzed. This test involved sequencing and deletion/duplication analysis of all genes with the exception of EPCAM (deletions/duplications only).    Testing did not detect an identifiable mutation associated with Hereditary Breast and Ovarian Cancer syndrome (BRCA1, BRCA2), Hereditary Diffuse Gastric Cancer (CDH1), Dowell syndrome (EPCAM, MLH1, MSH2, MSH6, PMS2), Li Fraumeni syndrome (TP53), Peutz-Jeghers syndrome (STK11), Neurofibromatosis type 1 (NF1), or Cowden syndrome (PTEN).      A copy of the test report can be found in the Laboratory tab, dated 3/9/2021, and named \"HEREDITARY CANCER BREAST GYN ACTIONABLE\".     Interpretation:  We discussed several different interpretations of this negative test result.    1. One explanation may be that there is a different gene or combination of genes and environment that are associated with the cancers in this family that are not identifiable using this test. As such, Rosie is encouraged to contact me regularly to review any new genetic testing options that may be appropriate for her.  2. Another explanation may be that her other relatives with cancer, such as her sister and/or extended relatives with cancer, do have a mutation in one of these 19 genes and Rosie did not inherit it. If that is the case, Rosie's ovarian cancer may be sporadic and caused by random cellular changes.  3. There is also a small possibility that there is a mutation in one of these genes, and the testing laboratory could not find it with their current testing methods.       Screening:  Based on this negative test result, it is important for Rosie and her relatives to refer back to the family history for appropriate cancer screening.      Rosie should continue to follow all ovarian cancer treatment and follow-up recommendations as made by her medical providers.     Due to Rosie's personal and family history of ovarian " cancer, her close female relatives remain at slightly increased risk for ovarian cancer. We discussed available ovarian cancer screening (pelvic exams, CA-125 blood tests, and transvaginal ultrasounds) as well as the significant limitations of this screening. As such, this screening is not typically recommended. That being said, women in this family should discuss this screening and the signs and symptoms of ovarian cancer with their primary OB/GYN provider, as they may have individualized recommendations.    Rosie and her close female relatives also remain at increased risk for breast cancer given their family history. Breast cancer screening is generally recommended to begin approximately 10 years younger than the earliest age of breast cancer diagnosis in the family, or at age 40, whichever comes first. In this family, screening may begin at age 40. Breast screening options should be discussed with an individual's primary care provider and a genetic counselor, to determine at what age to begin screening, what screening is appropriate, and if additional screening (such as breast MRI) is necessary based on personal/family history factors. Rosie herself should continue with regular breast screening as recommended by her primary care provider.    Other population cancer screening options, such as those recommended by the American Cancer Society and the National Comprehensive Cancer Network (NCCN), are also appropriate for Rosie and her family. These screening recommendations may change if there are changes to Rosie's personal and/or family history of cancer. Final screening recommendations should be made by each individual's managing physician.     Inheritance:  We reviewed the autosomal dominant inheritance of mutations in these 19 genes. We discussed that Rosie did not pass on an identifiable mutation in these genes to her children based on this test result. Mutations in these genes do not skip generations.       Additional Testing Considerations:  It is possible Rosie does carry a currently identifiable gene or combination of genes and environment that increase her risk for certain cancers. We again reviewed the option of larger gene panels covering more moderate risk genes associated with hereditary cancer. Rosie is encouraged to contact me if she wishes to readdress these larger gene panel options.     Also, although Rosie's genetic testing result was negative, other relatives may still carry a gene mutation associated with hereditary cancer. Genetic counseling is recommended for her sister and paternal cousins with cancer to discuss their genetic testing options. Rosie's other maternal and paternal extended relatives could also consider meeting with a genetic counselor. If any of these relatives do pursue genetic testing, Rosie is encouraged to contact me so that we may review the impact of their test results on her.    Summary:  We do not have an explanation for Rosie's ovarian cancer. While no genetic changes were identified, Rosie may still be at risk for certain cancers due to family history, environmental factors, or other genetic causes not identified by this test.  Because of that, it is important that she continue with cancer screening based on her personal and family history as discussed above.    Genetic testing is rapidly advancing, and new cancer susceptibility genes will most likely be identified in the future. Therefore, I encouraged Rosie to contact me regularly or if there are changes in her personal or family history. This may change how we assess her cancer risk, screening, and the testing we would offer.    Plan:  1.  I provided Rosie with a copy of her test results via [employer] ([employer]  2. She plans to follow-up with her medical providers, as needed.  3. She should contact me regularly or sooner if her family history changes.    If Rosie has any further questions, I encouraged her to contact me at  [phone].    Tiffanie Carter MS, [facility]  Licensed Genetic Counselor  Office: [phone]  Pager: [phone]    Addendum: I called Rosie on 4/23/2021 to discuss her updated genetic testing results. I explained that her prior testing did not include six genes (BARD1, DICER1, MRE11, MUTYH, RAD50, SMARCA4) that we had previously discussed testing for when we first met on 2/15/2021. As such, the laboratory completed testing of those additional genes.    This testing was negative. No mutations were identified in these six additional genes. Given that this testing also did not identify any mutations, the results will not change the interpretation of her test results, follow-up recommendations, implications for family members, or cancer screening recommendations as discussed on 4/1/2021. Rosie verbalized understanding.    We reviewed that it remains important for Rosie to contact me regularly, if she has any questions, and if there are any updates to her personal/family history of cancer. Rosie again verbalized understanding and denied having any other questions at this time.

## 2021-04-10 NOTE — PATIENT INSTRUCTIONS
Negative Genetic Test Result    Genetic Testing  You had a blood test that looked at the genetic information in one or more genes associated with increased cancer risk.  The testing looked for any harmful changes that would stop this particular gene from working like it should. If an individual does not have any harmful changes or variants of unknown significance found from their blood test, their genetic test result is reported as negative.       Results  The genetic test did not identify any pathogenic (harmful) changes in the genes that were tested. There are several possible explanations for a negative test result. Without knowing the gene mutation in your family, the cause of the cancer in you or your relatives is still unknown. Your genetic counselor can help interpret the result for you and your relatives. In this case, there are several reasons that may explain the negative test result:    There may be a gene mutation in the family that you did not inherit.     You may have a gene mutation in a different gene that was not included in the test, or has not yet been discovered.     The cancers in you or your family may be due to a combination of genetic factors and environment (multifactorial/familial).    The cancers in you or your family may be sporadic/random cancers.    There is very small chance that a mutation was not found by current testing methods.  As testing technology evolves over time, it may still be possible to identify a mutation in a gene that was not found on this test.    It is important to note which genes were included in your test. A list of these genes can be found on your test result.    Screening Recommendations  Due to this negative test result, cancer screening recommendations should be based on your personal and family history. This may include increased cancer screening for you and/or your family members. Your genetic counselor and health care provider can help make  appropriate recommendations.      Please call us if you have any questions or concerns.   Cancer Risk Management Program [phone]-CANCER ([phone])  ? Ariel Savage, MS, [facility] [phone]  ? Dora Bennett, MS, [facility]  [phone]  ? Jade Boo, MS, [facility]  [phone]  ? Tiffanie Carter, MS, [facility] [phone]  ? Esperanza Mota, MS, [facility] [phone]  ? Romy Gunn, MS, [facility]  [phone]

## 2021-04-12 ENCOUNTER — OFFICE VISIT (OUTPATIENT)
Dept: SURGERY | Facility: CLINIC | Age: 81
End: 2021-04-12
Payer: MEDICARE

## 2021-04-12 VITALS — DIASTOLIC BLOOD PRESSURE: 73 MMHG | SYSTOLIC BLOOD PRESSURE: 133 MMHG | HEART RATE: 69 BPM | TEMPERATURE: 98.1 F

## 2021-04-12 DIAGNOSIS — Z98.890 POSTOPERATIVE STATE: Primary | ICD-10-CM

## 2021-04-12 PROCEDURE — 99024 POSTOP FOLLOW-UP VISIT: CPT | Performed by: SURGERY

## 2021-04-12 NOTE — NURSING NOTE
"Initial /73   Pulse 69   Temp 98.1  F (36.7  C) (Tympanic)  Estimated body mass index is 35.4 kg/m  as calculated from the following:    Height as of 3/29/21: 1.613 m (5' 3.5\").    Weight as of 3/29/21: 92.1 kg (203 lb). .    Anushka Peña MA on 4/12/2021 at 9:55 AM    "

## 2021-04-12 NOTE — PROGRESS NOTES
Rosie returns for a final postop check.  It has been 3 weeks now since her incisional hernia repair.  She has no complaints at this time.  She continues to wear the binder primarily for comfort.    On exam: The incision is healing beautifully.  The rest of the glue was removed revealing a perfectly normal healing wound.    At this point I have no concerns.  I answered all of her questions.  She can increase her activity as tolerated.  I will see her back as needed.  I told her to take it easy for the next 3 weeks or so, but really her activity is only limited by her pain.    I will see her again as needed.    Aayush George MD FACS

## 2021-04-12 NOTE — LETTER
4/12/2021         RE: Rosie Blackman  [address] [address]        Dear Colleague,    Thank you for referring your patient, Rosie Blackman, to the [facility]. Please see a copy of my visit note below.    Rosie returns for a final postop check.  It has been 3 weeks now since her incisional hernia repair.  She has no complaints at this time.  She continues to wear the binder primarily for comfort.    On exam: The incision is healing beautifully.  The rest of the glue was removed revealing a perfectly normal healing wound.    At this point I have no concerns.  I answered all of her questions.  She can increase her activity as tolerated.  I will see her back as needed.  I told her to take it easy for the next 3 weeks or so, but really her activity is only limited by her pain.    I will see her again as needed.    Aayush George MD FACS      Again, thank you for allowing me to participate in the care of your patient.        Sincerely,        Aayush George MD

## 2021-04-13 DIAGNOSIS — Z80.41 FAMILY HISTORY OF MALIGNANT NEOPLASM OF OVARY: ICD-10-CM

## 2021-04-13 DIAGNOSIS — Z80.3 FAMILY HISTORY OF MALIGNANT NEOPLASM OF BREAST: ICD-10-CM

## 2021-04-13 DIAGNOSIS — Z85.43 PERSONAL HISTORY OF OVARIAN CANCER: Primary | ICD-10-CM

## 2021-04-21 LAB — COPATH REPORT: NORMAL

## 2021-05-17 ENCOUNTER — ONCOLOGY VISIT (OUTPATIENT)
Dept: ONCOLOGY | Facility: CLINIC | Age: 81
End: 2021-05-17
Attending: OBSTETRICS & GYNECOLOGY
Payer: MEDICARE

## 2021-05-17 ENCOUNTER — APPOINTMENT (OUTPATIENT)
Dept: LAB | Facility: CLINIC | Age: 81
End: 2021-05-17
Attending: OBSTETRICS & GYNECOLOGY
Payer: MEDICARE

## 2021-05-17 VITALS
HEIGHT: 64 IN | WEIGHT: 205.4 LBS | RESPIRATION RATE: 16 BRPM | HEART RATE: 70 BPM | SYSTOLIC BLOOD PRESSURE: 144 MMHG | BODY MASS INDEX: 35.07 KG/M2 | OXYGEN SATURATION: 98 % | TEMPERATURE: 97.6 F | DIASTOLIC BLOOD PRESSURE: 81 MMHG

## 2021-05-17 DIAGNOSIS — C56.9 OVARIAN CANCER, UNSPECIFIED LATERALITY (H): ICD-10-CM

## 2021-05-17 LAB — CANCER AG125 SERPL-ACNC: 7 U/ML (ref 0–30)

## 2021-05-17 PROCEDURE — G0463 HOSPITAL OUTPT CLINIC VISIT: HCPCS

## 2021-05-17 PROCEDURE — 36415 COLL VENOUS BLD VENIPUNCTURE: CPT

## 2021-05-17 PROCEDURE — 86304 IMMUNOASSAY TUMOR CA 125: CPT | Performed by: OBSTETRICS & GYNECOLOGY

## 2021-05-17 PROCEDURE — 99214 OFFICE O/P EST MOD 30 MIN: CPT | Performed by: OBSTETRICS & GYNECOLOGY

## 2021-05-17 ASSESSMENT — MIFFLIN-ST. JEOR: SCORE: 1378.75

## 2021-05-17 ASSESSMENT — PAIN SCALES - GENERAL: PAINLEVEL: MILD PAIN (3)

## 2021-05-17 NOTE — LETTER
2021         RE: Rosie Blackman  [address] [address]        Dear Colleague,    Thank you for referring your patient, Rosie Blackman, to the [facility]. Please see a copy of my visit note below.                            Consult Notes on Referred Patient         Dr. Kathya Escalera, DO  [address] [place] [address]       RE: Rosie Blackman  : 1940  ALEJANDRO: 2021     Dear Dr. Kathya Escalera:    I had the pleasure of seeing your patient Rosie Blackman here at the Gynecologic Cancer Clinic at the [facility] on 2020.  As you know she is a very pleasant 80 year old woman with a diagnosis IAG1 OVCA.  Given these findings she was subsequently sent to the Gynecologic Cancer Clinic for new patient consultation.     She recently presented with a complaint of hip pain.  The work-up of this included an MRI which has demonstrated a large pelvic mass.  The patient has been asymptomatic, but her  is elevated (108, CEA 19-9 were normal).    She underwent surgical staging on 20    PATH:  FINAL DIAGNOSIS:   A. OVARIES, LEFT AND RIGHT, BILATERAL OOPHORECTOMY:   - Right ovary with ENDOMETRIOID ADENOCARCINOMA, FIGO grade 1   - Left ovary with benign inclusion cysts, negative for malignancy   - Unremarkable bilateral fallopian tubes, negative for malignancy     B. MESENTERY, BIOPSY:   - Fibrovascular adhesions, negative for malignancy     C. OMENTUM, OMENTECTOMY:   - Adipose tissue, negative for malignancy     D. ANTERIOR CUL-DE-SAC, BIOPSY:   - Fibroadipose tissue with foci of endometriosis   - Negative for malignancy     E. POSTERIOR CUL-DE-SAC, BIOPSY:   - Fibrovascular tissue, negative for malignancy     F. LYMPH NODE, LEFT PELVIC, EXCISION:   - Eleven reactive lymph nodes, negative for malignancy (0/11)     G. LYMPH NODE, RIGHT PELVIC, EXCISION:   - Nine reactive lymph nodes, negative for malignancy (0/9)      H. LYMPH NODE, RIGHT PARA AORTIC, EXCISION:   - Three reactive lymph nodes, negative for malignancy (0/3)       Synoptic Report:     SPECIMEN     Procedure:         - Bilateral salpingo-oophorectomy         - Omentectomy         - Peritoneal  biopsies         - Peritoneal washing     Specimen Integrity of Right Ovary:         - Capsule intact     TUMOR     Tumor Site:         - Right ovary     Histologic Type:         - Endometrioid carcinoma     Histologic Grade:         - G1: Well differentiated     Tumor Size: 24.5 Centimeters (cm)     Ovarian Surface Involvement:         - Absent     Fallopian Tube Surface Involvement:         - Absent     Other Tissue / Organ Involvement:         - Not identified     Peritoneal Ascitic Fluid:         - Negative for malignancy (normal / benign)     Pleural Fluid:         - Not submitted / unknown     Treatment Effect:         - No known presurgical therapy     LYMPH NODES     Regional Lymph Nodes:         - All lymph nodes negative for tumor cells     Number of Lymph Nodes Examined: 23     Site(s):         - Right pelvic         - Left pelvic         - Right para-aortic       She has been in observation since that time.     Component      Latest Ref Rng & Units 7/9/2020 11/9/2020 2/1/2021         0 - 30 U/mL 108 (H) 20 8       Review of Systems:    Systemic           no weight changes; no fever; no chills; no night sweats; no appetite changes  Skin           no rashes, or lesions  Eye           no irritation; no changes in vision  Isabelle-Laryngeal           no dysphagia; no hoarseness   Pulmonary    no cough; no shortness of breath  Cardiovascular    no chest pain; no palpitations  Gastrointestinal    no diarrhea; no constipation; no abdominal pain; no changes in bowel  habits; no blood in stool  Genitourinary   no urinary frequency; no urinary urgency; no dysuria; no pain; no abnormal vaginal discharge; no abnormal vaginal bleeding  Breast   no breast discharge; no  breast changes; no breast pain  Musculoskeletal    no myalgias; no arthralgias; no back pain  Psychiatric           no depressed mood; no anxiety    Hematologic           no tender lymph nodes; no noticeable swellings or lumps   Endocrine    no hot flashes; no heat/cold intolerance         Neurological   no tremor; no numbness and tingling; no headaches; no difficulty  sleeping      Past Medical History:    Past Medical History:   Diagnosis Date     Chronic airway obstruction (H)      Gastroesophageal reflux disease      Hiatal hernia      Hypertension      Ovarian cancer, unspecified laterality (H) 3/10/2021     Personal history of contact with and (suspected) exposure to asbestos      Primary osteoarthritis of right hip 7/9/2020         Past Surgical History:    Past Surgical History:   Procedure Laterality Date     BREAST BIOPSY, CORE RT/LT  1994     C ANESTH,KNEE VEINS SURGERY  08/20/2014     CHOLECYSTECTOMY  1995     COLONOSCOPY  05/2010    Diverticulosis, colon polyps; repeat 5 yrs     COLONOSCOPY N/A 11/16/2015    Procedure: COLONOSCOPY;  Surgeon: Alejandro Matos MD;  Location: [facility]     Colonoscopy, hyperplastic polyps, repeat in 5 yrs  2004     ESOPHAGOSCOPY, GASTROSCOPY, DUODENOSCOPY (EGD), COMBINED  09/30/2013    Procedure: COMBINED ESOPHAGOSCOPY, GASTROSCOPY, DUODENOSCOPY (EGD), BIOPSY SINGLE OR MULTIPLE;  Gastroscopy;  Surgeon: Blaise Gill MD;  Location: [facility]     EYE SURGERY  2012    R/L Cataracts     HC REMOVE TONSILS/ADENOIDS,13+ Y/O      T & A 13+y.o.     HERNIORRHAPHY INCISIONAL (LOCATION) N/A 3/24/2021    Procedure: HERNIORRHAPHY, INCISIONAL, OPEN WITH MESH;  Surgeon: Aayush George MD;  Location: [facility]     HYSTERECTOMY, PAP NO LONGER INDICATED       LAPAROSCOPIC SALPINGO-OOPHORECTOMY N/A 07/24/2020    Procedure: Laparoscopy, removal or pelvic mass, both tubes and ovaries, omentectomy, anterior culvectomy, pelvic and para aortic lymph node dissection, laparotomy;  Surgeon: Chloe  Felipe Martinez MD;  Location: [facility]     WA ANESTH,LUMBAR SPINE,CORD SURGERY  10/2020    L3-4 L4-5 TRANSFORAMINAL INTERBODY FUSION L3-5, POSTERIOR INSTRUMENTED FUSION AND DECOMPRESSION     TVH for DUB, ovaries in       VASCULAR SURGERY      [place] closure         Health Maintenance:  Last Pap Smear: None since hysterectomy age 41    Last Mammogram: 10/2019 BIRADS 1    Last Colonoscopy:  (diverticulosis without polyps or cancer)                       Current Medications:     has a current medication list which includes the following prescription(s): evening primrose oil, hydrocodone-acetaminophen, mometasone, omega 3, pravastatin, theratears, valsartan-hydrochlorothiazide, and viactiv.       Allergies:     Allergies   Allergen Reactions     Ezetimibe Other (See Comments)     Severe muscle aches     Lisinopril      Omeprazole      Other reaction(s): *Unknown     Prilosec [Omeprazole]      Zestril [Ace Inhibitors] Cough     Atorvastatin Other (See Comments)     Muscle Pain     Irbesartan Cramps     Rosuvastatin Other (See Comments)     Muscle Pain         Social History:     Social History     Tobacco Use     Smoking status: Never Smoker     Smokeless tobacco: Never Used   Substance Use Topics     Alcohol use: No       History   Drug Use No           Family History:     The patient's family history is notable for .    Family History   Problem Relation Age of Onset     Cancer Mother         uterine cancer,      Respiratory Mother         COPD     Cardiovascular Mother         AAA  age 80     Breast Cancer Maternal Grandmother      Cancer Maternal Grandfather         cancer unknown type     Breast Cancer Sister      Eye Disorder Father         cataracts     Depression Father      Depression Son      Depression Son      Breast Cancer Sister         X2     Cancer - colorectal No family hx of         no ovarian cancer         Physical Exam:     PS 0  VS: BP (!) 144/81   Pulse 70   Temp 97.6  F (36.4  C) (Oral)  "  Resp 16   Ht 1.613 m (5' 3.5\")   Wt 93.2 kg (205 lb 6.4 oz)   SpO2 98%   BMI 35.81 kg/m      General Appearance: healthy and alert, no distress     HEENT:  no thyromegaly, no palpable nodules or masses        Cardiovascular: regular rate and rhythm, no gallops, rubs or murmurs     Respiratory: lungs clear, no rales, rhonchi or wheezes, normal diaphragmatic excursion    Musculoskeletal: extremities non tender and without edema    Skin: no lesions or rashes; incision with small area of erythema and induration at the superior edge.  No pus, no tenderness    Neurological: normal gait, no gross defects     Psychiatric: appropriate mood and affect                               Hematological: normal cervical, supraclavicular and inguinal lymph nodes     Gastrointestinal:       abdomen soft, non-tender, non-distended, she has an easily reducible hernia which is about 2-3 cm at the os.    Genitourinary: Normal post-hyst vaginal and rectal examination no evidence of cancer    Assessment:    Rosie Blackman is a woman with a new diagnosis stage I grade 1 OVCA     A total of 25 minutes was spent with the patient, 20 minutes of which were spent in counseling the patient and/or treatment planning.    Plan:     1.)   OVCA - She continues to do remarkably well overall.  She has lingering back pian which inhibits her mobility mildly, but is otherwise in excellent health and spirits.  She is aware that her prognosis is favorable, but not guaranteed in any setting.  Will continue her current surveillance plan.    2.) Genetic risk factors were assessed and the patient does meet the qualifications for a referral placed (this was previously done, but there was an apparent disconnect betweent he clinic and patient - we assisted in formalizing the visit today)..      3.) Labs and/or tests ordered include:  CA-125 pending     4.) Health maintenance issues addressed today include: none - She is interested in FIT testing versus " colonoscopy and I think she is a good candidate despite her age.    Thank you for allowing us to participate in the care of your patient.         Sincerely,    Felipe Corona MD    CC  Patient Care Team:  Kathya Escalera DO as PCP - General (Internal Medicine)  Mehran Howell MD as Assigned Musculoskeletal Provider  Aayush George MD as Assigned Surgical Provider

## 2021-05-17 NOTE — NURSING NOTE
"Oncology Rooming Note    May 17, 2021 9:49 AM   Rosie Blackman is a 81 year old female who presents for:    Chief Complaint   Patient presents with     Blood Draw     Labs drawn from  by RN in lab. Vitals taken. Checked into next appointment.      Oncology Clinic Visit     ovarian cancer      Initial Vitals: BP (!) 144/81   Pulse 70   Temp 97.6  F (36.4  C) (Oral)   Resp 16   Ht 1.613 m (5' 3.5\")   Wt 93.2 kg (205 lb 6.4 oz)   SpO2 98%   BMI 35.81 kg/m   Estimated body mass index is 35.81 kg/m  as calculated from the following:    Height as of this encounter: 1.613 m (5' 3.5\").    Weight as of this encounter: 93.2 kg (205 lb 6.4 oz). Body surface area is 2.04 meters squared.  Mild Pain (3) Comment: Data Unavailable   No LMP recorded. Patient has had a hysterectomy.  Allergies reviewed: Yes  Medications reviewed: Yes    Medications: Medication refills not needed today.  Pharmacy name entered into [employer]:    [id number] - [place], [place] - [address] - MAIL ORDER MAINT MEDS - NON-EPRESCRIBE  [facility] - [place], [place] - [address] [id number] - [place], [place] - [address] [place] [address]    Clinical concerns: Patient is still having hot flashes.  Dr Corona  was NOT notified.      Ines Deshpande            "

## 2021-05-17 NOTE — NURSING NOTE
Chief Complaint   Patient presents with     Blood Draw     Labs drawn from  by RN in lab. Vitals taken. Checked into next appointment.      Labs drawn by venipuncture by RN in lab.  Vital signs taken.  Pt checked in to next appointment.    Shima Perez RN

## 2021-05-17 NOTE — PROGRESS NOTES
Consult Notes on Referred Patient         Dr. Kathya Escalera, DO  [address] [place] [address]       RE: Rosie Blackman  : 1940  ALEJANDRO: 2021     Dear Dr. Kathya Escalera:    I had the pleasure of seeing your patient Rosie Blackman here at the Gynecologic Cancer Clinic at the [facility] on 2020.  As you know she is a very pleasant 80 year old woman with a diagnosis IAG1 OVCA.  Given these findings she was subsequently sent to the Gynecologic Cancer Clinic for new patient consultation.     She recently presented with a complaint of hip pain.  The work-up of this included an MRI which has demonstrated a large pelvic mass.  The patient has been asymptomatic, but her  is elevated (108, CEA 19-9 were normal).    She underwent surgical staging on 20    PATH:  FINAL DIAGNOSIS:   A. OVARIES, LEFT AND RIGHT, BILATERAL OOPHORECTOMY:   - Right ovary with ENDOMETRIOID ADENOCARCINOMA, FIGO grade 1   - Left ovary with benign inclusion cysts, negative for malignancy   - Unremarkable bilateral fallopian tubes, negative for malignancy     B. MESENTERY, BIOPSY:   - Fibrovascular adhesions, negative for malignancy     C. OMENTUM, OMENTECTOMY:   - Adipose tissue, negative for malignancy     D. ANTERIOR CUL-DE-SAC, BIOPSY:   - Fibroadipose tissue with foci of endometriosis   - Negative for malignancy     E. POSTERIOR CUL-DE-SAC, BIOPSY:   - Fibrovascular tissue, negative for malignancy     F. LYMPH NODE, LEFT PELVIC, EXCISION:   - Eleven reactive lymph nodes, negative for malignancy (0/11)     G. LYMPH NODE, RIGHT PELVIC, EXCISION:   - Nine reactive lymph nodes, negative for malignancy (0/9)     H. LYMPH NODE, RIGHT PARA AORTIC, EXCISION:   - Three reactive lymph nodes, negative for malignancy (0/3)       Synoptic Report:     SPECIMEN     Procedure:         - Bilateral salpingo-oophorectomy         - Omentectomy         - Peritoneal  biopsies         -  Peritoneal washing     Specimen Integrity of Right Ovary:         - Capsule intact     TUMOR     Tumor Site:         - Right ovary     Histologic Type:         - Endometrioid carcinoma     Histologic Grade:         - G1: Well differentiated     Tumor Size: 24.5 Centimeters (cm)     Ovarian Surface Involvement:         - Absent     Fallopian Tube Surface Involvement:         - Absent     Other Tissue / Organ Involvement:         - Not identified     Peritoneal Ascitic Fluid:         - Negative for malignancy (normal / benign)     Pleural Fluid:         - Not submitted / unknown     Treatment Effect:         - No known presurgical therapy     LYMPH NODES     Regional Lymph Nodes:         - All lymph nodes negative for tumor cells     Number of Lymph Nodes Examined: 23     Site(s):         - Right pelvic         - Left pelvic         - Right para-aortic       She has been in observation since that time.     Component      Latest Ref Rng & Units 7/9/2020 11/9/2020 2/1/2021         0 - 30 U/mL 108 (H) 20 8       Review of Systems:    Systemic           no weight changes; no fever; no chills; no night sweats; no appetite changes  Skin           no rashes, or lesions  Eye           no irritation; no changes in vision  Isabelle-Laryngeal           no dysphagia; no hoarseness   Pulmonary    no cough; no shortness of breath  Cardiovascular    no chest pain; no palpitations  Gastrointestinal    no diarrhea; no constipation; no abdominal pain; no changes in bowel  habits; no blood in stool  Genitourinary   no urinary frequency; no urinary urgency; no dysuria; no pain; no abnormal vaginal discharge; no abnormal vaginal bleeding  Breast   no breast discharge; no breast changes; no breast pain  Musculoskeletal    no myalgias; no arthralgias; no back pain  Psychiatric           no depressed mood; no anxiety    Hematologic           no tender lymph nodes; no noticeable swellings or lumps   Endocrine    no hot flashes; no heat/cold  intolerance         Neurological   no tremor; no numbness and tingling; no headaches; no difficulty  sleeping      Past Medical History:    Past Medical History:   Diagnosis Date     Chronic airway obstruction (H)      Gastroesophageal reflux disease      Hiatal hernia      Hypertension      Ovarian cancer, unspecified laterality (H) 3/10/2021     Personal history of contact with and (suspected) exposure to asbestos      Primary osteoarthritis of right hip 7/9/2020         Past Surgical History:    Past Surgical History:   Procedure Laterality Date     BREAST BIOPSY, CORE RT/LT  1994     C ANESTH,KNEE VEINS SURGERY  08/20/2014     CHOLECYSTECTOMY  1995     COLONOSCOPY  05/2010    Diverticulosis, colon polyps; repeat 5 yrs     COLONOSCOPY N/A 11/16/2015    Procedure: COLONOSCOPY;  Surgeon: Alejandro Matos MD;  Location: [facility]     Colonoscopy, hyperplastic polyps, repeat in 5 yrs  2004     ESOPHAGOSCOPY, GASTROSCOPY, DUODENOSCOPY (EGD), COMBINED  09/30/2013    Procedure: COMBINED ESOPHAGOSCOPY, GASTROSCOPY, DUODENOSCOPY (EGD), BIOPSY SINGLE OR MULTIPLE;  Gastroscopy;  Surgeon: Blaise Gill MD;  Location: [facility]     EYE SURGERY  2012    R/L Cataracts     HC REMOVE TONSILS/ADENOIDS,13+ Y/O      T & A 13+y.o.     HERNIORRHAPHY INCISIONAL (LOCATION) N/A 3/24/2021    Procedure: HERNIORRHAPHY, INCISIONAL, OPEN WITH MESH;  Surgeon: Aayush George MD;  Location: [facility]     HYSTERECTOMY, PAP NO LONGER INDICATED       LAPAROSCOPIC SALPINGO-OOPHORECTOMY N/A 07/24/2020    Procedure: Laparoscopy, removal or pelvic mass, both tubes and ovaries, omentectomy, anterior culvectomy, pelvic and para aortic lymph node dissection, laparotomy;  Surgeon: Felipe Corona MD;  Location: [facility]     SD ANESTH,LUMBAR SPINE,CORD SURGERY  10/2020    L3-4 L4-5 TRANSFORAMINAL INTERBODY FUSION L3-5, POSTERIOR INSTRUMENTED FUSION AND DECOMPRESSION     TVH for DUB, ovaries in       VASCULAR SURGERY  2014    [facility]  "Maintenance:  Last Pap Smear: None since hysterectomy age 41    Last Mammogram: 10/2019 BIRADS 1    Last Colonoscopy:  (diverticulosis without polyps or cancer)                       Current Medications:     has a current medication list which includes the following prescription(s): evening primrose oil, hydrocodone-acetaminophen, mometasone, omega 3, pravastatin, theratears, valsartan-hydrochlorothiazide, and viactiv.       Allergies:     Allergies   Allergen Reactions     Ezetimibe Other (See Comments)     Severe muscle aches     Lisinopril      Omeprazole      Other reaction(s): *Unknown     Prilosec [Omeprazole]      Zestril [Ace Inhibitors] Cough     Atorvastatin Other (See Comments)     Muscle Pain     Irbesartan Cramps     Rosuvastatin Other (See Comments)     Muscle Pain         Social History:     Social History     Tobacco Use     Smoking status: Never Smoker     Smokeless tobacco: Never Used   Substance Use Topics     Alcohol use: No       History   Drug Use No           Family History:     The patient's family history is notable for .    Family History   Problem Relation Age of Onset     Cancer Mother         uterine cancer,      Respiratory Mother         COPD     Cardiovascular Mother         AAA  age 80     Breast Cancer Maternal Grandmother      Cancer Maternal Grandfather         cancer unknown type     Breast Cancer Sister      Eye Disorder Father         cataracts     Depression Father      Depression Son      Depression Son      Breast Cancer Sister         X2     Cancer - colorectal No family hx of         no ovarian cancer         Physical Exam:     PS 0  VS: BP (!) 144/81   Pulse 70   Temp 97.6  F (36.4  C) (Oral)   Resp 16   Ht 1.613 m (5' 3.5\")   Wt 93.2 kg (205 lb 6.4 oz)   SpO2 98%   BMI 35.81 kg/m      General Appearance: healthy and alert, no distress     HEENT:  no thyromegaly, no palpable nodules or masses        Cardiovascular: regular rate and rhythm, no gallops, rubs " or murmurs     Respiratory: lungs clear, no rales, rhonchi or wheezes, normal diaphragmatic excursion    Musculoskeletal: extremities non tender and without edema    Skin: no lesions or rashes; incision with small area of erythema and induration at the superior edge.  No pus, no tenderness    Neurological: normal gait, no gross defects     Psychiatric: appropriate mood and affect                               Hematological: normal cervical, supraclavicular and inguinal lymph nodes     Gastrointestinal:       abdomen soft, non-tender, non-distended, she has an easily reducible hernia which is about 2-3 cm at the os.    Genitourinary: Normal post-hyst vaginal and rectal examination no evidence of cancer    Assessment:    Rosie Blackman is a woman with a new diagnosis stage I grade 1 OVCA     A total of 25 minutes was spent with the patient, 20 minutes of which were spent in counseling the patient and/or treatment planning.    Plan:     1.)   OVCA - She continues to do remarkably well overall.  She has lingering back pian which inhibits her mobility mildly, but is otherwise in excellent health and spirits.  She is aware that her prognosis is favorable, but not guaranteed in any setting.  Will continue her current surveillance plan.    2.) Genetic risk factors were assessed and the patient does meet the qualifications for a referral placed (this was previously done, but there was an apparent disconnect betweent he clinic and patient - we assisted in formalizing the visit today)..      3.) Labs and/or tests ordered include:  CA-125 pending     4.) Health maintenance issues addressed today include: none - She is interested in FIT testing versus colonoscopy and I think she is a good candidate despite her age.    Thank you for allowing us to participate in the care of your patient.         Sincerely,    Felipe Corona MD    CC  Patient Care Team:  Kathya Escalera DO as PCP - General (Internal  Medicine)  Murali Escalera DO as Assigned PCP  Mehran Howell MD as Assigned Musculoskeletal Provider  Felipe Corona MD as Assigned Cancer Care Provider  Aayush George MD as Assigned Surgical Provider  MURALI ESCALERA

## 2021-06-05 ENCOUNTER — RECORDS - HEALTHEAST (OUTPATIENT)
Dept: VASCULAR SURGERY | Facility: CLINIC | Age: 81
End: 2021-06-05

## 2021-06-05 DIAGNOSIS — I83.899 VARICOSE VEINS OF LOWER EXTREMITY WITH OTHER COMPLICATION: ICD-10-CM

## 2021-06-05 DIAGNOSIS — I83.90 ASYMPTOMATIC VARICOSE VEINS: ICD-10-CM

## 2021-06-07 ENCOUNTER — TRANSFERRED RECORDS (OUTPATIENT)
Dept: HEALTH INFORMATION MANAGEMENT | Facility: CLINIC | Age: 81
End: 2021-06-07

## 2021-06-11 NOTE — PROGRESS NOTES
HPI: Pt is here for follow up of a leg pain.  She had a closure in 2015 this is of her left leg she had good results with the no pain for quite some time and decrease in size her veins but now has lateral leg pain again. She wears her stockings on a regular basis.  She is doing well. Her appetite is good, and bowel function regular.  No fevers or chills. Ambulating without problems.       There were no vitals taken for this visit.      EXAM: This is a  77 y.o. WOMAN in no distress  GENERAL: Appears well  CHEST clear  CVS S1S2 NSR  ABDOMEN: Soft, non-tender.   EXT: warm, moves without difficulty, minimal varicose veins bilaterally some spider veins noted no open ulcers or sores are noted and no phlebitis or clots are noted in her legs.    US:  US Venous Insufficency Leg Left (Order [id number])   Imaging   Date: 6/13/2017 Department: [facility] Ultrasound [place] Released By: Wily Del Castillo RDMS, RVT Authorizing: Marco A Banuelos MD   Study Result   [facility]  6/13/2017     EXAM: LEFT LOWER EXTREMITY DEEP AND SUPERFICIAL VENOUS DUPLEX ULTRASOUND WITH PHYSIOLOGIC TESTING     INDICATION: Symptomatic varicose veins with left leg pain and swelling. Assess for incompetent veins.     TECHNIQUE: Supine and upright ultrasound of the deep and superficial veins with Valsalva and compression augmentation maneuvers. Duplex imaging is performed utilizing gray-scale, two-dimensional images, color-flow imaging, Doppler waveform analysis, and   spectral Doppler imaging.      INCOMPETENCY CRITERIA: Deep vein reflux reported when greater than 1,000 ms flow reversal.  Superficial vein reflux reported when greater than 600 ms flow reversal.  vein reflux reported as greater than 350 ms flow reversal.     DEEP VEIN FINDINGS:     LEFT LEG: The common femoral, profunda femoral, femoral, popliteal, and visualized calf veins are patent and compressible.  The popliteal vein is incompetent.     LEFT  SUPERFICIAL VEIN FINDINGS:  GREAT SAPHENOUS VEIN: The GSV is incompetent only at the SFJ where it measures 4 mm.     SMALL SAPHENOUS VEIN: Competent from the saphenopopliteal junction to the mid calf.     No incompetent perforating veins or abnormal accessory veins identified.     For comparison, the right common femoral vein is patent and compressible with normal venous waveforms and augmentation.     IMPRESSION:   CONCLUSION:   1.  No deep venous thrombosis of the left lower extremity.  2.  LEFT LEG: Localized GSV incompetency at the SFJ. The SSV is competent.         Assessment/Plan:   1. Left leg pain  I do not think this is related to her venous disease though it could be some of the lateral varicose vein branches that she has in the left leg.  She states though that wearing socks actually makes it worse which she would think would make it better.  With these findings at this time point we are going to continue sock use as much as she can and possibly if this does not improve return for possible stab avulsions.  I told her also that I would think someone about maybe back issues or issues causing compression or nerve kind of problems as a source of the pain  - US Venous Insufficency Leg Left; Future  - Compression stockings    2. 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    Marco A Banuelos MD  [facility] Department of Surgery

## 2021-06-11 NOTE — PROGRESS NOTES
Patient here for left leg pain for about 6 months. Patient unsure if the pain is from her feet or actual veins.  Patient underwent left rfa on 2015.

## 2021-07-14 NOTE — INTERVAL H&P NOTE
I have reviewed the surgical (or preoperative) H&P that is linked to this encounter, and examined the patient. There are no significant changes   ID band present and verified.

## 2021-07-20 DIAGNOSIS — Z11.59 ENCOUNTER FOR SCREENING FOR OTHER VIRAL DISEASES: ICD-10-CM

## 2021-08-22 DIAGNOSIS — Z11.59 ENCOUNTER FOR SCREENING FOR OTHER VIRAL DISEASES: ICD-10-CM

## 2021-09-14 ENCOUNTER — LAB (OUTPATIENT)
Dept: LAB | Facility: CLINIC | Age: 81
End: 2021-09-14
Payer: MEDICARE

## 2021-09-14 DIAGNOSIS — Z11.59 ENCOUNTER FOR SCREENING FOR OTHER VIRAL DISEASES: ICD-10-CM

## 2021-09-14 LAB — SARS-COV-2 RNA RESP QL NAA+PROBE: NEGATIVE

## 2021-09-14 PROCEDURE — U0005 INFEC AGEN DETEC AMPLI PROBE: HCPCS

## 2021-09-14 PROCEDURE — U0003 INFECTIOUS AGENT DETECTION BY NUCLEIC ACID (DNA OR RNA); SEVERE ACUTE RESPIRATORY SYNDROME CORONAVIRUS 2 (SARS-COV-2) (CORONAVIRUS DISEASE [COVID-19]), AMPLIFIED PROBE TECHNIQUE, MAKING USE OF HIGH THROUGHPUT TECHNOLOGIES AS DESCRIBED BY CMS-2020-01-R: HCPCS

## 2021-09-16 ENCOUNTER — ANESTHESIA EVENT (OUTPATIENT)
Dept: GASTROENTEROLOGY | Facility: CLINIC | Age: 81
End: 2021-09-16
Payer: MEDICARE

## 2021-09-16 ASSESSMENT — COPD QUESTIONNAIRES: COPD: 1

## 2021-09-16 NOTE — ANESTHESIA PREPROCEDURE EVALUATION
Anesthesia Pre-Procedure Evaluation    Patient: Rosie Blackman   MRN: [id number] : 1940        Preoperative Diagnosis: Screen for colon cancer [Z12.11]   Procedure : Procedure(s):  COLONOSCOPY     Past Medical History:   Diagnosis Date     Chronic airway obstruction (H)      Gastroesophageal reflux disease      Hiatal hernia      Hypertension      Ovarian cancer, unspecified laterality (H) 3/10/2021     Personal history of contact with and (suspected) exposure to asbestos      Primary osteoarthritis of right hip 2020      Past Surgical History:   Procedure Laterality Date     BREAST BIOPSY, CORE RT/LT       C ANESTH,KNEE VEINS SURGERY  2014     CHOLECYSTECTOMY       COLONOSCOPY  2010    Diverticulosis, colon polyps; repeat 5 yrs     COLONOSCOPY N/A 2015    Procedure: COLONOSCOPY;  Surgeon: Alejandro Matos MD;  Location: [facility]     Colonoscopy, hyperplastic polyps, repeat in 5 yrs       ESOPHAGOSCOPY, GASTROSCOPY, DUODENOSCOPY (EGD), COMBINED  2013    Procedure: COMBINED ESOPHAGOSCOPY, GASTROSCOPY, DUODENOSCOPY (EGD), BIOPSY SINGLE OR MULTIPLE;  Gastroscopy;  Surgeon: Blaise Gill MD;  Location: [facility]     EYE SURGERY      R/L Cataracts     HC REMOVE TONSILS/ADENOIDS,12+ Y/O      T & A 13+y.o.     HERNIORRHAPHY INCISIONAL (LOCATION) N/A 3/24/2021    Procedure: HERNIORRHAPHY, INCISIONAL, OPEN WITH MESH;  Surgeon: Aayush George MD;  Location: [facility]     HYSTERECTOMY, PAP NO LONGER INDICATED       LAPAROSCOPIC SALPINGO-OOPHORECTOMY N/A 2020    Procedure: Laparoscopy, removal or pelvic mass, both tubes and ovaries, omentectomy, anterior culvectomy, pelvic and para aortic lymph node dissection, laparotomy;  Surgeon: Felipe Corona MD;  Location: [facility]     [place] ANESTH,LUMBAR SPINE,CORD SURGERY  10/2020    L3-4 L4-5 TRANSFORAMINAL INTERBODY FUSION L3-5, POSTERIOR INSTRUMENTED FUSION AND DECOMPRESSION     TVH for DUB, ovaries in       VASCULAR SURGERY   2014    [place] closure      Allergies   Allergen Reactions     Ezetimibe Other (See Comments)     Severe muscle aches     Lisinopril      Omeprazole      Other reaction(s): *Unknown     Prilosec [Omeprazole]      Zestril [Ace Inhibitors] Cough     Atorvastatin Other (See Comments)     Muscle Pain     Irbesartan Cramps     Rosuvastatin Other (See Comments)     Muscle Pain      Social History     Tobacco Use     Smoking status: Never Smoker     Smokeless tobacco: Never Used   Substance Use Topics     Alcohol use: No      Wt Readings from Last 1 Encounters:   05/17/21 93.2 kg (205 lb 6.4 oz)        Anesthesia Evaluation   Pt has had prior anesthetic. Type: MAC and General.    No history of anesthetic complications       ROS/MED HX  ENT/Pulmonary:     (+) allergic rhinitis, COPD,     Neurologic:  - neg neurologic ROS     Cardiovascular:     (+) Dyslipidemia hypertension-----Previous cardiac testing   Echo: Date: Results:    Stress Test: Date: Results:    ECG Reviewed: Date: 3/10/21 Results:  Sinus Rhythm - occasional PAC   Cath: Date: Results:      METS/Exercise Tolerance:     Hematologic:  - neg hematologic  ROS     Musculoskeletal: Comment: Lumbar spinal stenosis  (+) arthritis,     GI/Hepatic: Comment: IBS  diverticulosis    (+) GERD, hiatal hernia,     Renal/Genitourinary:  - neg Renal ROS     Endo: Comment: Morbid obesity    (+) Obesity,     Psychiatric/Substance Use:  - neg psychiatric ROS     Infectious Disease:  - neg infectious disease ROS     Malignancy:   (+) Malignancy, History of Other.Other CA ovarian status post Surgery.    Other:  - neg other ROS          Physical Exam    Airway  airway exam normal      Mallampati: II       Respiratory Devices and Support         Dental  no notable dental history         Cardiovascular   cardiovascular exam normal          Pulmonary   pulmonary exam normal                OUTSIDE LABS:  CBC:   Lab Results   Component Value Date    WBC 8.1 07/26/2020    WBC 10.4 07/25/2020     HGB 14.5 10/05/2020    HGB 12.0 07/26/2020    HCT 38.6 07/26/2020    HCT 38.2 07/25/2020     07/26/2020     07/25/2020     BMP:   Lab Results   Component Value Date     03/10/2021     12/15/2020    POTASSIUM 3.6 03/10/2021    POTASSIUM 3.5 12/15/2020    CHLORIDE 105 03/10/2021    CHLORIDE 104 12/15/2020    CO2 32 03/10/2021    CO2 32 12/15/2020    BUN 16 03/10/2021    BUN 16 12/15/2020    CR 0.68 03/10/2021    CR 0.74 12/15/2020    GLC 88 03/10/2021    GLC 92 12/15/2020     COAGS:   Lab Results   Component Value Date    INR 1.07 10/05/2020     POC:   Lab Results   Component Value Date     (H) 07/25/2020     HEPATIC:   Lab Results   Component Value Date    ALBUMIN 3.4 07/09/2020    PROTTOTAL 6.9 07/09/2020    ALT 22 07/09/2020    AST 15 07/09/2020    ALKPHOS 51 07/09/2020    BILITOTAL 0.7 07/09/2020     OTHER:   Lab Results   Component Value Date    A1C 5.6 12/15/2020    ANISA 9.4 03/10/2021    TSH 3.85 10/01/2004    T4 0.79 10/01/2004    SED 18 10/01/2004       Anesthesia Plan    ASA Status:  3   NPO Status:  NPO Appropriate    Anesthesia Type: General.     - Airway: Native airway      Maintenance: Balanced.        Consents    Anesthesia Plan(s) and associated risks, benefits, and realistic alternatives discussed. Questions answered and patient/representative(s) expressed understanding.     - Discussed with:  Patient         Postoperative Care            Comments:                DELMY Dickerson CRNA

## 2021-09-17 ENCOUNTER — HOSPITAL ENCOUNTER (OUTPATIENT)
Facility: CLINIC | Age: 81
Discharge: HOME | End: 2021-09-17
Attending: SURGERY | Admitting: SURGERY
Payer: MEDICARE

## 2021-09-17 ENCOUNTER — ANESTHESIA (OUTPATIENT)
Dept: GASTROENTEROLOGY | Facility: CLINIC | Age: 81
End: 2021-09-17
Payer: MEDICARE

## 2021-09-17 VITALS
HEIGHT: 64 IN | RESPIRATION RATE: 18 BRPM | WEIGHT: 205 LBS | TEMPERATURE: 97.8 F | BODY MASS INDEX: 35 KG/M2 | DIASTOLIC BLOOD PRESSURE: 64 MMHG | HEART RATE: 73 BPM | OXYGEN SATURATION: 97 % | SYSTOLIC BLOOD PRESSURE: 131 MMHG

## 2021-09-17 LAB — COLONOSCOPY: NORMAL

## 2021-09-17 PROCEDURE — 45378 DIAGNOSTIC COLONOSCOPY: CPT | Performed by: SURGERY

## 2021-09-17 PROCEDURE — G0121 COLON CA SCRN NOT HI RSK IND: HCPCS | Performed by: SURGERY

## 2021-09-17 PROCEDURE — 258N000003 HC RX IP 258 OP 636: Performed by: SURGERY

## 2021-09-17 PROCEDURE — 250N000009 HC RX 250: Performed by: NURSE ANESTHETIST, CERTIFIED REGISTERED

## 2021-09-17 PROCEDURE — 370N000017 HC ANESTHESIA TECHNICAL FEE, PER MIN: Performed by: SURGERY

## 2021-09-17 PROCEDURE — 250N000011 HC RX IP 250 OP 636: Performed by: NURSE ANESTHETIST, CERTIFIED REGISTERED

## 2021-09-17 PROCEDURE — 250N000009 HC RX 250: Performed by: SURGERY

## 2021-09-17 RX ORDER — PROPOFOL 10 MG/ML
INJECTION, EMULSION INTRAVENOUS CONTINUOUS PRN
Status: DISCONTINUED | OUTPATIENT
Start: 2021-09-17 | End: 2021-09-17

## 2021-09-17 RX ORDER — GLYCOPYRROLATE 0.2 MG/ML
INJECTION, SOLUTION INTRAMUSCULAR; INTRAVENOUS PRN
Status: DISCONTINUED | OUTPATIENT
Start: 2021-09-17 | End: 2021-09-17

## 2021-09-17 RX ORDER — ONDANSETRON 2 MG/ML
4 INJECTION INTRAMUSCULAR; INTRAVENOUS
Status: DISCONTINUED | OUTPATIENT
Start: 2021-09-17 | End: 2021-09-17 | Stop reason: HOSPADM

## 2021-09-17 RX ORDER — LIDOCAINE HYDROCHLORIDE 10 MG/ML
INJECTION, SOLUTION INFILTRATION; PERINEURAL PRN
Status: DISCONTINUED | OUTPATIENT
Start: 2021-09-17 | End: 2021-09-17

## 2021-09-17 RX ORDER — SODIUM CHLORIDE, SODIUM LACTATE, POTASSIUM CHLORIDE, CALCIUM CHLORIDE 600; 310; 30; 20 MG/100ML; MG/100ML; MG/100ML; MG/100ML
INJECTION, SOLUTION INTRAVENOUS CONTINUOUS
Status: DISCONTINUED | OUTPATIENT
Start: 2021-09-17 | End: 2021-09-17 | Stop reason: HOSPADM

## 2021-09-17 RX ORDER — LIDOCAINE 40 MG/G
CREAM TOPICAL
Status: DISCONTINUED | OUTPATIENT
Start: 2021-09-17 | End: 2021-09-17 | Stop reason: HOSPADM

## 2021-09-17 RX ADMIN — PROPOFOL 200 MCG/KG/MIN: 10 INJECTION, EMULSION INTRAVENOUS at 10:48

## 2021-09-17 RX ADMIN — SODIUM CHLORIDE, POTASSIUM CHLORIDE, SODIUM LACTATE AND CALCIUM CHLORIDE: 600; 310; 30; 20 INJECTION, SOLUTION INTRAVENOUS at 10:37

## 2021-09-17 RX ADMIN — GLYCOPYRROLATE 0.1 MG: 0.2 INJECTION, SOLUTION INTRAMUSCULAR; INTRAVENOUS at 10:48

## 2021-09-17 RX ADMIN — LIDOCAINE HYDROCHLORIDE 30 MG: 10 INJECTION, SOLUTION INFILTRATION; PERINEURAL at 10:48

## 2021-09-17 RX ADMIN — GLYCOPYRROLATE 0.1 MG: 0.2 INJECTION, SOLUTION INTRAMUSCULAR; INTRAVENOUS at 10:47

## 2021-09-17 RX ADMIN — LIDOCAINE HYDROCHLORIDE 0.1 ML: 10 INJECTION, SOLUTION EPIDURAL; INFILTRATION; INTRACAUDAL; PERINEURAL at 10:37

## 2021-09-17 ASSESSMENT — MIFFLIN-ST. JEOR: SCORE: 1376.93

## 2021-09-17 NOTE — ANESTHESIA POSTPROCEDURE EVALUATION
Patient: Rosie Blackman    Procedure(s):  COLONOSCOPY    Diagnosis:Screen for colon cancer [Z12.11]  Diagnosis Additional Information: No value filed.    Anesthesia Type:  General    Note:  Disposition: Outpatient   Postop Pain Control: Uneventful            Sign Out: Well controlled pain   PONV: No   Neuro/Psych: Uneventful            Sign Out: Acceptable/Baseline neuro status   Airway/Respiratory: Uneventful            Sign Out: Acceptable/Baseline resp. status   CV/Hemodynamics: Uneventful            Sign Out: Acceptable CV status; No obvious hypovolemia; No obvious fluid overload   Other NRE: NONE   DID A NON-ROUTINE EVENT OCCUR? No           Last vitals:  Vitals Value Taken Time   /61 09/17/21 1116   Temp     Pulse 72 09/17/21 1116   Resp     SpO2 90 % 09/17/21 1125   Vitals shown include unvalidated device data.    Electronically Signed By: DELMY Chavez CRNA  September 17, 2021  11:26 AM

## 2021-09-17 NOTE — ANESTHESIA CARE TRANSFER NOTE
Patient: Rosie Blackman    Procedure(s):  COLONOSCOPY    Diagnosis: Screen for colon cancer [Z12.11]  Diagnosis Additional Information: No value filed.    Anesthesia Type:   General     Note:    Oropharynx: oropharynx clear of all foreign objects and spontaneously breathing  Level of Consciousness: drowsy  Oxygen Supplementation: room air    Independent Airway: airway patency satisfactory and stable  Dentition: dentition unchanged  Vital Signs Stable: post-procedure vital signs reviewed and stable  Report to RN Given: handoff report given  Patient transferred to: Phase II    Handoff Report: Identifed the Patient, Identified the Reponsible Provider, Reviewed the pertinent medical history, Discussed the surgical course, Reviewed Intra-OP anesthesia mangement and issues during anesthesia, Set expectations for post-procedure period and Allowed opportunity for questions and acknowledgement of understanding      Vitals:  Vitals Value Taken Time   BP     Temp     Pulse     Resp     SpO2         Electronically Signed By: DELMY Chavez CRNA  September 17, 2021  11:10 AM

## 2021-09-17 NOTE — BRIEF OP NOTE
[facility]   Brief Operative Note    Pre-operative diagnosis: Screen for colon cancer [Z12.11]   Post-operative diagnosis diverticulosis, otherwise normal     Procedure: Procedure(s):  COLONOSCOPY   Surgeon(s): Surgeon(s) and Role:     * Aayush George MD - Primary   Estimated blood loss: 0 mL    Specimens: * No specimens in log *   Findings: 1. Mild sigmoid diverticulosis  2. Internal hemorrhoids   3. Colon otherwise normal

## 2021-09-17 NOTE — H&P
81 year old year old female here for colonoscopy for screening.    Patient Active Problem List   Diagnosis     Essential hypertension     Personal history of contact with and (suspected) exposure to asbestos     Donor of other blood     Irritable bowel syndrome     ALLERGIC RHINITIS      Need for prophylactic hormone replacement therapy (postmenopausal)     UTI (urinary tract infection)     Pes planus     Diverticulosis of colon     Colon polyps     Prediabetes     HYPERLIPIDEMIA LDL GOAL <130     Advanced directives, counseling/discussion     Plantar fasciitis     Hiatal hernia     Gastroesophageal reflux disease, esophagitis presence not specified     Lichen sclerosus of female genitalia     Obesity (BMI 35.0-39.9) with comorbidity (H)     Tear of gluteus medius tendon     Primary osteoarthritis of right hip     Spinal stenosis of lumbar region, unspecified whether neurogenic claudication present     S/P exploratory laparotomy     Incisional hernia of anterior abdominal wall without obstruction or gangrene     Ovarian cancer, unspecified laterality (H)       Past Medical History:   Diagnosis Date     Chronic airway obstruction (H)      Gastroesophageal reflux disease      Hiatal hernia      Hypertension      Ovarian cancer, unspecified laterality (H) 3/10/2021     Personal history of contact with and (suspected) exposure to asbestos      Primary osteoarthritis of right hip 7/9/2020       Past Surgical History:   Procedure Laterality Date     BREAST BIOPSY, CORE RT/LT  1994     C ANESTH,KNEE VEINS SURGERY  08/20/2014     CHOLECYSTECTOMY  1995     COLONOSCOPY  05/2010    Diverticulosis, colon polyps; repeat 5 yrs     COLONOSCOPY N/A 11/16/2015    Procedure: COLONOSCOPY;  Surgeon: Alejandro Matos MD;  Location: [facility]     Colonoscopy, hyperplastic polyps, repeat in 5 yrs  2004     ESOPHAGOSCOPY, GASTROSCOPY, DUODENOSCOPY (EGD), COMBINED  09/30/2013    Procedure: COMBINED ESOPHAGOSCOPY, GASTROSCOPY, DUODENOSCOPY (EGD),  BIOPSY SINGLE OR MULTIPLE;  Gastroscopy;  Surgeon: Blaise Gill MD;  Location: [facility]     EYE SURGERY  2012    R/L Cataracts     HC REMOVE TONSILS/ADENOIDS,12+ Y/O      T & A 13+y.o.     HERNIORRHAPHY INCISIONAL (LOCATION) N/A 3/24/2021    Procedure: HERNIORRHAPHY, INCISIONAL, OPEN WITH MESH;  Surgeon: Aayush George MD;  Location: [facility]     HYSTERECTOMY, PAP NO LONGER INDICATED       LAPAROSCOPIC SALPINGO-OOPHORECTOMY N/A 07/24/2020    Procedure: Laparoscopy, removal or pelvic mass, both tubes and ovaries, omentectomy, anterior culvectomy, pelvic and para aortic lymph node dissection, laparotomy;  Surgeon: Felipe Corona MD;  Location: [facility]     CT ANESTH,LUMBAR SPINE,CORD SURGERY  10/2020    L3-4 L4-5 TRANSFORAMINAL INTERBODY FUSION L3-5, POSTERIOR INSTRUMENTED FUSION AND DECOMPRESSION     TVH for DUB, ovaries in       VASCULAR SURGERY  2014    [place]us closure       @[facility]@    No current outpatient medications on file.       Allergies   Allergen Reactions     Ezetimibe Other (See Comments)     Severe muscle aches     Lisinopril      Omeprazole      Other reaction(s): *Unknown     Prilosec [Omeprazole]      Zestril [Ace Inhibitors] Cough     Atorvastatin Other (See Comments)     Muscle Pain     Irbesartan Cramps     Rosuvastatin Other (See Comments)     Muscle Pain       Pt reports that she has never smoked. She has never used smokeless tobacco. She reports that she does not drink alcohol and does not use drugs.    Exam:  There were no vitals taken for this visit.    Awake, Alert OX3  Lungs - CTA bilaterally  CV - RRR, no murmurs, distal pulses intact  Abd - soft, non-distended, non-tender, +BS  Extr - No cyanosis or edema    A/P 81 year old year old female in need of colonoscopy for screening. Risks, benefits, alternatives, and complications were discussed including the possibility of perforation and the patient agreed to proceed    Aayush George MD

## 2021-09-18 ENCOUNTER — HEALTH MAINTENANCE LETTER (OUTPATIENT)
Age: 81
End: 2021-09-18

## 2021-09-19 NOTE — PROGRESS NOTES
Consult Notes on Referred Patient    Dr. Kathya Escalera, DO  [address] [place] [address]     RE: Rosie Blackman  : 1940  ALEJANDRO: 2021     Dear Dr. Kathya Escalera:    I had the pleasure of seeing your patient Rosie Blackman here at the Gy[facility]ologic Cancer [facility] at the [facility].  As you know she is a very pleasant 81 year old woman with a diagnosis IAG1 OVCA.  Given these findings she was subsequently sent to the Gynecologic Cancer Clinic for new patient consultation.     She recently presented with a complaint of hip pain.  The work-up of this included an MRI which has demonstrated a large pelvic mass.  The patient has been asymptomatic, but her  is elevated (108, CEA 19-9 were normal).    She underwent surgical staging on 20    PATH:  FINAL DIAGNOSIS:   A. OVARIES, LEFT AND RIGHT, BILATERAL OOPHORECTOMY:   - Right ovary with ENDOMETRIOID ADENOCARCINOMA, FIGO grade 1   - Left ovary with benign inclusion cysts, negative for malignancy   - Unremarkable bilateral fallopian tubes, negative for malignancy     B. MESENTERY, BIOPSY:   - Fibrovascular adhesions, negative for malignancy     C. OMENTUM, OMENTECTOMY:   - Adipose tissue, negative for malignancy     D. ANTERIOR CUL-DE-SAC, BIOPSY:   - Fibroadipose tissue with foci of endometriosis   - Negative for malignancy     E. POSTERIOR CUL-DE-SAC, BIOPSY:   - Fibrovascular tissue, negative for malignancy     F. LYMPH NODE, LEFT PELVIC, EXCISION:   - Eleven reactive lymph nodes, negative for malignancy (0/11)     G. LYMPH NODE, RIGHT PELVIC, EXCISION:   - Nine reactive lymph nodes, negative for malignancy (0/9)     H. LYMPH NODE, RIGHT PARA AORTIC, EXCISION:   - Three reactive lymph nodes, negative for malignancy (0/3)       Synoptic Report:     SPECIMEN     Procedure:         - Bilateral salpingo-oophorectomy         - Omentectomy         - Peritoneal  biopsies         - Peritoneal washing      Specimen Integrity of Right Ovary:         - Capsule intact     TUMOR     Tumor Site:         - Right ovary     Histologic Type:         - Endometrioid carcinoma     Histologic Grade:         - G1: Well differentiated     Tumor Size: 24.5 Centimeters (cm)     Ovarian Surface Involvement:         - Absent     Fallopian Tube Surface Involvement:         - Absent     Other Tissue / Organ Involvement:         - Not identified     Peritoneal Ascitic Fluid:         - Negative for malignancy (normal / benign)     Pleural Fluid:         - Not submitted / unknown     Treatment Effect:         - No known presurgical therapy     LYMPH NODES     Regional Lymph Nodes:         - All lymph nodes negative for tumor cells     Number of Lymph Nodes Examined: 23     Site(s):         - Right pelvic         - Left pelvic         - Right para-aortic       She has been in observation since that time.     Component      Latest Ref Rng & Units 7/9/2020 11/9/2020 2/1/2021 5/17/2021         0 - 30 U/mL 108 (H) 20 8 7         Review of Systems:    Systemic           no weight changes; no fever; no chills; no night sweats; no appetite changes  Skin           no rashes, or lesions  Eye           no irritation; no changes in vision  Isabelle-Laryngeal           no dysphagia; no hoarseness   Pulmonary    no cough; no shortness of breath  Cardiovascular    no chest pain; no palpitations  Gastrointestinal    no diarrhea; no constipation; no abdominal pain; no changes in bowel  habits; no blood in stool  Genitourinary   no urinary frequency; no urinary urgency; no dysuria; no pain; no abnormal vaginal discharge; no abnormal vaginal bleeding  Breast   no breast discharge; no breast changes; no breast pain  Musculoskeletal    no myalgias; no arthralgias; no back pain  Psychiatric           no depressed mood; no anxiety    Hematologic           no tender lymph nodes; no noticeable swellings or lumps   Endocrine    no hot flashes; no heat/cold  intolerance         Neurological   no tremor; no numbness and tingling; no headaches; no difficulty  sleeping      Past Medical History:    Past Medical History:   Diagnosis Date     Chronic airway obstruction (H)      Gastroesophageal reflux disease      Hiatal hernia      Hypertension      Ovarian cancer, unspecified laterality (H) 3/10/2021     Personal history of contact with and (suspected) exposure to asbestos      Primary osteoarthritis of right hip 7/9/2020         Past Surgical History:    Past Surgical History:   Procedure Laterality Date     BREAST BIOPSY, CORE RT/LT  1994     C ANESTH,KNEE VEINS SURGERY  08/20/2014     CHOLECYSTECTOMY  1995     COLONOSCOPY  05/2010    Diverticulosis, colon polyps; repeat 5 yrs     COLONOSCOPY N/A 11/16/2015    Procedure: COLONOSCOPY;  Surgeon: Alejandro Matos MD;  Location: [facility]     Colonoscopy, hyperplastic polyps, repeat in 5 yrs  2004     ESOPHAGOSCOPY, GASTROSCOPY, DUODENOSCOPY (EGD), COMBINED  09/30/2013    Procedure: COMBINED ESOPHAGOSCOPY, GASTROSCOPY, DUODENOSCOPY (EGD), BIOPSY SINGLE OR MULTIPLE;  Gastroscopy;  Surgeon: Blaise Gill MD;  Location: [facility]     EYE SURGERY  2012    R/L Cataracts     HC REMOVE TONSILS/ADENOIDS,12+ Y/O      T & A 13+y.o.     HERNIORRHAPHY INCISIONAL (LOCATION) N/A 3/24/2021    Procedure: HERNIORRHAPHY, INCISIONAL, OPEN WITH MESH;  Surgeon: Aayush George MD;  [facility]: [facility]     HYSTERECTOMY, PAP NO LONGER INDICATED       LAPAROSCOPIC SALPINGO-OOPHORECTOMY N/A 07/24/2020    Procedure: Laparoscopy, removal or pelvic mass, both tubes and ovaries, omentectomy, anterior culvectomy, pelvic and para aortic lymph node dissection, laparotomy;  Surgeon: Felipe Corona MD;  Location: [facility]     TX ANESTH,LUMBAR SPINE,CORD SURGERY  10/2020    L3-4 L4-5 TRANSFORAMINAL INTERBODY FUSION L3-5, POSTERIOR INSTRUMENTED FUSION AND DECOMPRESSION     TVH for DUB, ovaries in       VASCULAR SURGERY  2014    [facility]  Maintenance:  Last Pap Smear: None since hysterectomy age 41    Last Mammogram: 10/2019 BIRADS 1    Last Colonoscopy:  - negative                       Current Medications:     has a current medication list which includes the following prescription(s): evening primrose oil, hydrocodone-acetaminophen, mometasone, omega 3, pravastatin, theratears, valsartan-hydrochlorothiazide, and viactiv.       Allergies:     Allergies   Allergen Reactions     Ezetimibe Other (See Comments)     Severe muscle aches     Lisinopril      Omeprazole      Other reaction(s): *Unknown     Prilosec [Omeprazole]      Zestril [Ace Inhibitors] Cough     Atorvastatin Other (See Comments)     Muscle Pain     Irbesartan Cramps     Rosuvastatin Other (See Comments)     Muscle Pain         Social History:     Social History     Tobacco Use     Smoking status: Never Smoker     Smokeless tobacco: Never Used   Substance Use Topics     Alcohol use: No       History   Drug Use No           Family History:     The patient's family history is notable for .    Family History   Problem Relation Age of Onset     Cancer Mother         uterine cancer,      Respiratory Mother         COPD     Cardiovascular Mother         AAA  age 80     Breast Cancer Maternal Grandmother      Cancer Maternal Grandfather         cancer unknown type     Breast Cancer Sister      Eye Disorder Father         cataracts     Depression Father      Depression Son      Depression Son      Breast Cancer Sister         X2     Cancer - colorectal No family hx of         no ovarian cancer         Physical Exam:     PS 0  VS: RR14 P 88    General Appearance: healthy and alert, no distress     HEENT:  no thyromegaly, no palpable nodules or masses        Cardiovascular: regular rate and rhythm, no gallops, rubs or murmurs     Respiratory: lungs clear, no rales, rhonchi or wheezes, normal diaphragmatic excursion    Musculoskeletal: extremities non tender and without edema    Skin: no  lesions or rashes; incision with small area of erythema and induration at the superior edge.  No pus, no tenderness    Neurological: normal gait, no gross defects     Psychiatric: appropriate mood and affect                               Hematological: normal cervical, supraclavicular and inguinal lymph nodes     Gastrointestinal:       abdomen soft, non-tender, non-distended, she has an easily reducible hernia which is about 2-3 cm at the os.    Genitourinary: Normal post-hyst vaginal and rectal examination no evidence of cancer    Assessment:    Rosie Blackman is a woman with a new diagnosis stage I grade 1 OVCA     A total of 25 minutes was spent with the patient, 20 minutes of which were spent in counseling the patient and/or treatment planning.    Plan:     1.)   OVCA - She continues to do remarkably well overall. She has largely recovered from her back surgery and is generally well with the exception of her feet which are a lingering problem.  She is having these addressed soon    2.) Genetic risk factors were assessed and she is tested negative.    3.) Labs and/or tests ordered include:  CA-125 pending     4.) Health maintenance issues addressed today include: none - She is interested in FIT testing versus colonoscopy and I think she is a good candidate despite her age.    Thank you for allowing us to participate in the care of your patient.         Sincerely,    Felipe Corona MD    CC  Patient Care Team:  Murali Escalera DO as PCP - General (Internal Medicine)  Murali Escalera DO as Assigned PCP  Mehran Howell MD as Assigned Musculoskeletal Provider  Felipe Corona MD as Assigned Cancer Care Provider  Aayush George MD as Assigned Surgical Provider  MURALI ESCALERA

## 2021-09-20 ENCOUNTER — ONCOLOGY VISIT (OUTPATIENT)
Dept: ONCOLOGY | Facility: CLINIC | Age: 81
End: 2021-09-20
Attending: OBSTETRICS & GYNECOLOGY
Payer: MEDICARE

## 2021-09-20 ENCOUNTER — LAB (OUTPATIENT)
Dept: LAB | Facility: CLINIC | Age: 81
End: 2021-09-20
Payer: MEDICARE

## 2021-09-20 DIAGNOSIS — C56.9 OVARIAN CANCER, UNSPECIFIED LATERALITY (H): ICD-10-CM

## 2021-09-20 DIAGNOSIS — C56.9 OVARIAN CANCER, UNSPECIFIED LATERALITY (H): Primary | ICD-10-CM

## 2021-09-20 LAB — CANCER AG125 SERPL-ACNC: <5 U/ML (ref 0–30)

## 2021-09-20 PROCEDURE — 36415 COLL VENOUS BLD VENIPUNCTURE: CPT | Performed by: PATHOLOGY

## 2021-09-20 PROCEDURE — 999N000109 HC STATISTIC OP CR VISIT

## 2021-09-20 PROCEDURE — 86304 IMMUNOASSAY TUMOR CA 125: CPT | Performed by: OBSTETRICS & GYNECOLOGY

## 2021-09-20 PROCEDURE — 99214 OFFICE O/P EST MOD 30 MIN: CPT | Performed by: OBSTETRICS & GYNECOLOGY

## 2021-09-20 NOTE — LETTER
2021         RE: Rosie Blackman  [address] [facility] [id number]        Dear Colleague,    Thank you for referring your patient, Rosie Blackman, to the [facility]. Please see a copy of my visit note below.                            Consult Notes on Referred Patient    Dr. Kathya Escalera, DO  [address] [place] [address]     RE: Rosie Blackman  : 1940  ALEJANDRO: 2021     Dear Dr. Kathya Escalera:    I had the pleasure of seeing your patient Rosie Blackman here at the Gynecologic Cancer Clinic at the [facility].  As you know she is a very pleasant 81 year old woman with a diagnosis IAG1 OVCA.  Given these findings she was subsequently sent to the Gynecologic Cancer Clinic for new patient consultation.     She recently presented with a complaint of hip pain.  The work-up of this included an MRI which has demonstrated a large pelvic mass.  The patient has been asymptomatic, but her  is elevated (108, CEA 19-9 were normal).    She underwent surgical staging on 20    PATH:  FINAL DIAGNOSIS:   A. OVARIES, LEFT AND RIGHT, BILATERAL OOPHORECTOMY:   - Right ovary with ENDOMETRIOID ADENOCARCINOMA, FIGO grade 1   - Left ovary with benign inclusion cysts, negative for malignancy   - Unremarkable bilateral fallopian tubes, negative for malignancy     B. MESENTERY, BIOPSY:   - Fibrovascular adhesions, negative for malignancy     C. OMENTUM, OMENTECTOMY:   - Adipose tissue, negative for malignancy     D. ANTERIOR CUL-DE-SAC, BIOPSY:   - Fibroadipose tissue with foci of endometriosis   - Negative for malignancy     E. POSTERIOR CUL-DE-SAC, BIOPSY:   - Fibrovascular tissue, negative for malignancy     F. LYMPH NODE, LEFT PELVIC, EXCISION:   - Eleven reactive lymph nodes, negative for malignancy (0/11)     G. LYMPH NODE, RIGHT PELVIC, EXCISION:   - Nine reactive lymph nodes, negative for malignancy (0/9)     H. LYMPH NODE, RIGHT  PARA AORTIC, EXCISION:   - Three reactive lymph nodes, negative for malignancy (0/3)       Synoptic Report:     SPECIMEN     Procedure:         - Bilateral salpingo-oophorectomy         - Omentectomy         - Peritoneal  biopsies         - Peritoneal washing     Specimen Integrity of Right Ovary:         - Capsule intact     TUMOR     Tumor Site:         - Right ovary     Histologic Type:         - Endometrioid carcinoma     Histologic Grade:         - G1: Well differentiated     Tumor Size: 24.5 Centimeters (cm)     Ovarian Surface Involvement:         - Absent     Fallopian Tube Surface Involvement:         - Absent     Other Tissue / Organ Involvement:         - Not identified     Peritoneal Ascitic Fluid:         - Negative for malignancy (normal / benign)     Pleural Fluid:         - Not submitted / unknown     Treatment Effect:         - No known presurgical therapy     LYMPH NODES     Regional Lymph Nodes:         - All lymph nodes negative for tumor cells     Number of Lymph Nodes Examined: 23     Site(s):         - Right pelvic         - Left pelvic         - Right para-aortic       She has been in observation since that time.     Component      Latest Ref Rng & Units 7/9/2020 11/9/2020 2/1/2021 5/17/2021         0 - 30 U/mL 108 (H) 20 8 7         Review of Systems:    Systemic           no weight changes; no fever; no chills; no night sweats; no appetite changes  Skin           no rashes, or lesions  Eye           no irritation; no changes in vision  Isabelle-Laryngeal           no dysphagia; no hoarseness   Pulmonary    no cough; no shortness of breath  Cardiovascular    no chest pain; no palpitations  Gastrointestinal    no diarrhea; no constipation; no abdominal pain; no changes in bowel  habits; no blood in stool  Genitourinary   no urinary frequency; no urinary urgency; no dysuria; no pain; no abnormal vaginal discharge; no abnormal vaginal bleeding  Breast   no breast discharge; no breast changes;  no breast pain  Musculoskeletal    no myalgias; no arthralgias; no back pain  Psychiatric           no depressed mood; no anxiety    Hematologic           no tender lymph nodes; no noticeable swellings or lumps   Endocrine    no hot flashes; no heat/cold intolerance         Neurological   no tremor; no numbness and tingling; no headaches; no difficulty  sleeping      Past Medical History:    Past Medical History:   Diagnosis Date     Chronic airway obstruction (H)      Gastroesophageal reflux disease      Hiatal hernia      Hypertension      Ovarian cancer, unspecified laterality (H) 3/10/2021     Personal history of contact with and (suspected) exposure to asbestos      Primary osteoarthritis of right hip 7/9/2020         Past Surgical History:    Past Surgical History:   Procedure Laterality Date     BREAST BIOPSY, CORE RT/LT  1994     C ANESTH,KNEE VEINS SURGERY  08/20/2014     CHOLECYSTECTOMY  1995     COLONOSCOPY  05/2010    Diverticulosis, colon polyps; repeat 5 yrs     COLONOSCOPY N/A 11/16/2015    Procedure: COLONOSCOPY;  Surgeon: Alejandro Matos MD;  Location: [facility]     Colonoscopy, hyperplastic polyps, repeat in 5 yrs  2004     ESOPHAGOSCOPY, GASTROSCOPY, DUODENOSCOPY (EGD), COMBINED  09/30/2013    Procedure: COMBINED ESOPHAGOSCOPY, GASTROSCOPY, DUODENOSCOPY (EGD), BIOPSY SINGLE OR MULTIPLE;  Gastroscopy;  Surgeon: Blaise Gill MD;  Location: [facility]     EYE SURGERY  2012    R/L Cataracts     HC REMOVE TONSILS/ADENOIDS,12+ Y/O      T & A 13+y.o.     HERNIORRHAPHY INCISIONAL (LOCATION) N/A 3/24/2021    Procedure: HERNIORRHAPHY, INCISIONAL, OPEN WITH MESH;  Surgeon: Aayush George MD;  Location: [facility]     HYSTERECTOMY, PAP NO LONGER INDICATED       LAPAROSCOPIC SALPINGO-OOPHORECTOMY N/A 07/24/2020    Procedure: Laparoscopy, removal or pelvic mass, both tubes and ovaries, omentectomy, anterior culvectomy, pelvic and para aortic lymph node dissection, laparotomy;  Surgeon: Felipe Corona MD;   Location: [facility]     AR ANESTH,LUMBAR SPINE,CORD SURGERY  10/2020    L3-4 L4-5 TRANSFORAMINAL INTERBODY FUSION L3-5, POSTERIOR INSTRUMENTED FUSION AND DECOMPRESSION     TVH for DUB, ovaries in       VASCULAR SURGERY  2014    [place] closure         Health Maintenance:  Last Pap Smear: None since hysterectomy age 41    Last Mammogram: 10/2019 BIRADS 1    Last Colonoscopy:  - negative                       Current Medications:     has a current medication list which includes the following prescription(s): evening primrose oil, hydrocodone-acetaminophen, mometasone, omega 3, pravastatin, theratears, valsartan-hydrochlorothiazide, and viactiv.       Allergies:     Allergies   Allergen Reactions     Ezetimibe Other (See Comments)     Severe muscle aches     Lisinopril      Omeprazole      Other reaction(s): *Unknown     Prilosec [Omeprazole]      Zestril [Ace Inhibitors] Cough     Atorvastatin Other (See Comments)     Muscle Pain     Irbesartan Cramps     Rosuvastatin Other (See Comments)     Muscle Pain         Social History:     Social History     Tobacco Use     Smoking status: Never Smoker     Smokeless tobacco: Never Used   Substance Use Topics     Alcohol use: No       History   Drug Use No           Family History:     The patient's family history is notable for .    Family History   Problem Relation Age of Onset     Cancer Mother         uterine cancer,      Respiratory Mother         COPD     Cardiovascular Mother         AAA  age 80     Breast Cancer Maternal Grandmother      Cancer Maternal Grandfather         cancer unknown type     Breast Cancer Sister      Eye Disorder Father         cataracts     Depression Father      Depression Son      Depression Son      Breast Cancer Sister         X2     Cancer - colorectal No family hx of         no ovarian cancer         Physical Exam:     PS 0  VS: RR14 P 88    General Appearance: healthy and alert, no distress     HEENT:  no thyromegaly, no palpable  nodules or masses        Cardiovascular: regular rate and rhythm, no gallops, rubs or murmurs     Respiratory: lungs clear, no rales, rhonchi or wheezes, normal diaphragmatic excursion    Musculoskeletal: extremities non tender and without edema    Skin: no lesions or rashes; incision with small area of erythema and induration at the superior edge.  No pus, no tenderness    Neurological: normal gait, no gross defects     Psychiatric: appropriate mood and affect                               Hematological: normal cervical, supraclavicular and inguinal lymph nodes     Gastrointestinal:       abdomen soft, non-tender, non-distended, she has an easily reducible hernia which is about 2-3 cm at the os.    Genitourinary: Normal post-hyst vaginal and rectal examination no evidence of cancer    Assessment:    Rosie Blackman is a woman with a new diagnosis stage I grade 1 OVCA     A total of 25 minutes was spent with the patient, 20 minutes of which were spent in counseling the patient and/or treatment planning.    Plan:     1.)   OVCA - She continues to do remarkably well overall. She has largely recovered from her back surgery and is generally well with the exception of her feet which are a lingering problem.  She is having these addressed soon    2.) Genetic risk factors were assessed and she is tested negative.    3.) Labs and/or tests ordered include:  CA-125 pending     4.) Health maintenance issues addressed today include: none - She is interested in FIT testing versus colonoscopy and I think she is a good candidate despite her age.    Thank you for allowing us to participate in the care of your patient.         Sincerely,    Felipe Corona MD    CC  Patient Care Team:  Kathya Escalera DO as PCP - General (Internal Medicine)  Mehran Howell MD as Assigned Musculoskeletal Provider  Aayush George MD as Assigned Surgical Provider

## 2021-10-15 ENCOUNTER — TRANSFERRED RECORDS (OUTPATIENT)
Dept: HEALTH INFORMATION MANAGEMENT | Facility: CLINIC | Age: 81
End: 2021-10-15

## 2021-11-01 ENCOUNTER — TRANSFERRED RECORDS (OUTPATIENT)
Dept: HEALTH INFORMATION MANAGEMENT | Facility: CLINIC | Age: 81
End: 2021-11-01
Payer: MEDICARE

## 2021-11-22 DIAGNOSIS — I10 ESSENTIAL HYPERTENSION, BENIGN: ICD-10-CM

## 2021-11-24 RX ORDER — VALSARTAN AND HYDROCHLOROTHIAZIDE 160; 12.5 MG/1; MG/1
TABLET, FILM COATED ORAL
Qty: 90 TABLET | Refills: 0 | Status: SHIPPED | OUTPATIENT
Start: 2021-11-24 | End: 2022-01-13 | Stop reason: DRUGHIGH

## 2021-12-04 ENCOUNTER — APPOINTMENT (OUTPATIENT)
Dept: CT IMAGING | Facility: CLINIC | Age: 81
End: 2021-12-04
Attending: EMERGENCY MEDICINE
Payer: MEDICARE

## 2021-12-04 ENCOUNTER — HOSPITAL ENCOUNTER (EMERGENCY)
Facility: CLINIC | Age: 81
Discharge: HOME | End: 2021-12-05
Attending: EMERGENCY MEDICINE | Admitting: EMERGENCY MEDICINE
Payer: MEDICARE

## 2021-12-04 DIAGNOSIS — G45.4 TRANSIENT GLOBAL AMNESIA: ICD-10-CM

## 2021-12-04 LAB
ANION GAP SERPL CALCULATED.3IONS-SCNC: 5 MMOL/L (ref 3–14)
APTT PPP: 26 SECONDS (ref 22–38)
BASOPHILS # BLD AUTO: 0.1 10E3/UL (ref 0–0.2)
BASOPHILS NFR BLD AUTO: 1 %
BUN SERPL-MCNC: 22 MG/DL (ref 7–30)
CALCIUM SERPL-MCNC: 9.6 MG/DL (ref 8.5–10.1)
CHLORIDE BLD-SCNC: 109 MMOL/L (ref 94–109)
CO2 SERPL-SCNC: 29 MMOL/L (ref 20–32)
CREAT SERPL-MCNC: 0.67 MG/DL (ref 0.52–1.04)
EOSINOPHIL # BLD AUTO: 0.2 10E3/UL (ref 0–0.7)
EOSINOPHIL NFR BLD AUTO: 3 %
ERYTHROCYTE [DISTWIDTH] IN BLOOD BY AUTOMATED COUNT: 13.2 % (ref 10–15)
GFR SERPL CREATININE-BSD FRML MDRD: 83 ML/MIN/1.73M2
GLUCOSE BLD-MCNC: 115 MG/DL (ref 70–99)
GLUCOSE BLDC GLUCOMTR-MCNC: 114 MG/DL (ref 70–99)
HCT VFR BLD AUTO: 47.4 % (ref 35–47)
HGB BLD-MCNC: 15 G/DL (ref 11.7–15.7)
IMM GRANULOCYTES # BLD: 0 10E3/UL
IMM GRANULOCYTES NFR BLD: 0 %
INR PPP: 1.02 (ref 0.85–1.15)
LYMPHOCYTES # BLD AUTO: 1.3 10E3/UL (ref 0.8–5.3)
LYMPHOCYTES NFR BLD AUTO: 25 %
MCH RBC QN AUTO: 30.3 PG (ref 26.5–33)
MCHC RBC AUTO-ENTMCNC: 31.6 G/DL (ref 31.5–36.5)
MCV RBC AUTO: 96 FL (ref 78–100)
MONOCYTES # BLD AUTO: 0.8 10E3/UL (ref 0–1.3)
MONOCYTES NFR BLD AUTO: 14 %
NEUTROPHILS # BLD AUTO: 3 10E3/UL (ref 1.6–8.3)
NEUTROPHILS NFR BLD AUTO: 57 %
NRBC # BLD AUTO: 0 10E3/UL
NRBC BLD AUTO-RTO: 0 /100
PLATELET # BLD AUTO: 219 10E3/UL (ref 150–450)
POTASSIUM BLD-SCNC: 3.9 MMOL/L (ref 3.4–5.3)
RBC # BLD AUTO: 4.95 10E6/UL (ref 3.8–5.2)
SODIUM SERPL-SCNC: 143 MMOL/L (ref 133–144)
TROPONIN I SERPL HS-MCNC: 10 NG/L
WBC # BLD AUTO: 5.4 10E3/UL (ref 4–11)

## 2021-12-04 PROCEDURE — 250N000011 HC RX IP 250 OP 636: Performed by: EMERGENCY MEDICINE

## 2021-12-04 PROCEDURE — 70450 CT HEAD/BRAIN W/O DYE: CPT | Mod: ME,XS

## 2021-12-04 PROCEDURE — 85730 THROMBOPLASTIN TIME PARTIAL: CPT | Mod: GZ | Performed by: EMERGENCY MEDICINE

## 2021-12-04 PROCEDURE — 83036 HEMOGLOBIN GLYCOSYLATED A1C: CPT | Performed by: EMERGENCY MEDICINE

## 2021-12-04 PROCEDURE — 80048 BASIC METABOLIC PNL TOTAL CA: CPT | Performed by: EMERGENCY MEDICINE

## 2021-12-04 PROCEDURE — 82465 ASSAY BLD/SERUM CHOLESTEROL: CPT | Performed by: EMERGENCY MEDICINE

## 2021-12-04 PROCEDURE — 96374 THER/PROPH/DIAG INJ IV PUSH: CPT | Mod: 59 | Performed by: EMERGENCY MEDICINE

## 2021-12-04 PROCEDURE — 84484 ASSAY OF TROPONIN QUANT: CPT | Performed by: EMERGENCY MEDICINE

## 2021-12-04 PROCEDURE — 99285 EMERGENCY DEPT VISIT HI MDM: CPT | Mod: 25 | Performed by: EMERGENCY MEDICINE

## 2021-12-04 PROCEDURE — 250N000009 HC RX 250: Performed by: EMERGENCY MEDICINE

## 2021-12-04 PROCEDURE — 36415 COLL VENOUS BLD VENIPUNCTURE: CPT | Performed by: EMERGENCY MEDICINE

## 2021-12-04 PROCEDURE — 93005 ELECTROCARDIOGRAM TRACING: CPT | Performed by: EMERGENCY MEDICINE

## 2021-12-04 PROCEDURE — 85025 COMPLETE CBC W/AUTO DIFF WBC: CPT | Performed by: EMERGENCY MEDICINE

## 2021-12-04 PROCEDURE — 70498 CT ANGIOGRAPHY NECK: CPT | Mod: MG

## 2021-12-04 PROCEDURE — 85610 PROTHROMBIN TIME: CPT | Mod: GZ | Performed by: EMERGENCY MEDICINE

## 2021-12-04 PROCEDURE — 93010 ELECTROCARDIOGRAM REPORT: CPT | Performed by: EMERGENCY MEDICINE

## 2021-12-04 RX ORDER — LABETALOL HYDROCHLORIDE 5 MG/ML
20 INJECTION, SOLUTION INTRAVENOUS ONCE
Status: COMPLETED | OUTPATIENT
Start: 2021-12-04 | End: 2021-12-04

## 2021-12-04 RX ORDER — IOPAMIDOL 755 MG/ML
70 INJECTION, SOLUTION INTRAVASCULAR ONCE
Status: COMPLETED | OUTPATIENT
Start: 2021-12-04 | End: 2021-12-04

## 2021-12-04 RX ADMIN — LABETALOL HYDROCHLORIDE 20 MG: 5 INJECTION, SOLUTION INTRAVENOUS at 23:13

## 2021-12-04 RX ADMIN — SODIUM CHLORIDE 100 ML: 9 INJECTION, SOLUTION INTRAVENOUS at 22:50

## 2021-12-04 RX ADMIN — IOPAMIDOL 70 ML: 755 INJECTION, SOLUTION INTRAVENOUS at 22:49

## 2021-12-04 ASSESSMENT — ENCOUNTER SYMPTOMS
FEVER: 0
SHORTNESS OF BREATH: 0
ABDOMINAL PAIN: 0

## 2021-12-05 ENCOUNTER — APPOINTMENT (OUTPATIENT)
Dept: MRI IMAGING | Facility: CLINIC | Age: 81
End: 2021-12-05
Attending: EMERGENCY MEDICINE
Payer: MEDICARE

## 2021-12-05 VITALS
DIASTOLIC BLOOD PRESSURE: 79 MMHG | TEMPERATURE: 97.8 F | BODY MASS INDEX: 34.87 KG/M2 | HEART RATE: 72 BPM | WEIGHT: 200 LBS | OXYGEN SATURATION: 99 % | SYSTOLIC BLOOD PRESSURE: 160 MMHG | RESPIRATION RATE: 18 BRPM

## 2021-12-05 LAB
CHOLEST SERPL-MCNC: 225 MG/DL
HBA1C MFR BLD: 5.8 % (ref 0–5.6)
HDLC SERPL-MCNC: 112 MG/DL
HOLD SPECIMEN: NORMAL
LDLC SERPL CALC-MCNC: 96 MG/DL
NONHDLC SERPL-MCNC: 113 MG/DL
TRIGL SERPL-MCNC: 85 MG/DL

## 2021-12-05 PROCEDURE — 250N000013 HC RX MED GY IP 250 OP 250 PS 637: Performed by: EMERGENCY MEDICINE

## 2021-12-05 PROCEDURE — G0426 INPT/ED TELECONSULT50: HCPCS | Performed by: PSYCHIATRY & NEUROLOGY

## 2021-12-05 PROCEDURE — 255N000002 HC RX 255 OP 636: Performed by: FAMILY MEDICINE

## 2021-12-05 PROCEDURE — 70553 MRI BRAIN STEM W/O & W/DYE: CPT | Mod: ME

## 2021-12-05 PROCEDURE — A9585 GADOBUTROL INJECTION: HCPCS | Performed by: FAMILY MEDICINE

## 2021-12-05 RX ORDER — GADOBUTROL 604.72 MG/ML
9 INJECTION INTRAVENOUS ONCE
Status: COMPLETED | OUTPATIENT
Start: 2021-12-05 | End: 2021-12-05

## 2021-12-05 RX ORDER — PRAVASTATIN SODIUM 20 MG
80 TABLET ORAL DAILY
Status: DISCONTINUED | OUTPATIENT
Start: 2021-12-05 | End: 2021-12-05 | Stop reason: HOSPADM

## 2021-12-05 RX ORDER — ASPIRIN 325 MG
325 TABLET ORAL ONCE
Status: COMPLETED | OUTPATIENT
Start: 2021-12-05 | End: 2021-12-05

## 2021-12-05 RX ADMIN — PRAVASTATIN SODIUM 80 MG: 20 TABLET ORAL at 08:42

## 2021-12-05 RX ADMIN — VALSARTAN: 160 TABLET, FILM COATED ORAL at 08:42

## 2021-12-05 RX ADMIN — GADOBUTROL 9 ML: 604.72 INJECTION INTRAVENOUS at 07:25

## 2021-12-05 RX ADMIN — ASPIRIN 325 MG ORAL TABLET 325 MG: 325 PILL ORAL at 00:54

## 2021-12-05 NOTE — ED PROVIDER NOTES
81-year-old female presented with a history of memory impairment.  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  Each time he would tell her anytime she would not remember.  She did not exhibit word finding difficulty.  She had no slurred speech.  She did not complain of weakness or numbness in the extremities.  She came to the emergency department where work-up was initiated.  Her blood pressure was elevated and she received a single dose of IV labetalol.  She received her usual home antihypertensive medications.  She has subsequently cleared her symptoms completely and feels back to normal.  She is now making new memories.  She is not having any headache or focal neurologic symptoms.  I assumed care at shift change awaiting results of MRI scan of the brain.    9:15 AM: MRI results reviewed.  Discussed with Dr. Anderson, and stroke neurology.  I believe the history is consistent with transient global amnesia.  He concurs.  He reports seeing evidence of restricted diffusion in the hippocampus consistent with this diagnosis.  He recommends against beginning aspirin therapy.  He would like the patient to follow-up for recheck in general neurology.  I communicated all this information to the patient and her .  They expressed understanding.  They are comfortable with discharge home.  I have asked them to return if the symptoms recur or new concerning symptoms develop including aphasia, slurred speech, headache, weakness or numbness in the extremities, vision loss, or other worrisome symptoms.  Otherwise they will follow-up in general neurology and I placed a referral order.    Final diagnoses:   Transient global amnesia          Presley Luke MD  12/05/21 8920

## 2021-12-05 NOTE — DISCHARGE INSTRUCTIONS
You will be called to schedule follow-up in general neurology.  You do not need to begin taking aspirin daily.  Be sure to keep taking your blood pressure medications as prescribed.  Return to the emergency department if the symptoms recur or you develop new concerning symptoms such as difficulty finding words to say, slurred speech, weakness or numbness in arm or leg, vision loss, headache, or other worrisome symptoms.

## 2021-12-05 NOTE — PROGRESS NOTES
"[facility]– [place]    Stroke Consult Note    Reason for Consult: Stroke Code     Chief Complaint: Altered Mental Status (sudden onset confusion 2130. Forgot date, time and what she was doing. Forgetful now. Denies headache. Denies vision changes. Denies pain. )      HPI  Rosie Blackman is a 81 year old female with HTN, HLD presenting with acute onset of confusion.  Symptom onset was stated to 845 this evening.  Patient had gotten up from the couch, and subsequently went to different area of the house where she was working with desk type activities, and was noted by her  to be tearing up checks, and subsequently he asked her what she was doing. She seemed confused as to what day it was, and what she was actually performing.   stated that patient was just sitting there tearing on multiple checks, and different papers.    Imaging Findings  CTH with no acute findings  CTA with no LVO    Thrombolytic Treatment   Not given due to minor/isolated/quickly resolving symptoms.    Endovascular Treatment  Not initiated due to absence of proximal vessel occlusion    Impression   Possible Transient ischemic attack vs malignant hypertension      Recommendations  - aspirin 325 mg x1 tonight. If MRI brain showed an infarct, will start asa 81 mg for secondary stroke prevention.   - Statin: cont pravastatin for now. Pending LDL  - MRI Brain with and without contrast vs outpatient follow up with Neurology  - 24-hour Telemetry  - Bedside Glucose Monitoring  - A1c, Lipid Panel    Patient Follow-up    - final recommendation pending work-up    Thank you for this consult. No further stroke evaluation is recommended, so we will sign off. Please contact us with any additional questions.    Arabella Nolan MD   Neurocritical Care  To page me or covering stroke neurology team member, click here: [email or web address]   Choose \"On Call\" tab at top, then search dropdown box for \"Neurology Adult\", select location, press " Enter, then look for stroke/neuro ICU/telestroke.    ______________________________________________________    Clinically Significant Risk Factors Present on Admission                   Past Medical History   Past Medical History:   Diagnosis Date     Chronic airway obstruction (H)      Gastroesophageal reflux disease      Hiatal hernia      Hypertension      Ovarian cancer, unspecified laterality (H) 3/10/2021     Personal history of contact with and (suspected) exposure to asbestos      Primary osteoarthritis of right hip 7/9/2020     Past Surgical History   Past Surgical History:   Procedure Laterality Date     BREAST BIOPSY, CORE RT/LT  1994     C ANESTH,KNEE VEINS SURGERY  08/20/2014     CHOLECYSTECTOMY  1995     COLONOSCOPY  05/2010    Diverticulosis, colon polyps; repeat 5 yrs     COLONOSCOPY N/A 11/16/2015    Procedure: COLONOSCOPY;  Surgeon: Alejandro Matos MD;  Location: [facility]     COLONOSCOPY N/A 9/17/2021    Procedure: COLONOSCOPY;  Surgeon: Aayush George MD;  Location: [facility]     Colonoscopy, hyperplastic polyps, repeat in 5 yrs  2004     ESOPHAGOSCOPY, GASTROSCOPY, DUODENOSCOPY (EGD), COMBINED  09/30/2013    Procedure: COMBINED ESOPHAGOSCOPY, GASTROSCOPY, DUODENOSCOPY (EGD), BIOPSY SINGLE OR MULTIPLE;  Gastroscopy;  Surgeon: Blaise Gill MD;  Location: [facility]     EYE SURGERY  2012    R/L Cataracts     HC REMOVE TONSILS/ADENOIDS,12+ Y/O      T & A 13+y.o.     HERNIORRHAPHY INCISIONAL (LOCATION) N/A 3/24/2021    Procedure: HERNIORRHAPHY, INCISIONAL, OPEN WITH MESH;  Surgeon: Aayush George MD;  Location: [facility]     HYSTERECTOMY, PAP NO LONGER INDICATED       LAPAROSCOPIC SALPINGO-OOPHORECTOMY N/A 07/24/2020    Procedure: Laparoscopy, removal or pelvic mass, both tubes and ovaries, omentectomy, anterior culvectomy, pelvic and para aortic lymph node dissection, laparotomy;  Surgeon: Felipe Corona MD;  Location: [facility]     MS ANESTH,LUMBAR SPINE,CORD SURGERY  10/2020    L3-4 L4-5  TRANSFORAMINAL INTERBODY FUSION L3-5, POSTERIOR INSTRUMENTED FUSION AND DECOMPRESSION     TVH for DUB, ovaries in       VASCULAR SURGERY      Taylor closure     Medications   Home Meds  Prior to Admission medications    Medication Sig Start Date End Date Taking? Authorizing Provider   Evening Primrose Oil 1000 MG CAPS One daily    Reported, Patient   HYDROcodone-acetaminophen (NORCO) 5-325 MG tablet Take 1 tablet by mouth every 4 hours as needed for moderate to severe pain 3/24/21   Aayush George MD   mometasone (ELOCON) 0.1 % external cream Apply sparingly to affected area twice daily for 2 weeks then decrease to 1 time daily for 30 days then decrease to 2-3 times per week 12/15/20   Kathya Escalera,    omega 3 1000 MG CAPS Take by mouth daily     Reported, Patient   pravastatin (PRAVACHOL) 80 MG tablet Take 1 tablet (80 mg) by mouth daily 12/15/20   Kathya Escalear DO   THERATEARS 0.25 % OP SOLN BID    Reported, Patient   valsartan-hydrochlorothiazide (DIOVAN HCT) 160-12.5 MG tablet TAKE 1 TABLET BY MOUTH EVERY DAY 21   Kathya Escalera,    VIACTIV 500-100-40 OR CHEW once daily    Reported, Patient       Scheduled Meds      Infusion Meds      PRN Meds      Allergies   Allergies   Allergen Reactions     Ezetimibe Other (See Comments)     Severe muscle aches     Lisinopril      Omeprazole      Other reaction(s): *Unknown     Prilosec [Omeprazole]      Zestril [Ace Inhibitors] Cough     Atorvastatin Other (See Comments)     Muscle Pain     Irbesartan Cramps     Rosuvastatin Other (See Comments)     Muscle Pain     Family History   Family History   Problem Relation Age of Onset     Cancer Mother         uterine cancer,      Respiratory Mother         COPD     Cardiovascular Mother         AAA  age 80     Breast Cancer Maternal Grandmother      Cancer Maternal Grandfather         cancer unknown type     Breast Cancer Sister      Eye Disorder Father         cataracts     Depression  Father      Depression Son      Depression Son      Breast Cancer Sister         X2     Cancer - colorectal No family hx of         no ovarian cancer     Social History   Social History     Tobacco Use     Smoking status: Never Smoker     Smokeless tobacco: Never Used   Substance Use Topics     Alcohol use: No     Drug use: No       Review of Systems   The 10 point Review of Systems is negative other than noted in the HPI or here.        PHYSICAL EXAMINATION  Pulse:  [80-89] 84  Resp:  [11-24] 17  BP: (151-194)/() 151/64  SpO2:  [73 %-98 %] 96 %     General Exam  General:  patient lying in bed without any acute distress    HEENT:  normocephalic/atraumatic  Cardio:  RRR   Pulmonary:  no respiratory distress      Neuro Exam  Mental Status:  alert, oriented x 3, follows commands, speech clear and fluent, naming and repetition normal  Cranial Nerves:  visual fields intact (tested by nurse), EOMI with normal smooth pursuit, facial sensation intact and symmetric (tested by nurse), facial movements symmetric, hearing not formally tested but intact to conversation, no dysarthria, shoulder shrug equal bilaterally, tongue protrusion midline  Motor:  no abnormal movements, able to move all limbs antigravity spontaneously with no signs of hemiparesis observed, no pronator drift   Reflexes:  unable to test (telestroke)  Sensory:  light touch sensation intact and symmetric throughout upper and lower extremities (assessed by nurse), no extinction on double simultaneous stimulation (assessed by nurse)  Coordination:  normal finger-to-nose and heel-to-shin bilaterally without dysmetria  Station/Gait:  unable to test due to telestroke    Dysphagia Screen  Per Nursing    Stroke Scales    NIHSS  Interval baseline (12/04/21 6187)   Interval Comments     1a. Level of Consciousness 0-->Alert, keenly responsive   1b. LOC Questions 0-->Answers both questions correctly   1c. LOC Commands 0-->Performs both tasks correctly   2.   Best  Gaze 0-->Normal   3.   Visual 0-->No visual loss   4.   Facial Palsy 0-->Normal symmetrical movements   5a. Motor Arm, Left 0-->No drift, limb holds 90 (or 45) degrees for full 10 secs   5b. Motor Arm, Right 0-->No drift, limb holds 90 (or 45) degrees for full 10 secs   6a. Motor Leg, Left 0-->No drift, leg holds 30 degree position for full 5 secs   6b. Motor Leg, right 0-->No drift, leg holds 30 degree position for full 5 secs   7.   Limb Ataxia 0-->Absent   8.   Sensory 0-->Normal, no sensory loss   9.   Best Language 0-->No aphasia, normal   10. Dysarthria 0-->Normal   11. Extinction and Inattention  0-->No abnormality   Total 0 (12/04/21 2337)       Modified Baca Score (Pre-morbid)  0-No deficits    Imaging  I personally reviewed all imaging; relevant findings per HPI.     Lab Results Data   CBC  Recent Labs   Lab 12/04/21  2244   WBC 5.4   RBC 4.95   HGB 15.0   HCT 47.4*        Basic Metabolic Panel    Recent Labs   Lab 12/04/21  2244 12/04/21  2243     --    POTASSIUM 3.9  --    CHLORIDE 109  --    CO2 29  --    BUN 22  --    CR 0.67  --    * 114*   ANISA 9.6  --      Liver Panel  No results for input(s): PROTTOTAL, ALBUMIN, BILITOTAL, ALKPHOS, AST, ALT, BILIDIRECT in the last 168 hours.  INR    Recent Labs   Lab Test 12/04/21  2245 10/05/20  1340   INR 1.02 1.07      Lipid Profile    Recent Labs   Lab Test 12/15/20  1515 12/02/19  0735 11/27/18  0820 10/19/16  0745 10/19/15  0754 04/14/14  0746   CHOL 215* 227* 214*   < > 194 200*    107 93   < > 88 84   LDL 82 109* 111*   < > 92 102   TRIG 131 55 49   < > 70 72   CHOLHDLRATIO  --   --   --   --  2.2 2.0    < > = values in this interval not displayed.     A1C    Recent Labs   Lab Test 12/15/20  1515 11/27/18  0820 10/13/14  0750   A1C 5.6 5.8* 5.7     Troponin I  No results for input(s): TROPONIN, GHTROP in the last 168 hours.       Stroke Code Data Data   Stroke Code Data  (for stroke code with tele)  Stroke code activated 12/04/21    2242   First stroke provider response 12/05/21   2247   Video start time 12/04/21   2333   Video end time 12/04/21   2350   Last known normal 12/04/21 2045   Time of discovery  (or onset of symptoms)  12/04/21 2045   Head CT read by Stroke Neuro Dr/Provider 12/04/21 2254   Was stroke code de-escalated? Yes 12/04/21 2241           Telestroke Service Details  Type of service telemedicine diagnostic assessment of acute neurological changes   Reason telemedicine is appropriate patient requires assessment with a specialist for diagnosis and treatment of neurological symptoms   Mode of transmission secure interactive audio and video communication per Alejandro   Originating site (patient location) [facility]    Distant site (provider location) Provider remote site       I have personally spent a total of 50 minutes providing care and consulting with this patient's medical providers today, with more than 50% of this time spent in consultation, coordination of care, and discussion with the patient and/or family regarding diagnostic results, prognosis, symptom management, risks and benefits of management options, and development of plan of care.

## 2021-12-05 NOTE — ED TRIAGE NOTES
sudden onset confusion 2130. Forgot date, time and what she was doing. Forgetful now. Denies headache. Denies vision changes. Denies pain. Vaccinated against covid.

## 2021-12-05 NOTE — ED PROVIDER NOTES
History     Chief Complaint   Patient presents with     Altered Mental Status     sudden onset confusion 2130. Forgot date, time and what she was doing. Forgetful now. Denies headache. Denies vision changes. Denies pain.      HPI  Rosie Blackman is a 81 year old female who presents to the emergency department with  for concerns regarding confusion.  Symptom onset was stated to 845 this evening.  Patient had gotten up from the couch, and subsequently went to different area of the house where she was working with desk type activities, and was noted by her  to be tearing up checks, and subsequently he asked her what she was doing.  She seemed confused as to what day it was, and what she was actually performing.   stated that patient was just sitting there tearing on multiple checks, and different papers.  Patient was unaware of what the reason was for performing this.  Patient subsequently asked multiple times, greater than 10 times what day it was.  She is normally alert, oriented, without significant mental status changes, and no history of dementia.    Patient denies any focal extremity weakness.  No severe headache.  Not on blood thinning medications.  No prior history of stroke.  No numbness or tingling.    Allergies:  Allergies   Allergen Reactions     Ezetimibe Other (See Comments)     Severe muscle aches     Lisinopril      Omeprazole      Other reaction(s): *Unknown     Prilosec [Omeprazole]      Zestril [Ace Inhibitors] Cough     Atorvastatin Other (See Comments)     Muscle Pain     Irbesartan Cramps     Rosuvastatin Other (See Comments)     Muscle Pain       Problem List:    Patient Active Problem List    Diagnosis Date Noted     Ovarian cancer, unspecified laterality (H) 03/10/2021     Priority: Medium     Complete prior to 1.4 LJ       Incisional hernia of anterior abdominal wall without obstruction or gangrene 02/22/2021     Priority: Medium     Added automatically from request  "for surgery [id number]       S/P exploratory laparotomy 07/24/2020     Priority: Medium     Tear of gluteus medius tendon 07/09/2020     Priority: Medium     Primary osteoarthritis of right hip 07/09/2020     Priority: Medium     Spinal stenosis of lumbar region, unspecified whether neurogenic claudication present 07/09/2020     Priority: Medium     Obesity (BMI 35.0-39.9) with comorbidity (H) 11/28/2018     Priority: Medium     Lichen sclerosus of female genitalia 07/03/2018     Priority: Medium     Gastroesophageal reflux disease, esophagitis presence not specified 10/12/2017     Priority: Medium     Hiatal hernia 10/21/2013     Priority: Medium     Large; found on gastroscopy 10/2013; No GERD sx; chronic throat clearing; + H pylori       Plantar fasciitis 04/02/2012     Priority: Medium     Advanced directives, counseling/discussion 12/08/2011     Priority: Medium     Advance Care Planning:   ACP Review and Resources Provided: Reviewed chart for advance care plan. Rosie Blackman has an up to date advance care plan on file. See additional documentation in Problem List and click on code status for document details. Confirmed/documented designated decision maker(s). Added by Shima Cruz on 12/08/2011         HYPERLIPIDEMIA LDL GOAL <130 10/31/2010     Priority: Medium     Prediabetes 09/03/2010     Priority: Medium     Blood sugar 122 9/10 working on; has already been to nutrition, weight and wellness and is reading \"healthy for life\"       Diverticulosis of colon 05/24/2010     Priority: Medium     Noted on colonoscopy 5/10       Colon polyps 05/24/2010     Priority: Medium     Pes planus      Priority: Medium     Problem list name updated by automated process. Provider to review       Personal history of contact with and (suspected) exposure to asbestos      Priority: Medium             exposed 12 years in childhood       Donor of other blood      Priority: Medium           has false + syphyllis  Problem list name " updated by automated process. Provider to review       Irritable bowel syndrome      Priority: Medium     ALLERGIC RHINITIS       Priority: Medium     Resolving with dietary changes; stopping gluten       Need for prophylactic hormone replacement therapy (postmenopausal)      Priority: Medium     Weaned down to Premarin 0.3 mg. Cannot DC at this point. 4/07       UTI (urinary tract infection)      Priority: Medium             recurrent  Problem list name updated by automated process. Provider to review       Essential hypertension      Priority: Medium        Past Medical History:    Past Medical History:   Diagnosis Date     Chronic airway obstruction (H)      Gastroesophageal reflux disease      Hiatal hernia      Hypertension      Ovarian cancer, unspecified laterality (H) 3/10/2021     Personal history of contact with and (suspected) exposure to asbestos      Primary osteoarthritis of right hip 7/9/2020       Past Surgical History:    Past Surgical History:   Procedure Laterality Date     BREAST BIOPSY, CORE RT/LT  1994     C ANESTH,KNEE VEINS SURGERY  08/20/2014     CHOLECYSTECTOMY  1995     COLONOSCOPY  05/2010    Diverticulosis, colon polyps; repeat 5 yrs     COLONOSCOPY N/A 11/16/2015    Procedure: COLONOSCOPY;  Surgeon: Alejandro Matos MD;  Location: [facility]     COLONOSCOPY N/A 9/17/2021    Procedure: COLONOSCOPY;  Surgeon: Aayush George MD;  Location: [facility]     Colonoscopy, hyperplastic polyps, repeat in 5 yrs  2004     ESOPHAGOSCOPY, GASTROSCOPY, DUODENOSCOPY (EGD), COMBINED  09/30/2013    Procedure: COMBINED ESOPHAGOSCOPY, GASTROSCOPY, DUODENOSCOPY (EGD), BIOPSY SINGLE OR MULTIPLE;  Gastroscopy;  Surgeon: Blasie Gill MD;  Location: [facility]     EYE SURGERY  2012    R/L Cataracts     HC REMOVE TONSILS/ADENOIDS,13+ Y/O      T & A 13+y.o.     HERNIORRHAPHY INCISIONAL (LOCATION) N/A 3/24/2021    Procedure: HERNIORRHAPHY, INCISIONAL, OPEN WITH MESH;  Surgeon: Aayush George MD;  Location: [facility]      HYSTERECTOMY, PAP NO LONGER INDICATED       LAPAROSCOPIC SALPINGO-OOPHORECTOMY N/A 2020    Procedure: Laparoscopy, removal or pelvic mass, both tubes and ovaries, omentectomy, anterior culvectomy, pelvic and para aortic lymph node dissection, laparotomy;  Surgeon: Felipe Corona MD;  Location: [facility]     NE ANESTH,LUMBAR SPINE,CORD SURGERY  10/2020    L3-4 L4-5 TRANSFORAMINAL INTERBODY FUSION L3-5, POSTERIOR INSTRUMENTED FUSION AND DECOMPRESSION     TVH for DUB, ovaries in       VASCULAR SURGERY  2014    Taylor closure       Family History:    Family History   Problem Relation Age of Onset     Cancer Mother         uterine cancer,      Respiratory Mother         COPD     Cardiovascular Mother         AAA  age 80     Breast Cancer Maternal Grandmother      Cancer Maternal Grandfather         cancer unknown type     Breast Cancer Sister      Eye Disorder Father         cataracts     Depression Father      Depression Son      Depression Son      Breast Cancer Sister         X2     Cancer - colorectal No family hx of         no ovarian cancer       Social History:  Marital Status:   [2]  Social History     Tobacco Use     Smoking status: Never Smoker     Smokeless tobacco: Never Used   Substance Use Topics     Alcohol use: No     Drug use: No        Medications:    Evening Primrose Oil 1000 MG CAPS  HYDROcodone-acetaminophen (NORCO) 5-325 MG tablet  mometasone (ELOCON) 0.1 % external cream  omega 3 1000 MG CAPS  pravastatin (PRAVACHOL) 80 MG tablet  THERATEARS 0.25 % OP SOLN  valsartan-hydrochlorothiazide (DIOVAN HCT) 160-12.5 MG tablet  VIACTIV 500-100-40 OR CHEW          Review of Systems   Constitutional: Negative for fever.   Respiratory: Negative for shortness of breath.    Cardiovascular: Negative for chest pain.   Gastrointestinal: Negative for abdominal pain.   All other systems reviewed and are negative.      Physical Exam   BP: (!) 194/102  Pulse: 80  Temp: 97.8  F (36.6  C)  Resp:  16  Weight: 90.7 kg (200 lb)  SpO2: 92 %      Physical Exam  BP (!) 156/67   Pulse 80   Temp 97.8  F (36.6  C)   Resp 18   Wt 90.7 kg (200 lb)   SpO2 93%   BMI 34.87 kg/m    General: alert, interactive, in no apparent distress  Head: atraumatic  Nose: no rhinorrhea or epistaxis  Ears: no external auditory canal discharge or bleeding.    Eyes: Sclera nonicteric. Conjunctiva noninjected. PERRL, EOMI  Mouth: no tonsillar erythema, edema, or exudate  Neck: supple, no palp LAD  Lungs: CTAB  CV: RRR, S1/S2; peripheral pulses palpable and symmetric  Abdomen: soft, nt, nd, no guarding or rebound. Positive bowel sounds  Extremities: no cyanosis or edema  Skin: no rash or diaphoresis  Neuro: CN II-XII grossly intact, strength 5/5 in UE and LEs bilaterally, sensation intact to light touch in UE and LEs bilaterally; does have confusion, and is oriented to person, and place.  Oriented to year, and month      ED Course                 Procedures         EKG, reviewed by myself shows normal sinus rhythm.  Rate 83 bpm.  Normal intervals.  No acute ischemic appearing changes.  No ectopy.    Critical Care time:  none     The patient has stroke symptoms:         ED Stroke specific documentation           NIHSS PDF     Patient last known well time: 845pm  ED Provider first to bedside at: arrival  CT Results received at: 2306    Thrombolytics:   Not given due to minor/isolated/quickly resolving symptoms.    If treating with thrombolytics: Ensure SBP<180 and DBP<105 prior to treatment with thrombolytics.  Administering thrombolytics after treatment with IV labetalol, hydralazine, or nicardipine is reasonable once BP control is established.    Endovascular Retrieval:  Not initiated due to absence of proximal vessel occlusion    National [employer] Stroke Scale (Baseline)  Time Performed:      Score    Level of consciousness: (0)   Alert, keenly responsive    LOC questions: (0)   Answers both questions correctly    LOC  commands: (0)   Performs both tasks correctly    Best gaze: (0)   Normal    Visual: (0)   No visual loss    Facial palsy: (0)   Normal symmetrical movements    Motor arm (left): (0)   No drift    Motor arm (right): (0)   No drift    Motor leg (left): (0)   No drift    Motor leg (right): (0)   No drift    Limb ataxia: (0)   Absent    Sensory: (0)   Normal- no sensory loss    Best language: (0)   Normal- no aphasia    Dysarthria: (0)   Normal    Extinction and inattention: (0)   No abnormality        Total Score:  0        Stroke Mimics were considered (including migraine headache, seizure disorder, hypoglycemia (or hyperglycemia), head or spinal trauma, CNS infection, Toxin ingestion and shock state (e.g. sepsis) .                    Results for orders placed or performed during the hospital encounter of 12/04/21 (from the past 24 hour(s))   Glucose by meter   Result Value Ref Range    GLUCOSE BY METER POCT 114 (H) 70 - 99 mg/dL   [place] Draw    Narrative    The following orders were created for panel order [place] Draw.  Procedure                               Abnormality         Status                     ---------                               -----------         ------                     Extra Blue Top Tube[[id number]]                              Final result               Extra Red Top Tube[[id number]]                               Final result               Extra Green Top (Lithium...[[id number]]                      Final result               Extra Purple Top Tube[[id number]]                            Final result                 Please view results for these tests on the individual orders.   Extra Red Top Tube   Result Value Ref Range    Hold Specimen JIC    Extra Green Top (Lithium Heparin) Tube   Result Value Ref Range    Hold Specimen JIC    Extra Purple Top Tube   Result Value Ref Range    Hold Specimen JIC    CBC with Platelets & Differential    Narrative    The following orders were created for panel  order CBC with Platelets & Differential.  Procedure                               Abnormality         Status                     ---------                               -----------         ------                     CBC with platelets and d...[[id number]]  Abnormal            Final result                 Please view results for these tests on the individual orders.   Basic metabolic panel   Result Value Ref Range    Sodium 143 133 - 144 mmol/L    Potassium 3.9 3.4 - 5.3 mmol/L    Chloride 109 94 - 109 mmol/L    Carbon Dioxide (CO2) 29 20 - 32 mmol/L    Anion Gap 5 3 - 14 mmol/L    Urea Nitrogen 22 7 - 30 mg/dL    Creatinine 0.67 0.52 - 1.04 mg/dL    Calcium 9.6 8.5 - 10.1 mg/dL    Glucose 115 (H) 70 - 99 mg/dL    GFR Estimate 83 >60 mL/min/1.73m2   Troponin I   Result Value Ref Range    Troponin I High Sensitivity 10 <54 ng/L   CBC with platelets and differential   Result Value Ref Range    WBC Count 5.4 4.0 - 11.0 10e3/uL    RBC Count 4.95 3.80 - 5.20 10e6/uL    Hemoglobin 15.0 11.7 - 15.7 g/dL    Hematocrit 47.4 (H) 35.0 - 47.0 %    MCV 96 78 - 100 fL    MCH 30.3 26.5 - 33.0 pg    MCHC 31.6 31.5 - 36.5 g/dL    RDW 13.2 10.0 - 15.0 %    Platelet Count 219 150 - 450 10e3/uL    % Neutrophils 57 %    % Lymphocytes 25 %    % Monocytes 14 %    % Eosinophils 3 %    % Basophils 1 %    % Immature Granulocytes 0 %    NRBCs per 100 WBC 0 <1 /100    Absolute Neutrophils 3.0 1.6 - 8.3 10e3/uL    Absolute Lymphocytes 1.3 0.8 - 5.3 10e3/uL    Absolute Monocytes 0.8 0.0 - 1.3 10e3/uL    Absolute Eosinophils 0.2 0.0 - 0.7 10e3/uL    Absolute Basophils 0.1 0.0 - 0.2 10e3/uL    Absolute Immature Granulocytes 0.0 <=0.4 10e3/uL    Absolute NRBCs 0.0 10e3/uL   Hemaglobin A1C   Result Value Ref Range    Hemoglobin A1C 5.8 (H) 0.0 - 5.6 %   Extra Blue Top Tube   Result Value Ref Range    Hold Specimen JIC    INR   Result Value Ref Range    INR 1.02 0.85 - 1.15   Partial thromboplastin time   Result Value Ref Range    aPTT 26 22 - 38  Seconds   CT Head w/o Contrast    Narrative    EXAM: CT HEAD W/O CONTRAST, CTA  HEAD NECK WITH CONTRAST  LOCATION: [facility]  DATE/TIME: 12/4/2021 10:50 PM    INDICATION: Mental status change, unknown cause, acute onset of confusion.  COMPARISON: None.  CONTRAST: 70 mL Isovue 370  TECHNIQUE: Head and neck CT angiogram with IV contrast. Noncontrast head CT followed by axial helical CT images of the head and neck vessels obtained during the arterial phase of intravenous contrast administration. Axial 2D reconstructed images and   multiplanar 3D MIP reconstructed images of the head and neck vessels were performed by the technologist. Dose reduction techniques were used. All stenosis measurements made according to NASCET criteria unless otherwise specified.    FINDINGS:   NONCONTRAST HEAD CT:   INTRACRANIAL CONTENTS: No intracranial hemorrhage, extraaxial collection, or mass effect. No CT evidence of acute infarct. Mild volume loss and presumed chronic small vessel ischemia.    VISUALIZED ORBITS/SINUSES/MASTOIDS: No intraorbital abnormality. No paranasal sinus mucosal disease. No middle ear or mastoid effusion.    BONES/SOFT TISSUES: No acute abnormality.    HEAD CTA:  ANTERIOR CIRCULATION: No stenosis/occlusion, aneurysm, or high flow vascular malformation.    POSTERIOR CIRCULATION: No stenosis/occlusion, aneurysm, or high flow vascular malformation. Dominant left and smaller right vertebral arteries are widely patent and supply a normal caliber basilar artery.    DURAL VENOUS SINUSES: Expected enhancement of the major dural venous sinuses.    NECK CTA:  RIGHT CAROTID: No measurable stenosis or dissection.    LEFT CAROTID: No measurable stenosis or dissection.    VERTEBRAL ARTERIES: Patchy and segmental areas of mild narrowing of both vertebral arteries throughout their cervical courses are favored to be atheromatous in nature.     AORTIC ARCH: Classic aortic arch anatomy with no  significant stenosis at the origin of the great vessels.    NONVASCULAR STRUCTURES: Unremarkable.      Impression    IMPRESSION:   HEAD CT:  1.  No acute intracranial abnormality.    2.  Mild age-related change.     HEAD CTA:   1.  Normal CTA [place].    NECK CTA:  1.  Mild scattered atheromatous disease without high-grade stenosis of the carotid or vertebral arteries or dissection.    CT head findings discussed with Dr. Foote at 11:06 PM and CTA findings discussed with Dr. Owen at 11:19 PM 12/4/2021.   CTA Head Neck with Contrast    Narrative    EXAM: CT HEAD W/O CONTRAST, CTA  HEAD NECK WITH CONTRAST  LOCATION: [facility]  DATE/TIME: 12/4/2021 10:50 PM    INDICATION: Mental status change, unknown cause, acute onset of confusion.  COMPARISON: None.  CONTRAST: 70 mL Isovue 370  TECHNIQUE: Head and neck CT angiogram with IV contrast. Noncontrast head CT followed by axial helical CT images of the head and neck vessels obtained during the arterial phase of intravenous contrast administration. Axial 2D reconstructed images and   multiplanar 3D MIP reconstructed images of the head and neck vessels were performed by the technologist. Dose reduction techniques were used. All stenosis measurements made according to NASCET criteria unless otherwise specified.    FINDINGS:   NONCONTRAST HEAD CT:   INTRACRANIAL CONTENTS: No intracranial hemorrhage, extraaxial collection, or mass effect. No CT evidence of acute infarct. Mild volume loss and presumed chronic small vessel ischemia.    VISUALIZED ORBITS/SINUSES/MASTOIDS: No intraorbital abnormality. No paranasal sinus mucosal disease. No middle ear or mastoid effusion.    BONES/SOFT TISSUES: No acute abnormality.    HEAD CTA:  ANTERIOR CIRCULATION: No stenosis/occlusion, aneurysm, or high flow vascular malformation.    POSTERIOR CIRCULATION: No stenosis/occlusion, aneurysm, or high flow vascular malformation. Dominant left and smaller right  vertebral arteries are widely patent and supply a normal caliber basilar artery.    DURAL VENOUS SINUSES: Expected enhancement of the major dural venous sinuses.    NECK CTA:  RIGHT CAROTID: No measurable stenosis or dissection.    LEFT CAROTID: No measurable stenosis or dissection.    VERTEBRAL ARTERIES: Patchy and segmental areas of mild narrowing of both vertebral arteries throughout their cervical courses are favored to be atheromatous in nature.     AORTIC ARCH: Classic aortic arch anatomy with no significant stenosis at the origin of the great vessels.    NONVASCULAR STRUCTURES: Unremarkable.      Impression    IMPRESSION:   HEAD CT:  1.  No acute intracranial abnormality.    2.  Mild age-related change.     HEAD CTA:   1.  Normal CTA [place].    NECK CTA:  1.  Mild scattered atheromatous disease without high-grade stenosis of the carotid or vertebral arteries or dissection.    CT head findings discussed with Dr. Foote at 11:06 PM and CTA findings discussed with Dr. Owen at 11:19 PM 12/4/2021.       Medications   iopamidol (ISOVUE-370) solution 70 mL (70 mLs Intravenous Given 12/4/21 8689)   sodium chloride 0.9 % bag 500mL for CT scan flush use (100 mLs Intravenous Given 12/4/21 2250)   labetalol (NORMODYNE/TRANDATE) injection 20 mg (20 mg Intravenous Given 12/4/21 2313)   aspirin (ASA) tablet 325 mg (325 mg Oral Given 12/5/21 0054)       Assessments & Plan (with Medical Decision Making)  81 year old female presenting to the emergency department with  for episode of confusion.  This began this evening around 8:45 PM.  Patient had gone into a different room, and he she was noted to be tearing up checks, and tearing up papers, asking repetitive questions, especially with regards to what day it was today.  Patient is oriented to month, and year.  She does not recall the events of the evening.  Denies any recent infectious symptoms.  Has otherwise been healthy recently.  No prior history of  stroke.    Tier 1 stroke code called upon arrival for patient's altered mental status.  She has otherwise nonfocal neurologic exam.  Does arrive significantly hypertensive with blood pressures in the 190s.  Differential does include stroke, TIA, hypertensive urgency/emergency.    CT scan of the head with CT angiogram showing no significant acute abnormalities.  I had discussion with stroke neurologist.  I reviewed note from stroke neurology.  Recommendations are for MRI.  Consideration for stroke versus hypertensive urgency.    Patient has received 325 mg aspirin.  Will obtain MRI when available on Sunday morning/afternoon.  Patient has remained otherwise hemodynamically stable, with otherwise nonfocal neurologic exam with slight confusion which is noted.  Patient will be signed out at 6 AM pending MRI to be performed.  May require discussion with stroke neurology depending on MRI results.  Lipid panel is also pending.     I have reviewed the nursing notes.    I have reviewed the findings, diagnosis, plan and need for follow up with the patient.       New Prescriptions    No medications on file       Final diagnoses:   Confusion   Benign essential hypertension       12/4/2021   [facility] EMERGENCY DEPT     Aayush Foote MD  12/05/21 0427

## 2021-12-05 NOTE — CONSULTS
"    [facility]    Stroke Telephone Note    I was called by  on 12/05/21 regarding patient Rosie Blackman. 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 Please see earlier note by Dr Nolan    Follow up MRI reported as negative. 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 These findings are commonly seen in patients with TGA    Lam C, Keegan F, Bradly P, Shameka S, Aimee R, Bety F, Juve F. Transient global amnesia: diffusion-weighted imaging lesions and cerebrovascular disease. Stroke. 2008 Aug;39(8):2219-25. doi: 10.1161/[id number]. Epub 2008 Jun 26. PMID: [id number].    Lilia DE LA ROSA, Mary C, Arianne JI, Yandel R, Bryan M, Karlene A, Kay M, Charity A. Diffusion-weighted MRI in transient global amnesia and its diagnostic implications. Neurology. 2020 Jul 14;95(2):h928-k493. doi: 10.1212/WNL.[id number]. Epub 2020 Jun 12. PMID: [id number].        Impression  TGA    No need to continue aspirin from stroke stand point  Follow up with general neurology as outpatient    My recommendations are based on the information provided over the phone by Rosie Blackman's in-person providers. They are not intended to replace the clinical judgment of her in-person providers. I was not requested to personally see or examine the patient at this time.    Krishan Anderson MD  Vascular Neurology  To page me or covering stroke neurology team member, click here: [facility]COM   Choose \"On Call\" tab at top, then search dropdown box for \"Neurology Adult\", select location, press Enter, then look for stroke/neuro ICU/telestroke.           "
calm

## 2022-01-04 ENCOUNTER — ONCOLOGY VISIT (OUTPATIENT)
Dept: ONCOLOGY | Facility: CLINIC | Age: 82
End: 2022-01-04
Attending: NURSE PRACTITIONER
Payer: MEDICARE

## 2022-01-04 ENCOUNTER — LAB (OUTPATIENT)
Dept: LAB | Facility: CLINIC | Age: 82
End: 2022-01-04
Attending: NURSE PRACTITIONER
Payer: MEDICARE

## 2022-01-04 VITALS
HEART RATE: 71 BPM | BODY MASS INDEX: 36.39 KG/M2 | OXYGEN SATURATION: 100 % | DIASTOLIC BLOOD PRESSURE: 83 MMHG | WEIGHT: 208.7 LBS | TEMPERATURE: 97.5 F | SYSTOLIC BLOOD PRESSURE: 146 MMHG

## 2022-01-04 DIAGNOSIS — C56.9 OVARIAN CANCER, UNSPECIFIED LATERALITY (H): ICD-10-CM

## 2022-01-04 DIAGNOSIS — Z85.43 ENCOUNTER FOR FOLLOW-UP SURVEILLANCE OF OVARIAN CANCER: ICD-10-CM

## 2022-01-04 DIAGNOSIS — Z08 ENCOUNTER FOR FOLLOW-UP SURVEILLANCE OF OVARIAN CANCER: ICD-10-CM

## 2022-01-04 DIAGNOSIS — C56.9 OVARIAN CANCER, UNSPECIFIED LATERALITY (H): Primary | ICD-10-CM

## 2022-01-04 LAB — CANCER AG125 SERPL-ACNC: 6 U/ML (ref 0–30)

## 2022-01-04 PROCEDURE — G0463 HOSPITAL OUTPT CLINIC VISIT: HCPCS

## 2022-01-04 PROCEDURE — 99215 OFFICE O/P EST HI 40 MIN: CPT | Performed by: NURSE PRACTITIONER

## 2022-01-04 PROCEDURE — 86304 IMMUNOASSAY TUMOR CA 125: CPT

## 2022-01-04 PROCEDURE — 36415 COLL VENOUS BLD VENIPUNCTURE: CPT

## 2022-01-04 RX ORDER — MELOXICAM 15 MG/1
15 TABLET ORAL DAILY
COMMUNITY
Start: 2021-11-22 | End: 2022-08-18

## 2022-01-04 ASSESSMENT — PAIN SCALES - GENERAL: PAINLEVEL: NO PAIN (0)

## 2022-01-04 NOTE — NURSING NOTE
Chief Complaint   Patient presents with     Blood Draw     Labs drawn via  by RN in lab.      Kerry Grimes RN

## 2022-01-04 NOTE — LETTER
2022         RE: Rosie Blackman  [id number] [facility] [id number]        Dear Colleague,    Thank you for referring your patient, Rosie Blackman, to the [facility]. Please see a copy of my visit note below.    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  She is s/p BSO, PPLND 2020 which served as her treatment.  She is here today for a surveillance visit.      Oncology History:  She originally presented with a complaint of hip pain.  The work-up of this included an MRI which has demonstrated a large pelvic mass, her  was elevated (108, CEA 19-9 were normal).     2020: Exploratory laparoscopy followed by laparotomy, bilateral salpingo-oophorectomy, omentectomy, pelvic and periaortic lymph node dissection, anterior culdectomy.    FINAL DIAGNOSIS:   A. OVARIES, LEFT AND RIGHT, BILATERAL OOPHORECTOMY:   - Right ovary with ENDOMETRIOID ADENOCARCINOMA, FIGO grade 1   - Left ovary with benign inclusion cysts, negative for malignancy   - Unremarkable bilateral fallopian tubes, negative for malignancy     B. MESENTERY, BIOPSY:   - Fibrovascular adhesions, negative for malignancy     C. OMENTUM, OMENTECTOMY:   - Adipose tissue, negative for malignancy     D. ANTERIOR CUL-DE-SAC, BIOPSY:   - Fibroadipose tissue with foci of endometriosis   - Negative for malignancy     E. POSTERIOR CUL-DE-SAC, BIOPSY:   - Fibrovascular tissue, negative for malignancy     F. LYMPH NODE, LEFT PELVIC, EXCISION:   - Eleven reactive lymph nodes, negative for malignancy (0/11)     G. LYMPH NODE, RIGHT PELVIC, EXCISION:   - Nine reactive lymph nodes, negative for malignancy (0/9)     H. LYMPH NODE, RIGHT PARA AORTIC, EXCISION:   - Three  reactive lymph nodes, negative for malignancy (0/3)     11/9/2020:  20.  2/1/21:  8.  5/17/21:  7.  9/20/21:  <5.  1/4/21:  pending.                      Today she reports feeling well overall and is without concern.  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  She has been having significant hot flashes since she went through menopause many years ago.  She recently started evening primrose and now her hot flashes are significantly decreased.  She has about at night and 3 during the day.  She is very pleased with this response.  She does check her BP at home and usually this runs 130s/70s.  She denies any vaginal bleeding, no changes in her bowel or bladder habits, no nausea/emesis, no lower extremity edema, and no difficulties eating or sleeping. She denies any abdominal discomfort/bloating, no fevers or chills, and no chest pain or shortness of breath.  She is scheduled for her annual physical this month, is current with her colon cancer screening, due for her mammogram, and she is fully vaccinated against COVID.              Health Maintenance  Colonoscopy: 11/16/15, repeat in 10 years  Mammogram: 12/29/2020   Annual physical: 12/15/2020, scheduled 1/13/22  COVID vaccine: 2/4/21, 2/25/21, 9/30/21 [employer]      Review of Systems:    Systemic           no weight changes; no fever; no chills; no night sweats; no appetite changes  Skin           no rashes, or lesions  Eye           no irritation; no changes in vision  Isabelle-Laryngeal           no dysphagia; no hoarseness   Pulmonary    no cough; no shortness of breath  Cardiovascular    no chest pain; no palpitations  Gastrointestinal    no diarrhea; no constipation; no abdominal pain; no changes in bowel habits; no blood in stool  Genitourinary   no urinary frequency; no urinary urgency; no dysuria; no pain; no abnormal vaginal  discharge; no abnormal vaginal bleeding  Breast   no breast discharge; no breast changes; no breast pain  Musculoskeletal    no myalgias; no arthralgias; no back pain  Psychiatric           no depressed mood; no anxiety    Hematologic   no tender lymph nodes; no noticeable swellings or lumps   Endocrine    no hot flashes; no heat/cold intolerance         Neurological   no tremor; no numbness and tingling; no headaches; no difficulty sleeping      Past Medical History:    Past Medical History:   Diagnosis Date     Chronic airway obstruction (H)      Gastroesophageal reflux disease      Hiatal hernia      Hypertension      Ovarian cancer, unspecified laterality (H) 3/10/2021     Personal history of contact with and (suspected) exposure to asbestos      Primary osteoarthritis of right hip 7/9/2020         Past Surgical History:    Past Surgical History:   Procedure Laterality Date     BREAST BIOPSY, CORE RT/LT  1994     CHOLECYSTECTOMY  1995     COLONOSCOPY  05/2010    Diverticulosis, colon polyps; repeat 5 yrs     COLONOSCOPY N/A 11/16/2015    Procedure: COLONOSCOPY;  Surgeon: Alejandro Matos MD;  Location: [facility]     COLONOSCOPY N/A 9/17/2021    Procedure: COLONOSCOPY;  Surgeon: Aayush George MD;  Location: [facility]     Colonoscopy, hyperplastic polyps, repeat in 5 yrs  2004     ESOPHAGOSCOPY, GASTROSCOPY, DUODENOSCOPY (EGD), COMBINED  09/30/2013    Procedure: COMBINED ESOPHAGOSCOPY, GASTROSCOPY, DUODENOSCOPY (EGD), BIOPSY SINGLE OR MULTIPLE;  Gastroscopy;  Surgeon: Blaise Gill MD;  Location: [facility]     EYE SURGERY  2012    R/L Cataracts     HC REMOVE TONSILS/ADENOIDS,13+ Y/O      T & A 13+y.o.     HERNIORRHAPHY INCISIONAL (LOCATION) N/A 3/24/2021    Procedure: HERNIORRHAPHY, INCISIONAL, OPEN WITH MESH;  Surgeon: Aayush George MD;  Location: [facility]     HYSTERECTOMY, PAP NO LONGER INDICATED       LAPAROSCOPIC SALPINGO-OOPHORECTOMY N/A 07/24/2020    Procedure: Laparoscopy, removal or pelvic mass, both tubes and  ovaries, omentectomy, anterior culvectomy, pelvic and para aortic lymph node dissection, laparotomy;  Surgeon: Felipe Corona MD;  Location: [facility]     SD ANESTH,LUMBAR SPINE,CORD SURGERY  10/2020    L3-4 L4-5 TRANSFORAMINAL INTERBODY FUSION L3-5, POSTERIOR INSTRUMENTED FUSION AND DECOMPRESSION     TVH for DUB, ovaries in       VASCULAR SURGERY  2014    Taylor closure     ZZC ANESTH,KNEE VEINS SURGERY  08/20/2014         Health Maintenance Due   Topic Date Due     FALL RISK ASSESSMENT  12/05/2020     PHQ-2  01/01/2022     MEDICARE ANNUAL WELLNESS VISIT  12/15/2021     MAMMO SCREENING  12/29/2021       Current Medications:     Current Outpatient Medications   Medication Sig Dispense Refill     Evening Primrose Oil 1000 MG CAPS One daily       meloxicam (MOBIC) 15 MG tablet        mometasone (ELOCON) 0.1 % external cream Apply sparingly to affected area twice daily for 2 weeks then decrease to 1 time daily for 30 days then decrease to 2-3 times per week 45 g 11     omega 3 1000 MG CAPS Take by mouth daily        pravastatin (PRAVACHOL) 80 MG tablet Take 1 tablet (80 mg) by mouth daily 90 tablet 3     THERATEARS 0.25 % OP SOLN BID       valsartan-hydrochlorothiazide (DIOVAN HCT) 160-12.5 MG tablet TAKE 1 TABLET BY MOUTH EVERY DAY 90 tablet 0     VIACTIV 500-100-40 OR CHEW once daily           Allergies:        Allergies   Allergen Reactions     Ezetimibe Other (See Comments)     Severe muscle aches     Lisinopril      Omeprazole      Other reaction(s): *Unknown     Prilosec [Omeprazole]      Zestril [Ace Inhibitors] Cough     Atorvastatin Other (See Comments)     Muscle Pain     Irbesartan Cramps     Rosuvastatin Other (See Comments)     Muscle Pain        Social History:     Social History     Tobacco Use     Smoking status: Never Smoker     Smokeless tobacco: Never Used   Substance Use Topics     Alcohol use: No       History   Drug Use No         Family History:     The patient's family history is notable  for:    Family History   Problem Relation Age of Onset     Cancer Mother         uterine cancer,      Respiratory Mother         COPD     Cardiovascular Mother         AAA  age 80     Breast Cancer Maternal Grandmother      Cancer Maternal Grandfather         cancer unknown type     Breast Cancer Sister      Eye Disorder Father         cataracts     Depression Father      Depression Son      Depression Son      Breast Cancer Sister         X2     Cancer - colorectal No family hx of         no ovarian cancer         Physical Exam:     BP (!) 146/83   Pulse 71   Temp 97.5  F (36.4  C) (Oral)   Wt 94.7 kg (208 lb 11.2 oz)   SpO2 100%   BMI 36.39 kg/m    Body mass index is 36.39 kg/m .    General Appearance: healthy and alert, no distress     HEENT: no palpable nodules or masses        Cardiovascular: regular rate and rhythm, no gallops, rubs or murmurs     Respiratory: lungs clear, no rales, rhonchi or wheezes    Musculoskeletal: extremities non tender and without edema    Skin: no lesions or rashes     Neurological: normal gait, no gross defects     Psychiatric: appropriate mood and affect                               Hematological: normal cervical, supraclavicular and inguinal lymph nodes     Gastrointestinal:       abdomen soft, non-tender, non-distended, no organomegaly or masses    Genitourinary: External genitalia and urethral meatus appears normal.  Vagina is smooth without nodularity or masses.  Cervix is not easily visualized.  Bimanual exam reveal no masses, nodularity or fullness.  Recto-vaginal exam confirms these findings.      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  She is s/p BSO, PPLND 2020 which served as her treatment.  She is here today for a surveillance visit.        40 minutes spent on the date of the encounter doing chart review, history and exam, documentation, and further activities as noted  above.      Plan:     1.) Ovarian cancer:  IAN on exam.  RTC in 3 months for next surveillance visit and .  Plan for surveillance visits and  levels every 3 months for the first 2 years post treatment (7/2022), then every 6 months for 3 years thereafter (7/2025), then annually.  Reviewed signs and symptoms for when she should contact the clinic or seek additional care.  Patient to contact the clinic with any questions or concerns in the interim.        Genetic risk factors were assessed and she is negative for mutations in the ADALID, BRCA1, BRCA2, BRIP1, CDH1, CHEK2, EPCAM, MLH1, MSH2, MSH6, NBN, NF1, PALB2, PMS2, PTEN, RAD51C, RAD51D, STK11, and TP53 genes.      Labs and/or tests ordered include:  .     2.) Health maintenance:  Issues addressed today include following up with PCP for annual health maintenance and non-gynecologic issues.  Encouraged to schedule her mammogram.     3.) HTN: Encouraged to check her BP at home and follow up with her PCP if her readings remain >130/80.     4.) Oncology treatment summary:  Her oncology treatment summary was reviewed with her today and she was provided a paper and electronic copy through My Chart.       Kerry Sinclair, DNP, APRN, FNP-C  Nurse Practitioner  Division of Gynecologic Oncology  Pager: [phone]     CC  Patient Care Team:  Kathya Escalera DO as PCP - General (Internal Medicine)  Felipe Corona MD as Assigned Cancer Care Provider  Aayush George MD as Assigned Surgical Provider  Tiffanie Carter GC as Assigned OBGYN Provider  Fatimah Clement MD as MD (Neurology)

## 2022-01-04 NOTE — NURSING NOTE
"Oncology Rooming Note    January 4, 2022 10:22 AM   Rosie Blackman is a 82 year old female who presents for:    Chief Complaint   Patient presents with     Oncology Clinic Visit     ovarian cancer     Initial Vitals: BP (!) 146/83   Pulse 71   Temp 97.5  F (36.4  C) (Oral)   Wt 94.7 kg (208 lb 11.2 oz)   SpO2 100%   BMI 36.39 kg/m   Estimated body mass index is 36.39 kg/m  as calculated from the following:    Height as of 9/17/21: 1.613 m (5' 3.5\").    Weight as of this encounter: 94.7 kg (208 lb 11.2 oz). Body surface area is 2.06 meters squared.  No Pain (0) Comment: Data Unavailable   No LMP recorded. Patient has had a hysterectomy.  Allergies reviewed: Yes  Medications reviewed: Yes    Medications: Medication refills not needed today.  Pharmacy name entered into [employer]:    [id number] - [place], [place] - [address] - MAIL ORDER MAIN MEDS - NON-EPRESCRIBE  [facility] - [place], [place] - [address]  [id number] - [place], [place] - [address] [address]    Clinical concerns: hot flashes down to about 6 times a day instead of 24/7! :-)       Lorrie Gomez CMA            "

## 2022-01-08 ASSESSMENT — ENCOUNTER SYMPTOMS
HEMATOCHEZIA: 0
SHORTNESS OF BREATH: 0
FREQUENCY: 0
JOINT SWELLING: 1
HEMATURIA: 0
NAUSEA: 0
BREAST MASS: 0
NERVOUS/ANXIOUS: 0
FEVER: 0
COUGH: 0
HEARTBURN: 0
WEAKNESS: 0
ABDOMINAL PAIN: 0
CONSTIPATION: 0
PARESTHESIAS: 0
EYE PAIN: 0
PALPITATIONS: 0
HEADACHES: 0
DYSURIA: 0
DIARRHEA: 0
MYALGIAS: 0
CHILLS: 0
DIZZINESS: 0
SORE THROAT: 0
ARTHRALGIAS: 0

## 2022-01-08 ASSESSMENT — ACTIVITIES OF DAILY LIVING (ADL): CURRENT_FUNCTION: NO ASSISTANCE NEEDED

## 2022-01-13 ENCOUNTER — OFFICE VISIT (OUTPATIENT)
Dept: FAMILY MEDICINE | Facility: CLINIC | Age: 82
End: 2022-01-13
Payer: MEDICARE

## 2022-01-13 VITALS
HEART RATE: 68 BPM | BODY MASS INDEX: 36.86 KG/M2 | HEIGHT: 63 IN | OXYGEN SATURATION: 98 % | TEMPERATURE: 97.5 F | WEIGHT: 208 LBS | SYSTOLIC BLOOD PRESSURE: 158 MMHG | DIASTOLIC BLOOD PRESSURE: 88 MMHG | RESPIRATION RATE: 16 BRPM

## 2022-01-13 DIAGNOSIS — M79.89 LEG SWELLING: ICD-10-CM

## 2022-01-13 DIAGNOSIS — R10.84 ABDOMINAL PAIN, GENERALIZED: ICD-10-CM

## 2022-01-13 DIAGNOSIS — Z00.00 ENCOUNTER FOR MEDICARE ANNUAL WELLNESS EXAM: Primary | ICD-10-CM

## 2022-01-13 DIAGNOSIS — E66.812 CLASS 2 SEVERE OBESITY DUE TO EXCESS CALORIES WITH SERIOUS COMORBIDITY AND BODY MASS INDEX (BMI) OF 37.0 TO 37.9 IN ADULT (H): ICD-10-CM

## 2022-01-13 DIAGNOSIS — E66.01 CLASS 2 SEVERE OBESITY DUE TO EXCESS CALORIES WITH SERIOUS COMORBIDITY AND BODY MASS INDEX (BMI) OF 37.0 TO 37.9 IN ADULT (H): ICD-10-CM

## 2022-01-13 DIAGNOSIS — I10 ESSENTIAL HYPERTENSION, BENIGN: ICD-10-CM

## 2022-01-13 DIAGNOSIS — E78.5 HYPERLIPIDEMIA LDL GOAL <130: ICD-10-CM

## 2022-01-13 PROBLEM — D69.8: Status: RESOLVED | Noted: 2022-01-13 | Resolved: 2022-01-13

## 2022-01-13 PROBLEM — D69.8: Status: ACTIVE | Noted: 2022-01-13

## 2022-01-13 PROCEDURE — G0439 PPPS, SUBSEQ VISIT: HCPCS | Performed by: INTERNAL MEDICINE

## 2022-01-13 PROCEDURE — 99214 OFFICE O/P EST MOD 30 MIN: CPT | Mod: 25 | Performed by: INTERNAL MEDICINE

## 2022-01-13 RX ORDER — PRAVASTATIN SODIUM 80 MG/1
80 TABLET ORAL DAILY
Qty: 90 TABLET | Refills: 3 | Status: SHIPPED | OUTPATIENT
Start: 2022-01-13 | End: 2023-01-24

## 2022-01-13 RX ORDER — VALSARTAN AND HYDROCHLOROTHIAZIDE 320; 25 MG/1; MG/1
1 TABLET, FILM COATED ORAL DAILY
Qty: 90 TABLET | Refills: 3 | Status: SHIPPED | OUTPATIENT
Start: 2022-01-13 | End: 2023-01-24

## 2022-01-13 RX ORDER — VALSARTAN AND HYDROCHLOROTHIAZIDE 160; 12.5 MG/1; MG/1
1 TABLET, FILM COATED ORAL DAILY
Qty: 90 TABLET | Refills: 0 | Status: CANCELLED | OUTPATIENT
Start: 2022-01-13

## 2022-01-13 ASSESSMENT — ENCOUNTER SYMPTOMS
FEVER: 0
HEARTBURN: 0
SHORTNESS OF BREATH: 0
NERVOUS/ANXIOUS: 0
EYE PAIN: 0
SORE THROAT: 0
MYALGIAS: 0
ARTHRALGIAS: 0
WEAKNESS: 0
DIARRHEA: 0
NAUSEA: 0
ABDOMINAL PAIN: 0
PARESTHESIAS: 0
HEMATURIA: 0
COUGH: 0
CHILLS: 0
HEMATOCHEZIA: 0
JOINT SWELLING: 1
DIZZINESS: 0
HEADACHES: 0
DYSURIA: 0
BREAST MASS: 0
FREQUENCY: 0
CONSTIPATION: 0
PALPITATIONS: 0

## 2022-01-13 ASSESSMENT — MIFFLIN-ST. JEOR: SCORE: 1373.64

## 2022-01-13 ASSESSMENT — ACTIVITIES OF DAILY LIVING (ADL): CURRENT_FUNCTION: NO ASSISTANCE NEEDED

## 2022-01-13 NOTE — PATIENT INSTRUCTIONS
Patient Education   Personalized Prevention Plan  You are due for the preventive services outlined below.  Your care team is available to assist you in scheduling these services.  If you have already completed any of these items, please share that information with your care team to update in your medical record.  Health Maintenance Due   Topic Date Due     ANNUAL REVIEW OF HM ORDERS  Never done     FALL RISK ASSESSMENT  12/05/2020     Mammogram  12/29/2021           Dr. Escalera's Tips for a Healthy Diet    1. Add more fresh fruits and vegetables to your diet.  The more colorful with the fruit or vegetable (think berries, spinach, carrots, peppers) the healthier it tends to be.  Juice is not a fruit.  Prepare the vegetables in a healthy way - steam, bake.  Avoid batter/breading, butter/oil to cook.  2. Add more fiber to your diet.  Swap out white bread, white rice, white pasta for whole grain versions.  Reduce the portion size and frequency of carbohydrates/starches.  3. Choose healthier fats such as nuts, olive oil, avocado, etc. Stay away from lard, butter.  4. Decrease the frequency and portion size of 'junk food' -pizza, candy, cookies, potato chips, etc.  5. Watch liquid calories such as coffee drinks, juice, soda, teas.  There tends to be excessive sugar in these beverages.  6. Increase protein in your diet.  Eggs, cheese, yogurt, nuts, lentils, chicken, fish are good healthy choices.  Protein keeps you eddy longer, and you are less likely to have blood sugar spikes  7. Eat healthy at least 80% of the time.  It is ok for a special treat (Mom's spaghetti dinner, cake on your birthday) every once in a while, just not every day.  When are you going to indulge (think [employer] time), be sure you are eating extra healthy the day before and after.      Resources:    1. [employer] Resources for Health and Wellness:https://www.takingchargissel.cs.[facility].edu/dig-yes-foods    2.   [employer] Ways To Wellness:     https://www.[facility].org/services/ways-to-wellness  Ways to Wellness offers:    Nutrition and weight management     Corrective exercise and fitness training     Lifestyle and behavioral change     Healing services  Our team includes registered dietitians, certified personal trainers, life coaches, as well as a Culinary Educator.    3. [facility] for Cardiovascular Disease Prevention  Consider making an appointment at the [facility] if either of the following describes you:    You are an adult who has risk factors for cardiovascular disease. Risk factors include cholesterol elevations, diabetes, high blood pressure, elevated weight, inactive living, and history of tobacco use.     You don t have traditional risk factors but have a strong family history of cardiovascular disease, which may mean you have a higher of risk of cardiovascular disease.     4. Atomic Habits by Alejandro Watts.  This a good book that looks into our habits and how to sustain good healthy habits and get rid of bad habits.        Leg swellin. Compression, diuretic, low salt diet, elevation  2. Goal salt is 2000 mg or less    Hypertension:  1. Increase valsartan/ hydrochlorothiazide to 2 pills once daily.  When out of this supply,  higher MG dose at pharmacy  RN blood pressure check 2 weeks  2. If leg swelling is still present, may try Furosemide 20 mg daily; would not RN blood pressure check and non-fasting labs 1 week after starting.      Patient Education     Low-Salt Diet  This diet removes foods that are high in salt. It also limits the amount of salt you use when cooking. It is most often used for people with high blood pressure, fluid retention (edema), and kidney, liver, or heart disease.   Table salt has the mineral sodium. Your body needs sodium to work normally. But too much sodium can make your health problems worse. Your healthcare provider advises a low-salt (low-sodium) diet for you. Your total daily allowed  amount of salt is 1,500 to 2,300 milligrams (mg). This is less than 1 teaspoon of table salt. This means you can have only about 500 to 700 mg of sodium at each meal. People with certain health problems should limit salt intake to the lower end of the advised range.     When you cook, don t add much salt. If you can cook without using salt, even better. Don t add salt to your food at the table.   When shopping, read food labels. Salt is often called sodium on the label. Choose foods that are salt-free, low salt, or very low salt. Note that foods with reduced salt may not lower your salt intake enough.     Beans, potatoes, and pasta  OK: Dry beans, split peas, lentils, potatoes, rice, macaroni, pasta, spaghetti with no added salt, canned beans with no added salt   Avoid: Salted potato chips; regular (salt added) canned beans   Breads and grains  OK: Low-sodium breads, rolls, cereals, and cakes; low-salt crackers, matzo crackers   Avoid: Salted crackers, pretzels, tortilla chips, popcorn, and other salty snacks; Welsh toast, pancakes, muffins, regular bread   Dairy  OK: Milk, chocolate milk, hot chocolate mix, low-salt cheeses, yogurt   Avoid: Processed cheese and cheese spreads; Roquefort, Camembert, and cottage cheese; buttermilk, instant breakfast drink   Desserts  OK: Ice cream, frozen yogurt, juice bars, gelatin, cookies and pies, sugar, honey, jelly, hard candy   Avoid: Most pies, cakes and cookies made with salt; instant pudding   Drinks  OK: Tea, coffee, fizzy (carbonated) drinks, juices   Avoid: Flavored coffees, electrolyte replacement drinks, sports drinks   Meats  OK: All fresh meat, fish, poultry, low-salt tuna, eggs, egg substitute   Avoid: Smoked, pickled, brine-cured, or salted meats and fish, and processed poultry injected with salt or marinade. This includes lovett, chipped beef, corned beef, hot dogs, deli meats, ham, kosher meats, salt pork, sausage, canned tuna, salted codfish, smoked salmon,  herring, sardines, and anchovies.   Seasonings  OK: Most seasonings are okay. Good substitutes for salt include: fresh herb blends, hot sauce, lemon, garlic, diaz, vinegar, dry mustard, parsley, cilantro, horseradish, tomato paste, margarine, mayonnaise, unsalted butter, cream cheese, vegetable and olive oil, cream, low-salt salad dressing and gravy.   Avoid: Regular ketchup, relishes, pickles, soy sauce, teriyaki sauce, Worcestershire sauce, BBQ sauce, tartar sauce, meat tenderizer, chili sauce, regular gravy, regular salad dressing, salted butter   Soups  OK: Low-salt soups and broths made with allowed foods   Avoid: Bouillon cubes, soups with smoked or salted meats, regular soup and broth   Vegetables  OK: Most vegetables are okay, including canned vegetables with no salt added, and plain frozen vegetables; low-salt tomato and vegetable juices   Avoid: Sauerkraut and other brined vegetables; pickles and pickled vegetables; tomato juice, olives, canned vegetables with salt, frozen vegetables with sauces   Stop Being Watched last reviewed this educational content on 2/1/2020 2000-2021 [employer]. All rights reserved. This information is not intended as a substitute for professional medical care. Always follow your healthcare professional's instructions.

## 2022-01-13 NOTE — PROGRESS NOTES
"SUBJECTIVE:   Rosie Blackman is a 82 year old female who presents for Preventive Visit.    Patient has been advised of split billing requirements and indicates understanding: Yes   Are you in the first 12 months of your Medicare coverage?  No    Healthy Habits:     In general, how would you rate your overall health?  Excellent    Frequency of exercise:  2-3 days/week    Duration of exercise:  15-30 minutes    Do you usually eat at least 4 servings of fruit and vegetables a day, include whole grains    & fiber and avoid regularly eating high fat or \"junk\" foods?  Yes    Taking medications regularly:  Yes    Barriers to taking medications:  None    Medication side effects:  None    Ability to successfully perform activities of daily living:  No assistance needed    Home Safety:  No safety concerns identified    Hearing Impairment:  No hearing concerns    In the past 6 months, have you been bothered by leaking of urine?  No    In general, how would you rate your overall mental or emotional health?  Excellent      PHQ-2 Total Score: 0    Additional concerns today:  Yes (just need med refills on file.)    Do you feel safe in your environment? Yes    Have you ever done Advance Care Planning? (For example, a Health Directive, POLST, or a discussion with a medical provider or your loved ones about your wishes): Yes, advance care planning is on file.       Fall risk  Fallen 2 or more times in the past year?: No  Any fall with injury in the past year?: No    Cognitive Screening   1) Repeat 3 items (Leader, Season, Table)    2) Clock draw: NORMAL  3) 3 item recall: Recalls 3 objects  Results: 3 items recalled: COGNITIVE IMPAIRMENT LESS LIKELY    Mini-CogTM Placido Freedman. Licensed by the author for use in [facility]; reprinted with permission ([email or web address]). All rights reserved.      Do you have sleep apnea, excessive snoring or daytime drowsiness?: no    Reviewed and updated as needed this visit by clinical " staff  Tobacco  Allergies  Meds   Med Hx  Surg Hx  Fam Hx  Soc Hx       Reviewed and updated as needed this visit by Provider               Social History     Tobacco Use     Smoking status: Never Smoker     Smokeless tobacco: Never Used   Substance Use Topics     Alcohol use: No     If you drink alcohol do you typically have >3 drinks per day or >7 drinks per week? No    Alcohol Use 1/13/2022   Prescreen: >3 drinks/day or >7 drinks/week? -   Prescreen: >3 drinks/day or >7 drinks/week? No           Hyperlipidemia Follow-Up      Are you regularly taking any medication or supplement to lower your cholesterol?   Yes- pravastatin     Are you having muscle aches or other side effects that you think could be caused by your cholesterol lowering medication?  No     Fish Oil for her eyes, not hyperlipidemia     Hypertension Follow-up      Do you check your blood pressure regularly outside of the clinic? Yes     Are you following a low salt diet? Yes    Are your blood pressures ever more than 140 on the top number (systolic) OR more than 90 on the bottom number (diastolic), for example 140/90? Yes occasionally.     Noted blood pressure high since being on meloxicam      BP Readings from Last 6 Encounters:   01/13/22 (!) 158/88   01/04/22 (!) 146/83   12/05/21 (!) 160/79   09/17/21 131/64   05/17/21 (!) 144/81   04/12/21 133/73         Swelling;  --noting swelling in bilateral feet and ankles  --ongoing for years.  --has used compression stockings but has not helped much.  Due to back surgery, it is hard for her to don them;  is not much help    Abdominal pain:  --1 week ago, bilateral lower; felt like 'gas pain'; was intermittent when present  --since resolved for 3-4 days  --lasted a few days; would have pain with getting out of chair and having bowel movement alleviated the pain  --bowel movement were normal at the time; no fevers, N/V, blood in the stool, not waking up at night.    ER visit 12/4  -for  transient global amneisa.  Aspirin was NOT recommended to start, but she has not started  --recommend to see Neurology, has appointment 1/28    Current providers sharing in care for this patient include:   Patient Care Team:  Kathya Escalera DO as PCP - General (Internal Medicine)  Kathya Escalera DO as Assigned PCP  Felipe Corona MD as Assigned Cancer Care Provider  Aayush George MD as Assigned Surgical Provider  Tiffanie Carter GC as Assigned OBGYN Provider  Fatimah Clement MD as MD (Neurology)    The following health maintenance items are reviewed in Epic and correct as of today:  Health Maintenance Due   Topic Date Due     ANNUAL REVIEW OF HM ORDERS  Never done     FALL RISK ASSESSMENT  12/05/2020     MAMMO SCREENING  12/29/2021     Current Outpatient Medications   Medication Sig Dispense Refill     Evening Primrose Oil 1000 MG CAPS One daily       meloxicam (MOBIC) 15 MG tablet        mometasone (ELOCON) 0.1 % external cream Apply sparingly to affected area twice daily for 2 weeks then decrease to 1 time daily for 30 days then decrease to 2-3 times per week 45 g 11     omega 3 1000 MG CAPS Take by mouth daily        pravastatin (PRAVACHOL) 80 MG tablet Take 1 tablet (80 mg) by mouth daily 90 tablet 3     THERATEARS 0.25 % OP SOLN BID       valsartan-hydrochlorothiazide (DIOVAN HCT) 160-12.5 MG tablet TAKE 1 TABLET BY MOUTH EVERY DAY 90 tablet 0     VIACTIV 500-100-40 OR CHEW once daily           Review of Systems   Constitutional: Negative for chills and fever.   HENT: Negative for congestion, ear pain, hearing loss and sore throat.    Eyes: Negative for pain and visual disturbance.   Respiratory: Negative for cough and shortness of breath.    Cardiovascular: Positive for peripheral edema. Negative for chest pain and palpitations.   Gastrointestinal: Negative for abdominal pain, constipation, diarrhea, heartburn, hematochezia and nausea.   Breasts:  Negative for  "tenderness, breast mass and discharge.   Genitourinary: Negative for dysuria, frequency, genital sores, hematuria, pelvic pain, urgency, vaginal bleeding and vaginal discharge.   Musculoskeletal: Positive for joint swelling. Negative for arthralgias and myalgias.   Skin: Negative for rash.   Neurological: Negative for dizziness, weakness, headaches and paresthesias.   Psychiatric/Behavioral: Negative for mood changes. The patient is not nervous/anxious.          OBJECTIVE:   BP (!) 158/88   Pulse 68   Temp 97.5  F (36.4  C) (Tympanic)   Resp 16   Ht 1.594 m (5' 2.75\")   Wt 94.3 kg (208 lb)   SpO2 98%   BMI 37.14 kg/m   Estimated body mass index is 37.14 kg/m  as calculated from the following:    Height as of this encounter: 1.594 m (5' 2.75\").    Weight as of this encounter: 94.3 kg (208 lb).  Physical Exam  GENERAL: healthy, alert and no distress  EYES: Eyes grossly normal to inspection, PERRL and conjunctivae and sclerae normal  HENT: ear canals and TM's normal, nose and mouth without ulcers or lesions  NECK: no adenopathy, no asymmetry, masses, or scars and thyroid normal to palpation  RESP: lungs clear to auscultation - no rales, rhonchi or wheezes  CV: regular rates and rhythm, normal S1 S2, no S3 or S4, no murmur, click or rub, peripheral pulses strong and 2+ bilateral lower extremity pitting edema to ankle    ABDOMEN: soft, nontender, no hepatosplenomegaly, no masses and bowel sounds normal  MS: no gross musculoskeletal defects noted, no edema  SKIN: no suspicious lesions or rashes  NEURO: Normal strength and tone, mentation intact and speech normal  PSYCH: mentation appears normal, affect normal/bright        ASSESSMENT / PLAN:   (Z00.00) Encounter for Medicare annual wellness exam  (primary encounter diagnosis)  Comment:   Plan:     (I10) Essential hypertension, benign  Comment: uncontrolled.  Increase med from 160/12.5-3 20/25.  RN BP check 2 weeks.  Plan: valsartan-hydrochlorothiazide (DIOVAN HCT) " "        320-25 MG tablet            (E78.5) Hyperlipidemia LDL goal <130  Comment:  - stable, refill provided  Plan: pravastatin (PRAVACHOL) 80 MG tablet            (E66.01,  Z68.37) Class 2 severe obesity due to excess calories with serious comorbidity and body mass index (BMI) of 37.0 to 37.9 in adult (H)  Comment: Discussed healthy diet  Plan:     (R10.84) Abdominal pain, generalized  Comment: May be related to certain food or adhesions causing a partial small bowel obstruction?  Because it is resolved and there is no alarm features, no work-up is needed at this time.  If it recurs, would consider CT  Plan:     (M79.89) Leg swelling  Comment: Benign and related to obesity and venous stasis.  The higher dose of HCTZ may help.  If it does not, would initiate Lasix 20 mg once daily, RN BP check and BMP 1 week after.  Not able to use compression due to physical limitations from back surgery  Plan:     Patient has been advised of split billing requirements and indicates understanding: Yes  COUNSELING:  Reviewed preventive health counseling, as reflected in patient instructions    Estimated body mass index is 37.14 kg/m  as calculated from the following:    Height as of this encounter: 1.594 m (5' 2.75\").    Weight as of this encounter: 94.3 kg (208 lb).    Weight management plan: Discussed healthy diet and exercise guidelines    She reports that she has never smoked. She has never used smokeless tobacco.      Appropriate preventive services were discussed with this patient, including applicable screening as appropriate for cardiovascular disease, diabetes, osteopenia/osteoporosis, and glaucoma.  As appropriate for age/gender, discussed screening for colorectal cancer, prostate cancer, breast cancer, and cervical cancer. Checklist reviewing preventive services available has been given to the patient.    Reviewed patients plan of care and provided an AVS. The Complex Care Plan (for patients with higher acuity and " needing more deliberate coordination of services) for Rosie meets the Care Plan requirement. This Care Plan has been established and reviewed with the Patient.    Counseling Resources:  ATP IV Guidelines  Pooled Cohorts Equation Calculator  Breast Cancer Risk Calculator  Breast Cancer: Medication to Reduce Risk  FRAX Risk Assessment  ICSI Preventive Guidelines  Dietary Guidelines for Americans, 2010  USDA's MyPlate  ASA Prophylaxis  Lung CA Screening    Kathya Escalera [facility]    Identified Health Risks:

## 2022-01-27 ENCOUNTER — TELEPHONE (OUTPATIENT)
Dept: FAMILY MEDICINE | Facility: CLINIC | Age: 82
End: 2022-01-27

## 2022-01-27 ENCOUNTER — ALLIED HEALTH/NURSE VISIT (OUTPATIENT)
Dept: FAMILY MEDICINE | Facility: CLINIC | Age: 82
End: 2022-01-27
Payer: MEDICARE

## 2022-01-27 VITALS — DIASTOLIC BLOOD PRESSURE: 78 MMHG | HEART RATE: 76 BPM | SYSTOLIC BLOOD PRESSURE: 150 MMHG

## 2022-01-27 DIAGNOSIS — I10 ESSENTIAL HYPERTENSION, BENIGN: Primary | ICD-10-CM

## 2022-01-27 DIAGNOSIS — I10 ESSENTIAL HYPERTENSION: Primary | ICD-10-CM

## 2022-01-27 PROCEDURE — 99207 PR NO CHARGE NURSE ONLY: CPT

## 2022-01-27 RX ORDER — ATENOLOL 25 MG/1
25 TABLET ORAL DAILY
Qty: 90 TABLET | Refills: 3 | Status: SHIPPED | OUTPATIENT
Start: 2022-01-27 | End: 2022-08-18

## 2022-01-27 NOTE — TELEPHONE ENCOUNTER
Blood pressure is improved but there is still some room for improvement.  Continue with current medication.  She was supposed to have BMP today due to the higher dose of the HCTZ.  Recommend to add atenolol 25 mg once daily to current blood pressure regimen.  RN BP check and nonfasting labs in 2 weeks.

## 2022-01-27 NOTE — PROGRESS NOTES
"Rosie Blackman is a 82 year old year old patient who comes in today for a Blood Pressure check because of   1/13/22: increased valsartan-hydrochlorothiazide   *pt states since the increase in diuretic, the leg/feet swelling has \"greatly reduced\".    Vital Signs as repeated by RN   148/76 p76  150/78 p76    Patient is taking medication as prescribed  Patient is tolerating medications well.  Patient is monitoring Blood Pressure at home.  Average readings if yes are   120-160's/80's-pt did not bring in BP readings. Says BP is \"all over the place\"  Says yesterday afternoon 130/67. States last few days in the afternoon, BP has been consistently around 130/60's.   Current complaints: none  Disposition:  Routed to provider for review.     preferred pharmacy-[facility], dheeraj Leon RN        "

## 2022-01-27 NOTE — TELEPHONE ENCOUNTER
"Rosie Fuentes is a 82 year old year old patient who comes in today for a Blood Pressure check because of   1/13/22: increased valsartan-hydrochlorothiazide   *pt states since the increase in diuretic, the leg/feet swelling has \"greatly reduced\".     Vital Signs as repeated by RN   148/76 p76  150/78 p76     Patient is taking medication as prescribed  Patient is tolerating medications well.  Patient is monitoring Blood Pressure at home.  Average readings if yes are   120-160's/80's-pt did not bring in BP readings. Says BP is \"all over the place\"  Says yesterday afternoon 130/67. States last few days in the afternoon, BP has been consistently around 130/60's.   Current complaints: none  Disposition:  Routed to provider for review.      preferred pharmacy-[facility], dheeraj Leon RN  "

## 2022-01-28 ENCOUNTER — VIRTUAL VISIT (OUTPATIENT)
Dept: NEUROLOGY | Facility: CLINIC | Age: 82
End: 2022-01-28
Attending: FAMILY MEDICINE
Payer: MEDICARE

## 2022-01-28 DIAGNOSIS — G45.4 TRANSIENT GLOBAL AMNESIA: ICD-10-CM

## 2022-01-28 PROCEDURE — 99204 OFFICE O/P NEW MOD 45 MIN: CPT | Mod: 95 | Performed by: PSYCHIATRY & NEUROLOGY

## 2022-01-28 NOTE — PROGRESS NOTES
Rosie is a 82 year old who is being evaluated via a billable video visit.      How would you like to obtain your AVS? MyChart  If the video visit is dropped, the invitation should be resent by: Text to cell phone: [phone]  Will anyone else be joining your video visit? No      Video Start Time: 10:37  Video-Visit Details    Type of service:  Video Visit    Video End Time: 11:00    Originating Location (pt. Location): Home    Distant Location (provider location):  [facility] [place]   Platform used for Video Visit: Variable     Neurology History and Physical Exam  [facility]      Patient:Rosie Blackman  : 1940  Age: 82 year old  Today's Visit: 2022      History of present illness:   Mrs. Rosie Blackman is a very pleasant 82-year-old right-handed woman who was referred for evaluation of an episode of transient memory loss.  She is alone in this visit today.      Mrs. Blackman describes what happened on that date mainly based on what she was told.  She can remember what happened prior to the event.   This event happened on 2021.  She was sitting with her  watching a hockey game. 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 She took a shower and went back to continue watching the hockey game. Her  noted she got up and went back to open the envelopes that she just closed.  Her  pointed that the envelopes were fine and ready to go and he did not see any reason to open up the envelopes.   She kept asking what day was it, her  answered and she repeated; but she asked again.  This was repeated several times. She had no recall of that. He did not notice slurred speech, word-finding difficulty or talking incoherently. She did complain of weakness or numbness.  Her  got concerned and told her she needed to go to ER, but she said she didn't need to. He called her son and daughter-in-law to come for encouragement. She went to ER and she was  answering questions right at that time.  The event started 8 pm and she couldn't remember anything until 11.45. she could answer questions at ER, but has no recollection of that.  Her blood pressure was elevated at ER and she received a single dose of IV labetalol. She was back to baseline at ER.   Her  told her she called her granddaughter and talked to her for about half an hour and she has no recollection of that. She called her the next day and asked her if she was fine while talking to her the other day and her granddaughter who is a nurse reassured her that she was talking right. Mrs. Newsome states that one of the things they talked about was that her granddaughter had to let her dog go.  She says that they are both dog lovers and this was very emotional for her.      She never had any other episodes similar to this.  She denies confusional episodes or lapses in time or seizure-like activities.  Her paternal uncle had epilepsy and had seizures since birth.  She denies history of febrile seizures, meningitis, known stroke or tumor.    She had a brain MRI on 12/5/2021 which I reviewed and discussed the results with the patient.  That showed mild generalized cerebral atrophy, age-related and microvascular ischemic changes.  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    Past Medical History: HTN, HLD    Social History: Denies drinking or smoking, she worked in a dental office as . , has 2 children.        Current Outpatient Medications   Medication Sig Dispense Refill     Evening Primrose Oil 1000 MG CAPS One daily       meloxicam (MOBIC) 15 MG tablet Take 15 mg by mouth daily        mometasone (ELOCON) 0.1 % external cream Apply sparingly to affected area twice daily for 2 weeks then decrease to 1 time daily for 30 days then decrease to 2-3 times per week 45 g 11     omega 3 1000 MG CAPS Take by mouth daily        pravastatin (PRAVACHOL) 80 MG tablet  Take 1 tablet (80 mg) by mouth daily 90 tablet 3     THERATEARS 0.25 % OP SOLN BID       valsartan-hydrochlorothiazide (DIOVAN HCT) 320-25 MG tablet Take 1 tablet by mouth daily 90 tablet 3     VIACTIV 500-100-40 OR CHEW once daily       atenolol (TENORMIN) 25 MG tablet Take 1 tablet (25 mg) by mouth daily (Patient not taking: Reported on 1/28/2022) 90 tablet 3     Exam:    Wt Readings from Last 5 Encounters:   01/13/22 94.3 kg (208 lb)   01/04/22 94.7 kg (208 lb 11.2 oz)   12/04/21 90.7 kg (200 lb)   09/17/21 93 kg (205 lb)   05/17/21 93.2 kg (205 lb 6.4 oz)     Alert and oriented x4, NAD, no aphasia or dysarthria, EOMI, face is symmetric, moves upper extremities against gravity, normal finger tapping.    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  The patient has no recollection of ~4 hours period on December 11,2021 and asked questions repeatedly during that time.  She had no other similar episodes.  Her brain MRI was unremarkable except age-related atrophy and microvascular ischemic changes.  She has not no confusional episodes or seizure-like activities or risk factors for epilepsy other than her uncle who has epilepsy.  I reassured the patient that transient global amnesia typically happens only once in life and rarely can happen 2 times but is very unusual to happen more than twice in life, in that case, patient should be evaluated for other causes such as TIA or seizures.  For now she does not need any further evaluation at this point.  I would not make a follow-up appointment for her.  She may follow-up as needed if any new concerns arise.      As described above, I met with the patient for 23 minutes and during this time counseling was greater than 50% of the visit time.  I spent an additional 25 minutes on chart review, reviewing images and documentation.This note was created in part by the use of Dragon voice recognition system.  Inadvertent grammatical errors and typographical errors may still exist.  Fatimah Clement MD

## 2022-01-28 NOTE — LETTER
2022         RE: Rosie Blackman  [address] [facility] [address]        Dear Colleague,    Thank you for referring your patient, Rosie Blackman, to the [facility]UR[facility]. Please see a copy of my visit note below.          Rosie is a 82 year old who is being evaluated via a billable video visit.      How would you like to obtain your AVS? MyChart  If the video visit is dropped, the invitation should be resent by: Text to cell phone: [phone]  Will anyone else be joining your video visit? No      Video Start Time: 10:37  Video-Visit Details    Type of service:  Video Visit    Video End Time: 11:00    Originating Location (pt. Location): Home    Distant Location (provider location):  [facility]UR[facility]   Platform used for Video Visit: [employer]     Neurology History and Physical Exam  [facility]      Patient:Rosie Blackman  : 1940  Age: 82 year old  Today's Visit: 2022      History of present illness:   Mrs. Rosie Blackman is a very pleasant 82-year-old right-handed woman who was referred for evaluation of an episode of transient memory loss.  She is alone in this visit today.      Mrs. Blackman describes what happened on that date mainly based on what she was told.  She can remember what happened prior to the event.   This event happened on 2021.  She was sitting with her  watching a hockey game. 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 She took a shower and went back to continue watching the hockey game. Her  noted she got up and went back to open the envelopes that she just closed.  Her  pointed that the envelopes were fine and ready to go and he did not see any reason to open up the envelopes.   She kept asking what day was it, her  answered and she repeated; but she asked again.  This was repeated several times. She had no recall of that. He did not notice slurred speech,  word-finding difficulty or talking incoherently. She did complain of weakness or numbness.  Her  got concerned and told her she needed to go to ER, but she said she didn't need to. He called her son and daughter-in-law to come for encouragement. She went to ER and she was answering questions right at that time.  The event started 8 pm and she couldn't remember anything until 11.45. she could answer questions at ER, but has no recollection of that.  Her blood pressure was elevated at ER and she received a single dose of IV labetalol. She was back to baseline at ER.   Her  told her she called her granddaughter and talked to her for about half an hour and she has no recollection of that. She called her the next day and asked her if she was fine while talking to her the other day and her granddaughter who is a nurse reassured her that she was talking right. Mrs. Newsome states that one of the things they talked about was that her granddaughter had to let her dog go.  She says that they are both dog lovers and this was very emotional for her.      She never had any other episodes similar to this.  She denies confusional episodes or lapses in time or seizure-like activities.  Her paternal uncle had epilepsy and had seizures since birth.  She denies history of febrile seizures, meningitis, known stroke or tumor.    She had a brain MRI on 12/5/2021 which I reviewed and discussed the results with the patient.  That showed mild generalized cerebral atrophy, age-related and microvascular ischemic changes.  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    Past Medical History: HTN, HLD    Social History: Denies drinking or smoking, she worked in a dental office as . , has 2 children.        Current Outpatient Medications   Medication Sig Dispense Refill     Evening Primrose Oil 1000 MG CAPS One daily       meloxicam (MOBIC) 15 MG tablet Take 15 mg by mouth daily         mometasone (ELOCON) 0.1 % external cream Apply sparingly to affected area twice daily for 2 weeks then decrease to 1 time daily for 30 days then decrease to 2-3 times per week 45 g 11     omega 3 1000 MG CAPS Take by mouth daily        pravastatin (PRAVACHOL) 80 MG tablet Take 1 tablet (80 mg) by mouth daily 90 tablet 3     THERATEARS 0.25 % OP SOLN BID       valsartan-hydrochlorothiazide (DIOVAN HCT) 320-25 MG tablet Take 1 tablet by mouth daily 90 tablet 3     VIACTIV 500-100-40 OR CHEW once daily       atenolol (TENORMIN) 25 MG tablet Take 1 tablet (25 mg) by mouth daily (Patient not taking: Reported on 1/28/2022) 90 tablet 3     Exam:    Wt Readings from Last 5 Encounters:   01/13/22 94.3 kg (208 lb)   01/04/22 94.7 kg (208 lb 11.2 oz)   12/04/21 90.7 kg (200 lb)   09/17/21 93 kg (205 lb)   05/17/21 93.2 kg (205 lb 6.4 oz)     Alert and oriented x4, NAD, no aphasia or dysarthria, EOMI, face is symmetric, moves upper extremities against gravity, normal finger tapping.    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  The patient has no recollection of ~4 hours period on December 11,2021 and asked questions repeatedly during that time.  She had no other similar episodes.  Her brain MRI was unremarkable except age-related atrophy and microvascular ischemic changes.  She has not no confusional episodes or seizure-like activities or risk factors for epilepsy other than her uncle who has epilepsy.  I reassured the patient that transient global amnesia typically happens only once in life and rarely can happen 2 times but is very unusual to happen more than twice in life, in that case, patient should be evaluated for other causes such as TIA or seizures.  For now she does not need any further evaluation at this point.  I would not make a follow-up appointment for her.  She may follow-up as needed if any new concerns arise.      As described above, I  met with the patient for 23 minutes and during this time counseling was greater than 50% of the visit time.  I spent an additional 25 minutes on chart review, reviewing images and documentation.This note was created in part by the use of Dragon voice recognition system. Inadvertent grammatical errors and typographical errors may still exist.  Fatimah Clement MD        Again, thank you for allowing me to participate in the care of your patient.        Sincerely,        Fatimah Clement MD

## 2022-02-11 ENCOUNTER — TELEPHONE (OUTPATIENT)
Dept: FAMILY MEDICINE | Facility: CLINIC | Age: 82
End: 2022-02-11

## 2022-02-11 ENCOUNTER — ALLIED HEALTH/NURSE VISIT (OUTPATIENT)
Dept: FAMILY MEDICINE | Facility: CLINIC | Age: 82
End: 2022-02-11
Payer: MEDICARE

## 2022-02-11 ENCOUNTER — HOSPITAL ENCOUNTER (OUTPATIENT)
Dept: MAMMOGRAPHY | Facility: CLINIC | Age: 82
Discharge: HOME | End: 2022-02-11
Attending: INTERNAL MEDICINE | Admitting: INTERNAL MEDICINE
Payer: MEDICARE

## 2022-02-11 ENCOUNTER — LAB (OUTPATIENT)
Dept: LAB | Facility: CLINIC | Age: 82
End: 2022-02-11

## 2022-02-11 VITALS — HEART RATE: 54 BPM | SYSTOLIC BLOOD PRESSURE: 116 MMHG | DIASTOLIC BLOOD PRESSURE: 64 MMHG | OXYGEN SATURATION: 99 %

## 2022-02-11 DIAGNOSIS — I10 ESSENTIAL HYPERTENSION: Primary | ICD-10-CM

## 2022-02-11 DIAGNOSIS — I10 ESSENTIAL HYPERTENSION: ICD-10-CM

## 2022-02-11 DIAGNOSIS — Z12.31 VISIT FOR SCREENING MAMMOGRAM: ICD-10-CM

## 2022-02-11 LAB
ANION GAP SERPL CALCULATED.3IONS-SCNC: 5 MMOL/L (ref 3–14)
BUN SERPL-MCNC: 19 MG/DL (ref 7–30)
CALCIUM SERPL-MCNC: 9.4 MG/DL (ref 8.5–10.1)
CHLORIDE BLD-SCNC: 103 MMOL/L (ref 94–109)
CO2 SERPL-SCNC: 32 MMOL/L (ref 20–32)
CREAT SERPL-MCNC: 0.75 MG/DL (ref 0.52–1.04)
GFR SERPL CREATININE-BSD FRML MDRD: 80 ML/MIN/1.73M2
GLUCOSE BLD-MCNC: 79 MG/DL (ref 70–99)
POTASSIUM BLD-SCNC: 3.5 MMOL/L (ref 3.4–5.3)
SODIUM SERPL-SCNC: 140 MMOL/L (ref 133–144)

## 2022-02-11 PROCEDURE — 80048 BASIC METABOLIC PNL TOTAL CA: CPT

## 2022-02-11 PROCEDURE — 99207 PR NO CHARGE NURSE ONLY: CPT

## 2022-02-11 PROCEDURE — 36415 COLL VENOUS BLD VENIPUNCTURE: CPT

## 2022-02-11 PROCEDURE — 77067 SCR MAMMO BI INCL CAD: CPT

## 2022-02-11 NOTE — TELEPHONE ENCOUNTER
See note below from RN visit for BP check today:    Rosie Blackman is a 82 year old year old patient who comes in today for a Blood Pressure check because of new medication. Added atenolol 25mg daily on 1/31/22.  Vital Signs as repeated by RN: 132/64, P-60. Repeated 8 minutes later, 116/64 and P-54.  Patient is taking medication as prescribed  Patient is tolerating medications well.  Patient is monitoring Blood Pressure at home.  Average readings if yes are:   1/28: 122/94, P-80  1/29: 147/85, P-74  1/30: 145/79, P-69  1/31: 138/79, P-75  2/1: 129/76, P-74  2/2: 136/81, P-70  2/3: 129/72, P-63  2/4: 125/67, P-66  2/5: 125/70, P61  2/6: 128/69, P-53  2/7: 121/72, P-54  2/8: 139/70, P-60  2/9: 116/64, P-55  2/10: 125/71, P-66  Current complaints: none  Disposition:  Routed to PCP for review and recommendation. Patient also wanted to update PCP that she's taking her meloxicam as needed only now, says that she uses it no more than every 3 to 5 days. Says that she was started on it after back surgery last year.    Leslie Ace RN  [facility]

## 2022-02-11 NOTE — NURSING NOTE
Rosie Blackman is a 82 year old year old patient who comes in today for a Blood Pressure check because of new medication. Added atenolol 25mg daily on 1/31/22.  Vital Signs as repeated by RN: 132/64, P-60. Repeated 8 minutes later, 116/64 and P-54.  Patient is taking medication as prescribed  Patient is tolerating medications well.  Patient is monitoring Blood Pressure at home.  Average readings if yes are:   1/28: 122/94, P-80  1/29: 147/85, P-74  1/30: 145/79, P-69  1/31: 138/79, P-75  2/1: 129/76, P-74  2/2: 136/81, P-70  2/3: 129/72, P-63  2/4: 125/67, P-66  2/5: 125/70, P61  2/6: 128/69, P-53  2/7: 121/72, P-54  2/8: 139/70, P-60  2/9: 116/64, P-55  2/10: 125/71, P-66  Current complaints: none  Disposition:  Routed to PCP for review and recommendation. Patient also wanted to update PCP that she's taking her meloxicam as needed only now, says that she uses it no more than every 3 to 5 days. Says that she was started on it after back surgery last year.    Leslie Ace RN  [facility]

## 2022-02-22 ENCOUNTER — TRANSFERRED RECORDS (OUTPATIENT)
Dept: HEALTH INFORMATION MANAGEMENT | Facility: CLINIC | Age: 82
End: 2022-02-22
Payer: MEDICARE

## 2022-03-21 ENCOUNTER — MYC MEDICAL ADVICE (OUTPATIENT)
Dept: FAMILY MEDICINE | Facility: CLINIC | Age: 82
End: 2022-03-21
Payer: MEDICARE

## 2022-03-28 NOTE — PROGRESS NOTES
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  She is s/p BSO, PPLND 2020 which served as her treatment.  She is here today for a surveillance visit.      Oncology History:  She originally presented with a complaint of hip pain.  The work-up of this included an MRI which has demonstrated a large pelvic mass, her  was elevated (108, CEA 19-9 were normal).     2020: Exploratory laparoscopy followed by laparotomy, bilateral salpingo-oophorectomy, omentectomy, pelvic and periaortic lymph node dissection, anterior culdectomy.    FINAL DIAGNOSIS:   A. OVARIES, LEFT AND RIGHT, BILATERAL OOPHORECTOMY:   - Right ovary with ENDOMETRIOID ADENOCARCINOMA, FIGO grade 1   - Left ovary with benign inclusion cysts, negative for malignancy   - Unremarkable bilateral fallopian tubes, negative for malignancy     B. MESENTERY, BIOPSY:   - Fibrovascular adhesions, negative for malignancy     C. OMENTUM, OMENTECTOMY:   - Adipose tissue, negative for malignancy     D. ANTERIOR CUL-DE-SAC, BIOPSY:   - Fibroadipose tissue with foci of endometriosis   - Negative for malignancy     E. POSTERIOR CUL-DE-SAC, BIOPSY:   - Fibrovascular tissue, negative for malignancy     F. LYMPH NODE, LEFT PELVIC, EXCISION:   - Eleven reactive lymph nodes, negative for malignancy (0/11)     G. LYMPH NODE, RIGHT PELVIC, EXCISION:   - Nine reactive lymph nodes, negative for malignancy (0/9)     H. LYMPH NODE, RIGHT PARA AORTIC, EXCISION:   - Three reactive lymph nodes, negative for malignancy (0/3)     2020:  20.  2/:  8.  5/:  7.  9:  <5.  1/:  6.   4:  pending.                  Today she comes to clinic feeling well overall.  While on  vacation in [place] on 3/21 she developed right sided tingling and arm weakness, speech changes, facial droop, and aphasia.  Episode lasted 20 to 30 seconds.  She reached out to her PCP via [employer] who advised reporting to the ED for further evaluation.  She chose not to do this as her symptoms have resolved.  She has follow-up scheduled with her PCP in a couple weeks.  From a gynecologic perspective she denies any vaginal bleeding, no changes in her bowel or bladder habits though admits since surgery she does not feel an urge to urinate.  This is not changed.  No nausea/emesis.  No difficulties eating or sleeping.  She does have BLE swelling which has been her baseline for many years.  She denies any abdominal discomfort/bloating, no fevers or chills, and no chest pain or shortness of breath.  She is current with her annual physical, colon cancer screening, mammogram, and she is fully vaccinated against COVID.               Health Maintenance  Colonoscopy: 11/16/15, repeat in 10 years  Mammogram: 2/11/22  Annual physical: 1/13/22  COVID vaccine: 2/4/21, 2/25/21, 9/30/21 Pfizer         Review of Systems     Constitutional:  Negative for fever, chills, weight loss, weight gain, fatigue, decreased appetite, night sweats, recent stressors, height gain, height loss, post-operative complications, incisional pain, hallucinations, increased energy, hyperactivity and confused.   HENT:  Negative for ear pain, hearing loss, tinnitus, nosebleeds, trouble swallowing, hoarse voice, mouth sores, sore throat, ear discharge, tooth pain, gum tenderness, taste disturbance, smell disturbance, hearing aid, bleeding gums, dry mouth, sinus pain, sinus congestion and neck mass.    Eyes:  Negative for double vision, pain, redness, eye pain, decreased vision, eye watering, eye bulging, eye dryness, flashing lights, spots, floaters, strabismus, tunnel vision, jaundice and eye irritation.   Respiratory:   Negative for cough, hemoptysis,  sputum production, shortness of breath, wheezing, sleep disturbances due to breathing, snores loudly, respiratory pain, dyspnea on exertion, cough disturbing sleep and postural dyspnea.    Cardiovascular:  Negative for chest pain, dyspnea on exertion, palpitations, orthopnea, claudication, leg swelling, fingers/toes turn blue, hypertension, hypotension, syncope, history of heart murmur, chest pain on exertion, chest pain at rest, pacemaker, few scattered varicosities, leg pain, sleep disturbances due to breathing, tachycardia, light-headedness, exercise intolerance and edema.   Gastrointestinal:  Negative for heartburn, nausea, vomiting, abdominal pain, diarrhea, constipation, blood in stool, melena, rectal pain, bloating, hemorrhoids, bowel incontinence, jaundice, rectal bleeding, coffee ground emesis and change in stool.   Genitourinary:  Negative for bladder incontinence, dysuria, urgency, hematuria, flank pain, vaginal discharge, difficulty urinating, genital sores, dyspareunia, decreased libido, nocturia, voiding less frequently, arousal difficulty, abnormal vaginal bleeding, excessive menstruation, menstrual changes, hot flashes, vaginal dryness and postmenopausal bleeding.   Musculoskeletal:  Negative for myalgias, back pain, joint swelling, arthralgias, stiffness, muscle cramps, neck pain, bone pain, muscle weakness and fracture.   Skin:  Negative for nail changes, itching, poor wound healing, rash, hair changes, skin changes, acne, warts, poor wound healing, scarring, flaky skin, Raynaud's phenomenon, sensitivity to sunlight and skin thickening.   Neurological:  Positive for tingling, speech change, weakness and paralysis. Negative for dizziness, tremors, seizures, loss of consciousness, light-headedness, headaches, disturbances in coordination, memory loss and difficulty walking.   Endo/Heme:  Negative for anemia, swollen glands and bruises/bleeds easily.   Psychiatric/Behavioral:  Negative for depression,  hallucinations, memory loss, decreased concentration, mood swings and panic attacks.    Breast:  Negative for breast discharge, breast mass, breast pain and nipple retraction.   Endocrine:  Negative for altered temperature regulation, polyphagia, polydipsia, unwanted hair growth and change in facial hair.          Past Medical History:    Past Medical History:   Diagnosis Date     Chronic airway obstruction (H)      Gastroesophageal reflux disease      Hiatal hernia      Hypertension      Malignant neoplasm of ovary (H)      Ovarian cancer, unspecified laterality (H) 3/10/2021     Personal history of contact with and (suspected) exposure to asbestos      Primary osteoarthritis of right hip 7/9/2020         Past Surgical History:    Past Surgical History:   Procedure Laterality Date     BIOPSY BREAST Left      BREAST BIOPSY, CORE RT/LT  1994     CHOLECYSTECTOMY  1995     COLONOSCOPY  05/2010    Diverticulosis, colon polyps; repeat 5 yrs     COLONOSCOPY N/A 11/16/2015    Procedure: COLONOSCOPY;  Surgeon: Alejandro Matos MD;  Location: [facility]     COLONOSCOPY N/A 9/17/2021    Procedure: COLONOSCOPY;  Surgeon: Aayush George MD;  Location: [facility]     Colonoscopy, hyperplastic polyps, repeat in 5 yrs  2004     ESOPHAGOSCOPY, GASTROSCOPY, DUODENOSCOPY (EGD), COMBINED  09/30/2013    Procedure: COMBINED ESOPHAGOSCOPY, GASTROSCOPY, DUODENOSCOPY (EGD), BIOPSY SINGLE OR MULTIPLE;  Gastroscopy;  Surgeon: Blaise Gill MD;  Location: [facility]     EYE SURGERY  2012    R/L Cataracts     HC REMOVE TONSILS/ADENOIDS,12+ Y/O      T & A 13+y.o.     HERNIORRHAPHY INCISIONAL (LOCATION) N/A 3/24/2021    Procedure: HERNIORRHAPHY, INCISIONAL, OPEN WITH MESH;  Surgeon: Aayush George MD;  Location: [facility]     HYSTERECTOMY, PAP NO LONGER INDICATED       LAPAROSCOPIC SALPINGO-OOPHORECTOMY N/A 07/24/2020    Procedure: Laparoscopy, removal or pelvic mass, both tubes and ovaries, omentectomy, anterior culvectomy, pelvic and para aortic lymph  node dissection, laparotomy;  Surgeon: Felipe Corona MD;  Location: [facility]     MO ANESTH,LUMBAR SPINE,CORD SURGERY  10/2020    L3-4 L4-5 TRANSFORAMINAL INTERBODY FUSION L3-5, POSTERIOR INSTRUMENTED FUSION AND DECOMPRESSION     TVH for DUB, ovaries in       VASCULAR SURGERY  2014    [place] closure     ZZC ANESTH,KNEE VEINS SURGERY  08/20/2014         Health Maintenance Due   Topic Date Due     DEXA  04/27/2022       Current Medications:     Current Outpatient Medications   Medication Sig Dispense Refill     atenolol (TENORMIN) 25 MG tablet Take 1 tablet (25 mg) by mouth daily 90 tablet 3     Evening Primrose Oil 1000 MG CAPS One daily       meloxicam (MOBIC) 15 MG tablet Take 15 mg by mouth daily  (Patient not taking: Reported on 2/11/2022)       mometasone (ELOCON) 0.1 % external cream Apply sparingly to affected area twice daily for 2 weeks then decrease to 1 time daily for 30 days then decrease to 2-3 times per week 45 g 11     omega 3 1000 MG CAPS Take by mouth daily        pravastatin (PRAVACHOL) 80 MG tablet Take 1 tablet (80 mg) by mouth daily 90 tablet 3     THERATEARS 0.25 % OP SOLN BID       valsartan-hydrochlorothiazide (DIOVAN HCT) 320-25 MG tablet Take 1 tablet by mouth daily 90 tablet 3     VIACTIV 500-100-40 OR CHEW once daily           Allergies:        Allergies   Allergen Reactions     Ezetimibe Other (See Comments)     Severe muscle aches     Lisinopril      Omeprazole      Other reaction(s): *Unknown     Prilosec [Omeprazole]      Zestril [Ace Inhibitors] Cough     Atorvastatin Other (See Comments)     Muscle Pain     Irbesartan Cramps     Rosuvastatin Other (See Comments)     Muscle Pain        Social History:     Social History     Tobacco Use     Smoking status: Never Smoker     Smokeless tobacco: Never Used   Substance Use Topics     Alcohol use: No       History   Drug Use No         Family History:     The patient's family history is notable for:    Family History   Problem  "Relation Age of Onset     Cancer Mother         uterine cancer,      Respiratory Mother         COPD     Cardiovascular Mother         AAA  age 80     Breast Cancer Maternal Grandmother      Cancer Maternal Grandfather         cancer unknown type     Breast Cancer Sister      Eye Disorder Father         cataracts     Depression Father      Depression Son      Depression Son      Breast Cancer Sister         X2     Cancer - colorectal No family hx of         no ovarian cancer         Physical Exam:     /78 (BP Location: Right arm, Patient Position: Sitting, Cuff Size: Adult Large)   Pulse 57   Temp 97.6  F (36.4  C) (Oral)   Ht 1.594 m (5' 2.76\")   Wt 94.8 kg (208 lb 14.4 oz)   SpO2 100%   BMI 37.29 kg/m    Body mass index is 37.29 kg/m .    General Appearance: healthy and alert, no distress     HEENT: no palpable nodules or masses        Cardiovascular: regular rate and rhythm, no gallops, rubs or murmurs     Respiratory: lungs clear, no rales, rhonchi or wheezes    Musculoskeletal: extremities non tender and without edema    Skin: no lesions or rashes     Neurological: normal gait, no gross defects     Psychiatric: appropriate mood and affect                               Hematological: normal cervical, supraclavicular and inguinal lymph nodes     Gastrointestinal:       abdomen soft, non-tender, non-distended, no organomegaly or masses    Genitourinary: External genitalia and urethral meatus appears normal.  Vagina is smooth without nodularity or masses.  Cervix is surgically absent.  Bimanual exam reveal no masses, nodularity or fullness.  Recto-vaginal exam confirms these findings.      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  She is s/p BSO, PPLND 2020 which served as her treatment.  She is here today for a surveillance visit.        40 minutes spent on the date of the encounter doing chart review, " history and exam, documentation, and further activities as noted above.      Plan:     1.)        Ovarian cancer:  IAN on exam.  RTC in 3 months for next surveillance visit and .  At this point she will be 2 years out from treatment and can begin extending her visits to every 6 months for an additional 3 years (7/2025), then annually.  Reviewed signs and symptoms for when she should contact the clinic or seek additional care.  Patient to contact the clinic with any questions or concerns in the interim.        Genetic risk factors were assessed and she is negative for mutations in the ADALID, BRCA1, BRCA2, BRIP1, CDH1, CHEK2, EPCAM, MLH1, MSH2, MSH6, NBN, NF1, PALB2, PMS2, PTEN, RAD51C, RAD51D, STK11, and TP53 genes.       Labs and/or tests ordered include:  .                2.)        Health maintenance:  Issues addressed today include following up with PCP for annual health maintenance and non-gynecologic issues.  Encouraged to schedule her mammogram.      3.)          Neurological changes: Discussed what she experienced may have been a TIA.  Would strongly advise reporting to the ED for further evaluation is she has a recurrence of these symptoms.  Encouraged her to follow-up with her PCP as scheduled or to contact them to see if she can be squeezed in sooner.        Kerry Sinclair, DNP, APRN, FNP-C  Nurse Practitioner  Division of Gynecologic Oncology  Pager: [phone]     CC  Patient Care Team:  Kathya Escalera DO as PCP - General (Internal Medicine)  Kathya Escalera DO as Assigned PCP  Feilpe Corona MD as Assigned Cancer Care Provider  Aayush George MD as Assigned Surgical Provider  Fatimah Clement MD as MD (Neurology)  Fatimah Clement MD as Assigned Neuroscience Provider  SELF, REFERRED

## 2022-04-02 ASSESSMENT — ENCOUNTER SYMPTOMS
TREMORS: 0
PARALYSIS: 1
WEAKNESS: 1
SEIZURES: 0
SPEECH CHANGE: 1
MEMORY LOSS: 0
DISTURBANCES IN COORDINATION: 0
LOSS OF CONSCIOUSNESS: 0
TINGLING: 1
DIZZINESS: 0
HEADACHES: 0

## 2022-04-05 ENCOUNTER — ONCOLOGY VISIT (OUTPATIENT)
Dept: ONCOLOGY | Facility: CLINIC | Age: 82
End: 2022-04-05
Attending: NURSE PRACTITIONER
Payer: MEDICARE

## 2022-04-05 ENCOUNTER — LAB (OUTPATIENT)
Dept: LAB | Facility: CLINIC | Age: 82
End: 2022-04-05
Attending: NURSE PRACTITIONER
Payer: MEDICARE

## 2022-04-05 VITALS
OXYGEN SATURATION: 100 % | HEART RATE: 57 BPM | HEIGHT: 63 IN | DIASTOLIC BLOOD PRESSURE: 78 MMHG | BODY MASS INDEX: 37.02 KG/M2 | WEIGHT: 208.9 LBS | TEMPERATURE: 97.6 F | SYSTOLIC BLOOD PRESSURE: 123 MMHG

## 2022-04-05 DIAGNOSIS — C56.9 OVARIAN CANCER, UNSPECIFIED LATERALITY (H): ICD-10-CM

## 2022-04-05 DIAGNOSIS — Z08 ENCOUNTER FOR FOLLOW-UP SURVEILLANCE OF OVARIAN CANCER: ICD-10-CM

## 2022-04-05 DIAGNOSIS — C56.9 OVARIAN CANCER, UNSPECIFIED LATERALITY (H): Primary | ICD-10-CM

## 2022-04-05 DIAGNOSIS — Z85.43 ENCOUNTER FOR FOLLOW-UP SURVEILLANCE OF OVARIAN CANCER: ICD-10-CM

## 2022-04-05 LAB — CANCER AG125 SERPL-ACNC: 6 U/ML (ref 0–30)

## 2022-04-05 PROCEDURE — G0463 HOSPITAL OUTPT CLINIC VISIT: HCPCS

## 2022-04-05 PROCEDURE — 99215 OFFICE O/P EST HI 40 MIN: CPT | Performed by: NURSE PRACTITIONER

## 2022-04-05 PROCEDURE — 36415 COLL VENOUS BLD VENIPUNCTURE: CPT

## 2022-04-05 PROCEDURE — 86304 IMMUNOASSAY TUMOR CA 125: CPT

## 2022-04-05 ASSESSMENT — ENCOUNTER SYMPTOMS
SLEEP DISTURBANCES DUE TO BREATHING: 0
POOR WOUND HEALING: 0
BLOOD IN STOOL: 0
FATIGUE: 0
JAUNDICE: 0
PANIC: 0
HEMOPTYSIS: 0
ALTERED TEMPERATURE REGULATION: 0
EYE IRRITATION: 0
HYPERTENSION: 0
COUGH DISTURBING SLEEP: 0
RECTAL PAIN: 0
RECTAL BLEEDING: 0
LOSS OF CONSCIOUSNESS: 0
DISTURBANCES IN COORDINATION: 0
INCREASED ENERGY: 0
TASTE DISTURBANCE: 0
DECREASED APPETITE: 0
HOT FLASHES: 0
POLYDIPSIA: 0
TINGLING: 1
LEG SWELLING: 0
INSOMNIA: 0
SKIN CHANGES: 0
STIFFNESS: 0
SNORES LOUDLY: 0
PALPITATIONS: 0
WHEEZING: 0
DYSPNEA ON EXERTION: 0
SEIZURES: 0
HEARTBURN: 0
DIZZINESS: 0
DECREASED CONCENTRATION: 0
ABDOMINAL PAIN: 0
MUSCLE CRAMPS: 0
NAIL CHANGES: 0
CONSTIPATION: 0
HEMATURIA: 0
BREAST MASS: 0
SMELL DISTURBANCE: 0
COUGH: 0
SHORTNESS OF BREATH: 0
HYPOTENSION: 0
NECK PAIN: 0
EYE PAIN: 0
NECK MASS: 0
EXERCISE INTOLERANCE: 0
PARALYSIS: 1
CLAUDICATION: 0
DOUBLE VISION: 0
HOARSE VOICE: 0
FLANK PAIN: 0
TACHYCARDIA: 0
EYE REDNESS: 0
POLYPHAGIA: 0
LIGHT-HEADEDNESS: 0
HALLUCINATIONS: 0
SYNCOPE: 0
JOINT SWELLING: 0
DYSURIA: 0
WEAKNESS: 1
DEPRESSION: 0
SINUS CONGESTION: 0
ARTHRALGIAS: 0
NIGHT SWEATS: 0
BLOATING: 0
BOWEL INCONTINENCE: 0
TROUBLE SWALLOWING: 0
EYE WATERING: 0
RESPIRATORY PAIN: 0
POSTURAL DYSPNEA: 0
VOMITING: 0
SPUTUM PRODUCTION: 0
NERVOUS/ANXIOUS: 0
BACK PAIN: 0
HEADACHES: 0
MUSCLE WEAKNESS: 0
SWOLLEN GLANDS: 0
SINUS PAIN: 0
MYALGIAS: 0
FEVER: 0
BREAST PAIN: 0
DIFFICULTY URINATING: 0
DECREASED LIBIDO: 0
SPEECH CHANGE: 1
MEMORY LOSS: 0
DIARRHEA: 0
SORE THROAT: 0
BRUISES/BLEEDS EASILY: 0
CHILLS: 0
WEIGHT LOSS: 0
ORTHOPNEA: 0
WEIGHT GAIN: 0
NAUSEA: 0
LEG PAIN: 0
TREMORS: 0

## 2022-04-05 ASSESSMENT — PAIN SCALES - GENERAL: PAINLEVEL: EXTREME PAIN (8)

## 2022-04-05 NOTE — LETTER
2022         RE: Rosie Blackman  [id number] [facility] [id number]        Dear Colleague,    Thank you for referring your patient, Rosie Blackman, to the [facility]. Please see a copy of my visit note below.    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  She is s/p BSO, PPLND 2020 which served as her treatment.  She is here today for a surveillance visit.      Oncology History:  She originally presented with a complaint of hip pain.  The work-up of this included an MRI which has demonstrated a large pelvic mass, her  was elevated (108, CEA 19-9 were normal).     2020: Exploratory laparoscopy followed by laparotomy, bilateral salpingo-oophorectomy, omentectomy, pelvic and periaortic lymph node dissection, anterior culdectomy.    FINAL DIAGNOSIS:   A. OVARIES, LEFT AND RIGHT, BILATERAL OOPHORECTOMY:   - Right ovary with ENDOMETRIOID ADENOCARCINOMA, FIGO grade 1   - Left ovary with benign inclusion cysts, negative for malignancy   - Unremarkable bilateral fallopian tubes, negative for malignancy     B. MESENTERY, BIOPSY:   - Fibrovascular adhesions, negative for malignancy     C. OMENTUM, OMENTECTOMY:   - Adipose tissue, negative for malignancy     D. ANTERIOR CUL-DE-SAC, BIOPSY:   - Fibroadipose tissue with foci of endometriosis   - Negative for malignancy     E. POSTERIOR CUL-DE-SAC, BIOPSY:   - Fibrovascular tissue, negative for malignancy     F. LYMPH NODE, LEFT PELVIC, EXCISION:   - Eleven reactive lymph nodes, negative for malignancy (0/11)     G. LYMPH NODE, RIGHT PELVIC, EXCISION:   - Nine reactive lymph nodes, negative for malignancy (0/9)     H. LYMPH NODE, RIGHT PARA AORTIC, EXCISION:   - Three  reactive lymph nodes, negative for malignancy (0/3)     11/9/2020:  20.  2/1/21:  8.  5/17/21:  7.  9/20/21:  <5.  1/4/22:  6.   4/5/22:  pending.                  Today she comes to clinic feeling well overall.  While on vacation in [place] on 3/21 she developed right sided tingling and arm weakness, speech changes, facial droop, and aphasia.  Episode lasted 20 to 30 seconds.  She reached out to her PCP via [email or web address] who advised reporting to the ED for further evaluation.  She chose not to do this as her symptoms have resolved.  She has follow-up scheduled with her PCP in a couple weeks.  From a gynecologic perspective she denies any vaginal bleeding, no changes in her bowel or bladder habits though admits since surgery she does not feel an urge to urinate.  This is not changed.  No nausea/emesis.  No difficulties eating or sleeping.  She does have BLE swelling which has been her baseline for many years.  She denies any abdominal discomfort/bloating, no fevers or chills, and no chest pain or shortness of breath.  She is current with her annual physical, colon cancer screening, mammogram, and she is fully vaccinated against COVID.               Health Maintenance  Colonoscopy: 11/16/15, repeat in 10 years  Mammogram: 2/11/22  Annual physical: 1/13/22  COVID vaccine: 2/4/21, 2/25/21, 9/30/21 Pfizer         Review of Systems     Constitutional:  Negative for fever, chills, weight loss, weight gain, fatigue, decreased appetite, night sweats, recent stressors, height gain, height loss, post-operative complications, incisional pain, hallucinations, increased energy, hyperactivity and confused.   HENT:  Negative for ear pain, hearing loss, tinnitus, nosebleeds, trouble swallowing, hoarse voice, mouth sores, sore throat, ear discharge, tooth pain, gum tenderness, taste disturbance, smell disturbance, hearing aid, bleeding gums, dry mouth, sinus pain, sinus congestion and neck mass.     Eyes:  Negative for double vision, pain, redness, eye pain, decreased vision, eye watering, eye bulging, eye dryness, flashing lights, spots, floaters, strabismus, tunnel vision, jaundice and eye irritation.   Respiratory:   Negative for cough, hemoptysis, sputum production, shortness of breath, wheezing, sleep disturbances due to breathing, snores loudly, respiratory pain, dyspnea on exertion, cough disturbing sleep and postural dyspnea.    Cardiovascular:  Negative for chest pain, dyspnea on exertion, palpitations, orthopnea, claudication, leg swelling, fingers/toes turn blue, hypertension, hypotension, syncope, history of heart murmur, chest pain on exertion, chest pain at rest, pacemaker, few scattered varicosities, leg pain, sleep disturbances due to breathing, tachycardia, light-headedness, exercise intolerance and edema.   Gastrointestinal:  Negative for heartburn, nausea, vomiting, abdominal pain, diarrhea, constipation, blood in stool, melena, rectal pain, bloating, hemorrhoids, bowel incontinence, jaundice, rectal bleeding, coffee ground emesis and change in stool.   Genitourinary:  Negative for bladder incontinence, dysuria, urgency, hematuria, flank pain, vaginal discharge, difficulty urinating, genital sores, dyspareunia, decreased libido, nocturia, voiding less frequently, arousal difficulty, abnormal vaginal bleeding, excessive menstruation, menstrual changes, hot flashes, vaginal dryness and postmenopausal bleeding.   Musculoskeletal:  Negative for myalgias, back pain, joint swelling, arthralgias, stiffness, muscle cramps, neck pain, bone pain, muscle weakness and fracture.   Skin:  Negative for nail changes, itching, poor wound healing, rash, hair changes, skin changes, acne, warts, poor wound healing, scarring, flaky skin, Raynaud's phenomenon, sensitivity to sunlight and skin thickening.   Neurological:  Positive for tingling, speech change, weakness and paralysis. Negative for dizziness,  tremors, seizures, loss of consciousness, light-headedness, headaches, disturbances in coordination, memory loss and difficulty walking.   Endo/Heme:  Negative for anemia, swollen glands and bruises/bleeds easily.   Psychiatric/Behavioral:  Negative for depression, hallucinations, memory loss, decreased concentration, mood swings and panic attacks.    Breast:  Negative for breast discharge, breast mass, breast pain and nipple retraction.   Endocrine:  Negative for altered temperature regulation, polyphagia, polydipsia, unwanted hair growth and change in facial hair.          Past Medical History:    Past Medical History:   Diagnosis Date     Chronic airway obstruction (H)      Gastroesophageal reflux disease      Hiatal hernia      Hypertension      Malignant neoplasm of ovary (H)      Ovarian cancer, unspecified laterality (H) 3/10/2021     Personal history of contact with and (suspected) exposure to asbestos      Primary osteoarthritis of right hip 7/9/2020         Past Surgical History:    Past Surgical History:   Procedure Laterality Date     BIOPSY BREAST Left      BREAST BIOPSY, CORE RT/LT  1994     CHOLECYSTECTOMY  1995     COLONOSCOPY  05/2010    Diverticulosis, colon polyps; repeat 5 yrs     COLONOSCOPY N/A 11/16/2015    Procedure: COLONOSCOPY;  Surgeon: Alejandro Matos MD;  Location: [facility]     COLONOSCOPY N/A 9/17/2021    Procedure: COLONOSCOPY;  Surgeon: Aayush George MD;  Location: [facility]     Colonoscopy, hyperplastic polyps, repeat in 5 yrs  2004     ESOPHAGOSCOPY, GASTROSCOPY, DUODENOSCOPY (EGD), COMBINED  09/30/2013    Procedure: COMBINED ESOPHAGOSCOPY, GASTROSCOPY, DUODENOSCOPY (EGD), BIOPSY SINGLE OR MULTIPLE;  Gastroscopy;  Surgeon: Blaise Gill MD;  Location: [facility]     EYE SURGERY  2012    R/L Cataracts     HC REMOVE TONSILS/ADENOIDS,12+ Y/O      T & A 13+y.o.     HERNIORRHAPHY INCISIONAL (LOCATION) N/A 3/24/2021    Procedure: HERNIORRHAPHY, INCISIONAL, OPEN WITH MESH;  Surgeon: Doris  Aayush MENESES MD;  Location: [facility]     HYSTERECTOMY, PAP NO LONGER INDICATED       LAPAROSCOPIC SALPINGO-OOPHORECTOMY N/A 07/24/2020    Procedure: Laparoscopy, removal or pelvic mass, both tubes and ovaries, omentectomy, anterior culvectomy, pelvic and para aortic lymph node dissection, laparotomy;  Surgeon: Felipe Corona MD;  Location: [facility]     MN ANESTH,LUMBAR SPINE,CORD SURGERY  10/2020    L3-4 L4-5 TRANSFORAMINAL INTERBODY FUSION L3-5, POSTERIOR INSTRUMENTED FUSION AND DECOMPRESSION     TVH for DUB, ovaries in       VASCULAR SURGERY  2014    Delaplaine closure     ZZC ANESTH,KNEE VEINS SURGERY  08/20/2014         Health Maintenance Due   Topic Date Due     DEXA  04/27/2022       Current Medications:     Current Outpatient Medications   Medication Sig Dispense Refill     atenolol (TENORMIN) 25 MG tablet Take 1 tablet (25 mg) by mouth daily 90 tablet 3     Evening Primrose Oil 1000 MG CAPS One daily       meloxicam (MOBIC) 15 MG tablet Take 15 mg by mouth daily  (Patient not taking: Reported on 2/11/2022)       mometasone (ELOCON) 0.1 % external cream Apply sparingly to affected area twice daily for 2 weeks then decrease to 1 time daily for 30 days then decrease to 2-3 times per week 45 g 11     omega 3 1000 MG CAPS Take by mouth daily        pravastatin (PRAVACHOL) 80 MG tablet Take 1 tablet (80 mg) by mouth daily 90 tablet 3     THERATEARS 0.25 % OP SOLN BID       valsartan-hydrochlorothiazide (DIOVAN HCT) 320-25 MG tablet Take 1 tablet by mouth daily 90 tablet 3     VIACTIV 500-100-40 OR CHEW once daily           Allergies:        Allergies   Allergen Reactions     Ezetimibe Other (See Comments)     Severe muscle aches     Lisinopril      Omeprazole      Other reaction(s): *Unknown     Prilosec [Omeprazole]      Zestril [Ace Inhibitors] Cough     Atorvastatin Other (See Comments)     Muscle Pain     Irbesartan Cramps     Rosuvastatin Other (See Comments)     Muscle Pain        Social History:     Social  "History     Tobacco Use     Smoking status: Never Smoker     Smokeless tobacco: Never Used   Substance Use Topics     Alcohol use: No       History   Drug Use No         Family History:     The patient's family history is notable for:    Family History   Problem Relation Age of Onset     Cancer Mother         uterine cancer,      Respiratory Mother         COPD     Cardiovascular Mother         AAA  age 80     Breast Cancer Maternal Grandmother      Cancer Maternal Grandfather         cancer unknown type     Breast Cancer Sister      Eye Disorder Father         cataracts     Depression Father      Depression Son      Depression Son      Breast Cancer Sister         X2     Cancer - colorectal No family hx of         no ovarian cancer         Physical Exam:     /78 (BP Location: Right arm, Patient Position: Sitting, Cuff Size: Adult Large)   Pulse 57   Temp 97.6  F (36.4  C) (Oral)   Ht 1.594 m (5' 2.76\")   Wt 94.8 kg (208 lb 14.4 oz)   SpO2 100%   BMI 37.29 kg/m    Body mass index is 37.29 kg/m .    General Appearance: healthy and alert, no distress     HEENT: no palpable nodules or masses        Cardiovascular: regular rate and rhythm, no gallops, rubs or murmurs     Respiratory: lungs clear, no rales, rhonchi or wheezes    Musculoskeletal: extremities non tender and without edema    Skin: no lesions or rashes     Neurological: normal gait, no gross defects     Psychiatric: appropriate mood and affect                               Hematological: normal cervical, supraclavicular and inguinal lymph nodes     Gastrointestinal:       abdomen soft, non-tender, non-distended, no organomegaly or masses    Genitourinary: External genitalia and urethral meatus appears normal.  Vagina is smooth without nodularity or masses.  Cervix is surgically absent.  Bimanual exam reveal no masses, nodularity or fullness.  Recto-vaginal exam confirms these findings.      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  She is s/p BSO, PPLND 7/24/2020 which served as her treatment.  She is here today for a surveillance visit.        40 minutes spent on the date of the encounter doing chart review, history and exam, documentation, and further activities as noted above.      Plan:     1.)        Ovarian cancer:  IAN on exam.  RTC in 3 months for next surveillance visit and .  At this point she will be 2 years out from treatment and can begin extending her visits to every 6 months for an additional 3 years (7/2025), then annually.  Reviewed signs and symptoms for when she should contact the clinic or seek additional care.  Patient to contact the clinic with any questions or concerns in the interim.        Genetic risk factors were assessed and she is negative for mutations in the ADALID, BRCA1, BRCA2, BRIP1, CDH1, CHEK2, EPCAM, MLH1, MSH2, MSH6, NBN, NF1, PALB2, PMS2, PTEN, RAD51C, RAD51D, STK11, and TP53 genes.       Labs and/or tests ordered include:  .                2.)        Health maintenance:  Issues addressed today include following up with PCP for annual health maintenance and non-gynecologic issues.  Encouraged to schedule her mammogram.      3.)          Neurological changes: Discussed what she experienced may have been a TIA.  Would strongly advise reporting to the ED for further evaluation is she has a recurrence of these symptoms.  Encouraged her to follow-up with her PCP as scheduled or to contact them to see if she can be squeezed in sooner.        Kerry Sinclair, DNP, APRN, FNP-C  Nurse Practitioner  Division of Gynecologic Oncology  Pager: [phone]     CC  Patient Care Team:  Kathya Escalera DO as PCP - General (Internal Medicine)  Felipe Corona MD as Assigned Cancer Care Provider  Aayush George MD as Assigned Surgical Provider  Fatimah Clement MD as MD (Neurology)

## 2022-04-05 NOTE — NURSING NOTE
"Oncology Rooming Note    April 5, 2022 10:24 AM   Rosie Blackman is a 82 year old female who presents for:    Chief Complaint   Patient presents with     Oncology Clinic Visit     Ovarian cancer     Initial Vitals: /78 (BP Location: Right arm, Patient Position: Sitting, Cuff Size: Adult Large)   Pulse 57   Temp 97.6  F (36.4  C) (Oral)   Ht 1.594 m (5' 2.76\")   Wt 94.8 kg (208 lb 14.4 oz)   SpO2 100%   BMI 37.29 kg/m   Estimated body mass index is 37.29 kg/m  as calculated from the following:    Height as of this encounter: 1.594 m (5' 2.76\").    Weight as of this encounter: 94.8 kg (208 lb 14.4 oz). Body surface area is 2.05 meters squared.  Extreme Pain (8) Comment: Data Unavailable   No LMP recorded. Patient has had a hysterectomy.  Allergies reviewed: Yes  Medications reviewed: Yes    Medications: Medication refills not needed today.  Pharmacy name entered into enercast:    [id number] - [place], [place] - [address] - MAIL ORDER MAINT MEDS - NON-EPRESCRIBE  [facility] - [place], [place] - [address]  [id number] - [place], [place] - [address] [place] [address]    Clinical concerns: None.      Patrick Washington CMA            "

## 2022-04-05 NOTE — NURSING NOTE
Chief Complaint   Patient presents with     Blood Draw     lab only appointment.  labs drawn by venipuncture by rn in lab.      Hattie Almeida RN

## 2022-04-27 ENCOUNTER — OFFICE VISIT (OUTPATIENT)
Dept: FAMILY MEDICINE | Facility: CLINIC | Age: 82
End: 2022-04-27
Payer: MEDICARE

## 2022-04-27 VITALS
SYSTOLIC BLOOD PRESSURE: 124 MMHG | DIASTOLIC BLOOD PRESSURE: 80 MMHG | RESPIRATION RATE: 16 BRPM | HEIGHT: 62 IN | WEIGHT: 208.8 LBS | TEMPERATURE: 97.7 F | BODY MASS INDEX: 38.42 KG/M2 | HEART RATE: 61 BPM | OXYGEN SATURATION: 97 %

## 2022-04-27 DIAGNOSIS — E78.5 HYPERLIPIDEMIA LDL GOAL <130: ICD-10-CM

## 2022-04-27 DIAGNOSIS — G45.9 TIA (TRANSIENT ISCHEMIC ATTACK): Primary | ICD-10-CM

## 2022-04-27 PROCEDURE — 99215 OFFICE O/P EST HI 40 MIN: CPT | Performed by: INTERNAL MEDICINE

## 2022-04-27 ASSESSMENT — PAIN SCALES - GENERAL: PAINLEVEL: NO PAIN (0)

## 2022-04-27 NOTE — PATIENT INSTRUCTIONS
TIA:  You need to have a cardiology test done - 3 day heart monitor and echocardiogram Please call [phone] to schedule.   Order for MRI of head/neck. Radiology test was ordered.  Please call [phone] to schedule.  Start Aspirin 81 mg daily  Ok to continue Meloxicam - can increase bleeding/bruising on aspirin  Continue pravastatin - has not tolerated other statins due to muscle aches  Go to ER if symptoms recur

## 2022-04-27 NOTE — PROGRESS NOTES
Chief Complaint   Patient presents with     Transient Ischemic Attack     March 21 2022 , did not go to the hospital was in [place] when this happen        Assessment & Plan   Problem List Items Addressed This Visit     Hyperlipidemia LDL goal <130    TIA (transient ischemic attack) - Primary    Relevant Medications    aspirin (ASA) 81 MG EC tablet    Other Relevant Orders    REVIEW OF HEALTH MAINTENANCE PROTOCOL ORDERS (Completed)    MRA Neck (Carotids) w Contrast    MRA Brain ([place]) w Contrast    Echocardiogram Complete    Adult Leadless EKG Monitor 3 to 7 Days         Her symptoms certainly sound like a TIA.  There is no way to prove that at this time since she did not have medical evaluation.  Discussed FAST acronym if she were to develop stroke symptoms again.  Since the symptoms were remote, she does not need emergency evaluation at this time.  Discussed full work-up including head and neck vessel imaging, echo and Zio patch.  Discussed possible risk factors of TIA including A. fib or vascular disease.  Advised to start an aspirin.  Not able to change to more potent statin due to intolerance.  Blood pressure well controlled.  She is not diabetes, she does not smoke.          42 minutes spent on the date of the encounter doing chart review, review of test results, patient visit and documentation        Patient Instructions   TIA:  1. You need to have a cardiology test done - 3 day heart monitor and echocardiogram Please call [phone] to schedule.   2. Order for MRI of head/neck. Radiology test was ordered.  Please call [phone] to schedule.  3. Start Aspirin 81 mg daily  4. Ok to continue Meloxicam - can increase bleeding/bruising on aspirin  5. Continue pravastatin - has not tolerated other statins due to muscle aches  6. Go to ER if symptoms recur        No follow-ups on file.    Kathya Escalera, [facility]    Elle Velez is a 82 year old who presents for  the following health issues  accompanied by her self.    History of Present Illness       Reason for visit:  Do I need follow up tests  Symptom onset:  More than a month  Symptoms include:  Weakness right hand loss of speech drooping left side of mouth  Symptom intensity:  Severe  Symptom progression:  Improving  Had these symptoms before:  No  What makes it worse:  No  What makes it better:  No    She eats 2-3 servings of fruits and vegetables daily.She consumes 0 sweetened beverage(s) daily.She exercises with enough effort to increase her heart rate 10 to 19 minutes per day.  She exercises with enough effort to increase her heart rate 5 days per week.   She is taking medications regularly.       Cerebrovascular Follow-up      Patient history: TIA    Residual symptoms: None, Facial droop on the left, Headache (location: top of head) just now but not then, Slurred speech and Weakness in the arm on the right    Worsened or new symptoms since last visit: No    Daily aspirin use: No    Hypertension controlled: Yes    She doesn't take ASA due to excessive bruising    She is not headache prone, but since this episode she feels slight headache at times    My Chart message 3/21/22  I am in [place] on vacation. I just experienced what May have been a TIA. Weakness in right arm, drooping  left mouth and couldn t speak. No loss of vision or blurring. No dizziness. This lasted about 20-30 seconds. Am feeling fine now. All symptoms have disappeared. Do I need to go to Urgent Care?  --she reports sudden onset left mouth drooping, right hand weakness, trouble speaking, lasted 30 sec;  She sent a My Chart message but did not receive a response for 2 days from the care team.  She then made appointment but first appointment with me was not for 1 month; she never called to talk with care team  --she never sought medical care in [place]   --uses Mobic 1 x week for back pain    Hyperlipidemia:  --has been on atorvastatin, rosuvastatin,  "simvastastin all in 2013 - PCP wondered about causing myalgia - but was having hip/buttock pain, we now know she has/had significant spinal stenosis;  However, there was also generalized muscle aches on both atorvastatin and simvastatin, rosuvastatin      Current Outpatient Medications   Medication Sig Dispense Refill     atenolol (TENORMIN) 25 MG tablet Take 1 tablet (25 mg) by mouth daily 90 tablet 3     Evening Primrose Oil 1000 MG CAPS One daily       meloxicam (MOBIC) 15 MG tablet Take 15 mg by mouth daily       mometasone (ELOCON) 0.1 % external cream Apply sparingly to affected area twice daily for 2 weeks then decrease to 1 time daily for 30 days then decrease to 2-3 times per week 45 g 11     omega 3 1000 MG CAPS Take by mouth daily        pravastatin (PRAVACHOL) 80 MG tablet Take 1 tablet (80 mg) by mouth daily 90 tablet 3     THERATEARS 0.25 % OP SOLN BID       valsartan-hydrochlorothiazide (DIOVAN HCT) 320-25 MG tablet Take 1 tablet by mouth daily 90 tablet 3     VIACTIV 500-100-40 OR CHEW once daily           Review of Systems   Constitutional, HEENT, cardiovascular, pulmonary, gi and gu systems are negative, except as otherwise noted.      Objective    /80 (BP Location: Right arm, Patient Position: Sitting, Cuff Size: Adult Large)   Pulse 61   Temp 97.7  F (36.5  C) (Tympanic)   Resp 16   Ht 1.585 m (5' 2.4\")   Wt 94.7 kg (208 lb 12.8 oz)   SpO2 97%   BMI 37.70 kg/m    Body mass index is 37.7 kg/m .  Physical Exam   GENERAL APPEARANCE: healthy, alert, no distress and over weight  NECK: no adenopathy, no asymmetry, masses, or scars, thyroid normal to palpation and no bruits  RESP: lungs clear to auscultation - no rales, rhonchi or wheezes  CV: regular rates and rhythm, normal S1 S2, no S3 or S4 and no murmur, click or rub  NEURO: Normal strength and tone, mentation intact, speech normal, DTR symmetrically normal in lower extremities, gait normal including heel/toe/tandem walking, cranial " nerves 2-12 intact and rapid alternating movements normal

## 2022-05-06 ENCOUNTER — APPOINTMENT (OUTPATIENT)
Dept: CT IMAGING | Facility: CLINIC | Age: 82
End: 2022-05-06
Attending: FAMILY MEDICINE
Payer: MEDICARE

## 2022-05-06 ENCOUNTER — HOSPITAL ENCOUNTER (EMERGENCY)
Facility: CLINIC | Age: 82
Discharge: HOME | End: 2022-05-06
Attending: FAMILY MEDICINE | Admitting: FAMILY MEDICINE
Payer: MEDICARE

## 2022-05-06 ENCOUNTER — MYC MEDICAL ADVICE (OUTPATIENT)
Dept: FAMILY MEDICINE | Facility: CLINIC | Age: 82
End: 2022-05-06

## 2022-05-06 VITALS
HEIGHT: 64 IN | OXYGEN SATURATION: 99 % | DIASTOLIC BLOOD PRESSURE: 72 MMHG | WEIGHT: 200 LBS | RESPIRATION RATE: 11 BRPM | HEART RATE: 82 BPM | TEMPERATURE: 98.8 F | BODY MASS INDEX: 34.15 KG/M2 | SYSTOLIC BLOOD PRESSURE: 179 MMHG

## 2022-05-06 DIAGNOSIS — S16.1XXA CERVICAL STRAIN, INITIAL ENCOUNTER: ICD-10-CM

## 2022-05-06 LAB
ANION GAP SERPL CALCULATED.3IONS-SCNC: 7 MMOL/L (ref 3–14)
BASOPHILS # BLD AUTO: 0.1 10E3/UL (ref 0–0.2)
BASOPHILS NFR BLD AUTO: 1 %
BUN SERPL-MCNC: 18 MG/DL (ref 7–30)
CALCIUM SERPL-MCNC: 9 MG/DL (ref 8.5–10.1)
CHLORIDE BLD-SCNC: 107 MMOL/L (ref 94–109)
CO2 SERPL-SCNC: 30 MMOL/L (ref 20–32)
CREAT SERPL-MCNC: 0.77 MG/DL (ref 0.52–1.04)
EOSINOPHIL # BLD AUTO: 0.3 10E3/UL (ref 0–0.7)
EOSINOPHIL NFR BLD AUTO: 4 %
ERYTHROCYTE [DISTWIDTH] IN BLOOD BY AUTOMATED COUNT: 13.6 % (ref 10–15)
GFR SERPL CREATININE-BSD FRML MDRD: 77 ML/MIN/1.73M2
GLUCOSE BLD-MCNC: 112 MG/DL (ref 70–99)
HCT VFR BLD AUTO: 42.2 % (ref 35–47)
HGB BLD-MCNC: 13.7 G/DL (ref 11.7–15.7)
HOLD SPECIMEN: NORMAL
HOLD SPECIMEN: NORMAL
IMM GRANULOCYTES # BLD: 0 10E3/UL
IMM GRANULOCYTES NFR BLD: 0 %
LYMPHOCYTES # BLD AUTO: 1.2 10E3/UL (ref 0.8–5.3)
LYMPHOCYTES NFR BLD AUTO: 18 %
MCH RBC QN AUTO: 31 PG (ref 26.5–33)
MCHC RBC AUTO-ENTMCNC: 32.5 G/DL (ref 31.5–36.5)
MCV RBC AUTO: 96 FL (ref 78–100)
MONOCYTES # BLD AUTO: 1 10E3/UL (ref 0–1.3)
MONOCYTES NFR BLD AUTO: 15 %
NEUTROPHILS # BLD AUTO: 4.4 10E3/UL (ref 1.6–8.3)
NEUTROPHILS NFR BLD AUTO: 62 %
NRBC # BLD AUTO: 0 10E3/UL
NRBC BLD AUTO-RTO: 0 /100
PLATELET # BLD AUTO: 203 10E3/UL (ref 150–450)
POTASSIUM BLD-SCNC: 3.7 MMOL/L (ref 3.4–5.3)
RBC # BLD AUTO: 4.42 10E6/UL (ref 3.8–5.2)
SODIUM SERPL-SCNC: 144 MMOL/L (ref 133–144)
WBC # BLD AUTO: 6.9 10E3/UL (ref 4–11)

## 2022-05-06 PROCEDURE — 250N000011 HC RX IP 250 OP 636: Performed by: FAMILY MEDICINE

## 2022-05-06 PROCEDURE — 72125 CT NECK SPINE W/O DYE: CPT

## 2022-05-06 PROCEDURE — 99285 EMERGENCY DEPT VISIT HI MDM: CPT | Mod: 25 | Performed by: FAMILY MEDICINE

## 2022-05-06 PROCEDURE — 70498 CT ANGIOGRAPHY NECK: CPT

## 2022-05-06 PROCEDURE — 96374 THER/PROPH/DIAG INJ IV PUSH: CPT | Mod: 59 | Performed by: FAMILY MEDICINE

## 2022-05-06 PROCEDURE — 36415 COLL VENOUS BLD VENIPUNCTURE: CPT | Performed by: FAMILY MEDICINE

## 2022-05-06 PROCEDURE — 250N000009 HC RX 250: Performed by: FAMILY MEDICINE

## 2022-05-06 PROCEDURE — 99284 EMERGENCY DEPT VISIT MOD MDM: CPT | Performed by: FAMILY MEDICINE

## 2022-05-06 PROCEDURE — 70496 CT ANGIOGRAPHY HEAD: CPT

## 2022-05-06 PROCEDURE — 85025 COMPLETE CBC W/AUTO DIFF WBC: CPT | Performed by: FAMILY MEDICINE

## 2022-05-06 PROCEDURE — G1004 CDSM NDSC: HCPCS

## 2022-05-06 PROCEDURE — 258N000003 HC RX IP 258 OP 636: Performed by: FAMILY MEDICINE

## 2022-05-06 PROCEDURE — 96361 HYDRATE IV INFUSION ADD-ON: CPT | Performed by: FAMILY MEDICINE

## 2022-05-06 PROCEDURE — 80048 BASIC METABOLIC PNL TOTAL CA: CPT | Performed by: FAMILY MEDICINE

## 2022-05-06 RX ORDER — IOPAMIDOL 755 MG/ML
75 INJECTION, SOLUTION INTRAVASCULAR ONCE
Status: COMPLETED | OUTPATIENT
Start: 2022-05-06 | End: 2022-05-06

## 2022-05-06 RX ORDER — HYDROCODONE BITARTRATE AND ACETAMINOPHEN 5; 325 MG/1; MG/1
1-2 TABLET ORAL EVERY 8 HOURS PRN
Qty: 12 TABLET | Refills: 0 | Status: SHIPPED | OUTPATIENT
Start: 2022-05-06 | End: 2022-05-09

## 2022-05-06 RX ORDER — KETOROLAC TROMETHAMINE 15 MG/ML
15 INJECTION, SOLUTION INTRAMUSCULAR; INTRAVENOUS ONCE
Status: COMPLETED | OUTPATIENT
Start: 2022-05-06 | End: 2022-05-06

## 2022-05-06 RX ADMIN — KETOROLAC TROMETHAMINE 15 MG: 15 INJECTION, SOLUTION INTRAMUSCULAR; INTRAVENOUS at 07:24

## 2022-05-06 RX ADMIN — SODIUM CHLORIDE 500 ML: 900 INJECTION, SOLUTION INTRAVENOUS at 07:24

## 2022-05-06 RX ADMIN — IOPAMIDOL 75 ML: 755 INJECTION, SOLUTION INTRAVENOUS at 07:46

## 2022-05-06 RX ADMIN — SODIUM CHLORIDE 100 ML: 9 INJECTION, SOLUTION INTRAVENOUS at 07:46

## 2022-05-06 NOTE — DISCHARGE INSTRUCTIONS
RETURN TO THE EMERGENCY ROOM FOR THE FOLLOWING:    Severely worsened pain, new one-sided weakness or incoordination, new difficulty with speech, new facial droop, new vertigo, or at anytime for any concern.    FOLLOW UP:    Consider follow-up with your primary for further evaluation.  Consider physical therapy follow-up.  I placed a referral order at the time of your discharge.  Expect a phone call within the next 2 business days to help with scheduling.    TREATMENT RECOMMENDATIONS:    Alternate ibuprofen and Tylenol for comfort, as needed.  Use meloxicam as previously prescribed.  Norco 1-2 tabs every 8 hours as needed for pain not relieved by the above.  Note, each tablet of Norco has 325 mg of Tylenol in it.  Do not exceed 3000 mg of Tylenol over the course of 24 hours.  MiraLax OTC.  Titrate the medicine to achieve a soft, easy stool every 1-2 days.    NURSE ADVICE LINE:  [phone] or [phone]

## 2022-05-06 NOTE — ED PROVIDER NOTES
HPI   The patient is an 82-year-old female presenting with neck pain.  She provides a concerning and convincing history of probable TIA occurring in March, 2022.  She was in [place] at the time and had a facial droop, right hand weakness, and trouble with speech.  She did not seek medical care at that time.  She has an MRI scheduled for this coming week.  However, starting 3 days ago she had sudden onset of neck pain.  Her pain has worsened since onset.  It is constant.  The pain is specifically more severe with movement or palpation, left greater than right.  She denies radiating pain into the extremities.  No paresthesia.  Her pain wakes her up at night.  It is currently severe.  No new neurological deficits described.  No headache.  No fever.  No weight loss.  No obvious trauma or injury.        Allergies:  Allergies   Allergen Reactions     Ezetimibe Other (See Comments)     Severe muscle aches     Lisinopril      Omeprazole      Other reaction(s): *Unknown     Prilosec [Omeprazole]      Zestril [Ace Inhibitors] Cough     Atorvastatin Other (See Comments)     Muscle Pain     Irbesartan Cramps     Rosuvastatin Other (See Comments)     Muscle Pain     Problem List:    Patient Active Problem List    Diagnosis Date Noted     TIA (transient ischemic attack) 04/27/2022     Priority: Medium     Probable 3/22. sudden onset left mouth drooping, right hand weakness, trouble speaking, lasted 30 sec.  Did not seek medical care.       Ovarian cancer, unspecified laterality (H) 03/10/2021     Priority: Medium     Complete prior to 1.4 LJ       Incisional hernia of anterior abdominal wall without obstruction or gangrene 02/22/2021     Priority: Medium     Added automatically from request for surgery [id number]       S/P exploratory laparotomy 07/24/2020     Priority: Medium     Tear of gluteus medius tendon 07/09/2020     Priority: Medium     Primary osteoarthritis of right hip 07/09/2020     Priority: Medium     Spinal  "stenosis of lumbar region, unspecified whether neurogenic claudication present 07/09/2020     Priority: Medium     Obesity (BMI 35.0-39.9) with comorbidity (H) 11/28/2018     Priority: Medium     Lichen sclerosus of female genitalia 07/03/2018     Priority: Medium     Gastroesophageal reflux disease, esophagitis presence not specified 10/12/2017     Priority: Medium     Hiatal hernia 10/21/2013     Priority: Medium     Large; found on gastroscopy 10/2013; No GERD sx; chronic throat clearing; + H pylori       Plantar fasciitis 04/02/2012     Priority: Medium     Advanced directives, counseling/discussion 12/08/2011     Priority: Medium     Advance Care Planning:   ACP Review and Resources Provided: Reviewed chart for advance care plan. Rosie Blackman has an up to date advance care plan on file. See additional documentation in Problem List and click on code status for document details. Confirmed/documented designated decision maker(s). Added by Shima Cruz on 12/08/2011         Hyperlipidemia LDL goal <130 10/31/2010     Priority: Medium     Intolerant to all other statins (2013)       Prediabetes 09/03/2010     Priority: Medium     Blood sugar 122 9/10 working on; has already been to nutrition, weight and wellness and is reading \"healthy for life\"       Diverticulosis of colon 05/24/2010     Priority: Medium     Noted on colonoscopy 5/10       Colon polyps 05/24/2010     Priority: Medium     Pes planus      Priority: Medium     Personal history of contact with and (suspected) exposure to asbestos      Priority: Medium             exposed 12 years in childhood       Donor of other blood      Priority: Medium           has false + syphyllis  Problem list name updated by automated process. Provider to review       Irritable bowel syndrome      Priority: Medium     ALLERGIC RHINITIS       Priority: Medium     Resolving with dietary changes; stopping gluten       History of recurrent UTIs      Priority: Medium          "    recurrent  Problem list name updated by automated process. Provider to review       Essential hypertension      Priority: Medium      Past Medical History:    Past Medical History:   Diagnosis Date     Chronic airway obstruction (H)      Gastroesophageal reflux disease      Hiatal hernia      Hypertension      Malignant neoplasm of ovary (H)      Ovarian cancer, unspecified laterality (H) 3/10/2021     Personal history of contact with and (suspected) exposure to asbestos      Primary osteoarthritis of right hip 7/9/2020     Past Surgical History:    Past Surgical History:   Procedure Laterality Date     BIOPSY BREAST Left      BREAST BIOPSY, CORE RT/LT  1994     CHOLECYSTECTOMY  1995     COLONOSCOPY  05/2010    Diverticulosis, colon polyps; repeat 5 yrs     COLONOSCOPY N/A 11/16/2015    Procedure: COLONOSCOPY;  Surgeon: Alejandro Matos MD;  Location: [facility]     COLONOSCOPY N/A 9/17/2021    Procedure: COLONOSCOPY;  Surgeon: Aayush George MD;  Location: [facility]     Colonoscopy, hyperplastic polyps, repeat in 5 yrs  2004     ESOPHAGOSCOPY, GASTROSCOPY, DUODENOSCOPY (EGD), COMBINED  09/30/2013    Procedure: COMBINED ESOPHAGOSCOPY, GASTROSCOPY, DUODENOSCOPY (EGD), BIOPSY SINGLE OR MULTIPLE;  Gastroscopy;  Surgeon: Blaise Gill MD;  Location: [facility]     EYE SURGERY  2012    R/L Cataracts     HC REMOVE TONSILS/ADENOIDS,13+ Y/O      T & A 13+y.o.     HERNIORRHAPHY INCISIONAL (LOCATION) N/A 3/24/2021    Procedure: HERNIORRHAPHY, INCISIONAL, OPEN WITH MESH;  Surgeon: Aayush George MD;  Location: [facility]     HYSTERECTOMY, PAP NO LONGER INDICATED       LAPAROSCOPIC SALPINGO-OOPHORECTOMY N/A 07/24/2020    Procedure: Laparoscopy, removal or pelvic mass, both tubes and ovaries, omentectomy, anterior culvectomy, pelvic and para aortic lymph node dissection, laparotomy;  Surgeon: Felipe Corona MD;  Location: [facility]     NV ANESTH,LUMBAR SPINE,CORD SURGERY  10/2020    L3-4 L4-5 TRANSFORAMINAL INTERBODY FUSION  "L3-5, POSTERIOR INSTRUMENTED FUSION AND DECOMPRESSION     TVH for DUB, ovaries in       VASCULAR SURGERY  2014    [place] closure     C ANESTH,KNEE VEINS SURGERY  2014     Family History:    Family History   Problem Relation Age of Onset     Cancer Mother         uterine cancer,      Respiratory Mother         COPD     Cardiovascular Mother         AAA  age 80     Breast Cancer Maternal Grandmother      Cancer Maternal Grandfather         cancer unknown type     Breast Cancer Sister      Eye Disorder Father         cataracts     Depression Father      Depression Son      Depression Son      Breast Cancer Sister         X2     Cancer - colorectal No family hx of         no ovarian cancer     Social History:  Marital Status:   [2]  Social History     Tobacco Use     Smoking status: Never Smoker     Smokeless tobacco: Never Used   Vaping Use     Vaping Use: Never used   Substance Use Topics     Alcohol use: No     Drug use: No      Medications:    HYDROcodone-acetaminophen (NORCO) 5-325 MG tablet  aspirin (ASA) 81 MG EC tablet  atenolol (TENORMIN) 25 MG tablet  Evening Primrose Oil 1000 MG CAPS  meloxicam (MOBIC) 15 MG tablet  mometasone (ELOCON) 0.1 % external cream  omega 3 1000 MG CAPS  pravastatin (PRAVACHOL) 80 MG tablet  THERATEARS 0.25 % OP SOLN  valsartan-hydrochlorothiazide (DIOVAN HCT) 320-25 MG tablet  VIACTIV 500-100-40 OR CHEW      Review of Systems   All other systems reviewed and are negative.      PE   BP: (!) 167/76  Pulse: 88  Temp: 98.8  F (37.1  C)  Resp: 11  Height: 162.6 cm (5' 4\")  Weight: 90.7 kg (200 lb)  SpO2: 97 %  Physical Exam  Vitals reviewed.   Constitutional:       Appearance: She is well-developed.      Comments: The patient is lying in bed and obviously uncomfortable with any movement.  She is cooperative and answering questions appropriately.   HENT:      Head: Normocephalic and atraumatic.      Right Ear: External ear normal.      Left Ear: External ear normal.      " Nose: Nose normal.      Mouth/Throat:      Mouth: Mucous membranes are moist.      Pharynx: Oropharynx is clear.   Eyes:      Extraocular Movements: Extraocular movements intact.      Conjunctiva/sclera: Conjunctivae normal.      Pupils: Pupils are equal, round, and reactive to light.   Neck:      Comments: Tender within the soft tissue of her left neck.  No midline tenderness.  Trapezius tenderness bilaterally, left greater than right.  Cardiovascular:      Rate and Rhythm: Normal rate and regular rhythm.   Pulmonary:      Effort: Pulmonary effort is normal.      Breath sounds: Normal breath sounds.   Abdominal:      Palpations: Abdomen is soft.      Tenderness: There is no abdominal tenderness.   Musculoskeletal:         General: Normal range of motion.   Skin:     General: Skin is warm and dry.   Neurological:      General: No focal deficit present.      Mental Status: She is alert and oriented to person, place, and time.   Psychiatric:         Behavior: Behavior normal.         ED COURSE and [facility]   0707.  Patient has neck pain as described above.  Recent TIA likely.  No medical work-up has been obtained as of yet.  Most likely this is mechanical, musculoskeletal related problem.  However, given her recent TIA, given the fact that she does not have neck problems on a regular basis, and given the fact that her pain came on abruptly and has been constant and worsening since onset I think a work-up is appropriate, including CT imaging as ordered.  Lab values pending.  Toradol.  Fluid bolus.    1057.  The patient is moderately improved with Toradol.  CT results are reviewed with the patient.  Low concern for vascular source of pain.  No evidence for recent stroke.  A musculoskeletal source of pain is likely.  Alternate ibuprofen and Tylenol as able.  She has meloxicam at home and will use this in addition.  A prescription for Norco was given, 12 tablets.  Physical therapy referral order will be placed.  Follow-up  discussed.  Return for worsening.    LABS  Labs Ordered and Resulted from Time of ED Arrival to Time of ED Departure   BASIC METABOLIC PANEL - Abnormal       Result Value    Sodium 144      Potassium 3.7      Chloride 107      Carbon Dioxide (CO2) 30      Anion Gap 7      Urea Nitrogen 18      Creatinine 0.77      Calcium 9.0      Glucose 112 (*)     GFR Estimate 77     CBC WITH PLATELETS AND DIFFERENTIAL    WBC Count 6.9      RBC Count 4.42      Hemoglobin 13.7      Hematocrit 42.2      MCV 96      MCH 31.0      MCHC 32.5      RDW 13.6      Platelet Count 203      % Neutrophils 62      % Lymphocytes 18      % Monocytes 15      % Eosinophils 4      % Basophils 1      % Immature Granulocytes 0      NRBCs per 100 WBC 0      Absolute Neutrophils 4.4      Absolute Lymphocytes 1.2      Absolute Monocytes 1.0      Absolute Eosinophils 0.3      Absolute Basophils 0.1      Absolute Immature Granulocytes 0.0      Absolute NRBCs 0.0         IMAGING  Images reviewed by me.  Radiology report also reviewed.  CT Cervical Spine w/o Contrast   Preliminary Result   IMPRESSION:   1. No acute fracture or posttraumatic malalignment.   2. Advanced multilevel degenerative changes of the cervical spine, as   described, including multilevel degenerative spondylolisthesis,   reversal of the normal cervical lordosis, and mild levoconvex   curvature.   3. Interlobular septal thickening seen in the lung apices,   nonspecific, potentially related to interstitial edema. Recommend   clinical correlation.       CTA Head Neck with Contrast   Preliminary Result   IMPRESSION:   1. No high-grade stenosis or large vessel occlusion identified   involving the major intracranial arteries.   2. No significant change in a moderate focal stenosis at the junction   of the V2 and V3 segments of the left vertebral artery, nonspecific as   to etiology. Otherwise, the cervical carotid and vertebral arteries   demonstrate no significant stenosis. If there is  ongoing concern for   recent dissection, MR angiogram can be considered to evaluate for   subtle nonflow-limiting dissection/recent intramural hematoma.       CT Head w/o Contrast   Preliminary Result   IMPRESSION:      1. No evidence of acute intracranial hemorrhage, mass, or herniation.   2. Mild diffuse parenchymal volume loss and white matter changes   likely due to chronic microvascular ischemic disease.          Procedures    Medications   0.9% sodium chloride BOLUS (500 mLs Intravenous New Bag 5/6/22 0724)   ketorolac (TORADOL) injection 15 mg (15 mg Intravenous Given 5/6/22 0724)   iopamidol (ISOVUE-370) solution 75 mL (75 mLs Intravenous Given 5/6/22 0746)   sodium chloride 0.9 % bag 500mL for CT scan flush use (100 mLs Intravenous Given 5/6/22 0746)         IMPRESSION       ICD-10-CM    1. Cervical strain, initial encounter  S16.1XXA             Medication List      Started    HYDROcodone-acetaminophen 5-325 MG tablet  Commonly known as: NORCO  1-2 tablets, Oral, EVERY 8 HOURS PRN                          Kobe Flores MD  05/06/22 1057

## 2022-05-06 NOTE — ED TRIAGE NOTES
3 days of left sided neck pain. This is worse this morning and woke her up. No changes her pain over these 3 days. Did report a TIA in [place] 6 weeks ago and reports a MRI of head and neck upcoming on Tuesday.     Now no other symptoms then the neck pain today. No neuro deficients noted on exam. Pain worse with turning of there head

## 2022-05-09 NOTE — TELEPHONE ENCOUNTER
Dr. Escalera,    Patient sends my chart message asking if she should still do her MRI brain after her CTA in ER done last Friday. Viridiana KNOWLES RN

## 2022-05-10 ENCOUNTER — HOSPITAL ENCOUNTER (OUTPATIENT)
Dept: MRI IMAGING | Facility: CLINIC | Age: 82
Discharge: HOME | End: 2022-05-10
Attending: INTERNAL MEDICINE
Payer: MEDICARE

## 2022-05-10 ENCOUNTER — HOSPITAL ENCOUNTER (OUTPATIENT)
Dept: CARDIOLOGY | Facility: CLINIC | Age: 82
Discharge: HOME | End: 2022-05-10
Attending: INTERNAL MEDICINE
Payer: MEDICARE

## 2022-05-10 DIAGNOSIS — G45.9 TIA (TRANSIENT ISCHEMIC ATTACK): ICD-10-CM

## 2022-05-10 PROCEDURE — 93242 EXT ECG>48HR<7D RECORDING: CPT

## 2022-05-10 PROCEDURE — 93244 EXT ECG>48HR<7D REV&INTERPJ: CPT | Performed by: INTERNAL MEDICINE

## 2022-05-10 PROCEDURE — G1004 CDSM NDSC: HCPCS

## 2022-05-10 PROCEDURE — 70544 MR ANGIOGRAPHY HEAD W/O DYE: CPT | Mod: MG

## 2022-05-10 PROCEDURE — 255N000002 HC RX 255 OP 636: Performed by: INTERNAL MEDICINE

## 2022-05-10 PROCEDURE — A9585 GADOBUTROL INJECTION: HCPCS | Performed by: INTERNAL MEDICINE

## 2022-05-10 PROCEDURE — 258N000003 HC RX IP 258 OP 636: Performed by: INTERNAL MEDICINE

## 2022-05-10 RX ORDER — GADOBUTROL 604.72 MG/ML
10 INJECTION INTRAVENOUS ONCE
Status: COMPLETED | OUTPATIENT
Start: 2022-05-10 | End: 2022-05-10

## 2022-05-10 RX ADMIN — SODIUM CHLORIDE 50 ML: 9 INJECTION, SOLUTION INTRAVENOUS at 14:27

## 2022-05-10 RX ADMIN — GADOBUTROL 10 ML: 604.72 INJECTION INTRAVENOUS at 14:26

## 2022-05-18 NOTE — RESULT ENCOUNTER NOTE
The heart monitor is essentially normal.  There are a couple abnormal/extra beats, but nothing dangerous or concerning

## 2022-05-25 ENCOUNTER — HOSPITAL ENCOUNTER (OUTPATIENT)
Dept: PHYSICAL THERAPY | Facility: CLINIC | Age: 82
Setting detail: THERAPIES SERIES
Discharge: HOME | End: 2022-05-25
Attending: FAMILY MEDICINE
Payer: MEDICARE

## 2022-05-25 DIAGNOSIS — S16.1XXA CERVICAL STRAIN, INITIAL ENCOUNTER: ICD-10-CM

## 2022-05-25 PROCEDURE — 97140 MANUAL THERAPY 1/> REGIONS: CPT | Mod: GP | Performed by: PHYSICAL THERAPIST

## 2022-05-25 PROCEDURE — 97161 PT EVAL LOW COMPLEX 20 MIN: CPT | Mod: GP | Performed by: PHYSICAL THERAPIST

## 2022-05-25 PROCEDURE — 97110 THERAPEUTIC EXERCISES: CPT | Mod: GP | Performed by: PHYSICAL THERAPIST

## 2022-05-25 NOTE — PROGRESS NOTES
05/25/22 0900   General Information   Type of Visit Initial OP Ortho PT Evaluation   Start of Care Date 05/25/22   Referring Physician Dr. Escalera   Patient/Family Goals Statement decrease neck pain. improve ROM.   Orders Evaluate and Treat   Date of Order 05/06/22   Certification Required? Yes   Medical Diagnosis Cervical strain, initial encounter (S16.1XXA)   Body Part(s)   Body Part(s) Cervical Spine   Presentation and Etiology   Pertinent history of current problem (include personal factors and/or comorbidities that impact the POC) The pt noted one morning walking to unbearable pain to the area. She had to raise her head off the pillow and went to the ER. She reported pain up to the ears and out to the edge of shoulders. no numbness/tingling. No headaches. Pt has since gone to a massage therapist and completed her exercises from before and has since reduced her Sx. The pt reports lumbar fusion about 18 months ago. no other orthopedic surgeries to the UE and no chest surgeries.   Impairments A. Pain;D. Decreased ROM;E. Decreased flexibility   Functional Limitations perform activities of daily living   Symptom Location neck   How/Where did it occur From insidious onset   Onset date of current episode/exacerbation 05/03/22   Chronicity New   Pain rating (0-10 point scale) Best (/10);Worst (/10)   Best (/10) 2   Worst (/10) 10   Pain quality A. Sharp;B. Dull;C. Aching   Frequency of pain/symptoms A. Constant   Pain/symptoms are: Worse during the day   Pain/symptoms exacerbated by M. Other   Pain exacerbation comment movement   Pain/symptoms eased by A. Sitting   Progression of symptoms since onset: Improved   Current / Previous Interventions   Diagnostic Tests: CT scan;MRI   MRI Results Results   MRI results Vertebrals: The vertebral arteries bilaterally are patent without  stenosis and demonstrate antegrade flow. Narrowing at the V2 V3  junction of the left vertebral artery is much less conspicuous on the   current exam than on the comparison CTA. No evidence for dissection of  either vertebral artery   CT Results Results   CT results 1. No acute fracture or posttraumatic malalignment.  2. Advanced multilevel degenerative changes of the cervical spine, as  described, including multilevel degenerative spondylolisthesis,  reversal of the normal cervical lordosis, and mild levoconvex  curvature.  3. Interlobular septal thickening seen in the lung apices,  nonspecific, potentially related to interstitial edema. Recommend  clinical correlation.   Current Level of Function   Patient role/employment history F. Retired   Living environment [place]   Fall Risk Screen   Fall screen completed by PT   Have you fallen 2 or more times in the past year? No   Have you fallen and had an injury in the past year? No   Is patient a fall risk? No   Abuse Screen (yes response referral indicated)   Feels Unsafe at Home or Work/School no   Feels Threatened by Someone no   Does Anyone Try to Keep You From Having Contact with Others or Doing Things Outside Your Home? no   Physical Signs of Abuse Present no   Cervical Spine   Cervical Left Side Bending ROM 50% of motion with discomfort   Cervical Right Rotation ROM 50% of motion   Cervical Left Rotation ROM 60% of motion   Cervical Flexion %   Cervical Extension ROM 50%, limited in direction   Cervical Right Side Bending ROM 50% of motion with discomfort   Thoracic Extension ROM limited in motion   Shoulder AROM Screen flex- 140 degrees; abd- 120 degrees   Shoulder Shrug (C2-C4) Strength 5   Shoulder Abd (C5) Strength 5   Shoulder ER (C5, C6) Strength 4   Shoulder IR (C5, C6) Strength 5   Elbow Flexion (C5, C6) Strength 5   Elbow Extension (C7) Strength 5   Shoulder/Wrist/Hand Strength Comments good  strength   Upper Trapezius Flexibility tight   Pectoralis Minor Flexibility tight   Vertebral Artery Test imaging of the area is listed above without notable blockage   Cervical  Distraction Test -   Neer Impingement Test -   Matthews Impingement Test -   Segmental Mobility-Cervical limited in side glides and rot   Palpation TrP to UT, sub-occipitals, and neck extensors   Dermatome/Sensory Testing -   Neurological Testing Comments - radiating Sx   Posture Scans states degenerative changes wtih a reverse in cervical spine direction.   Planned Therapy Interventions   Planned Therapy Interventions balance training;joint mobilization;manual therapy;neuromuscular re-education;ROM;strengthening;stretching   Planned Modality Interventions   Planned Modality Interventions Hot packs;Electrical stimulation;TENS;Traction;Ultrasound   Clinical Impression   Criteria for Skilled Therapeutic Interventions Met yes, treatment indicated   PT Diagnosis neck stiffness   Influenced by the following impairments decreased ROM, tightness, muscle tension, joint restriction   Functional limitations due to impairments turning head when driving   Clinical Presentation Stable/Uncomplicated   Clinical Presentation Rationale current status; pt has a reduction in Sx to the area since treatment to the area with   Clinical Decision Making (Complexity) Low complexity   Therapy Frequency 2 times/Week   Predicted Duration of Therapy Intervention (days/wks) 6   Risk & Benefits of therapy have been explained Yes   Patient, Family & other staff in agreement with plan of care Yes   ORTHO GOALS   PT Ortho Eval Goals 1;2;3   Ortho Goal 1   Goal Identifier ST HEP goal   Goal Description Pt will be independent with her HEP in 2 weeks to be able to continue to maintain ROM and decrease Sx.   Target Date 06/08/22   Ortho Goal 2   Goal Identifier LT ROM goal   Goal Description Pt will have 75% of cervical rotation in 6 weeks to be able to turn and look in the car when driving.   Target Date 07/06/22   Ortho Goal 3   Goal Identifier LT pain goal   Goal Description Pt will be painfree (0/10) for 3 days a week in 6 weeks to decrease Sx and be  able to comfortably move her head.   Target Date 07/06/22   Total Evaluation Time   PT [place], Low Complexity Minutes (64845) 15   Therapy Certification   Certification date from 05/25/22   Certification date to 08/23/22   Medical Diagnosis Cervical strain, initial encounter (S16.1XXA)     Alicia Johnson, PT, DPT

## 2022-05-25 NOTE — PROGRESS NOTES
[facility]    OUTPATIENT PHYSICAL THERAPY ORTHOPEDIC EVALUATION  PLAN OF TREATMENT FOR OUTPATIENT REHABILITATION  (COMPLETE FOR INITIAL CLAIMS ONLY)  Patient's Last Name, First Name, M.I.  YOB: 1940  Rosie Blackman    Provider s Name:  [facility]   Medical Record No.  [id number]   Start of Care Date:  05/25/22   Onset Date:  05/03/22   Type:     _X__PT   ___OT   ___SLP Medical Diagnosis:  Cervical strain, initial encounter (S16.1XXA)     PT Diagnosis:  neck stiffness   Visits from SOC:  1      _________________________________________________________________________________  Plan of Treatment/Functional Goals:  balance training, joint mobilization, manual therapy, neuromuscular re-education, ROM, strengthening, stretching     Hot packs, Electrical stimulation, TENS, Traction, Ultrasound     Goals  Goal Identifier: ST HEP goal  Goal Description: Pt will be independent with her HEP in 2 weeks to be able to continue to maintain ROM and decrease Sx.  Target Date: 06/08/22    Goal Identifier: LT ROM goal  Goal Description: Pt will have 75% of cervical rotation in 6 weeks to be able to turn and look in the car when driving.  Target Date: 07/06/22    Goal Identifier: LT pain goal  Goal Description: Pt will be painfree (0/10) for 3 days a week in 6 weeks to decrease Sx and be able to comfortably move her head.  Target Date: 07/06/22                                       Therapy Frequency:  2 times/Week  Predicted Duration of Therapy Intervention:  6    Alicia Johnson PT                 I CERTIFY THE NEED FOR THESE SERVICES FURNISHED UNDER        THIS PLAN OF TREATMENT AND WHILE UNDER MY CARE     (Physician co-signature of this document indicates review and certification of the therapy plan).                       Certification Date From:  05/25/22   Certification Date  To:  08/23/22    Referring Provider:  Dr. Escalera    Initial Assessment        See Epic Evaluation Start of Care Date: 05/25/22

## 2022-06-06 ENCOUNTER — HOSPITAL ENCOUNTER (OUTPATIENT)
Dept: PHYSICAL THERAPY | Facility: CLINIC | Age: 82
Setting detail: THERAPIES SERIES
Discharge: HOME | End: 2022-06-06
Attending: FAMILY MEDICINE
Payer: MEDICARE

## 2022-06-06 PROCEDURE — 97140 MANUAL THERAPY 1/> REGIONS: CPT | Mod: GP | Performed by: PHYSICAL THERAPIST

## 2022-06-06 PROCEDURE — 97110 THERAPEUTIC EXERCISES: CPT | Mod: GP | Performed by: PHYSICAL THERAPIST

## 2022-06-14 ENCOUNTER — HOSPITAL ENCOUNTER (OUTPATIENT)
Dept: PHYSICAL THERAPY | Facility: CLINIC | Age: 82
Setting detail: THERAPIES SERIES
Discharge: HOME | End: 2022-06-14
Attending: FAMILY MEDICINE
Payer: MEDICARE

## 2022-06-14 PROCEDURE — 97140 MANUAL THERAPY 1/> REGIONS: CPT | Mod: GP | Performed by: PHYSICAL THERAPIST

## 2022-06-14 PROCEDURE — 97110 THERAPEUTIC EXERCISES: CPT | Mod: GP | Performed by: PHYSICAL THERAPIST

## 2022-06-20 ENCOUNTER — HOSPITAL ENCOUNTER (OUTPATIENT)
Dept: PHYSICAL THERAPY | Facility: CLINIC | Age: 82
Setting detail: THERAPIES SERIES
Discharge: HOME | End: 2022-06-20
Attending: FAMILY MEDICINE
Payer: MEDICARE

## 2022-06-20 ENCOUNTER — MYC MEDICAL ADVICE (OUTPATIENT)
Dept: FAMILY MEDICINE | Facility: CLINIC | Age: 82
End: 2022-06-20
Payer: MEDICARE

## 2022-06-20 DIAGNOSIS — G45.9 TIA (TRANSIENT ISCHEMIC ATTACK): ICD-10-CM

## 2022-06-20 PROCEDURE — 97140 MANUAL THERAPY 1/> REGIONS: CPT | Mod: GP | Performed by: PHYSICAL THERAPIST

## 2022-06-20 PROCEDURE — 97110 THERAPEUTIC EXERCISES: CPT | Mod: GP | Performed by: PHYSICAL THERAPIST

## 2022-06-27 DIAGNOSIS — Z85.43 ENCOUNTER FOR FOLLOW-UP SURVEILLANCE OF OVARIAN CANCER: ICD-10-CM

## 2022-06-27 DIAGNOSIS — C56.9 OVARIAN CANCER, UNSPECIFIED LATERALITY (H): Primary | ICD-10-CM

## 2022-06-27 DIAGNOSIS — Z08 ENCOUNTER FOR FOLLOW-UP SURVEILLANCE OF OVARIAN CANCER: ICD-10-CM

## 2022-06-27 NOTE — PROGRESS NOTES
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  She is s/p BSO, PPLND 2020 which served as her treatment.  She is here today for a surveillance visit.      Oncology History:  She originally presented with a complaint of hip pain.  The work-up of this included an MRI which has demonstrated a large pelvic mass, her  was elevated (108, CEA 19-9 were normal).     2020: Exploratory laparoscopy followed by laparotomy, bilateral salpingo-oophorectomy, omentectomy, pelvic and periaortic lymph node dissection, anterior culdectomy.    FINAL DIAGNOSIS:   A. OVARIES, LEFT AND RIGHT, BILATERAL OOPHORECTOMY:   - Right ovary with ENDOMETRIOID ADENOCARCINOMA, FIGO grade 1   - Left ovary with benign inclusion cysts, negative for malignancy   - Unremarkable bilateral fallopian tubes, negative for malignancy     B. MESENTERY, BIOPSY:   - Fibrovascular adhesions, negative for malignancy     C. OMENTUM, OMENTECTOMY:   - Adipose tissue, negative for malignancy     D. ANTERIOR CUL-DE-SAC, BIOPSY:   - Fibroadipose tissue with foci of endometriosis   - Negative for malignancy     E. POSTERIOR CUL-DE-SAC, BIOPSY:   - Fibrovascular tissue, negative for malignancy     F. LYMPH NODE, LEFT PELVIC, EXCISION:   - Eleven reactive lymph nodes, negative for malignancy (0/11)     G. LYMPH NODE, RIGHT PELVIC, EXCISION:   - Nine reactive lymph nodes, negative for malignancy (0/9)     H. LYMPH NODE, RIGHT PARA AORTIC, EXCISION:   - Three reactive lymph nodes, negative for malignancy (0/3)     2020:  20.  2/:  8.  5/:  7.  9:  <5.  1/:  6.   4/:  6.  7:  pending.                Today she comes to clinic feeling well overall  and is without concern.  She was seen in the ED for neck pain.  She has since started on PT which has helped.  She is also being worked up by cardiology for her TIA in March.  She is checking her BP at home and reports this yesterday was 109/70.  She denies any vaginal bleeding, no changes in her bowel or bladder habits, no nausea/emesis, no lower extremity edema, and no difficulties eating or sleeping. She denies any abdominal discomfort/bloating, no fevers or chills, and no chest pain or shortness of breath.  She is current with her annual physical, colon cancer screening, mammogram, and she is fully vaccinated against COVID.                 Health Maintenance  Colonoscopy: 11/16/15, repeat in 10 years  Mammogram: 2/11/22  Annual physical: 1/13/22  COVID vaccine: 2/4/21, 2/25/21, 9/30/21, 4/8/22 Pfizer      Review of Systems:    Systemic           no weight changes; no fever; no chills; no night sweats; no appetite changes  Skin           no rashes, or lesions  Eye           no irritation; no changes in vision  Isabelle-Laryngeal           no dysphagia; no hoarseness   Pulmonary    no cough; no shortness of breath  Cardiovascular    no chest pain; no palpitations  Gastrointestinal    no diarrhea; no constipation; no abdominal pain; no changes in bowel habits; no blood in stool  Genitourinary   no urinary frequency; no urinary urgency; no dysuria; no pain; no abnormal vaginal discharge; no abnormal vaginal bleeding  Breast   no breast discharge; no breast changes; no breast pain  Musculoskeletal    no myalgias; no arthralgias; no back pain  Psychiatric           no depressed mood; no anxiety    Hematologic   no tender lymph nodes; no noticeable swellings or lumps   Endocrine    no hot flashes; no heat/cold intolerance         Neurological   no tremor; no numbness and tingling; no headaches; no difficulty sleeping      Past Medical History:    Past Medical History:   Diagnosis Date     Chronic airway obstruction (H)       Diastolic dysfunction 6/28/2022     Gastroesophageal reflux disease      Hiatal hernia      Hypertension      Malignant neoplasm of ovary (H)      Ovarian cancer, unspecified laterality (H) 3/10/2021     Personal history of contact with and (suspected) exposure to asbestos      Primary osteoarthritis of right hip 7/9/2020         Past Surgical History:    Past Surgical History:   Procedure Laterality Date     BIOPSY BREAST Left      BREAST BIOPSY, CORE RT/LT  1994     CHOLECYSTECTOMY  1995     COLONOSCOPY  05/2010    Diverticulosis, colon polyps; repeat 5 yrs     COLONOSCOPY N/A 11/16/2015    Procedure: COLONOSCOPY;  Surgeon: Alejandro Matos MD;  Location: [facility]     COLONOSCOPY N/A 9/17/2021    Procedure: COLONOSCOPY;  Surgeon: Aayush George MD;  Location: [facility]     Colonoscopy, hyperplastic polyps, repeat in 5 yrs  2004     ESOPHAGOSCOPY, GASTROSCOPY, DUODENOSCOPY (EGD), COMBINED  09/30/2013    Procedure: COMBINED ESOPHAGOSCOPY, GASTROSCOPY, DUODENOSCOPY (EGD), BIOPSY SINGLE OR MULTIPLE;  Gastroscopy;  Surgeon: Blaise Gill MD;  Location: [facility]     EYE SURGERY  2012    R/L Cataracts     HC REMOVE TONSILS/ADENOIDS,12+ Y/O      T & A 13+y.o.     HERNIORRHAPHY INCISIONAL (LOCATION) N/A 3/24/2021    Procedure: HERNIORRHAPHY, INCISIONAL, OPEN WITH MESH;  Surgeon: Aayush George MD;  Location: [facility]     HYSTERECTOMY, PAP NO LONGER INDICATED       LAPAROSCOPIC SALPINGO-OOPHORECTOMY N/A 07/24/2020    Procedure: Laparoscopy, removal or pelvic mass, both tubes and ovaries, omentectomy, anterior culvectomy, pelvic and para aortic lymph node dissection, laparotomy;  Surgeon: Felipe Corona MD;  Location: [facility]     ID ANESTH,LUMBAR SPINE,CORD SURGERY  10/2020    L3-4 L4-5 TRANSFORAMINAL INTERBODY FUSION L3-5, POSTERIOR INSTRUMENTED FUSION AND DECOMPRESSION     TVH for DUB, ovaries in       VASCULAR SURGERY  2014    [place] closure     C ANESTH,KNEE VEINS SURGERY  08/20/2014         Health Maintenance Due    Topic Date Due     DEXA  2022       Current Medications:     Current Outpatient Medications   Medication Sig Dispense Refill     atenolol (TENORMIN) 25 MG tablet Take 1 tablet (25 mg) by mouth daily 90 tablet 3     Evening Primrose Oil 1000 MG CAPS One daily       meloxicam (MOBIC) 15 MG tablet Take 15 mg by mouth daily       mometasone (ELOCON) 0.1 % external cream Apply sparingly to affected area twice daily for 2 weeks then decrease to 1 time daily for 30 days then decrease to 2-3 times per week 45 g 11     omega 3 1000 MG CAPS Take by mouth daily        pravastatin (PRAVACHOL) 80 MG tablet Take 1 tablet (80 mg) by mouth daily 90 tablet 3     THERATEARS 0.25 % OP SOLN BID       valsartan-hydrochlorothiazide (DIOVAN HCT) 320-25 MG tablet Take 1 tablet by mouth daily 90 tablet 3     VIACTIV 500-100-40 OR CHEW once daily           Allergies:        Allergies   Allergen Reactions     Ezetimibe Other (See Comments)     Severe muscle aches     Lisinopril      Omeprazole      Other reaction(s): *Unknown     Prilosec [Omeprazole]      Zestril [Ace Inhibitors] Cough     Atorvastatin Other (See Comments)     Muscle Pain     Irbesartan Cramps     Rosuvastatin Other (See Comments)     Muscle Pain        Social History:     Social History     Tobacco Use     Smoking status: Never Smoker     Smokeless tobacco: Never Used   Substance Use Topics     Alcohol use: No       History   Drug Use No         Family History:     The patient's family history is notable for:    Family History   Problem Relation Age of Onset     Cancer Mother         uterine cancer,      Respiratory Mother         COPD     Cardiovascular Mother         AAA  age 80     Breast Cancer Maternal Grandmother      Cancer Maternal Grandfather         cancer unknown type     Breast Cancer Sister      Eye Disorder Father         cataracts     Depression Father      Depression Son      Depression Son      Breast Cancer Sister         X2     Cancer -  colorectal No family hx of         no ovarian cancer         Physical Exam:     BP (!) 153/76 (BP Location: Right arm, Patient Position: Sitting, Cuff Size: Adult Large)   Pulse 57   Temp 98  F (36.7  C) (Oral)   Wt 95.6 kg (210 lb 11.2 oz)   SpO2 99%   BMI 36.17 kg/m    Body mass index is 36.17 kg/m .    General Appearance: healthy and alert, no distress     HEENT: no palpable nodules or masses        Cardiovascular: regular rate and rhythm, no gallops, rubs or murmurs     Respiratory: lungs clear, no rales, rhonchi or wheezes    Musculoskeletal: extremities non tender and without edema    Skin: no lesions or rashes     Neurological: normal gait, no gross defects     Psychiatric: appropriate mood and affect                               Hematological: normal cervical, supraclavicular and inguinal lymph nodes     Gastrointestinal:       abdomen soft, non-tender, non-distended, no organomegaly or masses    Genitourinary: External genitalia and urethral meatus appears normal.  Vagina is smooth without nodularity or masses.  Cervix is surgically absent.  Bimanual exam reveal no masses, nodularity or fullness.  Recto-vaginal exam confirms these findings.      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  She is s/p BSO, PPLND 7/24/2020 which served as her treatment.  She is here today for a surveillance visit.        20 minutes spent on the date of the encounter doing chart review, history and exam, documentation, and further activities as noted above.      Plan:     1.)        Ovarian cancer:  IAN on exam.  As she is now 2 years out from treatment she can begin extending her visits to every 6 months.  RTC in 6 months for next surveillance visit and .  Plan for surveillance visits every 6 months until she is 5 years out from treatment (7/2025), then annually.  Reviewed signs and symptoms for when she should contact the clinic or  seek additional care.  Patient to contact the clinic with any questions or concerns in the interim.        Genetic risk factors were assessed and she is negative for mutations in the ADALID, BRCA1, BRCA2, BRIP1, CDH1, CHEK2, EPCAM, MLH1, MSH2, MSH6, NBN, NF1, PALB2, PMS2, PTEN, RAD51C, RAD51D, STK11, and TP53 genes.       Labs and/or tests ordered include:  .                2.)        Health maintenance:  Issues addressed today include following up with PCP for annual health maintenance and non-gynecologic issues.       3.)          Elevated BP:   She should continue to check her BP at home and should notify her PCP if her BP is > 140/90.      Kerry Sinclair, DNP, APRN, FNP-C  Nurse Practitioner  Division of Gynecologic Oncology  Pager: [phone]     CC  Patient Care Team:  Kathya Escalera DO as PCP - General (Internal Medicine)  Kathya Escalera DO as Assigned PCP  Aayush George MD as Assigned Surgical Provider  Fatimah Clement MD as MD (Neurology)  Fatimah Clement MD as Assigned Neuroscience Provider  Felipe Corona MD as Assigned Cancer Care Provider  SELF, REFERRED

## 2022-06-28 ENCOUNTER — HOSPITAL ENCOUNTER (OUTPATIENT)
Dept: CARDIOLOGY | Facility: CLINIC | Age: 82
Discharge: HOME | End: 2022-06-28
Attending: INTERNAL MEDICINE | Admitting: INTERNAL MEDICINE
Payer: MEDICARE

## 2022-06-28 DIAGNOSIS — G45.9 TIA (TRANSIENT ISCHEMIC ATTACK): ICD-10-CM

## 2022-06-28 PROBLEM — I51.89 DIASTOLIC DYSFUNCTION: Status: ACTIVE | Noted: 2022-06-28

## 2022-06-28 LAB — LVEF ECHO: NORMAL

## 2022-06-28 PROCEDURE — 93306 TTE W/DOPPLER COMPLETE: CPT | Mod: 26 | Performed by: INTERNAL MEDICINE

## 2022-06-28 PROCEDURE — 999N000208 ECHOCARDIOGRAM COMPLETE

## 2022-06-28 PROCEDURE — 255N000002 HC RX 255 OP 636: Performed by: INTERNAL MEDICINE

## 2022-06-28 RX ADMIN — HUMAN ALBUMIN MICROSPHERES AND PERFLUTREN 2 ML: 10; .22 INJECTION, SOLUTION INTRAVENOUS at 09:42

## 2022-06-28 NOTE — RESULT ENCOUNTER NOTE
The echo does not show any reason for a stroke.  It does show that the heart has a harder time relaxing to allow blood to fully fill the ventricle.  This leads to elevated left ventricular filling pressures (leading to diastolic dysfunction).  This is directly due to hypertension which causes stiffening of the heart muscle leading to difficulties with the heart muscle relaxing.  No specific treatment except managing high blood pressure.  Most recent blood pressure in clinic was elevated.  Recommend RN BP check in the next few weeks to ensure blood pressure is well controlled

## 2022-06-29 ENCOUNTER — HOSPITAL ENCOUNTER (OUTPATIENT)
Dept: PHYSICAL THERAPY | Facility: CLINIC | Age: 82
Setting detail: THERAPIES SERIES
Discharge: HOME | End: 2022-06-29
Attending: FAMILY MEDICINE
Payer: MEDICARE

## 2022-06-29 PROCEDURE — 97140 MANUAL THERAPY 1/> REGIONS: CPT | Mod: GP | Performed by: PHYSICAL THERAPIST

## 2022-06-29 NOTE — PROGRESS NOTES
[facility]    Outpatient Physical Therapy Discharge Note  Patient: Rosie Blackman  : 1940    Beginning/End Dates of Reporting Period:  22 to 22    Referring Provider: Dr. Escalera    Therapy Diagnosis: neck stiffness     Client Self Report: Pt notes she is doing well and wishes to continue at home with her exercises. She notes no pain to the area except only when she is completing her exercises and receives a stretch/pain to the area.    Objective Measurements:  Objective Measure: ROM  Details: ~80% of rotation  Objective Measure: pain  Details: 22- before getting out of bed 0/10, pain only occurs with stretching         Goals:  Goal Identifier ST HEP goal   Goal Description Pt will be independent with her HEP in 2 weeks to be able to continue to maintain ROM and decrease Sx.   Target Date 22   Date Met  22   Progress (detail required for progress note):       Goal Identifier LT ROM goal   Goal Description Pt will have 75% of cervical rotation in 6 weeks to be able to turn and look in the car when driving.   Target Date 22   Date Met  22   Progress (detail required for progress note):       Goal Identifier LT pain goal   Goal Description Pt will be painfree (0/10) for 3 days a week in 6 weeks to decrease Sx and be able to comfortably move her head.   Target Date 22   Date Met  22   Progress (detail required for progress note): met at this time-  she notes pain with her exercises and a stretch to the area. No other pain to the neck. She notes a decrease in Sx to the area.         Plan:  Discharge from therapy.    Discharge:    Reason for Discharge: Patient has met all goals and wishes to continue independently by herself to maintain ROM and flexibility to decrease morning stiffness.    Equipment Issued: none    Discharge Plan: Patient to continue home  program.

## 2022-07-05 ENCOUNTER — LAB (OUTPATIENT)
Dept: LAB | Facility: CLINIC | Age: 82
End: 2022-07-05
Attending: NURSE PRACTITIONER
Payer: MEDICARE

## 2022-07-05 ENCOUNTER — ONCOLOGY VISIT (OUTPATIENT)
Dept: ONCOLOGY | Facility: CLINIC | Age: 82
End: 2022-07-05
Attending: NURSE PRACTITIONER
Payer: MEDICARE

## 2022-07-05 VITALS
HEART RATE: 57 BPM | BODY MASS INDEX: 36.17 KG/M2 | SYSTOLIC BLOOD PRESSURE: 153 MMHG | WEIGHT: 210.7 LBS | TEMPERATURE: 98 F | OXYGEN SATURATION: 99 % | DIASTOLIC BLOOD PRESSURE: 76 MMHG

## 2022-07-05 DIAGNOSIS — Z08 ENCOUNTER FOR FOLLOW-UP SURVEILLANCE OF OVARIAN CANCER: ICD-10-CM

## 2022-07-05 DIAGNOSIS — Z85.43 ENCOUNTER FOR FOLLOW-UP SURVEILLANCE OF OVARIAN CANCER: ICD-10-CM

## 2022-07-05 DIAGNOSIS — C56.9 OVARIAN CANCER, UNSPECIFIED LATERALITY (H): Primary | ICD-10-CM

## 2022-07-05 DIAGNOSIS — C56.9 OVARIAN CANCER, UNSPECIFIED LATERALITY (H): ICD-10-CM

## 2022-07-05 LAB — CANCER AG125 SERPL-ACNC: 9 U/ML

## 2022-07-05 PROCEDURE — G0463 HOSPITAL OUTPT CLINIC VISIT: HCPCS

## 2022-07-05 PROCEDURE — 99213 OFFICE O/P EST LOW 20 MIN: CPT | Performed by: NURSE PRACTITIONER

## 2022-07-05 PROCEDURE — 86304 IMMUNOASSAY TUMOR CA 125: CPT

## 2022-07-05 PROCEDURE — 36415 COLL VENOUS BLD VENIPUNCTURE: CPT

## 2022-07-05 ASSESSMENT — PAIN SCALES - GENERAL: PAINLEVEL: NO PAIN (0)

## 2022-07-05 NOTE — NURSING NOTE
Chief Complaint   Patient presents with     Labs Only     Labs drawn via  by RN.      Labs collected from venipuncture by RN.      Fatemeh Mares RN

## 2022-07-05 NOTE — NURSING NOTE
"Oncology Rooming Note    July 5, 2022 10:09 AM   Rosie Blackman is a 82 year old female who presents for:    Chief Complaint   Patient presents with     Oncology Clinic Visit     Ovarian Cancer      Initial Vitals: BP (!) 153/76 (BP Location: Right arm, Patient Position: Sitting, Cuff Size: Adult Large)   Pulse 57   Temp 98  F (36.7  C) (Oral)   Wt 95.6 kg (210 lb 11.2 oz)   SpO2 99%   BMI 36.17 kg/m   Estimated body mass index is 36.17 kg/m  as calculated from the following:    Height as of 5/6/22: 1.626 m (5' 4\").    Weight as of this encounter: 95.6 kg (210 lb 11.2 oz). Body surface area is 2.08 meters squared.  No Pain (0) Comment: Data Unavailable   No LMP recorded. Patient has had a hysterectomy.  Allergies reviewed: Yes  Medications reviewed: Yes    Medications: Medication refills not needed today.  Pharmacy name entered into [employer]:    [id number] - [place], [place] - [address] - MAIL ORDER MAINT MEDS - NON-EPRESCRIBE  [facility] - [place], [place] - [address]/[id number] - [place], [place] [address]    Clinical concerns: None       Armando Black            "

## 2022-07-05 NOTE — LETTER
2022         RE: Rosie Blackman  [id number] [facility] [address]        Dear Colleague,    Thank you for referring your patient, Rosie Blackman, to the [facility]. Please see a copy of my visit note below.    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  She is s/p BSO, PPLND 2020 which served as her treatment.  She is here today for a surveillance visit.      Oncology History:  She originally presented with a complaint of hip pain.  The work-up of this included an MRI which has demonstrated a large pelvic mass, her  was elevated (108, CEA 19-9 were normal).     2020: Exploratory laparoscopy followed by laparotomy, bilateral salpingo-oophorectomy, omentectomy, pelvic and periaortic lymph node dissection, anterior culdectomy.    FINAL DIAGNOSIS:   A. OVARIES, LEFT AND RIGHT, BILATERAL OOPHORECTOMY:   - Right ovary with ENDOMETRIOID ADENOCARCINOMA, FIGO grade 1   - Left ovary with benign inclusion cysts, negative for malignancy   - Unremarkable bilateral fallopian tubes, negative for malignancy     B. MESENTERY, BIOPSY:   - Fibrovascular adhesions, negative for malignancy     C. OMENTUM, OMENTECTOMY:   - Adipose tissue, negative for malignancy     D. ANTERIOR CUL-DE-SAC, BIOPSY:   - Fibroadipose tissue with foci of endometriosis   - Negative for malignancy     E. POSTERIOR CUL-DE-SAC, BIOPSY:   - Fibrovascular tissue, negative for malignancy     F. LYMPH NODE, LEFT PELVIC, EXCISION:   - Eleven reactive lymph nodes, negative for malignancy (0/11)     G. LYMPH NODE, RIGHT PELVIC, EXCISION:   - Nine reactive lymph nodes, negative for malignancy (0/9)     H. LYMPH NODE, RIGHT PARA AORTIC, EXCISION:   - Three  reactive lymph nodes, negative for malignancy (0/3)     11/9/2020:  20.  2/1/21:  8.  5/17/21:  7.  9/20/21:  <5.  1/4/22:  6.   4/5/22:  6.  7/5/22:  pending.                Today she comes to clinic feeling well overall and is without concern.  She was seen in the ED for neck pain.  She has since started on PT which has helped.  She is also being worked up by cardiology for her TIA in March.  She is checking her BP at home and reports this yesterday was 109/70.  She denies any vaginal bleeding, no changes in her bowel or bladder habits, no nausea/emesis, no lower extremity edema, and no difficulties eating or sleeping. She denies any abdominal discomfort/bloating, no fevers or chills, and no chest pain or shortness of breath.  She is current with her annual physical, colon cancer screening, mammogram, and she is fully vaccinated against COVID.                 Health Maintenance  Colonoscopy: 11/16/15, repeat in 10 years  Mammogram: 2/11/22  Annual physical: 1/13/22  COVID vaccine: 2/4/21, 2/25/21, 9/30/21, 4/8/22 Pfizer      Review of Systems:    Systemic           no weight changes; no fever; no chills; no night sweats; no appetite changes  Skin           no rashes, or lesions  Eye           no irritation; no changes in vision  Isabelle-Laryngeal           no dysphagia; no hoarseness   Pulmonary    no cough; no shortness of breath  Cardiovascular    no chest pain; no palpitations  Gastrointestinal    no diarrhea; no constipation; no abdominal pain; no changes in bowel habits; no blood in stool  Genitourinary   no urinary frequency; no urinary urgency; no dysuria; no pain; no abnormal vaginal discharge; no abnormal vaginal bleeding  Breast   no breast discharge; no breast changes; no breast pain  Musculoskeletal    no myalgias; no arthralgias; no back pain  Psychiatric           no depressed mood; no anxiety    Hematologic   no tender lymph nodes; no noticeable swellings or  lumps   Endocrine    no hot flashes; no heat/cold intolerance         Neurological   no tremor; no numbness and tingling; no headaches; no difficulty sleeping      Past Medical History:    Past Medical History:   Diagnosis Date     Chronic airway obstruction (H)      Diastolic dysfunction 6/28/2022     Gastroesophageal reflux disease      Hiatal hernia      Hypertension      Malignant neoplasm of ovary (H)      Ovarian cancer, unspecified laterality (H) 3/10/2021     Personal history of contact with and (suspected) exposure to asbestos      Primary osteoarthritis of right hip 7/9/2020         Past Surgical History:    Past Surgical History:   Procedure Laterality Date     BIOPSY BREAST Left      BREAST BIOPSY, CORE RT/LT  1994     CHOLECYSTECTOMY  1995     COLONOSCOPY  05/2010    Diverticulosis, colon polyps; repeat 5 yrs     COLONOSCOPY N/A 11/16/2015    Procedure: COLONOSCOPY;  Surgeon: Alejandro Matos MD;  Location: [facility]     COLONOSCOPY N/A 9/17/2021    Procedure: COLONOSCOPY;  Surgeon: Aayush George MD;  Location: [facility]     Colonoscopy, hyperplastic polyps, repeat in 5 yrs  2004     ESOPHAGOSCOPY, GASTROSCOPY, DUODENOSCOPY (EGD), COMBINED  09/30/2013    Procedure: COMBINED ESOPHAGOSCOPY, GASTROSCOPY, DUODENOSCOPY (EGD), BIOPSY SINGLE OR MULTIPLE;  Gastroscopy;  Surgeon: Blaise Gill MD;  Location: [facility]     EYE SURGERY  2012    R/L Cataracts     HC REMOVE TONSILS/ADENOIDS,13+ Y/O      T & A 13+y.o.     HERNIORRHAPHY INCISIONAL (LOCATION) N/A 3/24/2021    Procedure: HERNIORRHAPHY, INCISIONAL, OPEN WITH MESH;  Surgeon: Aayush George MD;  Location: [facility]     HYSTERECTOMY, PAP NO LONGER INDICATED       LAPAROSCOPIC SALPINGO-OOPHORECTOMY N/A 07/24/2020    Procedure: Laparoscopy, removal or pelvic mass, both tubes and ovaries, omentectomy, anterior culvectomy, pelvic and para aortic lymph node dissection, laparotomy;  Surgeon: Felipe Corona MD;  Location: [facility]     IL ANESTH,LUMBAR SPINE,CORD  SURGERY  10/2020    L3-4 L4-5 TRANSFORAMINAL INTERBODY FUSION L3-5, POSTERIOR INSTRUMENTED FUSION AND DECOMPRESSION     TVH for DUB, ovaries in       VASCULAR SURGERY      Taylor closure     ZZC ANESTH,KNEE VEINS SURGERY  2014         Health Maintenance Due   Topic Date Due     DEXA  2022       Current Medications:     Current Outpatient Medications   Medication Sig Dispense Refill     atenolol (TENORMIN) 25 MG tablet Take 1 tablet (25 mg) by mouth daily 90 tablet 3     Evening Primrose Oil 1000 MG CAPS One daily       meloxicam (MOBIC) 15 MG tablet Take 15 mg by mouth daily       mometasone (ELOCON) 0.1 % external cream Apply sparingly to affected area twice daily for 2 weeks then decrease to 1 time daily for 30 days then decrease to 2-3 times per week 45 g 11     omega 3 1000 MG CAPS Take by mouth daily        pravastatin (PRAVACHOL) 80 MG tablet Take 1 tablet (80 mg) by mouth daily 90 tablet 3     THERATEARS 0.25 % OP SOLN BID       valsartan-hydrochlorothiazide (DIOVAN HCT) 320-25 MG tablet Take 1 tablet by mouth daily 90 tablet 3     VIACTIV 500-100-40 OR CHEW once daily           Allergies:        Allergies   Allergen Reactions     Ezetimibe Other (See Comments)     Severe muscle aches     Lisinopril      Omeprazole      Other reaction(s): *Unknown     Prilosec [Omeprazole]      Zestril [Ace Inhibitors] Cough     Atorvastatin Other (See Comments)     Muscle Pain     Irbesartan Cramps     Rosuvastatin Other (See Comments)     Muscle Pain        Social History:     Social History     Tobacco Use     Smoking status: Never Smoker     Smokeless tobacco: Never Used   Substance Use Topics     Alcohol use: No       History   Drug Use No         Family History:     The patient's family history is notable for:    Family History   Problem Relation Age of Onset     Cancer Mother         uterine cancer,      Respiratory Mother         COPD     Cardiovascular Mother         AAA  age 80     Breast Cancer  Maternal Grandmother      Cancer Maternal Grandfather         cancer unknown type     Breast Cancer Sister      Eye Disorder Father         cataracts     Depression Father      Depression Son      Depression Son      Breast Cancer Sister         X2     Cancer - colorectal No family hx of         no ovarian cancer         Physical Exam:     BP (!) 153/76 (BP Location: Right arm, Patient Position: Sitting, Cuff Size: Adult Large)   Pulse 57   Temp 98  F (36.7  C) (Oral)   Wt 95.6 kg (210 lb 11.2 oz)   SpO2 99%   BMI 36.17 kg/m    Body mass index is 36.17 kg/m .    General Appearance: healthy and alert, no distress     HEENT: no palpable nodules or masses        Cardiovascular: regular rate and rhythm, no gallops, rubs or murmurs     Respiratory: lungs clear, no rales, rhonchi or wheezes    Musculoskeletal: extremities non tender and without edema    Skin: no lesions or rashes     Neurological: normal gait, no gross defects     Psychiatric: appropriate mood and affect                               Hematological: normal cervical, supraclavicular and inguinal lymph nodes     Gastrointestinal:       abdomen soft, non-tender, non-distended, no organomegaly or masses    Genitourinary: External genitalia and urethral meatus appears normal.  Vagina is smooth without nodularity or masses.  Cervix is surgically absent.  Bimanual exam reveal no masses, nodularity or fullness.  Recto-vaginal exam confirms these findings.      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  She is s/p BSO, PPLND 7/24/2020 which served as her treatment.  She is here today for a surveillance visit.        20 minutes spent on the date of the encounter doing chart review, history and exam, documentation, and further activities as noted above.      Plan:     1.)        Ovarian cancer:  IAN on exam.  As she is now 2 years out from treatment she can begin extending her  visits to every 6 months.  RTC in 6 months for next surveillance visit and .  Plan for surveillance visits every 6 months until she is 5 years out from treatment (7/2025), then annually.  Reviewed signs and symptoms for when she should contact the clinic or seek additional care.  Patient to contact the clinic with any questions or concerns in the interim.        Genetic risk factors were assessed and she is negative for mutations in the ADALID, BRCA1, BRCA2, BRIP1, CDH1, CHEK2, EPCAM, MLH1, MSH2, MSH6, NBN, NF1, PALB2, PMS2, PTEN, RAD51C, RAD51D, STK11, and TP53 genes.       Labs and/or tests ordered include:  .                2.)        Health maintenance:  Issues addressed today include following up with PCP for annual health maintenance and non-gynecologic issues.       3.)          Elevated BP:   She should continue to check her BP at home and should notify her PCP if her BP is > 140/90.      Kerry Sinclair, DNP, APRN, FNP-C  Nurse Practitioner  Division of Gynecologic Oncology  Pager: [phone]     CC  Patient Care Team:  Kathya Escalera DO as PCP - General (Internal Medicine)  Aayush George MD as Assigned Surgical Provider  Fatimah Clement MD as MD (Neurology)  Felipe Corona MD as Assigned Cancer Care Provider

## 2022-07-13 ENCOUNTER — TELEPHONE (OUTPATIENT)
Dept: FAMILY MEDICINE | Facility: CLINIC | Age: 82
End: 2022-07-13

## 2022-07-13 NOTE — TELEPHONE ENCOUNTER
Patient Quality Outreach    Patient is due for the following:   Hypertension -  BP check    NEXT STEPS:   Schedule a nurse only visit for BP check     Type of outreach:    Sent letter.      Questions for provider review:    None     Maricruz Siegel MA

## 2022-07-14 ENCOUNTER — ALLIED HEALTH/NURSE VISIT (OUTPATIENT)
Dept: FAMILY MEDICINE | Facility: CLINIC | Age: 82
End: 2022-07-14
Payer: MEDICARE

## 2022-07-14 ENCOUNTER — TELEPHONE (OUTPATIENT)
Dept: FAMILY MEDICINE | Facility: CLINIC | Age: 82
End: 2022-07-14

## 2022-07-14 VITALS — SYSTOLIC BLOOD PRESSURE: 140 MMHG | DIASTOLIC BLOOD PRESSURE: 72 MMHG | HEART RATE: 58 BPM

## 2022-07-14 VITALS — DIASTOLIC BLOOD PRESSURE: 69 MMHG | SYSTOLIC BLOOD PRESSURE: 124 MMHG

## 2022-07-14 DIAGNOSIS — I10 ESSENTIAL HYPERTENSION: Primary | ICD-10-CM

## 2022-07-14 PROCEDURE — 99207 PR NO CHARGE NURSE ONLY: CPT

## 2022-07-14 NOTE — PROGRESS NOTES
Rosie Blackman is a 82 year old year old patient who comes in today for a Blood Pressure check because of ongoing blood pressure monitoring.    Vital Signs as repeated by RN   142/72 p52  140/72 p52    Patient is taking medication as prescribed  Patient is tolerating medications well.  Patient is monitoring Blood Pressure at home.    7/2: 111/69 [68  7/4 109/68 p60  7/11:  106/62 p54  7/12 127/69 p57  7/13: 124/69 p53    Current complaints: none  Disposition:  Routed to provider for review.  Preferred pharmacy: [facility] RN

## 2022-07-14 NOTE — TELEPHONE ENCOUNTER
Pt was called & read providers message. Pt verbalized understanding. Pt wants to come back in in 2 weeks to recheck BP & will bring home BP machine with to check. Pt will call for appt.    Olivia Leon RN

## 2022-07-14 NOTE — TELEPHONE ENCOUNTER
BP was borderline high today, since home BP readings are normal I do not recommend any changes in treatment plan. Continue current treatment plan and monitor BP at home.     DELMY Clark CNP, covering for PCP

## 2022-07-14 NOTE — TELEPHONE ENCOUNTER
Rosie Fuentes is a 82 year old year old patient who comes in today for a Blood Pressure check because of ongoing blood pressure monitoring.     Vital Signs as repeated by RN   142/72 p52  140/72 p52     Patient is taking medication as prescribed  Patient is tolerating medications well.  Patient is monitoring Blood Pressure at home.    7/2: 111/69 [68  7/4 109/68 p60  7/11:  106/62 p54  7/12 127/69 p57  7/13: 124/69 p53    Current complaints: none  Disposition:  Routed to provider for review.  Preferred pharmacy: [facility] RN

## 2022-07-28 ENCOUNTER — ALLIED HEALTH/NURSE VISIT (OUTPATIENT)
Dept: FAMILY MEDICINE | Facility: CLINIC | Age: 82
End: 2022-07-28

## 2022-07-28 ENCOUNTER — TELEPHONE (OUTPATIENT)
Dept: FAMILY MEDICINE | Facility: CLINIC | Age: 82
End: 2022-07-28

## 2022-07-28 VITALS — HEART RATE: 64 BPM | SYSTOLIC BLOOD PRESSURE: 122 MMHG | DIASTOLIC BLOOD PRESSURE: 64 MMHG

## 2022-07-28 DIAGNOSIS — I10 ESSENTIAL HYPERTENSION: Primary | ICD-10-CM

## 2022-07-28 PROCEDURE — 99207 PR NO CHARGE NURSE ONLY: CPT

## 2022-07-28 NOTE — PROGRESS NOTES
Rosie Blackman is a 82 year old year old patient who comes in today for a Blood Pressure check because of ongoing blood pressure monitoring.    Vital Signs as repeated by /64 HR 64    Patient is taking medication as prescribed  Patient is tolerating medications well.    Patient is monitoring Blood Pressure at home.  Average readings if yes are as follows:    7/14/2022- 130/72 HR 70 3 pm  7/15/2022- 116/91  2 pm  7/16/2022 95/66 HR 63 10 am  7/18/2022 113/70 HR 57 11 am  7/20/2022 106/67 HR 59 2 pm  7/27/2022 140/75 HR 61 1030 pm  7/28/2022 149/70 HR 62 730 am    Patient is questioning accuracy of home BP machine. Also checks at different times of the day. Encouraged patient to check in am prior to taking am meds for consistency. She would also like to check in the evening.    BP with home machine in clinic was 124/68 HR 58    Current complaints: none  Disposition:  patient instructed to await providers response.    Madelyn Dominique RN

## 2022-07-28 NOTE — TELEPHONE ENCOUNTER
Called pt and gave message    Ted Goldman RN    
Covering for Dr Escalera    BP well controlled, continue to monitor BP at home twice daily, its seems that her BP home monitor is accurate.     DELMY Clark CNP  
Rosie Blackman is a 82 year old year old patient who comes in today for a Blood Pressure check because of ongoing blood pressure monitoring.     Vital Signs as repeated by /64 HR 64     Patient is taking medication as prescribed  Patient is tolerating medications well.    Patient is monitoring Blood Pressure at home.  Average readings if yes are as follows:     7/14/2022- 130/72 HR 70 3 pm  7/15/2022- 116/91  2 pm  7/16/2022 95/66 HR 63 10 am  7/18/2022 113/70 HR 57 11 am  7/20/2022 106/67 HR 59 2 pm  7/27/2022 140/75 HR 61 1030 pm  7/28/2022 149/70 HR 62 730 am     Patient is questioning accuracy of home BP machine. Also checks at different times of the day. Encouraged patient to check in am prior to taking am meds for consistency. She would also like to check in the evening.     BP with home machine in clinic was 124/68 HR 58    Current complaints: none  Disposition:  patient instructed to await providers response.     Madelyn Dominique RN  
normal

## 2022-08-15 ENCOUNTER — MYC MEDICAL ADVICE (OUTPATIENT)
Dept: FAMILY MEDICINE | Facility: CLINIC | Age: 82
End: 2022-08-15

## 2022-08-15 DIAGNOSIS — R00.2 PALPITATIONS: Primary | ICD-10-CM

## 2022-08-16 ENCOUNTER — NURSE TRIAGE (OUTPATIENT)
Dept: FAMILY MEDICINE | Facility: CLINIC | Age: 82
End: 2022-08-16

## 2022-08-16 NOTE — TELEPHONE ENCOUNTER
Pt was notified, and verbalized understanding. Appointment scheduled with PCP on 8/18/22. Patient asked to keep her previously scheduled appointment 9/28/22, and she'll call to cancel if it's not needed.    Leslie Ace RN  [facility]

## 2022-08-16 NOTE — TELEPHONE ENCOUNTER
"    Reason for Disposition    Heart beating very rapidly (e.g., > 140 / minute) and present now  (Exception: During exercise.)     Denies symptoms at this time.  Last had symptoms a week ago.    Additional Information    Negative: Passed out (i.e., lost consciousness, collapsed and was not responding)    Negative: Shock suspected (e.g., cold/pale/clammy skin, too weak to stand, low BP, rapid pulse)    Negative: Difficult to awaken or acting confused (e.g., disoriented, slurred speech)    Negative: Visible sweat on face or sweat dripping down face    Negative: Unable to walk, or can only walk with assistance (e.g., requires support)    Negative: Received SHOCK from implantable cardiac defibrillator and has persisting symptoms (i.e., palpitations, lightheadedness)    Negative: Dizziness, lightheadedness, or weakness and heart beating very rapidly (e.g., > 140 / minute)    Negative: Dizziness, lightheadedness, or weakness and heart beating very slowly (e.g., < 50 / minute)    Negative: Sounds like a life-threatening emergency to the triager    Negative: Chest pain    Negative: Difficulty breathing    Negative: Dizziness, lightheadedness, or weakness    Answer Assessment - Initial Assessment Questions  1. DESCRIPTION: \"Please describe your heart rate or heartbeat that you are having\" (e.g., fast/slow, regular/irregular, skipped or extra beats, \"palpitations\")      Pt reports that has had 2 episodes in the past month of waking with a rapid heart rate from her sleep.  Most recent was a week ago.    2. ONSET: \"When did it start?\" (Minutes, hours or days)       One week ago.  3. DURATION: \"How long does it last\" (e.g., seconds, minutes, hours)      10-15 min  4. PATTERN \"Does it come and go, or has it been constant since it started?\"  \"Does it get worse with exertion?\"   \"Are you feeling it now?\"      Constant for the 10-15 min  5. TAP: \"Using your hand, can you tap out what you are feeling on a chair or table in front of " "you, so that I can hear?\" (Note: not all patients can do this)        Heart rate now is 70.  Pt says that she checked it a few minutes before calling.  She checked it this morning and it was 61.    6. HEART RATE: \"Can you tell me your heart rate?\" \"How many beats in 15 seconds?\"  (Note: not all patients can do this)        70 prior to calling  7. RECURRENT SYMPTOM: \"Have you ever had this before?\" If Yes, ask: \"When was the last time?\" and \"What happened that time?\"       2 episodes in the past month  8. CAUSE: \"What do you think is causing the palpitations?\"      Concerned it is cardiac  9. CARDIAC HISTORY: \"Do you have any history of heart disease?\" (e.g., heart attack, angina, bypass surgery, angioplasty, arrhythmia)       TIA in March  10. OTHER SYMPTOMS: \"Do you have any other symptoms?\" (e.g., dizziness, chest pain, sweating, difficulty breathing)        Denies dizziness, chest pain, difficulty breathing, exhausted feeling.  11. PREGNANCY: \"Is there any chance you are pregnant?\" \"When was your last menstrual period?\"        NA    Protocols used: HEART RATE AND HEARTBEAT [id number]      "

## 2022-08-16 NOTE — TELEPHONE ENCOUNTER
I placed an order for 7-day heart monitor.  We just did an evaluation due to the TIA that she had several months ago, so the monitor may not show anything.  Okay for same-day appointment this week either with myself or another provider.  Agree with triage advice given.  If the episode is lasting longer than 15 minutes or there is other associated symptoms as outlined, shortness of breath, chest pain, dizziness, low blood pressure, recommend ER

## 2022-08-16 NOTE — TELEPHONE ENCOUNTER
Routed to provider.  Please see [employer] message from pt for her details.    Pt called in because she had not gotten a reply from her [employer] message from yesterday.    S-(situation): Pt informs that twice in the past month she has awakened from sleep with episodes of rapid heart rate.  The episodes lasted 10-15 min each time.  Pt reports her heart rate ranged from 131 to 152 with each episode.    Most recent episode was about a week ago.    Denies symptoms at this time.    Pt checked pulse just before calling pulse was 70 and regular.  It was 61 upon waking this morning.    Pt denies feeling light-headed or dizzy during the episodes or ever.  Denies chest pain or pressure.  Denies difficulty breathing.    Denies feeling exhausted.  Denies any other symptoms during the episodes or since.    B-(background): TIA in March.  HTN and hyperlipidemia.  Pre-diabetic.    A-(assessment): 2 episodes of rapid heart rate lasting 10-15 minutes each in the last month.  Denies symptoms now.      R-(recommendations): Pt made appt 9/28/22 for further follow up.  Asks if could be worked in sooner?    Also, advised ED if symptoms recur.  911 if chest pain or breathing difficulties.    Cora Romano RN

## 2022-08-17 ENCOUNTER — HOSPITAL ENCOUNTER (OUTPATIENT)
Facility: CLINIC | Age: 82
Setting detail: OBSERVATION
Discharge: HOME | End: 2022-08-18
Attending: EMERGENCY MEDICINE | Admitting: FAMILY MEDICINE
Payer: MEDICARE

## 2022-08-17 DIAGNOSIS — E87.6 HYPOKALEMIA: ICD-10-CM

## 2022-08-17 DIAGNOSIS — Z11.52 ENCOUNTER FOR SCREENING LABORATORY TESTING FOR SEVERE ACUTE RESPIRATORY SYNDROME CORONAVIRUS 2 (SARS-COV-2): ICD-10-CM

## 2022-08-17 DIAGNOSIS — I48.91 ATRIAL FIBRILLATION WITH RVR (H): Primary | ICD-10-CM

## 2022-08-17 DIAGNOSIS — I48.0 PAROXYSMAL ATRIAL FIBRILLATION (H): ICD-10-CM

## 2022-08-17 LAB
ALBUMIN SERPL-MCNC: 3.3 G/DL (ref 3.4–5)
ALP SERPL-CCNC: 102 U/L (ref 40–150)
ALT SERPL W P-5'-P-CCNC: 47 U/L (ref 0–50)
ANION GAP SERPL CALCULATED.3IONS-SCNC: 5 MMOL/L (ref 3–14)
AST SERPL W P-5'-P-CCNC: 35 U/L (ref 0–45)
BASOPHILS # BLD AUTO: 0.1 10E3/UL (ref 0–0.2)
BASOPHILS NFR BLD AUTO: 1 %
BILIRUB SERPL-MCNC: 0.5 MG/DL (ref 0.2–1.3)
BUN SERPL-MCNC: 19 MG/DL (ref 7–30)
CALCIUM SERPL-MCNC: 9.3 MG/DL (ref 8.5–10.1)
CHLORIDE BLD-SCNC: 105 MMOL/L (ref 94–109)
CO2 SERPL-SCNC: 31 MMOL/L (ref 20–32)
CREAT SERPL-MCNC: 0.74 MG/DL (ref 0.52–1.04)
EOSINOPHIL # BLD AUTO: 0.2 10E3/UL (ref 0–0.7)
EOSINOPHIL NFR BLD AUTO: 4 %
ERYTHROCYTE [DISTWIDTH] IN BLOOD BY AUTOMATED COUNT: 13.1 % (ref 10–15)
GFR SERPL CREATININE-BSD FRML MDRD: 81 ML/MIN/1.73M2
GLUCOSE BLD-MCNC: 97 MG/DL (ref 70–99)
HCT VFR BLD AUTO: 43.3 % (ref 35–47)
HGB BLD-MCNC: 14 G/DL (ref 11.7–15.7)
IMM GRANULOCYTES # BLD: 0 10E3/UL
IMM GRANULOCYTES NFR BLD: 0 %
LYMPHOCYTES # BLD AUTO: 1.6 10E3/UL (ref 0.8–5.3)
LYMPHOCYTES NFR BLD AUTO: 26 %
MCH RBC QN AUTO: 30.4 PG (ref 26.5–33)
MCHC RBC AUTO-ENTMCNC: 32.3 G/DL (ref 31.5–36.5)
MCV RBC AUTO: 94 FL (ref 78–100)
MONOCYTES # BLD AUTO: 0.9 10E3/UL (ref 0–1.3)
MONOCYTES NFR BLD AUTO: 14 %
NEUTROPHILS # BLD AUTO: 3.4 10E3/UL (ref 1.6–8.3)
NEUTROPHILS NFR BLD AUTO: 55 %
NRBC # BLD AUTO: 0 10E3/UL
NRBC BLD AUTO-RTO: 0 /100
PLATELET # BLD AUTO: 219 10E3/UL (ref 150–450)
POTASSIUM BLD-SCNC: 3.3 MMOL/L (ref 3.4–5.3)
PROT SERPL-MCNC: 7.1 G/DL (ref 6.8–8.8)
RBC # BLD AUTO: 4.61 10E6/UL (ref 3.8–5.2)
SODIUM SERPL-SCNC: 141 MMOL/L (ref 133–144)
TSH SERPL DL<=0.005 MIU/L-ACNC: 4.66 MU/L (ref 0.4–4)
WBC # BLD AUTO: 6.1 10E3/UL (ref 4–11)

## 2022-08-17 PROCEDURE — C9803 HOPD COVID-19 SPEC COLLECT: HCPCS | Performed by: EMERGENCY MEDICINE

## 2022-08-17 PROCEDURE — 85025 COMPLETE CBC W/AUTO DIFF WBC: CPT | Performed by: EMERGENCY MEDICINE

## 2022-08-17 PROCEDURE — 92960 CARDIOVERSION ELECTRIC EXT: CPT | Performed by: EMERGENCY MEDICINE

## 2022-08-17 PROCEDURE — 93005 ELECTROCARDIOGRAM TRACING: CPT | Performed by: EMERGENCY MEDICINE

## 2022-08-17 PROCEDURE — 99285 EMERGENCY DEPT VISIT HI MDM: CPT | Mod: 25 | Performed by: EMERGENCY MEDICINE

## 2022-08-17 PROCEDURE — 80053 COMPREHEN METABOLIC PANEL: CPT | Performed by: EMERGENCY MEDICINE

## 2022-08-17 PROCEDURE — 84443 ASSAY THYROID STIM HORMONE: CPT | Performed by: EMERGENCY MEDICINE

## 2022-08-17 PROCEDURE — 250N000009 HC RX 250: Performed by: EMERGENCY MEDICINE

## 2022-08-17 PROCEDURE — 84439 ASSAY OF FREE THYROXINE: CPT | Performed by: EMERGENCY MEDICINE

## 2022-08-17 PROCEDURE — 36415 COLL VENOUS BLD VENIPUNCTURE: CPT | Performed by: EMERGENCY MEDICINE

## 2022-08-17 PROCEDURE — 83735 ASSAY OF MAGNESIUM: CPT | Performed by: PHYSICIAN ASSISTANT

## 2022-08-17 PROCEDURE — 250N000013 HC RX MED GY IP 250 OP 250 PS 637: Performed by: EMERGENCY MEDICINE

## 2022-08-17 PROCEDURE — 96375 TX/PRO/DX INJ NEW DRUG ADDON: CPT | Performed by: EMERGENCY MEDICINE

## 2022-08-17 PROCEDURE — 999N000054 HC STATISTIC EKG NON-CHARGEABLE: Performed by: EMERGENCY MEDICINE

## 2022-08-17 RX ORDER — ASPIRIN 81 MG/1
324 TABLET, CHEWABLE ORAL ONCE
Status: COMPLETED | OUTPATIENT
Start: 2022-08-17 | End: 2022-08-17

## 2022-08-17 RX ORDER — DILTIAZEM HYDROCHLORIDE 5 MG/ML
20 INJECTION INTRAVENOUS ONCE
Status: COMPLETED | OUTPATIENT
Start: 2022-08-17 | End: 2022-08-17

## 2022-08-17 RX ADMIN — DILTIAZEM HYDROCHLORIDE 20 MG: 5 INJECTION, SOLUTION INTRAVENOUS at 23:36

## 2022-08-17 RX ADMIN — ASPIRIN 81 MG CHEWABLE TABLET 324 MG: 81 TABLET CHEWABLE at 23:35

## 2022-08-17 ASSESSMENT — ENCOUNTER SYMPTOMS
HEADACHES: 0
LIGHT-HEADEDNESS: 0
ABDOMINAL PAIN: 0
FEVER: 0
DIARRHEA: 0
RHINORRHEA: 1
COUGH: 0
CHILLS: 0
SHORTNESS OF BREATH: 0
CONFUSION: 0
FATIGUE: 0
VOMITING: 0
NAUSEA: 0
WOUND: 0
APPETITE CHANGE: 0
PALPITATIONS: 1
WEAKNESS: 0

## 2022-08-17 ASSESSMENT — ACTIVITIES OF DAILY LIVING (ADL): ADLS_ACUITY_SCORE: 35

## 2022-08-18 ENCOUNTER — ANESTHESIA (OUTPATIENT)
Dept: EMERGENCY MEDICINE | Facility: CLINIC | Age: 82
End: 2022-08-18
Payer: MEDICARE

## 2022-08-18 ENCOUNTER — ANESTHESIA EVENT (OUTPATIENT)
Dept: EMERGENCY MEDICINE | Facility: CLINIC | Age: 82
End: 2022-08-18
Payer: MEDICARE

## 2022-08-18 VITALS
RESPIRATION RATE: 28 BRPM | SYSTOLIC BLOOD PRESSURE: 87 MMHG | HEIGHT: 64 IN | TEMPERATURE: 97.3 F | OXYGEN SATURATION: 94 % | DIASTOLIC BLOOD PRESSURE: 50 MMHG | HEART RATE: 54 BPM | BODY MASS INDEX: 35.65 KG/M2 | WEIGHT: 208.8 LBS

## 2022-08-18 PROBLEM — I48.91 ATRIAL FIBRILLATION WITH RVR (H): Status: ACTIVE | Noted: 2022-08-18

## 2022-08-18 PROBLEM — E87.6 HYPOKALEMIA: Status: ACTIVE | Noted: 2022-08-18

## 2022-08-18 LAB
GLUCOSE BLDC GLUCOMTR-MCNC: 97 MG/DL (ref 70–99)
HOLD SPECIMEN: NORMAL
INR PPP: 1.1 (ref 0.85–1.15)
MAGNESIUM SERPL-MCNC: 1.9 MG/DL (ref 1.6–2.3)
POTASSIUM BLD-SCNC: 3.7 MMOL/L (ref 3.4–5.3)
SARS-COV-2 RNA RESP QL NAA+PROBE: NEGATIVE
T4 FREE SERPL-MCNC: 1.04 NG/DL (ref 0.76–1.46)
TROPONIN I SERPL HS-MCNC: 11 NG/L
TROPONIN I SERPL HS-MCNC: 16 NG/L

## 2022-08-18 PROCEDURE — 250N000011 HC RX IP 250 OP 636: Performed by: EMERGENCY MEDICINE

## 2022-08-18 PROCEDURE — 258N000003 HC RX IP 258 OP 636: Performed by: INTERNAL MEDICINE

## 2022-08-18 PROCEDURE — 82962 GLUCOSE BLOOD TEST: CPT

## 2022-08-18 PROCEDURE — 96368 THER/DIAG CONCURRENT INF: CPT | Performed by: EMERGENCY MEDICINE

## 2022-08-18 PROCEDURE — 84132 ASSAY OF SERUM POTASSIUM: CPT | Performed by: INTERNAL MEDICINE

## 2022-08-18 PROCEDURE — G0378 HOSPITAL OBSERVATION PER HR: HCPCS

## 2022-08-18 PROCEDURE — 85610 PROTHROMBIN TIME: CPT | Performed by: PHYSICIAN ASSISTANT

## 2022-08-18 PROCEDURE — 250N000009 HC RX 250: Performed by: EMERGENCY MEDICINE

## 2022-08-18 PROCEDURE — 96366 THER/PROPH/DIAG IV INF ADDON: CPT | Performed by: EMERGENCY MEDICINE

## 2022-08-18 PROCEDURE — 370N000003 HC ANESTHESIA WARD SERVICE

## 2022-08-18 PROCEDURE — 87635 SARS-COV-2 COVID-19 AMP PRB: CPT | Performed by: EMERGENCY MEDICINE

## 2022-08-18 PROCEDURE — 250N000013 HC RX MED GY IP 250 OP 250 PS 637: Performed by: PHYSICIAN ASSISTANT

## 2022-08-18 PROCEDURE — 84484 ASSAY OF TROPONIN QUANT: CPT | Performed by: PHYSICIAN ASSISTANT

## 2022-08-18 PROCEDURE — 99236 HOSP IP/OBS SAME DATE HI 85: CPT | Performed by: INTERNAL MEDICINE

## 2022-08-18 PROCEDURE — 96365 THER/PROPH/DIAG IV INF INIT: CPT | Performed by: EMERGENCY MEDICINE

## 2022-08-18 PROCEDURE — 999N000157 HC STATISTIC RCP TIME EA 10 MIN

## 2022-08-18 PROCEDURE — 250N000013 HC RX MED GY IP 250 OP 250 PS 637: Performed by: INTERNAL MEDICINE

## 2022-08-18 PROCEDURE — 93005 ELECTROCARDIOGRAM TRACING: CPT

## 2022-08-18 PROCEDURE — 96366 THER/PROPH/DIAG IV INF ADDON: CPT

## 2022-08-18 PROCEDURE — 250N000011 HC RX IP 250 OP 636: Performed by: NURSE ANESTHETIST, CERTIFIED REGISTERED

## 2022-08-18 PROCEDURE — 250N000013 HC RX MED GY IP 250 OP 250 PS 637: Performed by: EMERGENCY MEDICINE

## 2022-08-18 PROCEDURE — 250N000011 HC RX IP 250 OP 636: Performed by: PHYSICIAN ASSISTANT

## 2022-08-18 PROCEDURE — 36415 COLL VENOUS BLD VENIPUNCTURE: CPT | Performed by: PHYSICIAN ASSISTANT

## 2022-08-18 PROCEDURE — 96375 TX/PRO/DX INJ NEW DRUG ADDON: CPT | Performed by: EMERGENCY MEDICINE

## 2022-08-18 RX ORDER — CARBOXYMETHYLCELLULOSE SODIUM 5 MG/ML
1 SOLUTION/ DROPS OPHTHALMIC 2 TIMES DAILY
Status: DISCONTINUED | OUTPATIENT
Start: 2022-08-18 | End: 2022-08-18 | Stop reason: HOSPADM

## 2022-08-18 RX ORDER — DILTIAZEM HYDROCHLORIDE 120 MG/1
120 CAPSULE, COATED, EXTENDED RELEASE ORAL DAILY
Status: DISCONTINUED | OUTPATIENT
Start: 2022-08-19 | End: 2022-08-18 | Stop reason: HOSPADM

## 2022-08-18 RX ORDER — POTASSIUM CHLORIDE 7.45 MG/ML
10 INJECTION INTRAVENOUS ONCE
Status: COMPLETED | OUTPATIENT
Start: 2022-08-18 | End: 2022-08-18

## 2022-08-18 RX ORDER — PRAVASTATIN SODIUM 20 MG
80 TABLET ORAL DAILY
Status: DISCONTINUED | OUTPATIENT
Start: 2022-08-18 | End: 2022-08-18 | Stop reason: HOSPADM

## 2022-08-18 RX ORDER — DILTIAZEM HYDROCHLORIDE 30 MG/1
30 TABLET, FILM COATED ORAL EVERY 6 HOURS SCHEDULED
Status: DISCONTINUED | OUTPATIENT
Start: 2022-08-18 | End: 2022-08-18 | Stop reason: HOSPADM

## 2022-08-18 RX ORDER — ATENOLOL 25 MG/1
25 TABLET ORAL DAILY
Status: DISCONTINUED | OUTPATIENT
Start: 2022-08-18 | End: 2022-08-18

## 2022-08-18 RX ORDER — WARFARIN SODIUM 2.5 MG/1
2.5 TABLET ORAL DAILY
Qty: 30 TABLET | Refills: 0 | Status: SHIPPED | OUTPATIENT
Start: 2022-08-18 | End: 2022-08-21 | Stop reason: ALTCHOICE

## 2022-08-18 RX ORDER — VALSARTAN AND HYDROCHLOROTHIAZIDE 320; 25 MG/1; MG/1
1 TABLET, FILM COATED ORAL DAILY
Status: DISCONTINUED | OUTPATIENT
Start: 2022-08-18 | End: 2022-08-18

## 2022-08-18 RX ORDER — DEXTROSE MONOHYDRATE 25 G/50ML
25-50 INJECTION, SOLUTION INTRAVENOUS
Status: DISCONTINUED | OUTPATIENT
Start: 2022-08-18 | End: 2022-08-18 | Stop reason: HOSPADM

## 2022-08-18 RX ORDER — DILTIAZEM HYDROCHLORIDE 5 MG/ML
15 INJECTION INTRAVENOUS ONCE
Status: COMPLETED | OUTPATIENT
Start: 2022-08-18 | End: 2022-08-18

## 2022-08-18 RX ORDER — AMOXICILLIN 250 MG
2 CAPSULE ORAL 2 TIMES DAILY PRN
Status: DISCONTINUED | OUTPATIENT
Start: 2022-08-18 | End: 2022-08-18 | Stop reason: HOSPADM

## 2022-08-18 RX ORDER — PROCHLORPERAZINE MALEATE 5 MG
5 TABLET ORAL EVERY 6 HOURS PRN
Status: DISCONTINUED | OUTPATIENT
Start: 2022-08-18 | End: 2022-08-18 | Stop reason: HOSPADM

## 2022-08-18 RX ORDER — WARFARIN SODIUM 2.5 MG/1
2.5 TABLET ORAL
Status: COMPLETED | OUTPATIENT
Start: 2022-08-18 | End: 2022-08-18

## 2022-08-18 RX ORDER — ONDANSETRON 2 MG/ML
4 INJECTION INTRAMUSCULAR; INTRAVENOUS EVERY 6 HOURS PRN
Status: DISCONTINUED | OUTPATIENT
Start: 2022-08-18 | End: 2022-08-18 | Stop reason: HOSPADM

## 2022-08-18 RX ORDER — PROPOFOL 10 MG/ML
INJECTION, EMULSION INTRAVENOUS PRN
Status: DISCONTINUED | OUTPATIENT
Start: 2022-08-18 | End: 2022-08-18

## 2022-08-18 RX ORDER — CARBOXYMETHYLCELLULOSE SODIUM 10 MG/ML
1 GEL OPHTHALMIC 2 TIMES DAILY
Status: DISCONTINUED | OUTPATIENT
Start: 2022-08-18 | End: 2022-08-18

## 2022-08-18 RX ORDER — POTASSIUM CHLORIDE 1500 MG/1
40 TABLET, EXTENDED RELEASE ORAL ONCE
Status: COMPLETED | OUTPATIENT
Start: 2022-08-18 | End: 2022-08-18

## 2022-08-18 RX ORDER — PROCHLORPERAZINE 25 MG
12.5 SUPPOSITORY, RECTAL RECTAL EVERY 12 HOURS PRN
Status: DISCONTINUED | OUTPATIENT
Start: 2022-08-18 | End: 2022-08-18 | Stop reason: HOSPADM

## 2022-08-18 RX ORDER — DILTIAZEM HYDROCHLORIDE 180 MG/1
180 CAPSULE, COATED, EXTENDED RELEASE ORAL DAILY
Qty: 30 CAPSULE | Refills: 0 | Status: SHIPPED | OUTPATIENT
Start: 2022-08-18 | End: 2022-09-06

## 2022-08-18 RX ORDER — DILTIAZEM HCL IN NACL,ISO-OSM 125 MG/125
5-15 PLASTIC BAG, INJECTION (ML) INTRAVENOUS CONTINUOUS
Status: DISCONTINUED | OUTPATIENT
Start: 2022-08-18 | End: 2022-08-18 | Stop reason: HOSPADM

## 2022-08-18 RX ORDER — AMOXICILLIN 250 MG
1 CAPSULE ORAL 2 TIMES DAILY PRN
Status: DISCONTINUED | OUTPATIENT
Start: 2022-08-18 | End: 2022-08-18 | Stop reason: HOSPADM

## 2022-08-18 RX ORDER — NICOTINE POLACRILEX 4 MG
15-30 LOZENGE BUCCAL
Status: DISCONTINUED | OUTPATIENT
Start: 2022-08-18 | End: 2022-08-18 | Stop reason: HOSPADM

## 2022-08-18 RX ORDER — ONDANSETRON 4 MG/1
4 TABLET, ORALLY DISINTEGRATING ORAL EVERY 6 HOURS PRN
Status: DISCONTINUED | OUTPATIENT
Start: 2022-08-18 | End: 2022-08-18 | Stop reason: HOSPADM

## 2022-08-18 RX ADMIN — PROPOFOL 60 MG: 10 INJECTION, EMULSION INTRAVENOUS at 01:18

## 2022-08-18 RX ADMIN — Medication 5 MG/HR: at 01:37

## 2022-08-18 RX ADMIN — PROPOFOL 30 MG: 10 INJECTION, EMULSION INTRAVENOUS at 01:21

## 2022-08-18 RX ADMIN — CARBOXYMETHYLCELLULOSE SODIUM 1 DROP: 5 SOLUTION/ DROPS OPHTHALMIC at 10:24

## 2022-08-18 RX ADMIN — APIXABAN 5 MG: 5 TABLET, FILM COATED ORAL at 01:46

## 2022-08-18 RX ADMIN — DILTIAZEM HYDROCHLORIDE 15 MG: 5 INJECTION INTRAVENOUS at 01:38

## 2022-08-18 RX ADMIN — DILTIAZEM HYDROCHLORIDE 30 MG: 30 TABLET, FILM COATED ORAL at 11:51

## 2022-08-18 RX ADMIN — ATENOLOL 25 MG: 25 TABLET ORAL at 10:23

## 2022-08-18 RX ADMIN — Medication 10 MG/HR: at 10:14

## 2022-08-18 RX ADMIN — POTASSIUM CHLORIDE 40 MEQ: 20 TABLET, EXTENDED RELEASE ORAL at 05:38

## 2022-08-18 RX ADMIN — SODIUM CHLORIDE 1000 ML: 9 INJECTION, SOLUTION INTRAVENOUS at 14:00

## 2022-08-18 RX ADMIN — POTASSIUM CHLORIDE 10 MEQ: 7.46 INJECTION, SOLUTION INTRAVENOUS at 02:37

## 2022-08-18 RX ADMIN — PRAVASTATIN SODIUM 80 MG: 20 TABLET ORAL at 10:23

## 2022-08-18 RX ADMIN — WARFARIN SODIUM 2.5 MG: 2.5 TABLET ORAL at 18:08

## 2022-08-18 ASSESSMENT — ACTIVITIES OF DAILY LIVING (ADL)
ADLS_ACUITY_SCORE: 20
ADLS_ACUITY_SCORE: 35
ADLS_ACUITY_SCORE: 35
ADLS_ACUITY_SCORE: 20
ADLS_ACUITY_SCORE: 35
ADLS_ACUITY_SCORE: 35

## 2022-08-18 ASSESSMENT — ENCOUNTER SYMPTOMS: DYSRHYTHMIAS: 1

## 2022-08-18 ASSESSMENT — COPD QUESTIONNAIRES: COPD: 1

## 2022-08-18 NOTE — ED NOTES
[facility]   ED Nurse to Floor Handoff     Rosie Blackman, a 82 year old, female from with a spouse,  in a home  They arrived in the ED by car from home    ED Chief Complaint: Irregular heart rate, Afib    Final diagnoses:   Patient Active Problem List    Diagnosis Date Noted     Hypokalemia 08/18/2022     Priority: Medium     Paroxysmal atrial fibrillation (H) 08/18/2022     Priority: Medium     Diastolic dysfunction 06/28/2022     Priority: Medium     Seen on Echo 6/2022       TIA (transient ischemic attack) 04/27/2022     Priority: Medium     Probable 3/22. sudden onset left mouth drooping, right hand weakness, trouble speaking, lasted 30 sec.  Did not seek medical care.  Did not tolerate ASA - GI upset and bleeding/bruising       Incisional hernia of anterior abdominal wall without obstruction or gangrene 02/22/2021     Priority: Medium     Added automatically from request for surgery [id number]       S/P exploratory laparotomy 07/24/2020     Priority: Medium     Ovarian cancer, unspecified laterality (H) 07/24/2020     Priority: Medium     Complete prior to 1.4 LJ       Tear of gluteus medius tendon 07/09/2020     Priority: Medium     Primary osteoarthritis of right hip 07/09/2020     Priority: Medium     Spinal stenosis of lumbar region, unspecified whether neurogenic claudication present 07/09/2020     Priority: Medium     Obesity (BMI 35.0-39.9) with comorbidity (H) 11/28/2018     Priority: Medium     Lichen sclerosus of female genitalia 07/03/2018     Priority: Medium     Gastroesophageal reflux disease, esophagitis presence not specified 10/12/2017     Priority: Medium     Hiatal hernia 10/21/2013     Priority: Medium     Large; found on gastroscopy 10/2013; No GERD sx; chronic throat clearing; + H pylori       Plantar fasciitis 04/02/2012     Priority: Medium     Advanced directives, counseling/discussion 12/08/2011     Priority: Medium     Advance Care Planning:   ACP Review  "and Resources Provided: Reviewed chart for advance care plan. Rosie Blackman has an up to date advance care plan on file. See additional documentation in Problem List and click on code status for document details. Confirmed/documented designated decision maker(s). Added by Shima Cruz on 12/08/2011         Hyperlipidemia LDL goal <130 10/31/2010     Priority: Medium     Intolerant to all other statins (2013)       Prediabetes 09/03/2010     Priority: Medium     Blood sugar 122 9/10 working on; has already been to nutrition, weight and wellness and is reading \"healthy for life\"       Diverticulosis of colon 05/24/2010     Priority: Medium     Noted on colonoscopy 5/10       Colon polyps 05/24/2010     Priority: Medium     Pes planus      Priority: Medium     Personal history of contact with and (suspected) exposure to asbestos      Priority: Medium             exposed 12 years in childhood       Donor of other blood      Priority: Medium           has false + syphyllis  Problem list name updated by automated process. Provider to review       Irritable bowel syndrome      Priority: Medium     ALLERGIC RHINITIS       Priority: Medium     Resolving with dietary changes; stopping gluten       History of recurrent UTIs      Priority: Medium             recurrent  Problem list name updated by automated process. Provider to review       Essential hypertension      Priority: Medium    (I48.0) Paroxysmal atrial fibrillation (H)  Comment:   Plan: Dilt gtt at 15 mg/hr, started Eliquis    (E87.6) Hypokalemia  Comment:   Plan: PO and IV potassium given in ED, monitor electrolytes        Needed?: No    Allergies:    Allergies   Allergen Reactions     Ezetimibe Other (See Comments)     Severe muscle aches     Lisinopril      Omeprazole      Other reaction(s): *Unknown     Prilosec [Omeprazole]      Zestril [Ace Inhibitors] Cough     Atorvastatin Other (See Comments)     Muscle Pain     Irbesartan Cramps     " "Rosuvastatin Other (See Comments)     Muscle Pain       Past Medical Hx:   Past Medical History:   Diagnosis Date     Chronic airway obstruction (H)      Diastolic dysfunction 6/28/2022     Gastroesophageal reflux disease      Hiatal hernia      Hypertension      Malignant neoplasm of ovary (H)      Ovarian cancer, unspecified laterality (H) 3/10/2021     Personal history of contact with and (suspected) exposure to asbestos      Primary osteoarthritis of right hip 7/9/2020       Vital Signs:  /57   Pulse 85   Temp 97.9  F (36.6  C) (Oral)   Resp 21   Ht 1.626 m (5' 4\")   Wt 90.7 kg (200 lb)   SpO2 98%   BMI 34.33 kg/m       Elimination Status: Continent: Yes     Activity Level: SBA    Baseline Mental status: WDL  Current Mental Status changes: at basesline    Infection present or suspected this encounter: no  Sepsis suspected: No  Isolation type: n/a  Patient tested for COVID 19 prior to admission: YES     Activity level - Baseline/Home:  Independent  Activity Level - Current:   Stand with Assist    Bariatric equipment needed?: No    In the ED these meds were given:   Medications   diltiazem (CARDIZEM) 125 mg in sodium chloride 0.7 % 125 mL infusion (15 mg/hr Intravenous Rate/Dose Change 8/18/22 0608)   diltiazem (CARDIZEM) injection 20 mg (20 mg Intravenous Given 8/17/22 2336)   aspirin (ASA) chewable tablet 324 mg (324 mg Oral Given 8/17/22 2335)   apixaban ANTICOAGULANT (ELIQUIS) tablet 5 mg (5 mg Oral Given 8/18/22 0146)   diltiazem (CARDIZEM) injection 15 mg (15 mg Intravenous Given 8/18/22 0138)   potassium chloride 10 mEq in 100 mL sterile water intermittent infusion (premix) (0 mEq Intravenous Stopped 8/18/22 0338)   potassium chloride ER (KLOR-CON M) CR tablet 40 mEq (40 mEq Oral Given 8/18/22 2838)       Drips running?  Yes    Home pump  No    Current LDAs: (Line status:405774)    Labs results:   Labs Ordered and Resulted from Time of ED Arrival Up to the Time of Departure from the ED  Results " for orders placed or performed during the hospital encounter of 08/17/22 (from the past 24 hour(s))   Wilson Draw    Narrative    The following orders were created for panel order [facility] Draw.  Procedure                               Abnormality         Status                     ---------                               -----------         ------                     Extra Blue Top Tube[[id number]]                              Final result               Extra Red Top Tube[[id number]]                               Final result               Extra Green Top (Lithium...[[id number]]                      Final result               Extra Purple Top Tube[[id number]]                            Final result                 Please view results for these tests on the individual orders.   Extra Blue Top Tube   Result Value Ref Range    Hold Specimen JIC    Extra Red Top Tube   Result Value Ref Range    Hold Specimen JIC    Extra Green Top (Lithium Heparin) Tube   Result Value Ref Range    Hold Specimen JIC    Extra Purple Top Tube   Result Value Ref Range    Hold Specimen JIC    CBC with platelets, differential    Narrative    The following orders were created for panel order CBC with platelets, differential.  Procedure                               Abnormality         Status                     ---------                               -----------         ------                     CBC with platelets and d...[[id number]]                      Final result                 Please view results for these tests on the individual orders.   Comprehensive metabolic panel   Result Value Ref Range    Sodium 141 133 - 144 mmol/L    Potassium 3.3 (L) 3.4 - 5.3 mmol/L    Chloride 105 94 - 109 mmol/L    Carbon Dioxide (CO2) 31 20 - 32 mmol/L    Anion Gap 5 3 - 14 mmol/L    Urea Nitrogen 19 7 - 30 mg/dL    Creatinine 0.74 0.52 - 1.04 mg/dL    Calcium 9.3 8.5 - 10.1 mg/dL    Glucose 97 70 - 99 mg/dL    Alkaline Phosphatase 102 40 - 150 U/L    AST 35  0 - 45 U/L    ALT 47 0 - 50 U/L    Protein Total 7.1 6.8 - 8.8 g/dL    Albumin 3.3 (L) 3.4 - 5.0 g/dL    Bilirubin Total 0.5 0.2 - 1.3 mg/dL    GFR Estimate 81 >60 mL/min/1.73m2   CBC with platelets and differential   Result Value Ref Range    WBC Count 6.1 4.0 - 11.0 10e3/uL    RBC Count 4.61 3.80 - 5.20 10e6/uL    Hemoglobin 14.0 11.7 - 15.7 g/dL    Hematocrit 43.3 35.0 - 47.0 %    MCV 94 78 - 100 fL    MCH 30.4 26.5 - 33.0 pg    MCHC 32.3 31.5 - 36.5 g/dL    RDW 13.1 10.0 - 15.0 %    Platelet Count 219 150 - 450 10e3/uL    % Neutrophils 55 %    % Lymphocytes 26 %    % Monocytes 14 %    % Eosinophils 4 %    % Basophils 1 %    % Immature Granulocytes 0 %    NRBCs per 100 WBC 0 <1 /100    Absolute Neutrophils 3.4 1.6 - 8.3 10e3/uL    Absolute Lymphocytes 1.6 0.8 - 5.3 10e3/uL    Absolute Monocytes 0.9 0.0 - 1.3 10e3/uL    Absolute Eosinophils 0.2 0.0 - 0.7 10e3/uL    Absolute Basophils 0.1 0.0 - 0.2 10e3/uL    Absolute Immature Granulocytes 0.0 <=0.4 10e3/uL    Absolute NRBCs 0.0 10e3/uL   TSH with free T4 reflex   Result Value Ref Range    TSH 4.66 (H) 0.40 - 4.00 mU/L   T4 free   Result Value Ref Range    Free T4 1.04 0.76 - 1.46 ng/dL   -Cardioversion External    Narrative    Rom Ann MD     8/18/2022  6:30 AM  [facility]    -Cardioversion External    Date/Time: 8/18/2022 1:26 AM  Performed by: Rom Ann MD  Authorized by: Rom Ann MD     Risks, benefits and alternatives discussed.      PRE-PROCEDURE DETAILS:   Attempt one:     Cardioversion mode:  Synchronous    Waveform:  Biphasic    Shock (Joules):  200    Shock outcome:  No change in rhythm  Attempt two:     Cardioversion mode:  Synchronous    Waveform:  Biphasic    Shock (Joules):  200    Shock outcome:  No change in rhythm  Attempt three:     Cardioversion mode:  Synchronous    Waveform:  Biphasic    Shock (Joules):  200    Shock outcome:  No change in rhythm  Post-procedure details:      Patient status:  Awake   Asymptomatic COVID-19 Virus (Coronavirus) by PCR Nasopharyngeal    Specimen: Nasopharyngeal; Swab   Result Value Ref Range    SARS CoV2 PCR Negative Negative    Narrative    Testing was performed using the herbert  SARS-CoV-2 & Influenza A/B Assay on the herbert  Nithya  System.  This test should be ordered for the detection of SARS-COV-2 in individuals who meet SARS-CoV-2 clinical and/or epidemiological criteria. Test performance is unknown in asymptomatic patients.  This test is for in vitro diagnostic use under the FDA EUA for laboratories certified under CLIA to perform moderate and/or high complexity testing. This test has not been FDA cleared or approved.  A negative test does not rule out the presence of PCR inhibitors in the specimen or target RNA in concentration below the limit of detection for the assay. The possibility of a false negative should be considered if the patient's recent exposure or clinical presentation suggests COVID-19.  [facility] are certified under the Clinical Laboratory Improvement Amendments of 1988 (CLIA-88) as qualified to perform moderate and/or high complexity laboratory testing.       Imaging Studies:   Recent Results (from the past 24 hour(s))  @[id number]@    Recent vital signs: BP: [unfilled], T: 97.9, HR: 85, R: 21     Ainsworth Coma Scale Score:    GCS:   Motor 6=Obeys commands   Verbal 5=Oriented   Eye Opening 4=Spontaneous   Total: 15        Risk for violent behavior: No     Cardiac Rhythm: A fib  Pt needs tele? Yes  Skin/wound Issues: None    Code Status: Full Code    Pain control: good    Nausea control: good    Abnormal labs/tests/findings requiring intervention: K 3.3-PO and IV potassium given. HR/Rhythm AFib-attempted cardioversion x 3 without success, Dilt gtt infusing at 15 mg/hr     Family present during ED course? Yes   Family Comments/Social Situation comments:  at bedside    Tasks needing completion: None    Kerry  BENSON Meeks, RN

## 2022-08-18 NOTE — PLAN OF CARE
Skin affirmation note    Admitting nurse completed full skin assessment, Goerge score and George interventions. This writer agrees with the initial skin assessment findings.

## 2022-08-18 NOTE — PROGRESS NOTES
"[place] [facility] ADMISSION NOTE    Patient admitted to room 1007 at approximately 0735 via cart from emergency room. Patient was accompanied by other:none.     Verbal SBAR report received from CLEMENTINA Payne prior to patient arrival.     Patient ambulated to bed with stand-by assist. Patient alert and oriented X 3. The patient is not having any pain.  . Admission vital signs: Blood pressure 126/58, pulse 79, temperature 97.9  F (36.6  C), temperature source Oral, resp. rate 29, height 1.626 m (5' 4\"), weight 90.7 kg (200 lb), SpO2 97 %, not currently breastfeeding. Patient was oriented to plan of care, call light, bed controls, tv, telephone, bathroom and visiting hours.     Risk Assessment    The following safety risks were identified during admission: fall. Yellow risk band applied: YES.     Skin Initial Assessment    This writer admitted this patient and completed a full skin assessment and George score in the Adult PCS flowsheet. Appropriate interventions initiated as needed.     Secondary skin check completed by CLEMENTINA Hansen.         Education    Patient has a [place] to Observation order: Yes  Observation education completed and documented: Yes          "

## 2022-08-18 NOTE — ED PROVIDER NOTES
History     Chief Complaint   Patient presents with     Palpitations     Afib       HPI  Rosie Blackman is a 82 year old female with a history of hypertension, prediabetes, hyperlipidemia, and a previous TIA presenting for evaluation of palpitations.  She reports symptoms began about 10:30 PM abruptly while laying in bed.  She felt her heart racing.  Denies any lightheadedness, dizziness, chest pain, or difficulty breathing.  Has had several episodes similar to this in the past several months.  Reports getting 1-2 episodes a month only.  Previous episodes have lasted less than 2 hours before resolving spontaneously.  No diagnosed history of atrial fibrillation.  Does not drink caffeine.  Has not been using any over-the-counter cough or cold medicines.  Denies any other stimulant use.  Has worn a Holter monitor without capturing an episode of irregular or fast heart rates.  Had transiently been on aspirin after her TIA but noticed some small bruising areas on her abdomen so this was discontinued especially given there was not a clear cardiac cause of her TIA at that time.  Denies history of GI bleeding or other systemic bleeding issues when on aspirin.    Allergies:  Allergies   Allergen Reactions     Ezetimibe Other (See Comments)     Severe muscle aches     Lisinopril      Omeprazole      Other reaction(s): *Unknown     Prilosec [Omeprazole]      Zestril [Ace Inhibitors] Cough     Atorvastatin Other (See Comments)     Muscle Pain     Irbesartan Cramps     Rosuvastatin Other (See Comments)     Muscle Pain       Problem List:    Patient Active Problem List    Diagnosis Date Noted     Diastolic dysfunction 06/28/2022     Priority: Medium     Seen on Echo 6/2022       TIA (transient ischemic attack) 04/27/2022     Priority: Medium     Probable 3/22. sudden onset left mouth drooping, right hand weakness, trouble speaking, lasted 30 sec.  Did not seek medical care.  Did not tolerate ASA - GI upset and  "bleeding/bruising       Incisional hernia of anterior abdominal wall without obstruction or gangrene 02/22/2021     Priority: Medium     Added automatically from request for surgery [id number]       S/P exploratory laparotomy 07/24/2020     Priority: Medium     Ovarian cancer, unspecified laterality (H) 07/24/2020     Priority: Medium     Complete prior to 1.4 LJ       Tear of gluteus medius tendon 07/09/2020     Priority: Medium     Primary osteoarthritis of right hip 07/09/2020     Priority: Medium     Spinal stenosis of lumbar region, unspecified whether neurogenic claudication present 07/09/2020     Priority: Medium     Obesity (BMI 35.0-39.9) with comorbidity (H) 11/28/2018     Priority: Medium     Lichen sclerosus of female genitalia 07/03/2018     Priority: Medium     Gastroesophageal reflux disease, esophagitis presence not specified 10/12/2017     Priority: Medium     Hiatal hernia 10/21/2013     Priority: Medium     Large; found on gastroscopy 10/2013; No GERD sx; chronic throat clearing; + H pylori       Plantar fasciitis 04/02/2012     Priority: Medium     Advanced directives, counseling/discussion 12/08/2011     Priority: Medium     Advance Care Planning:   ACP Review and Resources Provided: Reviewed chart for advance care plan. Rosie Blakcman has an up to date advance care plan on file. See additional documentation in Problem List and click on code status for document details. Confirmed/documented designated decision maker(s). Added by Shima Cruz on 12/08/2011         Hyperlipidemia LDL goal <130 10/31/2010     Priority: Medium     Intolerant to all other statins (2013)       Prediabetes 09/03/2010     Priority: Medium     Blood sugar 122 9/10 working on; has already been to nutrition, weight and wellness and is reading \"healthy for life\"       Diverticulosis of colon 05/24/2010     Priority: Medium     Noted on colonoscopy 5/10       Colon polyps 05/24/2010     Priority: Medium     Pes planus      " Priority: Medium     Personal history of contact with and (suspected) exposure to asbestos      Priority: Medium             exposed 12 years in childhood       Donor of other blood      Priority: Medium           has false + syphyllis  Problem list name updated by automated process. Provider to review       Irritable bowel syndrome      Priority: Medium     ALLERGIC RHINITIS       Priority: Medium     Resolving with dietary changes; stopping gluten       History of recurrent UTIs      Priority: Medium             recurrent  Problem list name updated by automated process. Provider to review       Essential hypertension      Priority: Medium        Past Medical History:    Past Medical History:   Diagnosis Date     Chronic airway obstruction (H)      Diastolic dysfunction 6/28/2022     Gastroesophageal reflux disease      Hiatal hernia      Hypertension      Malignant neoplasm of ovary (H)      Ovarian cancer, unspecified laterality (H) 3/10/2021     Personal history of contact with and (suspected) exposure to asbestos      Primary osteoarthritis of right hip 7/9/2020       Past Surgical History:    Past Surgical History:   Procedure Laterality Date     BIOPSY BREAST Left      BREAST BIOPSY, CORE RT/LT  1994     CHOLECYSTECTOMY  1995     COLONOSCOPY  05/2010    Diverticulosis, colon polyps; repeat 5 yrs     COLONOSCOPY N/A 11/16/2015    Procedure: COLONOSCOPY;  Surgeon: Alejandro Matos MD;  Location: [facility]     COLONOSCOPY N/A 9/17/2021    Procedure: COLONOSCOPY;  Surgeon: Aayush George MD;  Location: [facility]     Colonoscopy, hyperplastic polyps, repeat in 5 yrs  2004     ESOPHAGOSCOPY, GASTROSCOPY, DUODENOSCOPY (EGD), COMBINED  09/30/2013    Procedure: COMBINED ESOPHAGOSCOPY, GASTROSCOPY, DUODENOSCOPY (EGD), BIOPSY SINGLE OR MULTIPLE;  Gastroscopy;  Surgeon: Blaise Gill MD;  Location: [facility]     EYE SURGERY  2012    R/L Cataracts     HC REMOVE TONSILS/ADENOIDS,13+ Y/O      T & A 13+y.o.     HERNIORRHAPHY  INCISIONAL (LOCATION) N/A 3/24/2021    Procedure: HERNIORRHAPHY, INCISIONAL, OPEN WITH MESH;  Surgeon: Aayush George MD;  Location: [facility]     HYSTERECTOMY, PAP NO LONGER INDICATED       LAPAROSCOPIC SALPINGO-OOPHORECTOMY N/A 2020    Procedure: Laparoscopy, removal or pelvic mass, both tubes and ovaries, omentectomy, anterior culvectomy, pelvic and para aortic lymph node dissection, laparotomy;  Surgeon: Felipe Corona MD;  Location: [facility]     UT ANESTH,LUMBAR SPINE,CORD SURGERY  10/2020    L3-4 L4-5 TRANSFORAMINAL INTERBODY FUSION L3-5, POSTERIOR INSTRUMENTED FUSION AND DECOMPRESSION     TVH for DUB, ovaries in       VASCULAR SURGERY      [place] closure     ZZC ANESTH,KNEE VEINS SURGERY  2014       Family History:    Family History   Problem Relation Age of Onset     Cancer Mother         uterine cancer,      Respiratory Mother         COPD     Cardiovascular Mother         AAA  age 80     Breast Cancer Maternal Grandmother      Cancer Maternal Grandfather         cancer unknown type     Breast Cancer Sister      Eye Disorder Father         cataracts     Depression Father      Depression Son      Depression Son      Breast Cancer Sister         X2     Cancer - colorectal No family hx of         no ovarian cancer       Social History:  Marital Status:   [2]  Social History     Tobacco Use     Smoking status: Never Smoker     Smokeless tobacco: Never Used   Vaping Use     Vaping Use: Never used   Substance Use Topics     Alcohol use: No     Drug use: No        Medications:    atenolol (TENORMIN) 25 MG tablet  Evening Primrose Oil 1000 MG CAPS  mometasone (ELOCON) 0.1 % external cream  omega 3 1000 MG CAPS  pravastatin (PRAVACHOL) 80 MG tablet  THERATEARS 0.25 % OP SOLN  valsartan-hydrochlorothiazide (DIOVAN HCT) 320-25 MG tablet  VIACTIV 500-100-40 OR CHEW          Review of Systems   Constitutional: Negative for appetite change, chills, fatigue and fever.   HENT: Positive for  "rhinorrhea (Chronic). Negative for congestion.    Respiratory: Negative for cough and shortness of breath.    Cardiovascular: Positive for palpitations. Negative for chest pain.   Gastrointestinal: Negative for abdominal pain, diarrhea, nausea and vomiting.   Genitourinary: Negative for decreased urine volume.   Skin: Negative for wound.   Neurological: Negative for weakness, light-headedness and headaches.   Psychiatric/Behavioral: Negative for confusion.   All other systems reviewed and are negative.      Physical Exam   BP: (!) 168/103  Pulse: (!) 148  Temp: 97.9  F (36.6  C)  Resp: 20  Height: 162.6 cm (5' 4\")  Weight: 90.7 kg (200 lb)  SpO2: 99 %      Physical Exam  Vitals and nursing note reviewed.   Constitutional:       Appearance: Normal appearance. She is not ill-appearing or diaphoretic.   HENT:      Head: Atraumatic.      Nose: Nose normal.      Mouth/Throat:      Mouth: Mucous membranes are moist.   Eyes:      Conjunctiva/sclera: Conjunctivae normal.   Cardiovascular:      Rate and Rhythm: Tachycardia present. Rhythm irregular.      Pulses: Normal pulses.   Pulmonary:      Effort: Pulmonary effort is normal.      Breath sounds: Normal breath sounds.   Abdominal:      Palpations: Abdomen is soft.      Tenderness: There is no abdominal tenderness.   Musculoskeletal:         General: Normal range of motion.      Cervical back: Normal range of motion.      Right lower leg: No edema.      Left lower leg: No edema.   Skin:     General: Skin is warm and dry.      Capillary Refill: Capillary refill takes less than 2 seconds.   Neurological:      Mental Status: She is alert and oriented to person, place, and time.   Psychiatric:         Mood and Affect: Mood normal.         ED [facility]    -Cardioversion External    Date/Time: 8/18/2022 1:26 AM  Performed by: Rom Ann MD  Authorized by: Rom Ann MD     Risks, benefits and " alternatives discussed.      PRE-PROCEDURE DETAILS:   Attempt one:     Cardioversion mode:  Synchronous    Waveform:  Biphasic    Shock (Joules):  200    Shock outcome:  No change in rhythm  Attempt two:     Cardioversion mode:  Synchronous    Waveform:  Biphasic    Shock (Joules):  200    Shock outcome:  No change in rhythm  Attempt three:     Cardioversion mode:  Synchronous    Waveform:  Biphasic    Shock (Joules):  200    Shock outcome:  No change in rhythm  Post-procedure details:     Patient status:  Awake                  EKG Interpretation:      Interpreted by Rom Ann MD  Time reviewed: 2315  Symptoms at time of EKG: palpitations   Rhythm: Atrial fibrillation/flutter  Rate: 135  Axis: Normal  Ectopy: none  Conduction: normal  ST Segments/ T Waves: Diffuse ST depressions  Q Waves: none  Comparison to prior: New onset of likely atrial fibrillation since 12/4/2021, QRS morphology similar.    Clinical Impression: New onset likely atrial fibrillation/flutter with diffuse nonspecific ST depressions           EKG Interpretation:      Interpreted by Rom Ann MD  Time reviewed:0200   Symptoms at time of EKG: none   Rhythm: Normal sinus  and Atrial fibrillation - controlled  Rate: Normal  Axis: Normal  Ectopy: None  Conduction: Normal  ST Segments/ T Waves: No acute ischemic changes  Q Waves: None  Comparison to prior: Resolution of previous ST depressions    Clinical Impression: Rate controlled atrial fibrillation without other acute abnormality              Results for orders placed or performed during the hospital encounter of 08/17/22 (from the past 24 hour(s))   [place] Draw    Narrative    The following orders were created for panel order [place] Draw.  Procedure                               Abnormality         Status                     ---------                               -----------         ------                     Extra Blue Top Tube[[id number]]                               Final result               Extra Red Top Tube[[id number]]                               Final result               Extra Green Top (Lithium...[[id number]]                      Final result               Extra Purple Top Tube[[id number]]                            Final result                 Please view results for these tests on the individual orders.   Extra Blue Top Tube   Result Value Ref Range    Hold Specimen JIC    Extra Red Top Tube   Result Value Ref Range    Hold Specimen JIC    Extra Green Top (Lithium Heparin) Tube   Result Value Ref Range    Hold Specimen JIC    Extra Purple Top Tube   Result Value Ref Range    Hold Specimen JIC    CBC with platelets, differential    Narrative    The following orders were created for panel order CBC with platelets, differential.  Procedure                               Abnormality         Status                     ---------                               -----------         ------                     CBC with platelets and d...[[id number]]                      Final result                 Please view results for these tests on the individual orders.   Comprehensive metabolic panel   Result Value Ref Range    Sodium 141 133 - 144 mmol/L    Potassium 3.3 (L) 3.4 - 5.3 mmol/L    Chloride 105 94 - 109 mmol/L    Carbon Dioxide (CO2) 31 20 - 32 mmol/L    Anion Gap 5 3 - 14 mmol/L    Urea Nitrogen 19 7 - 30 mg/dL    Creatinine 0.74 0.52 - 1.04 mg/dL    Calcium 9.3 8.5 - 10.1 mg/dL    Glucose 97 70 - 99 mg/dL    Alkaline Phosphatase 102 40 - 150 U/L    AST 35 0 - 45 U/L    ALT 47 0 - 50 U/L    Protein Total 7.1 6.8 - 8.8 g/dL    Albumin 3.3 (L) 3.4 - 5.0 g/dL    Bilirubin Total 0.5 0.2 - 1.3 mg/dL    GFR Estimate 81 >60 mL/min/1.73m2   CBC with platelets and differential   Result Value Ref Range    WBC Count 6.1 4.0 - 11.0 10e3/uL    RBC Count 4.61 3.80 - 5.20 10e6/uL    Hemoglobin 14.0 11.7 - 15.7 g/dL    Hematocrit 43.3 35.0 - 47.0 %    MCV 94 78 - 100 fL    MCH 30.4 26.5 - 33.0  pg    MCHC 32.3 31.5 - 36.5 g/dL    RDW 13.1 10.0 - 15.0 %    Platelet Count 219 150 - 450 10e3/uL    % Neutrophils 55 %    % Lymphocytes 26 %    % Monocytes 14 %    % Eosinophils 4 %    % Basophils 1 %    % Immature Granulocytes 0 %    NRBCs per 100 WBC 0 <1 /100    Absolute Neutrophils 3.4 1.6 - 8.3 10e3/uL    Absolute Lymphocytes 1.6 0.8 - 5.3 10e3/uL    Absolute Monocytes 0.9 0.0 - 1.3 10e3/uL    Absolute Eosinophils 0.2 0.0 - 0.7 10e3/uL    Absolute Basophils 0.1 0.0 - 0.2 10e3/uL    Absolute Immature Granulocytes 0.0 <=0.4 10e3/uL    Absolute NRBCs 0.0 10e3/uL   TSH with free T4 reflex   Result Value Ref Range    TSH 4.66 (H) 0.40 - 4.00 mU/L   T4 free   Result Value Ref Range    Free T4 1.04 0.76 - 1.46 ng/dL   Asymptomatic COVID-19 Virus (Coronavirus) by PCR Nasopharyngeal    Specimen: Nasopharyngeal; Swab   Result Value Ref Range    SARS CoV2 PCR Negative Negative    Narrative    Testing was performed using the herbert  SARS-CoV-2 & Influenza A/B Assay on the herbert  Nithya  System.  This test should be ordered for the detection of SARS-COV-2 in individuals who meet SARS-CoV-2 clinical and/or epidemiological criteria. Test performance is unknown in asymptomatic patients.  This test is for in vitro diagnostic use under the FDA EUA for laboratories certified under CLIA to perform moderate and/or high complexity testing. This test has not been FDA cleared or approved.  A negative test does not rule out the presence of PCR inhibitors in the specimen or target RNA in concentration below the limit of detection for the assay. The possibility of a false negative should be considered if the patient's recent exposure or clinical presentation suggests COVID-19.  [facility] are certified under the Clinical Laboratory Improvement Amendments of 1988 (CLIA-88) as qualified to perform moderate and/or high complexity laboratory testing.       Medications   diltiazem (CARDIZEM) 125 mg in sodium chloride 0.7 %  125 mL infusion (15 mg/hr Intravenous Rate/Dose Change 8/18/22 0608)   diltiazem (CARDIZEM) injection 20 mg (20 mg Intravenous Given 8/17/22 2336)   aspirin (ASA) chewable tablet 324 mg (324 mg Oral Given 8/17/22 2335)   apixaban ANTICOAGULANT (ELIQUIS) tablet 5 mg (5 mg Oral Given 8/18/22 0146)   diltiazem (CARDIZEM) injection 15 mg (15 mg Intravenous Given 8/18/22 0138)   potassium chloride 10 mEq in 100 mL sterile water intermittent infusion (premix) (0 mEq Intravenous Stopped 8/18/22 0338)   potassium chloride ER (KLOR-CON M) CR tablet 40 mEq (40 mEq Oral Given 8/18/22 0538)        11:35 PM: CHADS 2 score calculated: 6 (age, gender, hypertension history, TIA history).  We will discuss consideration for full systemic anticoagulation.    12:38 AM Patient re-assessed: Resting comfortably.  Palpitations has resolved however she remains in A. fib on the monitor and heart rate runs from 104-140.  Discussed need for complete anticoagulation.  Discussed recommendations for cardioversion given that her blood pressure is marginally low and would likely further decrease if we gave more Cardizem.  Patient amenable to this.  Will set patient up for cardioversion    1:25 AM; attempted cardioversion x3 with no return to sinus rhythm.  Patient remained in atrial fibrillation after each attempt.    2:04 AM: Discussed with Dr Neil, tele-hospitalist.  He does not feel comfortable admitting her at this time due to her remaining in atrial fibrillation despite current ED management.  Requests cardiology evaluation    2:11 AM: Paging cardiology to discuss.     4:14 AM; no beds within the [place] system.  Patient is clinically stable.  We will hold off on cardiology consult for now.  We will continue on Cardizem drip and monitor.  Currently heart rate well controlled but remains in A. Fib.    6:29 AM: Discussed with Jade Powell. Accepts for obs.       Assessments & Plan (with Medical Decision Making)  82-year-old female  with history of hypertension, prediabetes, hyperlipidemia, and previous TIA presenting for evaluation of palpitations.  Found to be in rapid atrial fibrillation tonight.  Patient reports several recurrent similar episodes in the past which have all spontaneously resolved in less than an hour or 2.  For this reason, initial management initiated with Cardizem which provided some improved rate control but did not convert her to a sinus rhythm.  Subsequently proceeded to an attempt at cardioversion as patient is very recent from her onset of symptoms tonight.  Cardioversion attempted with sedation x3 without change in rhythm so management strategy reverted back to primarily rate control with further Cardizem with hopes of spontaneous chemical cardioversion as well.  Given a second bolus of Cardizem followed by drip which did provide good rate control.  Plan was to admit to the hospital service however overnight hospitalist uncomfortable with admission here.  No beds available elsewhere in the hospital system so was monitored in the ED overnight.     I have reviewed the nursing notes.    I have reviewed the findings, diagnosis, plan and need for follow up with the patient.       New Prescriptions    No medications on file       Final diagnoses:   Paroxysmal atrial fibrillation (H)   Hypokalemia       8/17/2022   [facility] EMERGENCY DEPT     Ann, Rom Allen MD  08/18/22 7506

## 2022-08-18 NOTE — ED TRIAGE NOTES
Here from home with concerns for Afib, has had a couple episodes since her TIA, wore holter & wasn't able to catch as OP. Tonight was in bed & felt heart racing, denies chest pain,SOB, dizziness, very asymptomatic, BPs stable.       Triage Assessment     Row Name 08/17/22 0538       Triage Assessment (Adult)    Airway WDL WDL       Respiratory WDL    Respiratory WDL WDL       Skin Circulation/Temperature WDL    Skin Circulation/Temperature WDL WDL       Cardiac WDL    Cardiac WDL X;all    Cardiac Rhythm tachycardic;apical pulse irregular       Chest Pain Assessment    Chest Pain Intervention cardiac biomarkers drawn;cardiac monitoring continued;cardiac monitor placed;12-lead ECG obtained       Peripheral/Neurovascular WDL    Peripheral Neurovascular WDL WDL       Cognitive/Neuro/Behavioral WDL    Cognitive/Neuro/Behavioral WDL WDL       Yuri Coma Scale    Best Eye Response 4-->(E4) spontaneous    Best Motor Response 6-->(M6) obeys commands    Best Verbal Response 5-->(V5) oriented    [place] Coma Scale Score 15

## 2022-08-18 NOTE — ANESTHESIA POSTPROCEDURE EVALUATION
Patient: Rosie Blackman    Procedure: * No procedures listed *       Anesthesia Type:  General    Note:  Disposition: Outpatient   Postop Pain Control: Uneventful            Sign Out: Well controlled pain   PONV: No   Neuro/Psych: Uneventful            Sign Out: Acceptable/Baseline neuro status   Airway/Respiratory: Uneventful            Sign Out: Acceptable/Baseline resp. status   CV/Hemodynamics: Uneventful            Sign Out: Acceptable CV status; No obvious hypovolemia; No obvious fluid overload   Other NRE: NONE   DID A NON-ROUTINE EVENT OCCUR? No           Last vitals:  Vitals:    08/18/22 0117 08/18/22 0117 08/18/22 0120   BP: (!) 116/99  115/84   Pulse: 118  (!) 126   Resp: 16 16 (!) 33   Temp:      SpO2: 98%  92%       Electronically Signed By: DELMY Alves CRNA  August 18, 2022  1:28 AM

## 2022-08-18 NOTE — H&P
[facility]    History and Physical - Hospitalist Service       Date of Admission:  8/17/2022    Assessment & Plan      Rosie Blackman is a 82 year old female admitted on 8/17/2022. She presented to the emergency department for evaluation of palpitations and a racing heart and was found to be in atrial fibrillation RVR for which she is being admitted for further evaluation and treatment.     Atrial fibrillation with RVR / Paroxysmal atrial fibrillation - new diagnosis   Prior history of palpitations, but no documented arrhythmias on prior EKGs or 3 day Zio patch. Presented with new atrial fibrillation with RVR on admission EKG, despite being on atenolol 25 mg daily prior to admission. Asymptomatic other than racing heart / palpitations. Troponin within normal limits (16). TSH high (4.66), but free T4 normal (1.04). CHADs2-VASc = 6.   Patient failed cardioversion x 3 in the emergency department. Was started on diltiazem drip in the emergency department with normalization in heart rate.  - Admit to ICU on diltiazem drip, wean as able  - Initiate diltiazem 30 mg q6h, titrate as heart rate / blood pressure allows  - Continue prior to admission atenolol   - Initiate anticoagulation - apixaban 5 mg bid in hospital, will discuss DOAC vs coumadin prior to discharge (apixaban $339/month, rivaroxaban $431 for 90 day supply)  - No echo indicated (recent echo on 6/28/22)  - Trend troponin     ADDENDUM 2:03 PM   Discussed anticoagulation options with patient (see above for test claims pricing). Patient elected to start coumadin rather than DOAC due to cost.   While in patient's room, she went into brief 6-7sec asystole on the monitor, followed by conversion to sinus bradycardia with rate between 45-49. Patient was able to state she felt unwell although had a brief moment of altered mental status, but quickly came to. A code / rapid response was initiated but cancelled once it was noted patient's  rhythm had converted and patient was responding appropriately. Blood pressure slightly soft but stable.   - Stop apixaban, pharmacy to dose coumadin with INR goal 2-3, no bridging needed  - Stop atenolol due to bradycardia  - Hold further diltiazem for today, will initiate diltiazem  mg in am 8/19  - Continue to monitor on telemetry    Hypokalemia  Initial K = 3.3. Replacement initiated in the emergency department.  - K replacement protocol    History of TIA (transient ischemic attack)  Symptoms in March 2022, was not evaluated at the time of symptoms (she was on vacation in [place]), but sought evaluation through PCP. Completed 3-day Zio patch and echo without significant findings. Did not tolerate aspirin due to GI upset. TIA was likely due to atrial fibrillation.  - Anticoagulation as noted above    Essential hypertension  Diastolic dysfunction  Echo 6/28/22 with EF 60-65%, grade II diastolic dysfunction, increased LV filling pressures, LA borderline dilated, RV size normal. Managed prior to admission with atenolol 25 mg daily and valsartan-hydrochlorothiazide 320-25 mg daily. Blood pressure has been borderline low at times since starting diltiazem drip.  - See above regarding atenolol and diltiazem   - Valsartan-hydrochlorothiazide on hold due to intermittently soft blood pressure    Hyperlipidemia LDL goal <130  Managed prior to admission with pravastatin 80 mg daily, continue.     Ovarian cancer, unspecified laterality  Previously diagnosed, s/p BSO, omentectomy, and peritoneal biopsies/washing. Follows with gyn/onc Dr. Felipe Corona. Not currently on any active treatments, but is followed by gyn/onc every 6 months.  - Continue with outpatient management    Primary osteoarthritis of right hip  Spinal stenosis of lumbar region, unspecified whether neurogenic claudication present  Chronic and stable. Not on any chronic narcotics.  - No intervention    Obesity  BMI 34.33, contributes to morbidity and  "mortality.        Diet: Combination Diet Low Saturated Fat Diet  DVT Prophylaxis: DOAC  Damian Catheter: Not present  Central Lines: None  Cardiac Monitoring: ACTIVE order. Indication: ICU  Code Status: Full Code  - discussed at time of admission       Disposition Plan      Expected Discharge Date: 08/18/2022      Destination: home          The patient's care was discussed with the Attending Physician, Dr. Irineo Callahan, Bedside Nurse, Patient and discussed during interdisciplinary rounds.    Fatemeh Cha PA-C  Hospitalist [facility]  Securely message with the Vocera Web Console (learn more here)  Text page via [facility] Paging/Directory         ______________________________________________________________________    Chief Complaint   \"My heart started racing last night.\"    History is obtained from the patient, review of EMR, and emergency department sign out from Dr. Rajendra Ann.    History of Present Illness   Rosie Blackman is a 82 year old female who presented to the emergency department for evaluation of palpitations.    Patient has a prior history of palpitations and a racing heart, but has never previously been diagnosed with an arrhythmia.  She has had 3 or 4 episodes of a racing heart over the past 4 months, typically lasing 10-15 minutes and then self-resolving.  She had an episode 5 days ago and notified her clinic, who advised her to go to the emergency department the next time she had an episode that lasted more than a few seconds.     Last night around 10 pm as she was laying in bed she turned over onto her side and developed a racing palpitation.   She presented to the emergency department and was found to be in atrial fibrillation RVR; she was asymptomatic other than the palpitations.  Cardioversion was attempted x 3 without success, although the racing heart sensation did stop after the cardioversion attempts.   She is currently feeling normal and denies any " palpitations (even though she remains in atrial fibrillation).     Review of systems:  She has been waking every morning with muscle aches in her anterior thighs that resolve with movement of the thigh muscles.   She has some chronic back pain and stiffness after having spinal stenosis surgery and spinal fusion, chronic and stable.   She has some balance issues at baseline that she attributes to her pes planus.  The remainder review of systems is negative.       Review of Systems    The 10 point Review of Systems is negative other than noted in the HPI or here.     Past Medical History    I have reviewed this patient's medical history and updated it with pertinent information if needed.   Past Medical History:   Diagnosis Date     Chronic airway obstruction (H)      Diastolic dysfunction 6/28/2022     Gastroesophageal reflux disease      Hiatal hernia      Hypertension      Malignant neoplasm of ovary (H)      Ovarian cancer, unspecified laterality (H) 3/10/2021     Personal history of contact with and (suspected) exposure to asbestos      Primary osteoarthritis of right hip 7/9/2020       Past Surgical History   I have reviewed this patient's surgical history and updated it with pertinent information if needed.  Past Surgical History:   Procedure Laterality Date     BIOPSY BREAST Left      BREAST BIOPSY, CORE RT/LT  1994     CHOLECYSTECTOMY  1995     COLONOSCOPY  05/2010    Diverticulosis, colon polyps; repeat 5 yrs     COLONOSCOPY N/A 11/16/2015    Procedure: COLONOSCOPY;  Surgeon: Alejandro Matos MD;  Location: [facility]     COLONOSCOPY N/A 9/17/2021    Procedure: COLONOSCOPY;  Surgeon: Aayush George MD;  Location: [facility]     Colonoscopy, hyperplastic polyps, repeat in 5 yrs  2004     ESOPHAGOSCOPY, GASTROSCOPY, DUODENOSCOPY (EGD), COMBINED  09/30/2013    Procedure: COMBINED ESOPHAGOSCOPY, GASTROSCOPY, DUODENOSCOPY (EGD), BIOPSY SINGLE OR MULTIPLE;  Gastroscopy;  Surgeon: Blaise Gill MD;  Location: [facility]      EYE SURGERY      R/L Cataracts     HC REMOVE TONSILS/ADENOIDS,12+ Y/O      T & A 12+y.o.     HERNIORRHAPHY INCISIONAL (LOCATION) N/A 3/24/2021    Procedure: HERNIORRHAPHY, INCISIONAL, OPEN WITH MESH;  Surgeon: Aayush George MD;  Location: [facility]     HYSTERECTOMY, PAP NO LONGER INDICATED       LAPAROSCOPIC SALPINGO-OOPHORECTOMY N/A 2020    Procedure: Laparoscopy, removal or pelvic mass, both tubes and ovaries, omentectomy, anterior culvectomy, pelvic and para aortic lymph node dissection, laparotomy;  Surgeon: Felipe Corona MD;  Location: [facility]     NH ANESTH,LUMBAR SPINE,CORD SURGERY  10/2020    L3-4 L4-5 TRANSFORAMINAL INTERBODY FUSION L3-5, POSTERIOR INSTRUMENTED FUSION AND DECOMPRESSION     TVH for DUB, ovaries in       VASCULAR SURGERY      [place] closure     ZZC ANESTH,KNEE VEINS SURGERY  2014       Social History   I have reviewed this patient's social history and updated it with pertinent information if needed.  Social History     Tobacco Use     Smoking status: Never Smoker     Smokeless tobacco: Never Used   Vaping Use     Vaping Use: Never used   Substance Use Topics     Alcohol use: No     Drug use: No       Family History   I have reviewed this patient's family history and updated it with pertinent information if needed.  Family History   Problem Relation Age of Onset     Cancer Mother         uterine cancer,      Respiratory Mother         COPD     Cardiovascular Mother         AAA  age 80     Breast Cancer Maternal Grandmother      Cancer Maternal Grandfather         cancer unknown type     Breast Cancer Sister      Eye Disorder Father         cataracts     Depression Father      Depression Son      Depression Son      Breast Cancer Sister         X2     Cancer - colorectal No family hx of         no ovarian cancer       Prior to Admission Medications   Prior to Admission Medications   Prescriptions Last Dose Informant Patient Reported? Taking?   Evening Primrose  Oil 1000 MG CAPS 8/17/2022 at 0730  Yes Yes   Sig: One daily   THERATEARS 0.25 % OP SOLN 8/17/2022 at Unknown time  Yes Yes   Sig: BID   VIACTIV 500-100-40 OR CHEW 8/17/2022 at 0830  Yes Yes   Sig: once daily   atenolol (TENORMIN) 25 MG tablet 8/17/2022 at 0730  No Yes   Sig: Take 1 tablet (25 mg) by mouth daily   mometasone (ELOCON) 0.1 % external cream 8/17/2022 at 0730  No Yes   Sig: Apply sparingly to affected area twice daily for 2 weeks then decrease to 1 time daily for 30 days then decrease to 2-3 times per week   omega 3 1000 MG CAPS 8/17/2022 at 0730  Yes Yes   Sig: Take by mouth daily    pravastatin (PRAVACHOL) 80 MG tablet 8/17/2022 at 0730  No Yes   Sig: Take 1 tablet (80 mg) by mouth daily   valsartan-hydrochlorothiazide (DIOVAN HCT) 320-25 MG tablet 8/17/2022 at 0730  No Yes   Sig: Take 1 tablet by mouth daily      Facility-Administered Medications: None     Allergies   Allergies   Allergen Reactions     Ezetimibe Other (See Comments)     Severe muscle aches     Lisinopril      Omeprazole      Other reaction(s): *Unknown     Prilosec [Omeprazole]      Zestril [Ace Inhibitors] Cough     Atorvastatin Other (See Comments)     Muscle Pain     Irbesartan Cramps     Rosuvastatin Other (See Comments)     Muscle Pain       Physical Exam   Vital Signs: Temp: 97.9  F (36.6  C) Temp src: Oral BP: 112/56 Pulse: 74   Resp: 21 SpO2: 98 % O2 Device: None (Room air) Oxygen Delivery: 3 LPM  Weight: 208 lbs 12.8 oz    Constitutional: Alert, oriented, cooperative. No apparent distress, appears nontoxic. Speaking in full coherent sentences.     Eyes: Eyes are clear, pupils are reactive. No scleral icterus.     HEENT: Oropharynx is clear and moist, no lesions. Normocephalic, no evidence of cranial trauma.      Cardiovascular: Irregularly irregular rhythm, normal rate, normal S1 and S2. No murmur, rubs, or gallops. Peripheral pulses intact bilaterally. Trace bilateral lower extremity edema.    Respiratory: Non-labored  breathing on room air. Lung sounds are clear to auscultation bilaterally without wheezes, rhonchi, or crackles.    GI: Soft, non-distended. Non-tender, no rebound or guarding. No hepatosplenomegaly or masses appreciated. Normal bowel sounds.     Musculoskeletal: Without obvious deformity, normal range of motion. Normal muscle bulk and tone. Distal CMS intact.      Skin: Warm and dry, no rashes or ecchymoses. No mottling of skin.      Neurologic: Patient moves all extremities. Gross strength and sensation are equal bilaterally.    Genitourinary: Deferred      Data   Data reviewed today: I reviewed all medications, new labs and imaging results over the last 24 hours. I personally reviewed the EKG tracing showing afib RVR, rate 135.    Recent Labs   Lab 08/18/22  0925 08/18/22  0753 08/17/22  2305   WBC  --   --  6.1   HGB  --   --  14.0   MCV  --   --  94   PLT  --   --  219   NA  --   --  141   POTASSIUM 3.7  --  3.3*   CHLORIDE  --   --  105   CO2  --   --  31   BUN  --   --  19   CR  --   --  0.74   ANIONGAP  --   --  5   ANISA  --   --  9.3   GLC  --  97 97   ALBUMIN  --   --  3.3*   PROTTOTAL  --   --  7.1   BILITOTAL  --   --  0.5   ALKPHOS  --   --  102   ALT  --   --  47   AST  --   --  35     Recent Results (from the past 24 hour(s))   -Cardioversion External    Narrative    Rom Ann MD     8/18/2022  6:30 AM  [facility]    -Cardioversion External    Date/Time: 8/18/2022 1:26 AM  Performed by: Rom Ann MD  Authorized by: Rom Ann MD     Risks, benefits and alternatives discussed.      PRE-PROCEDURE DETAILS:   Attempt one:     Cardioversion mode:  Synchronous    Waveform:  Biphasic    Shock (Joules):  200    Shock outcome:  No change in rhythm  Attempt two:     Cardioversion mode:  Synchronous    Waveform:  Biphasic    Shock (Joules):  200    Shock outcome:  No change in rhythm  Attempt three:     Cardioversion mode:  Synchronous     Waveform:  Biphasic    Shock (Joules):  200    Shock outcome:  No change in rhythm  Post-procedure details:     Patient status:  Awake

## 2022-08-18 NOTE — PROGRESS NOTES
"End Of Shift Note    Situation: admitted for \"heart racing\"feeling, found to be in A-fib    Plan: monitor heart rate over night, discontinue on Friday    Subjective/Objective:    Neuro: A&OX4, up with stand by assist to bathroom, sitting up in the recliner all shift    Cardiac: was in atrial fib and converted to sinus rhythm after having a 10 sec pause, was symptomatic and not responding, see cardiac monitor strips.  At 10 seconds patient has a sinus soha rhythm and was taking to the PA,   IV NS fluid bolus given.    Resp: Lungs clear bilaterally, denies feeling short of air     GI/: voiding spontaneously    Endo: eating good     MSK: up with stand by assist    Skin: c/o \"muscle cramping in thighs\" at home    LDAs: PIV x1, left AC      "

## 2022-08-18 NOTE — UTILIZATION REVIEW
Inpatient appropriate    Admission Status; Secondary Review Determination      Under the authority of the Utilization Management Committee, the utilization review process indicated a secondary review on the above patient. The review outcome is based on review of the medical records, discussions with staff, and applying clinical experience noted on the date of the review.    (x) Inpatient Status Appropriate - This patient's medical care is consistent with medical management for inpatient care and reasonable inpatient medical practice.    RATIONALE FOR DETERMINATION  82-year-old female with history of TIA, hypertension, hyperlipidemia was admitted to [facility] on 8/17/2022 with new onset rapid A. fib.  Patient had an episode of pause of around 10 seconds before she converted to normal sinus rhythm.  She is however still hypotensive with blood pressure in the 80s.  She will need to be monitored overnight closely on telemetry.  Inpatient care is appropriate at this time.    At the time of admission with the information available to the attending physician more than 2 nights Hospital complex care was anticipated, based on patient risk of adverse outcome if treated as outpatient and complex care required. Inpatient admission is appropriate based on the Medicare guidelines.    This document was produced using voice recognition software      The information on this document is developed by the utilization review team in order for the business office to ensure compliance. This only denotes the appropriateness of proper admission status and does not reflect the quality of care rendered.  The definitions of Inpatient Status and Observation Status used in making the determination above are those provided in the CMS Coverage Manual, Chapter 1 and Chapter 6, section 70.4.    Sincerely,    Utilization Review  Physician Advisor  [facility].   22-Mar-2017

## 2022-08-18 NOTE — ANESTHESIA PREPROCEDURE EVALUATION
Anesthesia Pre-Procedure Evaluation    Patient: Rosie Blackman   MRN: [id number] : 1940        Procedure :           Past Medical History:   Diagnosis Date     Chronic airway obstruction (H)      Diastolic dysfunction 2022     Gastroesophageal reflux disease      Hiatal hernia      Hypertension      Malignant neoplasm of ovary (H)      Ovarian cancer, unspecified laterality (H) 3/10/2021     Personal history of contact with and (suspected) exposure to asbestos      Primary osteoarthritis of right hip 2020      Past Surgical History:   Procedure Laterality Date     BIOPSY BREAST Left      BREAST BIOPSY, CORE RT/LT       CHOLECYSTECTOMY       COLONOSCOPY  2010    Diverticulosis, colon polyps; repeat 5 yrs     COLONOSCOPY N/A 2015    Procedure: COLONOSCOPY;  Surgeon: Alejandro Matos MD;  Location: [facility]     COLONOSCOPY N/A 2021    Procedure: COLONOSCOPY;  Surgeon: Aayush George MD;  Location: [facility]     Colonoscopy, hyperplastic polyps, repeat in 5 yrs       ESOPHAGOSCOPY, GASTROSCOPY, DUODENOSCOPY (EGD), COMBINED  2013    Procedure: COMBINED ESOPHAGOSCOPY, GASTROSCOPY, DUODENOSCOPY (EGD), BIOPSY SINGLE OR MULTIPLE;  Gastroscopy;  Surgeon: Blaise Gill MD;  Location: [facility]     EYE SURGERY      R/L Cataracts     HC REMOVE TONSILS/ADENOIDS,12+ Y/O      T & A 13+y.o.     HERNIORRHAPHY INCISIONAL (LOCATION) N/A 3/24/2021    Procedure: HERNIORRHAPHY, INCISIONAL, OPEN WITH MESH;  Surgeon: Aayush George MD;  Location: [facility]     HYSTERECTOMY, PAP NO LONGER INDICATED       LAPAROSCOPIC SALPINGO-OOPHORECTOMY N/A 2020    Procedure: Laparoscopy, removal or pelvic mass, both tubes and ovaries, omentectomy, anterior culvectomy, pelvic and para aortic lymph node dissection, laparotomy;  Surgeon: Felipe Corona MD;  Location: [facility]     UT ANESTH,LUMBAR SPINE,CORD SURGERY  10/2020    L3-4 L4-5 TRANSFORAMINAL INTERBODY FUSION L3-5, POSTERIOR INSTRUMENTED  FUSION AND DECOMPRESSION     TVH for DUB, ovaries in       VASCULAR SURGERY  2014    [place] closure     ZZC ANESTH,KNEE VEINS SURGERY  08/20/2014      Allergies   Allergen Reactions     Ezetimibe Other (See Comments)     Severe muscle aches     Lisinopril      Omeprazole      Other reaction(s): *Unknown     Prilosec [Omeprazole]      Zestril [Ace Inhibitors] Cough     Atorvastatin Other (See Comments)     Muscle Pain     Irbesartan Cramps     Rosuvastatin Other (See Comments)     Muscle Pain      Social History     Tobacco Use     Smoking status: Never Smoker     Smokeless tobacco: Never Used   Substance Use Topics     Alcohol use: No      Wt Readings from Last 1 Encounters:   08/17/22 90.7 kg (200 lb)        Anesthesia Evaluation   Pt has had prior anesthetic. Type: General, Regional and MAC.        ROS/MED HX  ENT/Pulmonary:     (+) allergic rhinitis, COPD,     Neurologic:     (+) TIA,     Cardiovascular:     (+) Dyslipidemia hypertension-----dysrhythmias, a-fib,     METS/Exercise Tolerance:     Hematologic:       Musculoskeletal:   (+) arthritis,     GI/Hepatic:     (+) GERD, hiatal hernia, bowel prep,     Renal/Genitourinary:       Endo:     (+) Obesity,     Psychiatric/Substance Use:       Infectious Disease:       Malignancy:   (+) Malignancy, History of Other.Other CA Ovarian status post Surgery.    Other:            Physical Exam    Airway        Mallampati: II   TM distance: > 3 FB   Neck ROM: full   Mouth opening: > 3 cm    Respiratory Devices and Support         Dental  no notable dental history         Cardiovascular   cardiovascular exam normal          Pulmonary   pulmonary exam normal                OUTSIDE LABS:  CBC:   Lab Results   Component Value Date    WBC 6.1 08/17/2022    WBC 6.9 05/06/2022    HGB 14.0 08/17/2022    HGB 13.7 05/06/2022    HCT 43.3 08/17/2022    HCT 42.2 05/06/2022     08/17/2022     05/06/2022     BMP:   Lab Results   Component Value Date     08/17/2022      05/06/2022    POTASSIUM 3.3 (L) 08/17/2022    POTASSIUM 3.7 05/06/2022    CHLORIDE 105 08/17/2022    CHLORIDE 107 05/06/2022    CO2 31 08/17/2022    CO2 30 05/06/2022    BUN 19 08/17/2022    BUN 18 05/06/2022    CR 0.74 08/17/2022    CR 0.77 05/06/2022    GLC 97 08/17/2022     (H) 05/06/2022     COAGS:   Lab Results   Component Value Date    PTT 26 12/04/2021    INR 1.02 12/04/2021     POC:   Lab Results   Component Value Date     (H) 07/25/2020     HEPATIC:   Lab Results   Component Value Date    ALBUMIN 3.3 (L) 08/17/2022    PROTTOTAL 7.1 08/17/2022    ALT 47 08/17/2022    AST 35 08/17/2022    ALKPHOS 102 08/17/2022    BILITOTAL 0.5 08/17/2022     OTHER:   Lab Results   Component Value Date    A1C 5.8 (H) 12/04/2021    ANISA 9.3 08/17/2022    TSH 4.66 (H) 08/17/2022    T4 1.04 08/17/2022    SED 18 10/01/2004       Anesthesia Plan    ASA Status:  3      Anesthesia Type: General.   Induction: Propofol.   Maintenance: TIVA.        Consents    Anesthesia Plan(s) and associated risks, benefits, and realistic alternatives discussed. Questions answered and patient/representative(s) expressed understanding.     - Discussed: Risks, Benefits and Alternatives for BOTH SEDATION and the PROCEDURE were discussed     - Discussed with:  Patient         Postoperative Care    Pain management: IV analgesics, Oral pain medications, Multi-modal analgesia.   PONV prophylaxis: Ondansetron (or other 5HT-3), Dexamethasone or Solumedrol     Comments:                DELMY Alves CRNA

## 2022-08-18 NOTE — ANESTHESIA CARE TRANSFER NOTE
Patient: Rosie Blackman    Procedure: * No procedures listed *       Diagnosis: * No pre-op diagnosis entered *  Diagnosis Additional Information: No value filed.    Anesthesia Type:   General     Note:    Oropharynx: oropharynx clear of all foreign objects  Level of Consciousness: drowsy  Oxygen Supplementation: nasal cannula    Independent Airway: airway patency satisfactory and stable  Dentition: dentition unchanged  Vital Signs Stable: post-procedure vital signs reviewed and stable  Report to RN Given: handoff report given  Patient transferred to: Emergency Department    Handoff Report: Identifed the Patient, Identified the Reponsible Provider, Reviewed the pertinent medical history, Discussed the surgical course, Reviewed Intra-OP anesthesia mangement and issues during anesthesia, Set expectations for post-procedure period and Allowed opportunity for questions and acknowledgement of understanding      Vitals:  Vitals Value Taken Time   /81 08/18/22 0121   Temp     Pulse 126 08/18/22 0127   Resp 30 08/18/22 0127   SpO2 95 % 08/18/22 0127   Vitals shown include unvalidated device data.    Electronically Signed By: DELMY Alves CRNA  August 18, 2022  1:28 AM

## 2022-08-19 ENCOUNTER — TELEPHONE (OUTPATIENT)
Dept: FAMILY MEDICINE | Facility: CLINIC | Age: 82
End: 2022-08-19

## 2022-08-19 ENCOUNTER — PATIENT OUTREACH (OUTPATIENT)
Dept: FAMILY MEDICINE | Facility: CLINIC | Age: 82
End: 2022-08-19

## 2022-08-19 DIAGNOSIS — I48.0 PAROXYSMAL ATRIAL FIBRILLATION (H): ICD-10-CM

## 2022-08-19 DIAGNOSIS — I48.0 PAROXYSMAL A-FIB (H): Primary | ICD-10-CM

## 2022-08-19 NOTE — TELEPHONE ENCOUNTER
"  ED for acute condition Discharge Protocol    \"Hi, my name is Viridiana Smith RN, a registered nurse, and I am calling from [facility].  I am calling to follow up and see how things are going for you after your recent emergency visit.\"    Tell me how you are doing now that you are home?\" doing perfect      Discharge Instructions    \"Let's review your discharge instructions.  What is/are the follow-up recommendations?  Pt. Response: f/u with PCP and cardiology    \"Has an appointment with your primary care provider been scheduled?\"  Yes. (confirm and remind to bring meds)    Medications    \"Tell me what changed about your medicines when you discharged?\"    none    \"What questions do you have about your medications?\"   None        Call Summary    \"What questions or concerns do you have about your recent visit and your follow-up care?\"     none    \"If you have questions or things don't continue to improve, we encourage you contact us through the main clinic number (give number).  Even if the clinic is not open, triage nurses are available 24/7 to help you.     We would like you to know that our clinic has extended hours (provide information).  We also have urgent care (provide details on closest location and hours/contact info)\"    \"Thank you for your time and take care!\"                "

## 2022-08-19 NOTE — TELEPHONE ENCOUNTER
ANTICOAGULATION  MANAGEMENT: Discharge Review    Rosie Blackman chart reviewed for anticoagulation continuity of care    Hospital Admission on 8-17-22 for atrial fib.    Discharge disposition: Home    Results:    Recent labs: (last 7 days)     08/18/22  1442   INR 1.10     Anticoagulation inpatient management:     new start up. Given 5 mg on 8-18 and 2.5 mg on 8-19 and to continue 2.5 mg daily after that    Anticoagulation discharge instructions:     Warfarin dosing: Next INR 8-22   Bridging: No   INR goal change: No      Medication changes affecting anticoagulation: No    Additional factors affecting anticoagulation: No     PLAN     Agree with discharge plan for follow up on 8-22-22    Spoke with Rosie    Anticoagulation Calendar updated    Rosemary Warner RN

## 2022-08-19 NOTE — TELEPHONE ENCOUNTER
Order/Referral Request    Who is requesting: Patient     Orders being requested: Patient was released from hospital, needs INR order for Mon, 8/22/22 and standing order.    Reason service is needed/diagnosis: Starting at Anticoagulation clinic as new patient    When are orders needed by: Today, 8/19/22    Has this been discussed with Provider: No    Does patient have a preference on a Group/Provider/Facility? [facility]    Does patient have an appointment scheduled?: Yes: 8/22/22    Where to send orders:     Could we send this information to you in [employer] or would you prefer to receive a phone call?:   Patient would prefer a phone call   Okay to leave a detailed message?: Yes at Home number on file [phone] (home)

## 2022-08-19 NOTE — DISCHARGE SUMMARY
[facility]  Discharge Summary  Hospital Medicine       Date of Admission:  8/17/2022  Date of Discharge:  8/18/2022  8:52 PM  Discharging Provider: Irineo Callahan MD      Identification and Chief Compaint: Rosie Blackman is a 82 year old female who presented on 8/17/2022 with complaint of palpitations.    Discharge Diagnoses   Atrial fibrillation with RVR  Paroxysmal atrial fibrillation     Follow-ups Needed After Discharge   Follow-up Appointments     Follow-up and recommended labs and tests       Follow up with primary care provider, Kathya Escalera, within 2-3   weeks for hospital follow- up.  The following labs/tests are recommended:   ZIO patch monitor for 1 week. Cardiology referral in 4-6 weeks.           Hospital Course     Atrial fibrillation with RVR  Paroxysmal atrial fibrillation    Prior history of palpitations, but no documented arrhythmias on prior EKGs or 3 day Zio patch. Presented with new atrial fibrillation with RVR on admission EKG, despite being on atenolol 25 mg daily prior to admission. Asymptomatic other than racing heart / palpitations. Troponin within normal limits (16). TSH high (4.66), but free T4 normal (1.04). CHADs2-VASc = 6.   Patient failed cardioversion x 3 in the emergency department. Was started on diltiazem drip in the emergency department with normalization in heart rate.    Weaned off diltiazem infusion and started on oral diltiazem. Home atenolol resumed. Patient spontaneously converted to NSR but had a 6-7 second pause at the time of the conversion and then sinus rhythm rate 40's initially and then 50's. Remained in SR several hours and appears stable.     Stopping atenolol given baseline sinus bradycardia and adding CCB. Starting long-acting diltiazem 180 mg daily on discharge. 7-day ZIO patch as outpatient to eval burden of atrial fibrillation recurrences. Since has symptoms when heart racing, patient may be candidate for ablation.  Recommend cardiology eval in 4-6 weeks. Anticoagulation discussed with patient and test scripts sent to determine costs. Patient agreeable to anticoagulation with warfarin.      Hypokalemia  Initial K = 3.3. Replacement initiated in the emergency department.  - K replacement protocol     History of TIA (transient ischemic attack)  Symptoms in March 2022, was not evaluated at the time of symptoms (she was on vacation in [place]), but sought evaluation through PCP. Completed 3-day Zio patch and echo without significant findings. Did not tolerate aspirin due to GI upset. TIA was likely due to atrial fibrillation.  - Anticoagulation as noted above     Essential hypertension  Diastolic dysfunction  Echo 6/28/22 with EF 60-65%, grade II diastolic dysfunction, increased LV filling pressures, LA borderline dilated, RV size normal. Managed prior to admission with atenolol 25 mg daily and valsartan-hydrochlorothiazide 320-25 mg daily. Blood pressure has been borderline low at times since starting diltiazem drip.  - See above regarding atenolol and diltiazem   - Valsartan-hydrochlorothiazide on hold due to intermittently soft blood pressure. Resume on discharge.      Hyperlipidemia LDL goal <130  Managed prior to admission with pravastatin 80 mg daily, continue.      Ovarian cancer, unspecified laterality  Previously diagnosed, s/p BSO, omentectomy, and peritoneal biopsies/washing. Follows with gyn/onc Dr. Felipe Corona. Not currently on any active treatments, but is followed by gyn/onc every 6 months.  - Continue with outpatient management     Primary osteoarthritis of right hip  Spinal stenosis of lumbar region, unspecified whether neurogenic claudication present  Chronic and stable. Not on any chronic narcotics.  - No intervention     Obesity  BMI 34.33, contributes to morbidity and mortality.      Consultations This Hospital Stay    None    Ancillary Consultations This Hospital Stay   PHARMACY TO DOSE WARFARIN    Code  Status   Full Code    The discharge plan was discussed with the patient and  at [facility], and they expressed understanding.     Time Spent on this Encounter   Total time on this discharge was 55 minutes.       Irineo Callahan MD  [facility]  ______________________________________________________________________    Physical Exam   Vital Signs: Temp: 97.3  F (36.3  C) Temp src: Oral BP: (!) 87/50 Pulse: 54   Resp: 28 SpO2: 94 % O2 Device: None (Room air) Oxygen Delivery: 3 LPM  Weight: 208 lbs 12.8 oz  Constitutional: alert and oriented, NAD  CV: Regular, no murmur  Respiratory: CTA bilaterally  GI: Soft, nontender  Skin: Warm and dry       Primary Care Physician   Kathya Escalera  [address] [facility] [id number]     Discharge Disposition   Discharged to home  Condition at discharge: Stable    Significant Results and Procedures   Results for orders placed or performed during the hospital encounter of 08/17/22   -Cardioversion External    Narrative    Rmo Ann MD     8/18/2022  6:30 AM  [facility]    -Cardioversion External    Date/Time: 8/18/2022 1:26 AM  Performed by: Rom Ann MD  Authorized by: Rom Ann MD     Risks, benefits and alternatives discussed.      PRE-PROCEDURE DETAILS:   Attempt one:     Cardioversion mode:  Synchronous    Waveform:  Biphasic    Shock (Joules):  200    Shock outcome:  No change in rhythm  Attempt two:     Cardioversion mode:  Synchronous    Waveform:  Biphasic    Shock (Joules):  200    Shock outcome:  No change in rhythm  Attempt three:     Cardioversion mode:  Synchronous    Waveform:  Biphasic    Shock (Joules):  200    Shock outcome:  No change in rhythm  Post-procedure details:     Patient status:  Awake     Procedures    Cardioversion attempted x 3 in the ED    Discharge Orders      Adult Cardiology Eval  Referral      Reason for your hospital stay     Paroxysmal atrial fibrillation with rapid heart rate.     Follow-up and recommended labs and tests     Follow up with primary care provider, Kathya Escalera, within 2-3 weeks for hospital follow- up.  The following labs/tests are recommended: ZIO patch monitor for 1 week. Cardiology referral in 4-6 weeks.     Activity    Your activity upon discharge: activity as tolerated     Adult Leadless EKG Monitor 3 to 7 Days    Eval for episodes of recurrent atrial fibrillation with RVR     Diet    Follow this diet upon discharge:       Combination Diet Low Saturated Fat Diet     Discharge Medications   Current Discharge Medication List      START taking these medications    Details   diltiazem ER COATED BEADS (CARDIZEM CD/CARTIA XT) 180 MG 24 hr capsule Take 1 capsule (180 mg) by mouth daily  Qty: 30 capsule, Refills: 0    Comments: Refills per Dr. Escalera  Associated Diagnoses: Paroxysmal atrial fibrillation (H)         CONTINUE these medications which have NOT CHANGED    Details   Evening Primrose Oil 1000 MG CAPS One daily      mometasone (ELOCON) 0.1 % external cream Apply sparingly to affected area twice daily for 2 weeks then decrease to 1 time daily for 30 days then decrease to 2-3 times per week  Qty: 45 g, Refills: 11    Associated Diagnoses: Lichen sclerosus et atrophicus of the vulva      omega 3 1000 MG CAPS Take by mouth daily       pravastatin (PRAVACHOL) 80 MG tablet Take 1 tablet (80 mg) by mouth daily  Qty: 90 tablet, Refills: 3    Associated Diagnoses: Hyperlipidemia LDL goal <130      THERATEARS 0.25 % OP SOLN BID      valsartan-hydrochlorothiazide (DIOVAN HCT) 320-25 MG tablet Take 1 tablet by mouth daily  Qty: 90 tablet, Refills: 3    Associated Diagnoses: Essential hypertension, benign      VIACTIV 500-100-40 OR CHEW once daily         STOP taking these medications       atenolol (TENORMIN) 25 MG tablet Comments:   Reason for Stopping:             Allergies   Allergies   Allergen Reactions      Ezetimibe Other (See Comments)     Severe muscle aches     Lisinopril      Omeprazole      Other reaction(s): *Unknown     Prilosec [Omeprazole]      Zestril [Ace Inhibitors] Cough     Atorvastatin Other (See Comments)     Muscle Pain     Irbesartan Cramps     Rosuvastatin Other (See Comments)     Muscle Pain

## 2022-08-19 NOTE — PROGRESS NOTES
[facility] DISCHARGE NOTE    Patient discharged to home at 8:50 PM via wheel chair. Accompanied by spouse and staff. Discharge instructions reviewed with patient, opportunity offered to ask questions. Prescriptions filled and sent with patient upon discharge. All belongings sent with patient.    Ladi Hill RN

## 2022-08-19 NOTE — TELEPHONE ENCOUNTER
Routing to PCP as we need a signed INR referral to see this patient.    Rosemary Warner, RN, BSN, PHN

## 2022-08-21 ENCOUNTER — HOSPITAL ENCOUNTER (EMERGENCY)
Facility: CLINIC | Age: 82
Discharge: HOME | End: 2022-08-21
Attending: EMERGENCY MEDICINE | Admitting: EMERGENCY MEDICINE
Payer: MEDICARE

## 2022-08-21 VITALS
RESPIRATION RATE: 19 BRPM | OXYGEN SATURATION: 93 % | HEIGHT: 64 IN | TEMPERATURE: 98.1 F | HEART RATE: 98 BPM | WEIGHT: 200 LBS | BODY MASS INDEX: 34.15 KG/M2 | DIASTOLIC BLOOD PRESSURE: 81 MMHG | SYSTOLIC BLOOD PRESSURE: 111 MMHG

## 2022-08-21 DIAGNOSIS — I48.91 ATRIAL FIBRILLATION, UNSPECIFIED TYPE (H): ICD-10-CM

## 2022-08-21 LAB
ALBUMIN SERPL-MCNC: 3.4 G/DL (ref 3.4–5)
ALP SERPL-CCNC: 89 U/L (ref 40–150)
ALT SERPL W P-5'-P-CCNC: 34 U/L (ref 0–50)
ANION GAP SERPL CALCULATED.3IONS-SCNC: 9 MMOL/L (ref 3–14)
AST SERPL W P-5'-P-CCNC: 23 U/L (ref 0–45)
BASOPHILS # BLD AUTO: 0.1 10E3/UL (ref 0–0.2)
BASOPHILS NFR BLD AUTO: 1 %
BILIRUB SERPL-MCNC: 0.7 MG/DL (ref 0.2–1.3)
BUN SERPL-MCNC: 16 MG/DL (ref 7–30)
CALCIUM SERPL-MCNC: 9.4 MG/DL (ref 8.5–10.1)
CHLORIDE BLD-SCNC: 107 MMOL/L (ref 94–109)
CO2 SERPL-SCNC: 27 MMOL/L (ref 20–32)
CREAT SERPL-MCNC: 0.7 MG/DL (ref 0.52–1.04)
EOSINOPHIL # BLD AUTO: 0.2 10E3/UL (ref 0–0.7)
EOSINOPHIL NFR BLD AUTO: 4 %
ERYTHROCYTE [DISTWIDTH] IN BLOOD BY AUTOMATED COUNT: 13.1 % (ref 10–15)
GFR SERPL CREATININE-BSD FRML MDRD: 86 ML/MIN/1.73M2
GLUCOSE BLD-MCNC: 108 MG/DL (ref 70–99)
HCT VFR BLD AUTO: 43 % (ref 35–47)
HGB BLD-MCNC: 14.2 G/DL (ref 11.7–15.7)
HOLD SPECIMEN: NORMAL
IMM GRANULOCYTES # BLD: 0 10E3/UL
IMM GRANULOCYTES NFR BLD: 0 %
INR PPP: 1 (ref 0.85–1.15)
LYMPHOCYTES # BLD AUTO: 1.5 10E3/UL (ref 0.8–5.3)
LYMPHOCYTES NFR BLD AUTO: 24 %
MCH RBC QN AUTO: 30.9 PG (ref 26.5–33)
MCHC RBC AUTO-ENTMCNC: 33 G/DL (ref 31.5–36.5)
MCV RBC AUTO: 94 FL (ref 78–100)
MONOCYTES # BLD AUTO: 0.8 10E3/UL (ref 0–1.3)
MONOCYTES NFR BLD AUTO: 12 %
NEUTROPHILS # BLD AUTO: 3.9 10E3/UL (ref 1.6–8.3)
NEUTROPHILS NFR BLD AUTO: 59 %
NRBC # BLD AUTO: 0 10E3/UL
NRBC BLD AUTO-RTO: 0 /100
PLATELET # BLD AUTO: 221 10E3/UL (ref 150–450)
POTASSIUM BLD-SCNC: 3.5 MMOL/L (ref 3.4–5.3)
PROT SERPL-MCNC: 7.2 G/DL (ref 6.8–8.8)
RBC # BLD AUTO: 4.6 10E6/UL (ref 3.8–5.2)
SODIUM SERPL-SCNC: 143 MMOL/L (ref 133–144)
WBC # BLD AUTO: 6.5 10E3/UL (ref 4–11)

## 2022-08-21 PROCEDURE — 96375 TX/PRO/DX INJ NEW DRUG ADDON: CPT | Performed by: EMERGENCY MEDICINE

## 2022-08-21 PROCEDURE — 250N000013 HC RX MED GY IP 250 OP 250 PS 637: Performed by: EMERGENCY MEDICINE

## 2022-08-21 PROCEDURE — 250N000009 HC RX 250: Performed by: EMERGENCY MEDICINE

## 2022-08-21 PROCEDURE — 85610 PROTHROMBIN TIME: CPT | Performed by: EMERGENCY MEDICINE

## 2022-08-21 PROCEDURE — 96374 THER/PROPH/DIAG INJ IV PUSH: CPT | Performed by: EMERGENCY MEDICINE

## 2022-08-21 PROCEDURE — 93010 ELECTROCARDIOGRAM REPORT: CPT | Performed by: EMERGENCY MEDICINE

## 2022-08-21 PROCEDURE — 85014 HEMATOCRIT: CPT | Performed by: EMERGENCY MEDICINE

## 2022-08-21 PROCEDURE — 99284 EMERGENCY DEPT VISIT MOD MDM: CPT | Mod: 25 | Performed by: EMERGENCY MEDICINE

## 2022-08-21 PROCEDURE — 80053 COMPREHEN METABOLIC PANEL: CPT | Performed by: EMERGENCY MEDICINE

## 2022-08-21 PROCEDURE — 36415 COLL VENOUS BLD VENIPUNCTURE: CPT | Performed by: EMERGENCY MEDICINE

## 2022-08-21 PROCEDURE — 93005 ELECTROCARDIOGRAM TRACING: CPT | Performed by: EMERGENCY MEDICINE

## 2022-08-21 RX ORDER — METOPROLOL TARTRATE 25 MG/1
25 TABLET, FILM COATED ORAL ONCE
Status: COMPLETED | OUTPATIENT
Start: 2022-08-21 | End: 2022-08-21

## 2022-08-21 RX ORDER — METOPROLOL TARTRATE 1 MG/ML
5 INJECTION, SOLUTION INTRAVENOUS ONCE
Status: COMPLETED | OUTPATIENT
Start: 2022-08-21 | End: 2022-08-21

## 2022-08-21 RX ORDER — DILTIAZEM HYDROCHLORIDE 5 MG/ML
25 INJECTION INTRAVENOUS ONCE
Status: COMPLETED | OUTPATIENT
Start: 2022-08-21 | End: 2022-08-21

## 2022-08-21 RX ORDER — METOPROLOL TARTRATE 25 MG/1
25 TABLET, FILM COATED ORAL 2 TIMES DAILY
Qty: 60 TABLET | Refills: 0 | Status: SHIPPED | OUTPATIENT
Start: 2022-08-21 | End: 2022-09-06

## 2022-08-21 RX ADMIN — METOPROLOL TARTRATE 25 MG: 25 TABLET, FILM COATED ORAL at 04:11

## 2022-08-21 RX ADMIN — APIXABAN 5 MG: 5 TABLET, FILM COATED ORAL at 04:24

## 2022-08-21 RX ADMIN — METOPROLOL TARTRATE 5 MG: 5 INJECTION INTRAVENOUS at 04:11

## 2022-08-21 RX ADMIN — DILTIAZEM HYDROCHLORIDE 25 MG: 5 INJECTION, SOLUTION INTRAVENOUS at 02:43

## 2022-08-21 ASSESSMENT — ENCOUNTER SYMPTOMS
SHORTNESS OF BREATH: 0
ABDOMINAL PAIN: 0
FATIGUE: 0
COUGH: 0
APPETITE CHANGE: 0
NAUSEA: 0
CONFUSION: 0
WEAKNESS: 0
LIGHT-HEADEDNESS: 0
CHEST TIGHTNESS: 0
HEADACHES: 0
CHILLS: 0
FEVER: 0
NUMBNESS: 0
BACK PAIN: 0
PALPITATIONS: 1

## 2022-08-21 ASSESSMENT — ACTIVITIES OF DAILY LIVING (ADL)
ADLS_ACUITY_SCORE: 35
ADLS_ACUITY_SCORE: 35

## 2022-08-21 NOTE — ED TRIAGE NOTES
Was here on Wed. With a-fib.  Returns today with same.  Woke her from sleep with palpataions.  Denies SOB no pain, and denies dizziness.  's     Triage Assessment     Row Name 08/21/22 0110       Triage Assessment (Adult)    Airway WDL WDL       Respiratory WDL    Respiratory WDL WDL       Skin Circulation/Temperature WDL    Skin Circulation/Temperature WDL WDL       Cardiac WDL    Cardiac WDL WDL       Peripheral/Neurovascular WDL    Peripheral Neurovascular WDL WDL       Cognitive/Neuro/Behavioral WDL    Cognitive/Neuro/Behavioral WDL WDL

## 2022-08-21 NOTE — ED PROVIDER NOTES
History     Chief Complaint   Patient presents with     Irregular Heart Beat     HPI  Rosie Blackman is a 82 year old female with a recent history of refractory rapid atrial fibrillation who was recently admitted for this presenting for evaluation of recurrent A. fib.  Patient had been discharged on diltiazem orally and on Coumadin.  Had been feeling well the past 2 days but tonight around 11:30 PM felt a recurrence of her fast heartbeat.  Denies chest pain or difficulty breathing.  Denies diaphoresis or nausea.  No other recent changes.  Last took her dose of diltiazem yesterday morning around 8:30 AM.  Reports she has been taking it regularly as prescribed.    Allergies:  Allergies   Allergen Reactions     Ezetimibe Other (See Comments)     Severe muscle aches     Lisinopril      Omeprazole      Other reaction(s): *Unknown     Prilosec [Omeprazole]      Zestril [Ace Inhibitors] Cough     Atorvastatin Other (See Comments)     Muscle Pain     Irbesartan Cramps     Rosuvastatin Other (See Comments)     Muscle Pain       Problem List:    Patient Active Problem List    Diagnosis Date Noted     Paroxysmal A-fib (H) 08/19/2022     Priority: Medium     Hypokalemia 08/18/2022     Priority: Medium     Paroxysmal atrial fibrillation (H) 08/18/2022     Priority: Medium     Atrial fibrillation with RVR (H) 08/18/2022     Priority: Medium     Diastolic dysfunction 06/28/2022     Priority: Medium     Seen on Echo 6/2022       TIA (transient ischemic attack) 04/27/2022     Priority: Medium     Probable 3/22. sudden onset left mouth drooping, right hand weakness, trouble speaking, lasted 30 sec.  Did not seek medical care.  Did not tolerate ASA - GI upset and bleeding/bruising       Incisional hernia of anterior abdominal wall without obstruction or gangrene 02/22/2021     Priority: Medium     Added automatically from request for surgery [id number]       S/P exploratory laparotomy 07/24/2020     Priority: Medium     Ovarian  "cancer, unspecified laterality (H) 07/24/2020     Priority: Medium     Complete prior to 1.4 LJ       Tear of gluteus medius tendon 07/09/2020     Priority: Medium     Primary osteoarthritis of right hip 07/09/2020     Priority: Medium     Spinal stenosis of lumbar region, unspecified whether neurogenic claudication present 07/09/2020     Priority: Medium     Obesity (BMI 35.0-39.9) with comorbidity (H) 11/28/2018     Priority: Medium     Lichen sclerosus of female genitalia 07/03/2018     Priority: Medium     Gastroesophageal reflux disease, esophagitis presence not specified 10/12/2017     Priority: Medium     Hiatal hernia 10/21/2013     Priority: Medium     Large; found on gastroscopy 10/2013; No GERD sx; chronic throat clearing; + H pylori       Plantar fasciitis 04/02/2012     Priority: Medium     Advanced directives, counseling/discussion 12/08/2011     Priority: Medium     Advance Care Planning:   ACP Review and Resources Provided: Reviewed chart for advance care plan. Rosie Blackman has an up to date advance care plan on file. See additional documentation in Problem List and click on code status for document details. Confirmed/documented designated decision maker(s). Added by Shima Cruz on 12/08/2011         Hyperlipidemia LDL goal <130 10/31/2010     Priority: Medium     Intolerant to all other statins (2013)       Prediabetes 09/03/2010     Priority: Medium     Blood sugar 122 9/10 working on; has already been to nutrition, weight and wellness and is reading \"healthy for life\"       Diverticulosis of colon 05/24/2010     Priority: Medium     Noted on colonoscopy 5/10       Colon polyps 05/24/2010     Priority: Medium     Pes planus      Priority: Medium     Personal history of contact with and (suspected) exposure to asbestos      Priority: Medium             exposed 12 years in childhood       Donor of other blood      Priority: Medium           has false + syphyllis  Problem list name updated by " automated process. Provider to review       Irritable bowel syndrome      Priority: Medium     ALLERGIC RHINITIS       Priority: Medium     Resolving with dietary changes; stopping gluten       History of recurrent UTIs      Priority: Medium             recurrent  Problem list name updated by automated process. Provider to review       Essential hypertension      Priority: Medium        Past Medical History:    Past Medical History:   Diagnosis Date     Chronic airway obstruction (H)      Diastolic dysfunction 6/28/2022     Gastroesophageal reflux disease      Hiatal hernia      Hypertension      Malignant neoplasm of ovary (H)      Ovarian cancer, unspecified laterality (H) 3/10/2021     Personal history of contact with and (suspected) exposure to asbestos      Primary osteoarthritis of right hip 7/9/2020       Past Surgical History:    Past Surgical History:   Procedure Laterality Date     BIOPSY BREAST Left      BREAST BIOPSY, CORE RT/LT  1994     CHOLECYSTECTOMY  1995     COLONOSCOPY  05/2010    Diverticulosis, colon polyps; repeat 5 yrs     COLONOSCOPY N/A 11/16/2015    Procedure: COLONOSCOPY;  Surgeon: Alejandro Matos MD;  Location: [facility]     COLONOSCOPY N/A 9/17/2021    Procedure: COLONOSCOPY;  Surgeon: Aayush George MD;  Location: [facility]     Colonoscopy, hyperplastic polyps, repeat in 5 yrs  2004     ESOPHAGOSCOPY, GASTROSCOPY, DUODENOSCOPY (EGD), COMBINED  09/30/2013    Procedure: COMBINED ESOPHAGOSCOPY, GASTROSCOPY, DUODENOSCOPY (EGD), BIOPSY SINGLE OR MULTIPLE;  Gastroscopy;  Surgeon: Blaise Gill MD;  Location: [facility]     EYE SURGERY  2012    R/L Cataracts     HC REMOVE TONSILS/ADENOIDS,13+ Y/O      T & A 13+y.o.     HERNIORRHAPHY INCISIONAL (LOCATION) N/A 3/24/2021    Procedure: HERNIORRHAPHY, INCISIONAL, OPEN WITH MESH;  Surgeon: Aayush George MD;  Location: [facility]     HYSTERECTOMY, PAP NO LONGER INDICATED       LAPAROSCOPIC SALPINGO-OOPHORECTOMY N/A 07/24/2020    Procedure: Laparoscopy,  removal or pelvic mass, both tubes and ovaries, omentectomy, anterior culvectomy, pelvic and para aortic lymph node dissection, laparotomy;  Surgeon: Felipe Corona MD;  Location: [facility]     [place] ANESTH,LUMBAR SPINE,CORD SURGERY  10/2020    L3-4 L4-5 TRANSFORAMINAL INTERBODY FUSION L3-5, POSTERIOR INSTRUMENTED FUSION AND DECOMPRESSION     TVH for DUB, ovaries in       VASCULAR SURGERY      [place] closure     ZZC ANESTH,KNEE VEINS SURGERY  2014       Family History:    Family History   Problem Relation Age of Onset     Cancer Mother         uterine cancer,      Respiratory Mother         COPD     Cardiovascular Mother         AAA  age 80     Breast Cancer Maternal Grandmother      Cancer Maternal Grandfather         cancer unknown type     Breast Cancer Sister      Eye Disorder Father         cataracts     Depression Father      Depression Son      Depression Son      Breast Cancer Sister         X2     Cancer - colorectal No family hx of         no ovarian cancer       Social History:  Marital Status:   [2]  Social History     Tobacco Use     Smoking status: Never Smoker     Smokeless tobacco: Never Used   Vaping Use     Vaping Use: Never used   Substance Use Topics     Alcohol use: No     Drug use: No        Medications:    apixaban ANTICOAGULANT (ELIQUIS) 5 MG tablet  metoprolol tartrate (LOPRESSOR) 25 MG tablet  diltiazem ER COATED BEADS (CARDIZEM CD/CARTIA XT) 180 MG 24 hr capsule  Evening Primrose Oil 1000 MG CAPS  mometasone (ELOCON) 0.1 % external cream  omega 3 1000 MG CAPS  pravastatin (PRAVACHOL) 80 MG tablet  THERATEARS 0.25 % OP SOLN  valsartan-hydrochlorothiazide (DIOVAN HCT) 320-25 MG tablet  VIACTIV 500-100-40 OR CHEW          Review of Systems   Constitutional: Negative for appetite change, chills, fatigue and fever.   HENT: Negative for congestion.    Respiratory: Negative for cough, chest tightness and shortness of breath.    Cardiovascular: Positive for palpitations.  "Negative for chest pain.   Gastrointestinal: Negative for abdominal pain and nausea.   Musculoskeletal: Negative for back pain.   Neurological: Negative for weakness, light-headedness, numbness and headaches.   Psychiatric/Behavioral: Negative for confusion.   All other systems reviewed and are negative.      Physical Exam   BP: (!) 156/81  Pulse: (S) (!) 141  Temp: 98.1  F (36.7  C)  Resp: 16  Height: 162.6 cm (5' 4\")  Weight: 90.7 kg (200 lb)  SpO2: 97 %      Physical Exam  Vitals and nursing note reviewed.   Constitutional:       Appearance: Normal appearance. She is not ill-appearing or diaphoretic.   HENT:      Head: Normocephalic.      Nose: Nose normal.      Mouth/Throat:      Mouth: Mucous membranes are moist.   Eyes:      Conjunctiva/sclera: Conjunctivae normal.   Cardiovascular:      Rate and Rhythm: Tachycardia present. Rhythm irregular.      Pulses: Normal pulses.   Pulmonary:      Effort: Pulmonary effort is normal.   Abdominal:      General: Abdomen is flat.      Palpations: Abdomen is soft.      Tenderness: There is no abdominal tenderness.   Musculoskeletal:         General: Normal range of motion.      Cervical back: Normal range of motion.   Skin:     General: Skin is warm and dry.      Capillary Refill: Capillary refill takes less than 2 seconds.   Neurological:      Mental Status: She is alert and oriented to person, place, and time.   Psychiatric:         Mood and Affect: Mood normal.         ED Course                 Procedures              EKG Interpretation:      Interpreted by Rom Ann MD  Time reviewed: 0300  Symptoms at time of EKG: palpitations   Rhythm: atrial fibrillation - controlled  Rate: Normal  Axis: Normal  Ectopy: none  Conduction: normal  ST Segments/ T Waves: No acute ischemic changes  Q Waves: none  Comparison to prior: Similar to previous EKGs    Clinical Impression: atrial fibrillation                       Results for orders placed or performed during the " hospital encounter of 08/21/22   [place] Draw     Status: None    Narrative    The following orders were created for panel order [facility] Draw.  Procedure                               Abnormality         Status                     ---------                               -----------         ------                     Extra Blue Top Tube[[id number]]                              Final result               Extra Red Top Tube[[id number]]                               Final result               Extra Green Top (Lithium...[[id number]]                      Final result               Extra Purple Top Tube[[id number]]                            Final result                 Please view results for these tests on the individual orders.   Extra Blue Top Tube     Status: None   Result Value Ref Range    Hold Specimen JIC    Extra Red Top Tube     Status: None   Result Value Ref Range    Hold Specimen JIC    Extra Green Top (Lithium Heparin) Tube     Status: None   Result Value Ref Range    Hold Specimen JIC    Extra Purple Top Tube     Status: None   Result Value Ref Range    Hold Specimen JIC    Comprehensive metabolic panel     Status: Abnormal   Result Value Ref Range    Sodium 143 133 - 144 mmol/L    Potassium 3.5 3.4 - 5.3 mmol/L    Chloride 107 94 - 109 mmol/L    Carbon Dioxide (CO2) 27 20 - 32 mmol/L    Anion Gap 9 3 - 14 mmol/L    Urea Nitrogen 16 7 - 30 mg/dL    Creatinine 0.70 0.52 - 1.04 mg/dL    Calcium 9.4 8.5 - 10.1 mg/dL    Glucose 108 (H) 70 - 99 mg/dL    Alkaline Phosphatase 89 40 - 150 U/L    AST 23 0 - 45 U/L    ALT 34 0 - 50 U/L    Protein Total 7.2 6.8 - 8.8 g/dL    Albumin 3.4 3.4 - 5.0 g/dL    Bilirubin Total 0.7 0.2 - 1.3 mg/dL    GFR Estimate 86 >60 mL/min/1.73m2   CBC with platelets and differential     Status: None   Result Value Ref Range    WBC Count 6.5 4.0 - 11.0 10e3/uL    RBC Count 4.60 3.80 - 5.20 10e6/uL    Hemoglobin 14.2 11.7 - 15.7 g/dL    Hematocrit 43.0 35.0 - 47.0 %    MCV 94 78 - 100 fL     MCH 30.9 26.5 - 33.0 pg    MCHC 33.0 31.5 - 36.5 g/dL    RDW 13.1 10.0 - 15.0 %    Platelet Count 221 150 - 450 10e3/uL    % Neutrophils 59 %    % Lymphocytes 24 %    % Monocytes 12 %    % Eosinophils 4 %    % Basophils 1 %    % Immature Granulocytes 0 %    NRBCs per 100 WBC 0 <1 /100    Absolute Neutrophils 3.9 1.6 - 8.3 10e3/uL    Absolute Lymphocytes 1.5 0.8 - 5.3 10e3/uL    Absolute Monocytes 0.8 0.0 - 1.3 10e3/uL    Absolute Eosinophils 0.2 0.0 - 0.7 10e3/uL    Absolute Basophils 0.1 0.0 - 0.2 10e3/uL    Absolute Immature Granulocytes 0.0 <=0.4 10e3/uL    Absolute NRBCs 0.0 10e3/uL   INR     Status: Normal   Result Value Ref Range    INR 1.00 0.85 - 1.15   CBC with platelets, differential     Status: None    Narrative    The following orders were created for panel order CBC with platelets, differential.  Procedure                               Abnormality         Status                     ---------                               -----------         ------                     CBC with platelets and d...[[id number]]                      Final result                 Please view results for these tests on the individual orders.         Medications   diltiazem (CARDIZEM) injection 25 mg (25 mg Intravenous Given 8/21/22 0243)   metoprolol tartrate (LOPRESSOR) tablet 25 mg (25 mg Oral Given 8/21/22 0411)   metoprolol (LOPRESSOR) injection 5 mg (5 mg Intravenous Given 8/21/22 0411)     3:54 AM; Discussed with Dr Almendarez, cardiology at [facility]. Could consider cardioversion even though she did not respond previously. If HR <100, ok to discharge home with EP follow up.  Could also add on metoprolol 25mg for rate control.     4:12 AM Patient re-assessed: Palpitations have resolved however heart rate remains anywhere from low 100s to 150s.  Discussed options with patient.  We will trial metoprolol.  Also discussed anticoagulation.  INR is 1.0 so is entirely not anticoagulated currently.  Patient states that she is okay with  restarting one of the 10a inhibitors as she believes her insurance deductible is nearly complete.  Will order apixaban    4:51 AM Patient re-assessed: Patient resting company.  Heart rate now bouncing between the upper 90s and low 100s.  Remaining asymptomatic.  Counseled patient regarding further care and follow-up    Assessments & Plan (with Medical Decision Making)  82-year-old female presents for evaluation of recurrent atrial fibrillation.  Patient presents with symptoms of palpitations and rapid heart rate up to 150.  Given a dose of diltiazem with improvement in heart rate but not with complete control.  Discussed with cardiology who provided some guidance as noted above.  Added on metoprolol which substantially proved her heart rate control.  Patient restarted on Eliquis and discharged home with a plan for cardiology follow-up     I have reviewed the nursing notes.    I have reviewed the findings, diagnosis, plan and need for follow up with the patient.       Discharge Medication List as of 8/21/2022  4:55 AM      START taking these medications    Details   apixaban ANTICOAGULANT (ELIQUIS) 5 MG tablet Take 1 tablet (5 mg) by mouth 2 times daily for 30 days, Disp-60 tablet, R-0, E-Prescribe      metoprolol tartrate (LOPRESSOR) 25 MG tablet Take 1 tablet (25 mg) by mouth 2 times daily for 30 days, Disp-60 tablet, R-0, E-Prescribe             Final diagnoses:   Atrial fibrillation, unspecified type (H)       8/21/2022   [facility] EMERGENCY DEPT     Ann, Rom Allen MD  08/22/22 0581

## 2022-08-22 ENCOUNTER — HOSPITAL ENCOUNTER (OUTPATIENT)
Dept: CARDIOLOGY | Facility: CLINIC | Age: 82
Discharge: HOME | End: 2022-08-22
Attending: INTERNAL MEDICINE | Admitting: INTERNAL MEDICINE
Payer: MEDICARE

## 2022-08-22 ENCOUNTER — DOCUMENTATION ONLY (OUTPATIENT)
Dept: ANTICOAGULATION | Facility: CLINIC | Age: 82
End: 2022-08-22

## 2022-08-22 DIAGNOSIS — I48.0 PAROXYSMAL A-FIB (H): Primary | ICD-10-CM

## 2022-08-22 DIAGNOSIS — I48.91 ATRIAL FIBRILLATION WITH RVR (H): ICD-10-CM

## 2022-08-22 PROCEDURE — 93244 EXT ECG>48HR<7D REV&INTERPJ: CPT | Performed by: INTERNAL MEDICINE

## 2022-08-22 PROCEDURE — 93242 EXT ECG>48HR<7D RECORDING: CPT

## 2022-08-23 ENCOUNTER — HOSPITAL ENCOUNTER (EMERGENCY)
Facility: CLINIC | Age: 82
Discharge: HOME | End: 2022-08-23
Attending: EMERGENCY MEDICINE | Admitting: EMERGENCY MEDICINE
Payer: MEDICARE

## 2022-08-23 VITALS
BODY MASS INDEX: 34.15 KG/M2 | HEIGHT: 64 IN | SYSTOLIC BLOOD PRESSURE: 115 MMHG | TEMPERATURE: 98.3 F | OXYGEN SATURATION: 96 % | WEIGHT: 200 LBS | HEART RATE: 63 BPM | RESPIRATION RATE: 16 BRPM | DIASTOLIC BLOOD PRESSURE: 66 MMHG

## 2022-08-23 DIAGNOSIS — I48.91 ATRIAL FIBRILLATION WITH RAPID VENTRICULAR RESPONSE (H): ICD-10-CM

## 2022-08-23 LAB
ANION GAP SERPL CALCULATED.3IONS-SCNC: 4 MMOL/L (ref 3–14)
BASOPHILS # BLD AUTO: 0.1 10E3/UL (ref 0–0.2)
BASOPHILS NFR BLD AUTO: 1 %
BUN SERPL-MCNC: 15 MG/DL (ref 7–30)
CALCIUM SERPL-MCNC: 9.2 MG/DL (ref 8.5–10.1)
CHLORIDE BLD-SCNC: 110 MMOL/L (ref 94–109)
CO2 SERPL-SCNC: 30 MMOL/L (ref 20–32)
CREAT SERPL-MCNC: 0.69 MG/DL (ref 0.52–1.04)
EOSINOPHIL # BLD AUTO: 0.2 10E3/UL (ref 0–0.7)
EOSINOPHIL NFR BLD AUTO: 3 %
ERYTHROCYTE [DISTWIDTH] IN BLOOD BY AUTOMATED COUNT: 13.1 % (ref 10–15)
GFR SERPL CREATININE-BSD FRML MDRD: 87 ML/MIN/1.73M2
GLUCOSE BLD-MCNC: 127 MG/DL (ref 70–99)
HCT VFR BLD AUTO: 42 % (ref 35–47)
HGB BLD-MCNC: 13.7 G/DL (ref 11.7–15.7)
HOLD SPECIMEN: NORMAL
HOLD SPECIMEN: NORMAL
IMM GRANULOCYTES # BLD: 0 10E3/UL
IMM GRANULOCYTES NFR BLD: 0 %
LYMPHOCYTES # BLD AUTO: 0.9 10E3/UL (ref 0.8–5.3)
LYMPHOCYTES NFR BLD AUTO: 13 %
MCH RBC QN AUTO: 30.6 PG (ref 26.5–33)
MCHC RBC AUTO-ENTMCNC: 32.6 G/DL (ref 31.5–36.5)
MCV RBC AUTO: 94 FL (ref 78–100)
MONOCYTES # BLD AUTO: 0.8 10E3/UL (ref 0–1.3)
MONOCYTES NFR BLD AUTO: 11 %
NEUTROPHILS # BLD AUTO: 4.9 10E3/UL (ref 1.6–8.3)
NEUTROPHILS NFR BLD AUTO: 72 %
NRBC # BLD AUTO: 0 10E3/UL
NRBC BLD AUTO-RTO: 0 /100
PLATELET # BLD AUTO: 218 10E3/UL (ref 150–450)
POTASSIUM BLD-SCNC: 3.7 MMOL/L (ref 3.4–5.3)
RBC # BLD AUTO: 4.48 10E6/UL (ref 3.8–5.2)
SODIUM SERPL-SCNC: 144 MMOL/L (ref 133–144)
TROPONIN I SERPL HS-MCNC: 9 NG/L
TSH SERPL DL<=0.005 MIU/L-ACNC: 2.49 MU/L (ref 0.4–4)
WBC # BLD AUTO: 6.9 10E3/UL (ref 4–11)

## 2022-08-23 PROCEDURE — 93010 ELECTROCARDIOGRAM REPORT: CPT | Mod: 76 | Performed by: EMERGENCY MEDICINE

## 2022-08-23 PROCEDURE — 84443 ASSAY THYROID STIM HORMONE: CPT | Performed by: EMERGENCY MEDICINE

## 2022-08-23 PROCEDURE — 93010 ELECTROCARDIOGRAM REPORT: CPT | Performed by: EMERGENCY MEDICINE

## 2022-08-23 PROCEDURE — 82310 ASSAY OF CALCIUM: CPT | Performed by: EMERGENCY MEDICINE

## 2022-08-23 PROCEDURE — 93005 ELECTROCARDIOGRAM TRACING: CPT | Mod: 76 | Performed by: EMERGENCY MEDICINE

## 2022-08-23 PROCEDURE — 99285 EMERGENCY DEPT VISIT HI MDM: CPT | Mod: 25 | Performed by: EMERGENCY MEDICINE

## 2022-08-23 PROCEDURE — 84484 ASSAY OF TROPONIN QUANT: CPT | Performed by: EMERGENCY MEDICINE

## 2022-08-23 PROCEDURE — 36415 COLL VENOUS BLD VENIPUNCTURE: CPT | Performed by: EMERGENCY MEDICINE

## 2022-08-23 PROCEDURE — 93005 ELECTROCARDIOGRAM TRACING: CPT | Performed by: EMERGENCY MEDICINE

## 2022-08-23 PROCEDURE — 99284 EMERGENCY DEPT VISIT MOD MDM: CPT | Mod: 25 | Performed by: EMERGENCY MEDICINE

## 2022-08-23 PROCEDURE — 85025 COMPLETE CBC W/AUTO DIFF WBC: CPT | Performed by: EMERGENCY MEDICINE

## 2022-08-23 ASSESSMENT — ACTIVITIES OF DAILY LIVING (ADL)
ADLS_ACUITY_SCORE: 33
ADLS_ACUITY_SCORE: 35

## 2022-08-23 NOTE — ED TRIAGE NOTES
Dizziness started yesterday morning when she woke up. Pt also reports N/V that started this AM.      Triage Assessment     Row Name 08/23/22 8506       Triage Assessment (Adult)    Airway WDL WDL       Respiratory WDL    Respiratory WDL WDL       Skin Circulation/Temperature WDL    Skin Circulation/Temperature WDL WDL       Cardiac WDL    Cardiac WDL WDL       Peripheral/Neurovascular WDL    Peripheral Neurovascular WDL WDL       Cognitive/Neuro/Behavioral WDL    Cognitive/Neuro/Behavioral WDL WDL

## 2022-08-23 NOTE — DISCHARGE INSTRUCTIONS
Continue medications as prescribed.    Follow up with Cardiologist as planned.     If your heart rate gets fast again, it is okay to wait 1-2 hours to see if it slows down. Ideally your heart rate should be below 110.    Please return to the emergency department if you develop  chest pain, difficulty breathing.

## 2022-08-23 NOTE — ED PROVIDER NOTES
History     Chief Complaint   Patient presents with     Tachycardia     Hx of Afib, HR 80-150s this morning     HPI  Rosie Blackman is a 82 year old female with history of recent diagnosis of paroxysmal atrial fibrillation with rapid ventricular response, hypertension, TIA, hypokalemia, obesity, who presents with worsening palpitations and elevated heart rate.  Patient was admitted to this hospital with was a new diagnosis of A. fib on 8/17 through 8/18.  Unsuccessful cardioversion. was discharged on diltiazem.  Had recurrence of symptoms 3 days later and returned to ED and was started on metoprolol in consultation with Cardiology. HR this morning noted to be in the 150s so she wanted to come in for evaluation.  Otherwise feeling well.  No chest pain or shortness of breath.  Does feel palpitations.  No fevers, chills, nausea, vomiting or diarrhea.  No alcohol or tobacco use. Patient took her morning dose of diltiazem and metoprolol at 8AM.     The patient's PMHx, Surgical Hx, Allergies, and Medications were all reviewed with the patient.    Allergies:  Allergies   Allergen Reactions     Ezetimibe Other (See Comments)     Severe muscle aches     Lisinopril      Omeprazole      Other reaction(s): *Unknown     Prilosec [Omeprazole]      Zestril [Ace Inhibitors] Cough     Atorvastatin Other (See Comments)     Muscle Pain     Irbesartan Cramps     Rosuvastatin Other (See Comments)     Muscle Pain       Problem List:    Patient Active Problem List    Diagnosis Date Noted     Paroxysmal A-fib (H) 08/19/2022     Priority: Medium     Hypokalemia 08/18/2022     Priority: Medium     Paroxysmal atrial fibrillation (H) 08/18/2022     Priority: Medium     Atrial fibrillation with RVR (H) 08/18/2022     Priority: Medium     Diastolic dysfunction 06/28/2022     Priority: Medium     Seen on Echo 6/2022       TIA (transient ischemic attack) 04/27/2022     Priority: Medium     Probable 3/22. sudden onset left mouth drooping, right  "hand weakness, trouble speaking, lasted 30 sec.  Did not seek medical care.  Did not tolerate ASA - GI upset and bleeding/bruising       Incisional hernia of anterior abdominal wall without obstruction or gangrene 02/22/2021     Priority: Medium     Added automatically from request for surgery [id number]       S/P exploratory laparotomy 07/24/2020     Priority: Medium     Ovarian cancer, unspecified laterality (H) 07/24/2020     Priority: Medium     Complete prior to 1.4 LJ       Tear of gluteus medius tendon 07/09/2020     Priority: Medium     Primary osteoarthritis of right hip 07/09/2020     Priority: Medium     Spinal stenosis of lumbar region, unspecified whether neurogenic claudication present 07/09/2020     Priority: Medium     Obesity (BMI 35.0-39.9) with comorbidity (H) 11/28/2018     Priority: Medium     Lichen sclerosus of female genitalia 07/03/2018     Priority: Medium     Gastroesophageal reflux disease, esophagitis presence not specified 10/12/2017     Priority: Medium     Hiatal hernia 10/21/2013     Priority: Medium     Large; found on gastroscopy 10/2013; No GERD sx; chronic throat clearing; + H pylori       Plantar fasciitis 04/02/2012     Priority: Medium     Advanced directives, counseling/discussion 12/08/2011     Priority: Medium     Advance Care Planning:   ACP Review and Resources Provided: Reviewed chart for advance care plan. Rosie Chaidezrossana has an up to date advance care plan on file. See additional documentation in Problem List and click on code status for document details. Confirmed/documented designated decision maker(s). Added by Shima Cruz on 12/08/2011         Hyperlipidemia LDL goal <130 10/31/2010     Priority: Medium     Intolerant to all other statins (2013)       Prediabetes 09/03/2010     Priority: Medium     Blood sugar 122 9/10 working on; has already been to nutrition, weight and wellness and is reading \"healthy for life\"       Diverticulosis of colon 05/24/2010     " Priority: Medium     Noted on colonoscopy 5/10       Colon polyps 05/24/2010     Priority: Medium     Pes planus      Priority: Medium     Personal history of contact with and (suspected) exposure to asbestos      Priority: Medium             exposed 12 years in childhood       Donor of other blood      Priority: Medium           has false + syphyllis  Problem list name updated by automated process. Provider to review       Irritable bowel syndrome      Priority: Medium     ALLERGIC RHINITIS       Priority: Medium     Resolving with dietary changes; stopping gluten       History of recurrent UTIs      Priority: Medium             recurrent  Problem list name updated by automated process. Provider to review       Essential hypertension      Priority: Medium        Past Medical History:    Past Medical History:   Diagnosis Date     Chronic airway obstruction (H)      Diastolic dysfunction 6/28/2022     Gastroesophageal reflux disease      Hiatal hernia      Hypertension      Malignant neoplasm of ovary (H)      Ovarian cancer, unspecified laterality (H) 3/10/2021     Personal history of contact with and (suspected) exposure to asbestos      Primary osteoarthritis of right hip 7/9/2020       Past Surgical History:    Past Surgical History:   Procedure Laterality Date     BIOPSY BREAST Left      BREAST BIOPSY, CORE RT/LT  1994     CHOLECYSTECTOMY  1995     COLONOSCOPY  05/2010    Diverticulosis, colon polyps; repeat 5 yrs     COLONOSCOPY N/A 11/16/2015    Procedure: COLONOSCOPY;  Surgeon: Alejandro Matos MD;  Location: [facility]     COLONOSCOPY N/A 9/17/2021    Procedure: COLONOSCOPY;  Surgeon: Aayush George MD;  Location: [facility]     Colonoscopy, hyperplastic polyps, repeat in 5 yrs  2004     ESOPHAGOSCOPY, GASTROSCOPY, DUODENOSCOPY (EGD), COMBINED  09/30/2013    Procedure: COMBINED ESOPHAGOSCOPY, GASTROSCOPY, DUODENOSCOPY (EGD), BIOPSY SINGLE OR MULTIPLE;  Gastroscopy;  Surgeon: Blaise Gill MD;  Location: [facility]      EYE SURGERY      R/L Cataracts     HC REMOVE TONSILS/ADENOIDS,13+ Y/O      T & A 13+y.o.     HERNIORRHAPHY INCISIONAL (LOCATION) N/A 3/24/2021    Procedure: HERNIORRHAPHY, INCISIONAL, OPEN WITH MESH;  Surgeon: Aayush George MD;  Location: [facility]     HYSTERECTOMY, PAP NO LONGER INDICATED       LAPAROSCOPIC SALPINGO-OOPHORECTOMY N/A 2020    Procedure: Laparoscopy, removal or pelvic mass, both tubes and ovaries, omentectomy, anterior culvectomy, pelvic and para aortic lymph node dissection, laparotomy;  Surgeon: Felipe Corona MD;  Location: [facility]     [place] ANESTH,LUMBAR SPINE,CORD SURGERY  10/2020    L3-4 L4-5 TRANSFORAMINAL INTERBODY FUSION L3-5, POSTERIOR INSTRUMENTED FUSION AND DECOMPRESSION     TVH for DUB, ovaries in       VASCULAR SURGERY      [place] closure     ZZC ANESTH,KNEE VEINS SURGERY  2014       Family History:    Family History   Problem Relation Age of Onset     Cancer Mother         uterine cancer,      Respiratory Mother         COPD     Cardiovascular Mother         AAA  age 80     Breast Cancer Maternal Grandmother      Cancer Maternal Grandfather         cancer unknown type     Breast Cancer Sister      Eye Disorder Father         cataracts     Depression Father      Depression Son      Depression Son      Breast Cancer Sister         X2     Cancer - colorectal No family hx of         no ovarian cancer       Social History:  Marital Status:   [2]  Social History     Tobacco Use     Smoking status: Never Smoker     Smokeless tobacco: Never Used   Vaping Use     Vaping Use: Never used   Substance Use Topics     Alcohol use: No     Drug use: No        Medications:    apixaban ANTICOAGULANT (ELIQUIS) 5 MG tablet  diltiazem ER COATED BEADS (CARDIZEM CD/CARTIA XT) 180 MG 24 hr capsule  Evening Primrose Oil 1000 MG CAPS  metoprolol tartrate (LOPRESSOR) 25 MG tablet  mometasone (ELOCON) 0.1 % external cream  omega 3 1000 MG CAPS  pravastatin (PRAVACHOL) 80 MG  "tablet  THERATEARS 0.25 % OP SOLN  valsartan-hydrochlorothiazide (DIOVAN HCT) 320-25 MG tablet  VIACTIV 500-100-40 OR CHEW          Review of Systems  A complete review of systems performed and is otherwise negative except as noted in the HPI    Physical Exam   BP: 109/70  Pulse: (!) 130  Temp: 98.3  F (36.8  C)  Resp: 16  Height: 162.6 cm (5' 4\")  Weight: 90.7 kg (200 lb)  SpO2: 96 %    Physical Exam  GEN: Awake, alert, and cooperative.  Appears mildly distressed but nontoxic.  Resting on cart.  HENT: MMM. External ears and nose normal bilaterally.  EYES: EOM intact. Conjunctiva clear. No discharge.   NECK: Symmetric, freely mobile.  No JVD  CV : Tachycardic rate.  Irregularly irregular rhythm  PULM: Normal effort. No wheezes, rales, or rhonchi bilaterally.  ABD: Soft, non-tender, non-distended. No rebound or guarding.   NEURO: Normal speech. Following commands. CN II-XII grossly intact. Answering questions and interacting appropriately.   EXT: No gross deformity.  1+ pretibial edema  INT: Warm. No diaphoresis. Normal color.        ED Course        Procedures         EKG#1: Interpreted by myself.  Narrow complex irregular rhythm with no discrete P waves preceding QRS complexes.  Normal axis.  Rate 132 bpm.  Impression atrial fibrillation with rapid ventricular response.    EKG#2: interpreted by myself.   Sinus rhythm with rate of 61 bpm.  Normal axis.  Normal R wave progression.  No ST segment elevations or depressions.  T wave inversion lead III (present on prior EKG) occasional ectopic beat.  Resolution of atrial fibrillation and tachycardia compared to EKG earlier today.  Impression sinus rhythm with beat.    Critical Care time:  none               Results for orders placed or performed during the hospital encounter of 08/23/22 (from the past 24 hour(s))   CBC with platelets differential    Narrative    The following orders were created for panel order CBC with platelets differential.  Procedure                  "              Abnormality         Status                     ---------                               -----------         ------                     CBC with platelets and d...[[id number]]                      Final result                 Please view results for these tests on the individual orders.   Basic metabolic panel   Result Value Ref Range    Sodium 144 133 - 144 mmol/L    Potassium 3.7 3.4 - 5.3 mmol/L    Chloride 110 (H) 94 - 109 mmol/L    Carbon Dioxide (CO2) 30 20 - 32 mmol/L    Anion Gap 4 3 - 14 mmol/L    Urea Nitrogen 15 7 - 30 mg/dL    Creatinine 0.69 0.52 - 1.04 mg/dL    Calcium 9.2 8.5 - 10.1 mg/dL    Glucose 127 (H) 70 - 99 mg/dL    GFR Estimate 87 >60 mL/min/1.73m2   Troponin I   Result Value Ref Range    Troponin I High Sensitivity 9 <54 ng/L   TSH with free T4 reflex   Result Value Ref Range    TSH 2.49 0.40 - 4.00 mU/L   [facility] Draw    Narrative    The following orders were created for panel order [facility] Draw.  Procedure                               Abnormality         Status                     ---------                               -----------         ------                     Extra Blue Top Tube[[id number]]                              Final result               Extra Red Top Tube[[id number]]                               Final result                 Please view results for these tests on the individual orders.   CBC with platelets and differential   Result Value Ref Range    WBC Count 6.9 4.0 - 11.0 10e3/uL    RBC Count 4.48 3.80 - 5.20 10e6/uL    Hemoglobin 13.7 11.7 - 15.7 g/dL    Hematocrit 42.0 35.0 - 47.0 %    MCV 94 78 - 100 fL    MCH 30.6 26.5 - 33.0 pg    MCHC 32.6 31.5 - 36.5 g/dL    RDW 13.1 10.0 - 15.0 %    Platelet Count 218 150 - 450 10e3/uL    % Neutrophils 72 %    % Lymphocytes 13 %    % Monocytes 11 %    % Eosinophils 3 %    % Basophils 1 %    % Immature Granulocytes 0 %    NRBCs per 100 WBC 0 <1 /100    Absolute Neutrophils 4.9 1.6 - 8.3 10e3/uL    Absolute Lymphocytes 0.9  0.8 - 5.3 10e3/uL    Absolute Monocytes 0.8 0.0 - 1.3 10e3/uL    Absolute Eosinophils 0.2 0.0 - 0.7 10e3/uL    Absolute Basophils 0.1 0.0 - 0.2 10e3/uL    Absolute Immature Granulocytes 0.0 <=0.4 10e3/uL    Absolute NRBCs 0.0 10e3/uL   Extra Blue Top Tube   Result Value Ref Range    Hold Specimen JIC    Extra Red Top Tube   Result Value Ref Range    Hold Specimen JIC        Medications - No data to display    Assessments & Plan (with Medical Decision Making)   82 year old female with past medical history  of recent diagnosis of paroxysmal atrial fibrillation with rapid ventricular response, hypertension, TIA, hypokalemia, obesity, with tachycardia and palpitations, heart rate in the 150s at home.     On arrival to emergency department irregular regular rhythm with 332 bpm.  EKG consistent with atrial fibrillation.  Other than palpitation she seems unbothered.  Rapid heart rate was concerning which brought her to the emergency department.  During my evaluation she has moments where she had a regular rhythm and appeared sinus on the monitor.  She took her diltiazem and metoprolol only a few hours ago and neither likely helping control her rate.    No signs of acute ischemia on EKG and high since her troponin is not elevated.  Very low suspicion for underlying ischemia.  CBC normal.  TSH normal.  BMP grossly normal, chloride 110.  Glucose 127.    No obvious precipitating events.  Patient is pending follow-up with cardiology and is currently wearing a Zio patch monitor.  During her stay she has a clearly sinus rhythm with a rate in the 50s and 60s.  Repeat EKG with sinus rhythm.     Plan for discharge and continue oral medications and cardiology follow-up. ED return precautions discussed.       I have reviewed the nursing notes.         New Prescriptions    No medications on file       Final diagnoses:   Atrial fibrillation with rapid ventricular response (H)     Gonsalo Gibson MD    8/23/2022   [facility]  [place] [facility]    Disclaimer: This note consists of words and symbols derived from keyboarding and dictation using voice recognition software.  As a result, there may be errors that have gone undetected.  Please consider this when interpreting information found in this note.             Gonsalo Gibson MD  08/23/22 0842

## 2022-09-01 ENCOUNTER — TELEPHONE (OUTPATIENT)
Dept: CARDIOLOGY | Facility: CLINIC | Age: 82
End: 2022-09-01

## 2022-09-01 NOTE — TELEPHONE ENCOUNTER
PARTH for pt to call me back to discuss the ZP results and that possibly would like to move the OV up if possible. MartitaRN

## 2022-09-02 NOTE — TELEPHONE ENCOUNTER
Spoke to pt about the findings on her ZP.  Pt states that the day of the pause she was in the ER (8/23),  Pt has been in the ER three times for A Fib and states that she actually had a pause during her hospital stay 8/17-8/18. Per note pt had a 6-7 second pause. Pt medications were changed. Pt states that since her last ER visit she has felt fine, with no lightheaded ness. Pt states that when she feels lightheaded she just sits down. Have asked that pt check her HR at this time. Pt currently taking Metoprolol tartrate 25 mg bid and Diltiazem 180 mg daily. Pt states that her HR is mainly in the 60's.  Pt currently scheduled to see Dr Rollins on 10/11 at 1100 in [place].  Pt given A fib nurse line to call if having anymore episodes of lightheadedness or if she passes out. Pt states understanding. Gino

## 2022-09-06 ENCOUNTER — OFFICE VISIT (OUTPATIENT)
Dept: FAMILY MEDICINE | Facility: CLINIC | Age: 82
End: 2022-09-06
Payer: MEDICARE

## 2022-09-06 VITALS
DIASTOLIC BLOOD PRESSURE: 76 MMHG | HEIGHT: 64 IN | BODY MASS INDEX: 36.16 KG/M2 | TEMPERATURE: 97.8 F | WEIGHT: 211.8 LBS | RESPIRATION RATE: 20 BRPM | SYSTOLIC BLOOD PRESSURE: 132 MMHG | HEART RATE: 61 BPM | OXYGEN SATURATION: 96 %

## 2022-09-06 DIAGNOSIS — I48.0 PAROXYSMAL ATRIAL FIBRILLATION (H): Primary | ICD-10-CM

## 2022-09-06 PROCEDURE — 99214 OFFICE O/P EST MOD 30 MIN: CPT | Performed by: INTERNAL MEDICINE

## 2022-09-06 RX ORDER — METOPROLOL TARTRATE 25 MG/1
25 TABLET, FILM COATED ORAL 2 TIMES DAILY
Qty: 60 TABLET | Refills: 3 | Status: SHIPPED | OUTPATIENT
Start: 2022-09-06 | End: 2022-11-02

## 2022-09-06 RX ORDER — DILTIAZEM HYDROCHLORIDE 180 MG/1
180 CAPSULE, COATED, EXTENDED RELEASE ORAL DAILY
Qty: 30 CAPSULE | Refills: 3 | Status: SHIPPED | OUTPATIENT
Start: 2022-09-06 | End: 2022-11-07

## 2022-09-06 RX ORDER — MELOXICAM 15 MG/1
15 TABLET ORAL DAILY PRN
COMMUNITY
End: 2024-01-26

## 2022-09-06 ASSESSMENT — PAIN SCALES - GENERAL: PAINLEVEL: NO PAIN (0)

## 2022-09-06 NOTE — PROGRESS NOTES
"    Assessment & Plan     Paroxysmal atrial fibrillation (H) -she is tolerating medications well.  Occasional use of meloxicam.  Discussed risk of NSAID and apixaban.  Reviewed general precautions with head injury while on blood thinners.  She has not had any further episodes of symptomatic RVR since the last ER visit.  If she has a short-lived episode lasting just a few minutes, she can monitor at home.  She should let me know, and I would increase the metoprolol to 50 twice daily.  Keep follow-up with EP cards.  Short refill of medications in case they are changed, otherwise she can contact me for 90-day supply when due for refills  - apixaban ANTICOAGULANT (ELIQUIS) 5 MG tablet; Take 1 tablet (5 mg) by mouth 2 times daily  - diltiazem ER COATED BEADS (CARDIZEM CD/CARTIA XT) 180 MG 24 hr capsule; Take 1 capsule (180 mg) by mouth daily  - metoprolol tartrate (LOPRESSOR) 25 MG tablet; Take 1 tablet (25 mg) by mouth 2 times daily             BMI:   Estimated body mass index is 36.36 kg/m  as calculated from the following:    Height as of this encounter: 1.626 m (5' 4\").    Weight as of this encounter: 96.1 kg (211 lb 12.8 oz).       Patient Instructions   1. Keep appointment with Cardiology  2. If you have another short episode, let Dr. Escalera know - may increase diltiazem  3. Ok to cancel appointment with Dr. Escalera   4. Will refill meds x 30 days - cardiology may change medications.  If need 90 day refill, send request to Dr. Escalera   5. With the Apixiban, interaction with NSAIDs;  Tylenol (acetaminophen) IS safe      No follow-ups on file.    Kathya Escalera, [facility]    Elle Velez is a 82 year old accompanied by her self , presenting for the following health issues:  Hospital F/U (ER on 9/21/22 and 9/23/22 all for A-fib), Health Maintenance (Aware due for flu shot), and ER F/U      HPI     Chief Complaint   Patient presents with     Hospital F/U     ER on 9/21/22 and " 9/23/22 all for A-fib     Health Maintenance     Aware due for flu shot     ER F/U         Hospital Follow-up Visit:    Hospital/Nursing Home/IP Rehab Facility: [facility]  Date of Admission: 8/17/22  Date of Discharge: 8/18/22  Reason(s) for Admission: a-fib    Was your hospitalization related to COVID-19? No   Problems taking medications regularly:  None  Medication changes since discharge:     diltiazem ER COATED BEADS (CARDIZEM CD/CARTIA XT) 180 MG 24 hr capsule    apixaban ANTICOAGULANT (ELIQUIS) 5 MG tablet Take 1 tablet (5 mg) by mouth 2 times daily for 30 days, Disp-60 tablet, R-0, E-Prescribe       metoprolol tartrate (LOPRESSOR) 25 MG tablet Take 1 tablet (25 mg) by mouth 2 times daily for 30 days, Disp-60 tablet, R-0, E-Prescribe           Problems adhering to non-medication therapy:  Lightheaded comes, 10-20 min when sit down and rest will stop and don't get this again    Summary of hospitalization:  [facility] discharge summary reviewed  Diagnostic Tests/Treatments reviewed.  Follow up needed: none  Other Healthcare Providers Involved in Patient s Care:         None  Update since discharge: improved.         Post Medication Reconciliation Status:        Plan of care communicated with patient           ED/UC Followup:    Facility:  [place]  Date of visit: 8/21/22 and 8/23/22  Reason for visit: A-fib  Current Status: Lightheaded comes, 10-20 min when sit down and rest will stop and don't get this again      A-fib:  --She was hospitalized as above for new diagnosis of paroxysmal A. fib with RVR  --She was recommended to wear a Zio patch for 1 week, seeing cardiology in 4 to 6 weeks  --CHADs2-VASc=6  --She failed cardioversion x3 in the ER, started on dill drip and heart rate normalized, transition to oral dilt  --Patient spontaneously converted to NSR but had a 6-7 second pause at the time of the conversion and then sinus rhythm rate 40's initially and then 50's.  Remained in SR several hours and appears stable  -- After this pause, home atenolol was stopped  --She was started on warfarin for anticoagulation  --It was thought that her recent TIA was due to A. Fib  --ER visit 8/21 had return of RVR.  She was compliant with her diltiazem.  She was started on metoprolol 25 mg.  She was switched from warfarin to apixaban.  Cardiology was consulted during this ER visit.  --She was then seen on 8/23 for RVR  --Zio patch not read yet  --she has not had any further episodes of RVR since most recent ER visit  --she has mild 'lightheadedness' in AM, after taking AM pills; feels more fatigued than usual, going to bed earlier and sleeping more soundly and longer than usual  --no chest pain or shortness of breath, no leg swelling  --she is not able to see EP cards sooner  --her eye doctor had a long conversation that COVID shots cause afib; she thinks he is an anti-vaxxer  --she uses meloxicam 1-2 days every 2-3 weeks for back and ankle pain; finds it VERY helpful    Current Outpatient Medications   Medication Sig Dispense Refill     apixaban ANTICOAGULANT (ELIQUIS) 5 MG tablet Take 1 tablet (5 mg) by mouth 2 times daily for 30 days 60 tablet 0     diltiazem ER COATED BEADS (CARDIZEM CD/CARTIA XT) 180 MG 24 hr capsule Take 1 capsule (180 mg) by mouth daily 30 capsule 0     Evening Primrose Oil 1000 MG CAPS One daily       metoprolol tartrate (LOPRESSOR) 25 MG tablet Take 1 tablet (25 mg) by mouth 2 times daily for 30 days 60 tablet 0     mometasone (ELOCON) 0.1 % external cream Apply sparingly to affected area twice daily for 2 weeks then decrease to 1 time daily for 30 days then decrease to 2-3 times per week 45 g 11     omega 3 1000 MG CAPS Take by mouth daily        pravastatin (PRAVACHOL) 80 MG tablet Take 1 tablet (80 mg) by mouth daily 90 tablet 3     THERATEARS 0.25 % OP SOLN BID       valsartan-hydrochlorothiazide (DIOVAN HCT) 320-25 MG tablet Take 1 tablet by mouth daily 90  "tablet 3     VIACTIV 500-100-40 OR CHEW once daily           Review of Systems   Constitutional, HEENT, cardiovascular, pulmonary, GI, , musculoskeletal, neuro, skin, endocrine and psych systems are negative, except as otherwise noted.      Objective    /76 (BP Location: Right arm, Patient Position: Sitting, Cuff Size: Adult Regular)   Pulse 61   Temp 97.8  F (36.6  C) (Tympanic)   Resp 20   Ht 1.626 m (5' 4\")   Wt 96.1 kg (211 lb 12.8 oz)   SpO2 96%   BMI 36.36 kg/m    Body mass index is 36.36 kg/m .  Physical Exam   GENERAL APPEARANCE: alert, no distress and over weight  RESP: lungs clear to auscultation - no rales, rhonchi or wheezes  CV: regular rates and rhythm, normal S1 S2, no S3 or S4 and no murmur, click or rub  LYMPHATICS: Mild-moderate bilateral pitting edema, unchanged from baseline                      "

## 2022-09-06 NOTE — PATIENT INSTRUCTIONS
Keep appointment with Cardiology  If you have another short episode, let Dr. Escalera know - may increase diltiazem  Ok to cancel appointment with Dr. Escalera   Will refill meds x 30 days - cardiology may change medications.  If need 90 day refill, send request to Dr. Escalera   With the Apixiban, interaction with NSAIDs;  Tylenol (acetaminophen) IS safe

## 2022-10-11 ENCOUNTER — OFFICE VISIT (OUTPATIENT)
Dept: CARDIOLOGY | Facility: CLINIC | Age: 82
End: 2022-10-11
Attending: EMERGENCY MEDICINE
Payer: MEDICARE

## 2022-10-11 VITALS
SYSTOLIC BLOOD PRESSURE: 108 MMHG | HEART RATE: 71 BPM | BODY MASS INDEX: 35.39 KG/M2 | WEIGHT: 206.2 LBS | OXYGEN SATURATION: 96 % | DIASTOLIC BLOOD PRESSURE: 54 MMHG

## 2022-10-11 DIAGNOSIS — I48.91 ATRIAL FIBRILLATION WITH RVR (H): ICD-10-CM

## 2022-10-11 DIAGNOSIS — I48.91 ATRIAL FIBRILLATION, UNSPECIFIED TYPE (H): ICD-10-CM

## 2022-10-11 PROCEDURE — 99204 OFFICE O/P NEW MOD 45 MIN: CPT | Performed by: INTERNAL MEDICINE

## 2022-10-11 NOTE — PROGRESS NOTES
Service Date: 10/11/2022    HISTORY OF PRESENT ILLNESS:  Thank you for allowing me to participate in the care of your very delightful patient.  As you know, Rosie is an 82-year-old female with a history of hypertension and TIA back in 03/2022 and did wear a 3-day Zio Patch monitor showing no evidence of any atrial fibrillation.  However, she was hospitalized this past August for new onset of symptomatic atrial fibrillation with symptoms of palpitation.  EKG confirmed atrial fibrillation, as reviewed by myself.  The patient was cardioverted 3 times without success but fortunately, converted later on its own.  She did have significant post-conversion pauses, however, with symptom of lightheadedness.  She was sent home on diltiazem.  Subsequently, she wore a Zio Patch monitor showing paroxysmal atrial fibrillation with burden of 6% of the time.  The longest episode lasted for 5 hours.  She also had one 4-second post-conversion sinus pause around 9:30 in the morning for which she was asymptomatic.  The patient has also had an echocardiogram done in June showing normal LV systolic function and mild left atrial enlargement.  She has been taking diltiazem 180 mg once a day along with Eliquis in addition to metoprolol 25 mg twice a day.  I was asked to see the patient for further evaluation.    Rosie is quite aware when she has atrial fibrillation and recently, she has not noted any palpitations.  We discussed at length about the cause of her atrial fibrillation including her age and her history of hypertension.  We also discussed potential complications including CVA which likely was the cause of her TIA this past March.  Fortunately, she did not have any neurological sequelae from it.  We also discussed complication of tachycardia-induced cardiomyopathy which is quite rare for her given that she does have symptoms when she is in atrial fibrillation.    We also discussed sinus node dysfunction, as evidenced by significant  post-conversion sinus pauses worsened by medication needed to control her rapid ventricular response with atrial fibrillation.  Lastly, we discussed treatment options including rate versus rhythm control strategy.  Either option would require medication and will suppress her sinus node and therefore, the safest option is to implant a pacemaker first and then we can decide on either rate or rhythm control strategy.  Certainly, we can try the simplest approach which is rate control strategy by increasing her beta blocker and stop her Diovan so that we have more blood pressure room once the pacemaker is implanted.  She will continue her Eliquis indefinitely given history of transient ischemic attack, likely related to her atrial fibrillation.    I went over the procedure in detail with risks including but not limited to vascular injury and infection.  One of our schedulers will contact the patient to schedule this procedure as soon as possible.    Nba Rollins MD        D: 10/11/2022   T: 10/11/2022   MT: jamison    Name:     CARLITA ROJO  MRN:      [id number]        Account:      [id number]   :      1940           Service Date: 10/11/2022       Document: [id number]

## 2022-10-11 NOTE — LETTER
10/11/2022    Kathya Escalera, DO  [address] [facility] [address]    RE: Rosie Blackman       Dear Colleague,     [facility].  HPI and Plan:   See dictation  [id number]  Today's clinic visit entailed:  The following tests were independently interpreted by me as noted in my documentation: ecg, zio, echo  45 minutes spent on the date of the encounter doing chart review, history and exam, documentation and further activities per the note  Provider  Link to [employer] Help Grid     The level of medical decision making during this visit was of moderate complexity.      Orders Placed This Encounter   Procedures     Case Request EP: Pacemaker Device & Lead Implant - Right Atrial & Right Ventricular       No orders of the defined types were placed in this encounter.      There are no discontinued medications.      Encounter Diagnoses   Name Primary?     Atrial fibrillation with RVR (H)      Atrial fibrillation, unspecified type (H)        CURRENT MEDICATIONS:  Current Outpatient Medications   Medication Sig Dispense Refill     apixaban ANTICOAGULANT (ELIQUIS) 5 MG tablet Take 1 tablet (5 mg) by mouth 2 times daily 60 tablet 3     diltiazem ER COATED BEADS (CARDIZEM CD/CARTIA XT) 180 MG 24 hr capsule Take 1 capsule (180 mg) by mouth daily 30 capsule 3     Evening Primrose Oil 1000 MG CAPS One daily       meloxicam (MOBIC) 15 MG tablet Take 15 mg by mouth daily as needed       metoprolol tartrate (LOPRESSOR) 25 MG tablet Take 1 tablet (25 mg) by mouth 2 times daily 60 tablet 3     mometasone (ELOCON) 0.1 % external cream Apply sparingly to affected area twice daily for 2 weeks then decrease to 1 time daily for 30 days then decrease to 2-3 times per week 45 g 11     omega 3 1000 MG CAPS Take by mouth daily        pravastatin (PRAVACHOL) 80 MG tablet Take 1 tablet (80 mg) by mouth daily 90 tablet 3     THERATEARS 0.25 % OP SOLN BID        valsartan-hydrochlorothiazide (DIOVAN HCT) 320-25 MG tablet Take 1 tablet by mouth daily 90 tablet 3     VIACTIV 500-100-40 OR CHEW once daily         ALLERGIES     Allergies   Allergen Reactions     Ezetimibe Other (See Comments)     Severe muscle aches     Lisinopril      Omeprazole      Other reaction(s): *Unknown     Prilosec [Omeprazole]      Zestril [Ace Inhibitors] Cough     Atorvastatin Other (See Comments)     Muscle Pain     Irbesartan Cramps     Rosuvastatin Other (See Comments)     Muscle Pain       PAST MEDICAL HISTORY:  Past Medical History:   Diagnosis Date     Chronic airway obstruction (H)      Diastolic dysfunction 6/28/2022     Gastroesophageal reflux disease      Hiatal hernia      Hypertension      Malignant neoplasm of ovary (H)      Ovarian cancer, unspecified laterality (H) 3/10/2021     Personal history of contact with and (suspected) exposure to asbestos      Primary osteoarthritis of right hip 7/9/2020       PAST SURGICAL HISTORY:  Past Surgical History:   Procedure Laterality Date     BIOPSY BREAST Left      BREAST BIOPSY, CORE RT/LT  1994     CHOLECYSTECTOMY  1995     COLONOSCOPY  05/2010    Diverticulosis, colon polyps; repeat 5 yrs     COLONOSCOPY N/A 11/16/2015    Procedure: COLONOSCOPY;  Surgeon: Alejandro Matos MD;  Location: [facility]     COLONOSCOPY N/A 9/17/2021    Procedure: COLONOSCOPY;  Surgeon: Aayush George MD;  Location: [facility]     Colonoscopy, hyperplastic polyps, repeat in 5 yrs  2004     ESOPHAGOSCOPY, GASTROSCOPY, DUODENOSCOPY (EGD), COMBINED  09/30/2013    Procedure: COMBINED ESOPHAGOSCOPY, GASTROSCOPY, DUODENOSCOPY (EGD), BIOPSY SINGLE OR MULTIPLE;  Gastroscopy;  Surgeon: Blaise Gill MD;  Location: [facility]     EYE SURGERY  2012    R/L Cataracts     HC REMOVE TONSILS/ADENOIDS,12+ Y/O      T & A 13+y.o.     HERNIORRHAPHY INCISIONAL (LOCATION) N/A 3/24/2021    Procedure: HERNIORRHAPHY, INCISIONAL, OPEN WITH MESH;  Surgeon: Aayush George MD;  Location: [facility]      HYSTERECTOMY, PAP NO LONGER INDICATED       LAPAROSCOPIC SALPINGO-OOPHORECTOMY N/A 2020    Procedure: Laparoscopy, removal or pelvic mass, both tubes and ovaries, omentectomy, anterior culvectomy, pelvic and para aortic lymph node dissection, laparotomy;  Surgeon: Felipe Corona MD;  Location: [facility]     [place] ANESTH,LUMBAR SPINE,CORD SURGERY  10/2020    L3-4 L4-5 TRANSFORAMINAL INTERBODY FUSION L3-5, POSTERIOR INSTRUMENTED FUSION AND DECOMPRESSION     TVH for DUB, ovaries in       VASCULAR SURGERY      Taylor closure     ZZC ANESTH,KNEE VEINS SURGERY  2014       FAMILY HISTORY:  Family History   Problem Relation Age of Onset     Cancer Mother         uterine cancer,      Respiratory Mother         COPD     Cardiovascular Mother         AAA  age 80     Breast Cancer Maternal Grandmother      Cancer Maternal Grandfather         cancer unknown type     Breast Cancer Sister      Eye Disorder Father         cataracts     Depression Father      Depression Son      Depression Son      Breast Cancer Sister         X2     Cancer - colorectal No family hx of         no ovarian cancer       SOCIAL HISTORY:  Social History     Socioeconomic History     Marital status:      Spouse name: Nelson Blackman     Number of children: 2     Years of education: 13+     Highest education level: None   Occupational History     Occupation:      Comment: [employer]   Tobacco Use     Smoking status: Never     Smokeless tobacco: Never   Vaping Use     Vaping Use: Never used   Substance and Sexual Activity     Alcohol use: No     Drug use: No     Sexual activity: Not Currently     Partners: Male     Birth control/protection: Surgical   Other Topics Concern     Parent/sibling w/ CABG, MI or angioplasty before 65F 55M? No     Social Determinants of Health     Intimate Partner Violence: Not At Risk     Fear of Current or Ex-Partner: No     Emotionally Abused: No     Physically Abused: No      Sexually Abused: No       Review of Systems:  Skin:          Eyes:         ENT:         Respiratory:  Positive for cough     Cardiovascular:    Positive for;lower extremity symptoms;edema    Gastroenterology:        Genitourinary:         Musculoskeletal:         Neurologic:         Psychiatric:         Heme/Lymph/Imm:         Endocrine:           Physical Exam:  Vitals: /54   Pulse 71   Wt 93.5 kg (206 lb 3.2 oz)   SpO2 96%   BMI 35.39 kg/m      Constitutional:  cooperative, alert and oriented, well developed, well nourished, in no acute distress overweight      Skin:  warm and dry to the touch, no apparent skin lesions or masses noted          Head:  normocephalic, no masses or lesions        Eyes:  pupils equal and round, conjunctivae and lids unremarkable, sclera white, no xanthalasma, EOMS intact, no nystagmus        Lymph:No Cervical lymphadenopathy present     ENT:  no pallor or cyanosis        Neck:  carotid pulses are full and equal bilaterally, JVP normal, no carotid bruit        Respiratory:  normal breath sounds, clear to auscultation, normal A-P diameter, normal symmetry, normal respiratory excursion, no use of accessory muscles         Cardiac: regular rhythm, normal S1/S2, no S3 or S4, apical impulse not displaced, no murmurs, gallops or rubs                pulses full and equal, no bruits auscultated                                        GI:  abdomen soft, non-tender, BS normoactive, no mass, no HSM, no bruits        Extremities and Muscular Skeletal:  no deformities, clubbing, cyanosis, erythema observed              Neurological:  no gross motor deficits        Psych:  Alert and Oriented x 3        CC  Rom Ann MD  [address] [place] [id number]                Service Date: 10/11/2022    HISTORY OF PRESENT ILLNESS:  Thank you for allowing me to participate in the care of your very delightful patient.  As you know, Rosie is an 82-year-old female with a history of  hypertension and TIA back in 03/2022 and did wear a 3-day Zio Patch monitor showing no evidence of any atrial fibrillation.  However, she was hospitalized this past August for new onset of symptomatic atrial fibrillation with symptoms of palpitation.  EKG confirmed atrial fibrillation, as reviewed by myself.  The patient was cardioverted 3 times without success but fortunately, converted later on its own.  She did have significant post-conversion pauses, however, with symptom of lightheadedness.  She was sent home on diltiazem.  Subsequently, she wore a Zio Patch monitor showing paroxysmal atrial fibrillation with burden of 6% of the time.  The longest episode lasted for 5 hours.  She also had one 4-second post-conversion sinus pause around 9:30 in the morning for which she was asymptomatic.  The patient has also had an echocardiogram done in June showing normal LV systolic function and mild left atrial enlargement.  She has been taking diltiazem 180 mg once a day along with Eliquis in addition to metoprolol 25 mg twice a day.  I was asked to see the patient for further evaluation.    Rosie is quite aware when she has atrial fibrillation and recently, she has not noted any palpitations.  We discussed at length about the cause of her atrial fibrillation including her age and her history of hypertension.  We also discussed potential complications including CVA which likely was the cause of her TIA this past March.  Fortunately, she did not have any neurological sequelae from it.  We also discussed complication of tachycardia-induced cardiomyopathy which is quite rare for her given that she does have symptoms when she is in atrial fibrillation.    We also discussed sinus node dysfunction, as evidenced by significant post-conversion sinus pauses worsened by medication needed to control her rapid ventricular response with atrial fibrillation.  Lastly, we discussed treatment options including rate versus rhythm control  strategy.  Either option would require medication and will suppress her sinus node and therefore, the safest option is to implant a pacemaker first and then we can decide on either rate or rhythm control strategy.  Certainly, we can try the simplest approach which is rate control strategy by increasing her beta blocker and stop her Diovan so that we have more blood pressure room once the pacemaker is implanted.  She will continue her Eliquis indefinitely given history of transient ischemic attack, likely related to her atrial fibrillation.    I went over the procedure in detail with risks including but not limited to vascular injury and infection.  One of our schedulers will contact the patient to schedule this procedure as soon as possible.    Nba Rollins MD        D: 10/11/2022   T: 10/11/2022   MT: jamison    Name:     CARLITA ROJO  MRN:      [id number]        Account:      [id number]   :      1940           Service Date: 10/11/2022       Document: [id number]      Thank you for allowing me to participate in the care of your patient.      Sincerely,     Nba Rowe MD     [facility]  cc:   Rom Ann MD  [address] [place] [address]

## 2022-10-11 NOTE — PROGRESS NOTES
HPI and Plan:   See dictation  [id number]  Today's clinic visit entailed:  The following tests were independently interpreted by me as noted in my documentation: ecg, zio, echo  45 minutes spent on the date of the encounter doing chart review, history and exam, documentation and further activities per the note  Provider  Link to [facility] Help Grid     The level of medical decision making during this visit was of moderate complexity.      Orders Placed This Encounter   Procedures     Case Request EP: Pacemaker Device & Lead Implant - Right Atrial & Right Ventricular       No orders of the defined types were placed in this encounter.      There are no discontinued medications.      Encounter Diagnoses   Name Primary?     Atrial fibrillation with RVR (H)      Atrial fibrillation, unspecified type (H)        CURRENT MEDICATIONS:  Current Outpatient Medications   Medication Sig Dispense Refill     apixaban ANTICOAGULANT (ELIQUIS) 5 MG tablet Take 1 tablet (5 mg) by mouth 2 times daily 60 tablet 3     diltiazem ER COATED BEADS (CARDIZEM CD/CARTIA XT) 180 MG 24 hr capsule Take 1 capsule (180 mg) by mouth daily 30 capsule 3     Evening Primrose Oil 1000 MG CAPS One daily       meloxicam (MOBIC) 15 MG tablet Take 15 mg by mouth daily as needed       metoprolol tartrate (LOPRESSOR) 25 MG tablet Take 1 tablet (25 mg) by mouth 2 times daily 60 tablet 3     mometasone (ELOCON) 0.1 % external cream Apply sparingly to affected area twice daily for 2 weeks then decrease to 1 time daily for 30 days then decrease to 2-3 times per week 45 g 11     omega 3 1000 MG CAPS Take by mouth daily        pravastatin (PRAVACHOL) 80 MG tablet Take 1 tablet (80 mg) by mouth daily 90 tablet 3     THERATEARS 0.25 % OP SOLN BID       valsartan-hydrochlorothiazide (DIOVAN HCT) 320-25 MG tablet Take 1 tablet by mouth daily 90 tablet 3     VIACTIV 500-100-40 OR CHEW once daily         ALLERGIES     Allergies   Allergen Reactions     Ezetimibe Other (See  Comments)     Severe muscle aches     Lisinopril      Omeprazole      Other reaction(s): *Unknown     Prilosec [Omeprazole]      Zestril [Ace Inhibitors] Cough     Atorvastatin Other (See Comments)     Muscle Pain     Irbesartan Cramps     Rosuvastatin Other (See Comments)     Muscle Pain       PAST MEDICAL HISTORY:  Past Medical History:   Diagnosis Date     Chronic airway obstruction (H)      Diastolic dysfunction 6/28/2022     Gastroesophageal reflux disease      Hiatal hernia      Hypertension      Malignant neoplasm of ovary (H)      Ovarian cancer, unspecified laterality (H) 3/10/2021     Personal history of contact with and (suspected) exposure to asbestos      Primary osteoarthritis of right hip 7/9/2020       PAST SURGICAL HISTORY:  Past Surgical History:   Procedure Laterality Date     BIOPSY BREAST Left      BREAST BIOPSY, CORE RT/LT  1994     CHOLECYSTECTOMY  1995     COLONOSCOPY  05/2010    Diverticulosis, colon polyps; repeat 5 yrs     COLONOSCOPY N/A 11/16/2015    Procedure: COLONOSCOPY;  Surgeon: Alejandro Matos MD;  Location: [facility]     COLONOSCOPY N/A 9/17/2021    Procedure: COLONOSCOPY;  Surgeon: Aayush George MD;  Location: [facility]     Colonoscopy, hyperplastic polyps, repeat in 5 yrs  2004     ESOPHAGOSCOPY, GASTROSCOPY, DUODENOSCOPY (EGD), COMBINED  09/30/2013    Procedure: COMBINED ESOPHAGOSCOPY, GASTROSCOPY, DUODENOSCOPY (EGD), BIOPSY SINGLE OR MULTIPLE;  Gastroscopy;  Surgeon: Blaise Gill MD;  Location: [facility]     EYE SURGERY  2012    R/L Cataracts     HC REMOVE TONSILS/ADENOIDS,13+ Y/O      T & A 13+y.o.     HERNIORRHAPHY INCISIONAL (LOCATION) N/A 3/24/2021    Procedure: HERNIORRHAPHY, INCISIONAL, OPEN WITH MESH;  Surgeon: Aayush George MD;  [facility]: [facility]     HYSTERECTOMY, PAP NO LONGER INDICATED       LAPAROSCOPIC SALPINGO-OOPHORECTOMY N/A 07/24/2020    Procedure: Laparoscopy, removal or pelvic mass, both tubes and ovaries, omentectomy, anterior culvectomy, pelvic and para  aortic lymph node dissection, laparotomy;  Surgeon: Felipe Corona MD;  Location: [facility]     DE ANESTH,LUMBAR SPINE,CORD SURGERY  10/2020    L3-4 L4-5 TRANSFORAMINAL INTERBODY FUSION L3-5, POSTERIOR INSTRUMENTED FUSION AND DECOMPRESSION     TVH for DUB, ovaries in       VASCULAR SURGERY      Taylor closure     ZZC ANESTH,KNEE VEINS SURGERY  2014       FAMILY HISTORY:  Family History   Problem Relation Age of Onset     Cancer Mother         uterine cancer,      Respiratory Mother         COPD     Cardiovascular Mother         AAA  age 80     Breast Cancer Maternal Grandmother      Cancer Maternal Grandfather         cancer unknown type     Breast Cancer Sister      Eye Disorder Father         cataracts     Depression Father      Depression Son      Depression Son      Breast Cancer Sister         X2     Cancer - colorectal No family hx of         no ovarian cancer       SOCIAL HISTORY:  Social History     Socioeconomic History     Marital status:      Spouse name: Nelson Blackman     Number of children: 2     Years of education: 13+     Highest education level: None   Occupational History     Occupation:      Comment: [employer] D[employer]   Tobacco Use     Smoking status: Never     Smokeless tobacco: Never   Vaping Use     Vaping Use: Never used   Substance and Sexual Activity     Alcohol use: No     Drug use: No     Sexual activity: Not Currently     Partners: Male     Birth control/protection: Surgical   Other Topics Concern     Parent/sibling w/ CABG, MI or angioplasty before 65F 55M? No     Social Determinants of Health     Intimate Partner Violence: Not At Risk     Fear of Current or Ex-Partner: No     Emotionally Abused: No     Physically Abused: No     Sexually Abused: No       Review of Systems:  Skin:          Eyes:         ENT:         Respiratory:  Positive for cough     Cardiovascular:    Positive for;lower extremity symptoms;edema    Gastroenterology:         Genitourinary:         Musculoskeletal:         Neurologic:         Psychiatric:         Heme/Lymph/Imm:         Endocrine:           Physical Exam:  Vitals: /54   Pulse 71   Wt 93.5 kg (206 lb 3.2 oz)   SpO2 96%   BMI 35.39 kg/m      Constitutional:  cooperative, alert and oriented, well developed, well nourished, in no acute distress overweight      Skin:  warm and dry to the touch, no apparent skin lesions or masses noted          Head:  normocephalic, no masses or lesions        Eyes:  pupils equal and round, conjunctivae and lids unremarkable, sclera white, no xanthalasma, EOMS intact, no nystagmus        Lymph:No Cervical lymphadenopathy present     ENT:  no pallor or cyanosis        Neck:  carotid pulses are full and equal bilaterally, JVP normal, no carotid bruit        Respiratory:  normal breath sounds, clear to auscultation, normal A-P diameter, normal symmetry, normal respiratory excursion, no use of accessory muscles         Cardiac: regular rhythm, normal S1/S2, no S3 or S4, apical impulse not displaced, no murmurs, gallops or rubs                pulses full and equal, no bruits auscultated                                        GI:  abdomen soft, non-tender, BS normoactive, no mass, no HSM, no bruits        Extremities and Muscular Skeletal:  no deformities, clubbing, cyanosis, erythema observed              Neurological:  no gross motor deficits        Psych:  Alert and Oriented x 3        CC  Rom Ann MD  [address] [place] [address]

## 2022-10-13 ENCOUNTER — TELEPHONE (OUTPATIENT)
Dept: FAMILY MEDICINE | Facility: CLINIC | Age: 82
End: 2022-10-13

## 2022-10-13 NOTE — TELEPHONE ENCOUNTER
Medication Question or Refill        What medication are you calling about (include dose and sig)?: Pt is calling on the following medication and if there are refills pt states per phar no.,   Disp Refills Start End ALTHEA   diltiazem ER COATED BEADS (CARDIZEM CD/CARTIA XT) 180 MG 24 hr capsule 30 capsule 3 9/6/2022  No   Sig - Route: Take 1 capsule (180 mg) by mouth daily - Oral   Sent to pharmacy as: dilTIAZem HCl ER Coated Beads 180 MG Oral Capsule Extended Release 24 Hour (CARDIZEM CD/CARTIA XT)   Class: E-Prescribe   Notes to Pharmacy: Patient will call to fill   Order: [id number]   E-Prescribing Status: Receipt confirmed by pharmacy (9/6/2022 10:49 AM CDT)     Printout Tracking        [facility]/pharmacy #[id number] - [address] [address]  Phone: [phone] Fax: [phone]      Could we send this information to you in [employer] or would you prefer to receive a   phone call?:   Patient would prefer a phone call   Okay to leave a detailed message?: Yes at Home number on file [phone] (home)

## 2022-10-14 ENCOUNTER — TRANSFERRED RECORDS (OUTPATIENT)
Dept: HEALTH INFORMATION MANAGEMENT | Facility: CLINIC | Age: 82
End: 2022-10-14

## 2022-10-14 NOTE — TELEPHONE ENCOUNTER
Writer contacted pharmacy. They said that there's no issue with this prescription, and they have a refill in process currently for a 90 day supply. Patient notified, and verbalized understanding.    Leslie Ace RN  [facility]

## 2022-10-15 ENCOUNTER — HOSPITAL ENCOUNTER (EMERGENCY)
Facility: CLINIC | Age: 82
Discharge: HOME | End: 2022-10-15
Attending: FAMILY MEDICINE | Admitting: FAMILY MEDICINE
Payer: MEDICARE

## 2022-10-15 ENCOUNTER — APPOINTMENT (OUTPATIENT)
Dept: GENERAL RADIOLOGY | Facility: CLINIC | Age: 82
End: 2022-10-15
Attending: FAMILY MEDICINE
Payer: MEDICARE

## 2022-10-15 VITALS
HEIGHT: 64 IN | HEART RATE: 68 BPM | DIASTOLIC BLOOD PRESSURE: 76 MMHG | OXYGEN SATURATION: 100 % | BODY MASS INDEX: 34.66 KG/M2 | SYSTOLIC BLOOD PRESSURE: 143 MMHG | TEMPERATURE: 97.5 F | WEIGHT: 203 LBS | RESPIRATION RATE: 20 BRPM

## 2022-10-15 DIAGNOSIS — J20.9 ACUTE BRONCHITIS, UNSPECIFIED ORGANISM: ICD-10-CM

## 2022-10-15 LAB
FLUAV RNA SPEC QL NAA+PROBE: NEGATIVE
FLUBV RNA RESP QL NAA+PROBE: NEGATIVE
SARS-COV-2 RNA RESP QL NAA+PROBE: NEGATIVE

## 2022-10-15 PROCEDURE — 87636 SARSCOV2 & INF A&B AMP PRB: CPT | Performed by: FAMILY MEDICINE

## 2022-10-15 PROCEDURE — C9803 HOPD COVID-19 SPEC COLLECT: HCPCS | Performed by: FAMILY MEDICINE

## 2022-10-15 PROCEDURE — 99284 EMERGENCY DEPT VISIT MOD MDM: CPT | Mod: CS,25 | Performed by: FAMILY MEDICINE

## 2022-10-15 PROCEDURE — 99282 EMERGENCY DEPT VISIT SF MDM: CPT | Mod: CS | Performed by: FAMILY MEDICINE

## 2022-10-15 PROCEDURE — 71046 X-RAY EXAM CHEST 2 VIEWS: CPT

## 2022-10-15 ASSESSMENT — ENCOUNTER SYMPTOMS
FREQUENCY: 0
SHORTNESS OF BREATH: 0
CHILLS: 0
ABDOMINAL PAIN: 0
SORE THROAT: 0
VOMITING: 1
COUGH: 1
DIARRHEA: 0
BLOOD IN STOOL: 0
NAUSEA: 0
SINUS PRESSURE: 0
FEVER: 0
CONSTIPATION: 0
WHEEZING: 0
DYSURIA: 0
PALPITATIONS: 0
HEADACHES: 0
DIAPHORESIS: 0

## 2022-10-15 ASSESSMENT — ACTIVITIES OF DAILY LIVING (ADL): ADLS_ACUITY_SCORE: 35

## 2022-10-15 NOTE — DISCHARGE INSTRUCTIONS
ICD-10-CM    1. Acute bronchitis, unspecified organism  J20.9     i suspect you have a post-bronchitic cough.  consider nasassl saline if there is settling of nassal drainage on the cords.  follow-up later this week if cough persists, in case there may be indications to postpone surgery. but at this time I see no serious findings that should contraindicate surgery

## 2022-10-15 NOTE — ED PROVIDER NOTES
History     Chief Complaint   Patient presents with     Cough     Upper respiratory x 9 days. Has improved.   Concern because has surgery scheduled for 10/24 for a pacemaker placement.     HPI  Rosie Blackman is a 82 year old female who presents with a history of hypertension, hyperlipidemia, gastroesophageal reflux, history of ovarian cancer, atrial fibrillation, pending pacemaker placement on the 24th of this month.  She presents here with a cough for the last 9 days.  No associated fever.  Cough has been improving.  No significant shortness of breath.  No associated chest pain.  Some coughing had been significant enough to cause vomiting  Had an Adacel in the last 10 years.        Allergies:  Allergies   Allergen Reactions     Ezetimibe Other (See Comments)     Severe muscle aches     Lisinopril      Omeprazole      Other reaction(s): *Unknown     Prilosec [Omeprazole]      Zestril [Ace Inhibitors] Cough     Atorvastatin Other (See Comments)     Muscle Pain     Irbesartan Cramps     Rosuvastatin Other (See Comments)     Muscle Pain       Problem List:    Patient Active Problem List    Diagnosis Date Noted     Hypokalemia 08/18/2022     Priority: Medium     Paroxysmal atrial fibrillation (H) 08/18/2022     Priority: Medium     Atrial fibrillation with RVR (H) 08/18/2022     Priority: Medium     Diastolic dysfunction 06/28/2022     Priority: Medium     Seen on Echo 6/2022       TIA (transient ischemic attack) 04/27/2022     Priority: Medium     Probable 3/22. sudden onset left mouth drooping, right hand weakness, trouble speaking, lasted 30 sec.  Did not seek medical care.  Did not tolerate ASA - GI upset and bleeding/bruising       Incisional hernia of anterior abdominal wall without obstruction or gangrene 02/22/2021     Priority: Medium     Added automatically from request for surgery [id number]       S/P exploratory laparotomy 07/24/2020     Priority: Medium     Ovarian cancer, unspecified laterality (H)  "07/24/2020     Priority: Medium     Complete prior to 1.4 LJ       Tear of gluteus medius tendon 07/09/2020     Priority: Medium     Primary osteoarthritis of right hip 07/09/2020     Priority: Medium     Spinal stenosis of lumbar region, unspecified whether neurogenic claudication present 07/09/2020     Priority: Medium     Obesity (BMI 35.0-39.9) with comorbidity (H) 11/28/2018     Priority: Medium     Lichen sclerosus of female genitalia 07/03/2018     Priority: Medium     Gastroesophageal reflux disease, esophagitis presence not specified 10/12/2017     Priority: Medium     Hiatal hernia 10/21/2013     Priority: Medium     Large; found on gastroscopy 10/2013; No GERD sx; chronic throat clearing; + H pylori       Plantar fasciitis 04/02/2012     Priority: Medium     Advanced directives, counseling/discussion 12/08/2011     Priority: Medium     Advance Care Planning:   ACP Review and Resources Provided: Reviewed chart for advance care plan. Rosie Blackman has an up to date advance care plan on file. See additional documentation in Problem List and click on code status for document details. Confirmed/documented designated decision maker(s). Added by Shima Cruz on 12/08/2011         Hyperlipidemia LDL goal <130 10/31/2010     Priority: Medium     Intolerant to all other statins (2013)       Prediabetes 09/03/2010     Priority: Medium     Blood sugar 122 9/10 working on; has already been to nutrition, weight and wellness and is reading \"healthy for life\"       Diverticulosis of colon 05/24/2010     Priority: Medium     Noted on colonoscopy 5/10       Colon polyps 05/24/2010     Priority: Medium     Pes planus      Priority: Medium     Personal history of contact with and (suspected) exposure to asbestos      Priority: Medium             exposed 12 years in childhood       Donor of other blood      Priority: Medium           has false + syphyllis  Problem list name updated by automated process. Provider to " review       Irritable bowel syndrome      Priority: Medium     ALLERGIC RHINITIS       Priority: Medium     Resolving with dietary changes; stopping gluten       History of recurrent UTIs      Priority: Medium             recurrent  Problem list name updated by automated process. Provider to review       Essential hypertension      Priority: Medium        Past Medical History:    Past Medical History:   Diagnosis Date     Chronic airway obstruction (H)      Diastolic dysfunction 6/28/2022     Gastroesophageal reflux disease      Hiatal hernia      Hypertension      Malignant neoplasm of ovary (H)      Ovarian cancer, unspecified laterality (H) 3/10/2021     Personal history of contact with and (suspected) exposure to asbestos      Primary osteoarthritis of right hip 7/9/2020       Past Surgical History:    Past Surgical History:   Procedure Laterality Date     BIOPSY BREAST Left      BREAST BIOPSY, CORE RT/LT  1994     CHOLECYSTECTOMY  1995     COLONOSCOPY  05/2010    Diverticulosis, colon polyps; repeat 5 yrs     COLONOSCOPY N/A 11/16/2015    Procedure: COLONOSCOPY;  Surgeon: Alejandro Matos MD;  Location: [facility]     COLONOSCOPY N/A 9/17/2021    Procedure: COLONOSCOPY;  Surgeon: Aayush George MD;  Location: [facility]     Colonoscopy, hyperplastic polyps, repeat in 5 yrs  2004     ESOPHAGOSCOPY, GASTROSCOPY, DUODENOSCOPY (EGD), COMBINED  09/30/2013    Procedure: COMBINED ESOPHAGOSCOPY, GASTROSCOPY, DUODENOSCOPY (EGD), BIOPSY SINGLE OR MULTIPLE;  Gastroscopy;  Surgeon: Blaise Gill MD;  Location: [facility]     EYE SURGERY  2012    R/L Cataracts     HC REMOVE TONSILS/ADENOIDS,12+ Y/O      T & A 13+y.o.     HERNIORRHAPHY INCISIONAL (LOCATION) N/A 3/24/2021    Procedure: HERNIORRHAPHY, INCISIONAL, OPEN WITH MESH;  Surgeon: Aayush George MD;  Location: [facility]     HYSTERECTOMY, PAP NO LONGER INDICATED       LAPAROSCOPIC SALPINGO-OOPHORECTOMY N/A 07/24/2020    Procedure: Laparoscopy, removal or pelvic mass, both tubes  and ovaries, omentectomy, anterior culvectomy, pelvic and para aortic lymph node dissection, laparotomy;  Surgeon: Felipe Corona MD;  Location: [facility]     ID ANESTH,LUMBAR SPINE,CORD SURGERY  10/2020    L3-4 L4-5 TRANSFORAMINAL INTERBODY FUSION L3-5, POSTERIOR INSTRUMENTED FUSION AND DECOMPRESSION     TVH for DUB, ovaries in       VASCULAR SURGERY      [place] closure     ZZC ANESTH,KNEE VEINS SURGERY  2014       Family History:    Family History   Problem Relation Age of Onset     Cancer Mother         uterine cancer,      Respiratory Mother         COPD     Cardiovascular Mother         AAA  age 80     Breast Cancer Maternal Grandmother      Cancer Maternal Grandfather         cancer unknown type     Breast Cancer Sister      Eye Disorder Father         cataracts     Depression Father      Depression Son      Depression Son      Breast Cancer Sister         X2     Cancer - colorectal No family hx of         no ovarian cancer       Social History:  Marital Status:   [2]  Social History     Tobacco Use     Smoking status: Never     Smokeless tobacco: Never   Vaping Use     Vaping Use: Never used   Substance Use Topics     Alcohol use: No     Drug use: No        Medications:    apixaban ANTICOAGULANT (ELIQUIS) 5 MG tablet  diltiazem ER COATED BEADS (CARDIZEM CD/CARTIA XT) 180 MG 24 hr capsule  Evening Primrose Oil 1000 MG CAPS  meloxicam (MOBIC) 15 MG tablet  metoprolol tartrate (LOPRESSOR) 25 MG tablet  mometasone (ELOCON) 0.1 % external cream  omega 3 1000 MG CAPS  pravastatin (PRAVACHOL) 80 MG tablet  THERATEARS 0.25 % OP SOLN  valsartan-hydrochlorothiazide (DIOVAN HCT) 320-25 MG tablet  VIACTIV 500-100-40 OR CHEW          Review of Systems   Constitutional: Negative for chills, diaphoresis and fever.   HENT: Negative for ear pain, sinus pressure and sore throat.    Eyes: Negative for visual disturbance.   Respiratory: Positive for cough. Negative for shortness of breath and wheezing.  "   Cardiovascular: Negative for chest pain and palpitations.   Gastrointestinal: Positive for vomiting. Negative for abdominal pain, blood in stool, constipation, diarrhea and nausea.   Genitourinary: Negative for dysuria, frequency and urgency.   Skin: Negative for rash.   Neurological: Negative for headaches.   All other systems reviewed and are negative.      Physical Exam   BP: (!) 144/70  Pulse: 75  Temp: 97.5  F (36.4  C)  Resp: 20  Height: 162.6 cm (5' 4\")  Weight: 92.1 kg (203 lb)  SpO2: 99 %      Physical Exam  Constitutional:       General: She is in acute distress.      Appearance: She is not diaphoretic.   Eyes:      Conjunctiva/sclera: Conjunctivae normal.   Cardiovascular:      Rate and Rhythm: Normal rate and regular rhythm.      Heart sounds: No murmur heard.  Pulmonary:      Effort: Pulmonary effort is normal. No respiratory distress.      Breath sounds: Normal breath sounds. No stridor. No wheezing or rhonchi.   Musculoskeletal:      Cervical back: Neck supple.      Right lower leg: No edema.      Left lower leg: No edema.   Skin:     Coloration: Skin is not pale.      Findings: No rash.   Neurological:      Mental Status: She is alert.         ED Course                 Procedures              Critical Care time:  none               Results for orders placed or performed during the hospital encounter of 10/15/22 (from the past 24 hour(s))   Symptomatic; Yes; 10/6/2022 Influenza A/B & SARS-CoV2 (COVID-19) Virus PCR Multiplex Nasopharyngeal    Specimen: Nasopharyngeal; Swab   Result Value Ref Range    Influenza A PCR Negative Negative    Influenza B PCR Negative Negative    SARS CoV2 PCR Negative Negative    Narrative    Testing was performed using the herbert SARS-CoV-2 & Influenza A/B Assay on the herbert Nithya System. This test should be ordered for the detection of SARS-CoV-2 and influenza viruses in individuals who meet clinical and/or epidemiological criteria. Test performance is unknown in " asymptomatic patients. This test is for in vitro diagnostic use under the FDA EUA for laboratories certified under CLIA to perform moderate and/or high complexity testing. This test has not been FDA cleared or approved. A negative result does not rule out the presence of PCR inhibitors in the specimen or target RNA in concentration below the limit of detection for the assay. If only one viral target is positive but coinfection with multiple targets is suspected, the sample should be re-tested with another FDA cleared, approved or authorized test, if coinfection would change clinical management. [facility] are certified under the Clinical Laboratory Improvement Amendments of 1988 (CLIA-88) as qualified to perform moderate and/or high complexity laboratory testing.   Chest XR,  PA & LAT    Narrative    EXAM: XR CHEST 2 VIEWS  LOCATION: [facility]  DATE/TIME: 10/15/2022, 12:48 PM    INDICATION: Cough.  COMPARISON: None.      Impression    IMPRESSION: Tortuous calcified thoracic aorta. Chest otherwise negative. Lungs clear. No pleural effusions.         Medications - No data to display    Assessments & Plan (with Medical Decision Making)     mdm: Rosie Blackman is a 82 year old female who presents with cough for the last 9 days intermittently productive without associated fever chest pain or significant shortness of breath.  Cough is been at times and spasms.  She has been vaccinated against pertussis in the last 10 years.  No obvious exposures to illness.  Her COVID and influenza test are negative today and her chest x-ray is negative for infiltrate.  She has normal vital signs and oxygen saturation.  We discussed differential diagnosis including viral syndrome or acute bronchitis now pulm post bronchitic cough versus possible postnasal drainage.  Unlikely to be bacterial etiology.  Discussed symptomatic management.  At this point no indication to postpone her upcoming  surgery but if symptoms are persisting until later this week consider follow-up with her primary provider later in the week to readdress.  I do suspect some of the cough will persist for up to 4 weeks.    I have reviewed the nursing notes.    I have reviewed the findings, diagnosis, plan and need for follow up with the patient.       New Prescriptions    No medications on file       Final diagnoses:   Acute bronchitis, unspecified organism - i suspect you have a post-bronchitic cough.  consider nasassl saline if there is settling of nassal drainage on the cords.  follow-up later this week if cough persists, in case there may be indications to postpone surgery. but at this time I see no serious findings that should contraindicate surgery       10/15/2022   [facility] EMERGENCY DEPT     Giovany Salazar MD  10/15/22 [id number]

## 2022-10-19 DIAGNOSIS — I48.91 ATRIAL FIBRILLATION WITH RVR (H): Primary | ICD-10-CM

## 2022-10-19 RX ORDER — LIDOCAINE 40 MG/G
CREAM TOPICAL
Status: CANCELLED | OUTPATIENT
Start: 2022-10-19

## 2022-10-19 RX ORDER — SODIUM CHLORIDE 450 MG/100ML
INJECTION, SOLUTION INTRAVENOUS CONTINUOUS
Status: CANCELLED | OUTPATIENT
Start: 2022-10-19

## 2022-10-19 RX ORDER — CEFAZOLIN SODIUM 2 G/100ML
2 INJECTION, SOLUTION INTRAVENOUS
Status: CANCELLED | OUTPATIENT
Start: 2022-10-19

## 2022-10-19 NOTE — PROGRESS NOTES
Pt called back, instructions given. SH/RN Called patient with pre-procedure instructions for Pacemaker device implant on 10/24/22 at 13:30a with arrival of 11:30a:     Anticoagulation: pt on Eliquis. Per Device Implant protocol for preprocedural management of OAC, pt is high risk d/t CHADSVASC score of >5. HTN, AGE, TIA with in the past year and female contribute to a score of 5. Pt is to continue OAC.   Oral diabetes meds: none  Insulin: no  SGLT2 Inhibitors - hold 3-4 days prior to procedure (Invokana, Farxiga, Jardiance, Steglatro): no  Diuretic: no  Contrast allergy: diovan  Pt informed to be NPO at midnight  (if procedure scheduled 1pm or later, may have clear liquid breakfast before 8am)    Instructed pt to shower the morning of the procedure, and then put on a clean shirt in order to help prevent infection.     Pt has post-procedure transportation and 24 hours monitoring set up. Pt reminded to call before coming in if their plans change.  With limited bed availability due to COVID, overnight hospital stays will be allowed for clinical reasons only.    Pt aware of no driving for 24 hours post procedure due to sedation.     INR check scheduled: no  (INR not needed)     COVID test scheduled: 10/21/22    Pt reminded to self-quarantine from the time of the COVID test to the procedure.    Asked pt to take temperature the morning of the procedure and call [facility] at [phone] if it is above 100.0    Pt aware of arrival time and location. Pt verbalized understanding of instructions.   CLEMENTINA Owens

## 2022-10-21 ENCOUNTER — TELEPHONE (OUTPATIENT)
Dept: MEDSURG UNIT | Facility: CLINIC | Age: 82
End: 2022-10-21

## 2022-10-21 ENCOUNTER — LAB (OUTPATIENT)
Dept: LAB | Facility: CLINIC | Age: 82
End: 2022-10-21
Payer: MEDICARE

## 2022-10-21 DIAGNOSIS — Z20.822 ENCOUNTER FOR LABORATORY TESTING FOR COVID-19 VIRUS: ICD-10-CM

## 2022-10-21 LAB — SARS-COV-2 RNA RESP QL NAA+PROBE: NEGATIVE

## 2022-10-21 PROCEDURE — U0003 INFECTIOUS AGENT DETECTION BY NUCLEIC ACID (DNA OR RNA); SEVERE ACUTE RESPIRATORY SYNDROME CORONAVIRUS 2 (SARS-COV-2) (CORONAVIRUS DISEASE [COVID-19]), AMPLIFIED PROBE TECHNIQUE, MAKING USE OF HIGH THROUGHPUT TECHNOLOGIES AS DESCRIBED BY CMS-2020-01-R: HCPCS

## 2022-10-21 PROCEDURE — U0005 INFEC AGEN DETEC AMPLI PROBE: HCPCS

## 2022-10-24 ENCOUNTER — HOSPITAL ENCOUNTER (OUTPATIENT)
Facility: CLINIC | Age: 82
Discharge: HOME | End: 2022-10-24
Admitting: INTERNAL MEDICINE
Payer: MEDICARE

## 2022-10-24 ENCOUNTER — APPOINTMENT (OUTPATIENT)
Dept: GENERAL RADIOLOGY | Facility: CLINIC | Age: 82
End: 2022-10-24
Attending: INTERNAL MEDICINE
Payer: MEDICARE

## 2022-10-24 VITALS
BODY MASS INDEX: 34.66 KG/M2 | DIASTOLIC BLOOD PRESSURE: 40 MMHG | RESPIRATION RATE: 16 BRPM | SYSTOLIC BLOOD PRESSURE: 115 MMHG | OXYGEN SATURATION: 95 % | TEMPERATURE: 98.1 F | HEIGHT: 64 IN | WEIGHT: 203 LBS | HEART RATE: 73 BPM

## 2022-10-24 DIAGNOSIS — I48.91 ATRIAL FIBRILLATION WITH RVR (H): ICD-10-CM

## 2022-10-24 DIAGNOSIS — I49.5 SSS (SICK SINUS SYNDROME) (H): Primary | ICD-10-CM

## 2022-10-24 DIAGNOSIS — I48.0 PAROXYSMAL ATRIAL FIBRILLATION (H): ICD-10-CM

## 2022-10-24 LAB
ANION GAP SERPL CALCULATED.3IONS-SCNC: 8 MMOL/L (ref 3–14)
BUN SERPL-MCNC: 16 MG/DL (ref 7–30)
CALCIUM SERPL-MCNC: 9.5 MG/DL (ref 8.5–10.1)
CHLORIDE BLD-SCNC: 104 MMOL/L (ref 94–109)
CO2 SERPL-SCNC: 27 MMOL/L (ref 20–32)
CREAT SERPL-MCNC: 0.74 MG/DL (ref 0.52–1.04)
ERYTHROCYTE [DISTWIDTH] IN BLOOD BY AUTOMATED COUNT: 13.3 % (ref 10–15)
GFR SERPL CREATININE-BSD FRML MDRD: 81 ML/MIN/1.73M2
GLUCOSE BLD-MCNC: 117 MG/DL (ref 70–99)
HCT VFR BLD AUTO: 44.4 % (ref 35–47)
HGB BLD-MCNC: 14.3 G/DL (ref 11.7–15.7)
MCH RBC QN AUTO: 30.6 PG (ref 26.5–33)
MCHC RBC AUTO-ENTMCNC: 32.2 G/DL (ref 31.5–36.5)
MCV RBC AUTO: 95 FL (ref 78–100)
PLATELET # BLD AUTO: 272 10E3/UL (ref 150–450)
POTASSIUM BLD-SCNC: 3.6 MMOL/L (ref 3.4–5.3)
RBC # BLD AUTO: 4.67 10E6/UL (ref 3.8–5.2)
SODIUM SERPL-SCNC: 139 MMOL/L (ref 133–144)
WBC # BLD AUTO: 7.5 10E3/UL (ref 4–11)

## 2022-10-24 PROCEDURE — 258N000002 HC RX IP 258 OP 250: Performed by: INTERNAL MEDICINE

## 2022-10-24 PROCEDURE — C1785 PMKR, DUAL, RATE-RESP: HCPCS | Performed by: INTERNAL MEDICINE

## 2022-10-24 PROCEDURE — 999N000071 HC STATISTIC HEART CATH LAB OR EP LAB

## 2022-10-24 PROCEDURE — 93010 ELECTROCARDIOGRAM REPORT: CPT | Mod: 59 | Performed by: INTERNAL MEDICINE

## 2022-10-24 PROCEDURE — C1898 LEAD, PMKR, OTHER THAN TRANS: HCPCS | Performed by: INTERNAL MEDICINE

## 2022-10-24 PROCEDURE — 93005 ELECTROCARDIOGRAM TRACING: CPT

## 2022-10-24 PROCEDURE — 33208 INSRT HEART PM ATRIAL & VENT: CPT | Mod: KX | Performed by: INTERNAL MEDICINE

## 2022-10-24 PROCEDURE — 85027 COMPLETE CBC AUTOMATED: CPT | Performed by: INTERNAL MEDICINE

## 2022-10-24 PROCEDURE — 250N000009 HC RX 250: Performed by: INTERNAL MEDICINE

## 2022-10-24 PROCEDURE — 250N000013 HC RX MED GY IP 250 OP 250 PS 637: Performed by: INTERNAL MEDICINE

## 2022-10-24 PROCEDURE — 250N000011 HC RX IP 250 OP 636: Performed by: INTERNAL MEDICINE

## 2022-10-24 PROCEDURE — 99152 MOD SED SAME PHYS/QHP 5/>YRS: CPT | Performed by: INTERNAL MEDICINE

## 2022-10-24 PROCEDURE — 999N000065 XR CHEST 2 VIEWS

## 2022-10-24 PROCEDURE — 272N000001 HC OR GENERAL SUPPLY STERILE: Performed by: INTERNAL MEDICINE

## 2022-10-24 PROCEDURE — 82310 ASSAY OF CALCIUM: CPT | Performed by: INTERNAL MEDICINE

## 2022-10-24 PROCEDURE — 99153 MOD SED SAME PHYS/QHP EA: CPT | Performed by: INTERNAL MEDICINE

## 2022-10-24 PROCEDURE — 36591 DRAW BLOOD OFF VENOUS DEVICE: CPT

## 2022-10-24 PROCEDURE — 36415 COLL VENOUS BLD VENIPUNCTURE: CPT | Performed by: INTERNAL MEDICINE

## 2022-10-24 PROCEDURE — C1894 INTRO/SHEATH, NON-LASER: HCPCS | Performed by: INTERNAL MEDICINE

## 2022-10-24 PROCEDURE — 33208 INSRT HEART PM ATRIAL & VENT: CPT | Performed by: INTERNAL MEDICINE

## 2022-10-24 PROCEDURE — 999N000184 HC STATISTIC TELEMETRY

## 2022-10-24 PROCEDURE — 999N000054 HC STATISTIC EKG NON-CHARGEABLE

## 2022-10-24 DEVICE — LEAD INGEVITY+ AF IS1 7841 52CM: Type: IMPLANTABLE DEVICE | Status: FUNCTIONAL

## 2022-10-24 DEVICE — LEAD INGEVITY+ AF IS1 7840 4CM: Type: IMPLANTABLE DEVICE | Status: FUNCTIONAL

## 2022-10-24 DEVICE — PCMKR CARD ACCOLADE MRI EL DR: Type: IMPLANTABLE DEVICE | Status: FUNCTIONAL

## 2022-10-24 RX ORDER — ACETAMINOPHEN 325 MG/1
650 TABLET ORAL EVERY 4 HOURS PRN
Status: DISCONTINUED | OUTPATIENT
Start: 2022-10-24 | End: 2022-10-24 | Stop reason: HOSPADM

## 2022-10-24 RX ORDER — CEFAZOLIN SODIUM 2 G/100ML
2 INJECTION, SOLUTION INTRAVENOUS
Status: COMPLETED | OUTPATIENT
Start: 2022-10-24 | End: 2022-10-24

## 2022-10-24 RX ORDER — NALOXONE HYDROCHLORIDE 0.4 MG/ML
0.4 INJECTION, SOLUTION INTRAMUSCULAR; INTRAVENOUS; SUBCUTANEOUS
Status: DISCONTINUED | OUTPATIENT
Start: 2022-10-24 | End: 2022-10-24 | Stop reason: HOSPADM

## 2022-10-24 RX ORDER — MIDAZOLAM HCL IN 0.9 % NACL/PF 1 MG/ML
.5-6 PLASTIC BAG, INJECTION (ML) INTRAVENOUS CONTINUOUS PRN
Status: DISCONTINUED | OUTPATIENT
Start: 2022-10-24 | End: 2022-10-24 | Stop reason: HOSPADM

## 2022-10-24 RX ORDER — BUPIVACAINE HYDROCHLORIDE 2.5 MG/ML
INJECTION, SOLUTION EPIDURAL; INFILTRATION; INTRACAUDAL
Status: DISCONTINUED | OUTPATIENT
Start: 2022-10-24 | End: 2022-10-24 | Stop reason: HOSPADM

## 2022-10-24 RX ORDER — FENTANYL CITRATE 50 UG/ML
INJECTION, SOLUTION INTRAMUSCULAR; INTRAVENOUS
Status: DISCONTINUED | OUTPATIENT
Start: 2022-10-24 | End: 2022-10-24 | Stop reason: HOSPADM

## 2022-10-24 RX ORDER — SODIUM CHLORIDE 450 MG/100ML
INJECTION, SOLUTION INTRAVENOUS CONTINUOUS
Status: DISCONTINUED | OUTPATIENT
Start: 2022-10-24 | End: 2022-10-24 | Stop reason: HOSPADM

## 2022-10-24 RX ORDER — NALOXONE HYDROCHLORIDE 0.4 MG/ML
0.2 INJECTION, SOLUTION INTRAMUSCULAR; INTRAVENOUS; SUBCUTANEOUS
Status: DISCONTINUED | OUTPATIENT
Start: 2022-10-24 | End: 2022-10-24 | Stop reason: HOSPADM

## 2022-10-24 RX ORDER — LIDOCAINE 40 MG/G
CREAM TOPICAL
Status: DISCONTINUED | OUTPATIENT
Start: 2022-10-24 | End: 2022-10-24 | Stop reason: HOSPADM

## 2022-10-24 RX ORDER — CEFAZOLIN SODIUM 1 G/3ML
1 INJECTION, POWDER, FOR SOLUTION INTRAMUSCULAR; INTRAVENOUS
Status: DISCONTINUED | OUTPATIENT
Start: 2022-10-24 | End: 2022-10-24 | Stop reason: HOSPADM

## 2022-10-24 RX ADMIN — SODIUM CHLORIDE: 4.5 INJECTION, SOLUTION INTRAVENOUS at 12:46

## 2022-10-24 RX ADMIN — CEFAZOLIN SODIUM 2 G: 2 INJECTION, SOLUTION INTRAVENOUS at 13:14

## 2022-10-24 RX ADMIN — ACETAMINOPHEN 650 MG: 325 TABLET, FILM COATED ORAL at 15:58

## 2022-10-24 ASSESSMENT — ACTIVITIES OF DAILY LIVING (ADL)
ADLS_ACUITY_SCORE: 35

## 2022-10-24 NOTE — PROGRESS NOTES
[facility] Discharge Nursing Note    Patient Information  Name: Rosie Blackman  Age: 82 year old    Ok to be discharged per Dr. Meyer.      Discharge Education:  Discharge instructions reviewed: Yes  Additional education/resources provided: NA  Patient/patient representative verbalizes understanding: Yes  Patient discharging on new medications: No  Medication education completed: N/A    Discharge Plans:   Discharge location: home  Discharge ride contacted: Yes  Approximate discharge time: 1800    Discharge Criteria:  Discharge criteria met and vital signs stable: Yes    Patient Belongs:  Patient belongings returned to patient: Yes    Charbel Matias RN

## 2022-10-24 NOTE — DISCHARGE INSTRUCTIONS
Pacemaker Implant Discharge Instructions     After you go home:    Have an adult stay with you until tomorrow.  You may resume your normal diet.       For 24 hours - due to the sedation you received:  Relax and take it easy.  Do NOT make any important or legal decisions.  Do NOT drive or operate machines at home or at work.  Do NOT drink alcohol.    Care of Chest Incision:    Keep the bandage on at least 3 days. You may remove the dressing on 10/27/22. Change it only if it gets loose or soaked. If you need to change it, use 4x4-inch gauze and a large clear bandage.   If there is a pressure dressing (gauze & tape) - 24 hours after your procedure you may remove ONLY the top dressing. Leave the bottom dressing on.  Leave the strips of tape on. They will fall off on their own, or we will remove them at your first check-up.  Check your wound daily for signs of infection, such as increased redness, severe swelling or draining. Fever may also be a sign of infection. Call us if you see any of these signs.  If there are no signs of infection, you may shower after the bandage comes off in 3 days. If you take a tub bath, keep the wound dry.  No soaking the incision (swimming pool, bathtub, hot tub) for 2 weeks.  You may have mild to medium pain for 3 to 5 days. Take Acetaminophen (Tylenol) or Ibuprofen (Advil) for the pain. If the pain persists or is severe, call us.    Activity:    For at least 2 weeks: Do not raise your elbow above your shoulder. You can begin to use your arm as it feels comfortable to you.  Do not use arm on implant side to lift more than 10 pounds for 2 weeks.  In 6 to 8 weeks: You may begin to golf, play tennis, swim and do similar activities.  No driving for one day & limit to necessary driving for one week.    Bleeding:    If you start bleeding from the incision site, sit down and press firmly on the site for 10 minutes.   Once bleeding stops, call [facility] as soon as you can.       Call [phone]  right away if you have heavy bleeding or bleeding that does not stop.      Medicines:    Take your medications, including blood thinners, unless your provider tells you not to.  If you have stopped any medicines, check with your provider about when to restart them.  Start Eliquis 10/25/22 in the evening per Dr. Meyer.    Follow Up Appointments:    Follow up with Device Clinic at [facility] of patient preference in 7-10 days.    Call the clinic if:    You have a large or growing hard lump around the site.  The site is red, swollen, hot or tender.  Blood or fluid is draining from the site.  You have chills or a fever greater than 101 F (38 C).  You feel dizzy or light-headed.  Questions or concerns    Telling others about your device:    Before you leave the hospital, you will receive a temporary ID card. A permanent card will be mailed to you about 6 to 8 weeks later. Always carry the ID card with you. It has important details about your device.  You may also get a Medical Alert bracelet or tag that says you have a pacemaker.  Go to [email or web address].   Always tell doctors, dentists and other care providers that you have a device implanted in you.  Let us know before you plan any surgeries. Your care team must take special steps to keep you safe during certain procedures. These steps will depend on the type of device you have. Your provider will need to see your ID card. They may need to call us for instructions.    Device Safety:    Please refer to device  s booklet for further information.        [facility]:    [phone] [facility] (7 days a week)

## 2022-10-24 NOTE — PROGRESS NOTES
[facility] Admission Nursing Note    Patient Information  Name: Rosie Blackman  Age: 82 year old  Reason for admission: Pacemaker  [facility] arrival time: 1100    Visitor Information  Name: Nelson  Informed of visitor restrictions: Yes  1 visitor allowed per patient   Visitor must screen negative for COVID symptoms   Visitor must wear a mask  Waiting rooms closed to visitors    Patient Admission/Assessment   Pre-procedure assessment complete: Yes  If abnormal assessment/labs, provider notified: N/A  NPO: Yes  Medications held per instructions/orders: Yes  Consent: obtained  If applicable, pregnancy test status: deferred  Patient oriented to room: Yes  Education/questions answered: Yes  Plan/other: Reviewed plan for today.    Discharge Planning  Discharge name/phone number: Nelson  Overnight post sedation caregiver: Nelson  Discharge location: home    Shannon Dickerson RN

## 2022-10-24 NOTE — PROGRESS NOTES
[facility] Admission Nursing Note    Patient Information  Name: Rosie Blackman  Age: 82 year old  Reason for admission: [facility]   [facility] arrival time: 1115    Visitor Information  Name: Nelson  Informed of visitor restrictions: Yes  1 visitor allowed per patient   Visitor must screen negative for COVID symptoms   Visitor must wear a mask  Waiting rooms closed to visitors    Patient Admission/Assessment   Pre-procedure assessment complete: Yes  If abnormal assessment/labs, provider notified: N/A  NPO: Yes  Medications held per instructions/orders: NA  Consent: to be obtained    Patient oriented to room: Yes  Education/questions answered: Yes  Plan/other: Proceed as planned    Discharge Planning  Discharge name/phone number: Nelson [phone]   Overnight post sedation caregiver: yes  Discharge location: home    Charbel Matias RN

## 2022-10-24 NOTE — PROGRESS NOTES
[facility] Post Procedure Note    Patient Information  Name: Rosie Blackman  Age: 82 year old    Post Procedure  Time patient returned to [facility]: 1445  Concerns/abnormal assessment: none  If abnormal assessment, provider notified: N/A  Plan/Other: Pressure dressing to pacemaker site left shoulder area CDI, no bruising/bleeding/hematoma. BP slight elevated will monitor other VSS. Pt in sinus rhythm. Pt denies pain. Tolerating po solids and liquids.    Shannon Dickerson RN

## 2022-10-24 NOTE — PROGRESS NOTES
Dictated.    Successful dual-chamber pacemaker implantation.  [employer], DDD 60/130 ppm.    EBL 10-15 mL.  No apparent complication.      Plan:  -Discharge later today, if doing well  -Chest x-ray and device interrogation pending  -The patient will hold Eliquis tonight and tomorrow morning and resume tomorrow evening  -Device clinic follow-up in 1 week

## 2022-10-24 NOTE — Clinical Note
Lab results reviewed and abnormals discussed with physician. No Patient/Caregiver provided printed discharge information.

## 2022-10-24 NOTE — PRE-PROCEDURE
GENERAL PRE-PROCEDURE:   Procedure:  PM  Date/Time:  10/24/2022 1:50 PM    Written consent obtained?: Yes    Risks and benefits: Risks, benefits and alternatives were discussed    Consent given by:  Patient  Patient states understanding of procedure being performed: Yes    Patient's understanding of procedure matches consent: Yes    Procedure consent matches procedure scheduled: Yes    Expected level of sedation:  Moderate  Appropriately NPO:  Yes  ASA Class:  2  Mallampati  :  Grade 2- soft palate, base of uvula, tonsillar pillars, and portion of posterior pharyngeal wall visible  Lungs:  Lungs clear with good breath sounds bilaterally  Heart:  Normal heart sounds and rate  History & Physical reviewed:  History and physical reviewed and no updates needed  Statement of review:  I have reviewed the lab findings, diagnostic data, medications, and the plan for sedation

## 2022-10-25 DIAGNOSIS — Z95.0 CARDIAC PACEMAKER IN SITU: ICD-10-CM

## 2022-10-25 DIAGNOSIS — I48.91 ATRIAL FIBRILLATION, UNSPECIFIED TYPE (H): Primary | ICD-10-CM

## 2022-10-26 ENCOUNTER — TELEPHONE (OUTPATIENT)
Dept: CARDIOLOGY | Facility: CLINIC | Age: 82
End: 2022-10-26

## 2022-10-26 LAB
ATRIAL RATE - MUSE: 62 BPM
DIASTOLIC BLOOD PRESSURE - MUSE: NORMAL MMHG
INTERPRETATION ECG - MUSE: NORMAL
P AXIS - MUSE: 51 DEGREES
PR INTERVAL - MUSE: 152 MS
QRS DURATION - MUSE: 86 MS
QT - MUSE: 418 MS
QTC - MUSE: 424 MS
R AXIS - MUSE: 7 DEGREES
SYSTOLIC BLOOD PRESSURE - MUSE: NORMAL MMHG
T AXIS - MUSE: 45 DEGREES
VENTRICULAR RATE- MUSE: 62 BPM

## 2022-10-26 NOTE — TELEPHONE ENCOUNTER
Post device implant discharge phone call.  LMTRC with any questions or concerns. SH/RN  Reviewed the following:  - No raising arm above shoulder on the side of implant for 3 weeks  - Remove outer dressing 3 days after implant. May shower after outer dressing removed.   - Leave steri-strips in place, will be removed at 1 week device check  - Limit driving for: 1 week (PPM)  - Watch for redness, drainage, warmth, or fever. Call device clinic if any signs of infection.     1 week device check scheduled: 11/1/22 at 0810    Pt states understanding of all instructions.

## 2022-10-27 ENCOUNTER — DOCUMENTATION ONLY (OUTPATIENT)
Dept: OTHER | Facility: CLINIC | Age: 82
End: 2022-10-27

## 2022-10-28 ENCOUNTER — TELEPHONE (OUTPATIENT)
Dept: CARDIOLOGY | Facility: CLINIC | Age: 82
End: 2022-10-28

## 2022-10-28 NOTE — TELEPHONE ENCOUNTER
Pt left VM. She says she had PPM implanted on Monday (10/24/2022) and everything has been going fine. But last night she woke up several times feeling like she was in AF, her whole body feels like it's vibrating. She wasn't sure if this was something that could happen after PPM implant. She tried to take her BP and pulse this morning but her cuff doesn't seem to be working.     Called pt back. I told her AF can happen even with a PPM, but as long as she is still on her eliquis and her symptoms are only mild, there is no concern. Pt said she wasn't sure if she would need to go to the ER for the AF, I told her only if her symptoms become severe, like chest pain, shortness of breath, or lightheadedness. Pt felt very reassured after hearing this. I offered to have pt send a remote transmission, but she declined because she was feeling better.     Confirmed pt's appointment for 1 week check in [place] next week on 11/1/2022.

## 2022-11-01 ENCOUNTER — HOSPITAL ENCOUNTER (OUTPATIENT)
Dept: CARDIOLOGY | Facility: CLINIC | Age: 82
Discharge: HOME | End: 2022-11-01
Attending: INTERNAL MEDICINE | Admitting: INTERNAL MEDICINE
Payer: MEDICARE

## 2022-11-01 ENCOUNTER — TELEPHONE (OUTPATIENT)
Dept: CARDIOLOGY | Facility: CLINIC | Age: 82
End: 2022-11-01

## 2022-11-01 DIAGNOSIS — Z95.0 CARDIAC PACEMAKER IN SITU: ICD-10-CM

## 2022-11-01 DIAGNOSIS — I10 ESSENTIAL HYPERTENSION: Primary | ICD-10-CM

## 2022-11-01 DIAGNOSIS — I48.91 ATRIAL FIBRILLATION, UNSPECIFIED TYPE (H): ICD-10-CM

## 2022-11-01 DIAGNOSIS — I48.91 ATRIAL FIBRILLATION WITH RVR (H): ICD-10-CM

## 2022-11-01 DIAGNOSIS — I48.0 PAROXYSMAL ATRIAL FIBRILLATION (H): Primary | ICD-10-CM

## 2022-11-01 PROCEDURE — 93280 PM DEVICE PROGR EVAL DUAL: CPT

## 2022-11-01 PROCEDURE — 93280 PM DEVICE PROGR EVAL DUAL: CPT | Mod: 26 | Performed by: INTERNAL MEDICINE

## 2022-11-01 NOTE — TELEPHONE ENCOUNTER
"Pt seen for routine 1 week follow-up after PPM device placement on 10/24/22.  Upon device interrogation, pt noted to have numerous episodes of AF with RVR:     - Atrial Arrhythmia: 151 episodes of SVT logged, 5 available EGMs show AF with RVR and v-rates 140-170's lasting 23 sec-[id number].  367 mode switches logged with 3 available EGMs showing AF/AFl with rates  bpm (histogram shows >100 for 90% of the time.  The longest of these episodes was 38 min and 4 sec.  Total burden 6%.  Pt is on Eliquis.    - Ventricular Arrhythmia: 243 NSVT episodes logged with 2 available EGMs AF with RVR with rates 150-170's bpm and lasting 12-29 sec.  1 VT episode logged showing AF with RVR lastng 41s with rates in the 170s.     Pt states she can \"feel [her] chest vibrate when in AF.\"  Pt currently takes 5mg Eliquis BID,  180mg Diltiazem ER beads, and 25mg Lopressor BID amongst her other meds.     Histogram during AF:        Will route update to Dr. Rollins for review and recommendations.      CLEMENTINA Caruso/CLEMENTINA Bustamante   "

## 2022-11-02 LAB
MDC_IDC_LEAD_IMPLANT_DT: NORMAL
MDC_IDC_LEAD_IMPLANT_DT: NORMAL
MDC_IDC_LEAD_LOCATION: NORMAL
MDC_IDC_LEAD_LOCATION: NORMAL
MDC_IDC_LEAD_LOCATION_DETAIL_1: NORMAL
MDC_IDC_LEAD_LOCATION_DETAIL_1: NORMAL
MDC_IDC_LEAD_MFG: NORMAL
MDC_IDC_LEAD_MFG: NORMAL
MDC_IDC_LEAD_MODEL: NORMAL
MDC_IDC_LEAD_MODEL: NORMAL
MDC_IDC_LEAD_POLARITY_TYPE: NORMAL
MDC_IDC_LEAD_POLARITY_TYPE: NORMAL
MDC_IDC_LEAD_SERIAL: NORMAL
MDC_IDC_LEAD_SERIAL: NORMAL
MDC_IDC_MSMT_BATTERY_STATUS: NORMAL
MDC_IDC_MSMT_LEADCHNL_RA_IMPEDANCE_VALUE: 624 OHM
MDC_IDC_MSMT_LEADCHNL_RA_PACING_THRESHOLD_AMPLITUDE: 1 V
MDC_IDC_MSMT_LEADCHNL_RA_PACING_THRESHOLD_PULSEWIDTH: 0.4 MS
MDC_IDC_MSMT_LEADCHNL_RA_SENSING_INTR_AMPL: 6.5 MV
MDC_IDC_MSMT_LEADCHNL_RV_IMPEDANCE_VALUE: 632 OHM
MDC_IDC_MSMT_LEADCHNL_RV_PACING_THRESHOLD_AMPLITUDE: 1.4 V
MDC_IDC_MSMT_LEADCHNL_RV_PACING_THRESHOLD_PULSEWIDTH: 0.4 MS
MDC_IDC_MSMT_LEADCHNL_RV_SENSING_INTR_AMPL: 22.8 MV
MDC_IDC_PG_IMPLANT_DTM: NORMAL
MDC_IDC_PG_MFG: NORMAL
MDC_IDC_PG_MODEL: NORMAL
MDC_IDC_PG_SERIAL: NORMAL
MDC_IDC_PG_TYPE: NORMAL
MDC_IDC_SESS_CLINIC_NAME: NORMAL
MDC_IDC_SESS_DTM: NORMAL
MDC_IDC_SESS_TYPE: NORMAL
MDC_IDC_SET_BRADY_AT_MODE_SWITCH_MODE: NORMAL
MDC_IDC_SET_BRADY_AT_MODE_SWITCH_RATE: 170 {BEATS}/MIN
MDC_IDC_SET_BRADY_HYSTRATE: NORMAL
MDC_IDC_SET_BRADY_LOWRATE: 60 {BEATS}/MIN
MDC_IDC_SET_BRADY_MAX_SENSOR_RATE: 130 {BEATS}/MIN
MDC_IDC_SET_BRADY_MAX_TRACKING_RATE: 130 {BEATS}/MIN
MDC_IDC_SET_BRADY_MODE: NORMAL
MDC_IDC_SET_BRADY_PAV_DELAY_HIGH: 200 MS
MDC_IDC_SET_BRADY_PAV_DELAY_LOW: 300 MS
MDC_IDC_SET_BRADY_SAV_DELAY_HIGH: 200 MS
MDC_IDC_SET_BRADY_SAV_DELAY_LOW: 300 MS
MDC_IDC_SET_LEADCHNL_RA_PACING_AMPLITUDE: 2 V
MDC_IDC_SET_LEADCHNL_RA_PACING_CAPTURE_MODE: NORMAL
MDC_IDC_SET_LEADCHNL_RA_PACING_POLARITY: NORMAL
MDC_IDC_SET_LEADCHNL_RA_PACING_PULSEWIDTH: 0.4 MS
MDC_IDC_SET_LEADCHNL_RA_SENSING_ADAPTATION_MODE: NORMAL
MDC_IDC_SET_LEADCHNL_RA_SENSING_POLARITY: NORMAL
MDC_IDC_SET_LEADCHNL_RA_SENSING_SENSITIVITY: 0.15 MV
MDC_IDC_SET_LEADCHNL_RV_PACING_AMPLITUDE: 2.1 V
MDC_IDC_SET_LEADCHNL_RV_PACING_CAPTURE_MODE: NORMAL
MDC_IDC_SET_LEADCHNL_RV_PACING_POLARITY: NORMAL
MDC_IDC_SET_LEADCHNL_RV_PACING_PULSEWIDTH: 0.4 MS
MDC_IDC_SET_LEADCHNL_RV_SENSING_ADAPTATION_MODE: NORMAL
MDC_IDC_SET_LEADCHNL_RV_SENSING_POLARITY: NORMAL
MDC_IDC_SET_LEADCHNL_RV_SENSING_SENSITIVITY: 1.5 MV
MDC_IDC_SET_ZONE_DETECTION_INTERVAL: 375 MS
MDC_IDC_SET_ZONE_TYPE: NORMAL
MDC_IDC_SET_ZONE_VENDOR_TYPE: NORMAL
MDC_IDC_STAT_EPISODE_RECENT_COUNT: 243
MDC_IDC_STAT_EPISODE_RECENT_COUNT_DTM_END: NORMAL
MDC_IDC_STAT_EPISODE_RECENT_COUNT_DTM_START: NORMAL
MDC_IDC_STAT_EPISODE_TOTAL_COUNT: 243
MDC_IDC_STAT_EPISODE_TOTAL_COUNT_DTM_END: NORMAL
MDC_IDC_STAT_EPISODE_TYPE: NORMAL
MDC_IDC_STAT_EPISODE_TYPE: NORMAL
MDC_IDC_STAT_EPISODE_VENDOR_TYPE: NORMAL
MDC_IDC_STAT_EPISODE_VENDOR_TYPE: NORMAL

## 2022-11-02 RX ORDER — METOPROLOL TARTRATE 50 MG
50 TABLET ORAL 2 TIMES DAILY
Qty: 180 TABLET | Refills: 3 | Status: SHIPPED | OUTPATIENT
Start: 2022-11-02 | End: 2023-11-06

## 2022-12-13 ENCOUNTER — HOSPITAL ENCOUNTER (OUTPATIENT)
Dept: CARDIOLOGY | Facility: CLINIC | Age: 82
Discharge: HOME | End: 2022-12-13
Attending: INTERNAL MEDICINE | Admitting: INTERNAL MEDICINE
Payer: MEDICARE

## 2022-12-13 DIAGNOSIS — Z95.0 CARDIAC PACEMAKER IN SITU: ICD-10-CM

## 2022-12-13 DIAGNOSIS — I48.91 ATRIAL FIBRILLATION, UNSPECIFIED TYPE (H): ICD-10-CM

## 2022-12-13 PROCEDURE — 93280 PM DEVICE PROGR EVAL DUAL: CPT

## 2022-12-13 PROCEDURE — 93280 PM DEVICE PROGR EVAL DUAL: CPT | Mod: 26 | Performed by: INTERNAL MEDICINE

## 2022-12-13 NOTE — TELEPHONE ENCOUNTER
Pt came into clinic for her 6 week device check today.  At her previous device check on 11/1/22, it was noted that during AF her rates were >100bpm for approximately 90% of the time.  Dr. Rollins increased her Metoprolol Tartrate from 25mg twice daily to 50mg twice daily.     Today, she has a 2% AF burden with all episodes occurring over 2 days (11/5 and 11/24/22).  Per graphic trends, patient was in AF/AFL for approximately 15 hours on 11/24/22.  Her rates during AF/AFL are improved, though still remain >100 approximately 60% of the time.  Patient states she was aware when she was in AF/AFL and could feel her heart going faster, but denies any shortness of breath, dizziness, or other symptoms.  She has been taking the Metoprolol Tartrate 50mg twice daily as prescribed.  She is also on Eliquis and Diltiazem.      She checks her blood pressures at home and these have been in the 110-130's/60-80's primarily.      Will route update to Dr. Rollins to see if he has any further recommendations.  Patient is scheduled to see Gema Potter PA-C on 2/7/23 for her 3 month post PPM follow-up.     CLEMENTINA Bustamante       Heart rate histogram during AF on 11/1/22 before Metoprolol was adjusted:            Heart rate histogram today, 12/13/22, on increased Metoprolol dose of 50mg twice daily:          AT/AF burden trend graph (shows AF/AFL on total of 3 days since implant - 10/28, 11/5, and 11/24):        EGM example of AF/AFL:

## 2022-12-14 NOTE — TELEPHONE ENCOUNTER
Nba Rollins MD  You 2 hours ago (11:28 AM)     QP  Ok to monitor         Received the above response from Dr. Rollins.  Called and updated patient on Dr. Rollins's response.  Instructed patient to notify clinic if she were to develop any symptoms with future AFib episodes or if she has any questions or concerns.  Patient verbalized understanding and agreement with plan.      CLEMENTINA Bustamante

## 2022-12-20 LAB
MDC_IDC_EPISODE_DTM: NORMAL
MDC_IDC_EPISODE_ID: NORMAL
MDC_IDC_EPISODE_TYPE: NORMAL
MDC_IDC_LEAD_IMPLANT_DT: NORMAL
MDC_IDC_LEAD_IMPLANT_DT: NORMAL
MDC_IDC_LEAD_LOCATION: NORMAL
MDC_IDC_LEAD_LOCATION: NORMAL
MDC_IDC_LEAD_LOCATION_DETAIL_1: NORMAL
MDC_IDC_LEAD_LOCATION_DETAIL_1: NORMAL
MDC_IDC_LEAD_MFG: NORMAL
MDC_IDC_LEAD_MFG: NORMAL
MDC_IDC_LEAD_MODEL: NORMAL
MDC_IDC_LEAD_MODEL: NORMAL
MDC_IDC_LEAD_POLARITY_TYPE: NORMAL
MDC_IDC_LEAD_POLARITY_TYPE: NORMAL
MDC_IDC_LEAD_SERIAL: NORMAL
MDC_IDC_LEAD_SERIAL: NORMAL
MDC_IDC_MSMT_BATTERY_STATUS: NORMAL
MDC_IDC_MSMT_LEADCHNL_RA_IMPEDANCE_VALUE: 610 OHM
MDC_IDC_MSMT_LEADCHNL_RA_PACING_THRESHOLD_AMPLITUDE: 0.7 V
MDC_IDC_MSMT_LEADCHNL_RA_PACING_THRESHOLD_PULSEWIDTH: 0.4 MS
MDC_IDC_MSMT_LEADCHNL_RA_SENSING_INTR_AMPL: 4.3 MV
MDC_IDC_MSMT_LEADCHNL_RV_IMPEDANCE_VALUE: 728 OHM
MDC_IDC_MSMT_LEADCHNL_RV_PACING_THRESHOLD_AMPLITUDE: 0.9 V
MDC_IDC_MSMT_LEADCHNL_RV_PACING_THRESHOLD_PULSEWIDTH: 0.4 MS
MDC_IDC_MSMT_LEADCHNL_RV_SENSING_INTR_AMPL: 22.9 MV
MDC_IDC_PG_IMPLANT_DTM: NORMAL
MDC_IDC_PG_MFG: NORMAL
MDC_IDC_PG_MODEL: NORMAL
MDC_IDC_PG_SERIAL: NORMAL
MDC_IDC_PG_TYPE: NORMAL
MDC_IDC_SESS_CLINIC_NAME: NORMAL
MDC_IDC_SESS_DTM: NORMAL
MDC_IDC_SESS_TYPE: NORMAL
MDC_IDC_SET_BRADY_AT_MODE_SWITCH_MODE: NORMAL
MDC_IDC_SET_BRADY_AT_MODE_SWITCH_RATE: 170 {BEATS}/MIN
MDC_IDC_SET_BRADY_LOWRATE: 60 {BEATS}/MIN
MDC_IDC_SET_BRADY_MAX_SENSOR_RATE: 130 {BEATS}/MIN
MDC_IDC_SET_BRADY_MAX_TRACKING_RATE: 130 {BEATS}/MIN
MDC_IDC_SET_BRADY_MODE: NORMAL
MDC_IDC_SET_BRADY_PAV_DELAY_HIGH: 200 MS
MDC_IDC_SET_BRADY_PAV_DELAY_LOW: 300 MS
MDC_IDC_SET_BRADY_SAV_DELAY_HIGH: 200 MS
MDC_IDC_SET_BRADY_SAV_DELAY_LOW: 300 MS
MDC_IDC_SET_LEADCHNL_RA_PACING_AMPLITUDE: 2 V
MDC_IDC_SET_LEADCHNL_RA_PACING_CAPTURE_MODE: NORMAL
MDC_IDC_SET_LEADCHNL_RA_PACING_POLARITY: NORMAL
MDC_IDC_SET_LEADCHNL_RA_PACING_PULSEWIDTH: 0.4 MS
MDC_IDC_SET_LEADCHNL_RA_SENSING_ADAPTATION_MODE: NORMAL
MDC_IDC_SET_LEADCHNL_RA_SENSING_POLARITY: NORMAL
MDC_IDC_SET_LEADCHNL_RA_SENSING_SENSITIVITY: 0.15 MV
MDC_IDC_SET_LEADCHNL_RV_PACING_AMPLITUDE: 1.5 V
MDC_IDC_SET_LEADCHNL_RV_PACING_CAPTURE_MODE: NORMAL
MDC_IDC_SET_LEADCHNL_RV_PACING_POLARITY: NORMAL
MDC_IDC_SET_LEADCHNL_RV_PACING_PULSEWIDTH: 0.4 MS
MDC_IDC_SET_LEADCHNL_RV_SENSING_ADAPTATION_MODE: NORMAL
MDC_IDC_SET_LEADCHNL_RV_SENSING_POLARITY: NORMAL
MDC_IDC_SET_LEADCHNL_RV_SENSING_SENSITIVITY: 1.5 MV
MDC_IDC_SET_ZONE_DETECTION_INTERVAL: 375 MS
MDC_IDC_SET_ZONE_TYPE: NORMAL
MDC_IDC_SET_ZONE_VENDOR_TYPE: NORMAL
MDC_IDC_STAT_EPISODE_RECENT_COUNT: 72
MDC_IDC_STAT_EPISODE_RECENT_COUNT_DTM_END: NORMAL
MDC_IDC_STAT_EPISODE_RECENT_COUNT_DTM_START: NORMAL
MDC_IDC_STAT_EPISODE_TOTAL_COUNT: 315
MDC_IDC_STAT_EPISODE_TOTAL_COUNT_DTM_END: NORMAL
MDC_IDC_STAT_EPISODE_TYPE: NORMAL
MDC_IDC_STAT_EPISODE_TYPE: NORMAL
MDC_IDC_STAT_EPISODE_VENDOR_TYPE: NORMAL
MDC_IDC_STAT_EPISODE_VENDOR_TYPE: NORMAL

## 2023-01-04 NOTE — PROGRESS NOTES
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  She is s/p BSO, PPLND 2020 which served as her treatment.  She is here today for a surveillance visit.      Oncology History:  She originally presented with a complaint of hip pain.  The work-up of this included an MRI which has demonstrated a large pelvic mass, her  was elevated (108, CEA 19-9 were normal).     2020: Exploratory laparoscopy followed by laparotomy, bilateral salpingo-oophorectomy, omentectomy, pelvic and periaortic lymph node dissection, anterior culdectomy.    FINAL DIAGNOSIS:   A. OVARIES, LEFT AND RIGHT, BILATERAL OOPHORECTOMY:   - Right ovary with ENDOMETRIOID ADENOCARCINOMA, FIGO grade 1   - Left ovary with benign inclusion cysts, negative for malignancy   - Unremarkable bilateral fallopian tubes, negative for malignancy     B. MESENTERY, BIOPSY:   - Fibrovascular adhesions, negative for malignancy     C. OMENTUM, OMENTECTOMY:   - Adipose tissue, negative for malignancy     D. ANTERIOR CUL-DE-SAC, BIOPSY:   - Fibroadipose tissue with foci of endometriosis   - Negative for malignancy     E. POSTERIOR CUL-DE-SAC, BIOPSY:   - Fibrovascular tissue, negative for malignancy     F. LYMPH NODE, LEFT PELVIC, EXCISION:   - Eleven reactive lymph nodes, negative for malignancy (0/11)     G. LYMPH NODE, RIGHT PELVIC, EXCISION:   - Nine reactive lymph nodes, negative for malignancy (0/9)     H. LYMPH NODE, RIGHT PARA AORTIC, EXCISION:   - Three reactive lymph nodes, negative for malignancy (0/3)     2020:  20.  2/:  8.  5/:  7.  9/:  <5.  1/:  6.   4/:  6.  7/:  9.  1:  pending.                  Today she comes to clinic  feeling well overall and is without concern.  She did recently have a pacemaker placed.  She continues to follow with cardiology.  She denies any vaginal bleeding, no changes in her bowel or bladder habits, no nausea/emesis, no lower extremity edema, and no difficulties eating or sleeping. She denies any abdominal discomfort/bloating, no fevers or chills, and no chest pain or shortness of breath. She is current with her annual physical, colon cancer screening, mammogram, and she is vaccinated against COVID.               Health Maintenance  Colonoscopy: 11/16/15, repeat in 10 years  Mammogram: 2/11/22  Annual physical: 1/13/22, scheduled 1/24/23  COVID vaccine: 2/4/21, 2/25/21, 9/30/21, 4/8/22, 9/29/22 Pfizer      Review of Systems     Constitutional:  Negative for fever, chills, weight loss, weight gain, fatigue, decreased appetite, night sweats, recent stressors, height gain, height loss, post-operative complications, incisional pain, hallucinations, increased energy, hyperactivity and confused.   HENT:  Negative for ear pain, hearing loss, tinnitus, nosebleeds, trouble swallowing, hoarse voice, mouth sores, sore throat, ear discharge, tooth pain, gum tenderness, taste disturbance, smell disturbance, hearing aid, bleeding gums, dry mouth, sinus pain, sinus congestion and neck mass.    Eyes:  Negative for double vision, pain, redness, eye pain, decreased vision, eye watering, eye bulging, eye dryness, flashing lights, spots, floaters, strabismus, tunnel vision, jaundice and eye irritation.   Respiratory:   Negative for cough, hemoptysis, sputum production, shortness of breath, wheezing, sleep disturbances due to breathing, snores loudly, respiratory pain, dyspnea on exertion, cough disturbing sleep and postural dyspnea.    Cardiovascular:  Positive for palpitations and pacemaker. Negative for chest pain, dyspnea on exertion, orthopnea, fingers/toes turn blue, hypertension, hypotension, syncope, history of heart  murmur, few scattered varicosities, leg pain, sleep disturbances due to breathing, light-headedness, exercise intolerance and edema.   Gastrointestinal:  Negative for heartburn, nausea, vomiting, abdominal pain, diarrhea, constipation, blood in stool, melena, rectal pain, bloating, hemorrhoids, bowel incontinence, jaundice, rectal bleeding, coffee ground emesis and change in stool.   Genitourinary:  Negative for bladder incontinence, dysuria, urgency, hematuria, flank pain, vaginal discharge, difficulty urinating, genital sores, dyspareunia, decreased libido, nocturia, voiding less frequently, arousal difficulty, abnormal vaginal bleeding, excessive menstruation, menstrual changes, hot flashes, vaginal dryness and postmenopausal bleeding.   Musculoskeletal:  Negative for myalgias, back pain, joint swelling, arthralgias, stiffness, muscle cramps, neck pain, bone pain, muscle weakness and fracture.   Skin:  Negative for nail changes, itching, poor wound healing, rash, hair changes, skin changes, acne, warts, poor wound healing, scarring, flaky skin, Raynaud's phenomenon, sensitivity to sunlight and skin thickening.   Neurological:  Negative for dizziness, tingling, tremors, speech change, seizures, loss of consciousness, weakness, light-headedness, numbness, headaches, disturbances in coordination, extremity numbness, memory loss, difficulty walking and paralysis.   Endo/Heme:  Negative for anemia, swollen glands and bruises/bleeds easily.   Psychiatric/Behavioral:  Negative for depression, hallucinations, memory loss, decreased concentration, mood swings and panic attacks.    Breast:  Negative for breast discharge, breast mass, breast pain and nipple retraction.   Endocrine:  Negative for altered temperature regulation, polyphagia, polydipsia, unwanted hair growth and change in facial hair.          Past Medical History:    Past Medical History:   Diagnosis Date     Chronic airway obstruction (H)      Diastolic  dysfunction 6/28/2022     Gastroesophageal reflux disease      Hiatal hernia      Hypertension      Malignant neoplasm of ovary (H)      Ovarian cancer, unspecified laterality (H) 3/10/2021     Personal history of contact with and (suspected) exposure to asbestos      Primary osteoarthritis of right hip 7/9/2020         Past Surgical History:    Past Surgical History:   Procedure Laterality Date     BIOPSY BREAST Left      BREAST BIOPSY, CORE RT/LT  1994     CHOLECYSTECTOMY  1995     COLONOSCOPY  05/2010    Diverticulosis, colon polyps; repeat 5 yrs     COLONOSCOPY N/A 11/16/2015    Procedure: COLONOSCOPY;  Surgeon: Alejandro Matos MD;  Location: [facility]     COLONOSCOPY N/A 9/17/2021    Procedure: COLONOSCOPY;  Surgeon: Aayush George MD;  Location: [facility]     Colonoscopy, hyperplastic polyps, repeat in 5 yrs  2004     EP PACEMAKER DEVICE & LEAD IMPLANT- RIGHT ATRIAL & RIGHT VENTRICULAR Left 10/24/2022    Procedure: Pacemaker Device & Lead Implant - Right Atrial & Right Ventricular;  Surgeon: Demond Meyer MD;  Location:  [facility]     ESOPHAGOSCOPY, GASTROSCOPY, DUODENOSCOPY (EGD), COMBINED  09/30/2013    Procedure: COMBINED ESOPHAGOSCOPY, GASTROSCOPY, DUODENOSCOPY (EGD), BIOPSY SINGLE OR MULTIPLE;  Gastroscopy;  Surgeon: Blaise Gill MD;  Location: [facility]     EYE SURGERY  2012    R/L Cataracts     HC REMOVE TONSILS/ADENOIDS,13+ Y/O      T & A 13+y.o.     HERNIORRHAPHY INCISIONAL (LOCATION) N/A 3/24/2021    Procedure: HERNIORRHAPHY, INCISIONAL, OPEN WITH MESH;  Surgeon: Aayush George MD;  Location: [facility]     HYSTERECTOMY, PAP NO LONGER INDICATED       LAPAROSCOPIC SALPINGO-OOPHORECTOMY N/A 07/24/2020    Procedure: Laparoscopy, removal or pelvic mass, both tubes and ovaries, omentectomy, anterior culvectomy, pelvic and para aortic lymph node dissection, laparotomy;  Surgeon: Felipe Corona MD;  Location: [facility]     NV ANESTH,LUMBAR SPINE,CORD SURGERY  10/2020    L3-4  L4-5 TRANSFORAMINAL INTERBODY FUSION L3-5, POSTERIOR INSTRUMENTED FUSION AND DECOMPRESSION     TVH for DUB, ovaries in       VASCULAR SURGERY  2014    Taylor closure     ZZC ANESTH,KNEE VEINS SURGERY  08/20/2014         Health Maintenance Due   Topic Date Due     DEXA  04/27/2022     DTAP/TDAP/TD IMMUNIZATION (3 - Td or Tdap) 11/02/2022     LIPID  12/04/2022     PHQ-2 (once per calendar year)  01/01/2023     MEDICARE ANNUAL WELLNESS VISIT  01/13/2023     FALL RISK ASSESSMENT  01/13/2023       Current Medications:     Current Outpatient Medications   Medication Sig Dispense Refill     apixaban ANTICOAGULANT (ELIQUIS) 5 MG tablet Take 1 tablet (5 mg) by mouth 2 times daily 60 tablet 3     diltiazem ER COATED BEADS (CARDIZEM CD/CARTIA XT) 180 MG 24 hr capsule TAKE 1 CAPSULE BY MOUTH EVERY DAY 90 capsule 0     Evening Primrose Oil 1000 MG CAPS One daily       meloxicam (MOBIC) 15 MG tablet Take 15 mg by mouth daily as needed       metoprolol tartrate (LOPRESSOR) 50 MG tablet Take 1 tablet (50 mg) by mouth 2 times daily 180 tablet 3     mometasone (ELOCON) 0.1 % external cream Apply sparingly to affected area twice daily for 2 weeks then decrease to 1 time daily for 30 days then decrease to 2-3 times per week 45 g 11     omega 3 1000 MG CAPS Take by mouth daily        pravastatin (PRAVACHOL) 80 MG tablet Take 1 tablet (80 mg) by mouth daily 90 tablet 3     THERATEARS 0.25 % OP SOLN BID       valsartan-hydrochlorothiazide (DIOVAN HCT) 320-25 MG tablet Take 1 tablet by mouth daily 90 tablet 3     VIACTIV 500-100-40 OR CHEW once daily           Allergies:        Allergies   Allergen Reactions     Atorvastatin Other (See Comments)     Muscle Pain     Ezetimibe Other (See Comments)     Severe muscle aches     Irbesartan Cramps     Lisinopril      Omeprazole      Other reaction(s): *Unknown     Prilosec [Omeprazole]      Rosuvastatin Other (See Comments)     Muscle Pain     Zestril [Ace Inhibitors] Cough        Social  "History:     Social History     Tobacco Use     Smoking status: Never     Smokeless tobacco: Never   Substance Use Topics     Alcohol use: No       History   Drug Use No         Family History:     The patient's family history is notable for:    Family History   Problem Relation Age of Onset     Cancer Mother         uterine cancer,      Respiratory Mother         COPD     Cardiovascular Mother         AAA  age 80     Breast Cancer Maternal Grandmother      Cancer Maternal Grandfather         cancer unknown type     Breast Cancer Sister      Eye Disorder Father         cataracts     Depression Father      Depression Son      Depression Son      Breast Cancer Sister         X2     Cancer - colorectal No family hx of         no ovarian cancer         Physical Exam:     /83 (BP Location: Right arm, Patient Position: Sitting, Cuff Size: Adult Large)   Pulse 66   Temp 97.9  F (36.6  C) (Oral)   Resp 18   Ht 1.626 m (5' 4.02\")   Wt 95.3 kg (210 lb 1.6 oz)   SpO2 96%   BMI 36.05 kg/m    Body mass index is 36.05 kg/m .    General Appearance: healthy and alert, no distress     HEENT: no palpable nodules or masses        Cardiovascular: regular rate and rhythm, no gallops, rubs or murmurs     Respiratory: lungs clear, no rales, rhonchi or wheezes    Musculoskeletal: extremities non tender and without edema    Skin: no lesions or rashes     Neurological: normal gait, no gross defects     Psychiatric: appropriate mood and affect                               Hematological: normal cervical, supraclavicular and inguinal lymph nodes     Gastrointestinal:       abdomen soft, non-tender, non-distended, no organomegaly or masses    Genitourinary: External genitalia and urethral meatus appears normal.  Vagina is smooth without nodularity or masses.  Cervix is surgically absent.  Bimanual exam reveal no masses, nodularity or fullness.  Recto-vaginal exam confirms these findings.      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  She is s/p BSO, PPLND 7/24/2020 which served as her treatment.  She is here today for a surveillance visit.     20 minutes spent on the date of the encounter doing chart review, history and exam, documentation, and further activities as noted above.      Plan:     1.)        Ovarian cancer:  IAN on exam.  RTC in 6 months for next surveillance visit and .  Plan for surveillance visits every 6 months until she is 5 years out from treatment (7/2025), then annually.  Reviewed signs and symptoms for when she should contact the clinic or seek additional care.  Patient to contact the clinic with any questions or concerns in the interim.        Genetic risk factors were assessed and she is negative for mutations in the ADALID, BRCA1, BRCA2, BRIP1, CDH1, CHEK2, EPCAM, MLH1, MSH2, MSH6, NBN, NF1, PALB2, PMS2, PTEN, RAD51C, RAD51D, STK11, and TP53 genes.       Labs and/or tests ordered include:  .                2.)        Health maintenance:  Issues addressed today include following up with PCP for annual health maintenance and non-gynecologic issues.      Kerry Sinclair, STEFANY, APRN, FNP-C  Nurse Practitioner  Division of Gynecologic Oncology  Pager: [phone]     CC  Patient Care Team:  Kathya Escalera DO as PCP - General (Internal Medicine)  Kathya Escalera DO as Assigned PCP  Fatimah Clement MD as MD (Neurology)  Fatimah Clement MD as Assigned Neuroscience Provider  Kerry Sinclair APRN CNP as Assigned Cancer Care Provider  Jai Lam MD as MD (Cardiology)  Nba Rollins MD as Assigned Heart and Vascular Provider  SELF, REFERRED

## 2023-01-05 ASSESSMENT — ENCOUNTER SYMPTOMS
PALPITATIONS: 1
SYNCOPE: 0
ORTHOPNEA: 0
EXERCISE INTOLERANCE: 0
LEG PAIN: 0
SLEEP DISTURBANCES DUE TO BREATHING: 0
HYPOTENSION: 0
HYPERTENSION: 0
LIGHT-HEADEDNESS: 0

## 2023-01-09 ENCOUNTER — LAB (OUTPATIENT)
Dept: LAB | Facility: CLINIC | Age: 83
End: 2023-01-09
Attending: NURSE PRACTITIONER
Payer: MEDICARE

## 2023-01-09 ENCOUNTER — ONCOLOGY VISIT (OUTPATIENT)
Dept: ONCOLOGY | Facility: CLINIC | Age: 83
End: 2023-01-09
Attending: NURSE PRACTITIONER
Payer: MEDICARE

## 2023-01-09 VITALS
OXYGEN SATURATION: 96 % | HEIGHT: 64 IN | SYSTOLIC BLOOD PRESSURE: 139 MMHG | DIASTOLIC BLOOD PRESSURE: 83 MMHG | BODY MASS INDEX: 35.87 KG/M2 | WEIGHT: 210.1 LBS | RESPIRATION RATE: 18 BRPM | HEART RATE: 66 BPM | TEMPERATURE: 97.9 F

## 2023-01-09 DIAGNOSIS — Z08 ENCOUNTER FOR FOLLOW-UP SURVEILLANCE OF OVARIAN CANCER: ICD-10-CM

## 2023-01-09 DIAGNOSIS — Z85.43 ENCOUNTER FOR FOLLOW-UP SURVEILLANCE OF OVARIAN CANCER: ICD-10-CM

## 2023-01-09 DIAGNOSIS — C56.9 OVARIAN CANCER, UNSPECIFIED LATERALITY (H): Primary | ICD-10-CM

## 2023-01-09 DIAGNOSIS — I48.0 PAROXYSMAL ATRIAL FIBRILLATION (H): ICD-10-CM

## 2023-01-09 DIAGNOSIS — C56.9 OVARIAN CANCER, UNSPECIFIED LATERALITY (H): ICD-10-CM

## 2023-01-09 LAB — CANCER AG125 SERPL-ACNC: 7 U/ML

## 2023-01-09 PROCEDURE — 86304 IMMUNOASSAY TUMOR CA 125: CPT

## 2023-01-09 PROCEDURE — 36415 COLL VENOUS BLD VENIPUNCTURE: CPT

## 2023-01-09 PROCEDURE — 99213 OFFICE O/P EST LOW 20 MIN: CPT | Performed by: NURSE PRACTITIONER

## 2023-01-09 PROCEDURE — G0463 HOSPITAL OUTPT CLINIC VISIT: HCPCS

## 2023-01-09 PROCEDURE — G0463 HOSPITAL OUTPT CLINIC VISIT: HCPCS | Performed by: NURSE PRACTITIONER

## 2023-01-09 ASSESSMENT — ENCOUNTER SYMPTOMS
BOWEL INCONTINENCE: 0
SYNCOPE: 0
LIGHT-HEADEDNESS: 0
SWOLLEN GLANDS: 0
POLYPHAGIA: 0
PARALYSIS: 0
BREAST MASS: 0
ARTHRALGIAS: 0
TREMORS: 0
SNORES LOUDLY: 0
FLANK PAIN: 0
DECREASED LIBIDO: 0
LOSS OF CONSCIOUSNESS: 0
HYPERTENSION: 0
NUMBNESS: 0
MEMORY LOSS: 0
EYE REDNESS: 0
SHORTNESS OF BREATH: 0
NECK PAIN: 0
DECREASED CONCENTRATION: 0
BACK PAIN: 0
DISTURBANCES IN COORDINATION: 0
EXERCISE INTOLERANCE: 0
TASTE DISTURBANCE: 0
HEMOPTYSIS: 0
DYSPNEA ON EXERTION: 0
BREAST PAIN: 0
VOMITING: 0
MUSCLE CRAMPS: 0
SPEECH CHANGE: 0
CONSTIPATION: 0
DEPRESSION: 0
WHEEZING: 0
BLOATING: 0
COUGH DISTURBING SLEEP: 0
NERVOUS/ANXIOUS: 0
NAUSEA: 0
RECTAL BLEEDING: 0
NECK MASS: 0
POLYDIPSIA: 0
STIFFNESS: 0
HEMATURIA: 0
PANIC: 0
FATIGUE: 0
SMELL DISTURBANCE: 0
CHILLS: 0
MUSCLE WEAKNESS: 0
DIZZINESS: 0
PALPITATIONS: 1
LEG PAIN: 0
SPUTUM PRODUCTION: 0
HEARTBURN: 0
SLEEP DISTURBANCES DUE TO BREATHING: 0
DIARRHEA: 0
INCREASED ENERGY: 0
INSOMNIA: 0
HOARSE VOICE: 0
BLOOD IN STOOL: 0
HEADACHES: 0
JOINT SWELLING: 0
SORE THROAT: 0
EXTREMITY NUMBNESS: 0
ABDOMINAL PAIN: 0
DIFFICULTY URINATING: 0
SKIN CHANGES: 0
COUGH: 0
HYPOTENSION: 0
FEVER: 0
POOR WOUND HEALING: 0
SINUS CONGESTION: 0
ORTHOPNEA: 0
POSTURAL DYSPNEA: 0
EYE WATERING: 0
RESPIRATORY PAIN: 0
TINGLING: 0
HALLUCINATIONS: 0
DECREASED APPETITE: 0
SINUS PAIN: 0
WEIGHT LOSS: 0
SEIZURES: 0
NIGHT SWEATS: 0
EYE IRRITATION: 0
WEIGHT GAIN: 0
DYSURIA: 0
JAUNDICE: 0
MYALGIAS: 0
WEAKNESS: 0
ALTERED TEMPERATURE REGULATION: 0
BRUISES/BLEEDS EASILY: 0
EYE PAIN: 0
HOT FLASHES: 0
NAIL CHANGES: 0
RECTAL PAIN: 0
DOUBLE VISION: 0
TROUBLE SWALLOWING: 0

## 2023-01-09 ASSESSMENT — PAIN SCALES - GENERAL: PAINLEVEL: NO PAIN (0)

## 2023-01-09 NOTE — LETTER
2023         RE: Rosie Blackman  [address] [facility] [id number]        Dear Colleague,    Thank you for referring your patient, Rosie Blackman, to the [facility]. Please see a copy of my visit note below.    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  She is s/p BSO, PPLND 2020 which served as her treatment.  She is here today for a surveillance visit.      Oncology History:  She originally presented with a complaint of hip pain.  The work-up of this included an MRI which has demonstrated a large pelvic mass, her  was elevated (108, CEA 19-9 were normal).     2020: Exploratory laparoscopy followed by laparotomy, bilateral salpingo-oophorectomy, omentectomy, pelvic and periaortic lymph node dissection, anterior culdectomy.    FINAL DIAGNOSIS:   A. OVARIES, LEFT AND RIGHT, BILATERAL OOPHORECTOMY:   - Right ovary with ENDOMETRIOID ADENOCARCINOMA, FIGO grade 1   - Left ovary with benign inclusion cysts, negative for malignancy   - Unremarkable bilateral fallopian tubes, negative for malignancy     B. MESENTERY, BIOPSY:   - Fibrovascular adhesions, negative for malignancy     C. OMENTUM, OMENTECTOMY:   - Adipose tissue, negative for malignancy     D. ANTERIOR CUL-DE-SAC, BIOPSY:   - Fibroadipose tissue with foci of endometriosis   - Negative for malignancy     E. POSTERIOR CUL-DE-SAC, BIOPSY:   - Fibrovascular tissue, negative for malignancy     F. LYMPH NODE, LEFT PELVIC, EXCISION:   - Eleven reactive lymph nodes, negative for malignancy (0/11)     G. LYMPH NODE, RIGHT PELVIC, EXCISION:   - Nine reactive lymph nodes, negative for malignancy (0/9)     H. LYMPH NODE, RIGHT PARA AORTIC, EXCISION:   - Three  reactive lymph nodes, negative for malignancy (0/3)     11/9/2020:  20.  2/1/21:  8.  5/17/21:  7.  9/20/21:  <5.  1/4/22:  6.   4/5/22:  6.  7/5/22:  9.  1/5/23:  pending.                  Today she comes to clinic feeling well overall and is without concern.  She did recently have a pacemaker placed.  She continues to follow with cardiology.  She denies any vaginal bleeding, no changes in her bowel or bladder habits, no nausea/emesis, no lower extremity edema, and no difficulties eating or sleeping. She denies any abdominal discomfort/bloating, no fevers or chills, and no chest pain or shortness of breath. She is current with her annual physical, colon cancer screening, mammogram, and she is vaccinated against COVID.               Health Maintenance  Colonoscopy: 11/16/15, repeat in 10 years  Mammogram: 2/11/22  Annual physical: 1/13/22, scheduled 1/24/23  COVID vaccine: 2/4/21, 2/25/21, 9/30/21, 4/8/22, 9/29/22 Pfizer      Review of Systems     Constitutional:  Negative for fever, chills, weight loss, weight gain, fatigue, decreased appetite, night sweats, recent stressors, height gain, height loss, post-operative complications, incisional pain, hallucinations, increased energy, hyperactivity and confused.   HENT:  Negative for ear pain, hearing loss, tinnitus, nosebleeds, trouble swallowing, hoarse voice, mouth sores, sore throat, ear discharge, tooth pain, gum tenderness, taste disturbance, smell disturbance, hearing aid, bleeding gums, dry mouth, sinus pain, sinus congestion and neck mass.    Eyes:  Negative for double vision, pain, redness, eye pain, decreased vision, eye watering, eye bulging, eye dryness, flashing lights, spots, floaters, strabismus, tunnel vision, jaundice and eye irritation.   Respiratory:   Negative for cough, hemoptysis, sputum production, shortness of breath, wheezing, sleep disturbances due to breathing, snores loudly, respiratory pain,  dyspnea on exertion, cough disturbing sleep and postural dyspnea.    Cardiovascular:  Positive for palpitations and pacemaker. Negative for chest pain, dyspnea on exertion, orthopnea, fingers/toes turn blue, hypertension, hypotension, syncope, history of heart murmur, few scattered varicosities, leg pain, sleep disturbances due to breathing, light-headedness, exercise intolerance and edema.   Gastrointestinal:  Negative for heartburn, nausea, vomiting, abdominal pain, diarrhea, constipation, blood in stool, melena, rectal pain, bloating, hemorrhoids, bowel incontinence, jaundice, rectal bleeding, coffee ground emesis and change in stool.   Genitourinary:  Negative for bladder incontinence, dysuria, urgency, hematuria, flank pain, vaginal discharge, difficulty urinating, genital sores, dyspareunia, decreased libido, nocturia, voiding less frequently, arousal difficulty, abnormal vaginal bleeding, excessive menstruation, menstrual changes, hot flashes, vaginal dryness and postmenopausal bleeding.   Musculoskeletal:  Negative for myalgias, back pain, joint swelling, arthralgias, stiffness, muscle cramps, neck pain, bone pain, muscle weakness and fracture.   Skin:  Negative for nail changes, itching, poor wound healing, rash, hair changes, skin changes, acne, warts, poor wound healing, scarring, flaky skin, Raynaud's phenomenon, sensitivity to sunlight and skin thickening.   Neurological:  Negative for dizziness, tingling, tremors, speech change, seizures, loss of consciousness, weakness, light-headedness, numbness, headaches, disturbances in coordination, extremity numbness, memory loss, difficulty walking and paralysis.   Endo/Heme:  Negative for anemia, swollen glands and bruises/bleeds easily.   Psychiatric/Behavioral:  Negative for depression, hallucinations, memory loss, decreased concentration, mood swings and panic attacks.    Breast:  Negative for breast discharge, breast mass, breast pain and nipple  retraction.   Endocrine:  Negative for altered temperature regulation, polyphagia, polydipsia, unwanted hair growth and change in facial hair.          Past Medical History:    Past Medical History:   Diagnosis Date     Chronic airway obstruction (H)      Diastolic dysfunction 6/28/2022     Gastroesophageal reflux disease      Hiatal hernia      Hypertension      Malignant neoplasm of ovary (H)      Ovarian cancer, unspecified laterality (H) 3/10/2021     Personal history of contact with and (suspected) exposure to asbestos      Primary osteoarthritis of right hip 7/9/2020         Past Surgical History:    Past Surgical History:   Procedure Laterality Date     BIOPSY BREAST Left      BREAST BIOPSY, CORE RT/LT  1994     CHOLECYSTECTOMY  1995     COLONOSCOPY  05/2010    Diverticulosis, colon polyps; repeat 5 yrs     COLONOSCOPY N/A 11/16/2015    Procedure: COLONOSCOPY;  Surgeon: Alejandro Matos MD;  Location: [facility]     COLONOSCOPY N/A 9/17/2021    Procedure: COLONOSCOPY;  Surgeon: Aayush George MD;  Location: [facility]     Colonoscopy, hyperplastic polyps, repeat in 5 yrs  2004     EP PACEMAKER DEVICE & LEAD IMPLANT- RIGHT ATRIAL & RIGHT VENTRICULAR Left 10/24/2022    Procedure: Pacemaker Device & Lead Implant - Right Atrial & Right Ventricular;  Surgeon: Demond Meyer MD;  Location:  [facility]     ESOPHAGOSCOPY, GASTROSCOPY, DUODENOSCOPY (EGD), COMBINED  09/30/2013    Procedure: COMBINED ESOPHAGOSCOPY, GASTROSCOPY, DUODENOSCOPY (EGD), BIOPSY SINGLE OR MULTIPLE;  Gastroscopy;  Surgeon: Blaise Gill MD;  Location: [facility]     EYE SURGERY  2012    R/L Cataracts     HC REMOVE TONSILS/ADENOIDS,12+ Y/O      T & A 13+y.o.     HERNIORRHAPHY INCISIONAL (LOCATION) N/A 3/24/2021    Procedure: HERNIORRHAPHY, INCISIONAL, OPEN WITH MESH;  Surgeon: Aayush George MD;  Location: [facility]     HYSTERECTOMY, PAP NO LONGER INDICATED       LAPAROSCOPIC SALPINGO-OOPHORECTOMY N/A 07/24/2020    Procedure:  Laparoscopy, removal or pelvic mass, both tubes and ovaries, omentectomy, anterior culvectomy, pelvic and para aortic lymph node dissection, laparotomy;  Surgeon: Felipe Corona MD;  Location: [facility]     HI ANESTH,LUMBAR SPINE,CORD SURGERY  10/2020    L3-4 L4-5 TRANSFORAMINAL INTERBODY FUSION L3-5, POSTERIOR INSTRUMENTED FUSION AND DECOMPRESSION     TVH for DUB, ovaries in       VASCULAR SURGERY  2014    [place] closure     ZZC ANESTH,KNEE VEINS SURGERY  08/20/2014         Health Maintenance Due   Topic Date Due     DEXA  04/27/2022     DTAP/TDAP/TD IMMUNIZATION (3 - Td or Tdap) 11/02/2022     LIPID  12/04/2022     PHQ-2 (once per calendar year)  01/01/2023     MEDICARE ANNUAL WELLNESS VISIT  01/13/2023     FALL RISK ASSESSMENT  01/13/2023       Current Medications:     Current Outpatient Medications   Medication Sig Dispense Refill     apixaban ANTICOAGULANT (ELIQUIS) 5 MG tablet Take 1 tablet (5 mg) by mouth 2 times daily 60 tablet 3     diltiazem ER COATED BEADS (CARDIZEM CD/CARTIA XT) 180 MG 24 hr capsule TAKE 1 CAPSULE BY MOUTH EVERY DAY 90 capsule 0     Evening Primrose Oil 1000 MG CAPS One daily       meloxicam (MOBIC) 15 MG tablet Take 15 mg by mouth daily as needed       metoprolol tartrate (LOPRESSOR) 50 MG tablet Take 1 tablet (50 mg) by mouth 2 times daily 180 tablet 3     mometasone (ELOCON) 0.1 % external cream Apply sparingly to affected area twice daily for 2 weeks then decrease to 1 time daily for 30 days then decrease to 2-3 times per week 45 g 11     omega 3 1000 MG CAPS Take by mouth daily        pravastatin (PRAVACHOL) 80 MG tablet Take 1 tablet (80 mg) by mouth daily 90 tablet 3     THERATEARS 0.25 % OP SOLN BID       valsartan-hydrochlorothiazide (DIOVAN HCT) 320-25 MG tablet Take 1 tablet by mouth daily 90 tablet 3     VIACTIV 500-100-40 OR CHEW once daily           Allergies:        Allergies   Allergen Reactions     Atorvastatin Other (See Comments)     Muscle Pain     Ezetimibe  "Other (See Comments)     Severe muscle aches     Irbesartan Cramps     Lisinopril      Omeprazole      Other reaction(s): *Unknown     Prilosec [Omeprazole]      Rosuvastatin Other (See Comments)     Muscle Pain     Zestril [Ace Inhibitors] Cough        Social History:     Social History     Tobacco Use     Smoking status: Never     Smokeless tobacco: Never   Substance Use Topics     Alcohol use: No       History   Drug Use No         Family History:     The patient's family history is notable for:    Family History   Problem Relation Age of Onset     Cancer Mother         uterine cancer,      Respiratory Mother         COPD     Cardiovascular Mother         AAA  age 80     Breast Cancer Maternal Grandmother      Cancer Maternal Grandfather         cancer unknown type     Breast Cancer Sister      Eye Disorder Father         cataracts     Depression Father      Depression Son      Depression Son      Breast Cancer Sister         X2     Cancer - colorectal No family hx of         no ovarian cancer         Physical Exam:     /83 (BP Location: Right arm, Patient Position: Sitting, Cuff Size: Adult Large)   Pulse 66   Temp 97.9  F (36.6  C) (Oral)   Resp 18   Ht 1.626 m (5' 4.02\")   Wt 95.3 kg (210 lb 1.6 oz)   SpO2 96%   BMI 36.05 kg/m    Body mass index is 36.05 kg/m .    General Appearance: healthy and alert, no distress     HEENT: no palpable nodules or masses        Cardiovascular: regular rate and rhythm, no gallops, rubs or murmurs     Respiratory: lungs clear, no rales, rhonchi or wheezes    Musculoskeletal: extremities non tender and without edema    Skin: no lesions or rashes     Neurological: normal gait, no gross defects     Psychiatric: appropriate mood and affect                               Hematological: normal cervical, supraclavicular and inguinal lymph nodes     Gastrointestinal:       abdomen soft, non-tender, non-distended, no organomegaly or masses    Genitourinary: External " genitalia and urethral meatus appears normal.  Vagina is smooth without nodularity or masses.  Cervix is surgically absent.  Bimanual exam reveal no masses, nodularity or fullness.  Recto-vaginal exam confirms these findings.      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  She is s/p BSO, PPLND 7/24/2020 which served as her treatment.  She is here today for a surveillance visit.     20 minutes spent on the date of the encounter doing chart review, history and exam, documentation, and further activities as noted above.      Plan:     1.)        Ovarian cancer:  IAN on exam.  RTC in 6 months for next surveillance visit and .  Plan for surveillance visits every 6 months until she is 5 years out from treatment (7/2025), then annually.  Reviewed signs and symptoms for when she should contact the clinic or seek additional care.  Patient to contact the clinic with any questions or concerns in the interim.        Genetic risk factors were assessed and she is negative for mutations in the ADALID, BRCA1, BRCA2, BRIP1, CDH1, CHEK2, EPCAM, MLH1, MSH2, MSH6, NBN, NF1, PALB2, PMS2, PTEN, RAD51C, RAD51D, STK11, and TP53 genes.       Labs and/or tests ordered include:  .                2.)        Health maintenance:  Issues addressed today include following up with PCP for annual health maintenance and non-gynecologic issues.      Kerry Sinclair, DNP, APRN, FNP-C  Nurse Practitioner  Division of Gynecologic Oncology  Pager: [phone]     CC  Patient Care Team:  Kathya Escalera DO as PCP - General (Internal Medicine)  Fatimah Clement MD as MD (Neurology)      Jai Lam MD as MD (Cardiology)  Nba Rollins MD as Assigned Heart and Vascular Provider

## 2023-01-09 NOTE — NURSING NOTE
"Oncology Rooming Note    January 9, 2023 3:05 PM   Rosie Blackman is a 83 year old female who presents for:    Chief Complaint   Patient presents with     Oncology Clinic Visit     RETURN - OVARIAN CANCER     Initial Vitals: /83 (BP Location: Right arm, Patient Position: Sitting, Cuff Size: Adult Large)   Pulse 66   Temp 97.9  F (36.6  C) (Oral)   Resp 18   Ht 1.626 m (5' 4.02\")   Wt 95.3 kg (210 lb 1.6 oz)   SpO2 96%   BMI 36.05 kg/m   Estimated body mass index is 36.05 kg/m  as calculated from the following:    Height as of this encounter: 1.626 m (5' 4.02\").    Weight as of this encounter: 95.3 kg (210 lb 1.6 oz). Body surface area is 2.07 meters squared.  No Pain (0) Comment: Data Unavailable   No LMP recorded. Patient has had a hysterectomy.  Allergies reviewed: Yes  Medications reviewed: Yes    Medications: Medication refills not needed today.  Pharmacy name entered into [employer]:    [id number] - [place], [place] - [address] [facility] - MAIL ORDER MAINT MEDS - NON-EPRESCRIBE  [facility] - [place], [place] - [address] [id number] - [place], [place] - [address] [place] [address]    Clinical concerns: No new clinical concerns other than reason for visit today.      Hannah Simmons [facility]            "

## 2023-01-09 NOTE — NURSING NOTE
Chief Complaint   Patient presents with     Blood Draw     Labs collected from venipuncture by CLEMENTINA.      Aniya MATIAS RN PHN BSN  BMT/Oncology Lab

## 2023-01-09 NOTE — TELEPHONE ENCOUNTER
Requested Prescriptions   Pending Prescriptions Disp Refills    ELIQUIS ANTICOAGULANT 5 MG tablet [Pharmacy Med Name: ELIQUIS 5 MG TABLET] 60 tablet 3     Sig: TAKE 1 TABLET BY MOUTH TWICE A DAY       Direct Oral Anticoagulant Agents Failed - 1/9/2023 12:25 AM        Failed - Patient is 18-79 years of age        Passed - Normal Platelets on file in past 12 months     Recent Labs   Lab Test 10/24/22  1126                  Passed - Medication is active on med list        Passed - Serum creatinine less than or equal to 1.4 on file in past 12 mos     Recent Labs   Lab Test 10/24/22  1126 07/24/20  0821 07/09/20  1110   CR 0.74   < >  --    CREAT  --   --  0.7    < > = values in this interval not displayed.       Ok to refill medication if creatinine is low          Passed - Weight is greater than 60 kg for the past year     Wt Readings from Last 3 Encounters:   01/09/23 95.3 kg (210 lb 1.6 oz)   10/24/22 92.1 kg (203 lb)   10/15/22 92.1 kg (203 lb)             Passed - No active pregnancy on record        Passed - No positive pregnancy test within past 12 months        Passed - Recent (6 mo) or future (30 days) visit within the authorizing provider's specialty

## 2023-01-10 RX ORDER — APIXABAN 5 MG/1
TABLET, FILM COATED ORAL
Qty: 60 TABLET | Refills: 3 | Status: SHIPPED | OUTPATIENT
Start: 2023-01-10 | End: 2023-01-24

## 2023-01-17 ASSESSMENT — ENCOUNTER SYMPTOMS
EYE PAIN: 0
SORE THROAT: 0
COUGH: 0
DIZZINESS: 0
ABDOMINAL PAIN: 0
WEAKNESS: 0
HEADACHES: 0
DYSURIA: 0
HEMATURIA: 0
NAUSEA: 0
BREAST MASS: 0
NERVOUS/ANXIOUS: 0
SHORTNESS OF BREATH: 0
CHILLS: 0
DIARRHEA: 0
FREQUENCY: 0
JOINT SWELLING: 0
CONSTIPATION: 0
PALPITATIONS: 0
PARESTHESIAS: 0
MYALGIAS: 0
HEMATOCHEZIA: 0
FEVER: 0
ARTHRALGIAS: 0
HEARTBURN: 0

## 2023-01-17 ASSESSMENT — ACTIVITIES OF DAILY LIVING (ADL): CURRENT_FUNCTION: NO ASSISTANCE NEEDED

## 2023-01-24 ENCOUNTER — OFFICE VISIT (OUTPATIENT)
Dept: FAMILY MEDICINE | Facility: CLINIC | Age: 83
End: 2023-01-24
Payer: MEDICARE

## 2023-01-24 VITALS
HEIGHT: 62 IN | TEMPERATURE: 98.6 F | SYSTOLIC BLOOD PRESSURE: 118 MMHG | RESPIRATION RATE: 20 BRPM | BODY MASS INDEX: 38.64 KG/M2 | DIASTOLIC BLOOD PRESSURE: 72 MMHG | OXYGEN SATURATION: 98 % | WEIGHT: 210 LBS | HEART RATE: 63 BPM

## 2023-01-24 DIAGNOSIS — I48.0 PAROXYSMAL ATRIAL FIBRILLATION (H): ICD-10-CM

## 2023-01-24 DIAGNOSIS — Z12.31 VISIT FOR SCREENING MAMMOGRAM: ICD-10-CM

## 2023-01-24 DIAGNOSIS — I10 ESSENTIAL HYPERTENSION, BENIGN: ICD-10-CM

## 2023-01-24 DIAGNOSIS — G45.9 TIA (TRANSIENT ISCHEMIC ATTACK): ICD-10-CM

## 2023-01-24 DIAGNOSIS — C56.1 OVARIAN CANCER, RIGHT (H): ICD-10-CM

## 2023-01-24 DIAGNOSIS — Z78.0 MENOPAUSE: ICD-10-CM

## 2023-01-24 DIAGNOSIS — Z95.0 CARDIAC PACEMAKER IN SITU: ICD-10-CM

## 2023-01-24 DIAGNOSIS — N90.4 LICHEN SCLEROSUS ET ATROPHICUS OF THE VULVA: ICD-10-CM

## 2023-01-24 DIAGNOSIS — Z00.00 ENCOUNTER FOR MEDICARE ANNUAL WELLNESS EXAM: Primary | ICD-10-CM

## 2023-01-24 DIAGNOSIS — E66.812 CLASS 2 SEVERE OBESITY DUE TO EXCESS CALORIES WITH SERIOUS COMORBIDITY AND BODY MASS INDEX (BMI) OF 37.0 TO 37.9 IN ADULT (H): ICD-10-CM

## 2023-01-24 DIAGNOSIS — E66.01 CLASS 2 SEVERE OBESITY DUE TO EXCESS CALORIES WITH SERIOUS COMORBIDITY AND BODY MASS INDEX (BMI) OF 37.0 TO 37.9 IN ADULT (H): ICD-10-CM

## 2023-01-24 DIAGNOSIS — I49.5 SSS (SICK SINUS SYNDROME) (H): ICD-10-CM

## 2023-01-24 DIAGNOSIS — E78.5 HYPERLIPIDEMIA LDL GOAL <130: ICD-10-CM

## 2023-01-24 PROBLEM — C56.9 OVARIAN CANCER, UNSPECIFIED LATERALITY (H): Status: ACTIVE | Noted: 2020-07-24

## 2023-01-24 PROBLEM — I48.91 ATRIAL FIBRILLATION WITH RVR (H): Status: RESOLVED | Noted: 2022-08-18 | Resolved: 2023-01-24

## 2023-01-24 PROBLEM — E87.6 HYPOKALEMIA: Status: RESOLVED | Noted: 2022-08-18 | Resolved: 2023-01-24

## 2023-01-24 PROCEDURE — 99214 OFFICE O/P EST MOD 30 MIN: CPT | Mod: 25 | Performed by: INTERNAL MEDICINE

## 2023-01-24 PROCEDURE — G0439 PPPS, SUBSEQ VISIT: HCPCS | Performed by: INTERNAL MEDICINE

## 2023-01-24 RX ORDER — DILTIAZEM HYDROCHLORIDE 180 MG/1
180 CAPSULE, COATED, EXTENDED RELEASE ORAL DAILY
Qty: 90 CAPSULE | Refills: 3 | Status: SHIPPED | OUTPATIENT
Start: 2023-01-24 | End: 2023-11-17

## 2023-01-24 RX ORDER — MOMETASONE FUROATE 1 MG/G
CREAM TOPICAL
Qty: 45 G | Refills: 11 | Status: SHIPPED | OUTPATIENT
Start: 2023-01-24 | End: 2024-06-18

## 2023-01-24 RX ORDER — PRAVASTATIN SODIUM 80 MG/1
80 TABLET ORAL DAILY
Qty: 90 TABLET | Refills: 3 | Status: SHIPPED | OUTPATIENT
Start: 2023-01-24 | End: 2023-11-17

## 2023-01-24 RX ORDER — VALSARTAN AND HYDROCHLOROTHIAZIDE 320; 25 MG/1; MG/1
1 TABLET, FILM COATED ORAL DAILY
Qty: 90 TABLET | Refills: 3 | Status: SHIPPED | OUTPATIENT
Start: 2023-01-24 | End: 2023-11-17

## 2023-01-24 ASSESSMENT — ENCOUNTER SYMPTOMS
FREQUENCY: 0
WEAKNESS: 0
CONSTIPATION: 0
SHORTNESS OF BREATH: 0
HEMATURIA: 0
JOINT SWELLING: 0
EYE PAIN: 0
NERVOUS/ANXIOUS: 0
HEARTBURN: 0
SORE THROAT: 0
NAUSEA: 0
BREAST MASS: 0
FEVER: 0
HEADACHES: 0
DYSURIA: 0
DIZZINESS: 0
PARESTHESIAS: 0
DIARRHEA: 0
ABDOMINAL PAIN: 0
PALPITATIONS: 0
MYALGIAS: 0
HEMATOCHEZIA: 0
COUGH: 0
ARTHRALGIAS: 0
CHILLS: 0

## 2023-01-24 ASSESSMENT — ACTIVITIES OF DAILY LIVING (ADL): CURRENT_FUNCTION: NO ASSISTANCE NEEDED

## 2023-01-24 ASSESSMENT — PAIN SCALES - GENERAL: PAINLEVEL: NO PAIN (0)

## 2023-01-24 NOTE — PATIENT INSTRUCTIONS
A-fib  Recommend against regularly using Meloxicam or other NSAIDs (ibuprofen/Advil, naproxen/Aleve, aspirin) due to increased risk of bleeding and bruising with the Eliquis;  rare use like you are is OK  Acetaminophen/ Tylenol is safer for more regular use with the Eliquis    Health Care Maintenance  You can consider stopping breast cancer screening; you opt to continue which is reasonable  Radiology test was ordered - Bone Density.  Please call [phone] to schedule.  3. Recommend tetanus vacccine      Tips for a Healthy Diet    Add more fresh fruits and vegetables to your diet.  The more colorful with the fruit or vegetable (think berries, spinach, carrots, peppers) the healthier it tends to be.  Juice is not a fruit.  Prepare the vegetables in a healthy way - steam, bake.  Avoid breading, butter/oil to cook.  Use herbs and spices instead of salt to season food.  Add more fiber to your diet.  Swap out white bread, white rice, white pasta for whole grain versions.  Reduce the portion size and frequency of carbohydrates/starches.   Choose healthier fats such as nuts, olive oil, avocado, etc. Stay away from lard, butter.  Decrease the frequency and portion size of 'junk food' -pizza, candy, cookies, potato chips, etc.  Watch liquid calories such as coffee drinks, juice, soda, teas.  There tends to be excessive sugar in these beverages.  Increase protein in your diet.  Eggs, cheese, Greek yogurt, lentils, chicken, fish, seafood, are good healthy choices.  Protein keeps you eddy longer, and you are less likely to have blood sugar spikes  Eat healthy at least 80% of the time.  It is ok for a special treat every once in a while, just not every day.  When are you going to indulge (think [employer] time), be sure you are eating extra healthy the day before and after.      Resources:  [employer] Resources for Health and Wellness:https://www.takingabril.cs.[facility].edu/dig-yes-foods    2.   [employer] Ways To Wellness:     https://www.[facility].org/services/ways-to-wellness  Ways to Wellness offers:  Nutrition and weight management   Corrective exercise and fitness training   Lifestyle and behavioral change   Healing services  Our team includes registered dietitians, certified personal trainers, life coaches, as well as a Culinary Educator.    3. Book - Atomic Habits by Alejandro Watts.  This a good book that looks into our habits and how to sustain good healthy habits and get rid of bad habits.      Patient Education   Personalized Prevention Plan  You are due for the preventive services outlined below.  Your care team is available to assist you in scheduling these services.  If you have already completed any of these items, please share that information with your care team to update in your medical record.  Health Maintenance Due   Topic Date Due    Osteoporosis Screening  04/27/2022    Cholesterol Lab  12/04/2022    Mammogram  02/11/2023

## 2023-01-24 NOTE — PROGRESS NOTES
"SUBJECTIVE:   Rosie is a 83 year old who presents for Preventive Visit.  Patient has been advised of split billing requirements and indicates understanding: Yes  Are you in the first 12 months of your Medicare coverage?  No      Chief Complaint   Patient presents with     medicare wellness      Hypertension     Lipids     Health Maintenance     Due for  mammo and TD     Gave card and aware has to go to pharmacy for TD        Healthy Habits:     In general, how would you rate your overall health?  Good    Frequency of exercise:  2-3 days/week    Duration of exercise:  15-30 minutes    Do you usually eat at least 4 servings of fruit and vegetables a day, include whole grains    & fiber and avoid regularly eating high fat or \"junk\" foods?  Yes    Taking medications regularly:  Yes    Medication side effects:  Not applicable    Ability to successfully perform activities of daily living:  No assistance needed    Home Safety:  No safety concerns identified    Hearing Impairment:  No hearing concerns    In the past 6 months, have you been bothered by leaking of urine?  No    In general, how would you rate your overall mental or emotional health?  Excellent      PHQ-2 Total Score: 0    Additional concerns today:  Yes      Have you ever done Advance Care Planning? (For example, a Health Directive, POLST, or a discussion with a medical provider or your loved ones about your wishes): Yes, advance care planning is on file.       Fall risk  Fallen 2 or more times in the past year?: No  Any fall with injury in the past year?: No    Cognitive Screening   1) Repeat 3 items (Leader, Season, Table)    2) Clock draw: NORMAL  3) 3 item recall: Recalls 3 objects  Results: 3 items recalled: COGNITIVE IMPAIRMENT LESS LIKELY    Mini-CogTM Copyright NEGRITA Freedman. Licensed by the author for use in [facility]; reprinted with permission (harshil@.[facility]). All rights reserved.      Do you have sleep apnea, excessive snoring or daytime " drowsiness?: no    Reviewed and updated as needed this visit by clinical staff   Tobacco  Allergies  Meds              Reviewed and updated as needed this visit by Provider                 Social History     Tobacco Use     Smoking status: Never     Smokeless tobacco: Never   Substance Use Topics     Alcohol use: No     If you drink alcohol do you typically have >3 drinks per day or >7 drinks per week? No    No flowsheet data found.        Hyperlipidemia Follow-Up       Are you regularly taking any medication or supplement to lower your cholesterol?   Yes- AM    Are you having muscle aches or other side effects that you think could be caused by your cholesterol lowering medication?  No    Hypertension Follow-up      Do you check your blood pressure regularly outside of the clinic? Yes     Are you following a low salt diet? Yes    Are your blood pressures ever more than 140 on the top number (systolic) OR more   than 90 on the bottom number (diastolic), for example 140/90? No     Ovarian Caner:  --Had follow-up with oncology in January 9    A-fib  -- Follows with cardiology  --s/p ppm due to tachybradycardia  --TIA in March likely due to a-fib.  Cardiology recommends Eliquis indefinitely  --she uses meloxicam periodically, Rx from back doctor; also helps for ankle pain;  Reports she uses it 1 x month    Current providers sharing in care for this patient include:   Patient Care Team:  Kathya Escalera DO as PCP - General (Internal Medicine)  Kathya Escalera DO as Assigned PCP  Fatimah Clement MD as MD (Neurology)  Fatimah Clement MD as Assigned Neuroscience Provider  Kerry Sinclair APRN CNP as Assigned Cancer Care Provider  Jai Lam MD as MD (Cardiology)  Nba Rollins MD as Assigned Heart and Vascular Provider    The following health maintenance items are reviewed in Epic and correct as of today:  Health Maintenance   Topic Date Due     DEXA  04/27/2022     LIPID  12/04/2022      MAMMO SCREENING  02/11/2023     DTAP/TDAP/TD IMMUNIZATION (3 - Td or Tdap) 12/29/2023 (Originally 11/2/2022)     ANNUAL REVIEW OF HM ORDERS  04/27/2023     BMP  10/24/2023     MEDICARE ANNUAL WELLNESS VISIT  01/24/2024     FALL RISK ASSESSMENT  01/24/2024     COLORECTAL CANCER SCREENING  09/17/2026     ADVANCE CARE PLANNING  01/24/2028     PHQ-2 (once per calendar year)  Completed     INFLUENZA VACCINE  Completed     Pneumococcal Vaccine: 65+ Years  Completed     ZOSTER IMMUNIZATION  Completed     COVID-19 Vaccine  Completed     IPV IMMUNIZATION  Aged Out     MENINGITIS IMMUNIZATION  Aged Out     Current Outpatient Medications   Medication Sig Dispense Refill     apixaban ANTICOAGULANT (ELIQUIS ANTICOAGULANT) 5 MG tablet Take 1 tablet (5 mg) by mouth 2 times daily 180 tablet 3     diltiazem ER COATED BEADS (CARDIZEM CD/CARTIA XT) 180 MG 24 hr capsule Take 1 capsule (180 mg) by mouth daily 90 capsule 3     Evening Primrose Oil 1000 MG CAPS One daily       meloxicam (MOBIC) 15 MG tablet Take 15 mg by mouth daily as needed       metoprolol tartrate (LOPRESSOR) 50 MG tablet Take 1 tablet (50 mg) by mouth 2 times daily 180 tablet 3     mometasone (ELOCON) 0.1 % external cream Apply sparingly to affected area twice daily for 2 weeks then decrease to 1 time daily for 30 days then decrease to 2-3 times per week 45 g 11     omega 3 1000 MG CAPS Take by mouth daily        pravastatin (PRAVACHOL) 80 MG tablet Take 1 tablet (80 mg) by mouth daily 90 tablet 3     THERATEARS 0.25 % OP SOLN BID       valsartan-hydrochlorothiazide (DIOVAN HCT) 320-25 MG tablet Take 1 tablet by mouth daily 90 tablet 3     VIACTIV 500-100-40 OR CHEW once daily       Mammogram Screening: Mammogram Screening - Patient over age 75, has elected to continue with screening.      Pertinent mammograms are reviewed under the imaging tab.    Review of Systems   Constitutional: Negative for chills and fever.   HENT: Negative for congestion, ear pain,  "hearing loss and sore throat.    Eyes: Negative for pain and visual disturbance.   Respiratory: Negative for cough and shortness of breath.    Cardiovascular: Negative for chest pain, palpitations and peripheral edema.   Gastrointestinal: Negative for abdominal pain, constipation, diarrhea, heartburn, hematochezia and nausea.   Breasts:  Negative for tenderness, breast mass and discharge.   Genitourinary: Negative for dysuria, frequency, genital sores, hematuria, pelvic pain, urgency, vaginal bleeding and vaginal discharge.   Musculoskeletal: Negative for arthralgias, joint swelling and myalgias.   Skin: Negative for rash.   Neurological: Negative for dizziness, weakness, headaches and paresthesias.   Psychiatric/Behavioral: Negative for mood changes. The patient is not nervous/anxious.          OBJECTIVE:   /72 (BP Location: Right arm, Patient Position: Sitting, Cuff Size: Adult Large)   Pulse 63   Temp 98.6  F (37  C) (Tympanic)   Resp 20   Ht 1.58 m (5' 2.21\")   Wt 95.3 kg (210 lb)   SpO2 98%   BMI 38.16 kg/m   Estimated body mass index is 38.16 kg/m  as calculated from the following:    Height as of this encounter: 1.58 m (5' 2.21\").    Weight as of this encounter: 95.3 kg (210 lb).  Physical Exam  GENERAL: healthy, alert and no distress  EYES: Eyes grossly normal to inspection, PERRL and conjunctivae and sclerae normal  HENT: ear canals and TM's normal, nose and mouth without ulcers or lesions  NECK: no adenopathy, no asymmetry, masses, or scars and thyroid normal to palpation  RESP: lungs clear to auscultation - no rales, rhonchi or wheezes  CV: regular rates and rhythm, normal S1 S2, no S3 or S4, no murmur, click or rub, peripheral pulses strong and 1+ bilateral lower extremity pitting edema to ankle    ABDOMEN: soft, nontender, no hepatosplenomegaly, no masses and bowel sounds normal  MS: no gross musculoskeletal defects noted, no edema  SKIN: no suspicious lesions or rashes  NEURO: Normal " strength and tone, mentation intact and speech normal  PSYCH: mentation appears normal, affect normal/bright        ASSESSMENT / PLAN:   (Z00.00) Encounter for Medicare annual wellness exam  (primary encounter diagnosis)  Comment:   Plan:     (I48.0) Paroxysmal atrial fibrillation (H)  Comment:  - stable, refill provided; needs AC long term  Plan: diltiazem ER COATED BEADS (CARDIZEM CD/CARTIA         XT) 180 MG 24 hr capsule, apixaban         ANTICOAGULANT (ELIQUIS ANTICOAGULANT) 5 MG         tablet, OFFICE/OUTPT VISIT,EST,LEVL IV, Basic         metabolic panel, CBC with platelets            (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  Comment:  - stable, refill provided  Plan: mometasone (ELOCON) 0.1 % external cream,         OFFICE/OUTPT VISIT,EST,LEVL IV            (E78.5) Hyperlipidemia LDL goal <130  Comment:  - stable, refill provided  Plan: pravastatin (PRAVACHOL) 80 MG tablet, Lipid         panel reflex to direct LDL Non-fasting,         OFFICE/OUTPT VISIT,EST,LEVL IV            (I10) Essential hypertension, benign  Comment:  - stable, refill provided  Plan: valsartan-hydrochlorothiazide (DIOVAN HCT)         320-25 MG tablet, OFFICE/OUTPT VISIT,EST,LEVL         IV            (C56.1) Ovarian cancer, right (H)  Comment: follows with oncology  Plan: OFFICE/OUTPT VISIT,EST,LEVL IV            (E66.01,  Z68.37) Class 2 severe obesity due to excess calories with serious comorbidity and body mass index (BMI) of 37.0 to 37.9 in adult (H)  Comment: see AVS for healthy diet recommendation  Plan: OFFICE/OUTPT VISIT,EST,LEVL IV            (G45.9) TIA (transient ischemic attack)  Comment: on AC, due to occult a-fib  Plan: OFFICE/OUTPT VISIT,EST,LEVL IV            (I49.5) SSS (sick sinus syndrome) (H)  Comment: s/p pacer  Plan: OFFICE/OUTPT VISIT,EST,LEVL IV            (Z95.0) Cardiac pacemaker in situ  Comment: placed 10/22  Plan: OFFICE/OUTPT VISIT,EST,LEVL IV            (Z12.31) Visit for screening  "mammogram  Comment: patient opts to continue  Plan: MA SCREENING DIGITAL BILAT - Future  (s+30),         OFFICE/OUTPT VISIT,ESTKAROLINA IV            (Z78.0) Menopause  Comment: due  Plan: DEXA HIP/PELVIS/SPINE - Future              Patient has been advised of split billing requirements and indicates understanding: Yes      COUNSELING:  Reviewed preventive health counseling, as reflected in patient instructions      BMI:   Estimated body mass index is 38.16 kg/m  as calculated from the following:    Height as of this encounter: 1.58 m (5' 2.21\").    Weight as of this encounter: 95.3 kg (210 lb).   Weight management plan: Discussed healthy diet and exercise guidelines      She reports that she has never smoked. She has never used smokeless tobacco.      Appropriate preventive services were discussed with this patient, including applicable screening as appropriate for cardiovascular disease, diabetes, osteopenia/osteoporosis, and glaucoma.  As appropriate for age/gender, discussed screening for colorectal cancer, prostate cancer, breast cancer, and cervical cancer. Checklist reviewing preventive services available has been given to the patient.    Reviewed patients plan of care and provided an AVS. The Complex Care Plan (for patients with higher acuity and needing more deliberate coordination of services) for Rosie meets the Care Plan requirement. This Care Plan has been established and reviewed with the Patient.          Kathya Escalera, [facility]    Identified Health Risks:  "

## 2023-02-04 NOTE — PROGRESS NOTES
[facility]    I had the pleasure of seeing Rosie when she came for follow up of recent pacemaker implantation.  This 83 year old sees Dr. Rollins for her history of:    1.  H/o TIA - 3/2022  2.  Paroxysmal AFib, SSS/post conversion pauses- first noted 8/2022 (not seen on monitoring following TIA 3/2022).  Noted to have significant postconversion pause when spontaneously converted after DCCV x3 were not successful.  S/p dual-chamber [employer] PPM 10/2022 with Dr. Meyer  3.  HTN  4. R Ovarian cancer - sees Dr. Sinclair, last OV 1/2023. S/p BSO with LN dissection       Dr. Rollins saw Rosie in 10/2022 at which time they reviewed her TIA in 3/2022.  Monitoring following the procedure did not indicate any episodes of atrial fibrillation, but this was seen 8/2022, with palpitations.  Follow-up monitor showed 6% AFib burden with episodes of up to 4-second pauses when she spontaneously converted.  Therefore, recommended for dual-chamber pacemaker implantation to prevent bradycardia with her treatment of atrial fibrillation.  Underwent dual-chamber [employer] PPM 10/2022.    At time of first device check 11/1, noted to have continued episodes of symptomatic AFib for which increased metoprolol was recommended.  As symptoms were mild, no changes were recommended.  I am seeing her back for routine 3-month check.    She saw Dr. Escalera for routine follow-up 1/24 and no changes made     Interval History:  Rosie overall is doing well! She's still had some rapid HRs, a few times in January. No triggers she's aware of. No dizziness, lightheadedness. No syncope. No associated CP or SOB. Sxs resolve spontaneously.     BP and HR at home looks great. No issues with medications she's aware of. Remains on AC with Eliquis without bleeding issues.Last Hgb 10/2022 was wnl 14.3 g/dL.    No change in chronic LE edema. Denies orthopnea, PND.     VITALS:  Vitals: /67   Pulse 63   Wt 95.3 kg (210 lb)   SpO2 97%    BMI 38.16 kg/m      Diagnostic Testing:  Device interrogation 12/2022, showed 42% AP and <1%  in DDD 60/130. HR 60-80 bpm per histogram.  Normal leads.  No ventricular arrhythmias.  342 mode switches, noted 11/5 and 11/24, with atrial fibrillation seen, up to 15 h.  HR >100 bpm 60-65% after increasing metoprolol.  Rate response turned on  Echocardiogram 8/2022-LVEF 60-65%. G2 DD.  Normal RV.  1+ MR.  RVSP 30+ RAP.  Aortic sclerosis    Plan:  1. See me back at time of in-office device check 12/2023  2. Continue routine device interrogations    Assessment/Plan:    1. Paroxysmal AFib/SSS    As above, had pauses when she spontaneously converted to SR and recommended for pacemaker implantation to allow adequate treatment of her RVR    S/p dual-chamber [employer] pacemaker 10/2022.  Has subsequently had metoprolol increased to 50 mg BID.  Remains on diltiazem 180 mg daily    Most recent device interrogation indicated continued pAFib, with improved HR control on the higher dose of the Li    Continues AC for [id number] (HTN, CVA, age, sex).    PLAN:    Continue current medications    Routine device checks - will bring monitor to [place] with her in 3/2023 as 3 week trip planned!    See us back 12/2023 at time of in-office device check!          Gema Potter PA-C, MSPAS      Orders Placed This Encounter   Procedures     Follow-Up with Cardiology [facility]     No orders of the defined types were placed in this encounter.    There are no discontinued medications.      Encounter Diagnoses   Name Primary?     Atrial fibrillation, unspecified type (H)      Cardiac pacemaker in situ      Essential hypertension        CURRENT MEDICATIONS:  Current Outpatient Medications   Medication Sig Dispense Refill     apixaban ANTICOAGULANT (ELIQUIS ANTICOAGULANT) 5 MG tablet Take 1 tablet (5 mg) by mouth 2 times daily 180 tablet 3     diltiazem ER COATED BEADS (CARDIZEM CD/CARTIA XT) 180 MG 24 hr capsule Take 1 capsule (180 mg) by mouth  daily 90 capsule 3     Evening Primrose Oil 1000 MG CAPS One daily       meloxicam (MOBIC) 15 MG tablet Take 15 mg by mouth daily as needed       metoprolol tartrate (LOPRESSOR) 50 MG tablet Take 1 tablet (50 mg) by mouth 2 times daily 180 tablet 3     mometasone (ELOCON) 0.1 % external cream Apply sparingly to affected area twice daily for 2 weeks then decrease to 1 time daily for 30 days then decrease to 2-3 times per week 45 g 11     omega 3 1000 MG CAPS Take by mouth daily        pravastatin (PRAVACHOL) 80 MG tablet Take 1 tablet (80 mg) by mouth daily 90 tablet 3     THERATEARS 0.25 % OP SOLN BID       valsartan-hydrochlorothiazide (DIOVAN HCT) 320-25 MG tablet Take 1 tablet by mouth daily 90 tablet 3     VIACTIV 500-100-40 OR CHEW once daily         ALLERGIES     Allergies   Allergen Reactions     Atorvastatin Other (See Comments)     Muscle Pain     Ezetimibe Other (See Comments)     Severe muscle aches     Irbesartan Cramps     Lisinopril      Omeprazole      Other reaction(s): *Unknown     Prilosec [Omeprazole]      Rosuvastatin Other (See Comments)     Muscle Pain     Zestril [Ace Inhibitors] Cough         Review of Systems:  Skin:        Eyes:       ENT:       Respiratory:  Negative for shortness of breath;dyspnea on exertion;dyspnea at rest  Cardiovascular:  Negative for;chest pain;edema;lightheadedness;dizziness;syncope or near-syncope;fatigue Positive for;palpitations  Gastroenterology:      Genitourinary:       Musculoskeletal:       Neurologic:       Psychiatric:       Heme/Lymph/Imm:       Endocrine:         Physical Exam:  Vitals: /67   Pulse 63   Wt 95.3 kg (210 lb)   SpO2 97%   BMI 38.16 kg/m      Constitutional:  cooperative, alert and oriented, well developed, well nourished, in no acute distress        Skin:  warm and dry to the touch, no apparent skin lesions or masses noted        Head:  normocephalic, no masses or lesions        Eyes:  pupils equal and round;conjunctivae and lids  unremarkable;sclera white        ENT:  not assessed this visit        Neck:  JVP normal;no carotid bruit        Chest:  normal breath sounds, clear to auscultation, normal A-P diameter, normal symmetry, normal respiratory excursion, no use of accessory muscles        Cardiac: regular rhythm, normal S1/S2, no S3 or S4, apical impulse not displaced, no murmurs, gallops or rubs                  Abdomen:  abdomen soft        Vascular: pulses full and equal                                      Extremities and Back:  no deformities, clubbing, cyanosis, erythema observed        Neurological:  no gross motor deficits            PAST MEDICAL HISTORY:  Past Medical History:   Diagnosis Date     Chronic airway obstruction (H)      Diastolic dysfunction 6/28/2022     Gastroesophageal reflux disease      Hiatal hernia      Hypertension      Malignant neoplasm of ovary (H)      Ovarian cancer, unspecified laterality (H) 3/10/2021     Personal history of contact with and (suspected) exposure to asbestos      Primary osteoarthritis of right hip 7/9/2020       PAST SURGICAL HISTORY:  Past Surgical History:   Procedure Laterality Date     BIOPSY BREAST Left      BREAST BIOPSY, CORE RT/LT  1994     CHOLECYSTECTOMY  1995     COLONOSCOPY  05/2010    Diverticulosis, colon polyps; repeat 5 yrs     COLONOSCOPY N/A 11/16/2015    Procedure: COLONOSCOPY;  Surgeon: Alejandro Matos MD;  Location: [facility]     COLONOSCOPY N/A 9/17/2021    Procedure: COLONOSCOPY;  Surgeon: Aayush George MD;  Location: [facility]     Colonoscopy, hyperplastic polyps, repeat in 5 yrs  2004     EP PACEMAKER DEVICE & LEAD IMPLANT- RIGHT ATRIAL & RIGHT VENTRICULAR Left 10/24/2022    Procedure: Pacemaker Device & Lead Implant - Right Atrial & Right Ventricular;  Surgeon: Demond Meyer MD;  Location:  [facility]     ESOPHAGOSCOPY, GASTROSCOPY, DUODENOSCOPY (EGD), COMBINED  09/30/2013    Procedure: COMBINED ESOPHAGOSCOPY, GASTROSCOPY, DUODENOSCOPY  (EGD), BIOPSY SINGLE OR MULTIPLE;  Gastroscopy;  Surgeon: Blaise Gill MD;  Location: [facility]     EYE SURGERY      R/L Cataracts     HC REMOVE TONSILS/ADENOIDS,12+ Y/O      T & A 13+y.o.     HERNIORRHAPHY INCISIONAL (LOCATION) N/A 3/24/2021    Procedure: HERNIORRHAPHY, INCISIONAL, OPEN WITH MESH;  Surgeon: Aayush George MD;  Location: [facility]     HYSTERECTOMY, PAP NO LONGER INDICATED       LAPAROSCOPIC SALPINGO-OOPHORECTOMY N/A 2020    Procedure: Laparoscopy, removal or pelvic mass, both tubes and ovaries, omentectomy, anterior culvectomy, pelvic and para aortic lymph node dissection, laparotomy;  Surgeon: Felipe Corona MD;  Location: [facility]     [place] ANESTH,LUMBAR SPINE,CORD SURGERY  10/2020    L3-4 L4-5 TRANSFORAMINAL INTERBODY FUSION L3-5, POSTERIOR INSTRUMENTED FUSION AND DECOMPRESSION     TVH for DUB, ovaries in       VASCULAR SURGERY      [place] closure     ZZC ANESTH,KNEE VEINS SURGERY  2014       FAMILY HISTORY:  Family History   Problem Relation Age of Onset     Cancer Mother         uterine cancer,      Respiratory Mother         COPD     Cardiovascular Mother         AAA  age 80     Breast Cancer Maternal Grandmother      Cancer Maternal Grandfather         cancer unknown type     Breast Cancer Sister      Eye Disorder Father         cataracts     Depression Father      Depression Son      Depression Son      Breast Cancer Sister         X2     Cancer - colorectal No family hx of         no ovarian cancer       SOCIAL HISTORY:  Social History     Socioeconomic History     Marital status:      Spouse name: Nelson Blackman     Number of children: 2     Years of education: 13+     Highest education level: None   Occupational History     Occupation:      Comment: [employer] D[employer]   Tobacco Use     Smoking status: Never     Smokeless tobacco: Never   Vaping Use     Vaping Use: Never used   Substance and Sexual Activity     Alcohol use: No     Drug  use: No     Sexual activity: Not Currently     Partners: Male     Birth control/protection: Surgical   Other Topics Concern     Parent/sibling w/ CABG, MI or angioplasty before 65F 55M? No     Social Determinants of Health     Intimate Partner Violence: Not At Risk     Fear of Current or Ex-Partner: No     Emotionally Abused: No     Physically Abused: No     Sexually Abused: No

## 2023-02-07 ENCOUNTER — OFFICE VISIT (OUTPATIENT)
Dept: CARDIOLOGY | Facility: CLINIC | Age: 83
End: 2023-02-07
Attending: ANESTHESIOLOGY
Payer: MEDICARE

## 2023-02-07 VITALS
HEART RATE: 63 BPM | DIASTOLIC BLOOD PRESSURE: 67 MMHG | OXYGEN SATURATION: 97 % | BODY MASS INDEX: 38.16 KG/M2 | WEIGHT: 210 LBS | SYSTOLIC BLOOD PRESSURE: 119 MMHG

## 2023-02-07 DIAGNOSIS — I10 ESSENTIAL HYPERTENSION: ICD-10-CM

## 2023-02-07 DIAGNOSIS — Z95.0 CARDIAC PACEMAKER IN SITU: ICD-10-CM

## 2023-02-07 DIAGNOSIS — I48.91 ATRIAL FIBRILLATION, UNSPECIFIED TYPE (H): ICD-10-CM

## 2023-02-07 PROCEDURE — 99214 OFFICE O/P EST MOD 30 MIN: CPT | Performed by: PHYSICIAN ASSISTANT

## 2023-02-07 NOTE — PATIENT INSTRUCTIONS
Rosie - it was nice to meet you today!    At your visit today we reviewed:    You've done well following the pacer was placed  Occasional palpitations have been minimal. The metoprolol and Diltiazem seems to be working well     Medication Changes:    No changes needed. Continue Eliquis to decrease risk of recurrent TIA/stroke    Recommendations:    As you're leaving for [place] x 3 weeks, would recommend you take your monitor with you! Enjoy Young!    Follow-up:    See us for cardiology follow up in 2023 at time of in-office device check but CALL Cardiology nurses Janet & Jessie @ [phone] for any issues, questions or concerns in the interim.      To schedule a future appointment, we kindly ask that you call cardiology scheduling at [phone] three months prior to requested visit date.    Important Phone Numbers for [facility]):    [facility] Janet Roman: [phone]  Cardiology [phone]  [place] [facility] [phone]  [facility] [phone]  [facility]: [phone]

## 2023-03-01 ENCOUNTER — HOSPITAL ENCOUNTER (OUTPATIENT)
Dept: MAMMOGRAPHY | Facility: CLINIC | Age: 83
Discharge: HOME | End: 2023-03-01
Attending: INTERNAL MEDICINE | Admitting: INTERNAL MEDICINE
Payer: MEDICARE

## 2023-03-01 DIAGNOSIS — Z12.31 VISIT FOR SCREENING MAMMOGRAM: ICD-10-CM

## 2023-03-01 PROCEDURE — 77067 SCR MAMMO BI INCL CAD: CPT

## 2023-03-07 ENCOUNTER — HOSPITAL ENCOUNTER (OUTPATIENT)
Dept: BONE DENSITY | Facility: CLINIC | Age: 83
Discharge: HOME | End: 2023-03-07
Attending: INTERNAL MEDICINE | Admitting: INTERNAL MEDICINE
Payer: MEDICARE

## 2023-03-07 DIAGNOSIS — Z78.0 MENOPAUSE: ICD-10-CM

## 2023-03-07 PROCEDURE — 77080 DXA BONE DENSITY AXIAL: CPT

## 2023-03-14 ENCOUNTER — ANCILLARY PROCEDURE (OUTPATIENT)
Dept: CARDIOLOGY | Facility: CLINIC | Age: 83
End: 2023-03-14
Attending: INTERNAL MEDICINE
Payer: MEDICARE

## 2023-03-14 ENCOUNTER — TELEPHONE (OUTPATIENT)
Dept: CARDIOLOGY | Facility: CLINIC | Age: 83
End: 2023-03-14

## 2023-03-14 DIAGNOSIS — Z95.0 CARDIAC PACEMAKER IN SITU: ICD-10-CM

## 2023-03-14 DIAGNOSIS — I48.91 ATRIAL FIBRILLATION, UNSPECIFIED TYPE (H): ICD-10-CM

## 2023-03-14 DIAGNOSIS — I48.0 PAROXYSMAL ATRIAL FIBRILLATION (H): Primary | ICD-10-CM

## 2023-03-14 PROCEDURE — 93296 REM INTERROG EVL PM/IDS: CPT | Performed by: INTERNAL MEDICINE

## 2023-03-14 PROCEDURE — 93294 REM INTERROG EVL PM/LDLS PM: CPT | Performed by: INTERNAL MEDICINE

## 2023-03-14 NOTE — TELEPHONE ENCOUNTER
GIL Bear: Your patient continues to have high ventricular rates when in AF/AFL. Patient has been in mode switch 8% of the time since 12/13/22. Longest logged episode lasted 25 minutes. Ventricular rates 70-190bpm per histogram. There have been 606 ventricular high rates since 12/20/22. 7 EGMs for review show AF/AFL with RVR, longest lasting 16 minutes 49 seconds, ventricular rates 130-180bpm. Episodes occurred 2/10, 3/1 and 3/10. pt on Eliquis, Diltiazem and Metoprolol. Had to leave voicemail for patient to call back regarding symptoms. Any changes?          [employer] (D) Remote PPM Device Check  AP: 40%  : 1%  Mode: DDDR 60/130  Presenting Rhythm: AP/VS, AS/VS  Heart Rate: Adequate rates per histogram, mostly in the 60s  Sensing: stable  Pacing Threshold: stable  Impedance: stable  Battery Status: 13.5 years  Atrial Arrhythmia: Patient in mode switch 8% of the time since 12/13/22. EGMs show As>Vs for AF/AFL, longest logged episode lasted 25 minutes. Ventricular rates 70-190bpm.  Ventricular Arrhythmia: 606 ventricular high rates since 12/20/22. 7 EGMs for review show AF/AFL with RVR, longest lasting 16 minutes 49 seconds, ventricular rates 130-180bpm. Episodes occurred 2/10, 3/1 and 3/10. pt on Eliquis, Diltiazem and Metoprolol.    Care Plan: f/U PPM [place] q 3 months. Pt due to see cardiology 12/2023. LM with results. ARLINE Sharma

## 2023-03-15 LAB
MDC_IDC_EPISODE_DTM: NORMAL
MDC_IDC_EPISODE_DURATION: 100 S
MDC_IDC_EPISODE_DURATION: 1009 S
MDC_IDC_EPISODE_DURATION: 1047 S
MDC_IDC_EPISODE_DURATION: 105 S
MDC_IDC_EPISODE_DURATION: 110 S
MDC_IDC_EPISODE_DURATION: 127 S
MDC_IDC_EPISODE_DURATION: 13 S
MDC_IDC_EPISODE_DURATION: 14 S
MDC_IDC_EPISODE_DURATION: 14 S
MDC_IDC_EPISODE_DURATION: 15 S
MDC_IDC_EPISODE_DURATION: 1538 S
MDC_IDC_EPISODE_DURATION: 16 S
MDC_IDC_EPISODE_DURATION: 160 S
MDC_IDC_EPISODE_DURATION: 162 S
MDC_IDC_EPISODE_DURATION: 20 S
MDC_IDC_EPISODE_DURATION: 21 S
MDC_IDC_EPISODE_DURATION: 22 S
MDC_IDC_EPISODE_DURATION: 220 S
MDC_IDC_EPISODE_DURATION: 24 S
MDC_IDC_EPISODE_DURATION: 25 S
MDC_IDC_EPISODE_DURATION: 26 S
MDC_IDC_EPISODE_DURATION: 26 S
MDC_IDC_EPISODE_DURATION: 27 S
MDC_IDC_EPISODE_DURATION: 271 S
MDC_IDC_EPISODE_DURATION: 28 S
MDC_IDC_EPISODE_DURATION: 289 S
MDC_IDC_EPISODE_DURATION: 29 S
MDC_IDC_EPISODE_DURATION: 29 S
MDC_IDC_EPISODE_DURATION: 31 S
MDC_IDC_EPISODE_DURATION: 32 S
MDC_IDC_EPISODE_DURATION: 33 S
MDC_IDC_EPISODE_DURATION: 36 S
MDC_IDC_EPISODE_DURATION: 38 S
MDC_IDC_EPISODE_DURATION: 41 S
MDC_IDC_EPISODE_DURATION: 43 S
MDC_IDC_EPISODE_DURATION: 430 S
MDC_IDC_EPISODE_DURATION: 435 S
MDC_IDC_EPISODE_DURATION: 51 S
MDC_IDC_EPISODE_DURATION: 52 S
MDC_IDC_EPISODE_DURATION: 56 S
MDC_IDC_EPISODE_DURATION: 57 S
MDC_IDC_EPISODE_DURATION: 58 S
MDC_IDC_EPISODE_DURATION: 59 S
MDC_IDC_EPISODE_DURATION: 62 S
MDC_IDC_EPISODE_DURATION: 63 S
MDC_IDC_EPISODE_DURATION: 65 S
MDC_IDC_EPISODE_DURATION: 67 S
MDC_IDC_EPISODE_DURATION: 68 S
MDC_IDC_EPISODE_DURATION: 68 S
MDC_IDC_EPISODE_DURATION: 684 S
MDC_IDC_EPISODE_DURATION: 72 S
MDC_IDC_EPISODE_DURATION: 78 S
MDC_IDC_EPISODE_DURATION: 80 S
MDC_IDC_EPISODE_DURATION: 84 S
MDC_IDC_EPISODE_DURATION: 88 S
MDC_IDC_EPISODE_ID: NORMAL
MDC_IDC_EPISODE_TYPE: NORMAL
MDC_IDC_EPISODE_VENDOR_TYPE: NORMAL
MDC_IDC_LEAD_IMPLANT_DT: NORMAL
MDC_IDC_LEAD_IMPLANT_DT: NORMAL
MDC_IDC_LEAD_LOCATION: NORMAL
MDC_IDC_LEAD_LOCATION: NORMAL
MDC_IDC_LEAD_LOCATION_DETAIL_1: NORMAL
MDC_IDC_LEAD_LOCATION_DETAIL_1: NORMAL
MDC_IDC_LEAD_MFG: NORMAL
MDC_IDC_LEAD_MFG: NORMAL
MDC_IDC_LEAD_MODEL: NORMAL
MDC_IDC_LEAD_MODEL: NORMAL
MDC_IDC_LEAD_POLARITY_TYPE: NORMAL
MDC_IDC_LEAD_POLARITY_TYPE: NORMAL
MDC_IDC_LEAD_SERIAL: NORMAL
MDC_IDC_LEAD_SERIAL: NORMAL
MDC_IDC_MSMT_BATTERY_DTM: NORMAL
MDC_IDC_MSMT_BATTERY_REMAINING_LONGEVITY: 162 MO
MDC_IDC_MSMT_BATTERY_REMAINING_PERCENTAGE: 100 %
MDC_IDC_MSMT_BATTERY_STATUS: NORMAL
MDC_IDC_MSMT_LEADCHNL_RA_IMPEDANCE_VALUE: 710 OHM
MDC_IDC_MSMT_LEADCHNL_RA_PACING_THRESHOLD_AMPLITUDE: 0.6 V
MDC_IDC_MSMT_LEADCHNL_RA_PACING_THRESHOLD_PULSEWIDTH: 0.4 MS
MDC_IDC_MSMT_LEADCHNL_RV_IMPEDANCE_VALUE: 734 OHM
MDC_IDC_MSMT_LEADCHNL_RV_PACING_THRESHOLD_AMPLITUDE: 0.8 V
MDC_IDC_MSMT_LEADCHNL_RV_PACING_THRESHOLD_PULSEWIDTH: 0.4 MS
MDC_IDC_PG_IMPLANT_DTM: NORMAL
MDC_IDC_PG_MFG: NORMAL
MDC_IDC_PG_MODEL: NORMAL
MDC_IDC_PG_SERIAL: NORMAL
MDC_IDC_PG_TYPE: NORMAL
MDC_IDC_SESS_CLINIC_NAME: NORMAL
MDC_IDC_SESS_DTM: NORMAL
MDC_IDC_SESS_TYPE: NORMAL
MDC_IDC_SET_BRADY_AT_MODE_SWITCH_MODE: NORMAL
MDC_IDC_SET_BRADY_AT_MODE_SWITCH_RATE: 170 {BEATS}/MIN
MDC_IDC_SET_BRADY_LOWRATE: 60 {BEATS}/MIN
MDC_IDC_SET_BRADY_MAX_SENSOR_RATE: 130 {BEATS}/MIN
MDC_IDC_SET_BRADY_MAX_TRACKING_RATE: 130 {BEATS}/MIN
MDC_IDC_SET_BRADY_MODE: NORMAL
MDC_IDC_SET_BRADY_PAV_DELAY_HIGH: 200 MS
MDC_IDC_SET_BRADY_PAV_DELAY_LOW: 300 MS
MDC_IDC_SET_BRADY_SAV_DELAY_HIGH: 200 MS
MDC_IDC_SET_BRADY_SAV_DELAY_LOW: 300 MS
MDC_IDC_SET_LEADCHNL_RA_PACING_AMPLITUDE: 2 V
MDC_IDC_SET_LEADCHNL_RA_PACING_CAPTURE_MODE: NORMAL
MDC_IDC_SET_LEADCHNL_RA_PACING_POLARITY: NORMAL
MDC_IDC_SET_LEADCHNL_RA_PACING_PULSEWIDTH: 0.4 MS
MDC_IDC_SET_LEADCHNL_RA_SENSING_ADAPTATION_MODE: NORMAL
MDC_IDC_SET_LEADCHNL_RA_SENSING_POLARITY: NORMAL
MDC_IDC_SET_LEADCHNL_RA_SENSING_SENSITIVITY: 0.15 MV
MDC_IDC_SET_LEADCHNL_RV_PACING_AMPLITUDE: 1.4 V
MDC_IDC_SET_LEADCHNL_RV_PACING_CAPTURE_MODE: NORMAL
MDC_IDC_SET_LEADCHNL_RV_PACING_POLARITY: NORMAL
MDC_IDC_SET_LEADCHNL_RV_PACING_PULSEWIDTH: 0.4 MS
MDC_IDC_SET_LEADCHNL_RV_SENSING_ADAPTATION_MODE: NORMAL
MDC_IDC_SET_LEADCHNL_RV_SENSING_POLARITY: NORMAL
MDC_IDC_SET_LEADCHNL_RV_SENSING_SENSITIVITY: 1.5 MV
MDC_IDC_SET_ZONE_DETECTION_INTERVAL: 375 MS
MDC_IDC_SET_ZONE_TYPE: NORMAL
MDC_IDC_SET_ZONE_VENDOR_TYPE: NORMAL
MDC_IDC_STAT_AT_BURDEN_PERCENT: 8 %
MDC_IDC_STAT_AT_DTM_END: NORMAL
MDC_IDC_STAT_AT_DTM_START: NORMAL
MDC_IDC_STAT_BRADY_DTM_END: NORMAL
MDC_IDC_STAT_BRADY_DTM_START: NORMAL
MDC_IDC_STAT_BRADY_RA_PERCENT_PACED: 40 %
MDC_IDC_STAT_BRADY_RV_PERCENT_PACED: 1 %
MDC_IDC_STAT_EPISODE_RECENT_COUNT: 0
MDC_IDC_STAT_EPISODE_RECENT_COUNT: 1
MDC_IDC_STAT_EPISODE_RECENT_COUNT: 160
MDC_IDC_STAT_EPISODE_RECENT_COUNT: 2429
MDC_IDC_STAT_EPISODE_RECENT_COUNT: 489
MDC_IDC_STAT_EPISODE_RECENT_COUNT_DTM_END: NORMAL
MDC_IDC_STAT_EPISODE_RECENT_COUNT_DTM_START: NORMAL
MDC_IDC_STAT_EPISODE_TYPE: NORMAL
MDC_IDC_STAT_EPISODE_VENDOR_TYPE: NORMAL

## 2023-04-03 ENCOUNTER — TELEPHONE (OUTPATIENT)
Dept: CARDIOLOGY | Facility: CLINIC | Age: 83
End: 2023-04-03
Payer: MEDICARE

## 2023-04-03 ENCOUNTER — TELEPHONE (OUTPATIENT)
Dept: FAMILY MEDICINE | Facility: CLINIC | Age: 83
End: 2023-04-03
Payer: MEDICARE

## 2023-04-03 NOTE — TELEPHONE ENCOUNTER
"Pt called back regarding symptoms:    Pt states that she recalls each day she had an episode of RVR. Her symptoms typically occur during the night. They wake her up but aren't severe. She states she feels like her body is \"vibrating\" and her pulse is elevated. She just gets a \"funny feeling.\"    Any changes?  "

## 2023-04-03 NOTE — TELEPHONE ENCOUNTER
"Remote alert received for tachy episodes on 4/1/23.  Pt had multiple back to back episodes logged with EGMs showing AFlutter.  Per logbook episodes, rates started in the 160-170s and slowed down to the 90-100s.     Patient has hx of AF and is on ELiquis, Diltiazem, and Metoprolol Tartrate.  Presenting EGM on 4/2/23 at 0002 shows patient remains in AFlutter.     Per [employer] on 4/3/23: \"Pt called in to report she felt symptomatic 4/1- this episode woke her up in the middle of the night. She states she felt \"icky\" in the morning but felt better as the day went on. eml\"    Called requested patient send in a remote transmission to see if she remains in AFlutter.  Transmission received and shows that she came out of AF on 4/2/23 at approximately 0230.  Episode lasted 18.1 hours per graphic trends.  Current presenting is an AP/VS rhythm in the 60s.     Message was already routed to Dr. Rollins by [employer].  Will route FYATIYA regarding above.    CLEMENTINA Bustamante       Histogram during AF:       AF/AFL EGM with rapid rates from 4/2/23:       2nd EGM example from 4/2/23 showing AF/AFL with slower rates:       Presenting 4/3/23 showing return to AP/VS rhythm:           "

## 2023-04-03 NOTE — TELEPHONE ENCOUNTER
Informed of dexa scan results from 3/7/23, patient reports she is currently taking vit D 1000 international unit(s) every day.    Update sent to PCP.    Julie Behrendt RN

## 2023-04-04 RX ORDER — VITAMIN B COMPLEX
1 TABLET ORAL DAILY
COMMUNITY
Start: 2023-04-04 | End: 2023-09-21

## 2023-04-11 NOTE — TELEPHONE ENCOUNTER
Nba Rollins MD  You Yesterday (9:55 AM)     QP  Af/afl likely to recur in the future. It would be best to consider Flecainide. Let's get a lexican nuc stress test first. Thanks, qp      Received above response from Dr. Rollins.  Called and updated patient on Dr. Rollins's response and recommendations.  Patient verbalized understanding and agreement with plan.      Order for stress test entered and message sent to [facility] to reach out to patient to schedule. Scheduling phone number also provided to patient.    CLEMENTINA Bustamante

## 2023-04-19 ENCOUNTER — HOSPITAL ENCOUNTER (OUTPATIENT)
Dept: NUCLEAR MEDICINE | Facility: CLINIC | Age: 83
Setting detail: NUCLEAR MEDICINE
Discharge: HOME | End: 2023-04-19
Attending: INTERNAL MEDICINE
Payer: MEDICARE

## 2023-04-19 ENCOUNTER — HOSPITAL ENCOUNTER (OUTPATIENT)
Dept: CARDIOLOGY | Facility: CLINIC | Age: 83
Discharge: HOME | End: 2023-04-19
Attending: INTERNAL MEDICINE
Payer: MEDICARE

## 2023-04-19 DIAGNOSIS — I48.0 PAROXYSMAL ATRIAL FIBRILLATION (H): ICD-10-CM

## 2023-04-19 LAB
CV STRESS MAX HR HE: 81
RATE PRESSURE PRODUCT: NORMAL
STRESS ECHO BASELINE DIASTOLIC HE: 66
STRESS ECHO BASELINE HR: 60 BPM
STRESS ECHO BASELINE SYSTOLIC BP: 128
STRESS ECHO CALCULATED PERCENT HR: 59 %
STRESS ECHO LAST STRESS DIASTOLIC BP: 68
STRESS ECHO LAST STRESS SYSTOLIC BP: 130
STRESS ECHO TARGET HR: 138

## 2023-04-19 PROCEDURE — G1010 CDSM STANSON: HCPCS | Performed by: INTERNAL MEDICINE

## 2023-04-19 PROCEDURE — A9502 TC99M TETROFOSMIN: HCPCS | Performed by: INTERNAL MEDICINE

## 2023-04-19 PROCEDURE — 93018 CV STRESS TEST I&R ONLY: CPT | Performed by: INTERNAL MEDICINE

## 2023-04-19 PROCEDURE — 343N000001 HC RX 343: Performed by: INTERNAL MEDICINE

## 2023-04-19 PROCEDURE — 78452 HT MUSCLE IMAGE SPECT MULT: CPT | Mod: 26 | Performed by: INTERNAL MEDICINE

## 2023-04-19 PROCEDURE — 250N000011 HC RX IP 250 OP 636: Performed by: INTERNAL MEDICINE

## 2023-04-19 PROCEDURE — 78452 HT MUSCLE IMAGE SPECT MULT: CPT | Mod: ME

## 2023-04-19 PROCEDURE — 93017 CV STRESS TEST TRACING ONLY: CPT

## 2023-04-19 PROCEDURE — 93016 CV STRESS TEST SUPVJ ONLY: CPT | Performed by: INTERNAL MEDICINE

## 2023-04-19 PROCEDURE — G1010 CDSM STANSON: HCPCS

## 2023-04-19 RX ORDER — REGADENOSON 0.08 MG/ML
0.4 INJECTION, SOLUTION INTRAVENOUS ONCE
Status: COMPLETED | OUTPATIENT
Start: 2023-04-19 | End: 2023-04-19

## 2023-04-19 RX ADMIN — TETROFOSMIN 11 MILLICURIE: 1.38 INJECTION, POWDER, LYOPHILIZED, FOR SOLUTION INTRAVENOUS at 07:50

## 2023-04-19 RX ADMIN — TETROFOSMIN 31.3 MILLICURIE: 1.38 INJECTION, POWDER, LYOPHILIZED, FOR SOLUTION INTRAVENOUS at 09:11

## 2023-04-19 RX ADMIN — REGADENOSON 0.4 MG: 0.08 INJECTION, SOLUTION INTRAVENOUS at 09:04

## 2023-04-24 ENCOUNTER — TELEPHONE (OUTPATIENT)
Dept: CARDIOLOGY | Facility: CLINIC | Age: 83
End: 2023-04-24
Payer: MEDICARE

## 2023-04-24 NOTE — TELEPHONE ENCOUNTER
Patient called regarding Stress Test results. Her results are available in her MyChart but she has not received a call. She has questions and is wondering what to do next seeing as the results were abnormal.     Alicia Ventura had routed a message attached to results regarding whether patient should be followed by a general cardiologist. Will route message to Ross Ventura now to determine who will be contact patient regarding follow up.    MARRY MCRAE

## 2023-04-25 NOTE — CONFIDENTIAL NOTE
See Lexiscan results for updated recommendations and communication with patient. Gm Ventura RN  
normal...
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
## 2023-05-24 ENCOUNTER — MYC MEDICAL ADVICE (OUTPATIENT)
Dept: CARDIOLOGY | Facility: CLINIC | Age: 83
End: 2023-05-24
Payer: MEDICARE

## 2023-06-09 ENCOUNTER — OFFICE VISIT (OUTPATIENT)
Dept: CARDIOLOGY | Facility: CLINIC | Age: 83
End: 2023-06-09
Payer: MEDICARE

## 2023-06-09 VITALS
DIASTOLIC BLOOD PRESSURE: 60 MMHG | BODY MASS INDEX: 37.98 KG/M2 | RESPIRATION RATE: 14 BRPM | WEIGHT: 209 LBS | HEART RATE: 64 BPM | SYSTOLIC BLOOD PRESSURE: 126 MMHG

## 2023-06-09 DIAGNOSIS — Z00.6 EXAMINATION OF PARTICIPANT OR CONTROL IN CLINICAL RESEARCH: Primary | ICD-10-CM

## 2023-06-09 PROCEDURE — 99207 PR NO CHARGE-RESEARCH SERVICE: CPT

## 2023-06-09 PROCEDURE — 84999 UNLISTED CHEMISTRY PROCEDURE: CPT

## 2023-06-09 PROCEDURE — 36415 COLL VENOUS BLD VENIPUNCTURE: CPT

## 2023-06-09 PROCEDURE — 93000 ELECTROCARDIOGRAM COMPLETE: CPT | Performed by: GENERAL ACUTE CARE HOSPITAL

## 2023-06-09 NOTE — PROGRESS NOTES
OCEANIC-AF (Phase 3 program of the Oral faCtor Eleven A iNhibitor asundexIan as novel antithrombotiC - Atrial Fibrillation study)     A multicenter, international randomized active comparator-controlled, double-blind, double-dummy, parallel-group, 2-arm, Phase 3 study to compare the efficacy and safety of the oral FXIa inhibitor asundexian (BAY [id number]) with apixaban for the prevention of stroke or systemic embolism in male and female participants aged 18 years and older with atrial fibrillation at risk for stroke     Subject seen for screening visit to discuss review the study details/consent form and collect qualifying data per protocol.    The study discussion included the following:     Study purpose    Qualifications for participation    Length of study participation    Study procedures    Risks and side effects    Benefits (if any)    Voluntary nature of participation    Alternatives to participation    Confidentiality of records    Financial considerations     Subject asked questions and agreed that she received answers that satisfied her.    Consent form (Version 24 Jan 2023 revised 15 Mar 2023) signed by the subject and data tokenization.   A signed copy was given to the subject & a copy was forwarded to medical records.    No study procedures were done prior to obtaining informed consent.     Inclusion/exclusion criteria reviewed.    Female participants only: Childbearing potential? No    Race     Enrolled in another study with the compound Asundexian No     Takes Meloxicam every month as needed for pain, does not take as ordered daily (per patient).        12 Lead EKG done.     Vitals:    06/09/23 0808   BP: 126/60   BP Location: Left arm   Patient Position: Sitting   Cuff Size: Adult Large   Pulse: 64   Resp: 14   Weight: 94.8 kg (209 lb)       Central labs drawn per protocol.        Current Outpatient Medications:      apixaban ANTICOAGULANT (ELIQUIS ANTICOAGULANT) 5 MG tablet, Take 1 tablet (5  mg) by mouth 2 times daily, Disp: 180 tablet, Rfl: 3     diltiazem ER COATED BEADS (CARDIZEM CD/CARTIA XT) 180 MG 24 hr capsule, Take 1 capsule (180 mg) by mouth daily, Disp: 90 capsule, Rfl: 3     Evening Primrose Oil 1000 MG CAPS, One daily, Disp: , Rfl:      meloxicam (MOBIC) 15 MG tablet, Take 15 mg by mouth daily as needed, Disp: , Rfl:      metoprolol tartrate (LOPRESSOR) 50 MG tablet, Take 1 tablet (50 mg) by mouth 2 times daily, Disp: 180 tablet, Rfl: 3     mometasone (ELOCON) 0.1 % external cream, Apply sparingly to affected area twice daily for 2 weeks then decrease to 1 time daily for 30 days then decrease to 2-3 times per week, Disp: 45 g, Rfl: 11     omega 3 1000 MG CAPS, Take 1 capsule by mouth daily, Disp: , Rfl:      pravastatin (PRAVACHOL) 80 MG tablet, Take 1 tablet (80 mg) by mouth daily, Disp: 90 tablet, Rfl: 3     THERATEARS 0.25 % OP SOLN, BID, Disp: , Rfl:      valsartan-hydrochlorothiazide (DIOVAN HCT) 320-25 MG tablet, Take 1 tablet by mouth daily, Disp: 90 tablet, Rfl: 3     VIACTIV 500-100-40 OR CHEW, Take 1 chew tab by mouth daily, Disp: , Rfl:      Vitamin D3 (CHOLECALCIFEROL) 25 mcg (1000 units) tablet, Take 1 tablet (25 mcg) by mouth daily, Disp: , Rfl:        Past Medical History:   Diagnosis Date     Chronic airway obstruction (H)      Diastolic dysfunction 6/28/2022     Gastroesophageal reflux disease      Hiatal hernia      Hypertension      Malignant neoplasm of ovary (H)      Ovarian cancer, unspecified laterality (H) 3/10/2021     Personal history of contact with and (suspected) exposure to asbestos      Primary osteoarthritis of right hip 7/9/2020        Kylie Sandoval RN

## 2023-06-09 NOTE — LETTER
6/9/2023    Kahtya Escalera, DO  [address] [facility] [address]    RE: Rosie Blackman       Dear Colleague,     I had the pleasure of seeing Rosie Blackman in the [facility].  OCEANIC-AF (Phase 3 program of the Oral faCtor Eleven A iNhibitor asundexIan as novel antithrombotiC - Atrial Fibrillation study)     A multicenter, international randomized active comparator-controlled, double-blind, double-dummy, parallel-group, 2-arm, Phase 3 study to compare the efficacy and safety of the oral FXIa inhibitor asundexian (BAY [id number]) with apixaban for the prevention of stroke or systemic embolism in male and female participants aged 18 years and older with atrial fibrillation at risk for stroke     Subject seen for screening visit to discuss review the study details/consent form and collect qualifying data per protocol.    The study discussion included the following:   Study purpose  Qualifications for participation  Length of study participation  Study procedures  Risks and side effects  Benefits (if any)  Voluntary nature of participation  Alternatives to participation  Confidentiality of records  Financial considerations     Subject asked questions and agreed that she received answers that satisfied her.    Consent form (Version 24 Jan 2023 revised 15 Mar 2023) signed by the subject and data tokenization.   A signed copy was given to the subject & a copy was forwarded to medical records.    No study procedures were done prior to obtaining informed consent.     Inclusion/exclusion criteria reviewed.    Female participants only: Childbearing potential? No    Race     Enrolled in another study with the compound Asundexian No     Takes Meloxicam every month as needed for pain, does not take as ordered daily (per patient).        12 Lead EKG done.     Vitals:    06/09/23 0808   BP: 126/60   BP Location: Left arm   Patient Position: Sitting   Cuff Size: Adult Large   Pulse: 64   Resp:  14   Weight: 94.8 kg (209 lb)       Central labs drawn per protocol.        Current Outpatient Medications:     apixaban ANTICOAGULANT (ELIQUIS ANTICOAGULANT) 5 MG tablet, Take 1 tablet (5 mg) by mouth 2 times daily, Disp: 180 tablet, Rfl: 3    diltiazem ER COATED BEADS (CARDIZEM CD/CARTIA XT) 180 MG 24 hr capsule, Take 1 capsule (180 mg) by mouth daily, Disp: 90 capsule, Rfl: 3    Evening Primrose Oil 1000 MG CAPS, One daily, Disp: , Rfl:     meloxicam (MOBIC) 15 MG tablet, Take 15 mg by mouth daily as needed, Disp: , Rfl:     metoprolol tartrate (LOPRESSOR) 50 MG tablet, Take 1 tablet (50 mg) by mouth 2 times daily, Disp: 180 tablet, Rfl: 3    mometasone (ELOCON) 0.1 % external cream, Apply sparingly to affected area twice daily for 2 weeks then decrease to 1 time daily for 30 days then decrease to 2-3 times per week, Disp: 45 g, Rfl: 11    omega 3 1000 MG CAPS, Take 1 capsule by mouth daily, Disp: , Rfl:     pravastatin (PRAVACHOL) 80 MG tablet, Take 1 tablet (80 mg) by mouth daily, Disp: 90 tablet, Rfl: 3    THERATEARS 0.25 % OP SOLN, BID, Disp: , Rfl:     valsartan-hydrochlorothiazide (DIOVAN HCT) 320-25 MG tablet, Take 1 tablet by mouth daily, Disp: 90 tablet, Rfl: 3    VIACTIV 500-100-40 OR CHEW, Take 1 chew tab by mouth daily, Disp: , Rfl:     Vitamin D3 (CHOLECALCIFEROL) 25 mcg (1000 units) tablet, Take 1 tablet (25 mcg) by mouth daily, Disp: , Rfl:        Past Medical History:   Diagnosis Date    Chronic airway obstruction (H)     Diastolic dysfunction 6/28/2022    Gastroesophageal reflux disease     Hiatal hernia     Hypertension     Malignant neoplasm of ovary (H)     Ovarian cancer, unspecified laterality (H) 3/10/2021    Personal history of contact with and (suspected) exposure to asbestos     Primary osteoarthritis of right hip 7/9/2020        Thank you for allowing me to participate in the care of your patient.      Sincerely,     Kylie Sandoval RN     [facility]  [facility]  cc:   No referring provider defined for this encounter.

## 2023-06-12 LAB
ATRIAL RATE - MUSE: 64 BPM
DIASTOLIC BLOOD PRESSURE - MUSE: NORMAL MMHG
INTERPRETATION ECG - MUSE: NORMAL
P AXIS - MUSE: 3 DEGREES
PR INTERVAL - MUSE: 200 MS
QRS DURATION - MUSE: 92 MS
QT - MUSE: 402 MS
QTC - MUSE: 414 MS
R AXIS - MUSE: -3 DEGREES
SYSTOLIC BLOOD PRESSURE - MUSE: NORMAL MMHG
T AXIS - MUSE: 20 DEGREES
VENTRICULAR RATE- MUSE: 64 BPM

## 2023-06-14 ENCOUNTER — DOCUMENTATION ONLY (OUTPATIENT)
Dept: CARDIOLOGY | Facility: CLINIC | Age: 83
End: 2023-06-14
Payer: MEDICARE

## 2023-06-14 PROCEDURE — 99207 PR NO CHARGE-RESEARCH SERVICE: CPT

## 2023-06-14 NOTE — PROGRESS NOTES
Did subject meet all Inclusion Criteria? Yes    Did subject meet any Exclusion criteria? No      OCEANIC-AF 19767 Inclusion/Exclusion Criteria     Inclusion Criteria- Participants are eligible to be included in the study only if all of the following criteria apply:  Yes No Criteria    [x] [] 1 18 years of age or older (at legal age of consent according to local legislation? At the time of signing the informed consent    [x] [] 2 Atrial fibrillation documented by ECG evidence* with an indication for indefinite treatment with an oral anticoagulant   [x] [] 3 [id number] score ? 3 if male or ? 4 if female, OR  [id number] score of 2 if male or 3 if female and at least one of the following enrichment criteria:     age ? 70     previous stroke, transient ischemic attack, or systemic embolism     renal dysfunction with eGFR <50 ml/min within 14 days prior to randomization     prior episode of non-traumatic major bleeding     current single agent antiplatelet therapy planned to continue for at least 6 months after randomization     ? 6 consecutive weeks of treatment with oral anticoagulant prior to randomization.   [x] [] 4 Male or female  Contraceptive use by men and women should be consistent with local regulations regarding the methods of contraception for those participating in clinical studies.  Male participants: there are no measures required for the study  Female participants: a female participant is eligible to participate if she is not pregnant or breastfeeding, and one of the following conditions applies:     is a woman of nonchildbearing potential (WONCBP) as defined in Appendix 4 OR     is a woman of childbearing potential (WOCBP) and using a contraceptive method that is highly effective, with a failure rate of < 1%, as described in Appendix 4 during the study intervention period and for at least 4 days after the last dose of study intervention. The investigator should evaluate the potential for  contraceptive method failure (e.g. noncompliance, recently initiated) in relationship to the first dose of study intervention.  A WOCBP must have a negative highly sensitive pregnancy test (urine or serum as required by local regulations) within 4 days before the first dose of study intervention, see Section 8.3.8. If a urine test cannot be confirmed as negative (e.g. an ambiguous result), a serum pregnancy test is required. In such cases, the participant must be excluded from participation if the serum pregnancy result is positive.     Additional requirements for pregnancy testing during and after study intervention are located in Section 8.3.8. The investigator is responsible for review of medical   [x] [] 5 Capable of giving signed informed consent as described in Section 10.1.3which includes compliance with the requirements and restrictions listed in the informed consent form (ICF) and in this protocol.           Exclusion Criteria- Participants are excluded from the study if any of the following criteria apply:  Yes No Criteria    [] [x] 1 Mechanical heart valve prosthesis (not including transcatheter aortic valve replacement)   [] [x] 2 Moderate-to-severe mitral stenosis at the time of inclusion into the study   [] [x] 3 Atrial fibrillation only due to reversible cause (e.g. thyrotoxicosis, endocarditis, pneumonia, pulmonary embolism)   [] [x] 4 Participants after successful ablation therapy without documented recurrent AF or participants after left atrial appendage (ALEKSANDER) occlusion/exclusion or plan for ablation or ALEKSANDER occlusion / exclusion within the next 6 months starting from randomization   [] [x] 5 Recent ischemic stroke (within 7 days prior to randomization)   [] [x] 6 Active non-trivial bleeding; known chronic bleeding disorder (e.g. von Willebrand disease or other coagulopathies); history of non-traumatic intracranial hemorrhage (does not include cerebral microbleeds or asymptomatic hemorrhagic  transformation of an ischemic stroke)   [] [x] 7 Known significant liver disease (e.g. acute hepatitis, chronic active hepatitis, cirrhosis) or known hepatic insufficiency classified as Child-Harris B or C at randomization   [] [x] 8 Estimated glomerular filtration rate (eGFR) < 25 mL/min/1.73 [id number] 14 days prior to randomization, calculated by Chronic Kidney Disease Epidemiology Collaboration (CKD-EPI) formula (see Section 10.6), or on dialysis or expected to be started on dialysis within the next 12 months starting from randomization   [] [x] 9 Major surgery during the last 30 days prior to randomization   [] [x] 10 Known allergy, intolerance or hypersensitivity to either of the study interventions (active substance or excipients)   [] [x] 11 Any contraindication for the use of an anticoagulant or listed in the local labelling for apixaban.     Prior/ Concomitant Therapy  Yes No Criteria    [] [x] 12 Requirement for chronic anticoagulation for a different indication than AF, e.g. mechanical heart valve or left ventricular cardiac thrombus (atrial thrombus is allowed), or dual antiplatelet therapy (single agent therapy is allowed)   [] [x] 13 Treatment with Vitamin K antagonist (VKA) in the 10 days prior to randomization (International Normalized Ratio [INR] ?2 prior to randomization if VKA was used)   [] [x] 14 14. Concomitant use of or anticipated need for:     daily or near daily (> 5 days per week) therapy with nonsteroidal anti-inflammatory drugs (NSAIDs) for more than 4 weeks during the study period     herbal or traditional medicine, and/or supplements with known anticoagulant and/or antiplatelet effect     combined P-glycoprotein (P-gp) and strong cytochrome P450 isoenzyme 3A4 (CY) inhibitors e.g. human immunodeficiency virus protease inhibitors, systemically used azole antimycotic agents, clarithromycin, nefazodone     combined P-gp and strong/moderate CY inducers, e.g. phenytoin, carbamazepine,  phenobarbital, rifampicin or Aleena's wort.   Respective substances (apart from NSAIDs) must be stopped - in case of combined inhibitors/inducers of CY and P-gp for at least 14 days (or at least five half-lives of the active substance, whichever is longer) - before randomization and first intake of study intervention.     Prior/ Concurrent Clinical Study Experience   Yes No Criteria    [] [x] 15 Previous (within 30 days or 5 half-lives of the investigational drug, whichever is longer) or concomitant participation in another clinical study with investigational medicinal product(s) or device(s). Registries and observational studies are allowed.     Other Exclusions   Yes No Criteria    [] [x] 16 Known current alcohol and/or illicit drug abuse that may interfere with the participant's safety and/or compliance at the discretion of the investigator   [] [x] 17 Close affiliation with the investigational site; e.g. a close relative of the investigator, or a dependent person (e.g. employee or student of the investigational site or the sponsor)   [] [x] 18 Any other history, condition or therapy, or uncontrolled intercurrent illness which would make the participant unsuitable for the study (e.g. noncompliance) or otherwise vulnerable (e.g. participant in custody by order of an authority or a court) or life expectancy < 12 months.     *AF should be documented within the last 12 months prior to randomization, either on a 6 (or more)-lead ECG or otherwise as an episode of AF of at least 30 seconds in case of continuous ECG recording, rhythm strip, pacemaker or implantable cardiac defibrillator interrogation    [id number] Score: 6 points - A score of 5 or greater represents a 7.2 - 12.2% annual risk of major embolic event, without anti-coagulation or an LAAO device.     Dr. Ryder: I agree with the above statement and this patient qualifies for this study.   [x] YES

## 2023-06-16 ENCOUNTER — TRANSFERRED RECORDS (OUTPATIENT)
Dept: CARDIOLOGY | Facility: CLINIC | Age: 83
End: 2023-06-16
Payer: MEDICARE

## 2023-06-16 NOTE — PROGRESS NOTES
----- Message from Tatianna Chris MD sent at 12/11/2019  7:53 PM CST -----  Please inform patient that all of the labs are normal. Follow-up as planned. Thank you very much.   Neena 10 research labs ncs including low tc, high glucose, high ldh, high ggt.  LF

## 2023-06-20 ENCOUNTER — ANCILLARY PROCEDURE (OUTPATIENT)
Dept: CARDIOLOGY | Facility: CLINIC | Age: 83
End: 2023-06-20
Attending: INTERNAL MEDICINE
Payer: MEDICARE

## 2023-06-20 DIAGNOSIS — Z95.0 CARDIAC PACEMAKER IN SITU: ICD-10-CM

## 2023-06-20 DIAGNOSIS — I48.91 ATRIAL FIBRILLATION, UNSPECIFIED TYPE (H): ICD-10-CM

## 2023-06-20 PROCEDURE — 93294 REM INTERROG EVL PM/LDLS PM: CPT | Performed by: INTERNAL MEDICINE

## 2023-06-20 PROCEDURE — 93296 REM INTERROG EVL PM/IDS: CPT | Performed by: INTERNAL MEDICINE

## 2023-06-22 ENCOUNTER — OFFICE VISIT (OUTPATIENT)
Dept: CARDIOLOGY | Facility: CLINIC | Age: 83
End: 2023-06-22
Payer: MEDICARE

## 2023-06-22 DIAGNOSIS — Z00.6 EXAMINATION OF PARTICIPANT OR CONTROL IN CLINICAL RESEARCH: Primary | ICD-10-CM

## 2023-06-22 LAB
MDC_IDC_EPISODE_DTM: NORMAL
MDC_IDC_EPISODE_DURATION: 108 S
MDC_IDC_EPISODE_DURATION: 115 S
MDC_IDC_EPISODE_DURATION: 119 S
MDC_IDC_EPISODE_DURATION: 14 S
MDC_IDC_EPISODE_DURATION: 145 S
MDC_IDC_EPISODE_DURATION: 149 S
MDC_IDC_EPISODE_DURATION: 15 S
MDC_IDC_EPISODE_DURATION: 18 S
MDC_IDC_EPISODE_DURATION: 2 S
MDC_IDC_EPISODE_DURATION: 2 S
MDC_IDC_EPISODE_DURATION: 20 S
MDC_IDC_EPISODE_DURATION: 20 S
MDC_IDC_EPISODE_DURATION: 200 S
MDC_IDC_EPISODE_DURATION: 21 S
MDC_IDC_EPISODE_DURATION: 21 S
MDC_IDC_EPISODE_DURATION: 22 S
MDC_IDC_EPISODE_DURATION: 227 S
MDC_IDC_EPISODE_DURATION: 229 S
MDC_IDC_EPISODE_DURATION: 23 S
MDC_IDC_EPISODE_DURATION: 26 S
MDC_IDC_EPISODE_DURATION: 26 S
MDC_IDC_EPISODE_DURATION: 27 S
MDC_IDC_EPISODE_DURATION: 28 S
MDC_IDC_EPISODE_DURATION: 28 S
MDC_IDC_EPISODE_DURATION: 3 S
MDC_IDC_EPISODE_DURATION: 30 S
MDC_IDC_EPISODE_DURATION: 31 S
MDC_IDC_EPISODE_DURATION: 31 S
MDC_IDC_EPISODE_DURATION: 32 S
MDC_IDC_EPISODE_DURATION: 33 S
MDC_IDC_EPISODE_DURATION: 33 S
MDC_IDC_EPISODE_DURATION: 34 S
MDC_IDC_EPISODE_DURATION: 36 S
MDC_IDC_EPISODE_DURATION: 37 S
MDC_IDC_EPISODE_DURATION: 38 S
MDC_IDC_EPISODE_DURATION: 4 S
MDC_IDC_EPISODE_DURATION: 40 S
MDC_IDC_EPISODE_DURATION: 41 S
MDC_IDC_EPISODE_DURATION: 433 S
MDC_IDC_EPISODE_DURATION: 46 S
MDC_IDC_EPISODE_DURATION: 47 S
MDC_IDC_EPISODE_DURATION: 48 S
MDC_IDC_EPISODE_DURATION: 48 S
MDC_IDC_EPISODE_DURATION: 49 S
MDC_IDC_EPISODE_DURATION: 51 S
MDC_IDC_EPISODE_DURATION: 54 S
MDC_IDC_EPISODE_DURATION: 57 S
MDC_IDC_EPISODE_DURATION: 61 S
MDC_IDC_EPISODE_DURATION: 61 S
MDC_IDC_EPISODE_DURATION: 62 S
MDC_IDC_EPISODE_DURATION: 62 S
MDC_IDC_EPISODE_DURATION: 66 S
MDC_IDC_EPISODE_DURATION: 69 S
MDC_IDC_EPISODE_DURATION: 71 S
MDC_IDC_EPISODE_DURATION: 77 S
MDC_IDC_EPISODE_DURATION: 87 S
MDC_IDC_EPISODE_DURATION: 90 S
MDC_IDC_EPISODE_DURATION: 90 S
MDC_IDC_EPISODE_ID: NORMAL
MDC_IDC_EPISODE_TYPE: NORMAL
MDC_IDC_EPISODE_VENDOR_TYPE: NORMAL
MDC_IDC_LEAD_IMPLANT_DT: NORMAL
MDC_IDC_LEAD_IMPLANT_DT: NORMAL
MDC_IDC_LEAD_LOCATION: NORMAL
MDC_IDC_LEAD_LOCATION: NORMAL
MDC_IDC_LEAD_LOCATION_DETAIL_1: NORMAL
MDC_IDC_LEAD_LOCATION_DETAIL_1: NORMAL
MDC_IDC_LEAD_MFG: NORMAL
MDC_IDC_LEAD_MFG: NORMAL
MDC_IDC_LEAD_MODEL: NORMAL
MDC_IDC_LEAD_MODEL: NORMAL
MDC_IDC_LEAD_POLARITY_TYPE: NORMAL
MDC_IDC_LEAD_POLARITY_TYPE: NORMAL
MDC_IDC_LEAD_SERIAL: NORMAL
MDC_IDC_LEAD_SERIAL: NORMAL
MDC_IDC_MSMT_BATTERY_DTM: NORMAL
MDC_IDC_MSMT_BATTERY_REMAINING_LONGEVITY: 156 MO
MDC_IDC_MSMT_BATTERY_REMAINING_PERCENTAGE: 100 %
MDC_IDC_MSMT_BATTERY_STATUS: NORMAL
MDC_IDC_MSMT_LEADCHNL_RA_IMPEDANCE_VALUE: 722 OHM
MDC_IDC_MSMT_LEADCHNL_RA_PACING_THRESHOLD_AMPLITUDE: 0.6 V
MDC_IDC_MSMT_LEADCHNL_RA_PACING_THRESHOLD_PULSEWIDTH: 0.4 MS
MDC_IDC_MSMT_LEADCHNL_RV_IMPEDANCE_VALUE: 717 OHM
MDC_IDC_MSMT_LEADCHNL_RV_PACING_THRESHOLD_AMPLITUDE: 1 V
MDC_IDC_MSMT_LEADCHNL_RV_PACING_THRESHOLD_PULSEWIDTH: 0.4 MS
MDC_IDC_PG_IMPLANT_DTM: NORMAL
MDC_IDC_PG_MFG: NORMAL
MDC_IDC_PG_MODEL: NORMAL
MDC_IDC_PG_SERIAL: NORMAL
MDC_IDC_PG_TYPE: NORMAL
MDC_IDC_SESS_CLINIC_NAME: NORMAL
MDC_IDC_SESS_DTM: NORMAL
MDC_IDC_SESS_TYPE: NORMAL
MDC_IDC_SET_BRADY_AT_MODE_SWITCH_MODE: NORMAL
MDC_IDC_SET_BRADY_AT_MODE_SWITCH_RATE: 170 {BEATS}/MIN
MDC_IDC_SET_BRADY_LOWRATE: 60 {BEATS}/MIN
MDC_IDC_SET_BRADY_MAX_SENSOR_RATE: 130 {BEATS}/MIN
MDC_IDC_SET_BRADY_MAX_TRACKING_RATE: 130 {BEATS}/MIN
MDC_IDC_SET_BRADY_MODE: NORMAL
MDC_IDC_SET_BRADY_PAV_DELAY_HIGH: 200 MS
MDC_IDC_SET_BRADY_PAV_DELAY_LOW: 300 MS
MDC_IDC_SET_BRADY_SAV_DELAY_HIGH: 200 MS
MDC_IDC_SET_BRADY_SAV_DELAY_LOW: 300 MS
MDC_IDC_SET_LEADCHNL_RA_PACING_AMPLITUDE: 2 V
MDC_IDC_SET_LEADCHNL_RA_PACING_CAPTURE_MODE: NORMAL
MDC_IDC_SET_LEADCHNL_RA_PACING_POLARITY: NORMAL
MDC_IDC_SET_LEADCHNL_RA_PACING_PULSEWIDTH: 0.4 MS
MDC_IDC_SET_LEADCHNL_RA_SENSING_ADAPTATION_MODE: NORMAL
MDC_IDC_SET_LEADCHNL_RA_SENSING_POLARITY: NORMAL
MDC_IDC_SET_LEADCHNL_RA_SENSING_SENSITIVITY: 0.15 MV
MDC_IDC_SET_LEADCHNL_RV_PACING_AMPLITUDE: 1.5 V
MDC_IDC_SET_LEADCHNL_RV_PACING_CAPTURE_MODE: NORMAL
MDC_IDC_SET_LEADCHNL_RV_PACING_POLARITY: NORMAL
MDC_IDC_SET_LEADCHNL_RV_PACING_PULSEWIDTH: 0.4 MS
MDC_IDC_SET_LEADCHNL_RV_SENSING_ADAPTATION_MODE: NORMAL
MDC_IDC_SET_LEADCHNL_RV_SENSING_POLARITY: NORMAL
MDC_IDC_SET_LEADCHNL_RV_SENSING_SENSITIVITY: 1.5 MV
MDC_IDC_SET_ZONE_DETECTION_INTERVAL: 375 MS
MDC_IDC_SET_ZONE_TYPE: NORMAL
MDC_IDC_SET_ZONE_VENDOR_TYPE: NORMAL
MDC_IDC_STAT_AT_BURDEN_PERCENT: 7 %
MDC_IDC_STAT_AT_DTM_END: NORMAL
MDC_IDC_STAT_AT_DTM_START: NORMAL
MDC_IDC_STAT_BRADY_DTM_END: NORMAL
MDC_IDC_STAT_BRADY_DTM_START: NORMAL
MDC_IDC_STAT_BRADY_RA_PERCENT_PACED: 39 %
MDC_IDC_STAT_BRADY_RV_PERCENT_PACED: 1 %
MDC_IDC_STAT_EPISODE_RECENT_COUNT: 0
MDC_IDC_STAT_EPISODE_RECENT_COUNT: 3
MDC_IDC_STAT_EPISODE_RECENT_COUNT: 360
MDC_IDC_STAT_EPISODE_RECENT_COUNT: 5430
MDC_IDC_STAT_EPISODE_RECENT_COUNT: 996
MDC_IDC_STAT_EPISODE_RECENT_COUNT_DTM_END: NORMAL
MDC_IDC_STAT_EPISODE_RECENT_COUNT_DTM_START: NORMAL
MDC_IDC_STAT_EPISODE_TYPE: NORMAL
MDC_IDC_STAT_EPISODE_VENDOR_TYPE: NORMAL

## 2023-06-22 PROCEDURE — 36415 COLL VENOUS BLD VENIPUNCTURE: CPT

## 2023-06-22 PROCEDURE — 84999 UNLISTED CHEMISTRY PROCEDURE: CPT

## 2023-06-22 PROCEDURE — 99207 PR NO CHARGE-RESEARCH SERVICE: CPT

## 2023-06-22 NOTE — LETTER
6/22/2023    Kathya Escalera, DO  [address] [facility] [address]    RE: Rosie Blackman       Dear Colleague,     I had the pleasure of seeing Rosie Blackman in the [facility].  OCEANIC-AF (Phase 3 program of the Oral factor Eleven A inhibitor asundexian as novel antithrombotic - Atrial fibrillation study) Randomization Visit    Inclusion/exclusion criteria reviewed.     Past Medical History:   Diagnosis Date    Chronic airway obstruction (H)     Diastolic dysfunction 6/28/2022    Gastroesophageal reflux disease     Hiatal hernia     Hypertension     Malignant neoplasm of ovary (H)     Ovarian cancer, unspecified laterality (H) 3/10/2021    Personal history of contact with and (suspected) exposure to asbestos     Primary osteoarthritis of right hip 7/9/2020       Current Outpatient Medications   Medication    study - apixaban vs. placebo, OCEANIC-AF,, IDS#[id number], 2.5 mg/5 mg/placebo tablet    study - asundexian vs placebo, OCEANIC-AF,, IDS#[id number], tablet    apixaban ANTICOAGULANT (ELIQUIS ANTICOAGULANT) 5 MG tablet    diltiazem ER COATED BEADS (CARDIZEM CD/CARTIA XT) 180 MG 24 hr capsule    Evening Primrose Oil 1000 MG CAPS    meloxicam (MOBIC) 15 MG tablet    metoprolol tartrate (LOPRESSOR) 50 MG tablet    mometasone (ELOCON) 0.1 % external cream    omega 3 1000 MG CAPS    pravastatin (PRAVACHOL) 80 MG tablet    THERATEARS 0.25 % OP SOLN    valsartan-hydrochlorothiazide (DIOVAN HCT) 320-25 MG tablet    VIACTIV 500-100-40 OR CHEW    Vitamin D3 (CHOLECALCIFEROL) 25 mcg (1000 units) tablet     No current facility-administered medications for this visit.     Outcome events or HCRU events reviewed and listed below.     Discussed patient with PI.     EQ-5D Survey completed.     Emergency card given, emergency contact verified in chart, and education provided about medication (missed doses, schedule)     Central labs were drawn pre-dose at 1200      Study medication dispensed.     R[place]kin [id number]  Placebo Lot number: [id number]    Apixaban/Placebo Lot number: [id number], [id number]      First dose at: 1203.      Next appointment scheduled for 1 month, this is a phone follow up visit.       Kylie Sandoval RN

## 2023-06-22 NOTE — PROGRESS NOTES
OCEANIC-AF (Phase 3 program of the Oral factor Eleven A inhibitor asundexian as novel antithrombotic - Atrial fibrillation study) Randomization Visit    Inclusion/exclusion criteria reviewed.     Past Medical History:   Diagnosis Date     Chronic airway obstruction (H)      Diastolic dysfunction 6/28/2022     Gastroesophageal reflux disease      Hiatal hernia      Hypertension      Malignant neoplasm of ovary (H)      Ovarian cancer, unspecified laterality (H) 3/10/2021     Personal history of contact with and (suspected) exposure to asbestos      Primary osteoarthritis of right hip 7/9/2020       Current Outpatient Medications   Medication     study - apixaban vs. placebo, OCEANIC-AF,, IDS#[id number], 2.5 mg/5 mg/placebo tablet     study - asundexian vs placebo, OCEANIC-AF,, IDS#[id number], tablet     apixaban ANTICOAGULANT (ELIQUIS ANTICOAGULANT) 5 MG tablet     diltiazem ER COATED BEADS (CARDIZEM CD/CARTIA XT) 180 MG 24 hr capsule     Evening Primrose Oil 1000 MG CAPS     meloxicam (MOBIC) 15 MG tablet     metoprolol tartrate (LOPRESSOR) 50 MG tablet     mometasone (ELOCON) 0.1 % external cream     omega 3 1000 MG CAPS     pravastatin (PRAVACHOL) 80 MG tablet     THERATEARS 0.25 % OP SOLN     valsartan-hydrochlorothiazide (DIOVAN HCT) 320-25 MG tablet     VIACTIV 500-100-40 OR CHEW     Vitamin D3 (CHOLECALCIFEROL) 25 mcg (1000 units) tablet     No current facility-administered medications for this visit.     Outcome events or HCRU events reviewed and listed below.     Discussed patient with PI.     EQ-5D Survey completed.     Emergency card given, emergency contact verified in chart, and education provided about medication (missed doses, schedule)     Central labs were drawn pre-dose at 1200      Study medication dispensed.     Cement [id number]/ Placebo Lot number: [id number]    Apixaban/Placebo Lot number: [id number], [id number]      First dose at: 1203.      Next appointment scheduled for 1 month, this is a phone follow up visit.        Kylie Sandoval, RN

## 2023-06-30 ENCOUNTER — TELEPHONE (OUTPATIENT)
Dept: CARDIOLOGY | Facility: CLINIC | Age: 83
End: 2023-06-30
Payer: MEDICARE

## 2023-06-30 DIAGNOSIS — Z00.6 EXAMINATION OF PARTICIPANT OR CONTROL IN CLINICAL RESEARCH: Primary | ICD-10-CM

## 2023-06-30 PROCEDURE — 99207 PR NO CHARGE-RESEARCH SERVICE: CPT

## 2023-06-30 NOTE — TELEPHONE ENCOUNTER
OCEANIC-AF (Phase 3 program of the Oral factor Eleven A inhibitor asundexian as novel antithrombotic - Atrial fibrillation study) AE Note    Diagnosis/event description (diagnosis preferred): Abdominal Bruising- Patient called coordinator and reported small bruises on upper abdomen, no reports of bumping the area. They appeared when she woke up and more appeared today (30 Jun 2023).   Start date: 28 June 2023 (radomized on  22 June 2023)  Date resolved: ongoing     Intensity: ([x] mild /[]  moderate / [] severe)     Outcome: ([] resolved [with or without sequelae] /[] recovering / [x] not recovered / [] fatal / [] unknown)      Date study team aware of event: 30 Jun 2023    Was treatment given for this event:  [] Yes   [x] No   If yes, provide details     Action taking with BAY [id number] / Placebo  [] Drug withdrawn  [] Drug interrupted  [x] Dose not changed   [] Not applicable (only use in the non-treatment phase, prior to first dose of study treatment or after permanent discontinuation of study treatment.     Action taken with Apixaban / Placebo  [] Drug withdrawn  [] Drug interrupted  [x] Dose not changed   [] Not applicable (only use in the non-treatment phase, prior to first dose of study treatment or after permanent discontinuation of study treatment.     Serious adverse event?    [] Yes   [x] No    Special interest event?  [] Yes   [x] No    Endpoint? [] Yes   [x] No     Fatal event?  [] Yes   [x] No    Dr. Ryder:    Reasonable causal relationship to BAY [id number] / Placebo:  [x] Yes   [] No    Reasonable causal relationship to Apixban / Placebo:  [x] Yes   [] No    Reasonable causal relationship to protocol required procedure(s):  [] Yes   [x] No

## 2023-07-03 ENCOUNTER — TELEPHONE (OUTPATIENT)
Dept: CARDIOLOGY | Facility: CLINIC | Age: 83
End: 2023-07-03
Payer: MEDICARE

## 2023-07-03 PROCEDURE — 99207 PR NO CHARGE-RESEARCH SERVICE: CPT

## 2023-07-03 NOTE — TELEPHONE ENCOUNTER
OCEANIC-AF (Phase 3 program of the Oral faCtor Eleven A iNhibitor asundexIan as novel antithrombotiC - Atrial Fibrillation study)     Follow up call from Friday regarding diffuse abdominal bruising. Patient reports that bruising has not worsened over the weekend. Reported that she is in atrial fibrillation this morning and is watching her heart rate at this time. Reported feeling fatigued in the past couple months especially after taking apixiban. Not worsening during the study but will continue to assess and report to PI if worsening.      Plan: Follow up with patient on 19 Jul 2023 for V3- Month 1 phone call. Alerted patient to call if she needs anything in the meantime.     Kylie Sandoval RN  Clinical Research Nurse  [phone]

## 2023-07-10 ENCOUNTER — ONCOLOGY VISIT (OUTPATIENT)
Dept: ONCOLOGY | Facility: CLINIC | Age: 83
End: 2023-07-10
Attending: NURSE PRACTITIONER
Payer: MEDICARE

## 2023-07-10 ENCOUNTER — LAB (OUTPATIENT)
Dept: LAB | Facility: CLINIC | Age: 83
End: 2023-07-10
Attending: NURSE PRACTITIONER
Payer: MEDICARE

## 2023-07-10 VITALS
HEIGHT: 64 IN | DIASTOLIC BLOOD PRESSURE: 77 MMHG | HEART RATE: 60 BPM | TEMPERATURE: 97.8 F | OXYGEN SATURATION: 97 % | BODY MASS INDEX: 35.83 KG/M2 | SYSTOLIC BLOOD PRESSURE: 148 MMHG | WEIGHT: 209.9 LBS

## 2023-07-10 DIAGNOSIS — C56.9 OVARIAN CANCER, UNSPECIFIED LATERALITY (H): ICD-10-CM

## 2023-07-10 DIAGNOSIS — Z85.43 ENCOUNTER FOR FOLLOW-UP SURVEILLANCE OF OVARIAN CANCER: ICD-10-CM

## 2023-07-10 DIAGNOSIS — Z08 ENCOUNTER FOR FOLLOW-UP SURVEILLANCE OF OVARIAN CANCER: ICD-10-CM

## 2023-07-10 DIAGNOSIS — C56.1 OVARIAN CANCER, RIGHT (H): Primary | ICD-10-CM

## 2023-07-10 LAB — CANCER AG125 SERPL-ACNC: 8 U/ML

## 2023-07-10 PROCEDURE — G0463 HOSPITAL OUTPT CLINIC VISIT: HCPCS | Performed by: NURSE PRACTITIONER

## 2023-07-10 PROCEDURE — 36415 COLL VENOUS BLD VENIPUNCTURE: CPT

## 2023-07-10 PROCEDURE — 86304 IMMUNOASSAY TUMOR CA 125: CPT

## 2023-07-10 PROCEDURE — 99213 OFFICE O/P EST LOW 20 MIN: CPT | Performed by: NURSE PRACTITIONER

## 2023-07-10 ASSESSMENT — PAIN SCALES - GENERAL: PAINLEVEL: NO PAIN (0)

## 2023-07-10 NOTE — LETTER
7/10/2023         RE: Rosie Blackman  [id number] [facility] [id number]        Dear Colleague,    Thank you for referring your patient, Rosie Blackman, to the [facility]. Please see a copy of my visit note below.    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  She is s/p BSO, PPLND 2020 which served as her treatment.  She is here today for a surveillance visit.      Oncology History:  She originally presented with a complaint of hip pain.  The work-up of this included an MRI which has demonstrated a large pelvic mass, her  was elevated (108, CEA 19-9 were normal).     2020: Exploratory laparoscopy followed by laparotomy, bilateral salpingo-oophorectomy, omentectomy, pelvic and periaortic lymph node dissection, anterior culdectomy.    FINAL DIAGNOSIS:   A. OVARIES, LEFT AND RIGHT, BILATERAL OOPHORECTOMY:   - Right ovary with ENDOMETRIOID ADENOCARCINOMA, FIGO grade 1   - Left ovary with benign inclusion cysts, negative for malignancy   - Unremarkable bilateral fallopian tubes, negative for malignancy     B. MESENTERY, BIOPSY:   - Fibrovascular adhesions, negative for malignancy     C. OMENTUM, OMENTECTOMY:   - Adipose tissue, negative for malignancy     D. ANTERIOR CUL-DE-SAC, BIOPSY:   - Fibroadipose tissue with foci of endometriosis   - Negative for malignancy     E. POSTERIOR CUL-DE-SAC, BIOPSY:   - Fibrovascular tissue, negative for malignancy     F. LYMPH NODE, LEFT PELVIC, EXCISION:   - Eleven reactive lymph nodes, negative for malignancy (0/11)     G. LYMPH NODE, RIGHT PELVIC, EXCISION:   - Nine reactive lymph nodes, negative for malignancy (0/9)     H. LYMPH NODE, RIGHT PARA AORTIC, EXCISION:    - Three reactive lymph nodes, negative for malignancy (0/3)     11/9/2020:  20.  2/1/21:  8.  5/17/21:  7.  9/20/21:  <5.  1/4/22:  6.   4/5/22:  6.  7/5/22:  9.  1/5/23:  7.  7/10/23:  pending.            Today she comes to clinic feeling well from a gynecologic perspective.  She is on a study medication for her a.fib which is going well.  She denies any vaginal bleeding, no changes in her bowel or bladder habits, no nausea/emesis, no difficulties eating. She denies any abdominal discomfort/bloating, no fevers or chills, and no chest pain or shortness of breath. She does have some slight ankle swelling at the end of the day improved with elevation; long standing and not changed.  She is current with her annual physical, colon cancer screening, and mammogram.             Health Maintenance  Colonoscopy: 11/16/15, repeat in 10 years  Mammogram: 3/1/23  Annual physical: 1/24/23    Review of Systems:    Systemic           no weight changes; no fever; no chills; no night sweats; no appetite changes  Skin           no rashes, or lesions  Eye           no irritation; no changes in vision  Isabelle-Laryngeal           no dysphagia; no hoarseness   Pulmonary    no cough; no shortness of breath  Cardiovascular    no chest pain; no palpitations  Gastrointestinal    no diarrhea; no constipation; no abdominal pain; no changes in bowel habits; no blood in stool  Genitourinary   no urinary frequency; no urinary urgency; no dysuria; no pain; no abnormal vaginal discharge; no abnormal vaginal bleeding  Breast   no breast discharge; no breast changes; no breast pain  Musculoskeletal    no myalgias; no arthralgias; no back pain  Psychiatric           no depressed mood; no anxiety    Hematologic   no tender lymph nodes; no noticeable swellings or lumps   Endocrine    no hot flashes; no heat/cold intolerance         Neurological   no tremor; no numbness and tingling; no headaches; no  difficulty sleeping      Past Medical History:    Past Medical History:   Diagnosis Date     Chronic airway obstruction (H)      Diastolic dysfunction 6/28/2022     Gastroesophageal reflux disease      Hiatal hernia      Hypertension      Malignant neoplasm of ovary (H)      Ovarian cancer, unspecified laterality (H) 3/10/2021     Personal history of contact with and (suspected) exposure to asbestos      Primary osteoarthritis of right hip 7/9/2020         Past Surgical History:    Past Surgical History:   Procedure Laterality Date     BIOPSY BREAST Left      BREAST BIOPSY, CORE RT/LT  1994     CHOLECYSTECTOMY  1995     COLONOSCOPY  05/2010    Diverticulosis, colon polyps; repeat 5 yrs     COLONOSCOPY N/A 11/16/2015    Procedure: COLONOSCOPY;  Surgeon: Alejandro Matos MD;  Location: [facility]     COLONOSCOPY N/A 09/17/2021    Procedure: COLONOSCOPY;  Surgeon: Aayush George MD;  Location: [facility]     Colonoscopy, hyperplastic polyps, repeat in 5 yrs  2004     EP PACEMAKER DEVICE & LEAD IMPLANT- RIGHT ATRIAL & RIGHT VENTRICULAR Left 10/24/2022    Procedure: Pacemaker Device & Lead Implant - Right Atrial & Right Ventricular;  Surgeon: Demond Meyer MD;  Location:  [facility]     ESOPHAGOSCOPY, GASTROSCOPY, DUODENOSCOPY (EGD), COMBINED  09/30/2013    Procedure: COMBINED ESOPHAGOSCOPY, GASTROSCOPY, DUODENOSCOPY (EGD), BIOPSY SINGLE OR MULTIPLE;  Gastroscopy;  Surgeon: Blaise Gill MD;  Location: [facility]     EYE SURGERY  2012    R/L Cataracts     HC REMOVE TONSILS/ADENOIDS,13+ Y/O      T & A 13+y.o.     HERNIORRHAPHY INCISIONAL (LOCATION) N/A 03/24/2021    Procedure: HERNIORRHAPHY, INCISIONAL, OPEN WITH MESH;  Surgeon: Aayush George MD;  Location: [facility]     HYSTERECTOMY, PAP NO LONGER INDICATED       LAPAROSCOPIC SALPINGO-OOPHORECTOMY N/A 07/24/2020    Procedure: Laparoscopy, removal or pelvic mass, both tubes and ovaries, omentectomy, anterior culvectomy, pelvic and para aortic lymph  node dissection, laparotomy;  Surgeon: Felipe Corona MD;  Location: [facility]     PA ANESTH,LUMBAR SPINE,CORD SURGERY  10/2020    L3-4 L4-5 TRANSFORAMINAL INTERBODY FUSION L3-5, POSTERIOR INSTRUMENTED FUSION AND DECOMPRESSION     TVH for DUB, ovaries in       VASCULAR SURGERY  2014    [place] closure     ZZC ANESTH,KNEE VEINS SURGERY  08/20/2014         Health Maintenance Due   Topic Date Due     LIPID  12/04/2022     COVID-19 Vaccine (6 - Pfizer series) 01/29/2023       Current Medications:     Current Outpatient Medications   Medication Sig Dispense Refill     apixaban ANTICOAGULANT (ELIQUIS ANTICOAGULANT) 5 MG tablet Take 1 tablet (5 mg) by mouth 2 times daily 180 tablet 3     diltiazem ER COATED BEADS (CARDIZEM CD/CARTIA XT) 180 MG 24 hr capsule Take 1 capsule (180 mg) by mouth daily 90 capsule 3     Evening Primrose Oil 1000 MG CAPS One daily       meloxicam (MOBIC) 15 MG tablet Take 15 mg by mouth daily as needed       metoprolol tartrate (LOPRESSOR) 50 MG tablet Take 1 tablet (50 mg) by mouth 2 times daily 180 tablet 3     mometasone (ELOCON) 0.1 % external cream Apply sparingly to affected area twice daily for 2 weeks then decrease to 1 time daily for 30 days then decrease to 2-3 times per week 45 g 11     omega 3 1000 MG CAPS Take 1 capsule by mouth daily       pravastatin (PRAVACHOL) 80 MG tablet Take 1 tablet (80 mg) by mouth daily 90 tablet 3     study - apixaban vs. placebo, OCEANIC-AF,, IDS#[id number], 2.5 mg/5 mg/placebo tablet Take 1 tablet by mouth 2 times daily 98 tablet 0     study - asundexian vs placebo, OCEANIC-AF,, IDS#[id number], tablet Take 1 tablet by mouth daily peferably in the morning. 98 tablet 0     THERATEARS 0.25 % OP SOLN BID       valsartan-hydrochlorothiazide (DIOVAN HCT) 320-25 MG tablet Take 1 tablet by mouth daily 90 tablet 3     VIACTIV 500-100-40 OR CHEW Take 1 chew tab by mouth daily       Vitamin D3 (CHOLECALCIFEROL) 25 mcg (1000 units) tablet Take 1 tablet (25 mcg) by mouth  "daily           Allergies:        Allergies   Allergen Reactions     Atorvastatin Other (See Comments)     Muscle Pain     Ezetimibe Other (See Comments)     Severe muscle aches     Irbesartan Cramps     Lisinopril      Omeprazole      Other reaction(s): *Unknown     Prilosec [Omeprazole]      Rosuvastatin Other (See Comments)     Muscle Pain     Zestril [Ace Inhibitors] Cough        Social History:     Social History     Tobacco Use     Smoking status: Never     Smokeless tobacco: Never   Substance Use Topics     Alcohol use: No       History   Drug Use No         Family History:     The patient's family history is notable for:    Family History   Problem Relation Age of Onset     Cancer Mother         uterine cancer,      Respiratory Mother         COPD     Cardiovascular Mother         AAA  age 80     Breast Cancer Maternal Grandmother      Cancer Maternal Grandfather         cancer unknown type     Breast Cancer Sister      Eye Disorder Father         cataracts     Depression Father      Depression Son      Depression Son      Breast Cancer Sister         X2     Cancer - colorectal No family hx of         no ovarian cancer         Physical Exam:     BP (!) 148/77 (BP Location: Right arm, Patient Position: Sitting, Cuff Size: Adult Regular)   Pulse 60   Temp 97.8  F (36.6  C) (Oral)   Ht 1.626 m (5' 4\")   Wt 95.2 kg (209 lb 14.4 oz)   SpO2 97%   BMI 36.03 kg/m    Body mass index is 36.03 kg/m .    General Appearance: healthy and alert, no distress     HEENT: no palpable nodules or masses        Cardiovascular: regular rate and rhythm, no gallops, rubs or murmurs     Respiratory: lungs clear, no rales, rhonchi or wheezes    Musculoskeletal: extremities non tender and without edema    Skin: no lesions or rashes     Neurological: normal gait, no gross defects     Psychiatric: appropriate mood and affect                               Hematological: normal cervical, supraclavicular and inguinal lymph " nodes     Gastrointestinal:       abdomen soft, non-tender, non-distended, no organomegaly or masses    Genitourinary: External genitalia and urethral meatus appears normal.  Vagina is smooth without nodularity or masses.  Cervix is surgically absent.  Bimanual exam reveal no masses, nodularity or fullness.  Recto-vaginal exam confirms these findings.  Exam limited due to body habitus.      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  She is s/p BSO, PPLND 7/24/2020 which served as her treatment.  She is here today for a surveillance visit.     20 minutes spent on the date of the encounter doing chart review, history and exam, documentation, and further activities as noted above.      Plan:     1.)        Ovarian cancer:  IAN on exam.  RTC in 6 months for next surveillance visit and .  Plan for surveillance visits every 6 months until she is 5 years out from treatment (7/2025), then annually.  Reviewed signs and symptoms for when she should contact the clinic or seek additional care.  Patient to contact the clinic with any questions or concerns in the interim.      Genetic risk factors were assessed and she is negative for mutations in the ADALID, BRCA1, BRCA2, BRIP1, CDH1, CHEK2, EPCAM, MLH1, MSH2, MSH6, NBN, NF1, PALB2, PMS2, PTEN, RAD51C, RAD51D, STK11, and TP53 genes.     Labs and/or tests ordered include:  .                2.)        Health maintenance:  Issues addressed today include following up with PCP for annual health maintenance and non-gynecologic issues.      Kerry Sinclair, DNP, APRN, FNP-C  Nurse Practitioner  Division of Gynecologic Oncology  Pager: [phone]     CC  Patient Care Team:  Kathya Escalera DO as PCP - General (Internal Medicine)  Kathya Escalera DO as Assigned PCP  Fatimah Clement MD as MD (Neurology)  Fatimah Clement MD as Assigned Neuroscience Provider  Yahir  DELMY Orellana CNP as Assigned Cancer Care Provider  Jai Lam MD as MD (Cardiology)  Jinny Potter PA-C as Assigned Heart and Vascular Provider  SELF, REFERRED      Again, thank you for allowing me to participate in the care of your patient.        Sincerely,        DELMY Gagnon CNP

## 2023-07-10 NOTE — NURSING NOTE
Chief Complaint   Patient presents with     Blood Draw     Labs drawn from .     Labs drawn with  by rn.  Pt tolerated well.     Marco A Pierce RN

## 2023-07-10 NOTE — NURSING NOTE
"Oncology Rooming Note    July 10, 2023 11:42 AM   Rosie Blackman is a 83 year old female who presents for:    Chief Complaint   Patient presents with     Uterine Cancer     Follow-up ovarian cancer.      Initial Vitals: BP (!) 148/77 (BP Location: Right arm, Patient Position: Sitting, Cuff Size: Adult Regular)   Pulse 60   Temp 97.8  F (36.6  C) (Oral)   Ht 1.626 m (5' 4\")   Wt 95.2 kg (209 lb 14.4 oz)   SpO2 97%   BMI 36.03 kg/m   Estimated body mass index is 36.03 kg/m  as calculated from the following:    Height as of this encounter: 1.626 m (5' 4\").    Weight as of this encounter: 95.2 kg (209 lb 14.4 oz). Body surface area is 2.07 meters squared.  No Pain (0) Comment: Data Unavailable   No LMP recorded. Patient has had a hysterectomy.  Allergies reviewed: Yes  Medications reviewed: Yes    Medications: Medication refills not needed today.  Pharmacy name entered into [facility]:    [id number] - [place], [place] - [address] [facility] - MAIL ORDER MAINT MEDS - NON-EPRESCRIBE  [facility] - [place], [place] - [address] [facility] [address] [facility] #[id number] - [place], [place] - [address] [place] [address]    Clinical concerns: none      Jann Underwood              "

## 2023-07-10 NOTE — PROGRESS NOTES
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  She is s/p BSO, PPLND 2020 which served as her treatment.  She is here today for a surveillance visit.      Oncology History:  She originally presented with a complaint of hip pain.  The work-up of this included an MRI which has demonstrated a large pelvic mass, her  was elevated (108, CEA 19-9 were normal).     2020: Exploratory laparoscopy followed by laparotomy, bilateral salpingo-oophorectomy, omentectomy, pelvic and periaortic lymph node dissection, anterior culdectomy.    FINAL DIAGNOSIS:   A. OVARIES, LEFT AND RIGHT, BILATERAL OOPHORECTOMY:   - Right ovary with ENDOMETRIOID ADENOCARCINOMA, FIGO grade 1   - Left ovary with benign inclusion cysts, negative for malignancy   - Unremarkable bilateral fallopian tubes, negative for malignancy     B. MESENTERY, BIOPSY:   - Fibrovascular adhesions, negative for malignancy     C. OMENTUM, OMENTECTOMY:   - Adipose tissue, negative for malignancy     D. ANTERIOR CUL-DE-SAC, BIOPSY:   - Fibroadipose tissue with foci of endometriosis   - Negative for malignancy     E. POSTERIOR CUL-DE-SAC, BIOPSY:   - Fibrovascular tissue, negative for malignancy     F. LYMPH NODE, LEFT PELVIC, EXCISION:   - Eleven reactive lymph nodes, negative for malignancy (0/11)     G. LYMPH NODE, RIGHT PELVIC, EXCISION:   - Nine reactive lymph nodes, negative for malignancy (0/9)     H. LYMPH NODE, RIGHT PARA AORTIC, EXCISION:   - Three reactive lymph nodes, negative for malignancy (0/3)     2020:  20.  2/:  8.  5/:  7.  9:  <5.  1/:  6.   4/:  6.  7/:  9.  1:  7.  7/10/23:   pending.            Today she comes to clinic feeling well from a gynecologic perspective.  She is on a study medication for her a.fib which is going well.  She denies any vaginal bleeding, no changes in her bowel or bladder habits, no nausea/emesis, no difficulties eating. She denies any abdominal discomfort/bloating, no fevers or chills, and no chest pain or shortness of breath. She does have some slight ankle swelling at the end of the day improved with elevation; long standing and not changed.  She is current with her annual physical, colon cancer screening, and mammogram.             Health Maintenance  Colonoscopy: 11/16/15, repeat in 10 years  Mammogram: 3/1/23  Annual physical: 1/24/23    Review of Systems:    Systemic           no weight changes; no fever; no chills; no night sweats; no appetite changes  Skin           no rashes, or lesions  Eye           no irritation; no changes in vision  Isabelle-Laryngeal           no dysphagia; no hoarseness   Pulmonary    no cough; no shortness of breath  Cardiovascular    no chest pain; no palpitations  Gastrointestinal    no diarrhea; no constipation; no abdominal pain; no changes in bowel habits; no blood in stool  Genitourinary   no urinary frequency; no urinary urgency; no dysuria; no pain; no abnormal vaginal discharge; no abnormal vaginal bleeding  Breast   no breast discharge; no breast changes; no breast pain  Musculoskeletal    no myalgias; no arthralgias; no back pain  Psychiatric           no depressed mood; no anxiety    Hematologic   no tender lymph nodes; no noticeable swellings or lumps   Endocrine    no hot flashes; no heat/cold intolerance         Neurological   no tremor; no numbness and tingling; no headaches; no difficulty sleeping      Past Medical History:    Past Medical History:   Diagnosis Date     Chronic airway obstruction (H)      Diastolic dysfunction 6/28/2022     Gastroesophageal reflux disease      Hiatal hernia      Hypertension       Malignant neoplasm of ovary (H)      Ovarian cancer, unspecified laterality (H) 3/10/2021     Personal history of contact with and (suspected) exposure to asbestos      Primary osteoarthritis of right hip 7/9/2020         Past Surgical History:    Past Surgical History:   Procedure Laterality Date     BIOPSY BREAST Left      BREAST BIOPSY, CORE RT/LT  1994     CHOLECYSTECTOMY  1995     COLONOSCOPY  05/2010    Diverticulosis, colon polyps; repeat 5 yrs     COLONOSCOPY N/A 11/16/2015    Procedure: COLONOSCOPY;  Surgeon: Alejandro Matos MD;  Location: [facility]     COLONOSCOPY N/A 09/17/2021    Procedure: COLONOSCOPY;  Surgeon: Aayush George MD;  Location: [facility]     Colonoscopy, hyperplastic polyps, repeat in 5 yrs  2004     EP PACEMAKER DEVICE & LEAD IMPLANT- RIGHT ATRIAL & RIGHT VENTRICULAR Left 10/24/2022    Procedure: Pacemaker Device & Lead Implant - Right Atrial & Right Ventricular;  Surgeon: Demond Meyer MD;  Location:  [facility]     ESOPHAGOSCOPY, GASTROSCOPY, DUODENOSCOPY (EGD), COMBINED  09/30/2013    Procedure: COMBINED ESOPHAGOSCOPY, GASTROSCOPY, DUODENOSCOPY (EGD), BIOPSY SINGLE OR MULTIPLE;  Gastroscopy;  Surgeon: Blaise Gill MD;  Location: [facility]     EYE SURGERY  2012    R/L Cataracts     HC REMOVE TONSILS/ADENOIDS,12+ Y/O      T & A 13+y.o.     HERNIORRHAPHY INCISIONAL (LOCATION) N/A 03/24/2021    Procedure: HERNIORRHAPHY, INCISIONAL, OPEN WITH MESH;  Surgeon: Aayush George MD;  Location: [facility]     HYSTERECTOMY, PAP NO LONGER INDICATED       LAPAROSCOPIC SALPINGO-OOPHORECTOMY N/A 07/24/2020    Procedure: Laparoscopy, removal or pelvic mass, both tubes and ovaries, omentectomy, anterior culvectomy, pelvic and para aortic lymph node dissection, laparotomy;  Surgeon: Felipe Corona MD;  Location: [facility]     HI ANESTH,LUMBAR SPINE,CORD SURGERY  10/2020    L3-4 L4-5 TRANSFORAMINAL INTERBODY FUSION L3-5, POSTERIOR INSTRUMENTED FUSION AND DECOMPRESSION     TVH  for DUB, ovaries in       VASCULAR SURGERY  2014    Taylor closure     [facility] ANESTH,KNEE VEINS SURGERY  08/20/2014         Health Maintenance Due   Topic Date Due     LIPID  12/04/2022     COVID-19 Vaccine (6 - Pfizer series) 01/29/2023       Current Medications:     Current Outpatient Medications   Medication Sig Dispense Refill     apixaban ANTICOAGULANT (ELIQUIS ANTICOAGULANT) 5 MG tablet Take 1 tablet (5 mg) by mouth 2 times daily 180 tablet 3     diltiazem ER COATED BEADS (CARDIZEM CD/CARTIA XT) 180 MG 24 hr capsule Take 1 capsule (180 mg) by mouth daily 90 capsule 3     Evening Primrose Oil 1000 MG CAPS One daily       meloxicam (MOBIC) 15 MG tablet Take 15 mg by mouth daily as needed       metoprolol tartrate (LOPRESSOR) 50 MG tablet Take 1 tablet (50 mg) by mouth 2 times daily 180 tablet 3     mometasone (ELOCON) 0.1 % external cream Apply sparingly to affected area twice daily for 2 weeks then decrease to 1 time daily for 30 days then decrease to 2-3 times per week 45 g 11     omega 3 1000 MG CAPS Take 1 capsule by mouth daily       pravastatin (PRAVACHOL) 80 MG tablet Take 1 tablet (80 mg) by mouth daily 90 tablet 3     study - apixaban vs. placebo, OCEANIC-AF,, IDS#[id number], 2.5 mg/5 mg/placebo tablet Take 1 tablet by mouth 2 times daily 98 tablet 0     study - asundexian vs placebo, OCEANIC-AF,, IDS#[id number], tablet Take 1 tablet by mouth daily peferably in the morning. 98 tablet 0     THERATEARS 0.25 % OP SOLN BID       valsartan-hydrochlorothiazide (DIOVAN HCT) 320-25 MG tablet Take 1 tablet by mouth daily 90 tablet 3     VIACTIV 500-100-40 OR CHEW Take 1 chew tab by mouth daily       Vitamin D3 (CHOLECALCIFEROL) 25 mcg (1000 units) tablet Take 1 tablet (25 mcg) by mouth daily           Allergies:        Allergies   Allergen Reactions     Atorvastatin Other (See Comments)     Muscle Pain     Ezetimibe Other (See Comments)     Severe muscle aches     Irbesartan Cramps     Lisinopril      Omeprazole      Other  "reaction(s): *Unknown     Prilosec [Omeprazole]      Rosuvastatin Other (See Comments)     Muscle Pain     Zestril [Ace Inhibitors] Cough        Social History:     Social History     Tobacco Use     Smoking status: Never     Smokeless tobacco: Never   Substance Use Topics     Alcohol use: No       History   Drug Use No         Family History:     The patient's family history is notable for:    Family History   Problem Relation Age of Onset     Cancer Mother         uterine cancer,      Respiratory Mother         COPD     Cardiovascular Mother         AAA  age 80     Breast Cancer Maternal Grandmother      Cancer Maternal Grandfather         cancer unknown type     Breast Cancer Sister      Eye Disorder Father         cataracts     Depression Father      Depression Son      Depression Son      Breast Cancer Sister         X2     Cancer - colorectal No family hx of         no ovarian cancer         Physical Exam:     BP (!) 148/77 (BP Location: Right arm, Patient Position: Sitting, Cuff Size: Adult Regular)   Pulse 60   Temp 97.8  F (36.6  C) (Oral)   Ht 1.626 m (5' 4\")   Wt 95.2 kg (209 lb 14.4 oz)   SpO2 97%   BMI 36.03 kg/m    Body mass index is 36.03 kg/m .    General Appearance: healthy and alert, no distress     HEENT: no palpable nodules or masses        Cardiovascular: regular rate and rhythm, no gallops, rubs or murmurs     Respiratory: lungs clear, no rales, rhonchi or wheezes    Musculoskeletal: extremities non tender and without edema    Skin: no lesions or rashes     Neurological: normal gait, no gross defects     Psychiatric: appropriate mood and affect                               Hematological: normal cervical, supraclavicular and inguinal lymph nodes     Gastrointestinal:       abdomen soft, non-tender, non-distended, no organomegaly or masses    Genitourinary: External genitalia and urethral meatus appears normal.  Vagina is smooth without nodularity or masses.  Cervix is surgically " absent.  Bimanual exam reveal no masses, nodularity or fullness.  Recto-vaginal exam confirms these findings.  Exam limited due to body habitus.      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  She is s/p BSO, PPLND 7/24/2020 which served as her treatment.  She is here today for a surveillance visit.     20 minutes spent on the date of the encounter doing chart review, history and exam, documentation, and further activities as noted above.      Plan:     1.)        Ovarian cancer:  IAN on exam.  RTC in 6 months for next surveillance visit and .  Plan for surveillance visits every 6 months until she is 5 years out from treatment (7/2025), then annually.  Reviewed signs and symptoms for when she should contact the clinic or seek additional care.  Patient to contact the clinic with any questions or concerns in the interim.        Genetic risk factors were assessed and she is negative for mutations in the ADALID, BRCA1, BRCA2, BRIP1, CDH1, CHEK2, EPCAM, MLH1, MSH2, MSH6, NBN, NF1, PALB2, PMS2, PTEN, RAD51C, RAD51D, STK11, and TP53 genes.       Labs and/or tests ordered include:  .                2.)        Health maintenance:  Issues addressed today include following up with PCP for annual health maintenance and non-gynecologic issues.      Kerry Sinclair DNP, APRN, FNP-C  Nurse Practitioner  Division of Gynecologic Oncology  Pager: [phone]     CC  Patient Care Team:  Kathya Escalera DO as PCP - General (Internal Medicine)  Kathya Escalera DO as Assigned PCP  Fatimah Clement MD as MD (Neurology)  Fatimah Clement MD as Assigned Neuroscience Provider  Kerry Sinclair APRN CNP as Assigned Cancer Care Provider  Jai Lam MD as MD (Cardiology)  Jinny Potter PA-C as Assigned Heart and Vascular Provider  SELF, REFERRED

## 2023-07-11 ENCOUNTER — TELEPHONE (OUTPATIENT)
Dept: FAMILY MEDICINE | Facility: CLINIC | Age: 83
End: 2023-07-11
Payer: MEDICARE

## 2023-07-11 NOTE — TELEPHONE ENCOUNTER
Patient Quality Outreach    Patient is due for the following:   Hypertension -  BP check    Next Steps:   Schedule a nurse only visit for BP check     Type of outreach:    Sent letter.    Next Steps:  Reach out within 90 days via Letter.    Max number of attempts reached: Yes. Will try again in 90 days if patient still on fail list.    Questions for provider review:    None           Maricruz Siegel MA  Chart routed to .

## 2023-07-18 ENCOUNTER — ALLIED HEALTH/NURSE VISIT (OUTPATIENT)
Dept: FAMILY MEDICINE | Facility: CLINIC | Age: 83
End: 2023-07-18
Payer: MEDICARE

## 2023-07-18 ENCOUNTER — TELEPHONE (OUTPATIENT)
Dept: FAMILY MEDICINE | Facility: CLINIC | Age: 83
End: 2023-07-18

## 2023-07-18 VITALS — OXYGEN SATURATION: 99 % | SYSTOLIC BLOOD PRESSURE: 142 MMHG | DIASTOLIC BLOOD PRESSURE: 70 MMHG | HEART RATE: 60 BPM

## 2023-07-18 DIAGNOSIS — Z01.30 BP CHECK: Primary | ICD-10-CM

## 2023-07-18 PROCEDURE — 99207 PR NO CHARGE NURSE ONLY: CPT

## 2023-07-18 NOTE — TELEPHONE ENCOUNTER
Rosie Blackman is a 83 year old year old patient who comes in today for a Blood Pressure check because of elevated BP at recent clinic visits.  Vital Signs as repeated by /70 p 60, 140/72  Patient is taking medication as prescribed  Patient is tolerating medications well.  Patient is monitoring Blood Pressure at home.  Average readings if yes are 124/69, she uses an upper arm BP cuff. The occasional higher reading at home is consistent with readings at the clinic. She brought her BP machine in previously to check it with manual readings.  Current complaints: none  Viridiana Hennessy RN   :

## 2023-07-18 NOTE — TELEPHONE ENCOUNTER
Blood pressure is reasonably well controlled; no changes to medications at this time.  If average home blood pressure readings are > 140, recommend follow-up with me

## 2023-07-19 ENCOUNTER — VIRTUAL VISIT (OUTPATIENT)
Dept: CARDIOLOGY | Facility: CLINIC | Age: 83
End: 2023-07-19
Payer: MEDICARE

## 2023-07-19 DIAGNOSIS — Z00.6 EXAMINATION OF PARTICIPANT OR CONTROL IN CLINICAL RESEARCH: Primary | ICD-10-CM

## 2023-07-19 PROCEDURE — 99207 PR NO CHARGE-RESEARCH SERVICE: CPT

## 2023-07-19 NOTE — PROGRESS NOTES
BAY [id number] Study 19767- OCEANIC- AF Phone visit (A multicenter, international, randomized, active comparator-controlled, double-blind, double-dummy, parallel-group, 2 arm,phase 3 study to compare the efficacy and safety of the oral FXIa inhibitor asundexian (BAY 6430279) with apixaban for the prevention of stroke or systemic embolism in male and female participants aged 18 years and older with atrial fibrillation at risk for stroke.    Phone visit 3.    Subject contacted by telephone to evaluate/ensure compliance.    Bruising have resolved 04 Jul 2023, overall feeling better on the medication.    Noticed fatigue when she started taking Eliquis last year and that has resolved as well.     Subject queried for adverse events, endpoints and medication changes. If present, noted below.  Queried regarding compliance and re-educated if necessary.    Will see in in clinic     Kylie Sandoval RN     States [employer] never received fax re; Levemir. Also if we could double check with [employer] that they received prescription. Now will need a short supply sent to [facility] in [place]. .

## 2023-07-19 NOTE — LETTER
7/19/2023    Kathya Escalera, DO  [address] [facility] [address]    RE: Rosie Blackman       Dear Colleague,     I had the pleasure of seeing Rosie Blackman in the [facility].  Garretson [id number] Study 19767- OCEANIC- AF Phone visit (A multicenter, international, randomized, active comparator-controlled, double-blind, double-dummy, parallel-group, 2 arm,phase 3 study to compare the efficacy and safety of the oral FXIa inhibitor asundexian (BAY 1131034) with apixaban for the prevention of stroke or systemic embolism in male and female participants aged 18 years and older with atrial fibrillation at risk for stroke.    Phone visit 3.    Subject contacted by telephone to evaluate/ensure compliance.    Bruising have resolved 04 Jul 2023, overall feeling better on the medication.    Noticed fatigue when she started taking Eliquis last year and that has resolved as well.     Subject queried for adverse events, endpoints and medication changes. If present, noted below.  Queried regarding compliance and re-educated if necessary.    Will see in in clinic     Kylie Sandoval RN    Thank you for allowing me to participate in the care of your patient.      Sincerely,   Kylie Sandoval RN   [facility]  cc: No referring provider defined for this encounter.

## 2023-09-21 ENCOUNTER — OFFICE VISIT (OUTPATIENT)
Dept: CARDIOLOGY | Facility: CLINIC | Age: 83
End: 2023-09-21
Payer: MEDICARE

## 2023-09-21 VITALS
HEART RATE: 61 BPM | SYSTOLIC BLOOD PRESSURE: 132 MMHG | RESPIRATION RATE: 12 BRPM | OXYGEN SATURATION: 94 % | BODY MASS INDEX: 36.9 KG/M2 | DIASTOLIC BLOOD PRESSURE: 64 MMHG | WEIGHT: 215 LBS

## 2023-09-21 DIAGNOSIS — Z00.6 EXAMINATION OF PARTICIPANT OR CONTROL IN CLINICAL RESEARCH: Primary | ICD-10-CM

## 2023-09-21 PROCEDURE — 36415 COLL VENOUS BLD VENIPUNCTURE: CPT | Performed by: INTERNAL MEDICINE

## 2023-09-21 PROCEDURE — 84999 UNLISTED CHEMISTRY PROCEDURE: CPT | Performed by: INTERNAL MEDICINE

## 2023-09-21 PROCEDURE — 99207 PR NO CHARGE-RESEARCH SERVICE: CPT | Performed by: INTERNAL MEDICINE

## 2023-09-21 NOTE — PROGRESS NOTES
OCEANIC-AF (Phase 3 program of the Oral factor Eleven A inhibitor asundexian as novel antithrombotic - Atrial fibrillation study)     Subject Visit 4    Fatigue is improving but not resolved.   Diffuse abdominal bruising resolved on 05 Jul 2023.     Central labs collected at 1030.    IP dispense.     BAY [id number]/Placebo Lot number: [id number]  Drug accountability BAY [id number]/Placebo: returned kit [id number]- 5 pills returned    Apixaban/Placebo Lot number: [id number], [id number]  Drug accountability Apixaban/Placebo: [id number] returned 9, [id number] returned 8    First dose given at (if drawing PD): 1041    Doses:   20 sep 2023 1830  20 sep 2023 0730  19 Sep 2023 1830    Subject queried for adverse events, outcome events, HCRU and medication changes. If present, noted below.  Patient is currently taking study medication and no other blood thinning medications (eliquis, xarelto, coumadin).     Will see in clinic in 3 months.     Kylie Sandoval RN    [facility] program of the Oral factor Eleven A inhibitor asundexian as novel antithrombotic - Atrial fibrillation study) AE Note    Diagnosis/event description (diagnosis preferred):  Body Aches   Start date: 21 Aug 2023   Date resolved: 15 Sep 2023    Intensity: ([x] mild /[]  moderate / [] severe)     Outcome: ([x] resolved [with or without sequelae] /[] recovering / [] not recovered / [] fatal / [] unknown)      Date study team aware of event: 21 Sep 2023    Was treatment given for this event:  [x] Yes   [] No   If yes, provide details:   took prn meloxicam twice and this helped patient. Provided teaching that she cannot take more than 5 days per week, patient was understanding of this. Planning on seeing PCP about body aches.     Action taking with BAY [id number] / Placebo  [] Drug withdrawn  [] Drug interrupted  [x] Dose not changed   [] Not applicable (only use in the non-treatment phase, prior to first dose of study treatment or after permanent discontinuation of study  treatment.     Action taken with Apixaban / Placebo  [] Drug withdrawn  [] Drug interrupted  [x] Dose not changed   [] Not applicable (only use in the non-treatment phase, prior to first dose of study treatment or after permanent discontinuation of study treatment.     Serious adverse event?    [] Yes   [x] No    Special interest event?  [] Yes   [x] No    Endpoint? [] Yes   [x] No     Fatal event?  [] Yes   [x] No    Dr. Ryder:    Reasonable causal relationship to BAY [id number] / Placebo:  [] Yes   [x] No    Reasonable causal relationship to Apixban / Placebo:  [] Yes   [x] No    Reasonable causal relationship to protocol required procedure(s):  [] Yes   [x] No       20 sep 2023 1830  20 sep 0730  19 Sep 1830

## 2023-09-21 NOTE — LETTER
9/21/2023    Kathya Escalera, DO  [address] [facility] [address]    RE: Rosie Blackman       Dear Colleague,     [facility] the [facility]  OCEANIC-AF (Phase 3 program of the Oral factor Eleven A inhibitor asundexian as novel antithrombotic - Atrial fibrillation study)     Subject Visit 4    Fatigue is improving but not resolved.   Diffuse abdominal bruising resolved on 05 Jul 2023.     Central labs collected at 1035.    IP dispense.     BAY [id number]/Placebo Lot number: [id number]  Drug accountability BAY [id number]/Placebo:    Apixaban/Placebo Lot number: [id number], [id number]  Drug accountability Apixaban/Placebo:     First dose given at (if drawing PD): 1041    Subject queried for adverse events, outcome events, HCRU and medication changes. If present, noted below.  Patient is currently taking study medication and no other blood thinning medications (eliquis, xarelto, coumadin).     Will see in clinic in 3 months.     Kylie Sandoval RN    OCEANIC-AF (Phase 3 program of the Oral factor Eleven A inhibitor asundexian as novel antithrombotic - Atrial fibrillation study) AE Note    Diagnosis/event description (diagnosis preferred):  Body Aches   Start date: 21 Aug 2023   Date resolved: 15 Sep 2023    Intensity: ([x] mild /[]  moderate / [] severe)     Outcome: ([x] resolved [with or without sequelae] /[] recovering / [] not recovered / [] fatal / [] unknown)      Date study team aware of event: 21 Sep 2023    Was treatment given for this event:  [x] Yes   [] No   If yes, provide details:   took prn meloxicam twice and this helped patient. Provided teaching that she cannot take more than 5 days per week, patient was understanding of this. Planning on seeing PCP about body aches.     Action taking with BAY [id number] / Placebo  [] Drug withdrawn  [] Drug interrupted  [x] Dose not changed   [] Not applicable (only use in the non-treatment phase, prior to first  dose of study treatment or after permanent discontinuation of study treatment.     Action taken with Apixaban / Placebo  [] Drug withdrawn  [] Drug interrupted  [x] Dose not changed   [] Not applicable (only use in the non-treatment phase, prior to first dose of study treatment or after permanent discontinuation of study treatment.     Serious adverse event?    [] Yes   [x] No    Special interest event?  [] Yes   [x] No    Endpoint? [] Yes   [x] No     Fatal event?  [] Yes   [x] No    Dr. Ryder:    Reasonable causal relationship to BAY [id number] / Placebo:  [] Yes   [] No    Reasonable causal relationship to Apixban / Placebo:  [] Yes   [] No    Reasonable causal relationship to protocol required procedure(s):  [] Yes   [] No       20 sep 2023 1830  20 sep 0730  19 Sep 1830         Thank you for allowing me to participate in the care of your patient.      Sincerely,     Kylie Sandoval RN     [facility]  cc:   No referring provider defined for this encounter.

## 2023-09-26 ENCOUNTER — ANCILLARY PROCEDURE (OUTPATIENT)
Dept: CARDIOLOGY | Facility: CLINIC | Age: 83
End: 2023-09-26
Attending: INTERNAL MEDICINE
Payer: MEDICARE

## 2023-09-26 DIAGNOSIS — I48.91 ATRIAL FIBRILLATION, UNSPECIFIED TYPE (H): ICD-10-CM

## 2023-09-26 DIAGNOSIS — Z95.0 CARDIAC PACEMAKER IN SITU: ICD-10-CM

## 2023-09-26 PROCEDURE — 93294 REM INTERROG EVL PM/LDLS PM: CPT | Performed by: INTERNAL MEDICINE

## 2023-09-26 PROCEDURE — 93296 REM INTERROG EVL PM/IDS: CPT | Performed by: INTERNAL MEDICINE

## 2023-09-27 ENCOUNTER — TRANSFERRED RECORDS (OUTPATIENT)
Dept: CARDIOLOGY | Facility: CLINIC | Age: 83
End: 2023-09-27
Payer: MEDICARE

## 2023-10-05 ENCOUNTER — OFFICE VISIT (OUTPATIENT)
Dept: FAMILY MEDICINE | Facility: CLINIC | Age: 83
End: 2023-10-05
Payer: MEDICARE

## 2023-10-05 VITALS
OXYGEN SATURATION: 94 % | HEIGHT: 64 IN | RESPIRATION RATE: 14 BRPM | TEMPERATURE: 97.7 F | HEART RATE: 60 BPM | SYSTOLIC BLOOD PRESSURE: 124 MMHG | BODY MASS INDEX: 36.19 KG/M2 | DIASTOLIC BLOOD PRESSURE: 70 MMHG | WEIGHT: 212 LBS

## 2023-10-05 DIAGNOSIS — R10.32 LLQ ABDOMINAL PAIN: Primary | ICD-10-CM

## 2023-10-05 LAB
ALBUMIN SERPL BCG-MCNC: 4.1 G/DL (ref 3.5–5.2)
ALP SERPL-CCNC: 73 U/L (ref 35–104)
ALT SERPL W P-5'-P-CCNC: 21 U/L (ref 0–50)
ANION GAP SERPL CALCULATED.3IONS-SCNC: 9 MMOL/L (ref 7–15)
AST SERPL W P-5'-P-CCNC: 24 U/L (ref 0–45)
BASO+EOS+MONOS # BLD AUTO: NORMAL 10*3/UL
BASO+EOS+MONOS NFR BLD AUTO: NORMAL %
BASOPHILS # BLD AUTO: 0.1 10E3/UL (ref 0–0.2)
BASOPHILS NFR BLD AUTO: 1 %
BILIRUB SERPL-MCNC: 0.5 MG/DL
BUN SERPL-MCNC: 17.8 MG/DL (ref 8–23)
CALCIUM SERPL-MCNC: 10 MG/DL (ref 8.8–10.2)
CHLORIDE SERPL-SCNC: 103 MMOL/L (ref 98–107)
CREAT SERPL-MCNC: 0.85 MG/DL (ref 0.51–0.95)
DEPRECATED HCO3 PLAS-SCNC: 30 MMOL/L (ref 22–29)
EGFRCR SERPLBLD CKD-EPI 2021: 68 ML/MIN/1.73M2
EOSINOPHIL # BLD AUTO: 0.3 10E3/UL (ref 0–0.7)
EOSINOPHIL NFR BLD AUTO: 6 %
ERYTHROCYTE [DISTWIDTH] IN BLOOD BY AUTOMATED COUNT: 13 % (ref 10–15)
GLUCOSE SERPL-MCNC: 118 MG/DL (ref 70–99)
HCT VFR BLD AUTO: 43.4 % (ref 35–47)
HGB BLD-MCNC: 13.9 G/DL (ref 11.7–15.7)
IMM GRANULOCYTES # BLD: 0 10E3/UL
IMM GRANULOCYTES NFR BLD: 0 %
LYMPHOCYTES # BLD AUTO: 1 10E3/UL (ref 0.8–5.3)
LYMPHOCYTES NFR BLD AUTO: 20 %
MCH RBC QN AUTO: 30.8 PG (ref 26.5–33)
MCHC RBC AUTO-ENTMCNC: 32 G/DL (ref 31.5–36.5)
MCV RBC AUTO: 96 FL (ref 78–100)
MONOCYTES # BLD AUTO: 0.5 10E3/UL (ref 0–1.3)
MONOCYTES NFR BLD AUTO: 11 %
NEUTROPHILS # BLD AUTO: 3.1 10E3/UL (ref 1.6–8.3)
NEUTROPHILS NFR BLD AUTO: 63 %
PLATELET # BLD AUTO: 203 10E3/UL (ref 150–450)
POTASSIUM SERPL-SCNC: 3.9 MMOL/L (ref 3.4–5.3)
PROT SERPL-MCNC: 6.7 G/DL (ref 6.4–8.3)
RBC # BLD AUTO: 4.51 10E6/UL (ref 3.8–5.2)
SODIUM SERPL-SCNC: 142 MMOL/L (ref 135–145)
WBC # BLD AUTO: 4.9 10E3/UL (ref 4–11)

## 2023-10-05 PROCEDURE — 90662 IIV NO PRSV INCREASED AG IM: CPT | Performed by: INTERNAL MEDICINE

## 2023-10-05 PROCEDURE — 36415 COLL VENOUS BLD VENIPUNCTURE: CPT | Performed by: INTERNAL MEDICINE

## 2023-10-05 PROCEDURE — G0008 ADMIN INFLUENZA VIRUS VAC: HCPCS | Performed by: INTERNAL MEDICINE

## 2023-10-05 PROCEDURE — 99214 OFFICE O/P EST MOD 30 MIN: CPT | Mod: 25 | Performed by: INTERNAL MEDICINE

## 2023-10-05 PROCEDURE — 85025 COMPLETE CBC W/AUTO DIFF WBC: CPT | Performed by: INTERNAL MEDICINE

## 2023-10-05 PROCEDURE — 80053 COMPREHEN METABOLIC PANEL: CPT | Performed by: INTERNAL MEDICINE

## 2023-10-05 ASSESSMENT — PAIN SCALES - GENERAL: PAINLEVEL: NO PAIN (0)

## 2023-10-05 NOTE — PATIENT INSTRUCTIONS
Health Care Maintenance  Ok for flu shot today  Ok for RSV vaccine    Abdominal Pain  Blood work today  CT of abdomen.  Radiology test was ordered.  Please call [phone] to schedule.  If CT is normal, next step is colonoscopy  Be seen if symptoms worse

## 2023-10-05 NOTE — PROGRESS NOTES
Assessment & Plan     LLQ abdominal pain  Most suspicious for diverticulitis.  If CT negative, get c-scope  - Comprehensive metabolic panel (BMP + Alb, Alk Phos, ALT, AST, Total. Bili, TP); Future  - CBC with platelets and differential; Future  - CT Abdomen Pelvis w Contrast; Future      Patient Instructions   Health Care Maintenance  Ok for flu shot today  Ok for RSV vaccine    Abdominal Pain  Blood work today  CT of abdomen.  Radiology test was ordered.  Please call [phone] to schedule.  If CT is normal, next step is colonoscopy  Be seen if symptoms worse    Kathya Escalera, DO  [facility]    Elle Velez is a 83 year old, presenting for the following health issues:  Abdominal Pain        10/5/2023     8:09 AM   Additional Questions   Roomed by Yumiko MENESES   Accompanied by self         10/5/2023     8:09 AM   Patient Reported Additional Medications   Patient reports taking the following new medications no new meds     Pt is requesting flu shot.   Would like to discuss the RSV vaccine, wondering if it is okay to get in the future with heart issues.     History of Present Illness       Reason for visit:  Discomfort in lower abdomen  Symptom onset:  More than a month  Symptom intensity:  Moderate  Symptom progression:  Improving  Had these symptoms before:  No  What makes it worse:  No  What makes it better:  No    She eats 2-3 servings of fruits and vegetables daily.She consumes 0 sweetened beverage(s) daily.She exercises with enough effort to increase her heart rate 9 or less minutes per day.  She exercises with enough effort to increase her heart rate 3 or less days per week.   She is taking medications regularly.       Pain History:  When did you first notice your pain? Dull ache all summer and now localized in the last month    Have you seen anyone else for your pain? No  How has your pain affected your ability to work? Not applicable  Where in your body do you have pain?  Abdominal/Flank Pain  Onset/Duration: Worse about the last month   Description:   Character: Dull ache and grabbing sensation and turns into a tingling sensation   Location: left lower quadrant  Radiation: None  Intensity: mild  Progression of Symptoms:  same  Accompanying Signs & Symptoms:  Fever/Chills: YES- chills at the beginning of summer and feeling fatigued (not sure if related due to being on eliquis and pacemaker)  Gas/Bloating: No  Nausea: No  Vomitting: No  Diarrhea: YES  Constipation: No  Dysuria or Hematuria: No  History:   Trauma: No  Previous similar pain: YES- has had IBS in the past, has had surgeries in the past   Previous tests done: Colonoscopy  Precipitating factors:   Does the pain change with:     Food: No    Bowel Movement: No    Urination: No   Other factors:  No  Therapies tried and outcome: None  No LMP recorded. Patient has had a hysterectomy.  --this summer had intermittent episodes of diarrhea, 5+/day, sometimes with urgency. No nocturnal bowel movement.  --diarrhea has improved, but still will have loose stools at times; having 2-4 bowel movement/day; no constipation  --would occ have dull ache in LLQ; felt chilled, fatigued; talked about fatigue w/her oncology and heart teams  --the LLQ pain is ongoing; is intermittent;  nothing seems to bring it on or alleviate; pain episode lasts 1-2 min  -- was low in July  --wonders about adhesiohns      C-scope 2021  - Non-bleeding internal hemorrhoids.                        - Diverticulosis in the sigmoid colon.                        - No specimens collected.     Current Outpatient Medications   Medication Sig Dispense Refill    diltiazem ER COATED BEADS (CARDIZEM CD/CARTIA XT) 180 MG 24 hr capsule Take 1 capsule (180 mg) by mouth daily 90 capsule 3    Evening Primrose Oil 1000 MG CAPS Take 1 capsule by mouth daily One daily      Fish Oil-Cholecalciferol (OMEGA-3 + D PO) Take 1 capsule by mouth daily      meloxicam (MOBIC) 15 MG tablet  "Take 15 mg by mouth daily as needed      metoprolol tartrate (LOPRESSOR) 50 MG tablet Take 1 tablet (50 mg) by mouth 2 times daily 180 tablet 3    mometasone (ELOCON) 0.1 % external cream Apply sparingly to affected area twice daily for 2 weeks then decrease to 1 time daily for 30 days then decrease to 2-3 times per week 45 g 11    pravastatin (PRAVACHOL) 80 MG tablet Take 1 tablet (80 mg) by mouth daily 90 tablet 3    study - apixaban vs. placebo, OCEANIC-AF,, IDS#6117, 2.5 mg/5 mg/placebo tablet Take 1 tablet by mouth 2 times daily 98 tablet 0    study - apixaban vs. placebo, OCEANIC-AF,, IDS#[id number], 2.5 mg/5 mg/placebo tablet Take 1 tablet by mouth 2 times daily 98 tablet 0    study - asundexian vs placebo, OCEANIC-AF,, IDS#[id number], tablet Take 1 tablet by mouth daily peferably in the morning. 98 tablet 0    study - asundexian vs placebo, OCEANIC-AF,, IDS#[id number], tablet Take 1 tablet by mouth daily peferably in the morning. 98 tablet 0    THERATEARS 0.25 % OP SOLN Place 2 drops into both eyes as needed      valsartan-hydrochlorothiazide (DIOVAN HCT) 320-25 MG tablet Take 1 tablet by mouth daily 90 tablet 3    VIACTIV 500-100-40 OR CHEW Take 1 chew tab by mouth daily      apixaban ANTICOAGULANT (ELIQUIS ANTICOAGULANT) 5 MG tablet Take 1 tablet (5 mg) by mouth 2 times daily (Patient not taking: Reported on 9/21/2023) 180 tablet 3           Review of Systems   Constitutional, HEENT, cardiovascular, pulmonary, gi and gu systems are negative, except as otherwise noted.      Objective    /70   Pulse 60   Temp 97.7  F (36.5  C) (Tympanic)   Resp 14   Ht 1.626 m (5' 4\")   Wt 96.2 kg (212 lb)   SpO2 94%   BMI 36.39 kg/m    Body mass index is 36.39 kg/m .  Physical Exam   GENERAL APPEARANCE: healthy, alert, and no distress  ABDOMEN: soft, nontender, without hepatosplenomegaly or masses and bowel sounds normal                      "

## 2023-10-09 LAB
MDC_IDC_EPISODE_DTM: NORMAL
MDC_IDC_EPISODE_DURATION: 104 S
MDC_IDC_EPISODE_DURATION: 105 S
MDC_IDC_EPISODE_DURATION: 106 S
MDC_IDC_EPISODE_DURATION: 11 S
MDC_IDC_EPISODE_DURATION: 118 S
MDC_IDC_EPISODE_DURATION: 12 S
MDC_IDC_EPISODE_DURATION: 12 S
MDC_IDC_EPISODE_DURATION: 13 S
MDC_IDC_EPISODE_DURATION: 14 S
MDC_IDC_EPISODE_DURATION: 15 S
MDC_IDC_EPISODE_DURATION: 159 S
MDC_IDC_EPISODE_DURATION: 16 S
MDC_IDC_EPISODE_DURATION: 19 S
MDC_IDC_EPISODE_DURATION: 19 S
MDC_IDC_EPISODE_DURATION: 20 S
MDC_IDC_EPISODE_DURATION: 21 S
MDC_IDC_EPISODE_DURATION: 212 S
MDC_IDC_EPISODE_DURATION: 22 S
MDC_IDC_EPISODE_DURATION: 22 S
MDC_IDC_EPISODE_DURATION: 236 S
MDC_IDC_EPISODE_DURATION: 26 S
MDC_IDC_EPISODE_DURATION: 27 S
MDC_IDC_EPISODE_DURATION: 279 S
MDC_IDC_EPISODE_DURATION: 3 S
MDC_IDC_EPISODE_DURATION: 30 S
MDC_IDC_EPISODE_DURATION: 32 S
MDC_IDC_EPISODE_DURATION: 34 S
MDC_IDC_EPISODE_DURATION: 349 S
MDC_IDC_EPISODE_DURATION: 35 S
MDC_IDC_EPISODE_DURATION: 36 S
MDC_IDC_EPISODE_DURATION: 37 S
MDC_IDC_EPISODE_DURATION: 39 S
MDC_IDC_EPISODE_DURATION: 44 S
MDC_IDC_EPISODE_DURATION: 46 S
MDC_IDC_EPISODE_DURATION: 48 S
MDC_IDC_EPISODE_DURATION: 49 S
MDC_IDC_EPISODE_DURATION: 53 S
MDC_IDC_EPISODE_DURATION: 57 S
MDC_IDC_EPISODE_DURATION: 58 S
MDC_IDC_EPISODE_DURATION: 6 S
MDC_IDC_EPISODE_DURATION: 6 S
MDC_IDC_EPISODE_DURATION: 63 S
MDC_IDC_EPISODE_DURATION: 63 S
MDC_IDC_EPISODE_DURATION: 64 S
MDC_IDC_EPISODE_DURATION: 68 S
MDC_IDC_EPISODE_DURATION: 686 S
MDC_IDC_EPISODE_DURATION: 71 S
MDC_IDC_EPISODE_DURATION: 9 S
MDC_IDC_EPISODE_ID: NORMAL
MDC_IDC_EPISODE_TYPE: NORMAL
MDC_IDC_EPISODE_VENDOR_TYPE: NORMAL
MDC_IDC_LEAD_IMPLANT_DT: NORMAL
MDC_IDC_LEAD_IMPLANT_DT: NORMAL
MDC_IDC_LEAD_LOCATION: NORMAL
MDC_IDC_LEAD_LOCATION: NORMAL
MDC_IDC_LEAD_LOCATION_DETAIL_1: NORMAL
MDC_IDC_LEAD_LOCATION_DETAIL_1: NORMAL
MDC_IDC_LEAD_MFG: NORMAL
MDC_IDC_LEAD_MFG: NORMAL
MDC_IDC_LEAD_MODEL: NORMAL
MDC_IDC_LEAD_MODEL: NORMAL
MDC_IDC_LEAD_POLARITY_TYPE: NORMAL
MDC_IDC_LEAD_POLARITY_TYPE: NORMAL
MDC_IDC_LEAD_SERIAL: NORMAL
MDC_IDC_LEAD_SERIAL: NORMAL
MDC_IDC_MSMT_BATTERY_DTM: NORMAL
MDC_IDC_MSMT_BATTERY_REMAINING_LONGEVITY: 156 MO
MDC_IDC_MSMT_BATTERY_REMAINING_PERCENTAGE: 100 %
MDC_IDC_MSMT_BATTERY_STATUS: NORMAL
MDC_IDC_MSMT_LEADCHNL_RA_IMPEDANCE_VALUE: 713 OHM
MDC_IDC_MSMT_LEADCHNL_RA_PACING_THRESHOLD_AMPLITUDE: 0.6 V
MDC_IDC_MSMT_LEADCHNL_RA_PACING_THRESHOLD_PULSEWIDTH: 0.4 MS
MDC_IDC_MSMT_LEADCHNL_RV_IMPEDANCE_VALUE: 678 OHM
MDC_IDC_MSMT_LEADCHNL_RV_PACING_THRESHOLD_AMPLITUDE: 1 V
MDC_IDC_MSMT_LEADCHNL_RV_PACING_THRESHOLD_PULSEWIDTH: 0.4 MS
MDC_IDC_PG_IMPLANT_DTM: NORMAL
MDC_IDC_PG_MFG: NORMAL
MDC_IDC_PG_MODEL: NORMAL
MDC_IDC_PG_SERIAL: NORMAL
MDC_IDC_PG_TYPE: NORMAL
MDC_IDC_SESS_CLINIC_NAME: NORMAL
MDC_IDC_SESS_DTM: NORMAL
MDC_IDC_SESS_TYPE: NORMAL
MDC_IDC_SET_BRADY_AT_MODE_SWITCH_MODE: NORMAL
MDC_IDC_SET_BRADY_AT_MODE_SWITCH_RATE: 170 {BEATS}/MIN
MDC_IDC_SET_BRADY_LOWRATE: 60 {BEATS}/MIN
MDC_IDC_SET_BRADY_MAX_SENSOR_RATE: 130 {BEATS}/MIN
MDC_IDC_SET_BRADY_MAX_TRACKING_RATE: 130 {BEATS}/MIN
MDC_IDC_SET_BRADY_MODE: NORMAL
MDC_IDC_SET_BRADY_PAV_DELAY_HIGH: 200 MS
MDC_IDC_SET_BRADY_PAV_DELAY_LOW: 300 MS
MDC_IDC_SET_BRADY_SAV_DELAY_HIGH: 200 MS
MDC_IDC_SET_BRADY_SAV_DELAY_LOW: 300 MS
MDC_IDC_SET_LEADCHNL_RA_PACING_AMPLITUDE: 2.4 V
MDC_IDC_SET_LEADCHNL_RA_PACING_CAPTURE_MODE: NORMAL
MDC_IDC_SET_LEADCHNL_RA_PACING_POLARITY: NORMAL
MDC_IDC_SET_LEADCHNL_RA_PACING_PULSEWIDTH: 0.4 MS
MDC_IDC_SET_LEADCHNL_RA_SENSING_ADAPTATION_MODE: NORMAL
MDC_IDC_SET_LEADCHNL_RA_SENSING_POLARITY: NORMAL
MDC_IDC_SET_LEADCHNL_RA_SENSING_SENSITIVITY: 0.15 MV
MDC_IDC_SET_LEADCHNL_RV_PACING_AMPLITUDE: 1.5 V
MDC_IDC_SET_LEADCHNL_RV_PACING_CAPTURE_MODE: NORMAL
MDC_IDC_SET_LEADCHNL_RV_PACING_POLARITY: NORMAL
MDC_IDC_SET_LEADCHNL_RV_PACING_PULSEWIDTH: 0.4 MS
MDC_IDC_SET_LEADCHNL_RV_SENSING_ADAPTATION_MODE: NORMAL
MDC_IDC_SET_LEADCHNL_RV_SENSING_POLARITY: NORMAL
MDC_IDC_SET_LEADCHNL_RV_SENSING_SENSITIVITY: 1.5 MV
MDC_IDC_SET_ZONE_DETECTION_INTERVAL: 375 MS
MDC_IDC_SET_ZONE_TYPE: NORMAL
MDC_IDC_SET_ZONE_VENDOR_TYPE: NORMAL
MDC_IDC_STAT_AT_BURDEN_PERCENT: 6 %
MDC_IDC_STAT_AT_DTM_END: NORMAL
MDC_IDC_STAT_AT_DTM_START: NORMAL
MDC_IDC_STAT_BRADY_DTM_END: NORMAL
MDC_IDC_STAT_BRADY_DTM_START: NORMAL
MDC_IDC_STAT_BRADY_RA_PERCENT_PACED: 37 %
MDC_IDC_STAT_BRADY_RV_PERCENT_PACED: 1 %
MDC_IDC_STAT_EPISODE_RECENT_COUNT: 0
MDC_IDC_STAT_EPISODE_RECENT_COUNT: 1394
MDC_IDC_STAT_EPISODE_RECENT_COUNT: 4
MDC_IDC_STAT_EPISODE_RECENT_COUNT: 478
MDC_IDC_STAT_EPISODE_RECENT_COUNT: 8231
MDC_IDC_STAT_EPISODE_RECENT_COUNT_DTM_END: NORMAL
MDC_IDC_STAT_EPISODE_RECENT_COUNT_DTM_START: NORMAL
MDC_IDC_STAT_EPISODE_TYPE: NORMAL
MDC_IDC_STAT_EPISODE_VENDOR_TYPE: NORMAL

## 2023-10-10 ENCOUNTER — HOSPITAL ENCOUNTER (OUTPATIENT)
Dept: CT IMAGING | Facility: CLINIC | Age: 83
Discharge: HOME | End: 2023-10-10
Attending: INTERNAL MEDICINE | Admitting: INTERNAL MEDICINE
Payer: MEDICARE

## 2023-10-10 DIAGNOSIS — R10.32 LLQ ABDOMINAL PAIN: ICD-10-CM

## 2023-10-10 PROCEDURE — 250N000009 HC RX 250: Performed by: RADIOLOGY

## 2023-10-10 PROCEDURE — 250N000011 HC RX IP 250 OP 636: Performed by: RADIOLOGY

## 2023-10-10 PROCEDURE — 74177 CT ABD & PELVIS W/CONTRAST: CPT | Mod: MG

## 2023-10-10 RX ORDER — IOPAMIDOL 755 MG/ML
100 INJECTION, SOLUTION INTRAVASCULAR ONCE
Status: COMPLETED | OUTPATIENT
Start: 2023-10-10 | End: 2023-10-10

## 2023-10-10 RX ADMIN — IOPAMIDOL 100 ML: 755 INJECTION, SOLUTION INTRAVENOUS at 10:23

## 2023-10-10 RX ADMIN — SODIUM CHLORIDE 66 ML: 9 INJECTION, SOLUTION INTRAVENOUS at 10:23

## 2023-10-11 DIAGNOSIS — R10.32 LLQ ABDOMINAL PAIN: Primary | ICD-10-CM

## 2023-10-11 NOTE — RESULT ENCOUNTER NOTE
There is no clear cause of the abdominal pain seen on CT.  There is diverticulosis without diverticulitis.  This would not be causing any symptoms.  There is a small left inguinal (groin) hernia.  This would not be causing any symptoms.  the next step to determine the cause of the pain would be a colonoscopy.  Order placed.    If the symptoms have resolved, no further testing is needed unless symptoms recur

## 2023-11-06 DIAGNOSIS — I48.91 ATRIAL FIBRILLATION WITH RVR (H): ICD-10-CM

## 2023-11-06 RX ORDER — METOPROLOL TARTRATE 50 MG
50 TABLET ORAL 2 TIMES DAILY
Qty: 180 TABLET | Refills: 0 | Status: SHIPPED | OUTPATIENT
Start: 2023-11-06 | End: 2023-11-14

## 2023-11-09 ENCOUNTER — ANESTHESIA EVENT (OUTPATIENT)
Dept: GASTROENTEROLOGY | Facility: CLINIC | Age: 83
End: 2023-11-09
Payer: MEDICARE

## 2023-11-09 RX ORDER — ONDANSETRON 2 MG/ML
4 INJECTION INTRAMUSCULAR; INTRAVENOUS EVERY 30 MIN PRN
Status: CANCELLED | OUTPATIENT
Start: 2023-11-09

## 2023-11-09 RX ORDER — HYDROMORPHONE HCL IN WATER/PF 6 MG/30 ML
0.4 PATIENT CONTROLLED ANALGESIA SYRINGE INTRAVENOUS EVERY 5 MIN PRN
Status: CANCELLED | OUTPATIENT
Start: 2023-11-09

## 2023-11-09 RX ORDER — FENTANYL CITRATE 50 UG/ML
25 INJECTION, SOLUTION INTRAMUSCULAR; INTRAVENOUS EVERY 5 MIN PRN
Status: CANCELLED | OUTPATIENT
Start: 2023-11-09

## 2023-11-09 RX ORDER — HYDROMORPHONE HCL IN WATER/PF 6 MG/30 ML
0.2 PATIENT CONTROLLED ANALGESIA SYRINGE INTRAVENOUS EVERY 5 MIN PRN
Status: CANCELLED | OUTPATIENT
Start: 2023-11-09

## 2023-11-09 RX ORDER — FENTANYL CITRATE 50 UG/ML
50 INJECTION, SOLUTION INTRAMUSCULAR; INTRAVENOUS EVERY 5 MIN PRN
Status: CANCELLED | OUTPATIENT
Start: 2023-11-09

## 2023-11-09 RX ORDER — ONDANSETRON 4 MG/1
4 TABLET, ORALLY DISINTEGRATING ORAL EVERY 30 MIN PRN
Status: CANCELLED | OUTPATIENT
Start: 2023-11-09

## 2023-11-09 RX ORDER — SODIUM CHLORIDE, SODIUM LACTATE, POTASSIUM CHLORIDE, CALCIUM CHLORIDE 600; 310; 30; 20 MG/100ML; MG/100ML; MG/100ML; MG/100ML
INJECTION, SOLUTION INTRAVENOUS CONTINUOUS
Status: CANCELLED | OUTPATIENT
Start: 2023-11-09

## 2023-11-09 ASSESSMENT — ENCOUNTER SYMPTOMS: DYSRHYTHMIAS: 1

## 2023-11-09 ASSESSMENT — COPD QUESTIONNAIRES: COPD: 1

## 2023-11-09 NOTE — ANESTHESIA PREPROCEDURE EVALUATION
Anesthesia Pre-Procedure Evaluation    Patient: Rosie Blackman   MRN: [id number] : 1940        Procedure : Procedure(s):  Colonoscopy          Past Medical History:   Diagnosis Date    Chronic airway obstruction (H)     Diastolic dysfunction 2022    Gastroesophageal reflux disease     Hiatal hernia     Hypertension     Malignant neoplasm of ovary (H)     Ovarian cancer, unspecified laterality (H) 3/10/2021    Personal history of contact with and (suspected) exposure to asbestos     Primary osteoarthritis of right hip 2020      Past Surgical History:   Procedure Laterality Date    BIOPSY BREAST Left     BREAST BIOPSY, CORE RT/LT      CHOLECYSTECTOMY      COLONOSCOPY  2010    Diverticulosis, colon polyps; repeat 5 yrs    COLONOSCOPY N/A 2015    Procedure: COLONOSCOPY;  Surgeon: Alejandro Matos MD;  Location: [facility]    COLONOSCOPY N/A 2021    Procedure: COLONOSCOPY;  Surgeon: Aayush George MD;  Location: [facility]    Colonoscopy, hyperplastic polyps, repeat in 5 yrs      EP PACEMAKER DEVICE & LEAD IMPLANT- RIGHT ATRIAL & RIGHT VENTRICULAR Left 10/24/2022    Procedure: Pacemaker Device & Lead Implant - Right Atrial & Right Ventricular;  Surgeon: Demond Meyer MD;  Location:  [facility]    ESOPHAGOSCOPY, GASTROSCOPY, DUODENOSCOPY (EGD), COMBINED  2013    Procedure: COMBINED ESOPHAGOSCOPY, GASTROSCOPY, DUODENOSCOPY (EGD), BIOPSY SINGLE OR MULTIPLE;  Gastroscopy;  Surgeon: Blaise Gill MD;  Location: [facility]    EYE SURGERY      R/L Cataracts    HC REMOVE TONSILS/ADENOIDS,13+ Y/O      T & A 13+y.o.    HERNIORRHAPHY INCISIONAL (LOCATION) N/A 2021    Procedure: HERNIORRHAPHY, INCISIONAL, OPEN WITH MESH;  Surgeon: Aayush George MD;  Location: [facility]    HYSTERECTOMY, PAP NO LONGER INDICATED      LAPAROSCOPIC SALPINGO-OOPHORECTOMY N/A 2020    Procedure: Laparoscopy, removal or pelvic mass, both tubes and ovaries, omentectomy,  anterior culvectomy, pelvic and para aortic lymph node dissection, laparotomy;  Surgeon: Felipe Corona MD;  Location: [facility]    [facility] ANESTH,LUMBAR SPINE,CORD SURGERY  10/2020    L3-4 L4-5 TRANSFORAMINAL INTERBODY FUSION L3-5, POSTERIOR INSTRUMENTED FUSION AND DECOMPRESSION    TVH for DUB, ovaries in      VASCULAR SURGERY  2014    [place] closure    ZZC ANESTH,KNEE VEINS SURGERY  08/20/2014      Allergies   Allergen Reactions    Atorvastatin Other (See Comments)     Muscle Pain    Ezetimibe Other (See Comments)     Severe muscle aches    Irbesartan Cramps    Lisinopril     Omeprazole      Other reaction(s): *Unknown    Prilosec [Omeprazole]     Rosuvastatin Other (See Comments)     Muscle Pain    Zestril [Ace Inhibitors] Cough      Social History     Tobacco Use    Smoking status: Never    Smokeless tobacco: Never   Substance Use Topics    Alcohol use: No      Wt Readings from Last 1 Encounters:   10/05/23 96.2 kg (212 lb)        Anesthesia Evaluation            ROS/MED HX  ENT/Pulmonary:     (+)           allergic rhinitis,              COPD,              Neurologic:     (+)              TIA,                  Cardiovascular:     (+) Dyslipidemia hypertension- -   -  - -              pacemaker, Reason placed: See below. type: [place] scientific,        dysrhythmias, a-fib and Sick Sinus Syndrome,        Previous cardiac testing   Echo: Date: Results:    Stress Test:  Date: 4/2023 Results:  atus: Final result       Visible to patient: Yes (seen)       Next appt: 11/17/2023 at 10:30 AM in Family Practice (Kathya Escalera DO)       Dx: Paroxysmal atrial fibrillation (H)    3 Result Notes  Details      Reading Physician Reading Date Result Priority  Graham Barahona MD  [phone] 4/19/2023 Routine  Graham Barahona MD  [phone] 4/19/2023     Result Text        The nuclear stress test is abnormal.     There is a small area of nontransmural infarction in the distal anterior segment(s) of the left  ventricle.     Left ventricular function is normal.     There is no prior study for comparison.       ECG Summary    ECG Baseline electrocardiogram demonstrates sinus rhythm.   The stress electrocardiogram is negative for inducible ischemic EKG changes.  Technical Comments    Cardiac Protocol A pharmacologic stress test was performed following a walking Lexiscan protocol using 0.4 mg of intravenous regadenoson administered over 10 seconds under the supervision of Dr. Lester. The supervising registered nurse observed typical vasodilator side effects during the stress test.  Isotope Administration Nuclear imaging was accomplished using a one day protocol with 31.3 mCi of technetium tetrofosmin injected at the completion of Lexiscan infusion on 4/19/2023 and 11 mCi of technetium tetrofosmin at rest on 4/19/2023.  Nuclear Study Quality Final image quality is satisfactory.  Stress Measurements    Baseline Vitals  Baseline HR   60 bpm      Baseline Systolic BP   128      Baseline Diastolic BP   66      Peak Stress Vitals  Max HR   81      Last Stress Systolic BP   130      Last Stress Diastolic BP   68      Exercise Data  Target HR   138      Max Predicted HR   59 %        Nuclear Perfusion    Stress Summed Score: 2 Percent Abnormal: 2.94%      Moderate count reduction in the following segments: apical anterior.  All other segments are normal.      Resting Summed Score: 2 Percent Abnormal: 2.94%      Moderate count reduction in the following segments: apical anterior.  All other segments are normal.              ECG Reviewed:  Date: Results:  [employer] (D) Remote PPM Device Check  AP: 39%  : 1%  Mode: DDDR 60/130  Presenting Rhythm: AP/VS, AS/VS  Historical underlying rhythm: SBrady at 55  Heart Rate: Rates 60-170bpm per histogram (mostly in the 60s when AP)  Sensing: stable  Pacing Threshold: stable  Impedance: stable  Battery Status: 13 years  Atrial Arrhythmia: Patient in mode switch 7% of the time.  EGMs show As>Vs for AF/AFL, longest logged episode lasted 1 hour and 9 minutes. Ventricular rates 70-170bpm.  Ventricular Arrhythmia: 709 VHR logged. 23 EGMs for review show AF/AFL with RVR, longest lasting 1 minute and 16 seconds, ventricular rates 130-180bpm.  pt on Eliquis, Diltiazem and Metoprolol.     Cath:  Date: Results:      METS/Exercise Tolerance:     Hematologic:       Musculoskeletal:       GI/Hepatic:     (+) GERD,     hiatal hernia,              Renal/Genitourinary:       Endo: Comment: prediabetic    (+)               Obesity,       Psychiatric/Substance Use:       Infectious Disease:       Malignancy:   (+) Malignancy, History of Other.Other CA ovarian cancer status post.    Other:            Physical Exam    Airway  airway exam normal      Mallampati: II   TM distance: > 3 FB   Neck ROM: full   Mouth opening: > 3 cm    Respiratory Devices and Support         Dental  no notable dental history     (+) Minor Abnormalities - some fillings, tiny chips      Cardiovascular          Rhythm and rate: tachycardia     Pulmonary   pulmonary exam normal                OUTSIDE LABS:  CBC:   Lab Results   Component Value Date    WBC 4.9 10/05/2023    WBC 7.5 10/24/2022    HGB 13.9 10/05/2023    HGB 14.3 10/24/2022    HCT 43.4 10/05/2023    HCT 44.4 10/24/2022     10/05/2023     10/24/2022     BMP:   Lab Results   Component Value Date     10/05/2023     10/24/2022    POTASSIUM 3.9 10/05/2023    POTASSIUM 3.6 10/24/2022    CHLORIDE 103 10/05/2023    CHLORIDE 104 10/24/2022    CO2 30 (H) 10/05/2023    CO2 27 10/24/2022    BUN 17.8 10/05/2023    BUN 16 10/24/2022    CR 0.85 10/05/2023    CR 0.74 10/24/2022     (H) 10/05/2023     (H) 10/24/2022     COAGS:   Lab Results   Component Value Date    PTT 26 12/04/2021    INR 1.00 08/21/2022     POC:   Lab Results   Component Value Date     (H) 07/25/2020     HEPATIC:   Lab Results   Component Value Date    ALBUMIN 4.1 10/05/2023     PROTTOTAL 6.7 10/05/2023    ALT 21 10/05/2023    AST 24 10/05/2023    ALKPHOS 73 10/05/2023    BILITOTAL 0.5 10/05/2023     OTHER:   Lab Results   Component Value Date    A1C 5.8 (H) 12/04/2021    ANISA 10.0 10/05/2023    MAG 1.9 08/17/2022    TSH 2.49 08/23/2022    T4 1.04 08/17/2022    SED 18 10/01/2004       Anesthesia Plan    ASA Status:  3    NPO Status:  NPO Appropriate    Anesthesia Type: General.   Induction: Propofol, Intravenous.   Maintenance: TIVA.        Consents    Anesthesia Plan(s) and associated risks, benefits, and realistic alternatives discussed. Questions answered and patient/representative(s) expressed understanding.     - Discussed: Risks, Benefits and Alternatives for BOTH SEDATION and the PROCEDURE were discussed     - Discussed with:  Patient            Postoperative Care    Pain management: Multi-modal analgesia, IV analgesics, Oral pain medications.   PONV prophylaxis: Ondansetron (or other 5HT-3), Dexamethasone or Solumedrol, Background Propofol Infusion     Comments:                DELMY Trejo CRNA

## 2023-11-10 ENCOUNTER — HOSPITAL ENCOUNTER (OUTPATIENT)
Facility: CLINIC | Age: 83
Discharge: HOME | End: 2023-11-10
Attending: SURGERY | Admitting: SURGERY
Payer: MEDICARE

## 2023-11-10 ENCOUNTER — ANESTHESIA (OUTPATIENT)
Dept: GASTROENTEROLOGY | Facility: CLINIC | Age: 83
End: 2023-11-10
Payer: MEDICARE

## 2023-11-10 ENCOUNTER — HOSPITAL ENCOUNTER (EMERGENCY)
Facility: CLINIC | Age: 83
Discharge: HOME | End: 2023-11-10
Attending: EMERGENCY MEDICINE | Admitting: EMERGENCY MEDICINE
Payer: MEDICARE

## 2023-11-10 VITALS
SYSTOLIC BLOOD PRESSURE: 122 MMHG | DIASTOLIC BLOOD PRESSURE: 97 MMHG | RESPIRATION RATE: 16 BRPM | HEART RATE: 107 BPM | BODY MASS INDEX: 36.19 KG/M2 | OXYGEN SATURATION: 97 % | WEIGHT: 212 LBS | TEMPERATURE: 98.7 F | HEIGHT: 64 IN

## 2023-11-10 VITALS
DIASTOLIC BLOOD PRESSURE: 72 MMHG | HEART RATE: 71 BPM | RESPIRATION RATE: 47 BRPM | SYSTOLIC BLOOD PRESSURE: 132 MMHG | WEIGHT: 212 LBS | TEMPERATURE: 97.7 F | HEIGHT: 64 IN | OXYGEN SATURATION: 96 % | BODY MASS INDEX: 36.19 KG/M2

## 2023-11-10 DIAGNOSIS — I48.91 ATRIAL FIBRILLATION WITH RVR (H): ICD-10-CM

## 2023-11-10 LAB
ANION GAP SERPL CALCULATED.3IONS-SCNC: 13 MMOL/L (ref 7–15)
BUN SERPL-MCNC: 12.2 MG/DL (ref 8–23)
CALCIUM SERPL-MCNC: 9.9 MG/DL (ref 8.8–10.2)
CHLORIDE SERPL-SCNC: 104 MMOL/L (ref 98–107)
CREAT SERPL-MCNC: 0.98 MG/DL (ref 0.51–0.95)
DEPRECATED HCO3 PLAS-SCNC: 23 MMOL/L (ref 22–29)
EGFRCR SERPLBLD CKD-EPI 2021: 57 ML/MIN/1.73M2
GLUCOSE SERPL-MCNC: 109 MG/DL (ref 70–99)
HOLD SPECIMEN: NORMAL
POTASSIUM SERPL-SCNC: 3.7 MMOL/L (ref 3.4–5.3)
SODIUM SERPL-SCNC: 140 MMOL/L (ref 135–145)

## 2023-11-10 PROCEDURE — 250N000011 HC RX IP 250 OP 636: Performed by: EMERGENCY MEDICINE

## 2023-11-10 PROCEDURE — 258N000003 HC RX IP 258 OP 636: Performed by: SURGERY

## 2023-11-10 PROCEDURE — 96374 THER/PROPH/DIAG INJ IV PUSH: CPT | Performed by: EMERGENCY MEDICINE

## 2023-11-10 PROCEDURE — 80048 BASIC METABOLIC PNL TOTAL CA: CPT | Performed by: NURSE ANESTHETIST, CERTIFIED REGISTERED

## 2023-11-10 PROCEDURE — 99285 EMERGENCY DEPT VISIT HI MDM: CPT | Mod: 25 | Performed by: EMERGENCY MEDICINE

## 2023-11-10 PROCEDURE — 93005 ELECTROCARDIOGRAM TRACING: CPT

## 2023-11-10 PROCEDURE — 250N000009 HC RX 250: Performed by: NURSE ANESTHETIST, CERTIFIED REGISTERED

## 2023-11-10 PROCEDURE — 250N000009 HC RX 250: Performed by: SURGERY

## 2023-11-10 PROCEDURE — 36415 COLL VENOUS BLD VENIPUNCTURE: CPT | Performed by: NURSE ANESTHETIST, CERTIFIED REGISTERED

## 2023-11-10 PROCEDURE — 99284 EMERGENCY DEPT VISIT MOD MDM: CPT | Performed by: EMERGENCY MEDICINE

## 2023-11-10 RX ORDER — SODIUM CHLORIDE, SODIUM LACTATE, POTASSIUM CHLORIDE, CALCIUM CHLORIDE 600; 310; 30; 20 MG/100ML; MG/100ML; MG/100ML; MG/100ML
INJECTION, SOLUTION INTRAVENOUS CONTINUOUS
Status: DISCONTINUED | OUTPATIENT
Start: 2023-11-10 | End: 2023-11-10 | Stop reason: HOSPADM

## 2023-11-10 RX ORDER — LIDOCAINE 40 MG/G
CREAM TOPICAL
Status: DISCONTINUED | OUTPATIENT
Start: 2023-11-10 | End: 2023-11-10 | Stop reason: HOSPADM

## 2023-11-10 RX ORDER — DILTIAZEM HYDROCHLORIDE 5 MG/ML
20 INJECTION INTRAVENOUS ONCE
Status: COMPLETED | OUTPATIENT
Start: 2023-11-10 | End: 2023-11-10

## 2023-11-10 RX ORDER — METOPROLOL TARTRATE 1 MG/ML
INJECTION, SOLUTION INTRAVENOUS PRN
Status: DISCONTINUED | OUTPATIENT
Start: 2023-11-10 | End: 2023-11-10

## 2023-11-10 RX ADMIN — LIDOCAINE HYDROCHLORIDE 0.1 ML: 10 INJECTION, SOLUTION INFILTRATION; PERINEURAL at 09:32

## 2023-11-10 RX ADMIN — METOPROLOL TARTRATE 5 MG: 5 INJECTION INTRAVENOUS at 10:48

## 2023-11-10 RX ADMIN — DILTIAZEM HYDROCHLORIDE 20 MG: 5 INJECTION, SOLUTION INTRAVENOUS at 12:03

## 2023-11-10 RX ADMIN — METOPROLOL TARTRATE 5 MG: 5 INJECTION INTRAVENOUS at 09:58

## 2023-11-10 RX ADMIN — METOPROLOL TARTRATE 5 MG: 5 INJECTION INTRAVENOUS at 09:47

## 2023-11-10 RX ADMIN — METOPROLOL TARTRATE 5 MG: 5 INJECTION INTRAVENOUS at 09:53

## 2023-11-10 RX ADMIN — SODIUM CHLORIDE, POTASSIUM CHLORIDE, SODIUM LACTATE AND CALCIUM CHLORIDE: 600; 310; 30; 20 INJECTION, SOLUTION INTRAVENOUS at 09:32

## 2023-11-10 RX ADMIN — METOPROLOL TARTRATE 5 MG: 5 INJECTION INTRAVENOUS at 10:49

## 2023-11-10 ASSESSMENT — ACTIVITIES OF DAILY LIVING (ADL)
ADLS_ACUITY_SCORE: 33
ADLS_ACUITY_SCORE: 35
ADLS_ACUITY_SCORE: 35

## 2023-11-10 NOTE — DISCHARGE INSTRUCTIONS
Continue your home medications.  Return to the emergency department for lightheadedness, chest pain, difficulty breathing, repeated vomiting, or other concerns.  Otherwise follow-up in clinic for recheck.

## 2023-11-10 NOTE — PROGRESS NOTES
Colonoscopy was cancelled.  Pt went into AF RVR with HR ranging between 130-160s despite IV metropolol.  Pt is asymptomatic.  THus, I do not recommend going through the procedure with that HR.  I recommend she visit her PCP to see if this colonoscopy needs to be reschedule vs another mode of diagnostic testings. All of her questions were answered.        [facility], DO

## 2023-11-10 NOTE — ED PROVIDER NOTES
History     Chief Complaint   Patient presents with    Tachycardia     HPI  Rosie Blackman is a 83 year old female who presents for evaluation of tachycardia.  The patient was scheduled for colonoscopy this morning.  She has a history of atrial fibrillation and has been off of apixaban (she is actually in a study and is not sure if she is on apixaban or another study medication) for the past 2 days.  She also has not taken her metoprolol or diltiazem this morning.  She went to the procedure and was feeling okay but they noted that she had a rapid heart rate.  They tried giving IV metoprolol 5 mg x 3 without improvement.  They also gave IV fluids.  She continued to have rapid A-fib and so they canceled the procedure.  I spoke on the phone with the surgeon who is handling the case, Dr. Garcia.  She called and I spoke on the phone with her, we discussed further management and I recommended the patient come to the emergency department for further fluids and possible other medications.  The patient denies chest pain, shortness of breath, lightheadedness, abdominal pain, nausea, vomiting, diaphoresis.    Allergies:  Allergies   Allergen Reactions    Atorvastatin Other (See Comments)     Muscle Pain    Ezetimibe Other (See Comments)     Severe muscle aches    Irbesartan Cramps    Lisinopril     Omeprazole      Other reaction(s): *Unknown    Prilosec [Omeprazole]     Rosuvastatin Other (See Comments)     Muscle Pain    Zestril [Ace Inhibitors] Cough       Problem List:    Patient Active Problem List    Diagnosis Date Noted    SSS (sick sinus syndrome) (H) 01/24/2023     Priority: Medium     S/p pacer 10/22      Cardiac pacemaker in situ 01/24/2023     Priority: Medium     Due to SSS 10/2022      Paroxysmal atrial fibrillation (H) 08/18/2022     Priority: Medium     TIA 3/2022.  Tachy-soha s/p PPM 10/2022;  On Eliquis indefinitely      Diastolic dysfunction 06/28/2022     Priority: Medium     Seen on Echo 6/2022      TIA  (transient ischemic attack) 04/27/2022     Priority: Medium     Probable 3/22. sudden onset left mouth drooping, right hand weakness, trouble speaking, lasted 30 sec.  Did not seek medical care.  Did not tolerate ASA - GI upset and bleeding/bruising.  Likely due to occult A. fib.  Recommend Eliquis indefinitely      Incisional hernia of anterior abdominal wall without obstruction or gangrene 02/22/2021     Priority: Medium     Added automatically from request for surgery [id number]      S/P exploratory laparotomy 07/24/2020     Priority: Medium    Ovarian cancer, right (H) 07/24/2020     Priority: Medium     7/2020 Right ovary with ENDOMETRIOID ADENOCARCINOMA, FIGO grade 1       Tear of gluteus medius tendon 07/09/2020     Priority: Medium    Primary osteoarthritis of right hip 07/09/2020     Priority: Medium    Spinal stenosis of lumbar region, unspecified whether neurogenic claudication present 07/09/2020     Priority: Medium    Obesity (BMI 35.0-39.9) with comorbidity (H) 11/28/2018     Priority: Medium    Lichen sclerosus of female genitalia 07/03/2018     Priority: Medium    Gastroesophageal reflux disease, esophagitis presence not specified 10/12/2017     Priority: Medium    Hiatal hernia 10/21/2013     Priority: Medium     Large; found on gastroscopy 10/2013; No GERD sx; chronic throat clearing; + H pylori      Plantar fasciitis 04/02/2012     Priority: Medium    Advanced directives, counseling/discussion 12/08/2011     Priority: Medium     Advance Care Planning:   ACP Review and Resources Provided: Reviewed chart for advance care plan. Rosie Blackman has an up to date advance care plan on file. See additional documentation in Problem List and click on code status for document details. Confirmed/documented designated decision maker(s). Added by Shima Cruz on 12/08/2011        Hyperlipidemia LDL goal <130 10/31/2010     Priority: Medium     Intolerant to all other statins (2013)      Prediabetes 09/03/2010     " Priority: Medium     Blood sugar 122 9/10 working on; has already been to nutrition, weight and wellness and is reading \"healthy for life\"      Diverticulosis of colon 05/24/2010     Priority: Medium     Noted on colonoscopy 5/10      Colon polyps 05/24/2010     Priority: Medium    Pes planus      Priority: Medium    Personal history of contact with and (suspected) exposure to asbestos      Priority: Medium             exposed 12 years in childhood      Donor of other blood      Priority: Medium           has false + syphyllis  Problem list name updated by automated process. Provider to review      Irritable bowel syndrome      Priority: Medium    ALLERGIC RHINITIS       Priority: Medium     Resolving with dietary changes; stopping gluten      History of recurrent UTIs      Priority: Medium             recurrent  Problem list name updated by automated process. Provider to review      Essential hypertension      Priority: Medium        Past Medical History:    Past Medical History:   Diagnosis Date    Chronic airway obstruction (H)     Diastolic dysfunction 6/28/2022    Gastroesophageal reflux disease     Hiatal hernia     Hypertension     Malignant neoplasm of ovary (H)     Ovarian cancer, unspecified laterality (H) 3/10/2021    Personal history of contact with and (suspected) exposure to asbestos     Primary osteoarthritis of right hip 7/9/2020       Past Surgical History:    Past Surgical History:   Procedure Laterality Date    BIOPSY BREAST Left     BREAST BIOPSY, CORE RT/LT  1994    CHOLECYSTECTOMY  1995    COLONOSCOPY  05/2010    Diverticulosis, colon polyps; repeat 5 yrs    COLONOSCOPY N/A 11/16/2015    Procedure: COLONOSCOPY;  Surgeon: Alejandro Matos MD;  Location: [facility]    COLONOSCOPY N/A 09/17/2021    Procedure: COLONOSCOPY;  Surgeon: Aayush George MD;  Location: [facility]    Colonoscopy, hyperplastic polyps, repeat in 5 yrs  2004    EP PACEMAKER DEVICE & LEAD IMPLANT- RIGHT ATRIAL & RIGHT VENTRICULAR Left " 10/24/2022    Procedure: Pacemaker Device & Lead Implant - Right Atrial & Right Ventricular;  Surgeon: Demond Meyer MD;  Location:  [facility]    ESOPHAGOSCOPY, GASTROSCOPY, DUODENOSCOPY (EGD), COMBINED  2013    Procedure: COMBINED ESOPHAGOSCOPY, GASTROSCOPY, DUODENOSCOPY (EGD), BIOPSY SINGLE OR MULTIPLE;  Gastroscopy;  Surgeon: Blaise Gill MD;  Location: [facility]    EYE SURGERY      R/L Cataracts    HC REMOVE TONSILS/ADENOIDS,13+ Y/O      T & A 13+y.o.    HERNIORRHAPHY INCISIONAL (LOCATION) N/A 2021    Procedure: HERNIORRHAPHY, INCISIONAL, OPEN WITH MESH;  Surgeon: Aayush George MD;  Location: [facility]    HYSTERECTOMY, PAP NO LONGER INDICATED      LAPAROSCOPIC SALPINGO-OOPHORECTOMY N/A 2020    Procedure: Laparoscopy, removal or pelvic mass, both tubes and ovaries, omentectomy, anterior culvectomy, pelvic and para aortic lymph node dissection, laparotomy;  Surgeon: Felipe Corona MD;  Location: [facility]    ND ANESTH,LUMBAR SPINE,CORD SURGERY  10/2020    L3-4 L4-5 TRANSFORAMINAL INTERBODY FUSION L3-5, POSTERIOR INSTRUMENTED FUSION AND DECOMPRESSION    TVH for DUB, ovaries in      VASCULAR SURGERY      [facility] closure    C ANESTH,KNEE VEINS SURGERY  2014       Family History:    Family History   Problem Relation Age of Onset    Cancer Mother         uterine cancer,     Respiratory Mother         COPD    Cardiovascular Mother         AAA  age 80    Breast Cancer Maternal Grandmother     Cancer Maternal Grandfather         cancer unknown type    Breast Cancer Sister     Eye Disorder Father         cataracts    Depression Father     Depression Son     Depression Son     Breast Cancer Sister         X2    Cancer - colorectal No family hx of         no ovarian cancer       Social History:  Marital Status:   [2]  Social History     Tobacco Use    Smoking status: Never    Smokeless tobacco: Never   Vaping Use    Vaping Use: Never used  "  Substance Use Topics    Alcohol use: No    Drug use: No        Medications:    diltiazem ER COATED BEADS (CARDIZEM CD/CARTIA XT) 180 MG 24 hr capsule  metoprolol tartrate (LOPRESSOR) 50 MG tablet  apixaban ANTICOAGULANT (ELIQUIS ANTICOAGULANT) 5 MG tablet  Evening Primrose Oil 1000 MG CAPS  Fish Oil-Cholecalciferol (OMEGA-3 + D PO)  meloxicam (MOBIC) 15 MG tablet  mometasone (ELOCON) 0.1 % external cream  pravastatin (PRAVACHOL) 80 MG tablet  study - apixaban vs. placebo, OCEANIC-AF,, IDS#[id number], 2.5 mg/5 mg/placebo tablet  study - apixaban vs. placebo, OCEANIC-AF,, IDS#[id number], 2.5 mg/5 mg/placebo tablet  study - asundexian vs placebo, OCEANIC-AF,, IDS#[id number], tablet  study - asundexian vs placebo, OCEANIC-AF,, IDS#[id number], tablet  THERATEARS 0.25 % OP SOLN  valsartan-hydrochlorothiazide (DIOVAN HCT) 320-25 MG tablet  VIACTIV 500-100-40 OR CHEW          Review of Systems    Physical Exam   BP: (!) 129/108  Pulse: (!) 130  Temp: 97.7  F (36.5  C)  Resp: 18  Height: 162.6 cm (5' 4\")  Weight: 96.2 kg (212 lb)  SpO2: 96 %      Physical Exam  Vitals and nursing note reviewed.   Constitutional:       Appearance: She is well-developed. She is not diaphoretic.   HENT:      Head: Normocephalic and atraumatic.      Right Ear: External ear normal.      Left Ear: External ear normal.      Nose: Nose normal.   Eyes:      General: No scleral icterus.     Conjunctiva/sclera: Conjunctivae normal.   Cardiovascular:      Rate and Rhythm: Tachycardia present. Rhythm irregularly irregular.   Pulmonary:      Effort: Pulmonary effort is normal. No respiratory distress.      Breath sounds: No stridor.   Abdominal:      General: There is no distension.      Palpations: Abdomen is soft.      Tenderness: There is no abdominal tenderness.   Musculoskeletal:      Cervical back: Normal range of motion.      Right lower leg: No edema.      Left lower leg: No edema.   Skin:     General: Skin is warm and dry.   Neurological:      Mental Status: She is " alert and oriented to person, place, and time.   Psychiatric:         Behavior: Behavior normal.         ED Course                 Procedures              Critical Care time:  none               Results for orders placed or performed during the hospital encounter of 11/10/23 (from the past 24 hour(s))   Basic metabolic panel   Result Value Ref Range    Sodium 140 135 - 145 mmol/L    Potassium 3.7 3.4 - 5.3 mmol/L    Chloride 104 98 - 107 mmol/L    Carbon Dioxide (CO2) 23 22 - 29 mmol/L    Anion Gap 13 7 - 15 mmol/L    Urea Nitrogen 12.2 8.0 - 23.0 mg/dL    Creatinine 0.98 (H) 0.51 - 0.95 mg/dL    GFR Estimate 57 (L) >60 mL/min/1.73m2    Calcium 9.9 8.8 - 10.2 mg/dL    Glucose 109 (H) 70 - 99 mg/dL   Extra Tube    Narrative    The following orders were created for panel order Extra Tube.  Procedure                               Abnormality         Status                     ---------                               -----------         ------                     Extra Purple Top Tube[[id number]]                                                         Please view results for these tests on the individual orders.       Medications   diltiazem (CARDIZEM) injection 20 mg (20 mg Intravenous $Given 11/10/23 1203)       Assessments & Plan (with Medical Decision Making)   83-year-old female with a history of atrial fibrillation on anticoagulants as well as metoprolol and diltiazem.  Presents from the surgery center in rapid A-fib.  She is otherwise asymptomatic.  They already gave the patient IV fluids and metoprolol.  We will continue the IV fluids here and she is given IV diltiazem as well.  I did review the patient's EKG that was obtained while she was in surgery and it shows rapid atrial fibrillation.  On recheck here her atrial fibrillation is resolved.  She feels well.  She is ambulate without difficulty, denies lightheadedness, chest pain, shortness of breath.  She wants to go home and I think this is reasonable.  She is  told to return if she has worsening of her symptoms or other concerns, otherwise continue home medications as prescribed and follow-up in clinic.  The patient is in agreement with this plan.    I have reviewed the nursing notes.    I have reviewed the findings, diagnosis, plan and need for follow up with the patient.         New Prescriptions    No medications on file       Final diagnoses:   Atrial fibrillation with RVR (H)       11/10/2023   [facility] EMERGENCY DEPT       Akil Maldonado MD  11/10/23 [id number]

## 2023-11-10 NOTE — PROGRESS NOTES
Pt brought back to room and put on monitors. Pt alert and oriented. -150s mostly. Plan for LUIS ANTONIO Streeter to reassess pt in 30 minutes, will continue to closely monitor pt in the meantime.

## 2023-11-10 NOTE — ED TRIAGE NOTES
Pt arrives to ED from colonoscopy after being found to bein a-fib RVR prior to procedure. Pt has hx a-fib. Stopped elequis 4 days ago in preperation for colonscopy. Pt received metoprolol and 1liter LR in procedure area with no resolution of tachycardia, pt denies chest pain or SOB.      Triage Assessment (Adult)       Row Name 11/10/23 1136          Triage Assessment    Airway WDL WDL        Respiratory WDL    Respiratory WDL WDL        Skin Circulation/Temperature WDL    Skin Circulation/Temperature WDL WDL        Cardiac WDL    Cardiac WDL X;rhythm     Pulse Rate & Regularity tachycardic        Peripheral/Neurovascular WDL    Peripheral Neurovascular WDL WDL        Cognitive/Neuro/Behavioral WDL    Cognitive/Neuro/Behavioral WDL WDL        Yuri Coma Scale    Best Eye Response 4-->(E4) spontaneous     Best Motor Response 6-->(M6) obeys commands     Best Verbal Response 5-->(V5) oriented     [place] Coma Scale Score 15

## 2023-11-10 NOTE — PROGRESS NOTES
Report given to MAURA Wagner RN. Pt asymptomatic, but remains in afib. EKG and BMP done. Total of 25 mg Metoprolol given while in SDS dept, see eMAR for details. at bedside. Dr. Mota, and LUIS ANTONIO Streeter at bedside to assess pt. Pt agreeable to plan and all questions answered.

## 2023-11-10 NOTE — ANESTHESIA CARE TRANSFER NOTE
Patient: Rosie Blackman    Procedure: Procedure(s):  Colonoscopy       Diagnosis: LUQ abdominal pain [R10.12]  Diagnosis Additional Information: No value filed.    Anesthesia Type:   No value filed.     Note:    Oropharynx: oropharynx clear of all foreign objects and spontaneously breathing  Level of Consciousness: awake  Oxygen Supplementation: room air    Independent Airway: airway patency satisfactory and stable  Dentition: dentition unchanged  Vital Signs Stable: post-procedure vital signs reviewed and stable  Report to RN Given: handoff report given  Patient transferred to: Emergency Department  Comments: Patient found to be in atrial fibrillation with rapid ventricular response. Treated with 5 mg IV metoprolol x3. EKG ordered. BMP ordered. General surgeon called report to ED physician. Patient transferred to ED after being monitored in PACU for another hour.   Handoff Report: Identifed the Patient, Identified the Reponsible Provider, Reviewed the pertinent medical history, Discussed the surgical course, Reviewed Intra-OP anesthesia mangement and issues during anesthesia, Set expectations for post-procedure period and Allowed opportunity for questions and acknowledgement of understanding      Vitals:  Vitals Value Taken Time   /97 11/10/23 1115   Temp     Pulse 107 11/10/23 1115   Resp 16 11/10/23 1115   SpO2 98 % 11/10/23 1119   Vitals shown include unfiled device data.    Electronically Signed By: DELMY Hooper CRNA  November 10, 2023  11:35 AM

## 2023-11-10 NOTE — H&P
[facility]    Pre-Endoscopy History and Physical     Rosie Blackman MRN# [id number]   YOB: 1940 Age: 83 year old     Date of Procedure: 11/10/2023  Primary care provider: Kathya Escalera  Type of Endoscopy: Colonoscopy with possible biopsy, possible polypectomy  Reason for Procedure: LUQ pain and diarrhea  Type of Anesthesia Anticipated: MAC    HPI:    Rosie is a 83 year old female who will be undergoing the above procedure.  Colon 2021 (nl, no more screening); eliquis; no famhx of colon cancer; now las LUQ pain    A history and physical has been performed. The patient's medications and allergies have been reviewed. The risks and benefits of the procedure and the sedation options and risks were discussed with the patient.  All questions were answered and informed consent was obtained.      She denies a personal or family history of anesthesia complications or bleeding disorders.     Patient Active Problem List   Diagnosis    Essential hypertension    Personal history of contact with and (suspected) exposure to asbestos    Donor of other blood    Irritable bowel syndrome    ALLERGIC RHINITIS     History of recurrent UTIs    Pes planus    Diverticulosis of colon    Colon polyps    Prediabetes    Hyperlipidemia LDL goal <130    Advanced directives, counseling/discussion    Plantar fasciitis    Hiatal hernia    Gastroesophageal reflux disease, esophagitis presence not specified    Lichen sclerosus of female genitalia    Obesity (BMI 35.0-39.9) with comorbidity (H)    Tear of gluteus medius tendon    Primary osteoarthritis of right hip    Spinal stenosis of lumbar region, unspecified whether neurogenic claudication present    S/P exploratory laparotomy    Incisional hernia of anterior abdominal wall without obstruction or gangrene    Ovarian cancer, right (H)    TIA (transient ischemic attack)    Diastolic dysfunction    Paroxysmal atrial fibrillation (H)    SSS (sick sinus  syndrome) (H)    Cardiac pacemaker in situ        Past Medical History:   Diagnosis Date    Chronic airway obstruction (H)     Diastolic dysfunction 6/28/2022    Gastroesophageal reflux disease     Hiatal hernia     Hypertension     Malignant neoplasm of ovary (H)     Ovarian cancer, unspecified laterality (H) 3/10/2021    Personal history of contact with and (suspected) exposure to asbestos     Primary osteoarthritis of right hip 7/9/2020        Past Surgical History:   Procedure Laterality Date    BIOPSY BREAST Left     BREAST BIOPSY, CORE RT/LT  1994    CHOLECYSTECTOMY  1995    COLONOSCOPY  05/2010    Diverticulosis, colon polyps; repeat 5 yrs    COLONOSCOPY N/A 11/16/2015    Procedure: COLONOSCOPY;  Surgeon: Alejandro Matos MD;  Location: [facility]    COLONOSCOPY N/A 09/17/2021    Procedure: COLONOSCOPY;  Surgeon: Aayush George MD;  Location: [facility]    Colonoscopy, hyperplastic polyps, repeat in 5 yrs  2004    EP PACEMAKER DEVICE & LEAD IMPLANT- RIGHT ATRIAL & RIGHT VENTRICULAR Left 10/24/2022    Procedure: Pacemaker Device & Lead Implant - Right Atrial & Right Ventricular;  Surgeon: Demond Meyer MD;  Location:  [facility]    ESOPHAGOSCOPY, GASTROSCOPY, DUODENOSCOPY (EGD), COMBINED  09/30/2013    Procedure: COMBINED ESOPHAGOSCOPY, GASTROSCOPY, DUODENOSCOPY (EGD), BIOPSY SINGLE OR MULTIPLE;  Gastroscopy;  Surgeon: Blaise Gill MD;  Location: [facility]    EYE SURGERY  2012    R/L Cataracts    HC REMOVE TONSILS/ADENOIDS,12+ Y/O      T & A 13+y.o.    HERNIORRHAPHY INCISIONAL (LOCATION) N/A 03/24/2021    Procedure: HERNIORRHAPHY, INCISIONAL, OPEN WITH MESH;  Surgeon: Aayush George MD;  Location: [facility]    HYSTERECTOMY, PAP NO LONGER INDICATED      LAPAROSCOPIC SALPINGO-OOPHORECTOMY N/A 07/24/2020    Procedure: Laparoscopy, removal or pelvic mass, both tubes and ovaries, omentectomy, anterior culvectomy, pelvic and para aortic lymph node dissection, laparotomy;  Surgeon: Felipe Corona  MD Juan;  Location: [facility]    MI ANESTH,LUMBAR SPINE,CORD SURGERY  10/2020    L3-4 L4-5 TRANSFORAMINAL INTERBODY FUSION L3-5, POSTERIOR INSTRUMENTED FUSION AND DECOMPRESSION    TVH for DUB, ovaries in      VASCULAR SURGERY      Taylor closure    ZZC ANESTH,KNEE VEINS SURGERY  2014       Social History     Tobacco Use    Smoking status: Never    Smokeless tobacco: Never   Substance Use Topics    Alcohol use: No       Family History   Problem Relation Age of Onset    Cancer Mother         uterine cancer,     Respiratory Mother         COPD    Cardiovascular Mother         AAA  age 80    Breast Cancer Maternal Grandmother     Cancer Maternal Grandfather         cancer unknown type    Breast Cancer Sister     Eye Disorder Father         cataracts    Depression Father     Depression Son     Depression Son     Breast Cancer Sister         X2    Cancer - colorectal No family hx of         no ovarian cancer       Prior to Admission medications    Medication Sig Start Date End Date Taking? Authorizing Provider   diltiazem ER COATED BEADS (CARDIZEM CD/CARTIA XT) 180 MG 24 hr capsule Take 1 capsule (180 mg) by mouth daily 23  Yes Kathya Escalera DO   Evening Primrose Oil 1000 MG CAPS Take 1 capsule by mouth daily One daily   Yes Reported, Patient   Fish Oil-Cholecalciferol (OMEGA-3 + D PO) Take 1 capsule by mouth daily   Yes Reported, Patient   meloxicam (MOBIC) 15 MG tablet Take 15 mg by mouth daily as needed   Yes Reported, Patient   metoprolol tartrate (LOPRESSOR) 50 MG tablet Take 1 tablet (50 mg) by mouth 2 times daily 23  Yes Jinny Potter PA-C   mometasone (ELOCON) 0.1 % external cream Apply sparingly to affected area twice daily for 2 weeks then decrease to 1 time daily for 30 days then decrease to 2-3 times per week 23  Yes Kathya Escalera DO   pravastatin (PRAVACHOL) 80 MG tablet Take 1 tablet (80 mg) by mouth daily 23  Yes Kathya Escalera DO  "  valsartan-hydrochlorothiazide (DIOVAN HCT) 320-25 MG tablet Take 1 tablet by mouth daily 1/24/23  Yes Kathya Escalera DO   VIACTIV 500-100-40 OR CHEW Take 1 chew tab by mouth daily   Yes Reported, Patient   apixaban ANTICOAGULANT (ELIQUIS ANTICOAGULANT) 5 MG tablet Take 1 tablet (5 mg) by mouth 2 times daily  Patient not taking: Reported on 9/21/2023 1/24/23   Kathya Escalera DO   study - apixaban vs. placebo, OCEANIC-AF,, IDS#[id number], 2.5 mg/5 mg/placebo tablet Take 1 tablet by mouth 2 times daily 9/21/23   Jerry Ryder MD   study - apixaban vs. placebo, OCEANIC-AF,, IDS#[id number], 2.5 mg/5 mg/placebo tablet Take 1 tablet by mouth 2 times daily 6/22/23   Jerry Ryder MD   study - asundexian vs placebo, OCEANIC-AF,, IDS#[id number], tablet Take 1 tablet by mouth daily peferably in the morning. 9/21/23   Jerry Ryder MD   study - asundexian vs placebo, OCEANIC-AF,, IDS#[id number], tablet Take 1 tablet by mouth daily peferably in the morning. 6/22/23   eJrry Ryder MD   THERATEARS 0.25 % OP SOLN Place 2 drops into both eyes as needed    Reported, Patient   atenolol (TENORMIN) 25 MG tablet Take 1 tablet (25 mg) by mouth daily 1/27/22 8/18/22  Kathya Escalera DO       Allergies   Allergen Reactions    Atorvastatin Other (See Comments)     Muscle Pain    Ezetimibe Other (See Comments)     Severe muscle aches    Irbesartan Cramps    Lisinopril     Omeprazole      Other reaction(s): *Unknown    Prilosec [Omeprazole]     Rosuvastatin Other (See Comments)     Muscle Pain    Zestril [Ace Inhibitors] Cough        REVIEW OF SYSTEMS:   5 point ROS negative except as noted above in HPI, including Gen., Resp., CV, GI &  system review.    PHYSICAL EXAM:   /87 (BP Location: Right arm)   Pulse 93   Temp 98.7  F (37.1  C) (Axillary)   Resp 18   Ht 1.626 m (5' 4\")   Wt 96.2 kg (212 lb)   SpO2 98%   BMI 36.39 kg/m   Estimated body mass index is 36.39 kg/m  as calculated from the following:    Height as of this " "encounter: 1.626 m (5' 4\").    Weight as of this encounter: 96.2 kg (212 lb).   Constitutional: Awake, alert, no acute distress.  Eyes: No scleral icterus.  Conjunctiva are without injection.  ENMT: Mucous membranes moist, dentition and gums are intact.   Neck: Soft, supple, trachea midline.    Endocrine: n/a   Lymphatic: There is no cervical, submandibularadenopathy.  Respiratory: normal effortgs   Cardiovascular: S1, S2  Abdomen: Non-distended, non-tender,  No masses,  Musculoskeletal: No spinal or CVA tenderness. Full range of motion in the upper and lower extremities.    Skin: No skin rashes or lesions to inspection.  No petechia.    Neurologic: alerted and oriented 3x  Psychiatric: The patient's affect is not blunted and mood is appropriate.  DIAGNOSTICS:    Not indicated    IMPRESSION   ASA Class 2 - Mild systemic disease    PLAN:   Plan for Colonoscopy with possible biopsy, possible polypectomy. We discussed the risks, benefits and alternatives and the patient wished to proceed.  Patient is cleared for the above procedure.    The above has been forwarded to the consulting provider.    [facility], [facility] General [facility]        "

## 2023-11-10 NOTE — ANESTHESIA POSTPROCEDURE EVALUATION
Patient: Rosie Blackman    Procedure: Procedure(s):  Colonoscopy       Anesthesia Type:  No value filed.    Note:  Disposition: Disposition Change/Cancellation            Disposition Change: Procedure canceled   Postop Pain Control:    PONV:    Neuro/Psych:    Airway/Respiratory:    CV/Hemodynamics:    Other NRE:    DID A NON-ROUTINE EVENT OCCUR? YES    Event details/Postop Comments:  Patient found to be in atrial fibrillation with rapid ventricular response before procedure started. Sedation was not administered. Patient was treated with 5 mg IV metoprolol x3. EKG ordered. BMP ordered. General surgeon called report to ED physician. Patient transferred to ED after being monitored in PACU for another hour. Patient was asymptomatic through out.           Last vitals:  Vitals Value Taken Time   /108 11/10/23 1135   Temp 36.5  C (97.7  F) 11/10/23 1135   Pulse 130 11/10/23 1135   Resp 18 11/10/23 1135   SpO2 96 % 11/10/23 1135       Electronically Signed By: DELMY Hooper CRNA  November 10, 2023  11:37 AM

## 2023-11-14 ENCOUNTER — TELEPHONE (OUTPATIENT)
Dept: CARDIOLOGY | Facility: CLINIC | Age: 83
End: 2023-11-14
Payer: MEDICARE

## 2023-11-14 DIAGNOSIS — I48.91 ATRIAL FIBRILLATION WITH RVR (H): ICD-10-CM

## 2023-11-14 RX ORDER — METOPROLOL TARTRATE 50 MG
100 TABLET ORAL 2 TIMES DAILY
COMMUNITY
Start: 2023-11-14 | End: 2023-12-30 | Stop reason: ALTCHOICE

## 2023-11-14 NOTE — PROGRESS NOTES
Called and reviewed providers recommendations with pt and results of PPM check. Pt will try increasing her metoprolol to 100mg BID and follow up as planned on 12/12/23 to see how this is working and if her symptoms are improving.   Med list updated.     Janet Taylor RN

## 2023-11-14 NOTE — TELEPHONE ENCOUNTER
[facility] Call Center    Phone Message    May a detailed message be left on voicemail: yes     Reason for Call: Other: patient is calling and would like to speak to the care team regarding afib that she is having, please call patient to advise, thank you      Action Taken: Other: cardiology     Travel Screening: Not Applicable  Thank you!  [facility]

## 2023-11-14 NOTE — TELEPHONE ENCOUNTER
Pt calling in stating she is having more and more episodes of A fib. She states she is feeling her heart race in the middle of the night and early mornings. She is feeling very tired- sleeping 10 hours a day. No energy and unable to do her normal activities.     Will route to device nurses to do a device interrogation first. Once done will route to ELLIOTT Corona to review device report and determine if adjustments need to be made.     Janet Taylor RN

## 2023-11-14 NOTE — TELEPHONE ENCOUNTER
"Spoke with patient. She states she doesn't feel like she is in AF at the moment. She \"has as funny feeling in her chest\" but does not feel a rapid heart rate at the moment.     She was supposed to have a colonoscopy on 11/10 but they noted she was in AF RVR at that time and had to cancel d/t rapid heart rates.     Patient states she has been more tired over the past year, seems to be around the time her PPM was implanted (10/2022). She sleeps 10 hours a night and still feels like she needs to take a nap during the evening. She doesn't have a lot of ambition during the day which is new for her. She does not that she is currently part of a research study but \"hasn't felt any different\" since starting up the new med.     Had patient send a manual transmission. Results as below:    AP: 34%    : 1%    Mode: DDDR     Presenting Rhythm: AP/AS-VS 60s    Heart Rate: some variability, mostly from 60-80s per histogram    Leads: stable  Battery Status: 11 years    Atrial Arrhythmia: 886 mode switch episodes logged  lasting anywhere from <1min to >48h. 2 EGMs for review confirm AF/AFlutter with variable V rates from the 70-160s per histogram (>100bpm about 60-70% of the time). AF burden 8% since 12/13/22.  Ventricular Arrhythmia: 617 HVR episodes logged lasting anywhere from 12s to 7m48s. EGMs for review show AF w/ RVR.  Takes eliquis, 180mg dilt ER, and 50mg lopressor BID    Will route an update to Gema Potter. Currently scheduled to follow up on 12/12.  MARRY MCRAE        "

## 2023-11-14 NOTE — TELEPHONE ENCOUNTER
Pls have her increase metoprolol tartrate to 100 mg BID to help control HR when in AF.    Stress test was just done this year 4/2023 and was negative for ischemia  Echo done 6/2022 showed normal LVEF and no significant valve abnormalities    Dr. Escalera just got blood work this fall and it showed normal kidney function, electrolytes and blood count.  TSH was normal 8/2022    At this point, I cannot think of anything else that could be causing her significant fatigue but we can screen for sleep apnea when I see her next.  Otherwise, let's see how she does on the increased dose of metoprolol.  Please send in Rx/update med list if needed    Thanks-Gema

## 2023-11-17 ENCOUNTER — ANCILLARY PROCEDURE (OUTPATIENT)
Dept: GENERAL RADIOLOGY | Facility: CLINIC | Age: 83
End: 2023-11-17
Attending: INTERNAL MEDICINE
Payer: MEDICARE

## 2023-11-17 ENCOUNTER — OFFICE VISIT (OUTPATIENT)
Dept: FAMILY MEDICINE | Facility: CLINIC | Age: 83
End: 2023-11-17
Payer: MEDICARE

## 2023-11-17 VITALS
BODY MASS INDEX: 36.45 KG/M2 | DIASTOLIC BLOOD PRESSURE: 70 MMHG | HEART RATE: 60 BPM | HEIGHT: 64 IN | RESPIRATION RATE: 16 BRPM | SYSTOLIC BLOOD PRESSURE: 120 MMHG | TEMPERATURE: 98.3 F | WEIGHT: 213.5 LBS | OXYGEN SATURATION: 98 %

## 2023-11-17 DIAGNOSIS — R10.32 LEFT GROIN PAIN: Primary | ICD-10-CM

## 2023-11-17 DIAGNOSIS — I48.0 PAROXYSMAL ATRIAL FIBRILLATION (H): ICD-10-CM

## 2023-11-17 DIAGNOSIS — R10.32 LEFT GROIN PAIN: ICD-10-CM

## 2023-11-17 DIAGNOSIS — I10 ESSENTIAL HYPERTENSION, BENIGN: ICD-10-CM

## 2023-11-17 DIAGNOSIS — E78.5 HYPERLIPIDEMIA LDL GOAL <130: ICD-10-CM

## 2023-11-17 PROCEDURE — 99214 OFFICE O/P EST MOD 30 MIN: CPT | Performed by: INTERNAL MEDICINE

## 2023-11-17 PROCEDURE — 73502 X-RAY EXAM HIP UNI 2-3 VIEWS: CPT | Mod: TC | Performed by: RADIOLOGY

## 2023-11-17 RX ORDER — DILTIAZEM HYDROCHLORIDE 180 MG/1
180 CAPSULE, COATED, EXTENDED RELEASE ORAL DAILY
Qty: 90 CAPSULE | Refills: 3 | Status: SHIPPED | OUTPATIENT
Start: 2023-11-17 | End: 2023-12-28

## 2023-11-17 RX ORDER — VALSARTAN AND HYDROCHLOROTHIAZIDE 320; 25 MG/1; MG/1
1 TABLET, FILM COATED ORAL DAILY
Qty: 90 TABLET | Refills: 3 | Status: SHIPPED | OUTPATIENT
Start: 2023-11-17 | End: 2024-07-09

## 2023-11-17 RX ORDER — PRAVASTATIN SODIUM 80 MG/1
80 TABLET ORAL DAILY
Qty: 90 TABLET | Refills: 3 | Status: SHIPPED | OUTPATIENT
Start: 2023-11-17

## 2023-11-17 ASSESSMENT — PAIN SCALES - GENERAL: PAINLEVEL: NO PAIN (1)

## 2023-11-17 NOTE — RESULT ENCOUNTER NOTE
Minimal arthritis is seen in the left hip.  Labrum is difficult to visualize sometimes on x-ray.  Continue plan of care discussed at the time of visit

## 2023-11-17 NOTE — PROGRESS NOTES
Assessment & Plan     Left groin pain  Last visit seemd more abdominal because of diarrhea, but this has since resolved.  She has hernias but they are minimally detected on exam and provocative movements do not reproduce the pain.  Pain is worse with laying on RIGHT hip - applies pressure on left leg per patient, and this reproduces the pain. Review of past imaging shows labral pathology. Will refer to ortho/sports med and help determine if MSK cause is contributing. Labral tear?  - Orthopedic  Referral; Future  - XR Hip Left 2-3 Views; Future    Paroxysmal atrial fibrillation (H)   - stable, refill provided  - diltiazem ER COATED BEADS (CARDIZEM CD/CARTIA XT) 180 MG 24 hr capsule; Take 1 capsule (180 mg) by mouth daily    Hyperlipidemia LDL goal <130   - stable, refill provided  - pravastatin (PRAVACHOL) 80 MG tablet; Take 1 tablet (80 mg) by mouth daily  - Lipid panel reflex to direct LDL Fasting; Future    Essential hypertension, benign   - stable, refill provided  - valsartan-hydrochlorothiazide (DIOVAN HCT) 320-25 MG tablet; Take 1 tablet by mouth daily      Kathya Escalera [facility]    Elle Velez is a 83 year old, presenting for the following health issues:  Follow Up (CT done 10/1023/There is no clear cause of the abdominal pain seen on CT.  There is diverticulosis without diverticulitis.  This would not be causing any symptoms.  There is a small left inguinal (groin) hernia.  This would not be causing any symptoms.  the next step to determine the cause of the pain would be a colonoscopy.  Order placed./ /If the symptoms have resolved, no further testing is needed unless symptoms recur /) and ER F/U        11/17/2023     7:03 AM   Additional Questions   Roomed by fabricio smith   Accompanied by self         11/17/2023     7:03 AM   Patient Reported Additional Medications   Patient reports taking the following new medications none       History of Present Illness        Reason for visit:  Discofort in lower left abdomen    She eats 2-3 servings of fruits and vegetables daily.She consumes 0 sweetened beverage(s) daily.She exercises with enough effort to increase her heart rate 9 or less minutes per day.  She exercises with enough effort to increase her heart rate 3 or less days per week.   She is taking medications regularly.       Chief Complaint   Patient presents with    Follow Up     CT done 10/1023  There is no clear cause of the abdominal pain seen on CT.  There is diverticulosis without diverticulitis.  This would not be causing any symptoms.  There is a small left inguinal (groin) hernia.  This would not be causing any symptoms.  the next step to determine the cause of the pain would be a colonoscopy.  Order placed.     If the symptoms have resolved, no further testing is needed unless symptoms recur       ER F/U           ED/UC Followup:    Facility:  [facility]  Date of visit: 11/10/23  Reason for visit: Atrial fibrillation with RVR   Current Status: not having A-fib anymore,was sick from the prep , is doing better       GI symptoms  --no longer having diarrhea; now tending toward constipation  --still having LLQ pain  --when she lays on right side, has pain in left hip into the groin, so has to sleep on her back  --wonders if inguinal hernias are causing the pain  --pain is daily, intermittent;  food does not worsen the pain; was having diarrhea prior to prep, this has since resolve  --pain is dull ache; not interfering with lifestyle but is very bothersome  --no change in pain with bowel movement   --no N/V, normal appetite, no GERD  --pain can come on with rest, sitting, laying down, or standing;  no movements bring it on  --history of spinal fusion with persisting chronic low back pain (on meloxicam 2-3 x month)  --feels left leg is a bit weaker, harder to lift it into the car      CT 1010/23  HEPATOBILIARY: Stable small cyst in lateral right hepatic dome. No  follow-up necessary. Mild prominence of the common duct likely due to the patient's age and postcholecystectomy state. The portal vein is patent.     PANCREAS: Normal.     SPLEEN: Normal.     ADRENAL GLANDS: Normal.     KIDNEYS/BLADDER: Stable cyst in the lateral left kidney. No follow-up necessary. No evidence of enhancing mass or hydronephrosis on either side. No bladder wall thickening or mass.     BOWEL: Small hiatal hernia. Two large duodenal diverticula are present. No bowel obstruction. Normal appendix. Descending and sigmoid diverticulosis is noted without evidence of diverticulitis.     PELVIC ORGANS: Small bilateral fat-containing inguinal hernias are stable. No free fluid or adenopathy in the pelvis.     ADDITIONAL FINDINGS: None.     MUSCULOSKELETAL: Degenerative changes are noted through the spine. Fusion hardware is seen through the lower lumbar spine.                                                                      IMPRESSION:   1.  No acute pathology through the abdomen and pelvis.  2.  Descending and sigmoid diverticulosis without evidence of diverticulitis.  3.  Small fat-containing left inguinal hernia is unchanged. The adjacent sigmoid colon does not extend into the hernia.  4.  Two large duodenal diverticula.       MRI left hip 7/2020  1. Partial tear of the insertion of the gluteus medius and proximal  attachment of the vastus lateralis on the greater trochanter. Mild  hamstring tendinosis.     2. No MR evidence of significant left hip osteoarthrosis.     3. Indeterminate T2 hyperintense cystic/tubular appearing structure in  the pelvis, which is partially visualized. Recommend CT of the abdomen  and pelvis for further evaluation.      MRI Lumbar Spine 7/2020  IMPRESSION:  1. Multilevel degenerative disc disease and facet arthropathy of the  lumbar spine, as described. The most significant finding is seen at  the L3-L4 level, where the constellation of degenerative  anterolisthesis of L3 on  L4, diffuse disc bulge, severe facet  arthropathy and ligamentum flavum thickening results in severe spinal  stenosis with compression of the traversing cauda equina nerve roots.  2. Large fluid-filled tubular structure with possible complex soft  tissue along its superior margin that is partially visualized in the  right paramedian pelvis. Correlating with the hip MRIs of same date,  there appears to be complete compression of the urinary bladder due to  external mass effect from this structure. This may represent a complex  adnexal mass (in this patient who is status post hysterectomy) versus  a dilated abnormal bowel loop, but this is incompletely evaluated. CT  of the abdomen and pelvis with contrast is recommended for further  characterization.    Current Outpatient Medications   Medication Sig Dispense Refill    diltiazem ER COATED BEADS (CARDIZEM CD/CARTIA XT) 180 MG 24 hr capsule Take 1 capsule (180 mg) by mouth daily 90 capsule 3    Evening Primrose Oil 1000 MG CAPS Take 1 capsule by mouth daily One daily      Fish Oil-Cholecalciferol (OMEGA-3 + D PO) Take 1 capsule by mouth daily      meloxicam (MOBIC) 15 MG tablet Take 15 mg by mouth daily as needed      metoprolol tartrate (LOPRESSOR) 50 MG tablet Take 2 tablets (100 mg) by mouth 2 times daily      mometasone (ELOCON) 0.1 % external cream Apply sparingly to affected area twice daily for 2 weeks then decrease to 1 time daily for 30 days then decrease to 2-3 times per week 45 g 11    pravastatin (PRAVACHOL) 80 MG tablet Take 1 tablet (80 mg) by mouth daily 90 tablet 3    study - apixaban vs. placebo, OCEANIC-AF,, IDS#[id number], 2.5 mg/5 mg/placebo tablet Take 1 tablet by mouth 2 times daily 98 tablet 0    study - apixaban vs. placebo, OCEANIC-AF,, IDS#[id number], 2.5 mg/5 mg/placebo tablet Take 1 tablet by mouth 2 times daily 98 tablet 0    study - asundexian vs placebo, OCEANIC-AF,, IDS#[id number], tablet Take 1 tablet by mouth daily peferably in the morning. 98 tablet 0  "   study - asundexian vs placebo, [facility]-,, IDS#[id number], tablet Take 1 tablet by mouth daily peferably in the morning. 98 tablet 0    THERATEARS 0.25 % OP SOLN Place 2 drops into both eyes as needed      valsartan-hydrochlorothiazide (DIOVAN HCT) 320-25 MG tablet Take 1 tablet by mouth daily 90 tablet 3    VIACTIV 500-100-40 OR CHEW Take 1 chew tab by mouth daily      apixaban ANTICOAGULANT (ELIQUIS ANTICOAGULANT) 5 MG tablet Take 1 tablet (5 mg) by mouth 2 times daily (Patient not taking: Reported on 11/17/2023) 180 tablet 3           Review of Systems   Constitutional, HEENT, cardiovascular, pulmonary, gi and gu systems are negative, except as otherwise noted.      Objective    /70 (BP Location: Right arm, Patient Position: Sitting, Cuff Size: Adult Large)   Pulse 60   Temp 98.3  F (36.8  C) (Tympanic)   Resp 16   Ht 1.626 m (5' 4\")   Wt 96.8 kg (213 lb 8 oz)   SpO2 98%   BMI 36.65 kg/m    Body mass index is 36.65 kg/m .  Physical Exam   GENERAL APPEARANCE: alert, no distress, and over weight  ABDOMEN: soft, nontender, without hepatosplenomegaly or masses and bowel sounds normal; barely perceptible left inguinal hernia with sit up maneuver.  No pain with this  MS: 4/5 strength of left leg compared to 5/5 strength of right leg                      "

## 2023-11-17 NOTE — PATIENT INSTRUCTIONS
Left hip/left groin/abdominal pain  1. GI work-up is normal thus far  2. Xray of hip/pelvis today  3. Recommend to see Dr. Houser, referral placed      Hypertension   Hyperlipidemia  Refills sent  Due for lipids any time you get blood work next

## 2023-11-20 ENCOUNTER — TELEPHONE (OUTPATIENT)
Dept: CARDIOLOGY | Facility: CLINIC | Age: 83
End: 2023-11-20
Payer: MEDICARE

## 2023-11-20 NOTE — TELEPHONE ENCOUNTER
Rosie Blackman was called today with an important study update regarding the OCEANIC-AF study.    The  Independent Data Monitoring Committee (IDMC) recommended stopping the OCEANIC-AF study due to inferior efficacy of asundexian versus the control arm.   Dr. Ryder and the study team were notified early on Monday, 20 November 2023, that the study's IDMC has recommended to immediately (as soon as feasible) to stop the study.    For DOAC patients (delete for Warfarin patients)  Discussed the need to convert from study drug to previous DOAC medication.  she was informed that a letter describing this situation has been sent to their PCP.    They confirmed that they have at least 3 days of their DOAC medication, and they can start taking this medication tomorrow.   They confirmed that they will NOT discard any of the study medication, and that they will bring all the remaining IP with for the Common End of Treatment (CEOT) visit.    Scheduled CEOT for 30 Nov 2023.     All questions and concerns were addressed during this call to her satisfaction.    Kylie Sandoval RN

## 2023-11-21 ENCOUNTER — MYC MEDICAL ADVICE (OUTPATIENT)
Dept: FAMILY MEDICINE | Facility: CLINIC | Age: 83
End: 2023-11-21
Payer: MEDICARE

## 2023-11-21 DIAGNOSIS — I48.0 PAROXYSMAL ATRIAL FIBRILLATION (H): ICD-10-CM

## 2023-11-30 ENCOUNTER — OFFICE VISIT (OUTPATIENT)
Dept: CARDIOLOGY | Facility: CLINIC | Age: 83
End: 2023-11-30
Payer: MEDICARE

## 2023-11-30 DIAGNOSIS — Z00.6 EXAMINATION OF PARTICIPANT OR CONTROL IN CLINICAL RESEARCH: Primary | ICD-10-CM

## 2023-11-30 PROCEDURE — 36415 COLL VENOUS BLD VENIPUNCTURE: CPT

## 2023-11-30 PROCEDURE — 84999 UNLISTED CHEMISTRY PROCEDURE: CPT

## 2023-11-30 PROCEDURE — 99207 PR NO CHARGE-RESEARCH SERVICE: CPT

## 2023-11-30 NOTE — PROGRESS NOTES
OCEANIC-AF (Phase 3 program of the Oral faCtor Eleven A iNhibitor asundexIan as novel antithrombotiC - Atrial Fibrillation study)     Subject seen for Common End of Treatment Visit    ED-5Q completed.     Central labs drawn at 1019    Female participants only: Childbearing potential? No    Last dose of study medication 20 Nov 2023  Started Eliquis on 21 Nov 2023     Drug accountability for BAY [id number]/ Placebo: [id number] returned 39 pills   59/61= 97%    Drug accountability for apixaban/ Placebo: [id number] returned, 59/61= 97%  [id number] 40 pills returned 58/61= 95%    Subject queried for adverse events, outcome events, HCRU and medication changes. If present, noted below.    Kylie Sandoval RN     AE 1 of 1  OCEANIC-AF (Phase 3 program of the Oral factor Eleven A inhibitor asundexian as novel antithrombotic - Atrial fibrillation study) AE Note    Diagnosis/event description (diagnosis preferred):  Atrial fibrillation with Rapid Ventricular response   Start date: 10 Nov 2023   Date resolved: 10 Nov 2023     Intensity: ([] mild /[x]  moderate / [] severe)     Outcome: ([x] resolved [with or without sequelae] /[] recovering / [] not recovered / [] fatal / [] unknown)      Date study team aware of event: 30 Nov 2023    Was treatment given for this event:  [x] Yes   [] No   If yes, provide details: Patient was send to ED before colonscopy due to elevated heart rate. She was found to be in atrial fibrillation with rapid ventricular response. Metoprolol IV, diltiazem IV and 1L of fluid given. Repeat EKG showed resolution.     Action taking with BAY [id number] / Placebo  [] Drug withdrawn  [] Drug interrupted  [x] Dose not changed   [] Not applicable (only use in the non-treatment phase, prior to first dose of study treatment or after permanent discontinuation of study treatment.     Action taken with Apixaban / Placebo  [] Drug withdrawn  [] Drug interrupted  [x] Dose not changed   [] Not applicable (only use in the  non-treatment phase, prior to first dose of study treatment or after permanent discontinuation of study treatment.     Serious adverse event?    [] Yes   [x] No    Special interest event?  [] Yes   [x] No    Endpoint? [] Yes   [x] No     Fatal event?  [] Yes   [x] No    Dr. Ryder:    Reasonable causal relationship to BAY [id number] / Placebo:  [] Yes   [] No    Reasonable causal relationship to Apixban / Placebo:  [] Yes   [] No    Reasonable causal relationship to protocol required procedure(s):  [] Yes   [] No

## 2023-11-30 NOTE — LETTER
11/30/2023    Kathya Escalera, DO  [address] [facility], [place] [address]    RE: Rosie Blackman       Dear Colleague,     [facility] the [facility].  OCEANIC-AF (Phase 3 program of the Oral faCtor Eleven A iNhibitor asundexIan as novel antithrombotiC - Atrial Fibrillation study)     Subject seen for Common End of Treatment Visit    ED-5Q completed.     Central labs drawn at 1019    Female participants only: Childbearing potential? No    Last dose of study medication 20 Nov 2023  Started Eliquis on 21 Nov 2023     Drug accountability for BAY [id number]/ Placebo: [id number] returned 39 pills   59/61= 97%    Drug accountability for apixaban/ Placebo: [id number] 39 returned, 59/61= 97%  [id number] 40 pills returned 58/61= 95%    Subject queried for adverse events, outcome events, HCRU and medication changes. If present, noted below.    Kylie Sandoval RN     AE 1 of 1  OCEANIC-AF (Phase 3 program of the Oral factor Eleven A inhibitor asundexian as novel antithrombotic - Atrial fibrillation study) AE Note    Diagnosis/event description (diagnosis preferred):  Atrial fibrillation with Rapid Ventricular response   Start date: 10 Nov 2023   Date resolved: 10 Nov 2023     Intensity: ([] mild /[x]  moderate / [] severe)     Outcome: ([x] resolved [with or without sequelae] /[] recovering / [] not recovered / [] fatal / [] unknown)      Date study team aware of event: 30 Nov 2023    Was treatment given for this event:  [x] Yes   [] No   If yes, provide details: Patient was send to ED before colonscopy due to elevated heart rate. She was found to be in atrial fibrillation with rapid ventricular response. Metoprolol IV, diltiazem IV and 1L of fluid given. Repeat EKG showed resolution.     Action taking with BAY [id number] / Placebo  [] Drug withdrawn  [] Drug interrupted  [x] Dose not changed   [] Not applicable (only use in the non-treatment phase, prior to first dose of study  treatment or after permanent discontinuation of study treatment.     Action taken with Apixaban / Placebo  [] Drug withdrawn  [] Drug interrupted  [x] Dose not changed   [] Not applicable (only use in the non-treatment phase, prior to first dose of study treatment or after permanent discontinuation of study treatment.     Serious adverse event?    [] Yes   [x] No    Special interest event?  [] Yes   [x] No    Endpoint? [] Yes   [x] No     Fatal event?  [] Yes   [x] No    Dr. Ryder:    Reasonable causal relationship to BAY [id number] / Placebo:  [] Yes   [] No    Reasonable causal relationship to Apixban / Placebo:  [] Yes   [] No    Reasonable causal relationship to protocol required procedure(s):  [] Yes   [] No       Thank you for allowing me to participate in the care of your patient.      Sincerely,     Kylie Sandoval RN     [facility]  cc:   No referring provider defined for this encounter.

## 2023-12-02 NOTE — DISCHARGE INSTRUCTIONS
Regarding WARFARIN dosing: Take warfarin 2.5mg (1 tablet) daily on 8/19, 8/20 and 8/21, then have your INR checked at the lab and the  [facility] will call you with further dosing. Please call on 8/19 to set up a lab appointment for 8/22.   
.

## 2023-12-07 ENCOUNTER — TRANSFERRED RECORDS (OUTPATIENT)
Dept: CARDIOLOGY | Facility: CLINIC | Age: 83
End: 2023-12-07
Payer: MEDICARE

## 2023-12-07 NOTE — PROGRESS NOTES
Research laboratory data from November 30 reviewed and results not clinically significant including low cholesterol, low glucose, mildly high GGT.  LF

## 2023-12-09 NOTE — PROGRESS NOTES
"[facility]    I had the pleasure of seeing Rosie when she came for follow up of AFib.  This 83 year old sees Dr. Rollins for her history of:    1.  H/o TIA - 3/2022  2.  Paroxysmal AFib, SSS/post conversion pauses- first noted 8/2022 (not seen on monitoring following TIA 3/2022).  Noted to have significant postconversion pause when spontaneously converted after DCCV x3 were not successful.  S/p dual-chamber [employer] PPM 10/2022 with Dr. Meyer  3.  HTN  4. R Ovarian cancer - sees Dr. Sinclair, last OV 1/2023. S/p BSO with LN dissection        Last Visit & Interval History:  I saw Rosie 2/2023 at which time she was doing well, with just occasional tachycardias. She was tolerating BB therapy for RVR without issues. Routine follow-up with in-office device check recommended.     In 3/2023 noted to have episodes of RVR for which Dr. Rollins recommended continued monitoring if feeling OK. Episodes were lasting ~18h and stress test recommended to see if we could start Ic AAD.     Stress test 4/2023 showed no ischemia and small area of infarction. No changes were made.    Rosie was then in ER 11/10 after developing RVR after holding Diltiazem and metoprolol for colonoscopy. IVF and IV Diltiazem started and spontaneously converted to SR.    Updated device check 11/14 showed episodes of AFib lasting up to 48h with rates >100 bpm 60-70%. I increased metoprolol tartrate.     Today's Visit:  Remains fatigued. Notes that she'll fall asleep and \"doze\" if sitting down ~1500 each day. Feels rested when she wakes up but \"not a morning person.\" Notes fatigue is enough that she'd  cancel plans if needed.  She was evaluated for sleep apnea \"years ago.\"     She has noted continued episodes of AFib (noted on device checks) and with palpitations, even prior to ER visit 11/10 in setting of AFib/RVR.  She's not had any AFib since increasing increasing metoprolol tartrate to 100 mg BID last month after ER visit.    Notes " "increasing RECIO x 6 months. No CP, discomfort. No orthopnea, PND. No rest SOB.  Notes that since her back surgery, she has been much less active and thinks a lot of this is due to deconditioning/weight gain.    VITALS:  Vitals: /78   Pulse 61   Resp 18   Ht 1.626 m (5' 4\")   Wt 96.2 kg (212 lb)   SpO2 98%   BMI 36.39 kg/m      Diagnostic Testing:  EKG today which I overread, showed SR 63 bpm  EKG 11/10/2023 showed coarse AFib vs atypical AFlutter 116 bpm  Device interrogation 9/2023, showed 37%AP and 1%  in DDDR 60/130. Underlying SB 55 bpm. 6% mode switching with longest episode 71 minutes with rates to 170s.   Nuc Stress Test 4/2023 small area of infarction in distal anterior segment. No ischemia.  Echocardiogram 8/2022-LVEF 60-65%. G2 DD.  Normal RV.  1+ MR.  RVSP 30+ RAP.  Aortic sclerosis    Plan:  See me back 6 months with echo  2.    Device check 1/2024 in office    Assessment/Plan:    Paroxysmal AFib, SSS  As above, had pauses when she spontaneously converted to SR and recommended for pacemaker implantation to allow adequate treatment of her RVR  S/p dual-chamber [employer] pacemaker 10/2022.  Has subsequently had metoprolol increased to 50 mg BID and then again 100 mg BID (11/2023)  Remains on Diltiazem 180 mg daily    Continues AC for [id number] (HTN, CVA, age, sex).Last Hgb 13.1g/dL 11/30 (scanned). She'd been participating in OCEANIC trial, and is now back on Eliquis 5 mg BID    PLAN:  At this point, continue metoprolol tartrate and Diltiazem. Discussed decreasing BB and increasing CCB to see if it helps with fatigue, but she's not interested  Continue Pacer checks  See me back 1/2025 with in-office PPM check but will see me in 6 months to ensure she's still doing well    RECIO  Reviewed EKG showing SR  Limited activity/deconditioning may be contributing  No c/o orthopnea, PND. On hydrochlorothiazide with valsartan    PLAN:  Continue to monitor  - may want a trial of loop diuretic " to see if RECIO improved          Gema Potter PA-C, MSPAS      Orders Placed This Encounter   Procedures    Follow-Up with Cardiology [facility]    EKG 12-lead complete w/read - Clinics (performed today)    Echocardiogram Complete     No orders of the defined types were placed in this encounter.    There are no discontinued medications.      Encounter Diagnosis   Name Primary?    Atrial fibrillation, unspecified type (H)        CURRENT MEDICATIONS:  Current Outpatient Medications   Medication Sig Dispense Refill    diltiazem ER COATED BEADS (CARDIZEM CD/CARTIA XT) 180 MG 24 hr capsule Take 1 capsule (180 mg) by mouth daily 90 capsule 3    Evening Primrose Oil 1000 MG CAPS Take 1 capsule by mouth daily One daily      Fish Oil-Cholecalciferol (OMEGA-3 + D PO) Take 1 capsule by mouth daily      meloxicam (MOBIC) 15 MG tablet Take 15 mg by mouth daily as needed      metoprolol tartrate (LOPRESSOR) 50 MG tablet Take 2 tablets (100 mg) by mouth 2 times daily      mometasone (ELOCON) 0.1 % external cream Apply sparingly to affected area twice daily for 2 weeks then decrease to 1 time daily for 30 days then decrease to 2-3 times per week 45 g 11    pravastatin (PRAVACHOL) 80 MG tablet Take 1 tablet (80 mg) by mouth daily 90 tablet 3    THERATEARS 0.25 % OP SOLN Place 2 drops into both eyes as needed      valsartan-hydrochlorothiazide (DIOVAN HCT) 320-25 MG tablet Take 1 tablet by mouth daily 90 tablet 3    VIACTIV 500-100-40 OR CHEW Take 1 chew tab by mouth daily      apixaban ANTICOAGULANT (ELIQUIS ANTICOAGULANT) 5 MG tablet Take 1 tablet (5 mg) by mouth 2 times daily 180 tablet 3       ALLERGIES     Allergies   Allergen Reactions    Atorvastatin Other (See Comments)     Muscle Pain    Ezetimibe Other (See Comments)     Severe muscle aches    Irbesartan Cramps    Lisinopril     Omeprazole      Other reaction(s): *Unknown    Prilosec [Omeprazole]     Rosuvastatin Other (See Comments)     Muscle Pain    Zestril [Ace Inhibitors] Cough  "        Review of Systems:  Skin:        Eyes:       ENT:       Respiratory:    shortness of breath  Cardiovascular:    palpitations;edema;fatigue  Gastroenterology:      Genitourinary:       Musculoskeletal:       Neurologic:       Psychiatric:       Heme/Lymph/Imm:       Endocrine:         Physical Exam:  Vitals: /78   Pulse 61   Resp 18   Ht 1.626 m (5' 4\")   Wt 96.2 kg (212 lb)   SpO2 98%   BMI 36.39 kg/m      Constitutional:  cooperative, alert and oriented, well developed, well nourished, in no acute distress        Skin:  warm and dry to the touch, no apparent skin lesions or masses noted        Head:  normocephalic, no masses or lesions        Eyes:  pupils equal and round;conjunctivae and lids unremarkable;sclera white        ENT:  no pallor or cyanosis, dentition good        Neck:  JVP normal;no carotid bruit        Chest:  normal breath sounds, clear to auscultation, normal A-P diameter, normal symmetry, normal respiratory excursion, no use of accessory muscles        Cardiac: regular rhythm, normal S1/S2, no S3 or S4, apical impulse not displaced, no murmurs, gallops or rubs                  Abdomen:  abdomen soft obese      Vascular: pulses full and equal                                      Extremities and Back:  no deformities, clubbing, cyanosis, erythema observed        Neurological:  no gross motor deficits            PAST MEDICAL HISTORY:  Past Medical History:   Diagnosis Date    Chronic airway obstruction (H)     Diastolic dysfunction 6/28/2022    Gastroesophageal reflux disease     Hiatal hernia     Hypertension     Malignant neoplasm of ovary (H)     Ovarian cancer, unspecified laterality (H) 3/10/2021    Personal history of contact with and (suspected) exposure to asbestos     Primary osteoarthritis of right hip 7/9/2020       PAST SURGICAL HISTORY:  Past Surgical History:   Procedure Laterality Date    BIOPSY BREAST Left     BREAST BIOPSY, CORE RT/LT  1994    CHOLECYSTECTOMY  "     COLONOSCOPY  2010    Diverticulosis, colon polyps; repeat 5 yrs    COLONOSCOPY N/A 2015    Procedure: COLONOSCOPY;  Surgeon: Alejandro Matos MD;  Location: [facility]    COLONOSCOPY N/A 2021    Procedure: COLONOSCOPY;  Surgeon: Aayush George MD;  Location: [facility]    Colonoscopy, hyperplastic polyps, repeat in 5 yrs      EP PACEMAKER DEVICE & LEAD IMPLANT- RIGHT ATRIAL & RIGHT VENTRICULAR Left 10/24/2022    Procedure: Pacemaker Device & Lead Implant - Right Atrial & Right Ventricular;  Surgeon: Demond Meyer MD;  Location:  [facility]    ESOPHAGOSCOPY, GASTROSCOPY, DUODENOSCOPY (EGD), COMBINED  2013    Procedure: COMBINED ESOPHAGOSCOPY, GASTROSCOPY, DUODENOSCOPY (EGD), BIOPSY SINGLE OR MULTIPLE;  Gastroscopy;  Surgeon: Blaise Gill MD;  Location: [facility]    EYE SURGERY      R/L Cataracts    HC REMOVE TONSILS/ADENOIDS,13+ Y/O      T & A 13+y.o.    HERNIORRHAPHY INCISIONAL (LOCATION) N/A 2021    Procedure: HERNIORRHAPHY, INCISIONAL, OPEN WITH MESH;  Surgeon: Aayush George MD;  Location: [facility]    HYSTERECTOMY, PAP NO LONGER INDICATED      LAPAROSCOPIC SALPINGO-OOPHORECTOMY N/A 2020    Procedure: Laparoscopy, removal or pelvic mass, both tubes and ovaries, omentectomy, anterior culvectomy, pelvic and para aortic lymph node dissection, laparotomy;  Surgeon: Felipe Corona MD;  Location: [facility]    CT ANESTH,LUMBAR SPINE,CORD SURGERY  10/2020    L3-4 L4-5 TRANSFORAMINAL INTERBODY FUSION L3-5, POSTERIOR INSTRUMENTED FUSION AND DECOMPRESSION    TVH for DUB, ovaries in      VASCULAR SURGERY      Taylor closure    ZZC ANESTH,KNEE VEINS SURGERY  2014       FAMILY HISTORY:  Family History   Problem Relation Age of Onset    Cancer Mother         uterine cancer,     Respiratory Mother         COPD    Cardiovascular Mother         AAA  age 80    Breast Cancer Maternal Grandmother     Cancer Maternal Grandfather         cancer  unknown type    Breast Cancer Sister     Eye Disorder Father         cataracts    Depression Father     Depression Son     Depression Son     Breast Cancer Sister         X2    Cancer - colorectal No family hx of         no ovarian cancer       SOCIAL HISTORY:  Social History     Socioeconomic History    Marital status:      Spouse name: Nelson Blackman    Number of children: 2    Years of education: 13+    Highest education level: None   Occupational History    Occupation:      Comment: [employer]   Tobacco Use    Smoking status: Never    Smokeless tobacco: Never   Vaping Use    Vaping Use: Never used   Substance and Sexual Activity    Alcohol use: No    Drug use: No    Sexual activity: Not Currently     Partners: Male     Birth control/protection: Surgical   Other Topics Concern    Parent/sibling w/ CABG, MI or angioplasty before 65F 55M? No     Social Determinants of Health     Financial Resource Strain: Low Risk  (11/17/2023)    Financial Resource Strain     Within the past 12 months, have you or your family members you live with been unable to get utilities (heat, electricity) when it was really needed?: No   Food Insecurity: Low Risk  (11/17/2023)    Food Insecurity     Within the past 12 months, did you worry that your food would run out before you got money to buy more?: No     Within the past 12 months, did the food you bought just not last and you didn t have money to get more?: No   Transportation Needs: Low Risk  (11/17/2023)    Transportation Needs     Within the past 12 months, has lack of transportation kept you from medical appointments, getting your medicines, non-medical meetings or appointments, work, or from getting things that you need?: No   Interpersonal Safety: Low Risk  (10/5/2023)    Interpersonal Safety     Do you feel physically and emotionally safe where you currently live?: Yes     Within the past 12 months, have you been hit, slapped, kicked or otherwise  physically hurt by someone?: No     Within the past 12 months, have you been humiliated or emotionally abused in other ways by your partner or ex-partner?: No   Housing Stability: Low Risk  (11/17/2023)    Housing Stability     Do you have housing? : Yes     Are you worried about losing your housing?: No

## 2023-12-12 ENCOUNTER — OFFICE VISIT (OUTPATIENT)
Dept: CARDIOLOGY | Facility: CLINIC | Age: 83
End: 2023-12-12
Attending: PHYSICIAN ASSISTANT
Payer: MEDICARE

## 2023-12-12 VITALS
BODY MASS INDEX: 36.19 KG/M2 | DIASTOLIC BLOOD PRESSURE: 78 MMHG | SYSTOLIC BLOOD PRESSURE: 134 MMHG | OXYGEN SATURATION: 98 % | WEIGHT: 212 LBS | HEIGHT: 64 IN | HEART RATE: 61 BPM | RESPIRATION RATE: 18 BRPM

## 2023-12-12 DIAGNOSIS — I48.91 ATRIAL FIBRILLATION, UNSPECIFIED TYPE (H): ICD-10-CM

## 2023-12-12 PROCEDURE — 93000 ELECTROCARDIOGRAM COMPLETE: CPT | Performed by: PHYSICIAN ASSISTANT

## 2023-12-12 PROCEDURE — 99214 OFFICE O/P EST MOD 30 MIN: CPT | Performed by: PHYSICIAN ASSISTANT

## 2023-12-12 NOTE — PATIENT INSTRUCTIONS
Rosie - it was nice see you today!     At your visit today we reviewed:    Still tired  Thyroid checked  looked OK, blood count  looked fine  Reviewed no worse on higher dose of metoprolol after ER visit  Sleep study done MANY years ago  EKG today looked good!  BP high here, but improved when I rechecked.     Medication Changes:    At this point, no changes BUT discussed decreasing metoprolol and increasing Diltiazem IF YOU WANT TO see if it makes you feel less tired!  Call if you want to try! [phone]    Recommendations:    IF you have more AFib while in [place] -   If wake up with it, take your morning metoprolol and the Diltiazem early  If it happens in day (after morning meds and more than 3 hours before you're due for next dose of metoprolol, just take it early)  IF NEEDED (like >3 hours before you're due to take meds), OK to take extra metoprolol 50 mg to see if you can get out of AFib on your own. May repeat 50 mg after 2 hours if HR still >100 bmp    2.  Stay on anticoagulant    Follow-up:    See me for cardiology follow up in  with updated echo but CALL Cardiology nurses Janet & Jessie @ [phone] for any issues, questions or concerns in the interim.      To schedule a future appointment, we kindly ask that you call cardiology scheduling at [phone] three months prior to requested visit date.    Important Phone Numbers for [facility] ([place]):    [place] [facility]: [phone]  Cardiology [phone]  [place] [facility] [phone]  [place] [facility] [phone]  [place] [facility]: [phone]  '

## 2023-12-12 NOTE — LETTER
"12/12/2023    Kathyapeg Escalera, DO  [address] [place] [address]    RE: Rosie CUTLER Yehuda       Dear Colleague,     I had the pleasure of seeing Rosie Blackman in the [facility].  [facility]    I had the pleasure of seeing Rosie when she came for follow up of AFib.  This 83 year old sees Dr. Rollins for her history of:    1.  H/o TIA - 3/2022  2.  Paroxysmal AFib, SSS/post conversion pauses- first noted 8/2022 (not seen on monitoring following TIA 3/2022).  Noted to have significant postconversion pause when spontaneously converted after DCCV x3 were not successful.  S/p dual-chamber [employer] PPM 10/2022 with Dr. Meyer  3.  HTN  4. R Ovarian cancer - sees Dr. Sinclair, last OV 1/2023. S/p BSO with LN dissection        Last Visit & Interval History:  I saw Rosie 2/2023 at which time she was doing well, with just occasional tachycardias. She was tolerating BB therapy for RVR without issues. Routine follow-up with in-office device check recommended.     In 3/2023 noted to have episodes of RVR for which Dr. Rollins recommended continued monitoring if feeling OK. Episodes were lasting ~18h and stress test recommended to see if we could start Ic AAD.     Stress test 4/2023 showed no ischemia and small area of infarction. No changes were made.    Rosie was then in ER 11/10 after developing RVR after holding Diltiazem and metoprolol for colonoscopy. IVF and IV Diltiazem started and spontaneously converted to SR.    Updated device check 11/14 showed episodes of AFib lasting up to 48h with rates >100 bpm 60-70%. I increased metoprolol tartrate.     Today's Visit:  Remains fatigued. Notes that she'll fall asleep and \"doze\" if sitting down ~1500 each day. Feels rested when she wakes up but \"not a morning person.\" Notes fatigue is enough that she'd  cancel plans if needed.  She was evaluated for sleep apnea \"years ago.\"     She has noted continued episodes of AFib (noted on " "device checks) and with palpitations, even prior to ER visit 11/10 in setting of AFib/RVR.  She's not had any AFib since increasing increasing metoprolol tartrate to 100 mg BID last month after ER visit.    Notes increasing RECIO x 6 months. No CP, discomfort. No orthopnea, PND. No rest SOB.  Notes that since her back surgery, she has been much less active and thinks a lot of this is due to deconditioning/weight gain.    VITALS:  Vitals: /78   Pulse 61   Resp 18   Ht 1.626 m (5' 4\")   Wt 96.2 kg (212 lb)   SpO2 98%   BMI 36.39 kg/m      Diagnostic Testing:  EKG today which I overread, showed SR 63 bpm  EKG 11/10/2023 showed coarse AFib vs atypical AFlutter 116 bpm  Device interrogation 9/2023, showed 37%AP and 1%  in DDDR 60/130. Underlying SB 55 bpm. 6% mode switching with longest episode 71 minutes with rates to 170s.   Nuc Stress Test 4/2023 small area of infarction in distal anterior segment. No ischemia.  Echocardiogram 8/2022-LVEF 60-65%. G2 DD.  Normal RV.  1+ MR.  RVSP 30+ RAP.  Aortic sclerosis    Plan:  See me back 6 months with echo  2.    Device check 1/2024 in office    Assessment/Plan:    Paroxysmal AFib, SSS  As above, had pauses when she spontaneously converted to SR and recommended for pacemaker implantation to allow adequate treatment of her RVR  S/p dual-chamber [employer] pacemaker 10/2022.  Has subsequently had metoprolol increased to 50 mg BID and then again 100 mg BID (11/2023)  Remains on Diltiazem 180 mg daily    Continues AC for [id number] (HTN, CVA, age, sex).Last Hgb 13.1g/dL 11/30 (scanned). She'd been participating in OCEANIC trial, and is now back on Eliquis 5 mg BID    PLAN:  At this point, continue metoprolol tartrate and Diltiazem. Discussed decreasing BB and increasing CCB to see if it helps with fatigue, but she's not interested  Continue Pacer checks  See me back 1/2025 with in-office PPM check but will see me in 6 months to ensure she's still doing " well    RECIO  Reviewed EKG showing SR  Limited activity/deconditioning may be contributing  No c/o orthopnea, PND. On hydrochlorothiazide with valsartan    PLAN:  Continue to monitor  - may want a trial of loop diuretic to see if RECIO improved          Gema Potter PA-C, MSPAS      Orders Placed This Encounter   Procedures    Follow-Up with Cardiology GUILLE    EKG 12-lead complete w/read - Clinics (performed today)    Echocardiogram Complete     No orders of the defined types were placed in this encounter.    There are no discontinued medications.      Encounter Diagnosis   Name Primary?    Atrial fibrillation, unspecified type (H)        CURRENT MEDICATIONS:  Current Outpatient Medications   Medication Sig Dispense Refill    diltiazem ER COATED BEADS (CARDIZEM CD/CARTIA XT) 180 MG 24 hr capsule Take 1 capsule (180 mg) by mouth daily 90 capsule 3    Evening Primrose Oil 1000 MG CAPS Take 1 capsule by mouth daily One daily      Fish Oil-Cholecalciferol (OMEGA-3 + D PO) Take 1 capsule by mouth daily      meloxicam (MOBIC) 15 MG tablet Take 15 mg by mouth daily as needed      metoprolol tartrate (LOPRESSOR) 50 MG tablet Take 2 tablets (100 mg) by mouth 2 times daily      mometasone (ELOCON) 0.1 % external cream Apply sparingly to affected area twice daily for 2 weeks then decrease to 1 time daily for 30 days then decrease to 2-3 times per week 45 g 11    pravastatin (PRAVACHOL) 80 MG tablet Take 1 tablet (80 mg) by mouth daily 90 tablet 3    THERATEARS 0.25 % OP SOLN Place 2 drops into both eyes as needed      valsartan-hydrochlorothiazide (DIOVAN HCT) 320-25 MG tablet Take 1 tablet by mouth daily 90 tablet 3    VIACTIV 500-100-40 OR CHEW Take 1 chew tab by mouth daily      apixaban ANTICOAGULANT (ELIQUIS ANTICOAGULANT) 5 MG tablet Take 1 tablet (5 mg) by mouth 2 times daily 180 tablet 3       ALLERGIES     Allergies   Allergen Reactions    Atorvastatin Other (See Comments)     Muscle Pain    Ezetimibe Other (See Comments)  "    Severe muscle aches    Irbesartan Cramps    Lisinopril     Omeprazole      Other reaction(s): *Unknown    Prilosec [Omeprazole]     Rosuvastatin Other (See Comments)     Muscle Pain    Zestril [Ace Inhibitors] Cough         Review of Systems:  Skin:        Eyes:       ENT:       Respiratory:    shortness of breath  Cardiovascular:    palpitations;edema;fatigue  Gastroenterology:      Genitourinary:       Musculoskeletal:       Neurologic:       Psychiatric:       Heme/Lymph/Imm:       Endocrine:         Physical Exam:  Vitals: /78   Pulse 61   Resp 18   Ht 1.626 m (5' 4\")   Wt 96.2 kg (212 lb)   SpO2 98%   BMI 36.39 kg/m      Constitutional:  cooperative, alert and oriented, well developed, well nourished, in no acute distress        Skin:  warm and dry to the touch, no apparent skin lesions or masses noted        Head:  normocephalic, no masses or lesions        Eyes:  pupils equal and round;conjunctivae and lids unremarkable;sclera white        ENT:  no pallor or cyanosis, dentition good        Neck:  JVP normal;no carotid bruit        Chest:  normal breath sounds, clear to auscultation, normal A-P diameter, normal symmetry, normal respiratory excursion, no use of accessory muscles        Cardiac: regular rhythm, normal S1/S2, no S3 or S4, apical impulse not displaced, no murmurs, gallops or rubs                  Abdomen:  abdomen soft obese      Vascular: pulses full and equal                                      Extremities and Back:  no deformities, clubbing, cyanosis, erythema observed        Neurological:  no gross motor deficits            PAST MEDICAL HISTORY:  Past Medical History:   Diagnosis Date    Chronic airway obstruction (H)     Diastolic dysfunction 6/28/2022    Gastroesophageal reflux disease     Hiatal hernia     Hypertension     Malignant neoplasm of ovary (H)     Ovarian cancer, unspecified laterality (H) 3/10/2021    Personal history of contact with and (suspected) exposure to " asbestos     Primary osteoarthritis of right hip 7/9/2020       PAST SURGICAL HISTORY:  Past Surgical History:   Procedure Laterality Date    BIOPSY BREAST Left     BREAST BIOPSY, CORE RT/LT  1994    CHOLECYSTECTOMY  1995    COLONOSCOPY  05/2010    Diverticulosis, colon polyps; repeat 5 yrs    COLONOSCOPY N/A 11/16/2015    Procedure: COLONOSCOPY;  Surgeon: Alejandro Matos MD;  Location: [facility]    COLONOSCOPY N/A 09/17/2021    Procedure: COLONOSCOPY;  Surgeon: Aayush George MD;  Location: [facility]    Colonoscopy, hyperplastic polyps, repeat in 5 yrs  2004    EP PACEMAKER DEVICE & LEAD IMPLANT- RIGHT ATRIAL & RIGHT VENTRICULAR Left 10/24/2022    Procedure: Pacemaker Device & Lead Implant - Right Atrial & Right Ventricular;  Surgeon: Demond Meyer MD;  Location:  HE[facility]ARD[facility] CATH LAB    ESOPHAGOSCOPY, GASTROSCOPY, DUODENOSCOPY (EGD), COMBINED  09/30/2013    Procedure: COMBINED ESOPHAGOSCOPY, GASTROSCOPY, DUODENOSCOPY (EGD), BIOPSY SINGLE OR MULTIPLE;  Gastroscopy;  Surgeon: Blaise Gill MD;  Location: [facility]    EYE SURGERY  2012    R/L Cataracts    HC REMOVE TONSILS/ADENOIDS,13+ Y/O      T & A 13+y.o.    HERNIORRHAPHY INCISIONAL (LOCATION) N/A 03/24/2021    Procedure: HERNIORRHAPHY, INCISIONAL, OPEN WITH MESH;  Surgeon: Aayush George MD;  Location: [facility]    HYSTERECTOMY, PAP NO LONGER INDICATED      LAPAROSCOPIC SALPINGO-OOPHORECTOMY N/A 07/24/2020    Procedure: Laparoscopy, removal or pelvic mass, both tubes and ovaries, omentectomy, anterior culvectomy, pelvic and para aortic lymph node dissection, laparotomy;  Surgeon: Felipe Corona MD;  Location: [facility]    NC ANESTH,LUMBAR SPINE,CORD SURGERY  10/2020    L3-4 L4-5 TRANSFORAMINAL INTERBODY FUSION L3-5, POSTERIOR INSTRUMENTED FUSION AND DECOMPRESSION    TVH for DUB, ovaries in      VASCULAR SURGERY  2014    [place] closure    ZZC ANESTH,KNEE VEINS SURGERY  08/20/2014       FAMILY HISTORY:  Family History   Problem Relation Age of  Onset    Cancer Mother         uterine cancer,     Respiratory Mother         COPD    Cardiovascular Mother         AAA  age 80    Breast Cancer Maternal Grandmother     Cancer Maternal Grandfather         cancer unknown type    Breast Cancer Sister     Eye Disorder Father         cataracts    Depression Father     Depression Son     Depression Son     Breast Cancer Sister         X2    Cancer - colorectal No family hx of         no ovarian cancer       SOCIAL HISTORY:  Social History     Socioeconomic History    Marital status:      Spouse name: Nelson Blackman    Number of children: 2    Years of education: 13+    Highest education level: None   Occupational History    Occupation:      Comment: [employer]   Tobacco Use    Smoking status: Never    Smokeless tobacco: Never   Vaping Use    Vaping Use: Never used   Substance and Sexual Activity    Alcohol use: No    Drug use: No    Sexual activity: Not Currently     Partners: Male     Birth control/protection: Surgical   Other Topics Concern    Parent/sibling w/ CABG, MI or angioplasty before 65F 55M? No     Social Determinants of Health     Financial Resource Strain: Low Risk  (2023)    Financial Resource Strain     Within the past 12 months, have you or your family members you live with been unable to get utilities (heat, electricity) when it was really needed?: No   Food Insecurity: Low Risk  (2023)    Food Insecurity     Within the past 12 months, did you worry that your food would run out before you got money to buy more?: No     Within the past 12 months, did the food you bought just not last and you didn t have money to get more?: No   Transportation Needs: Low Risk  (2023)    Transportation Needs     Within the past 12 months, has lack of transportation kept you from medical appointments, getting your medicines, non-medical meetings or appointments, work, or from getting things that you need?: No    Interpersonal Safety: Low Risk  (10/5/2023)    Interpersonal Safety     Do you feel physically and emotionally safe where you currently live?: Yes     Within the past 12 months, have you been hit, slapped, kicked or otherwise physically hurt by someone?: No     Within the past 12 months, have you been humiliated or emotionally abused in other ways by your partner or ex-partner?: No   Housing Stability: Low Risk  (11/17/2023)    Housing Stability     Do you have housing? : Yes     Are you worried about losing your housing?: No               Thank you for allowing me to participate in the care of your patient.      Sincerely,     iJnny Potter PA-C     [facility]  cc:   Jinny Potter PA-C  [address]  [place] [id number]

## 2023-12-14 ENCOUNTER — VIRTUAL VISIT (OUTPATIENT)
Dept: CARDIOLOGY | Facility: CLINIC | Age: 83
End: 2023-12-14
Payer: MEDICARE

## 2023-12-14 ENCOUNTER — OFFICE VISIT (OUTPATIENT)
Dept: ORTHOPEDICS | Facility: CLINIC | Age: 83
End: 2023-12-14
Attending: INTERNAL MEDICINE
Payer: MEDICARE

## 2023-12-14 VITALS
DIASTOLIC BLOOD PRESSURE: 76 MMHG | WEIGHT: 212 LBS | HEIGHT: 64 IN | BODY MASS INDEX: 36.19 KG/M2 | SYSTOLIC BLOOD PRESSURE: 125 MMHG

## 2023-12-14 DIAGNOSIS — S76.212D STRAIN OF ADDUCTOR MAGNUS MUSCLE OF LEFT LOWER EXTREMITY, SUBSEQUENT ENCOUNTER: ICD-10-CM

## 2023-12-14 DIAGNOSIS — M86.9 OSTEITIS PUBIS (H): Primary | ICD-10-CM

## 2023-12-14 DIAGNOSIS — Z00.6 EXAMINATION OF PARTICIPANT OR CONTROL IN CLINICAL RESEARCH: Primary | ICD-10-CM

## 2023-12-14 DIAGNOSIS — R10.32 LEFT GROIN PAIN: ICD-10-CM

## 2023-12-14 DIAGNOSIS — S39.011D STRAIN OF ABDOMINAL MUSCLE, SUBSEQUENT ENCOUNTER: ICD-10-CM

## 2023-12-14 PROCEDURE — 99203 OFFICE O/P NEW LOW 30 MIN: CPT | Performed by: FAMILY MEDICINE

## 2023-12-14 PROCEDURE — 99207 PR NO CHARGE-RESEARCH SERVICE: CPT | Performed by: INTERNAL MEDICINE

## 2023-12-14 NOTE — PROGRESS NOTES
OCEANIC-AF (Phase 3 program of the Oral faCtor Eleven A iNhibitor asundexIan as novel antithrombotiC - Atrial Fibrillation study)     Subject contacted via telephone for common end of treatment safety follow up visit.     Subject queried for adverse events, outcome events, HCRU and medication changes. If present, noted below.    Kylie Sandoval RN

## 2023-12-14 NOTE — LETTER
12/14/2023    Kathya Escalera, DO  [address] [facility] [address]    RE: Rosie Blackman       Dear Colleague,     I had the pleasure of seeing Rosie Blackman in the [facility].  OCEANIC-AF (Phase 3 program of the Oral faCtor Eleven A iNhibitor asundexIan as novel antithrombotiC - Atrial Fibrillation study)     Subject contacted via telephone for common end of treatment safety follow up visit.     Subject queried for adverse events, outcome events, HCRU and medication changes. If present, noted below.    Kylie Sandoval RN     Thank you for allowing me to participate in the care of your patient.

## 2023-12-14 NOTE — PROGRESS NOTES
Rosie Blackman  :  1940  DOS: 2023  MRN: [id number]    Sports Medicine Clinic Visit    PCP: Kathya Escalera    Rosie Blackman is a 83 year old female who is seen in consultation at the request of  Kathya Escalera D.O.presenting with  left hip and groin pain.    Injury: Gradual onset of pain over the past 4 - 6 months that patient initially attributed to irritable bowel syndrome, however bowel symptoms have improved with no change in pain.  Pain located over left deep anterior hip/groin, left lower abdominal quadrant, nonradiating.  Additional Features:  Positive: catching and weakness.  Symptoms are better with Rest.  Symptoms are worse with: going from sit to stand, getting into/out of vehicle, crossing legs.  Other evaluation and/or treatments so far consists of: Ice, Tylenol, and Rest.  Recent imaging completed: X-rays completed 23.  Prior History of related problems: history of status post lumbar spine fusion in , surgery was with Dr Patel @ [facility].    Social History: retired    Review of Systems  Musculoskeletal: as above  Remainder of review of systems is negative including constitutional, CV, pulmonary, GI, Skin and Neurologic except as noted in HPI or medical history.    Past Medical History:   Diagnosis Date    Chronic airway obstruction (H)     Diastolic dysfunction 2022    Gastroesophageal reflux disease     Hiatal hernia     Hypertension     Malignant neoplasm of ovary (H)     Ovarian cancer, unspecified laterality (H) 3/10/2021    Personal history of contact with and (suspected) exposure to asbestos     Primary osteoarthritis of right hip 2020     Past Surgical History:   Procedure Laterality Date    BIOPSY BREAST Left     BREAST BIOPSY, CORE RT/LT      CHOLECYSTECTOMY      COLONOSCOPY  2010    Diverticulosis, colon polyps; repeat 5 yrs    COLONOSCOPY N/A 2015    Procedure: COLONOSCOPY;  Surgeon: Alejandro Matos MD;  Location: [facility]    COLONOSCOPY N/A 2021    Procedure: COLONOSCOPY;  Surgeon: Aayush George MD;  Location: [facility]    Colonoscopy, hyperplastic polyps, repeat in 5 yrs      EP PACEMAKER DEVICE & LEAD IMPLANT- RIGHT ATRIAL & RIGHT VENTRICULAR Left 10/24/2022    Procedure: Pacemaker Device & Lead Implant - Right Atrial & Right Ventricular;  Surgeon: Demond Meyer MD;  Location:  HE[facility] CARDI[facility] CATH LAB    ESOPHAGOSCOPY, GASTROSCOPY, DUODENOSCOPY (EGD), COMBINED  2013    Procedure: COMBINED ESOPHAGOSCOPY, GASTROSCOPY, DUODENOSCOPY (EGD), BIOPSY SINGLE OR MULTIPLE;  Gastroscopy;  Surgeon: Blaise Gill MD;  Location: [facility]    EYE SURGERY      R/L Cataracts    HC REMOVE TONSILS/ADENOIDS,13+ Y/O      T & A 13+y.o.    HERNIORRHAPHY INCISIONAL (LOCATION) N/A 2021    Procedure: HERNIORRHAPHY, INCISIONAL, OPEN WITH MESH;  Surgeon: Aayush George MD;  Location: [facility]    HYSTERECTOMY, PAP NO LONGER INDICATED      LAPAROSCOPIC SALPINGO-OOPHORECTOMY N/A 2020    Procedure: Laparoscopy, removal or pelvic mass, both tubes and ovaries, omentectomy, anterior culvectomy, pelvic and para aortic lymph node dissection, laparotomy;  Surgeon: Felipe Corona MD;  Location: [facility]    OR ANESTH,LUMBAR SPINE,CORD SURGERY  10/2020    L3-4 L4-5 TRANSFORAMINAL INTERBODY FUSION L3-5, POSTERIOR INSTRUMENTED FUSION AND DECOMPRESSION    TVH for DUB, ovaries in      VASCULAR SURGERY      [place] closure    ZC ANESTH,KNEE VEINS SURGERY  2014     Family History   Problem Relation Age of Onset    Cancer Mother         uterine cancer,     Respiratory Mother         COPD    Cardiovascular Mother         AAA  age 80    Breast Cancer Maternal Grandmother     Cancer Maternal Grandfather         cancer unknown type    Breast Cancer Sister     Eye Disorder Father         cataracts    Depression Father     Depression Son     Depression Son     Breast Cancer Sister         X2    Cancer - colorectal  "No family hx of         no ovarian cancer       Objective  /76   Ht 1.626 m (5' 4\")   Wt 96.2 kg (212 lb)   BMI 36.39 kg/m      General: healthy, alert and in no distress    HEENT: no scleral icterus or conjunctival erythema   Skin: no suspicious lesions or rash. No jaundice.   CV: regular rhythm by palpation, 2+ distal pulses, no pedal edema    Resp: normal respiratory effort without conversational dyspnea   Psych: normal mood and affect    Gait: nonantalgic, appropriate coordination and balance   Neuro: normal light touch sensory exam of the extremities. Motor strength as noted below     Left hip exam    Inspection:        no edema or ecchymosis in hip area    ROM:       Full active and passive ROM     Strength:        flexion 5/5       extension 5/5       abduction 5/5       adduction 5/5    Tender:        pubis       Lower abdominals adjacent to the pubis       Left proximal adductors and adductor attachment to pelvis    Non Tender:        remainder of hip area       illiac crest       ASIS       greater trochanter       SI joint    Sensation:        grossly intact in hip and thigh    Skin:       well perfused       capillary refill brisk    Special Tests:        neg (-) IMELDA       neg (-) FADIR       neg (-) scour       neg (-) Johanna       Neg vincent test    Radiology  Results for orders placed or performed in visit on 11/17/23   XR Hip Left 2-3 Views    Narrative    XR HIP LEFT 2-3 VIEWS 11/17/2023 11:21 AM    HISTORY: months of left hip pain, labral pathology?; Left groin pain    COMPARISON: None.      Impression    IMPRESSION: No fracture. Very mild degenerative changes in the left  hip.    VAN KEARNS MD         SYSTEM ID:  [id number]       Assessment:  1. Osteitis pubis (H)    2. Left groin pain    3. Strain of adductor alisson muscle of left lower extremity, subsequent encounter    4. Strain of abdominal muscle, subsequent encounter        Plan:  Discussed the assessment with the " patient.  Follow up: prn with me  Pain not coming from hip joint, more correlating with adductor and lower abdominal attachment to pubes, resulting in some TTP and inflammation in pubis and pubic symphysis  PT ordered, will work with pelvic   XR images independently visualized and reviewed with patient today in clinic  Activity modification strategies reviewed  May be influenced by gait/alignment which is altered due to profoundly flat feet  Also has had relatively recent abdominal surgeries, which could also be contributory but does not change the plan overall  We discussed modified progressive pain-free activity as tolerated  Home handouts provided and supportive care reviewed  All questions were answered today  Contact us with additional questions or concerns  Signs and sx of concern reviewed      Giovany Houser DO, ARN  Sports Medicine Physician  [facility]          Disclaimer: This note consists of symbols derived from keyboarding, dictation and/or voice recognition software. As a result, there may be errors in the script that have gone undetected. Please consider this when interpreting information found in this chart.

## 2023-12-14 NOTE — LETTER
2023         RE: Rosie Blackman  [address] [id number]        Dear Colleague,    Thank you for referring your patient, Rosie Blackman, to the [facility] [place]. Please see a copy of my visit note below.    Rosie Blackman  :  1940  DOS: 2023  MRN: [id number]    Sports Medicine Clinic Visit    PCP: Kathya Escalera    Rosie Blackman is a 83 year old female who is seen in consultation at the request of  Kathya Escalera D.O.presenting with  left hip and groin pain.    Injury: Gradual onset of pain over the past 4 - 6 months that patient initially attributed to irritable bowel syndrome, however bowel symptoms have improved with no change in pain.  Pain located over left deep anterior hip/groin, left lower abdominal quadrant, nonradiating.  Additional Features:  Positive: catching and weakness.  Symptoms are better with Rest.  Symptoms are worse with: going from sit to stand, getting into/out of vehicle, crossing legs.  Other evaluation and/or treatments so far consists of: Ice, Tylenol, and Rest.  Recent imaging completed: X-rays completed 23.  Prior History of related problems: history of status post lumbar spine fusion in , surgery was with Dr Patel @ [facility].    Social History: retired    Review of Systems  Musculoskeletal: as above  Remainder of review of systems is negative including constitutional, CV, pulmonary, GI, Skin and Neurologic except as noted in HPI or medical history.    Past Medical History:   Diagnosis Date     Chronic airway obstruction (H)      Diastolic dysfunction 2022     Gastroesophageal reflux disease      Hiatal hernia      Hypertension      Malignant neoplasm of ovary (H)      Ovarian cancer, unspecified laterality (H) 3/10/2021     Personal history of contact with and (suspected) exposure to asbestos      Primary osteoarthritis of right hip 2020     Past Surgical  History:   Procedure Laterality Date     BIOPSY BREAST Left      BREAST BIOPSY, CORE RT/LT  1994     CHOLECYSTECTOMY  1995     COLONOSCOPY  05/2010    Diverticulosis, colon polyps; repeat 5 yrs     COLONOSCOPY N/A 11/16/2015    Procedure: COLONOSCOPY;  Surgeon: Alejandro Matos MD;  Location: [facility]     COLONOSCOPY N/A 09/17/2021    Procedure: COLONOSCOPY;  Surgeon: Aayush George MD;  Location: [facility]     Colonoscopy, hyperplastic polyps, repeat in 5 yrs  2004     EP PACEMAKER DEVICE & LEAD IMPLANT- RIGHT ATRIAL & RIGHT VENTRICULAR Left 10/24/2022    Procedure: Pacemaker Device & Lead Implant - Right Atrial & Right Ventricular;  Surgeon: Demond Meyer MD;  Location:  HEART CARDIAC CATH LAB     ESOPHAGOSCOPY, GASTROSCOPY, DUODENOSCOPY (EGD), COMBINED  09/30/2013    Procedure: COMBINED ESOPHAGOSCOPY, GASTROSCOPY, DUODENOSCOPY (EGD), BIOPSY SINGLE OR MULTIPLE;  Gastroscopy;  Surgeon: Blaise Gill MD;  Location: [facility]     EYE SURGERY  2012    R/L Cataracts     HC REMOVE TONSILS/ADENOIDS,12+ Y/O      T & A 13+y.o.     HERNIORRHAPHY INCISIONAL (LOCATION) N/A 03/24/2021    Procedure: HERNIORRHAPHY, INCISIONAL, OPEN WITH MESH;  Surgeon: Aayush George MD;  Location: [facility]     HYSTERECTOMY, PAP NO LONGER INDICATED       LAPAROSCOPIC SALPINGO-OOPHORECTOMY N/A 07/24/2020    Procedure: Laparoscopy, removal or pelvic mass, both tubes and ovaries, omentectomy, anterior culvectomy, pelvic and para aortic lymph node dissection, laparotomy;  Surgeon: Felipe Corona MD;  Location: [facility]     [place] ANESTH,LUMBAR SPINE,CORD SURGERY  10/2020    L3-4 L4-5 TRANSFORAMINAL INTERBODY FUSION L3-5, POSTERIOR INSTRUMENTED FUSION AND DECOMPRESSION     TVH for DUB, ovaries in       VASCULAR SURGERY  2014    [place] closure     ZZC ANESTH,KNEE VEINS SURGERY  08/20/2014     Family History   Problem Relation Age of Onset     Cancer Mother         uterine cancer,      Respiratory Mother         COPD     Cardiovascular  "Mother         AAA  age 80     Breast Cancer Maternal Grandmother      Cancer Maternal Grandfather         cancer unknown type     Breast Cancer Sister      Eye Disorder Father         cataracts     Depression Father      Depression Son      Depression Son      Breast Cancer Sister         X2     Cancer - colorectal No family hx of         no ovarian cancer       Objective  /76   Ht 1.626 m (5' 4\")   Wt 96.2 kg (212 lb)   BMI 36.39 kg/m      General: healthy, alert and in no distress    HEENT: no scleral icterus or conjunctival erythema   Skin: no suspicious lesions or rash. No jaundice.   CV: regular rhythm by palpation, 2+ distal pulses, no pedal edema    Resp: normal respiratory effort without conversational dyspnea   Psych: normal mood and affect    Gait: nonantalgic, appropriate coordination and balance   Neuro: normal light touch sensory exam of the extremities. Motor strength as noted below     Left hip exam    Inspection:        no edema or ecchymosis in hip area    ROM:       Full active and passive ROM     Strength:        flexion 5/5       extension 5/5       abduction 5/5       adduction 5/5    Tender:        pubis       Lower abdominals adjacent to the pubis       Left proximal adductors and adductor attachment to pelvis    Non Tender:        remainder of hip area       illiac crest       ASIS       greater trochanter       SI joint    Sensation:        grossly intact in hip and thigh    Skin:       well perfused       capillary refill brisk    Special Tests:        neg (-) IMELDA       neg (-) FADIR       neg (-) scour       neg (-) Johanna       Neg vincent test    Radiology  Results for orders placed or performed in visit on 23   XR Hip Left 2-3 Views    Narrative    XR HIP LEFT 2-3 VIEWS 2023 11:21 AM    HISTORY: months of left hip pain, labral pathology?; Left groin pain    COMPARISON: None.      Impression    IMPRESSION: No fracture. Very mild degenerative changes in the " left  hip.    VAN KEARNS MD         SYSTEM ID:  [id number]       Assessment:  1. Osteitis pubis (H)    2. Left groin pain    3. Strain of adductor alisson muscle of left lower extremity, subsequent encounter    4. Strain of abdominal muscle, subsequent encounter        Plan:  Discussed the assessment with the patient.  Follow up: prn with me  Pain not coming from hip joint, more correlating with adductor and lower abdominal attachment to pubes, resulting in some TTP and inflammation in pubis and pubic symphysis  PT ordered, will work with pelvic   XR images independently visualized and reviewed with patient today in clinic  Activity modification strategies reviewed  May be influenced by gait/alignment which is altered due to profoundly flat feet  Also has had relatively recent abdominal surgeries, which could also be contributory but does not change the plan overall  We discussed modified progressive pain-free activity as tolerated  Home handouts provided and supportive care reviewed  All questions were answered today  Contact us with additional questions or concerns  Signs and sx of concern reviewed      Giovany Houser DO, CAQ  Sports Medicine Physician  [facility]          Disclaimer: This note consists of symbols derived from keyboarding, dictation and/or voice recognition software. As a result, there may be errors in the script that have gone undetected. Please consider this when interpreting information found in this chart.      Again, thank you for allowing me to participate in the care of your patient.        Sincerely,        Giovany Houser DO

## 2023-12-28 ENCOUNTER — MYC REFILL (OUTPATIENT)
Dept: FAMILY MEDICINE | Facility: CLINIC | Age: 83
End: 2023-12-28
Payer: MEDICARE

## 2023-12-28 DIAGNOSIS — I48.91 ATRIAL FIBRILLATION WITH RVR (H): ICD-10-CM

## 2023-12-28 DIAGNOSIS — I48.0 PAROXYSMAL ATRIAL FIBRILLATION (H): ICD-10-CM

## 2023-12-28 RX ORDER — METOPROLOL TARTRATE 50 MG
100 TABLET ORAL 2 TIMES DAILY
Status: CANCELLED | OUTPATIENT
Start: 2023-12-28

## 2023-12-30 RX ORDER — DILTIAZEM HYDROCHLORIDE 180 MG/1
180 CAPSULE, COATED, EXTENDED RELEASE ORAL DAILY
Qty: 90 CAPSULE | Refills: 3 | Status: SHIPPED | OUTPATIENT
Start: 2023-12-30 | End: 2024-01-30

## 2023-12-30 RX ORDER — METOPROLOL SUCCINATE 100 MG/1
100 TABLET, EXTENDED RELEASE ORAL DAILY
Qty: 90 TABLET | Refills: 3 | Status: SHIPPED | OUTPATIENT
Start: 2023-12-30 | End: 2024-01-02 | Stop reason: ALTCHOICE

## 2024-01-02 DIAGNOSIS — I48.91 ATRIAL FIBRILLATION WITH RVR (H): Primary | ICD-10-CM

## 2024-01-02 RX ORDER — METOPROLOL TARTRATE 100 MG
100 TABLET ORAL 2 TIMES DAILY
Qty: 180 TABLET | Refills: 1 | Status: SHIPPED | OUTPATIENT
Start: 2024-01-02 | End: 2024-06-27

## 2024-01-04 NOTE — LETTER
2/7/2023    Kathya Escalera, DO  [address] [facility] [address]    RE: Rosie CUTLER Yehuda       Dear Colleague,     I had the pleasure of seeing Rosie Blackman in the [facility].  [facility]    I had the pleasure of seeing Rosie when she came for follow up of recent pacemaker implantation.  This 83 year old sees Dr. Rollins for her history of:    1.  H/o TIA - 3/2022  2.  Paroxysmal AFib, SSS/post conversion pauses- first noted 8/2022 (not seen on monitoring following TIA 3/2022).  Noted to have significant postconversion pause when spontaneously converted after DCCV x3 were not successful.  S/p dual-chamber [employer] PPM 10/2022 with Dr. Meyer  3.  HTN  4. R Ovarian cancer - sees Dr. Sinclair, last OV 1/2023. S/p BSO with LN dissection       Dr. Rollins saw Rosie in 10/2022 at which time they reviewed her TIA in 3/2022.  Monitoring following the procedure did not indicate any episodes of atrial fibrillation, but this was seen 8/2022, with palpitations.  Follow-up monitor showed 6% AFib burden with episodes of up to 4-second pauses when she spontaneously converted.  Therefore, recommended for dual-chamber pacemaker implantation to prevent bradycardia with her treatment of atrial fibrillation.  Underwent dual-chamber [employer] PPM 10/2022.    At time of first device check 11/1, noted to have continued episodes of symptomatic AFib for which increased metoprolol was recommended.  As symptoms were mild, no changes were recommended.  I am seeing her back for routine 3-month check.    She saw Dr. Escalera for routine follow-up 1/24 and no changes made     Interval History:  Rosie overall is doing well! She's still had some rapid HRs, a few times in January. No triggers she's aware of. No dizziness, lightheadedness. No syncope. No associated CP or SOB. Sxs resolve spontaneously.     BP and HR at home looks great. No issues with medications she's aware of. Remains on  Home health certification plan of care:  Start of Care Date: 6/7/23  Certification period: From: 12/4/23  To: 2/1/24  Certification Type: Recertification    Sergei is being seen by  [facility]  for PD, suprapubic cath care.   At this time, he is on:  Current Outpatient Medications   Medication Sig Dispense Refill   • albuterol 108 (90 Base) MCG/ACT inhaler Inhale 2 puffs into the lungs every 4 hours as needed for Shortness of Breath or Wheezing. 1 each 11   • carbidopa-levodopa (SINEMET)  MG per tablet Take 1 tablet by mouth in the morning and 1 tablet at noon and 1 tablet in the evening. 90 tablet 3   • ferrous sulfate 325 (65 FE) MG tablet Take 1 tablet by mouth daily (with breakfast). 90 tablet 0   • acetaminophen (TYLENOL) 650 MG CR tablet Take 1,300 mg by mouth every 8 hours as needed for Pain.     • docusate sodium (COLACE) 100 MG capsule Take 100 mg by mouth daily as needed for Constipation.     • mirtazapine (REMERON) 7.5 MG tablet Take 7.5 mg by mouth nightly.     • sertraline (ZOLOFT) 50 MG tablet Take 50 mg by mouth daily.     • polyethylene glycol (MIRALAX) 17 g packet Take 17 g by mouth daily. Stir and dissolve powder in any 4 to 8 ounces of beverage, then drink. 90 each 3     No current facility-administered medications for this visit.       I agree with the current plan of care Yes.    I have completed the form for Home Health Certification and has been sent to  [facility] .    Authorizing Provider: Epi Clement DO       AC with Eliquis without bleeding issues.Last Hgb 10/2022 was wnl 14.3 g/dL.    No change in chronic LE edema. Denies orthopnea, PND.     VITALS:  Vitals: /67   Pulse 63   Wt 95.3 kg (210 lb)   SpO2 97%   BMI 38.16 kg/m      Diagnostic Testing:  Device interrogation 12/2022, showed 42% AP and <1%  in DDD 60/130. HR 60-80 bpm per histogram.  Normal leads.  No ventricular arrhythmias.  342 mode switches, noted 11/5 and 11/24, with atrial fibrillation seen, up to 15 h.  HR >100 bpm 60-65% after increasing metoprolol.  Rate response turned on  Echocardiogram 8/2022-LVEF 60-65%. G2 DD.  Normal RV.  1+ MR.  RVSP 30+ RAP.  Aortic sclerosis    Plan:  1. See me back at time of in-office device check 12/2023  2. Continue routine device interrogations    Assessment/Plan:    1. Paroxysmal AFib/SSS    As above, had pauses when she spontaneously converted to SR and recommended for pacemaker implantation to allow adequate treatment of her RVR    S/p dual-chamber [employer] pacemaker 10/2022.  Has subsequently had metoprolol increased to 50 mg BID.  Remains on diltiazem 180 mg daily    Most recent device interrogation indicated continued pAFib, with improved HR control on the higher dose of the Li    Continues AC for [id number] 6 (HTN, CVA, age, sex).    PLAN:    Continue current medications    Routine device checks - will bring monitor to [place] with her in 3/2023 as 3 week trip planned!    See us back 12/2023 at time of in-office device check!          Gema Potter PA-C, MSPAS      Orders Placed This Encounter   Procedures     Follow-Up with Cardiology [facility]     No orders of the defined types were placed in this encounter.    There are no discontinued medications.      Encounter Diagnoses   Name Primary?     Atrial fibrillation, unspecified type (H)      Cardiac pacemaker in situ      Essential hypertension        CURRENT MEDICATIONS:  Current Outpatient Medications   Medication Sig Dispense Refill     apixaban  ANTICOAGULANT (ELIQUIS ANTICOAGULANT) 5 MG tablet Take 1 tablet (5 mg) by mouth 2 times daily 180 tablet 3     diltiazem ER COATED BEADS (CARDIZEM CD/CARTIA XT) 180 MG 24 hr capsule Take 1 capsule (180 mg) by mouth daily 90 capsule 3     Evening Primrose Oil 1000 MG CAPS One daily       meloxicam (MOBIC) 15 MG tablet Take 15 mg by mouth daily as needed       metoprolol tartrate (LOPRESSOR) 50 MG tablet Take 1 tablet (50 mg) by mouth 2 times daily 180 tablet 3     mometasone (ELOCON) 0.1 % external cream Apply sparingly to affected area twice daily for 2 weeks then decrease to 1 time daily for 30 days then decrease to 2-3 times per week 45 g 11     omega 3 1000 MG CAPS Take by mouth daily        pravastatin (PRAVACHOL) 80 MG tablet Take 1 tablet (80 mg) by mouth daily 90 tablet 3     THERATEARS 0.25 % OP SOLN BID       valsartan-hydrochlorothiazide (DIOVAN HCT) 320-25 MG tablet Take 1 tablet by mouth daily 90 tablet 3     VIACTIV 500-100-40 OR CHEW once daily         ALLERGIES     Allergies   Allergen Reactions     Atorvastatin Other (See Comments)     Muscle Pain     Ezetimibe Other (See Comments)     Severe muscle aches     Irbesartan Cramps     Lisinopril      Omeprazole      Other reaction(s): *Unknown     Prilosec [Omeprazole]      Rosuvastatin Other (See Comments)     Muscle Pain     Zestril [Ace Inhibitors] Cough         Review of Systems:  Skin:        Eyes:       ENT:       Respiratory:  Negative for shortness of breath;dyspnea on exertion;dyspnea at rest  Cardiovascular:  Negative for;chest pain;edema;lightheadedness;dizziness;syncope or near-syncope;fatigue Positive for;palpitations  Gastroenterology:      Genitourinary:       Musculoskeletal:       Neurologic:       Psychiatric:       Heme/Lymph/Imm:       Endocrine:         Physical Exam:  Vitals: /67   Pulse 63   Wt 95.3 kg (210 lb)   SpO2 97%   BMI 38.16 kg/m      Constitutional:  cooperative, alert and oriented, well developed, well  nourished, in no acute distress        Skin:  warm and dry to the touch, no apparent skin lesions or masses noted        Head:  normocephalic, no masses or lesions        Eyes:  pupils equal and round;conjunctivae and lids unremarkable;sclera white        ENT:  not assessed this visit        Neck:  JVP normal;no carotid bruit        Chest:  normal breath sounds, clear to auscultation, normal A-P diameter, normal symmetry, normal respiratory excursion, no use of accessory muscles        Cardiac: regular rhythm, normal S1/S2, no S3 or S4, apical impulse not displaced, no murmurs, gallops or rubs                  Abdomen:  abdomen soft        Vascular: pulses full and equal                                      Extremities and Back:  no deformities, clubbing, cyanosis, erythema observed        Neurological:  no gross motor deficits           PAST MEDICAL HISTORY:  Past Medical History:   Diagnosis Date     Chronic airway obstruction (H)      Diastolic dysfunction 6/28/2022     Gastroesophageal reflux disease      Hiatal hernia      Hypertension      Malignant neoplasm of ovary (H)      Ovarian cancer, unspecified laterality (H) 3/10/2021     Personal history of contact with and (suspected) exposure to asbestos      Primary osteoarthritis of right hip 7/9/2020       PAST SURGICAL HISTORY:  Past Surgical History:   Procedure Laterality Date     BIOPSY BREAST Left      BREAST BIOPSY, CORE RT/LT  1994     CHOLECYSTECTOMY  1995     COLONOSCOPY  05/2010    Diverticulosis, colon polyps; repeat 5 yrs     COLONOSCOPY N/A 11/16/2015    Procedure: COLONOSCOPY;  Surgeon: Alejandro Matos MD;  Location: [facility]     COLONOSCOPY N/A 9/17/2021    Procedure: COLONOSCOPY;  Surgeon: Aayush George MD;  Location: [facility]     Colonoscopy, hyperplastic polyps, repeat in 5 yrs  2004     EP PACEMAKER DEVICE & LEAD IMPLANT- RIGHT ATRIAL & RIGHT VENTRICULAR Left 10/24/2022    Procedure: Pacemaker Device & Lead Implant - Right Atrial & Right  Ventricular;  Surgeon: Demond Meyer MD;  Location:  HEART CARDIAC CATH LAB     ESOPHAGOSCOPY, GASTROSCOPY, DUODENOSCOPY (EGD), COMBINED  2013    Procedure: COMBINED ESOPHAGOSCOPY, GASTROSCOPY, DUODENOSCOPY (EGD), BIOPSY SINGLE OR MULTIPLE;  Gastroscopy;  Surgeon: Blaise Gill MD;  Location: [facility]     EYE SURGERY      R/L Cataracts     HC REMOVE TONSILS/ADENOIDS,13+ Y/O      T & A 13+y.o.     HERNIORRHAPHY INCISIONAL (LOCATION) N/A 3/24/2021    Procedure: HERNIORRHAPHY, INCISIONAL, OPEN WITH MESH;  Surgeon: Aayush George MD;  Location: [facility]     HYSTERECTOMY, PAP NO LONGER INDICATED       LAPAROSCOPIC SALPINGO-OOPHORECTOMY N/A 2020    Procedure: Laparoscopy, removal or pelvic mass, both tubes and ovaries, omentectomy, anterior culvectomy, pelvic and para aortic lymph node dissection, laparotomy;  Surgeon: Felipe Corona MD;  Location: [facility]     [place] ANESTH,LUMBAR SPINE,CORD SURGERY  10/2020    L3-4 L4-5 TRANSFORAMINAL INTERBODY FUSION L3-5, POSTERIOR INSTRUMENTED FUSION AND DECOMPRESSION     TVH for DUB, ovaries in       VASCULAR SURGERY      [place] closure     ZZC ANESTH,KNEE VEINS SURGERY  2014       FAMILY HISTORY:  Family History   Problem Relation Age of Onset     Cancer Mother         uterine cancer,      Respiratory Mother         COPD     Cardiovascular Mother         AAA  age 80     Breast Cancer Maternal Grandmother      Cancer Maternal Grandfather         cancer unknown type     Breast Cancer Sister      Eye Disorder Father         cataracts     Depression Father      Depression Son      Depression Son      Breast Cancer Sister         X2     Cancer - colorectal No family hx of         no ovarian cancer       SOCIAL HISTORY:  Social History     Socioeconomic History     Marital status:      Spouse name: Nelson Blackman     Number of children: 2     Years of education: 13+     Highest education level: None   Occupational History      Occupation:      Comment: [employer] D[employer]   Tobacco Use     Smoking status: Never     Smokeless tobacco: Never   Vaping Use     Vaping Use: Never used   Substance and Sexual Activity     Alcohol use: No     Drug use: No     Sexual activity: Not Currently     Partners: Male     Birth control/protection: Surgical   Other Topics Concern     Parent/sibling w/ CABG, MI or angioplasty before 65F 55M? No     Social Determinants of Health     Intimate Partner Violence: Not At Risk     Fear of Current or Ex-Partner: No     Emotionally Abused: No     Physically Abused: No     Sexually Abused: No           Thank you for allowing me to participate in the care of your patient.      Sincerely,     Jinny Potter PA-C     [facility]  cc:   Gerry Rollins MD  [address] [place] [id number]

## 2024-01-22 ENCOUNTER — LAB (OUTPATIENT)
Dept: LAB | Facility: CLINIC | Age: 84
End: 2024-01-22
Attending: NURSE PRACTITIONER
Payer: MEDICARE

## 2024-01-22 ENCOUNTER — ONCOLOGY VISIT (OUTPATIENT)
Dept: ONCOLOGY | Facility: CLINIC | Age: 84
End: 2024-01-22
Attending: NURSE PRACTITIONER
Payer: MEDICARE

## 2024-01-22 VITALS
HEIGHT: 64 IN | WEIGHT: 209.7 LBS | BODY MASS INDEX: 35.8 KG/M2 | TEMPERATURE: 98.2 F | HEART RATE: 75 BPM | OXYGEN SATURATION: 97 % | RESPIRATION RATE: 16 BRPM | SYSTOLIC BLOOD PRESSURE: 125 MMHG | DIASTOLIC BLOOD PRESSURE: 78 MMHG

## 2024-01-22 DIAGNOSIS — C56.1 OVARIAN CANCER, RIGHT (H): ICD-10-CM

## 2024-01-22 DIAGNOSIS — I48.0 PAROXYSMAL ATRIAL FIBRILLATION (H): ICD-10-CM

## 2024-01-22 DIAGNOSIS — Z08 ENCOUNTER FOR FOLLOW-UP SURVEILLANCE OF OVARIAN CANCER: ICD-10-CM

## 2024-01-22 DIAGNOSIS — E78.5 HYPERLIPIDEMIA LDL GOAL <130: ICD-10-CM

## 2024-01-22 DIAGNOSIS — C56.1 OVARIAN CANCER, RIGHT (H): Primary | ICD-10-CM

## 2024-01-22 DIAGNOSIS — Z85.43 ENCOUNTER FOR FOLLOW-UP SURVEILLANCE OF OVARIAN CANCER: ICD-10-CM

## 2024-01-22 LAB
ANION GAP SERPL CALCULATED.3IONS-SCNC: 8 MMOL/L (ref 7–15)
BUN SERPL-MCNC: 19 MG/DL (ref 8–23)
CALCIUM SERPL-MCNC: 9.4 MG/DL (ref 8.8–10.2)
CANCER AG125 SERPL-ACNC: 7 U/ML
CHLORIDE SERPL-SCNC: 105 MMOL/L (ref 98–107)
CHOLEST SERPL-MCNC: 187 MG/DL
CREAT SERPL-MCNC: 0.92 MG/DL (ref 0.51–0.95)
DEPRECATED HCO3 PLAS-SCNC: 28 MMOL/L (ref 22–29)
EGFRCR SERPLBLD CKD-EPI 2021: 61 ML/MIN/1.73M2
ERYTHROCYTE [DISTWIDTH] IN BLOOD BY AUTOMATED COUNT: 13.2 % (ref 10–15)
FASTING STATUS PATIENT QL REPORTED: NORMAL
GLUCOSE SERPL-MCNC: 98 MG/DL (ref 70–99)
HCT VFR BLD AUTO: 42.2 % (ref 35–47)
HDLC SERPL-MCNC: 71 MG/DL
HGB BLD-MCNC: 14.2 G/DL (ref 11.7–15.7)
LDLC SERPL CALC-MCNC: 88 MG/DL
MCH RBC QN AUTO: 31.1 PG (ref 26.5–33)
MCHC RBC AUTO-ENTMCNC: 33.6 G/DL (ref 31.5–36.5)
MCV RBC AUTO: 92 FL (ref 78–100)
NONHDLC SERPL-MCNC: 116 MG/DL
PLATELET # BLD AUTO: 260 10E3/UL (ref 150–450)
POTASSIUM SERPL-SCNC: 4.1 MMOL/L (ref 3.4–5.3)
RBC # BLD AUTO: 4.57 10E6/UL (ref 3.8–5.2)
SODIUM SERPL-SCNC: 141 MMOL/L (ref 135–145)
TRIGL SERPL-MCNC: 138 MG/DL
WBC # BLD AUTO: 5.5 10E3/UL (ref 4–11)

## 2024-01-22 PROCEDURE — 86304 IMMUNOASSAY TUMOR CA 125: CPT

## 2024-01-22 PROCEDURE — 85027 COMPLETE CBC AUTOMATED: CPT

## 2024-01-22 PROCEDURE — 80061 LIPID PANEL: CPT

## 2024-01-22 PROCEDURE — 36415 COLL VENOUS BLD VENIPUNCTURE: CPT

## 2024-01-22 PROCEDURE — G0463 HOSPITAL OUTPT CLINIC VISIT: HCPCS | Performed by: NURSE PRACTITIONER

## 2024-01-22 PROCEDURE — 99214 OFFICE O/P EST MOD 30 MIN: CPT | Performed by: NURSE PRACTITIONER

## 2024-01-22 PROCEDURE — 80048 BASIC METABOLIC PNL TOTAL CA: CPT

## 2024-01-22 ASSESSMENT — PAIN SCALES - GENERAL: PAINLEVEL: NO PAIN (0)

## 2024-01-22 NOTE — NURSING NOTE
"Oncology Rooming Note    January 22, 2024 1:59 PM   Rosie Blackman is a 84 year old female who presents for:    Chief Complaint   Patient presents with    Oncology Clinic Visit     [facility] RETURN - OVARIAN CANCER     Initial Vitals: /78 (BP Location: Right arm, Patient Position: Chair, Cuff Size: Adult Large)   Pulse 75   Temp 98.2  F (36.8  C)   Resp 16   Ht 1.626 m (5' 4.02\")   Wt 95.1 kg (209 lb 11.2 oz)   SpO2 97%   BMI 35.98 kg/m   Estimated body mass index is 35.98 kg/m  as calculated from the following:    Height as of this encounter: 1.626 m (5' 4.02\").    Weight as of this encounter: 95.1 kg (209 lb 11.2 oz). Body surface area is 2.07 meters squared.  No Pain (0) Comment: Data Unavailable   No LMP recorded. Patient has had a hysterectomy.  Allergies reviewed: Yes  Medications reviewed: Yes    Medications: Medication refills not needed today.  Pharmacy name entered into [employer]:    [id number] - [place], [place] - [address] - MAIL ORDER MAIN MEDS - NON-EPRESCRIBE  [id number] - [place], [place] - [address] [address]    Frailty Screening:   Is the patient here for a new oncology consult visit in cancer care? 2. No      Jose Briggs LPN              "

## 2024-01-22 NOTE — PROGRESS NOTES
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  She is s/p BSO, PPLND 2020 which served as her treatment.  She is here today for a surveillance visit.      Oncology History:  She originally presented with a complaint of hip pain.  The work-up of this included an MRI which has demonstrated a large pelvic mass, her  was elevated (108, CEA 19-9 were normal).     2020: Exploratory laparoscopy followed by laparotomy, bilateral salpingo-oophorectomy, omentectomy, pelvic and periaortic lymph node dissection, anterior culdectomy.    FINAL DIAGNOSIS:   A. OVARIES, LEFT AND RIGHT, BILATERAL OOPHORECTOMY:   - Right ovary with ENDOMETRIOID ADENOCARCINOMA, FIGO grade 1   - Left ovary with benign inclusion cysts, negative for malignancy   - Unremarkable bilateral fallopian tubes, negative for malignancy   B. MESENTERY, BIOPSY:   - Fibrovascular adhesions, negative for malignancy   C. OMENTUM, OMENTECTOMY:   - Adipose tissue, negative for malignancy   D. ANTERIOR CUL-DE-SAC, BIOPSY:   - Fibroadipose tissue with foci of endometriosis   - Negative for malignancy   E. POSTERIOR CUL-DE-SAC, BIOPSY:   - Fibrovascular tissue, negative for malignancy   F. LYMPH NODE, LEFT PELVIC, EXCISION:   - Eleven reactive lymph nodes, negative for malignancy (0/11)   G. LYMPH NODE, RIGHT PELVIC, EXCISION:   - Nine reactive lymph nodes, negative for malignancy (0/9)   H. LYMPH NODE, RIGHT PARA AORTIC, EXCISION:   - Three reactive lymph nodes, negative for malignancy (0/3)     2020:  20.  2/:  8.  5/:  7.  9:  <5.  1/:  6.   4/:  6.  7/:  9.  1:  7.  7/10/23:  8.  10/10/23: CT AP LLQ  pain  IMPRESSION:   1.  No acute pathology through the abdomen and pelvis.  2.  Descending and sigmoid diverticulosis without evidence of  diverticulitis.  3.  Small fat-containing left inguinal hernia is unchanged. The  adjacent sigmoid colon does not extend into the hernia.  4.  Two large duodenal diverticula.  1/22/24:  pending.              Today she reports;    -Vaginal bleeding/discharge: She has noticed a spot of discoloration on her underwear intermittently  -Abdominal pain/bloating: Some lower abdominal cramping and LLQ pain over the summer, had a CT that was without concern.  Symptoms have resolved.  -Appetite:  Good  -Weight loss: No  -Bowel/bladder habits:  Normal, was having some looser stools in the summer when eating more fresh fruits and vegetables  -Leg swelling:  Baseline BLE edema, unchanged               Health Maintenance  Colonoscopy: 11/16/15, repeat in 10 years  Mammogram: 3/1/23  Annual physical: 1/24/23      Review of Systems:    See HPI      Past Medical History:    Past Medical History:   Diagnosis Date    Chronic airway obstruction (H)     Diastolic dysfunction 6/28/2022    Gastroesophageal reflux disease     Hiatal hernia     Hypertension     Malignant neoplasm of ovary (H)     Ovarian cancer, unspecified laterality (H) 3/10/2021    Personal history of contact with and (suspected) exposure to asbestos     Primary osteoarthritis of right hip 7/9/2020         Past Surgical History:    Past Surgical History:   Procedure Laterality Date    BIOPSY BREAST Left     BREAST BIOPSY, CORE RT/LT  1994    CHOLECYSTECTOMY  1995    COLONOSCOPY  05/2010    Diverticulosis, colon polyps; repeat 5 yrs    COLONOSCOPY N/A 11/16/2015    Procedure: COLONOSCOPY;  Surgeon: Alejandro Matos MD;  Location: [facility]    COLONOSCOPY N/A 09/17/2021    Procedure: COLONOSCOPY;  Surgeon: Aayush George MD;  Location: [facility]    Colonoscopy, hyperplastic polyps, repeat in 5 yrs  2004    EP PACEMAKER DEVICE & LEAD  IMPLANT- RIGHT ATRIAL & RIGHT VENTRICULAR Left 10/24/2022    Procedure: Pacemaker Device & Lead Implant - Right Atrial & Right Ventricular;  Surgeon: Demond Meyer MD;  [facility]    ESOPHAGOSCOPY, GASTROSCOPY, DUODENOSCOPY (EGD), COMBINED  09/30/2013    Procedure: COMBINED ESOPHAGOSCOPY, GASTROSCOPY, DUODENOSCOPY (EGD), BIOPSY SINGLE OR MULTIPLE;  Gastroscopy;  Surgeon: Blaise Gill MD;  Location: [facility]    EYE SURGERY  2012    R/L Cataracts    HC REMOVE TONSILS/ADENOIDS,12+ Y/O      T & A 13+y.o.    HERNIORRHAPHY INCISIONAL (LOCATION) N/A 03/24/2021    Procedure: HERNIORRHAPHY, INCISIONAL, OPEN WITH MESH;  Surgeon: Aayush George MD;  Location: [facility]    HYSTERECTOMY, PAP NO LONGER INDICATED      LAPAROSCOPIC SALPINGO-OOPHORECTOMY N/A 07/24/2020    Procedure: Laparoscopy, removal or pelvic mass, both tubes and ovaries, omentectomy, anterior culvectomy, pelvic and para aortic lymph node dissection, laparotomy;  Surgeon: Felipe Corona MD;  Location: [facility]    KS ANESTH,LUMBAR SPINE,CORD SURGERY  10/2020    L3-4 L4-5 TRANSFORAMINAL INTERBODY FUSION L3-5, POSTERIOR INSTRUMENTED FUSION AND DECOMPRESSION    TVH for DUB, ovaries in      VASCULAR SURGERY  2014    Taylor closure    C ANESTH,KNEE VEINS SURGERY  08/20/2014         Health Maintenance Due   Topic Date Due    LIPID  12/04/2022    PHQ-2 (once per calendar year)  01/01/2024    MEDICARE ANNUAL WELLNESS VISIT  01/24/2024    FALL RISK ASSESSMENT  01/24/2024       Current Medications:     Current Outpatient Medications   Medication Sig Dispense Refill    apixaban ANTICOAGULANT (ELIQUIS ANTICOAGULANT) 5 MG tablet Take 1 tablet (5 mg) by mouth 2 times daily 180 tablet 3    diltiazem ER COATED BEADS (CARDIZEM CD/CARTIA XT) 180 MG 24 hr capsule Take 1 capsule (180 mg) by mouth daily 90 capsule 3    Evening Primrose Oil 1000 MG CAPS Take 1 capsule by mouth daily One daily      Fish Oil-Cholecalciferol (OMEGA-3 +  D PO) Take 1 capsule by mouth daily      meloxicam (MOBIC) 15 MG tablet Take 15 mg by mouth daily as needed      metoprolol tartrate (LOPRESSOR) 100 MG tablet Take 1 tablet (100 mg) by mouth 2 times daily 180 tablet 1    mometasone (ELOCON) 0.1 % external cream Apply sparingly to affected area twice daily for 2 weeks then decrease to 1 time daily for 30 days then decrease to 2-3 times per week 45 g 11    pravastatin (PRAVACHOL) 80 MG tablet Take 1 tablet (80 mg) by mouth daily 90 tablet 3    THERATEARS 0.25 % OP SOLN Place 2 drops into both eyes as needed      valsartan-hydrochlorothiazide (DIOVAN HCT) 320-25 MG tablet Take 1 tablet by mouth daily 90 tablet 3    VIACTIV 500-100-40 OR CHEW Take 1 chew tab by mouth daily           Allergies:        Allergies   Allergen Reactions    Atorvastatin Other (See Comments)     Muscle Pain    Ezetimibe Other (See Comments)     Severe muscle aches    Irbesartan Cramps    Lisinopril     Omeprazole      Other reaction(s): *Unknown    Prilosec [Omeprazole]     Rosuvastatin Other (See Comments)     Muscle Pain    Zestril [Ace Inhibitors] Cough        Social History:     Social History     Tobacco Use    Smoking status: Never    Smokeless tobacco: Never   Substance Use Topics    Alcohol use: No       History   Drug Use No         Family History:     The patient's family history is notable for:    Family History   Problem Relation Age of Onset    Cancer Mother         uterine cancer,     Respiratory Mother         COPD    Cardiovascular Mother         AAA  age 80    Breast Cancer Maternal Grandmother     Cancer Maternal Grandfather         cancer unknown type    Breast Cancer Sister     Eye Disorder Father         cataracts    Depression Father     Depression Son     Depression Son     Breast Cancer Sister         X2    Cancer - colorectal No family hx of         no ovarian cancer         Physical Exam:     /78 (BP Location: Right arm, Patient Position: Chair, Cuff Size:  "Adult Large)   Pulse 75   Temp 98.2  F (36.8  C)   Resp 16   Ht 1.626 m (5' 4.02\")   Wt 95.1 kg (209 lb 11.2 oz)   SpO2 97%   BMI 35.98 kg/m    Body mass index is 35.98 kg/m .    General Appearance:  alert, no distress     HEENT: no palpable nodules or masses        Cardiovascular: regular rate and rhythm, no gallops, rubs or murmurs     Respiratory: lungs clear, no rales, rhonchi or wheezes    Musculoskeletal: extremities non tender and +2 BLE edema    Skin: no rashes, skin lesion with irregular borders in her left inguinal area    Neurological: normal gait, no gross defects     Psychiatric: appropriate mood and affect                               Hematological: normal cervical, supraclavicular and inguinal lymph nodes     Gastrointestinal:       abdomen soft, non-tender, non-distended, no organomegaly or masses    Genitourinary: External genitalia and urethral meatus appears normal.  Vagina is smooth without nodularity or masses.  Cervix is surgically absent.  Bimanual exam reveal no masses, nodularity or fullness.  Recto-vaginal exam confirms these findings.      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  She is s/p BSO, PPLND 7/24/2020 which served as her treatment.  She is here today for a surveillance visit.      30 minutes spent on the date of the encounter doing chart review, history and exam, documentation, and further activities as noted above.      Plan:     1.)        Ovarian cancer:  IAN on exam.  RTC in 6 months for next surveillance visit and .  Plan for surveillance visits every 6 months until she is 5 years out from treatment (7/2025), then annually.  Reviewed signs and symptoms for when she should contact the clinic or seek additional care.  Patient to contact the clinic with any questions or concerns in the interim.      Genetic risk factors were assessed and she is negative for mutations in the ADALID, " BRCA1, BRCA2, BRIP1, CDH1, CHEK2, EPCAM, MLH1, MSH2, MSH6, NBN, NF1, PALB2, PMS2, PTEN, RAD51C, RAD51D, STK11, and TP53 genes.     Labs and/or tests ordered include:  .                2.)        Health maintenance:  Issues addressed today include following up with PCP for annual health maintenance and non-gynecologic issues.      3.) Skin lesion: Skin lesion in her left inguinal region with irregular borders.  She is scheduled to see dermatology in a couple weeks.  Encouraged she show the lesion to dermatology at her appointment.       Kerry Sinclair DNP, APRN, FNP-C, AOCNP  Oncology Nurse Practitioner  Division of Gynecologic Oncology  Pager: [phone]     CC  Patient Care Team:  Kathya Escalera DO as PCP - General (Internal Medicine)  Kathya Escalera DO as Assigned PCP  Fatimah Clement MD as MD (Neurology)  Kerry Sinclair APRN CNP as Assigned Cancer Care Provider  Jai Lam MD as MD (Cardiology)  Jinny Potter PA-C as Assigned Heart and Vascular Provider  Giovany Houser DO as Assigned Musculoskeletal Provider  SELF, REFERRED

## 2024-01-22 NOTE — NURSING NOTE
Chief Complaint   Patient presents with    Blood Draw     Labs collected from venipuncture by RN.      Labs collected from venipuncture by RN.     Alida Oliveira RN

## 2024-01-22 NOTE — LETTER
2024         RE: Rosie Blackman  [address] [address] [facility] [id number]        Dear Colleague,    Thank you for referring your patient, Rosie Blackman, to the [facility]. Please see a copy of my visit note below.    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  She is s/p BSO, PPLND 2020 which served as her treatment.  She is here today for a surveillance visit.      Oncology History:  She originally presented with a complaint of hip pain.  The work-up of this included an MRI which has demonstrated a large pelvic mass, her  was elevated (108, CEA 19-9 were normal).     2020: Exploratory laparoscopy followed by laparotomy, bilateral salpingo-oophorectomy, omentectomy, pelvic and periaortic lymph node dissection, anterior culdectomy.    FINAL DIAGNOSIS:   A. OVARIES, LEFT AND RIGHT, BILATERAL OOPHORECTOMY:   - Right ovary with ENDOMETRIOID ADENOCARCINOMA, FIGO grade 1   - Left ovary with benign inclusion cysts, negative for malignancy   - Unremarkable bilateral fallopian tubes, negative for malignancy   B. MESENTERY, BIOPSY:   - Fibrovascular adhesions, negative for malignancy   C. OMENTUM, OMENTECTOMY:   - Adipose tissue, negative for malignancy   D. ANTERIOR CUL-DE-SAC, BIOPSY:   - Fibroadipose tissue with foci of endometriosis   - Negative for malignancy   E. POSTERIOR CUL-DE-SAC, BIOPSY:   - Fibrovascular tissue, negative for malignancy   F. LYMPH NODE, LEFT PELVIC, EXCISION:   - Eleven reactive lymph nodes, negative for malignancy (0/11)   G. LYMPH NODE, RIGHT PELVIC, EXCISION:   - Nine reactive lymph nodes, negative for malignancy (0/9)   H. LYMPH NODE, RIGHT PARA AORTIC, EXCISION:   - Three  reactive lymph nodes, negative for malignancy (0/3)     11/9/2020:  20.  2/1/21:  8.  5/17/21:  7.  9/20/21:  <5.  1/4/22:  6.   4/5/22:  6.  7/5/22:  9.  1/5/23:  7.  7/10/23:  8.  10/10/23: CT AP LLQ pain  IMPRESSION:   1.  No acute pathology through the abdomen and pelvis.  2.  Descending and sigmoid diverticulosis without evidence of  diverticulitis.  3.  Small fat-containing left inguinal hernia is unchanged. The  adjacent sigmoid colon does not extend into the hernia.  4.  Two large duodenal diverticula.  1/22/24:  pending.              Today she reports;    -Vaginal bleeding/discharge: She has noticed a spot of discoloration on her underwear intermittently  -Abdominal pain/bloating: Some lower abdominal cramping and LLQ pain over the summer, had a CT that was without concern.  Symptoms have resolved.  -Appetite:  Good  -Weight loss: No  -Bowel/bladder habits:  Normal, was having some looser stools in the summer when eating more fresh fruits and vegetables  -Leg swelling:  Baseline BLE edema, unchanged               Health Maintenance  Colonoscopy: 11/16/15, repeat in 10 years  Mammogram: 3/1/23  Annual physical: 1/24/23      Review of Systems:    See HPI      Past Medical History:    Past Medical History:   Diagnosis Date    Chronic airway obstruction (H)     Diastolic dysfunction 6/28/2022    Gastroesophageal reflux disease     Hiatal hernia     Hypertension     Malignant neoplasm of ovary (H)     Ovarian cancer, unspecified laterality (H) 3/10/2021    Personal history of contact with and (suspected) exposure to asbestos     Primary osteoarthritis of right hip 7/9/2020         Past Surgical History:    Past Surgical History:   Procedure Laterality Date    BIOPSY BREAST Left     BREAST BIOPSY, CORE RT/LT  1994    CHOLECYSTECTOMY  1995    COLONOSCOPY  05/2010    Diverticulosis, colon polyps; repeat 5 yrs    COLONOSCOPY N/A 11/16/2015    Procedure:  COLONOSCOPY;  Surgeon: Alejandro Matos MD;  Location: [facility]    COLONOSCOPY N/A 09/17/2021    Procedure: COLONOSCOPY;  Surgeon: Aayush George MD;  Location: [facility]    Colonoscopy, hyperplastic polyps, repeat in 5 yrs  2004    EP PACEMAKER DEVICE & LEAD IMPLANT- RIGHT ATRIAL & RIGHT VENTRICULAR Left 10/24/2022    Procedure: Pacemaker Device & Lead Implant - Right Atrial & Right Ventricular;  Surgeon: Demond Meyer MD;  Location:  [facility]    ESOPHAGOSCOPY, GASTROSCOPY, DUODENOSCOPY (EGD), COMBINED  09/30/2013    Procedure: COMBINED ESOPHAGOSCOPY, GASTROSCOPY, DUODENOSCOPY (EGD), BIOPSY SINGLE OR MULTIPLE;  Gastroscopy;  Surgeon: Blaise Gill MD;  Location: [facility]    EYE SURGERY  2012    R/L Cataracts    HC REMOVE TONSILS/ADENOIDS,13+ Y/O      T & A 13+y.o.    HERNIORRHAPHY INCISIONAL (LOCATION) N/A 03/24/2021    Procedure: HERNIORRHAPHY, INCISIONAL, OPEN WITH MESH;  Surgeon: Aayush George MD;  Location: [facility]    HYSTERECTOMY, PAP NO LONGER INDICATED      LAPAROSCOPIC SALPINGO-OOPHORECTOMY N/A 07/24/2020    Procedure: Laparoscopy, removal or pelvic mass, both tubes and ovaries, omentectomy, anterior culvectomy, pelvic and para aortic lymph node dissection, laparotomy;  Surgeon: Felipe Corona MD;  Location: [facility]    DE ANESTH,LUMBAR SPINE,CORD SURGERY  10/2020    L3-4 L4-5 TRANSFORAMINAL INTERBODY FUSION L3-5, POSTERIOR INSTRUMENTED FUSION AND DECOMPRESSION    TVH for DUB, ovaries in      VASCULAR SURGERY  2014    Taylor closure    ZC ANESTH,KNEE VEINS SURGERY  08/20/2014         Health Maintenance Due   Topic Date Due    LIPID  12/04/2022    PHQ-2 (once per calendar year)  01/01/2024    MEDICARE ANNUAL WELLNESS VISIT  01/24/2024    FALL RISK ASSESSMENT  01/24/2024       Current Medications:     Current Outpatient Medications   Medication Sig Dispense Refill    apixaban ANTICOAGULANT (ELIQUIS ANTICOAGULANT) 5 MG tablet Take 1 tablet (5 mg) by mouth 2 times daily 180  tablet 3    diltiazem ER COATED BEADS (CARDIZEM CD/CARTIA XT) 180 MG 24 hr capsule Take 1 capsule (180 mg) by mouth daily 90 capsule 3    Evening Primrose Oil 1000 MG CAPS Take 1 capsule by mouth daily One daily      Fish Oil-Cholecalciferol (OMEGA-3 + D PO) Take 1 capsule by mouth daily      meloxicam (MOBIC) 15 MG tablet Take 15 mg by mouth daily as needed      metoprolol tartrate (LOPRESSOR) 100 MG tablet Take 1 tablet (100 mg) by mouth 2 times daily 180 tablet 1    mometasone (ELOCON) 0.1 % external cream Apply sparingly to affected area twice daily for 2 weeks then decrease to 1 time daily for 30 days then decrease to 2-3 times per week 45 g 11    pravastatin (PRAVACHOL) 80 MG tablet Take 1 tablet (80 mg) by mouth daily 90 tablet 3    THERATEARS 0.25 % OP SOLN Place 2 drops into both eyes as needed      valsartan-hydrochlorothiazide (DIOVAN HCT) 320-25 MG tablet Take 1 tablet by mouth daily 90 tablet 3    VIACTIV 500-100-40 OR CHEW Take 1 chew tab by mouth daily           Allergies:        Allergies   Allergen Reactions    Atorvastatin Other (See Comments)     Muscle Pain    Ezetimibe Other (See Comments)     Severe muscle aches    Irbesartan Cramps    Lisinopril     Omeprazole      Other reaction(s): *Unknown    Prilosec [Omeprazole]     Rosuvastatin Other (See Comments)     Muscle Pain    Zestril [Ace Inhibitors] Cough        Social History:     Social History     Tobacco Use    Smoking status: Never    Smokeless tobacco: Never   Substance Use Topics    Alcohol use: No       History   Drug Use No         Family History:     The patient's family history is notable for:    Family History   Problem Relation Age of Onset    Cancer Mother         uterine cancer,     Respiratory Mother         COPD    Cardiovascular Mother         AAA  age 80    Breast Cancer Maternal Grandmother     Cancer Maternal Grandfather         cancer unknown type    Breast Cancer Sister     Eye Disorder Father         cataracts     "Depression Father     Depression Son     Depression Son     Breast Cancer Sister         X2    Cancer - colorectal No family hx of         no ovarian cancer         Physical Exam:     /78 (BP Location: Right arm, Patient Position: Chair, Cuff Size: Adult Large)   Pulse 75   Temp 98.2  F (36.8  C)   Resp 16   Ht 1.626 m (5' 4.02\")   Wt 95.1 kg (209 lb 11.2 oz)   SpO2 97%   BMI 35.98 kg/m    Body mass index is 35.98 kg/m .    General Appearance:  alert, no distress     HEENT: no palpable nodules or masses        Cardiovascular: regular rate and rhythm, no gallops, rubs or murmurs     Respiratory: lungs clear, no rales, rhonchi or wheezes    Musculoskeletal: extremities non tender and +2 BLE edema    Skin: no rashes, skin lesion with irregular borders in her left inguinal area    Neurological: normal gait, no gross defects     Psychiatric: appropriate mood and affect                               Hematological: normal cervical, supraclavicular and inguinal lymph nodes     Gastrointestinal:       abdomen soft, non-tender, non-distended, no organomegaly or masses    Genitourinary: External genitalia and urethral meatus appears normal.  Vagina is smooth without nodularity or masses.  Cervix is surgically absent.  Bimanual exam reveal no masses, nodularity or fullness.  Recto-vaginal exam confirms these findings.      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  She is s/p BSO, PPLND 7/24/2020 which served as her treatment.  She is here today for a surveillance visit.      30 minutes spent on the date of the encounter doing chart review, history and exam, documentation, and further activities as noted above.      Plan:     1.)        Ovarian cancer:  IAN on exam.  RTC in 6 months for next surveillance visit and .  Plan for surveillance visits every 6 months until she is 5 years out from treatment (7/2025), then annually.  " Reviewed signs and symptoms for when she should contact the clinic or seek additional care.  Patient to contact the clinic with any questions or concerns in the interim.      Genetic risk factors were assessed and she is negative for mutations in the ADALID, BRCA1, BRCA2, BRIP1, CDH1, CHEK2, EPCAM, MLH1, MSH2, MSH6, NBN, NF1, PALB2, PMS2, PTEN, RAD51C, RAD51D, STK11, and TP53 genes.     Labs and/or tests ordered include:  .                2.)        Health maintenance:  Issues addressed today include following up with PCP for annual health maintenance and non-gynecologic issues.      3.) Skin lesion: Skin lesion in her left inguinal region with irregular borders.  She is scheduled to see dermatology in a couple weeks.  Encouraged she show the lesion to dermatology at her appointment.       Kerry Sinclair, STEFANY, APRN, FNP-C, AOCNP  Oncology Nurse Practitioner  Division of Gynecologic Oncology  Pager: [phone]     CC  Patient Care Team:  Kathya Escalera DO as PCP - General (Internal Medicine)  Kathya Escalera DO as Assigned PCP

## 2024-01-23 NOTE — RESULT ENCOUNTER NOTE
Cholesterol is improved from 2 years ago.  Kidney function, electrolytes, blood sugar, blood counts are normal

## 2024-01-24 ENCOUNTER — THERAPY VISIT (OUTPATIENT)
Dept: PHYSICAL THERAPY | Facility: CLINIC | Age: 84
End: 2024-01-24
Attending: FAMILY MEDICINE
Payer: MEDICARE

## 2024-01-24 DIAGNOSIS — R10.32 LEFT GROIN PAIN: Primary | ICD-10-CM

## 2024-01-24 DIAGNOSIS — M86.9 OSTEITIS PUBIS (H): ICD-10-CM

## 2024-01-24 DIAGNOSIS — S76.212D STRAIN OF ADDUCTOR MAGNUS MUSCLE OF LEFT LOWER EXTREMITY, SUBSEQUENT ENCOUNTER: ICD-10-CM

## 2024-01-24 DIAGNOSIS — S39.011D STRAIN OF ABDOMINAL MUSCLE, SUBSEQUENT ENCOUNTER: ICD-10-CM

## 2024-01-24 PROCEDURE — 97162 PT EVAL MOD COMPLEX 30 MIN: CPT | Mod: GP | Performed by: PHYSICAL THERAPIST

## 2024-01-24 PROCEDURE — 97110 THERAPEUTIC EXERCISES: CPT | Mod: GP | Performed by: PHYSICAL THERAPIST

## 2024-01-24 PROCEDURE — 97140 MANUAL THERAPY 1/> REGIONS: CPT | Mod: GP | Performed by: PHYSICAL THERAPIST

## 2024-01-24 NOTE — PROGRESS NOTES
PHYSICAL THERAPY EVALUATION  Type of Visit: Evaluation    See electronic medical record for Abuse and Falls Screening details.    Subjective     Primary pain is (L) LQ/groin pain that started last summer (2023) without injury and this pain currently happens anytime without known activity. States the pain feels like someone is twisting the tissue really tight and then as it releases, it radiates out. This pain is not debilitating. Has gotten much better in the last few months. Was happening multiple times per day, and is now about daily and much less intense.   Presenting condition or subjective complaint: (L) groin discomfort since July 2023  Date of onset: 12/14/23    Relevant medical history: Cancer; High blood pressure; Pacemaker   Dates & types of surgery: Pelvic Mass (Malignant) removed 7/2020; Lumbar Spinal Fusion 10/2020, Pacemaker placed 10/2022, Cholecystectomy 1995, Hernia surgery 3/2021    Prior diagnostic imaging/testing results: X-ray; CT scan X-ray: no fractures, minimal degenerative changes at (L) hip. CT:No acute pathology through the abdomen and pelvis.   Prior therapy history for the same diagnosis, illness or injury: No      Prior Level of Function  Transfers: Independent  Ambulation: Independent  ADL: Independent    Living Environment  Social support: With a significant other or spouse   Type of home: House; 1 level   Stairs to enter the home: Yes 3 Is there a railing: Yes   Ramp: No   Stairs inside the home: Yes 12 Is there a railing: Yes   Help at home: None  Equipment owned:       Employment: Not Applicable    Hobbies/Interests: Travel, did volunteer @ [facility] [place] prior to Covid    Patient goals for therapy: Have a full day without this pain       Objective   HIP EVALUATION  PAIN: Pain Level at Rest: 0/10  Pain is Worst: usually daytime it comes on  Pain is Exacerbated By: nothing specific that can be identified  Pain is Relieved By: nothing pt does, it just starts to radiate and then will  "dissipate  Pain Progression: Improved  INTEGUMENTARY (edema, incisions):  abdominally pt has multiple large area scars from surgeries (transversally and sagitally)  POSTURE: Standing Posture: Rounded shoulders, Forward head, (L) iliac crest slightly elevated  GAIT:   Weightbearing Status: WBAT  Assistive Device(s): None  Gait Deviations: Base of support increased  Stance time decreased  Mary decreased  BALANCE/PROPRIOCEPTION:  Pt needed a \"couple seconds\" to \"get my bearings\" upon initial stand; second time she stood she had to reach for armrest of chair as she \"felt a little off.\"   WEIGHTBEARING ALIGNMENT: Foot/Ankle WB Alignment:Hallux valgus L, Pes cavus L, Hallux valgus R, Pes cavus R  NON-WEIGHTBEARING ALIGNMENT: Forefoot valgus L, Rearfoot valgus L, Forefoot valgus R, Rearfoot valgus R   ROM: AROM WFL  PROM WFL  *Pt did get CAM pinching sensation at groin (L) only with FADIR/modified FADIR for piriformis stretch  PELVIC/SI SCREEN: Standing Forward Bend: (L) first/furthest, [place] (March): (L) moves > (R), Sacroiliac Provocation Test: (L) +, Pubic Symphysis Provocation: neg  STRENGTH: WFL  Pt has straight line 4-5/5 strength, but states she feels she has to assist (L) LE with her hand getting in/out of car and with poitioning (L) foot to dami socks  LE FLEXIBILITY:  (B) with (L) worse than (R) tight at Piriformis mm  SPECIAL TESTS: IMELDA = neg (B), FADIR = + (L), Neg for scour (B)  PALPATION:  Tenderness (L) glute med and abominally (L) .5-1 inch laterally to midline, hypomobile abdominal fascia with focal mvmt in transverse plane (mainly).  JOINT MOBILITY: decreased inferior glide (L) hip capsule    Assessment & Plan   CLINICAL IMPRESSIONS  Medical Diagnosis: Left groin pain  Osteitis pubis (H)  Strain of adductor alisson muscle of left lower extremity, subsequent encounter  Strain of abdominal muscle, subsequent encounte    Treatment Diagnosis: (L) hip and groin impairment   Impression/Assessment: Patient " is a 84 year old female with (L) LQ abdominal and groin pain complaints.  The following significant findings have been identified: Pain, Decreased ROM/flexibility, Decreased joint mobility, Decreased strength, Impaired balance, Impaired gait, Impaired muscle performance, Decreased activity tolerance, and Impaired posture. These impairments interfere with their ability to perform self care tasks, recreational activities, household chores, household mobility, and community mobility as compared to previous level of function.     Clinical Decision Making (Complexity):  Clinical Presentation: Evolving/Changing  Clinical Presentation Rationale: based on medical and personal factors listed in PT evaluation  Clinical Decision Making (Complexity): Moderate complexity    PLAN OF CARE  Treatment Interventions:  Modalities: Biofeedback, Cryotherapy, E-stim, Hot Pack, Ultrasound  Interventions: Gait Training, Manual Therapy, Neuromuscular Re-education, Therapeutic Activity, Therapeutic Exercise, Self-Care/Home Management    Long Term Goals     PT Goal 1  Goal Identifier: STG  Goal Description: 1)Pt will report 3/7 days without her (L)  LQ abdominal/groin pain, in 4 weeks.  Target Date: 02/21/24  PT Goal 2  Goal Identifier: LTG  Goal Description: 2)Pt will report 5/7 days without her (L) LQ abdominal/groin pain in 8 weeks.  Target Date: 03/20/24  PT Goal 3  Goal Identifier: LTG  Goal Description: 3)Pt will be indep in a HEP to prevent return of her symptoms, in 8 weeks.  Target Date: 03/20/24  PT Goal 4  Goal Identifier: LTG  Goal Description: 4)Pt will not feel like she has to assist (L) leg with hands to get in/out of car or to dami her socks, in 8 weeks.  Target Date: 03/20/24      Frequency of Treatment: 1x per week  Duration of Treatment: 8 weeks    Recommended Referrals to Other Professionals:  none  Education Assessment:   Learner/Method: Patient;Listening;Reading;Demonstration;Pictures/Video;No Barriers to  Learning    Risks and benefits of evaluation/treatment have been explained.   Patient/Family/caregiver agrees with Plan of Care.     Evaluation Time:     PT Eval, Moderate Complexity Minutes (93542): 30   Present: Not applicable     Signing Clinician: DIANE Guerra [facility]                                                                                   OUTPATIENT PHYSICAL THERAPY      PLAN OF TREATMENT FOR OUTPATIENT REHABILITATION   Patient's Last Name, First Name, Rosie Diaz YOB: 1940   Provider's Name   [facility]   Medical Record No.  [id number]     Onset Date: 12/14/23  Start of Care Date: 01/24/24     Medical Diagnosis:  Left groin pain  Osteitis pubis (H)  Strain of adductor alisson muscle of left lower extremity, subsequent encounter  Strain of abdominal muscle, subsequent encounte      PT Treatment Diagnosis:  (L) hip and groin impairment Plan of Treatment  Frequency/Duration: 1x per week/ 8 weeks    Certification date from 01/24/24 to 03/20/24         See note for plan of treatment details and functional goals     Danni Stubbs, PT                         I CERTIFY THE NEED FOR THESE SERVICES FURNISHED UNDER        THIS PLAN OF TREATMENT AND WHILE UNDER MY CARE     (Physician attestation of this document indicates review and certification of the therapy plan).              Referring Provider:  Giovany oHuser    Initial Assessment  See Epic Evaluation- Start of Care Date: 01/24/24

## 2024-01-26 ENCOUNTER — OFFICE VISIT (OUTPATIENT)
Dept: FAMILY MEDICINE | Facility: CLINIC | Age: 84
End: 2024-01-26
Payer: MEDICARE

## 2024-01-26 VITALS
TEMPERATURE: 97.7 F | SYSTOLIC BLOOD PRESSURE: 120 MMHG | HEIGHT: 62 IN | BODY MASS INDEX: 38.46 KG/M2 | HEART RATE: 60 BPM | WEIGHT: 209 LBS | DIASTOLIC BLOOD PRESSURE: 72 MMHG | OXYGEN SATURATION: 97 % | RESPIRATION RATE: 16 BRPM

## 2024-01-26 DIAGNOSIS — M16.11 PRIMARY OSTEOARTHRITIS OF RIGHT HIP: ICD-10-CM

## 2024-01-26 DIAGNOSIS — E66.01 MORBID OBESITY (H): ICD-10-CM

## 2024-01-26 DIAGNOSIS — M48.061 SPINAL STENOSIS OF LUMBAR REGION, UNSPECIFIED WHETHER NEUROGENIC CLAUDICATION PRESENT: ICD-10-CM

## 2024-01-26 DIAGNOSIS — Z00.00 ENCOUNTER FOR MEDICARE ANNUAL WELLNESS EXAM: Primary | ICD-10-CM

## 2024-01-26 DIAGNOSIS — C56.1 OVARIAN CANCER, RIGHT (H): ICD-10-CM

## 2024-01-26 DIAGNOSIS — I48.0 PAROXYSMAL ATRIAL FIBRILLATION (H): ICD-10-CM

## 2024-01-26 DIAGNOSIS — R73.03 PREDIABETES: ICD-10-CM

## 2024-01-26 DIAGNOSIS — M86.9 OSTEITIS PUBIS (H): ICD-10-CM

## 2024-01-26 PROCEDURE — 99214 OFFICE O/P EST MOD 30 MIN: CPT | Mod: 25 | Performed by: INTERNAL MEDICINE

## 2024-01-26 PROCEDURE — G0439 PPPS, SUBSEQ VISIT: HCPCS | Performed by: INTERNAL MEDICINE

## 2024-01-26 RX ORDER — MELOXICAM 15 MG/1
15 TABLET ORAL DAILY PRN
Qty: 30 TABLET | Refills: 11 | Status: ON HOLD | OUTPATIENT
Start: 2024-01-26 | End: 2024-07-06

## 2024-01-26 RX ORDER — METOPROLOL TARTRATE 100 MG
100 TABLET ORAL 2 TIMES DAILY
Qty: 180 TABLET | Refills: 1 | Status: CANCELLED | OUTPATIENT
Start: 2024-01-26

## 2024-01-26 ASSESSMENT — ENCOUNTER SYMPTOMS
CONSTIPATION: 0
JOINT SWELLING: 0
HEMATOCHEZIA: 0
DIARRHEA: 0
NERVOUS/ANXIOUS: 0
HEADACHES: 0
HEARTBURN: 0
SORE THROAT: 0
SHORTNESS OF BREATH: 0
DIZZINESS: 0
EYE PAIN: 0
WEAKNESS: 0
NAUSEA: 0
CHILLS: 0
MYALGIAS: 0
PARESTHESIAS: 0
BREAST MASS: 0
ARTHRALGIAS: 0
FEVER: 0
HEMATURIA: 0
COUGH: 0
PALPITATIONS: 0
DYSURIA: 0
ABDOMINAL PAIN: 0
FREQUENCY: 0

## 2024-01-26 ASSESSMENT — PAIN SCALES - GENERAL: PAINLEVEL: NO PAIN (0)

## 2024-01-26 ASSESSMENT — ACTIVITIES OF DAILY LIVING (ADL): CURRENT_FUNCTION: NO ASSISTANCE NEEDED

## 2024-01-26 NOTE — PROGRESS NOTES
"Preventive Care Visit  [facility]  Kathya Escalera DO, Internal Medicine  Jan 26, 2024      SUBJECTIVE:   Rosie is a 84 year old, presenting for the following:  AWV, Hypertension, and Lipids        1/26/2024     9:54 AM   Additional Questions   Roomed by fabricio smith   Accompanied by self         1/26/2024     9:54 AM   Patient Reported Additional Medications   Patient reports taking the following new medications none     Are you in the first 12 months of your Medicare coverage?  No    Healthy Habits:     In general, how would you rate your overall health?  Good    Frequency of exercise:  1 day/week    Duration of exercise:  15-30 minutes    Do you usually eat at least 4 servings of fruit and vegetables a day, include whole grains    & fiber and avoid regularly eating high fat or \"junk\" foods?  Yes    Taking medications regularly:  Yes    Medication side effects:  None    Ability to successfully perform activities of daily living:  No assistance needed    Home Safety:  No safety concerns identified    Hearing Impairment:  No hearing concerns    In the past 6 months, have you been bothered by leaking of urine?  No    In general, how would you rate your overall mental or emotional health?  Excellent    Additional concerns today:  No      Chief Complaint   Patient presents with    AWV    Hypertension    Lipids         Have you ever done Advance Care Planning? (For example, a Health Directive, POLST, or a discussion with a medical provider or your loved ones about your wishes): Yes, advance care planning is on file.       Fall risk  Fallen 2 or more times in the past year?: No  Any fall with injury in the past year?: No    Cognitive Screening   1) Repeat 3 items (Leader, Season, Table)    2) Clock draw: NORMAL  3) 3 item recall: Recalls 3 objects  Results: 3 items recalled: COGNITIVE IMPAIRMENT LESS LIKELY    Mini-CogTM Copyright S Jasmina. Licensed by the author for use in [facility] " Services; reprinted with permission (sovipin@[facility]). All rights reserved.      Do you have sleep apnea, excessive snoring or daytime drowsiness? : no    Reviewed and updated as needed this visit by clinical staff   Tobacco  Allergies  Meds  Problems             Reviewed and updated as needed this visit by Provider     Meds  Problems             Social History     Tobacco Use    Smoking status: Never    Smokeless tobacco: Never   Substance Use Topics    Alcohol use: No             1/26/2024    10:40 AM   Alcohol Use   Prescreen: >3 drinks/day or >7 drinks/week? Not Applicable       Do you have a current opioid prescription? No  Do you use any other controlled substances or medications that are not prescribed by a provider? None          Hyperlipidemia Follow-Up    Are you regularly taking any medication or supplement to lower your cholesterol?   Yes- AM  Are you having muscle aches or other side effects that you think could be caused by your cholesterol lowering medication?  No    Hypertension Follow-up    Do you check your blood pressure regularly outside of the clinic? Yes   Are you following a low salt diet? Yes  Are your blood pressures ever more than 140 on the top number (systolic) OR more than 90 on the bottom number (diastolic), for example 140/90? No  She thinks she had RSV a few weeks ago; checks blood pressure regularly, and while she was sick, she felt dehydrated; blood pressure was 89/69;  she wonders if she should have held one of her blood pressure meds during this time    Pain  --takes meloxicam ~ 2 x mo;  ortho was Rxing, wants me to Rx  --cards is aware she takes this    Current providers sharing in care for this patient include:   Patient Care Team:  Kathya Escalera DO as PCP - General (Internal Medicine)  Kathya Escalera DO as Assigned PCP  Fatimah Clement MD as MD (Neurology)  Kerry Sinclair APRN CNP as Assigned Cancer Care Provider  Jai Lam MD as MD  (Cardiology)  Jinny Potter PA-C as Assigned Heart and Vascular Provider  Giovany Houser DO as Assigned Musculoskeletal Provider    The following health maintenance items are reviewed in Epic and correct as of today:  Health Maintenance   Topic Date Due    MAMMO SCREENING  03/01/2024    ANNUAL REVIEW OF HM ORDERS  10/05/2024    BMP  01/22/2025    LIPID  01/22/2025    MEDICARE ANNUAL WELLNESS VISIT  01/26/2025    FALL RISK ASSESSMENT  01/26/2025    ADVANCE CARE PLANNING  01/26/2029    DTAP/TDAP/TD IMMUNIZATION (3 - Td or Tdap) 04/11/2033    DEXA  03/07/2038    PHQ-2 (once per calendar year)  Completed    INFLUENZA VACCINE  Completed    Pneumococcal Vaccine: 65+ Years  Completed    ZOSTER IMMUNIZATION  Completed    RSV VACCINE (Pregnancy & 60+)  Completed    COVID-19 Vaccine  Completed    IPV IMMUNIZATION  Aged Out    HPV IMMUNIZATION  Aged Out    MENINGITIS IMMUNIZATION  Aged Out    RSV MONOCLONAL ANTIBODY  Aged Out    COLORECTAL CANCER SCREENING  Discontinued     Current Outpatient Medications   Medication Sig Dispense Refill    apixaban ANTICOAGULANT (ELIQUIS ANTICOAGULANT) 5 MG tablet Take 1 tablet (5 mg) by mouth 2 times daily 180 tablet 3    diltiazem ER COATED BEADS (CARDIZEM CD/CARTIA XT) 180 MG 24 hr capsule Take 1 capsule (180 mg) by mouth daily 90 capsule 3    Evening Primrose Oil 1000 MG CAPS Take 1 capsule by mouth daily One daily      Fish Oil-Cholecalciferol (OMEGA-3 + D PO) Take 1 capsule by mouth daily      meloxicam (MOBIC) 15 MG tablet Take 15 mg by mouth daily as needed      metoprolol tartrate (LOPRESSOR) 100 MG tablet Take 1 tablet (100 mg) by mouth 2 times daily 180 tablet 1    mometasone (ELOCON) 0.1 % external cream Apply sparingly to affected area twice daily for 2 weeks then decrease to 1 time daily for 30 days then decrease to 2-3 times per week (Patient taking differently: 2-3 times per week) 45 g 11    pravastatin (PRAVACHOL) 80 MG tablet Take 1 tablet (80 mg) by  "mouth daily 90 tablet 3    THERATEARS 0.25 % OP SOLN Place 2 drops into both eyes as needed      valsartan-hydrochlorothiazide (DIOVAN HCT) 320-25 MG tablet Take 1 tablet by mouth daily 90 tablet 3    VIACTIV 500-100-40 OR CHEW Take 1 chew tab by mouth daily             Pertinent mammograms are reviewed under the imaging tab.  Review of Systems   Constitutional:  Negative for chills and fever.   HENT:  Negative for congestion, ear pain, hearing loss and sore throat.    Eyes:  Negative for pain and visual disturbance.   Respiratory:  Negative for cough and shortness of breath.    Cardiovascular:  Negative for chest pain and palpitations.   Gastrointestinal:  Negative for abdominal pain, constipation, diarrhea and nausea.   Genitourinary:  Negative for dysuria, frequency, genital sores, hematuria, pelvic pain, urgency, vaginal bleeding and vaginal discharge.   Musculoskeletal:  Negative for arthralgias, joint swelling and myalgias.   Skin:  Negative for rash.   Neurological:  Negative for dizziness, weakness and headaches.   Psychiatric/Behavioral:  The patient is not nervous/anxious.           OBJECTIVE:   /72 (BP Location: Right arm, Patient Position: Sitting, Cuff Size: Adult Regular)   Pulse 60   Temp 97.7  F (36.5  C) (Tympanic)   Resp 16   Ht 1.575 m (5' 2.01\")   Wt 94.8 kg (209 lb)   SpO2 97%   BMI 38.22 kg/m     Estimated body mass index is 38.22 kg/m  as calculated from the following:    Height as of this encounter: 1.575 m (5' 2.01\").    Weight as of this encounter: 94.8 kg (209 lb).  Physical Exam  GENERAL: alert and no distress  EYES: Eyes grossly normal to inspection, PERRL and conjunctivae and sclerae normal  HENT: ear canals and TM's normal, nose and mouth without ulcers or lesions  NECK: no adenopathy, no asymmetry, masses, or scars  RESP: lungs clear to auscultation - no rales, rhonchi or wheezes  CV: irregularly irregular rhythm, normal S1 S2, no S3 or S4, no murmur, click or rub, " "peripheral pulses strong, and 1+ bilateral lower extremity pitting edema to ankle    ABDOMEN: soft, nontender, no hepatosplenomegaly, no masses and bowel sounds normal  MS: no gross musculoskeletal defects noted, no edema  SKIN: no suspicious lesions or rashes  NEURO: Normal strength and tone, mentation intact and speech normal  PSYCH: mentation appears normal, affect normal/bright  LYMPH: no cervical, supraclavicular, axillary, or inguinal adenopathy        ASSESSMENT / PLAN:   Encounter for Medicare annual wellness exam    Ovarian cancer, right (H)  Follows with Gyn Onc. CA-125 low  - OFFICE/OUTPT VISIT,ESTLEVL III    Morbid obesity (H)  Known issue that I take into account for their medical decisions, no current exacerbations or new concerns  - OFFICE/OUTPT VISIT,EST,LEVL III    Prediabetes  Blood sugar normal  - OFFICE/OUTPT VISIT,EST,LEVL III    Paroxysmal atrial fibrillation (H)  On DOAC, BB, CCD  - OFFICE/OUTPT VISIT,EST,LEVL III    Osteitis pubis (H)  Following with pelvic physical therapy and Ortho/sports med  - OFFICE/OUTPT VISIT,ESTLEVL III    Primary osteoarthritis of right hip   - stable, refill provided.  Rare use, she is aware of risk with DOAC  - meloxicam (MOBIC) 15 MG tablet; Take 1 tablet (15 mg) by mouth daily as needed for moderate pain  - OFFICE/OUTPT VISIT,EST,LEVL III    Spinal stenosis of lumbar region, unspecified whether neurogenic claudication present   - stable, refill provided  - meloxicam (MOBIC) 15 MG tablet; Take 1 tablet (15 mg) by mouth daily as needed for moderate pain  - OFFICE/OUTPT VISIT,ESTLEVL III    Patient has been advised of split billing requirements and indicates understanding: Yes      Counseling  Reviewed preventive health counseling, as reflected in patient instructions      BMI  Estimated body mass index is 38.22 kg/m  as calculated from the following:    Height as of this encounter: 1.575 m (5' 2.01\").    Weight as of this encounter: 94.8 kg (209 lb).   Weight " management plan: Discussed healthy diet and exercise guidelines      She reports that she has never smoked. She has never used smokeless tobacco.      Appropriate preventive services were discussed with this patient, including applicable screening as appropriate for fall prevention, nutrition, physical activity, Tobacco-use cessation, weight loss and cognition.  Checklist reviewing preventive services available has been given to the patient.    Reviewed patients plan of care and provided an AVS. The Intermediate Care Plan ( asthma action plan, low back pain action plan, and migraine action plan) for Rosie meets the Care Plan requirement. This Care Plan has been established and reviewed with the Patient.          Signed Electronically by: Kathya Escalera DO    Identified Health Risks  I have reviewed Opioid Use Disorder and Substance Use Disorder risk factors and made any needed referrals. She is at risk for lack of exercise and has been provided with information to increase physical activity for the benefit of her well-being.

## 2024-01-26 NOTE — PATIENT INSTRUCTIONS
"If you get sick and blood pressure runs low, you can contact the clinic.  You can also hold your valsartan/hydrochlorothiazide   Will renew the meloxicam, contact pharmacy to fill      \Patient Education   Personalized Prevention Plan  You are due for the preventive services outlined below.  Your care team is available to assist you in scheduling these services.  If you have already completed any of these items, please share that information with your care team to update in your medical record.  There are no preventive care reminders to display for this patient.    Learning About Being Physically Active  What is physical activity?     Being physically active means doing any kind of activity that gets your body moving.  The types of physical activity that can help you get fit and stay healthy include:  Aerobic or \"cardio\" activities. These make your heart beat faster and make you breathe harder, such as brisk walking, riding a bike, or running. They strengthen your heart and lungs and build up your endurance.  Strength training activities. These make your muscles work against, or \"resist,\" something. Examples include lifting weights or doing push-ups. These activities help tone and strengthen your muscles and bones.  Stretches. These let you move your joints and muscles through their full range of motion. Stretching helps you be more flexible.  Reaching a balance between these three types of physical activity is important because each one contributes to your overall fitness.  What are the benefits of being active?  Being active is one of the best things you can do for your health. It helps you to:  Feel stronger and have more energy to do all the things you like to do.  Focus better at school or work.  Feel, think, and sleep better.  Reach and stay at a healthy weight.  Lose fat and build lean muscle.  Lower your risk for serious health problems, including diabetes, heart attack, high blood pressure, and some " "cancers.  Keep your heart, lungs, bones, muscles, and joints strong and healthy.  How can you make being active part of your life?  Start slowly. Make it your long-term goal to get at least 30 minutes of exercise on most days of the week. Walking is a good choice. You also may want to do other activities, such as running, swimming, cycling, or playing tennis or team sports.  Pick activities that you like--ones that make your heart beat faster, your muscles stronger, and your muscles and joints more flexible. If you find more than one thing you like doing, do them all. You don't have to do the same thing every day.  Get your heart pumping every day. Any activity that makes your heart beat faster and keeps it at that rate for a while counts.  Here are some great ways to get your heart beating faster:  Go for a brisk walk, run, or hike.  Go for a swim or bike ride.  Take an online exercise class or dance.  Play a game of touch football, basketball, or soccer.  Play tennis, pickleball, or racquetball.  Climb stairs.  Even some household chores can be aerobic. Just do them at a faster pace. Raking or mowing the lawn, sweeping the garage, and vacuuming and cleaning your home all can help get your heart rate up.  Strengthen your muscles during the week. You don't have to lift heavy weights or grow big, bulky muscles to get stronger. Doing a few simple activities that make your muscles work against, or \"resist,\" something can help you get stronger. Aim for at least twice a week.  For example, you can:  Do push-ups or sit-ups, which use your own body weight as resistance.  Lift weights or dumbbells or use stretch bands at home or in a gym or community center.  Stretch your muscles often. Stretching will help you as you become more active. It can help you stay flexible and loosen tight muscles. It can also help improve your balance and posture and can be a great way to relax.  Be sure to stretch the muscles you'll be using " "when you work out. It's best to warm your muscles slightly before you stretch them. Walk or do some other light aerobic activity for a few minutes. Then start stretching.  When you stretch your muscles:  Do it slowly. Stretching is not about going fast or making sudden movements.  Don't push or bounce during a stretch.  Hold each stretch for at least 15 to 30 seconds, if you can. You should feel a stretch in the muscle, but not pain.  Breathe out as you do the stretch. Then breathe in as you hold the stretch. Don't hold your breath.  If you're worried about how more activity might affect your health, have a checkup before you start. Follow any special advice your doctor gives you for getting a smart start.  Where can you learn more?  Go to https://www.[employer].net/patiented  Enter W332 in the search box to learn more about \"Learning About Being Physically Active.\"  Current as of: June 6, 2023               Content Version: 13.8    5172-0424 [employer].   Care instructions adapted under license by your healthcare professional. If you have questions about a medical condition or this instruction, always ask your healthcare professional. [employer] disclaims any warranty or liability for your use of this information.         "

## 2024-01-30 ENCOUNTER — HOSPITAL ENCOUNTER (OUTPATIENT)
Dept: CARDIOLOGY | Facility: CLINIC | Age: 84
Discharge: HOME | End: 2024-01-30
Attending: INTERNAL MEDICINE | Admitting: INTERNAL MEDICINE
Payer: MEDICARE

## 2024-01-30 DIAGNOSIS — Z95.0 CARDIAC PACEMAKER IN SITU: ICD-10-CM

## 2024-01-30 DIAGNOSIS — I48.91 ATRIAL FIBRILLATION, UNSPECIFIED TYPE (H): ICD-10-CM

## 2024-01-30 DIAGNOSIS — R06.83 SNORING: ICD-10-CM

## 2024-01-30 DIAGNOSIS — I48.0 PAROXYSMAL ATRIAL FIBRILLATION (H): ICD-10-CM

## 2024-01-30 DIAGNOSIS — I48.91 ATRIAL FIBRILLATION, UNSPECIFIED TYPE (H): Primary | ICD-10-CM

## 2024-01-30 PROCEDURE — 93280 PM DEVICE PROGR EVAL DUAL: CPT

## 2024-01-30 PROCEDURE — 93280 PM DEVICE PROGR EVAL DUAL: CPT | Mod: 26 | Performed by: INTERNAL MEDICINE

## 2024-01-30 NOTE — TELEPHONE ENCOUNTER
Patient seen in clinic for routine check of her PPM.     Atrial Arrhythmia: 994 mode switch episodes logged since 11/14/23. Episodes for review last anywhere from <1 min to 2h16m (AF trend graphs suggest that patient has had episodes exceeding 18hours). EGMs for review confirm AFl w/ V rates in the 70-160s. Patient has a known history of pAF. Lopressor was increased to 100mg BID in 11/2023 after increased RVR episodes were noted. AF burden 10% since 12/13/22.  Ventricular Arrhythmia: 346 HVR episodes logged. All EGMs for review show AF w/ RVR. Histograms cleared today to get a better sense of HR trends when in AF since lopressor dose was increased 11/2023.      Patient states that she can feel when she goes into AF... always occurs at night and feels like a fluttering. Patient wondered why these episodes always seem to occur when she is sleeping... explained to patient that undiagnosed sleep apnea is a known risk factor for pAF. She states she has not been tested for sleep apnea in years but her  does tell her that she snores. She requests a consult to sleep med be placed. She has been compliant with her eliquis, dilt, and lopressor.            Will route an update to Gema Potter regarding continued AF w/ RVR episodes and request for sleep med consult.  MARRY MCRAE

## 2024-01-30 NOTE — TELEPHONE ENCOUNTER
Great idea, Miriam. Order for sleep eval placed. She should call us in 2 weeks if she's not heard from them to set up.    Continue Eliquis  2. As HR is fast in AFib, pls have her continue metoprolol tartrate 100 mg BID and INCREASE Diltiazem to 240 mg daily. I have khanh'd up Rx for her -  pls send in to appropriate pharmacy/change to 90 d if she'd prefer.    Thx!  Gema

## 2024-01-31 ENCOUNTER — PATIENT OUTREACH (OUTPATIENT)
Dept: CARE COORDINATION | Facility: CLINIC | Age: 84
End: 2024-01-31
Payer: MEDICARE

## 2024-01-31 ENCOUNTER — THERAPY VISIT (OUTPATIENT)
Dept: PHYSICAL THERAPY | Facility: CLINIC | Age: 84
End: 2024-01-31
Attending: FAMILY MEDICINE
Payer: MEDICARE

## 2024-01-31 DIAGNOSIS — S39.011D STRAIN OF ABDOMINAL MUSCLE, SUBSEQUENT ENCOUNTER: ICD-10-CM

## 2024-01-31 DIAGNOSIS — M86.9 OSTEITIS PUBIS (H): ICD-10-CM

## 2024-01-31 DIAGNOSIS — R10.32 LEFT GROIN PAIN: ICD-10-CM

## 2024-01-31 DIAGNOSIS — S76.212D STRAIN OF ADDUCTOR MAGNUS MUSCLE OF LEFT LOWER EXTREMITY, SUBSEQUENT ENCOUNTER: ICD-10-CM

## 2024-01-31 PROCEDURE — 97140 MANUAL THERAPY 1/> REGIONS: CPT | Mod: GP | Performed by: PHYSICAL THERAPIST

## 2024-01-31 PROCEDURE — 97110 THERAPEUTIC EXERCISES: CPT | Mod: GP | Performed by: PHYSICAL THERAPIST

## 2024-01-31 RX ORDER — DILTIAZEM HYDROCHLORIDE 240 MG/1
240 CAPSULE, COATED, EXTENDED RELEASE ORAL DAILY
Qty: 90 CAPSULE | Refills: 3 | Status: SHIPPED | OUTPATIENT
Start: 2024-01-31

## 2024-02-05 LAB
MDC_IDC_LEAD_CONNECTION_STATUS: NORMAL
MDC_IDC_LEAD_CONNECTION_STATUS: NORMAL
MDC_IDC_LEAD_IMPLANT_DT: NORMAL
MDC_IDC_LEAD_IMPLANT_DT: NORMAL
MDC_IDC_LEAD_LOCATION: NORMAL
MDC_IDC_LEAD_LOCATION: NORMAL
MDC_IDC_LEAD_LOCATION_DETAIL_1: NORMAL
MDC_IDC_LEAD_LOCATION_DETAIL_1: NORMAL
MDC_IDC_LEAD_MFG: NORMAL
MDC_IDC_LEAD_MFG: NORMAL
MDC_IDC_LEAD_MODEL: NORMAL
MDC_IDC_LEAD_MODEL: NORMAL
MDC_IDC_LEAD_POLARITY_TYPE: NORMAL
MDC_IDC_LEAD_POLARITY_TYPE: NORMAL
MDC_IDC_LEAD_SERIAL: NORMAL
MDC_IDC_LEAD_SERIAL: NORMAL
MDC_IDC_MSMT_BATTERY_STATUS: NORMAL
MDC_IDC_MSMT_LEADCHNL_RA_IMPEDANCE_VALUE: 742 OHM
MDC_IDC_MSMT_LEADCHNL_RA_PACING_THRESHOLD_AMPLITUDE: 1.1 V
MDC_IDC_MSMT_LEADCHNL_RA_PACING_THRESHOLD_PULSEWIDTH: 0.4 MS
MDC_IDC_MSMT_LEADCHNL_RA_SENSING_INTR_AMPL: 4.4 MV
MDC_IDC_MSMT_LEADCHNL_RV_IMPEDANCE_VALUE: 660 OHM
MDC_IDC_MSMT_LEADCHNL_RV_PACING_THRESHOLD_AMPLITUDE: 0.9 V
MDC_IDC_MSMT_LEADCHNL_RV_PACING_THRESHOLD_PULSEWIDTH: 0.4 MS
MDC_IDC_MSMT_LEADCHNL_RV_SENSING_INTR_AMPL: 10.9 MV
MDC_IDC_PG_IMPLANT_DTM: NORMAL
MDC_IDC_PG_MFG: NORMAL
MDC_IDC_PG_MODEL: NORMAL
MDC_IDC_PG_SERIAL: NORMAL
MDC_IDC_PG_TYPE: NORMAL
MDC_IDC_SESS_CLINIC_NAME: NORMAL
MDC_IDC_SESS_DTM: NORMAL
MDC_IDC_SESS_TYPE: NORMAL
MDC_IDC_SET_BRADY_AT_MODE_SWITCH_MODE: NORMAL
MDC_IDC_SET_BRADY_AT_MODE_SWITCH_RATE: 170 {BEATS}/MIN
MDC_IDC_SET_BRADY_LOWRATE: 60 {BEATS}/MIN
MDC_IDC_SET_BRADY_MAX_SENSOR_RATE: 130 {BEATS}/MIN
MDC_IDC_SET_BRADY_MAX_TRACKING_RATE: 130 {BEATS}/MIN
MDC_IDC_SET_BRADY_MODE: NORMAL
MDC_IDC_SET_BRADY_PAV_DELAY_HIGH: 200 MS
MDC_IDC_SET_BRADY_PAV_DELAY_LOW: 300 MS
MDC_IDC_SET_BRADY_SAV_DELAY_HIGH: 200 MS
MDC_IDC_SET_BRADY_SAV_DELAY_LOW: 300 MS
MDC_IDC_SET_LEADCHNL_RA_PACING_AMPLITUDE: 2 V
MDC_IDC_SET_LEADCHNL_RA_PACING_CAPTURE_MODE: NORMAL
MDC_IDC_SET_LEADCHNL_RA_PACING_POLARITY: NORMAL
MDC_IDC_SET_LEADCHNL_RA_PACING_PULSEWIDTH: 0.4 MS
MDC_IDC_SET_LEADCHNL_RA_SENSING_ADAPTATION_MODE: NORMAL
MDC_IDC_SET_LEADCHNL_RA_SENSING_POLARITY: NORMAL
MDC_IDC_SET_LEADCHNL_RA_SENSING_SENSITIVITY: 0.15 MV
MDC_IDC_SET_LEADCHNL_RV_PACING_AMPLITUDE: 1.5 V
MDC_IDC_SET_LEADCHNL_RV_PACING_CAPTURE_MODE: NORMAL
MDC_IDC_SET_LEADCHNL_RV_PACING_POLARITY: NORMAL
MDC_IDC_SET_LEADCHNL_RV_PACING_PULSEWIDTH: 0.4 MS
MDC_IDC_SET_LEADCHNL_RV_SENSING_ADAPTATION_MODE: NORMAL
MDC_IDC_SET_LEADCHNL_RV_SENSING_POLARITY: NORMAL
MDC_IDC_SET_LEADCHNL_RV_SENSING_SENSITIVITY: 1.5 MV
MDC_IDC_SET_ZONE_DETECTION_INTERVAL: 375 MS
MDC_IDC_SET_ZONE_STATUS: NORMAL
MDC_IDC_SET_ZONE_TYPE: NORMAL
MDC_IDC_SET_ZONE_VENDOR_TYPE: NORMAL
MDC_IDC_STAT_EPISODE_RECENT_COUNT: 2406
MDC_IDC_STAT_EPISODE_RECENT_COUNT_DTM_END: NORMAL
MDC_IDC_STAT_EPISODE_RECENT_COUNT_DTM_START: NORMAL
MDC_IDC_STAT_EPISODE_TOTAL_COUNT: 2721
MDC_IDC_STAT_EPISODE_TOTAL_COUNT_DTM_END: NORMAL
MDC_IDC_STAT_EPISODE_TYPE: NORMAL
MDC_IDC_STAT_EPISODE_TYPE: NORMAL
MDC_IDC_STAT_EPISODE_VENDOR_TYPE: NORMAL
MDC_IDC_STAT_EPISODE_VENDOR_TYPE: NORMAL

## 2024-02-07 ENCOUNTER — THERAPY VISIT (OUTPATIENT)
Dept: PHYSICAL THERAPY | Facility: CLINIC | Age: 84
End: 2024-02-07
Attending: FAMILY MEDICINE
Payer: MEDICARE

## 2024-02-07 DIAGNOSIS — S76.212D STRAIN OF ADDUCTOR MAGNUS MUSCLE OF LEFT LOWER EXTREMITY, SUBSEQUENT ENCOUNTER: ICD-10-CM

## 2024-02-07 DIAGNOSIS — S39.011D STRAIN OF ABDOMINAL MUSCLE, SUBSEQUENT ENCOUNTER: ICD-10-CM

## 2024-02-07 DIAGNOSIS — M86.9 OSTEITIS PUBIS (H): ICD-10-CM

## 2024-02-07 DIAGNOSIS — R10.32 LEFT GROIN PAIN: Primary | ICD-10-CM

## 2024-02-07 PROCEDURE — 97140 MANUAL THERAPY 1/> REGIONS: CPT | Mod: GP | Performed by: PHYSICAL THERAPIST

## 2024-02-07 PROCEDURE — 97110 THERAPEUTIC EXERCISES: CPT | Mod: GP | Performed by: PHYSICAL THERAPIST

## 2024-02-28 ENCOUNTER — PATIENT OUTREACH (OUTPATIENT)
Dept: CARE COORDINATION | Facility: CLINIC | Age: 84
End: 2024-02-28
Payer: MEDICARE

## 2024-03-05 ENCOUNTER — HOSPITAL ENCOUNTER (OUTPATIENT)
Dept: MAMMOGRAPHY | Facility: CLINIC | Age: 84
Discharge: HOME | End: 2024-03-05
Attending: INTERNAL MEDICINE | Admitting: INTERNAL MEDICINE
Payer: MEDICARE

## 2024-03-05 DIAGNOSIS — Z12.31 VISIT FOR SCREENING MAMMOGRAM: ICD-10-CM

## 2024-03-05 PROCEDURE — 77063 BREAST TOMOSYNTHESIS BI: CPT

## 2024-03-05 NOTE — RESULT ENCOUNTER NOTE
Hello -     The results are within the expected range. Please continue with the current plan of care and let us know if there are any questions or concerns.

## 2024-04-04 ENCOUNTER — TRANSFERRED RECORDS (OUTPATIENT)
Dept: HEALTH INFORMATION MANAGEMENT | Facility: CLINIC | Age: 84
End: 2024-04-04
Payer: MEDICARE

## 2024-04-08 ENCOUNTER — TELEPHONE (OUTPATIENT)
Dept: CARDIOLOGY | Facility: CLINIC | Age: 84
End: 2024-04-08
Payer: MEDICARE

## 2024-04-08 NOTE — TELEPHONE ENCOUNTER
Called pt to discuss. She is scheduled for a CT scan and myelogram of lumbar spine on 4/17/24. They are requesting she be off Eliquis for 3 days prior. On Eliquis for dx of PAFib. Pt has hx of TIA, but no hx CVA or clotting disorder. Will discuss with Gema TRIMBLE for recommendations. Jessie Byrd RN Cardiology April 8, 2024, 3:39 PM

## 2024-04-08 NOTE — TELEPHONE ENCOUNTER
[facility] Call Center    Phone Message    May a detailed message be left on voicemail: yes     Reason for Call: Other: Rosie called requesting to speak with Gema or a member of her care team about holding her Eliquis prior to a CT Scan she is going to be having. Informed Rosie that Gema did not prescribe her Eliquis but her PCP did. Rosie states she and Gema have spoken about her medications before, especially her heart medications. Please reach out to Rosie to discuss. Thank you!     Action Taken: Other: Cardiology    Travel Screening: Not Applicable    Thank you!  [facility]

## 2024-04-09 NOTE — TELEPHONE ENCOUNTER
CHADSVASc 6 (HTN, TIA, age, sex), procedure is high risk.     Per protocol, OK to hold AC x 3 days, would not bridge given TIA was >1 year ago. Restart NOAC day after procedure unless she hears differently from procedural team.    See me 6/2024 with echo as previously scheduled    Gema  April 9, 2024 at 10:37 AM

## 2024-04-09 NOTE — TELEPHONE ENCOUNTER
Spoke with pt. Pt aware that it is OK to hold Eliquis for 3 days prior to procedure and restart the next day after procedure.     Sandy Duran RN

## 2024-04-23 ENCOUNTER — TRANSFERRED RECORDS (OUTPATIENT)
Dept: HEALTH INFORMATION MANAGEMENT | Facility: CLINIC | Age: 84
End: 2024-04-23
Payer: MEDICARE

## 2024-04-28 PROBLEM — S76.212D: Status: RESOLVED | Noted: 2024-01-24 | Resolved: 2024-04-28

## 2024-04-28 PROBLEM — M86.9 OSTEITIS PUBIS (H): Status: RESOLVED | Noted: 2024-01-24 | Resolved: 2024-04-28

## 2024-04-28 PROBLEM — S39.011D: Status: RESOLVED | Noted: 2024-01-24 | Resolved: 2024-04-28

## 2024-04-28 PROBLEM — R10.32 LEFT GROIN PAIN: Status: RESOLVED | Noted: 2024-01-24 | Resolved: 2024-04-28

## 2024-04-28 NOTE — PROGRESS NOTES
"Physical Therapy Discharge Note      DISCHARGE  Reason for Discharge: Patient chooses to discontinue therapy.  Pt states treatments and exercises seemed to be making her more sore, so she chooses to discharge PT at this time.     Equipment Issued: none    Discharge Plan: Patient to continue home program.  Pt is not currently doing program as this increased her pain.    Referring Provider:  Giovany Houser     02/07/24 0500   Appointment Info   Signing clinician's name / credentials Danni Stubbs, PT MA #[id number]   Total/Authorized Visits 8 ([facility])   Visits Used 3   Medical Diagnosis Left groin pain  Osteitis pubis (H)  Strain of adductor alisson muscle of left lower extremity, subsequent encounter  Strain of abdominal muscle, subsequent encounte   PT Tx Diagnosis (L) hip and groin impairment   Progress Note/Certification   Start of Care Date 01/24/24   Onset of illness/injury or Date of Surgery 12/14/23   Therapy Frequency 1x per week   Predicted Duration 8 weeks   Certification date from 01/24/24   Certification date to 03/20/24   Progress Note Due Date 03/20/24   PT Goal 1   Goal Identifier STG   Goal Description 1)Pt will report 3/7 days without her (L)  LQ abdominal/groin pain, in 4 weeks.   Target Date 02/21/24   PT Goal 2   Goal Identifier LTG   Goal Description 2)Pt will report 5/7 days without her (L) LQ abdominal/groin pain in 8 weeks.   Target Date 03/20/24   PT Goal 3   Goal Identifier LTG   Goal Description 3)Pt will be indep in a HEP to prevent return of her symptoms, in 8 weeks.   Target Date 03/20/24   PT Goal 4   Goal Identifier LTG   Goal Description 4)Pt will not feel like she has to assist (L) leg with hands to get in/out of car or to dami her socks, in 8 weeks.   Target Date 03/20/24   Subjective Report   Subjective Report Pt got flared up after last session.  Had a \"very bad week.\"  States the original (L) hip and groin pain are minimal compared to the pain she suffered this week.   Objective " "Measure 1   Objective Measure Pelvic Alignment   Details Transverse torsion with (R) ASIS prominent   Objective Measure 2   Objective Measure Strength   Details Weakness in hip extensors   Objective Measure 3   Objective Measure Palpation   Details  at (L) glute med, inferior to PSIS. No tenderness (R) glute med. Improved PA mobility (L) sacral base vs last session.   Objective Measure 4   Objective Measure Pain   Details (L) SIJ = 2/10 in standing; 0/10 in sitting or lying down.  (L) groin pain is minimal compared to pain in last week at LB/SIJ.   Therapeutic Procedure/Exercise   Therapeutic Procedures: strength, endurance, ROM, flexibility minutes (19331) 23   Ther Proc 1 - Details Had pt complete bridging 2x5. Reviewed the need for motion in the hips and low back - suggested walking at store a little longer than she would do for shopping. Pt told PT of her complication with A-fib and somedays needs to \"lay low\" due to waking up and her heart rate being very erratic. Told pt would be okay to do her exers on her \"good days\" and it is understandable to rest on days she is cardiac challenged.  Instructed to restart at home, and completed in session with PT assist:  piriformis and OI stretching. Instructed to restart clams at home.   Skilled Intervention Exer: strengthening, stretching; progression of HEP   Patient Response/Progress Pt states she will restart the exers now that her pain has calmed down.   Manual Therapy   Manual Therapy: Mobilization, MFR, MLD, friction massage minutes (85554) 17   Manual Therapy 1 - Details Shotgun MET to correct transverse torsion. Set position with Symph Pub MET.  STM, mobs and mild TrP release at posterior/lumbosacral regions - glute med and proximal SIJ.   Skilled Intervention MT: METs, STM, TrP release   Patient Response/Progress Pt states \"it is still a little tender back there.\" No increase in pain with rx today.   Plan   Home program [id number]   Updates to plan " of care Restart HEP   Plan for next session See pt in one week. Cont with MT and advancing HEP.   Total Session Time   Timed Code Treatment Minutes 40   Total Treatment Time (sum of timed and untimed services) 40     Thank you for the referral of this patient.  Danni Stubbs, PT, MA  #[id number]

## 2024-05-09 ENCOUNTER — ANCILLARY PROCEDURE (OUTPATIENT)
Dept: CARDIOLOGY | Facility: CLINIC | Age: 84
End: 2024-05-09
Attending: INTERNAL MEDICINE
Payer: MEDICARE

## 2024-05-09 DIAGNOSIS — I48.91 ATRIAL FIBRILLATION, UNSPECIFIED TYPE (H): ICD-10-CM

## 2024-05-09 DIAGNOSIS — Z95.0 CARDIAC PACEMAKER IN SITU: ICD-10-CM

## 2024-05-09 LAB
MDC_IDC_EPISODE_DTM: NORMAL
MDC_IDC_EPISODE_DURATION: 102 S
MDC_IDC_EPISODE_DURATION: 112 S
MDC_IDC_EPISODE_DURATION: 116 S
MDC_IDC_EPISODE_DURATION: 122 S
MDC_IDC_EPISODE_DURATION: 13 S
MDC_IDC_EPISODE_DURATION: 136 S
MDC_IDC_EPISODE_DURATION: 14 S
MDC_IDC_EPISODE_DURATION: 142 S
MDC_IDC_EPISODE_DURATION: 143 S
MDC_IDC_EPISODE_DURATION: 145 S
MDC_IDC_EPISODE_DURATION: 15 S
MDC_IDC_EPISODE_DURATION: 15 S
MDC_IDC_EPISODE_DURATION: 16 S
MDC_IDC_EPISODE_DURATION: 16 S
MDC_IDC_EPISODE_DURATION: 166 S
MDC_IDC_EPISODE_DURATION: 18 S
MDC_IDC_EPISODE_DURATION: 180 S
MDC_IDC_EPISODE_DURATION: 19 S
MDC_IDC_EPISODE_DURATION: 20 S
MDC_IDC_EPISODE_DURATION: 21 S
MDC_IDC_EPISODE_DURATION: 212 S
MDC_IDC_EPISODE_DURATION: 27 S
MDC_IDC_EPISODE_DURATION: 274 S
MDC_IDC_EPISODE_DURATION: 28 S
MDC_IDC_EPISODE_DURATION: 283 S
MDC_IDC_EPISODE_DURATION: 29 S
MDC_IDC_EPISODE_DURATION: 30 S
MDC_IDC_EPISODE_DURATION: 316 S
MDC_IDC_EPISODE_DURATION: 35 S
MDC_IDC_EPISODE_DURATION: 36 S
MDC_IDC_EPISODE_DURATION: 41 S
MDC_IDC_EPISODE_DURATION: 413 S
MDC_IDC_EPISODE_DURATION: 4277 S
MDC_IDC_EPISODE_DURATION: 43 S
MDC_IDC_EPISODE_DURATION: 43 S
MDC_IDC_EPISODE_DURATION: 44 S
MDC_IDC_EPISODE_DURATION: 45 S
MDC_IDC_EPISODE_DURATION: 45 S
MDC_IDC_EPISODE_DURATION: 46 S
MDC_IDC_EPISODE_DURATION: 48 S
MDC_IDC_EPISODE_DURATION: 52 S
MDC_IDC_EPISODE_DURATION: 52 S
MDC_IDC_EPISODE_DURATION: 53 S
MDC_IDC_EPISODE_DURATION: 56 S
MDC_IDC_EPISODE_DURATION: 57 S
MDC_IDC_EPISODE_DURATION: 58 S
MDC_IDC_EPISODE_DURATION: 62 S
MDC_IDC_EPISODE_DURATION: 62 S
MDC_IDC_EPISODE_DURATION: 63 S
MDC_IDC_EPISODE_DURATION: 66 S
MDC_IDC_EPISODE_DURATION: 71 S
MDC_IDC_EPISODE_DURATION: 72 S
MDC_IDC_EPISODE_DURATION: 75 S
MDC_IDC_EPISODE_DURATION: 81 S
MDC_IDC_EPISODE_DURATION: 86 S
MDC_IDC_EPISODE_DURATION: 88 S
MDC_IDC_EPISODE_DURATION: 9 S
MDC_IDC_EPISODE_ID: NORMAL
MDC_IDC_EPISODE_TYPE: NORMAL
MDC_IDC_EPISODE_TYPE_INDUCED: NO
MDC_IDC_EPISODE_VENDOR_TYPE: NORMAL
MDC_IDC_LEAD_CONNECTION_STATUS: NORMAL
MDC_IDC_LEAD_CONNECTION_STATUS: NORMAL
MDC_IDC_LEAD_IMPLANT_DT: NORMAL
MDC_IDC_LEAD_IMPLANT_DT: NORMAL
MDC_IDC_LEAD_LOCATION: NORMAL
MDC_IDC_LEAD_LOCATION: NORMAL
MDC_IDC_LEAD_LOCATION_DETAIL_1: NORMAL
MDC_IDC_LEAD_LOCATION_DETAIL_1: NORMAL
MDC_IDC_LEAD_MFG: NORMAL
MDC_IDC_LEAD_MFG: NORMAL
MDC_IDC_LEAD_MODEL: NORMAL
MDC_IDC_LEAD_MODEL: NORMAL
MDC_IDC_LEAD_POLARITY_TYPE: NORMAL
MDC_IDC_LEAD_POLARITY_TYPE: NORMAL
MDC_IDC_LEAD_SERIAL: NORMAL
MDC_IDC_LEAD_SERIAL: NORMAL
MDC_IDC_MSMT_BATTERY_DTM: NORMAL
MDC_IDC_MSMT_BATTERY_REMAINING_LONGEVITY: 150 MO
MDC_IDC_MSMT_BATTERY_REMAINING_PERCENTAGE: 100 %
MDC_IDC_MSMT_BATTERY_STATUS: NORMAL
MDC_IDC_MSMT_LEADCHNL_RA_IMPEDANCE_VALUE: 586 OHM
MDC_IDC_MSMT_LEADCHNL_RA_PACING_THRESHOLD_AMPLITUDE: 0.6 V
MDC_IDC_MSMT_LEADCHNL_RA_PACING_THRESHOLD_PULSEWIDTH: 0.4 MS
MDC_IDC_MSMT_LEADCHNL_RV_IMPEDANCE_VALUE: 620 OHM
MDC_IDC_MSMT_LEADCHNL_RV_PACING_THRESHOLD_AMPLITUDE: 1 V
MDC_IDC_MSMT_LEADCHNL_RV_PACING_THRESHOLD_PULSEWIDTH: 0.4 MS
MDC_IDC_PG_IMPLANT_DTM: NORMAL
MDC_IDC_PG_MFG: NORMAL
MDC_IDC_PG_MODEL: NORMAL
MDC_IDC_PG_SERIAL: NORMAL
MDC_IDC_PG_TYPE: NORMAL
MDC_IDC_SESS_CLINIC_NAME: NORMAL
MDC_IDC_SESS_DTM: NORMAL
MDC_IDC_SESS_TYPE: NORMAL
MDC_IDC_SET_BRADY_AT_MODE_SWITCH_MODE: NORMAL
MDC_IDC_SET_BRADY_AT_MODE_SWITCH_RATE: 170 {BEATS}/MIN
MDC_IDC_SET_BRADY_LOWRATE: 60 {BEATS}/MIN
MDC_IDC_SET_BRADY_MAX_SENSOR_RATE: 130 {BEATS}/MIN
MDC_IDC_SET_BRADY_MAX_TRACKING_RATE: 130 {BEATS}/MIN
MDC_IDC_SET_BRADY_MODE: NORMAL
MDC_IDC_SET_BRADY_PAV_DELAY_HIGH: 200 MS
MDC_IDC_SET_BRADY_PAV_DELAY_LOW: 300 MS
MDC_IDC_SET_BRADY_SAV_DELAY_HIGH: 200 MS
MDC_IDC_SET_BRADY_SAV_DELAY_LOW: 300 MS
MDC_IDC_SET_LEADCHNL_RA_PACING_AMPLITUDE: 2 V
MDC_IDC_SET_LEADCHNL_RA_PACING_CAPTURE_MODE: NORMAL
MDC_IDC_SET_LEADCHNL_RA_PACING_POLARITY: NORMAL
MDC_IDC_SET_LEADCHNL_RA_PACING_PULSEWIDTH: 0.4 MS
MDC_IDC_SET_LEADCHNL_RA_SENSING_ADAPTATION_MODE: NORMAL
MDC_IDC_SET_LEADCHNL_RA_SENSING_POLARITY: NORMAL
MDC_IDC_SET_LEADCHNL_RA_SENSING_SENSITIVITY: 0.15 MV
MDC_IDC_SET_LEADCHNL_RV_PACING_AMPLITUDE: 1.5 V
MDC_IDC_SET_LEADCHNL_RV_PACING_CAPTURE_MODE: NORMAL
MDC_IDC_SET_LEADCHNL_RV_PACING_POLARITY: NORMAL
MDC_IDC_SET_LEADCHNL_RV_PACING_PULSEWIDTH: 0.4 MS
MDC_IDC_SET_LEADCHNL_RV_SENSING_ADAPTATION_MODE: NORMAL
MDC_IDC_SET_LEADCHNL_RV_SENSING_POLARITY: NORMAL
MDC_IDC_SET_LEADCHNL_RV_SENSING_SENSITIVITY: 1.5 MV
MDC_IDC_SET_ZONE_DETECTION_INTERVAL: 375 MS
MDC_IDC_SET_ZONE_STATUS: NORMAL
MDC_IDC_SET_ZONE_TYPE: NORMAL
MDC_IDC_SET_ZONE_VENDOR_TYPE: NORMAL
MDC_IDC_STAT_AT_BURDEN_PERCENT: 7 %
MDC_IDC_STAT_AT_DTM_END: NORMAL
MDC_IDC_STAT_AT_DTM_START: NORMAL
MDC_IDC_STAT_BRADY_DTM_END: NORMAL
MDC_IDC_STAT_BRADY_DTM_START: NORMAL
MDC_IDC_STAT_BRADY_RA_PERCENT_PACED: 62 %
MDC_IDC_STAT_BRADY_RV_PERCENT_PACED: 3 %
MDC_IDC_STAT_EPISODE_RECENT_COUNT: 0
MDC_IDC_STAT_EPISODE_RECENT_COUNT: 0
MDC_IDC_STAT_EPISODE_RECENT_COUNT: 201
MDC_IDC_STAT_EPISODE_RECENT_COUNT_DTM_END: NORMAL
MDC_IDC_STAT_EPISODE_RECENT_COUNT_DTM_START: NORMAL
MDC_IDC_STAT_EPISODE_TYPE: NORMAL
MDC_IDC_STAT_EPISODE_VENDOR_TYPE: NORMAL
MDC_IDC_STAT_EPISODE_VENDOR_TYPE: NORMAL

## 2024-05-09 PROCEDURE — 93296 REM INTERROG EVL PM/IDS: CPT | Performed by: INTERNAL MEDICINE

## 2024-05-09 PROCEDURE — 93294 REM INTERROG EVL PM/LDLS PM: CPT | Performed by: INTERNAL MEDICINE

## 2024-05-19 ASSESSMENT — SLEEP AND FATIGUE QUESTIONNAIRES
HOW LIKELY ARE YOU TO NOD OFF OR FALL ASLEEP WHEN YOU ARE A PASSENGER IN A CAR FOR AN HOUR WITHOUT A BREAK: SLIGHT CHANCE OF DOZING
HOW LIKELY ARE YOU TO NOD OFF OR FALL ASLEEP WHILE SITTING QUIETLY AFTER LUNCH WITHOUT ALCOHOL: WOULD NEVER DOZE
HOW LIKELY ARE YOU TO NOD OFF OR FALL ASLEEP WHILE LYING DOWN TO REST IN THE AFTERNOON WHEN CIRCUMSTANCES PERMIT: WOULD NEVER DOZE
HOW LIKELY ARE YOU TO NOD OFF OR FALL ASLEEP IN A CAR, WHILE STOPPED FOR A FEW MINUTES IN TRAFFIC: WOULD NEVER DOZE
HOW LIKELY ARE YOU TO NOD OFF OR FALL ASLEEP WHILE SITTING INACTIVE IN A PUBLIC PLACE: WOULD NEVER DOZE
HOW LIKELY ARE YOU TO NOD OFF OR FALL ASLEEP WHILE WATCHING TV: SLIGHT CHANCE OF DOZING
HOW LIKELY ARE YOU TO NOD OFF OR FALL ASLEEP WHILE SITTING AND READING: SLIGHT CHANCE OF DOZING
HOW LIKELY ARE YOU TO NOD OFF OR FALL ASLEEP WHILE SITTING AND TALKING TO SOMEONE: WOULD NEVER DOZE

## 2024-05-20 ENCOUNTER — TELEPHONE (OUTPATIENT)
Dept: CARDIOLOGY | Facility: CLINIC | Age: 84
End: 2024-05-20
Payer: MEDICARE

## 2024-05-20 NOTE — TELEPHONE ENCOUNTER
Routing call to Dr. Rollins in Gema Potter absence from clinic today.    Pt calling in stating she is needing an injection in her back and is anticipating back surgery in the near future. She is scheduled for the injection this Wed. and was instructed to hold her Eliquis for 3 days.   She took her last dose of Eliquis Saturday evening.     She has dx PAF. CHADVasc score =6?    TIA 2022 = 2  Age=          2  HTN=         1  Female =   1   _______________          6    Or since it was only a TIA and back in 2022 do you not count that and her score is actually 4?    Okay to hold Eliquis as is?    Janet Taylor RN   
glen Rollins.     Called and notified pt that per Dr. Ria barajas to hold Eliquis 3 days prior. No Bridging needed.     Pt verbalized an understanding.     Janet Taylor RN   
10

## 2024-05-23 ENCOUNTER — MYC MEDICAL ADVICE (OUTPATIENT)
Dept: FAMILY MEDICINE | Facility: CLINIC | Age: 84
End: 2024-05-23

## 2024-05-23 ENCOUNTER — VIRTUAL VISIT (OUTPATIENT)
Dept: SLEEP MEDICINE | Facility: CLINIC | Age: 84
End: 2024-05-23
Attending: PHYSICIAN ASSISTANT
Payer: MEDICARE

## 2024-05-23 VITALS
WEIGHT: 200 LBS | DIASTOLIC BLOOD PRESSURE: 59 MMHG | BODY MASS INDEX: 35.44 KG/M2 | HEIGHT: 63 IN | SYSTOLIC BLOOD PRESSURE: 85 MMHG

## 2024-05-23 DIAGNOSIS — I48.91 ATRIAL FIBRILLATION, UNSPECIFIED TYPE (H): ICD-10-CM

## 2024-05-23 DIAGNOSIS — R06.83 SNORING: Primary | ICD-10-CM

## 2024-05-23 DIAGNOSIS — I10 ESSENTIAL HYPERTENSION: ICD-10-CM

## 2024-05-23 PROCEDURE — 99203 OFFICE O/P NEW LOW 30 MIN: CPT | Mod: 95 | Performed by: INTERNAL MEDICINE

## 2024-05-23 ASSESSMENT — PAIN SCALES - GENERAL: PAINLEVEL: NO PAIN (0)

## 2024-05-23 NOTE — TELEPHONE ENCOUNTER
This is being managed by Cardiology. See their MyChart message from today.    Closing this encounter.    Erin MATIAS RN  [facility]  [phone]

## 2024-05-23 NOTE — PROGRESS NOTES
Virtual Visit Details    Type of service:  Video Visit   Video Start Time: 11:12 AM  Video End Time:11:26 AM  Originating Location (pt. Location): Home  Distant Location (provider location):  Off-site  Platform used for Video Visit: AmWell    Additional 15 minutes on the date of service was spent performing the following:    -Preparing to see the patient  -Obtaining and/or reviewing separately obtained history   -Ordering medications, tests, or procedures   -Documenting clinical information in the electronic or other health record     Thank you for the opportunity to participate in the care of  Rosie Blackman.    Assessment and Plan:    1. Snoring/Atrial fibrillation, unspecified type (H)/Essential hypertension  I informed the patient that I do not have enough evidence to proceed directly with a sleep study at this point in time.  I will however order a nocturnal oximetry study look for abnormalities.  If positive I may consider proceeding with a sleep study.  The patient expressed understanding and agreed.  - Sleep Study (referral)  - Overnight oximetry study; Future    Lab reviewed: Discussed with patient.    History of present illness:    She is a 84 year old female who comes to the virtual clinic to rule out LALA. Her cardiologist would like  her to get tested to see if she has LALA as an exacerbating factor contributing to her atrial fibrillation. However, the patient denies any episodes of witness apnea or daytime sleepiness. She does have some snoring. She is also being treated for HTN.     Ideal Sleep-Wake Cycle(devoid of societal pressure):      TIME IN BED:    1) Work/School Days:    Do you work or go to school? No   What time do you usually get into bed? 9:45 p m   About how long does it take you to fall asleep? 15-45 minutes   How often do you have trouble falling asleep? Only if I drink caffinated beverages in the afternoon. I do not drink coffee at all!   How often do you wake up during the night? Hot  flashes 3-6 x per night. Need to urinate 2-3 x per night.   Do you work days/evenings/nights/rotating shifts?    What wakes you up at night? Use the bathroom    Other   Please elaborate: Hot flashes   How often do you have trouble falling back to sleep? Rarely   About how long does it take to fall back to sleep? 5 minutes   What do you usually do if you have trouble getting back to sleep? Relax. Imagine ocean waves   What time do you usually get out of bed to start your day? 7:30 a m   Do you use an alarm? No   2) Weekends/Non-work Days/All Other Days    What time do you usually get into bed? 9:45 p m   About how long does it take you to fall asleep? 5-45 minutes   What time do you usually get out of bed to start your day? 7:30   Do you use an alarm? No   SLEEP NEED    On average, about how much sleep do you think you get? 8-9 hours   About how much sleep do you think you need? 8-9 hours   SLEEP POSITION    Which sleep positions do you prefer? Back    Side   Do you do any of the following activities in bed? Watch TV   How often do you take a nap on purpose? Never   About how long are your naps? N/A   Do you feel better after naps? No   How often do you doze off unintentionally? Occasionally   Have you ever had a driving accident or near-miss due to sleepiness/drowsiness? No     Stop Bang Questionnaire    Question 5/19/2024  5:13 PM CDT - Filed by Patient   Do you SNORE loudly (louder than talking or loud enough to be heard through closed doors)? No   Do you often feel TIRED, fatigued, or sleepy during daytime? No   Has anyone OBSERVED you stop breathing during your sleep? No   Do you have or are you being treated for high blood PRESSURE? Yes   BMI more than 35kg/m2? Yes   AGE over 50 years old? Yes   NECK circumference > 16 inches (40cm)? No   GENDER: Male? No   STOP-BANG Total Score (range: 0 - 8) 4 (High risk of LALA) Critical        ELIZA:  ELIZA Total Score: 3  Total score - [place]: 3 (5/19/2024  5:12 PM)    Patient  told to return in one week after the sleep study is interpreted.    Patient Active Problem List   Diagnosis    Essential hypertension    Personal history of contact with and (suspected) exposure to asbestos    Donor of other blood    Irritable bowel syndrome    ALLERGIC RHINITIS     History of recurrent UTIs    Pes planus    Diverticulosis of colon    Colon polyps    Prediabetes    Hyperlipidemia LDL goal <130    Advanced directives, counseling/discussion    Plantar fasciitis    Hiatal hernia    Gastroesophageal reflux disease, esophagitis presence not specified    Lichen sclerosus of female genitalia    Obesity (BMI 35.0-39.9) with comorbidity (H)    Tear of gluteus medius tendon    Primary osteoarthritis of right hip    Spinal stenosis of lumbar region, unspecified whether neurogenic claudication present    S/P exploratory laparotomy    Incisional hernia of anterior abdominal wall without obstruction or gangrene    Ovarian cancer, right (H)    TIA (transient ischemic attack)    Diastolic dysfunction    Paroxysmal atrial fibrillation (H)    SSS (sick sinus syndrome) (H)    Cardiac pacemaker in situ     Past Medical History:   Diagnosis Date    Chronic airway obstruction (H)     Diastolic dysfunction 6/28/2022    Gastroesophageal reflux disease     Hiatal hernia     Hypertension     Malignant neoplasm of ovary (H)     Ovarian cancer, unspecified laterality (H) 3/10/2021    Personal history of contact with and (suspected) exposure to asbestos     Primary osteoarthritis of right hip 7/9/2020     Past Surgical History:   Procedure Laterality Date    BIOPSY BREAST Left     BREAST BIOPSY, CORE RT/LT  1994    CHOLECYSTECTOMY  1995    COLONOSCOPY  05/2010    Diverticulosis, colon polyps; repeat 5 yrs    COLONOSCOPY N/A 11/16/2015    Procedure: COLONOSCOPY;  Surgeon: Alejandro Matos MD;  Location: [facility]    COLONOSCOPY N/A 09/17/2021    Procedure: COLONOSCOPY;  Surgeon: Aayush George MD;  Location: [facility]    Colonoscopy,  hyperplastic polyps, repeat in 5 yrs  2004    EP PACEMAKER DEVICE & LEAD IMPLANT- RIGHT ATRIAL & RIGHT VENTRICULAR Left 10/24/2022    Procedure: Pacemaker Device & Lead Implant - Right Atrial & Right Ventricular;  Surgeon: Demond Meyer MD;  [facility]    ESOPHAGOSCOPY, GASTROSCOPY, DUODENOSCOPY (EGD), COMBINED  09/30/2013    Procedure: COMBINED ESOPHAGOSCOPY, GASTROSCOPY, DUODENOSCOPY (EGD), BIOPSY SINGLE OR MULTIPLE;  Gastroscopy;  Surgeon: Blaise Gill MD;  Location: [facility]    EYE SURGERY  2012    R/L Cataracts    HC REMOVE TONSILS/ADENOIDS,13+ Y/O      T & A 13+y.o.    HERNIORRHAPHY INCISIONAL (LOCATION) N/A 03/24/2021    Procedure: HERNIORRHAPHY, INCISIONAL, OPEN WITH MESH;  Surgeon: Aayush George MD;  Location: [facility]    HYSTERECTOMY, PAP NO LONGER INDICATED      LAPAROSCOPIC SALPINGO-OOPHORECTOMY N/A 07/24/2020    Procedure: Laparoscopy, removal or pelvic mass, both tubes and ovaries, omentectomy, anterior culvectomy, pelvic and para aortic lymph node dissection, laparotomy;  Surgeon: Felipe Corona MD;  Location: [facility]    MO ANESTH,LUMBAR SPINE,CORD SURGERY  10/2020    L3-4 L4-5 TRANSFORAMINAL INTERBODY FUSION L3-5, POSTERIOR INSTRUMENTED FUSION AND DECOMPRESSION    TVH for DUB, ovaries in      VASCULAR SURGERY  2014    Taylor closure    ZZC ANESTH,KNEE VEINS SURGERY  08/20/2014     Current Outpatient Medications   Medication Sig Dispense Refill    apixaban ANTICOAGULANT (ELIQUIS ANTICOAGULANT) 5 MG tablet Take 1 tablet (5 mg) by mouth 2 times daily 180 tablet 3    diltiazem ER COATED BEADS (CARDIZEM CD/CARTIA XT) 240 MG 24 hr capsule Take 1 capsule (240 mg) by mouth daily 90 capsule 3    Evening Primrose Oil 1000 MG CAPS Take 1 capsule by mouth daily One daily      Fish Oil-Cholecalciferol (OMEGA-3 + D PO) Take 1 capsule by mouth daily      meloxicam (MOBIC) 15 MG tablet Take 1 tablet (15 mg) by mouth daily as needed for moderate pain 30 tablet 11     metoprolol tartrate (LOPRESSOR) 100 MG tablet Take 1 tablet (100 mg) by mouth 2 times daily 180 tablet 1    mometasone (ELOCON) 0.1 % external cream Apply sparingly to affected area twice daily for 2 weeks then decrease to 1 time daily for 30 days then decrease to 2-3 times per week (Patient taking differently: 2-3 times per week) 45 g 11    pravastatin (PRAVACHOL) 80 MG tablet Take 1 tablet (80 mg) by mouth daily 90 tablet 3    THERATEARS 0.25 % OP SOLN Place 2 drops into both eyes as needed      valsartan-hydrochlorothiazide (DIOVAN HCT) 320-25 MG tablet Take 1 tablet by mouth daily 90 tablet 3    VIACTIV 500-100-40 OR CHEW Take 1 chew tab by mouth daily       Atorvastatin, Ezetimibe, Irbesartan, Lisinopril, Omeprazole, Prilosec [omeprazole], Rosuvastatin, and Zestril [ace inhibitors]  Social History     Socioeconomic History    Marital status:      Spouse name: Nelson Blackman    Number of children: 2    Years of education: 13+    Highest education level: Not on file   Occupational History    Occupation:      Comment: [employer] D[employer]   Tobacco Use    Smoking status: Never    Smokeless tobacco: Never   Vaping Use    Vaping status: Never Used   Substance and Sexual Activity    Alcohol use: No    Drug use: No    Sexual activity: Not Currently     Partners: Male     Birth control/protection: Surgical   Other Topics Concern    Parent/sibling w/ CABG, MI or angioplasty before 65F 55M? No   Social History Narrative    Not on file     Social Determinants of Health     Financial Resource Strain: Low Risk  (1/26/2024)    Financial Resource Strain     Within the past 12 months, have you or your family members you live with been unable to get utilities (heat, electricity) when it was really needed?: No   Food Insecurity: Low Risk  (1/26/2024)    Food Insecurity     Within the past 12 months, did you worry that your food would run out before you got money to buy more?: No     Within the past 12  "months, did the food you bought just not last and you didn t have money to get more?: No   Transportation Needs: Low Risk  (2024)    Transportation Needs     Within the past 12 months, has lack of transportation kept you from medical appointments, getting your medicines, non-medical meetings or appointments, work, or from getting things that you need?: No   Physical Activity: Not on file   Stress: Not on file   Social Connections: Not on file   Interpersonal Safety: Low Risk  (2024)    Interpersonal Safety     Do you feel physically and emotionally safe where you currently live?: Yes     Within the past 12 months, have you been hit, slapped, kicked or otherwise physically hurt by someone?: No     Within the past 12 months, have you been humiliated or emotionally abused in other ways by your partner or ex-partner?: No   Housing Stability: Low Risk  (2024)    Housing Stability     Do you have housing? : Yes     Are you worried about losing your housing?: No     Family History   Problem Relation Age of Onset    Cancer Mother         uterine cancer,     Respiratory Mother         COPD    Cardiovascular Mother         AAA  age 80    Breast Cancer Maternal Grandmother     Cancer Maternal Grandfather         cancer unknown type    Breast Cancer Sister     Eye Disorder Father         cataracts    Depression Father     Depression Son     Depression Son     Breast Cancer Sister         X2    Cancer - colorectal No family hx of         no ovarian cancer        Visual Exam:  GEN: NAD,   Head: Normocephalic.  EYES: EOMI  ENT: Oropharynx is clear, Meneses class 4+ airway.  Psych: normal mood, normal affect  Snore test:(-)     Labs/Studies:     No results found for: \"PH\", \"PHARTERIAL\", \"PO2\", \"[id number]\", \"SAT\", \"PCO2\", \"HCO3\", \"BASEEXCESS\", \"REJI\", \"BEB\"  Lab Results   Component Value Date    TSH 2.49 2022    TSH 4.66 (H) 2022     Lab Results   Component Value Date    GLC 98 2024     " "(H) 11/10/2023     Lab Results   Component Value Date    HGB 14.2 01/22/2024    HGB 13.9 10/05/2023     Lab Results   Component Value Date    BUN 19.0 01/22/2024    BUN 12.2 11/10/2023    CR 0.92 01/22/2024    CR 0.98 (H) 11/10/2023     Lab Results   Component Value Date    AST 24 10/05/2023    AST 23 08/21/2022    ALT 21 10/05/2023    ALT 34 08/21/2022    ALKPHOS 73 10/05/2023    ALKPHOS 89 08/21/2022    BILITOTAL 0.5 10/05/2023    BILITOTAL 0.7 08/21/2022     No results found for: \"UAMP\", \"UBARB\", \"BENZODIAZEUR\", \"UCANN\", \"UCOC\", \"OPIT\", \"UPCP\"    Recent Labs   Lab Test 01/22/24  1513 11/10/23  1055    140   POTASSIUM 4.1 3.7   CHLORIDE 105 104   CO2 28 23   ANIONGAP 8 13   GLC 98 109*   BUN 19.0 12.2   CR 0.92 0.98*   ANISA 9.4 9.9       Walter Byrne DO  Board Certified in Internal Medicine and Sleep Medicine    (Note created with Dragon voice recognition and unintended spelling errors and word substitutions may occur)     "

## 2024-05-23 NOTE — NURSING NOTE
Is the patient currently in the state of [place]? YES    Visit mode:VIDEO    If the visit is dropped, the patient can be reconnected by: VIDEO VISIT: Text to cell phone:   Telephone Information:   Mobile [phone]       Will anyone else be joining the visit? NO  (If patient encounters technical issues they should call [phone] :[id number])    How would you like to obtain your AVS? MyChart    Are changes needed to the allergy or medication list? No    Are refills needed on medications prescribed by this physician? NO    Reason for visit: Consult    Daniel TOLEDO

## 2024-05-30 ENCOUNTER — DOCUMENTATION ONLY (OUTPATIENT)
Dept: SLEEP MEDICINE | Facility: CLINIC | Age: 84
End: 2024-05-30
Payer: MEDICARE

## 2024-05-30 NOTE — PROGRESS NOTES
[facility] contacted to schedule SARMAD for pt. [facility] states they will contact the pt to schedule.

## 2024-06-12 ENCOUNTER — HOSPITAL ENCOUNTER (OUTPATIENT)
Dept: CARDIOLOGY | Facility: CLINIC | Age: 84
Discharge: HOME | End: 2024-06-12
Attending: PHYSICIAN ASSISTANT | Admitting: PHYSICIAN ASSISTANT
Payer: MEDICARE

## 2024-06-12 DIAGNOSIS — I48.91 ATRIAL FIBRILLATION, UNSPECIFIED TYPE (H): ICD-10-CM

## 2024-06-12 LAB
BI-PLANE LVEF ECHO: NORMAL
LVEF ECHO: NORMAL

## 2024-06-12 PROCEDURE — 93306 TTE W/DOPPLER COMPLETE: CPT | Mod: 26 | Performed by: INTERNAL MEDICINE

## 2024-06-12 PROCEDURE — 255N000002 HC RX 255 OP 636: Performed by: PHYSICIAN ASSISTANT

## 2024-06-12 PROCEDURE — C8929 TTE W OR WO FOL WCON,DOPPLER: HCPCS

## 2024-06-12 RX ADMIN — HUMAN ALBUMIN MICROSPHERES AND PERFLUTREN 2 ML: 10; .22 INJECTION, SOLUTION INTRAVENOUS at 11:07

## 2024-06-16 NOTE — PROGRESS NOTES
"[facility]    I had the pleasure of seeing Rosie when she came for follow up of AFib.  This 83 year old sees Dr. Rollins for her history of:    1.  H/o TIA - 3/2022  2.  Paroxysmal AFib, SSS/post conversion pauses- first noted 8/2022 (not seen on monitoring following TIA 3/2022).  Noted to have significant postconversion pause when spontaneously converted after DCCV x3 were not successful.  S/p dual-chamber [employer] PPM 10/2022 with Dr. Meyer.  Has continued to have pAFib on  device interrogations   3.  HTN  4. R Ovarian cancer - sees Dr. Sinclair. S/p BSO with LN dissection  5.  Abnl Echo -echo 6/2024 showed mild drop in LVEF to 50-55%.  There was also mild hypokinesis of the mid/basal anteroseptal and inferolateral walls, which were new compared with 2022.      Last Visit & Interval History:  I saw Rosie 12/2023 at which time she continued to complain of sleepiness, as well as palpitations for AF despite increase in metoprolol.  She also noted 6 m h/o RECIO thought due to deconditioning/weight gain after a back surgery.  No changes were made, with plans for updated echo in 6 months and routine device interrogations.    In 4/2024, she contacted us noting she had a lumbar myelogram coming up and Eliquis needed to be held, then held again 5/2024 when she required an injection in the spine.    On 5/23, she contacted us noting recurrent AF after her steroid injection the day prior.  Updated echo 6/12 showed she was in SR.  EF appeared lower (50-55%) with increased LV filling pressures and mild HK of the mid/basal anteroseptal and inferolateral walls.  This was new compared with 6/2022    Today's Visit:  Rosie has done well overall without recurrent AFib since spinal injection. She didn't feel \"awful\" with AFib but definitely knew \"it was there.\"  We reviewed pacemaker interrogation 5/2024 showing no evidence of AF lasting longer than 7 minutes    Her RECIO hasn't changed but notes she's not been " able to exercise d/t continued back pain. Steroid injection didn't help anything,  And she meets with her orthopedic surgeon on Friday to review her significant back discomfort and options for treatment.    No CP, pressure, tightness. Mild lightheadedness when standing up quickly from bed. No falls/faints. No palpitations.     BPs 80s-180s (after steroid injection), typically 130s/60s.     Nelson agrees things seem to be going well as far as he can observe.    VITALS:  Vitals: /72 (BP Location: Right arm, Patient Position: Sitting)   Pulse 60   Resp 20   Wt 96.2 kg (212 lb)   SpO2 98%   BMI 37.55 kg/m      Diagnostic Testing:  Echo 6/12/2024 LVEF 50-55%, with mild HK of mid/basal anterolateral/inferolateral walls.  No thrombus.  Normal RV.  Mildly dilated LA.  1-2+ MR.  1+ TR.  RVSP 34+ RAP.  Mild aortic sclerosis.  1+ PI.  SR.  Normal aorta  Device interrogation 5/9/2024 62% AP, 3%  in DDDR 60/130.  Underlying  bpm per histogram 7% mode switching, for AF, longest episode lasting 71 minutes with rate  bpm.  Had 260 VHR's with 7 EGM showing AF/RVR lasting up to 7 minutes with rates to 200 bpm  EKG 12/2023 showed SR 63 bpm  EKG 11/10/2023 showed coarse AFib vs atypical AFlutter 116 bpm  Nuc Stress Test 4/2023 small area of infarction in distal anterior segment. No ischemia.  Echocardiogram 8/2022-LVEF 60-65%. G2 DD.  Normal RV.  1+ MR.  RVSP 30+ RAP.  Aortic sclerosis  Component      Latest Ref Rng 11/10/2023  10:55 AM 1/22/2024  3:13 PM   Sodium      135 - 145 mmol/L 140  141    Potassium      3.4 - 5.3 mmol/L 3.7  4.1    Chloride      98 - 107 mmol/L 104  105    Carbon Dioxide (CO2)      22 - 29 mmol/L 23  28    Anion Gap      7 - 15 mmol/L 13  8    Urea Nitrogen      8.0 - 23.0 mg/dL 12.2  19.0    Creatinine      0.51 - 0.95 mg/dL 0.98 (H)  0.92    GFR Estimate      >60 mL/min/1.73m2 57 (L)  61    Calcium      8.8 - 10.2 mg/dL 9.9  9.4    Glucose      70 - 99 mg/dL 109 (H)  98        Component      Latest Ref Rng 12/4/2021  10:44 PM 1/22/2024  3:13 PM   Cholesterol      <200 mg/dL 225 (H)  187    Triglycerides      <150 mg/dL 85  138    HDL Cholesterol      >=50 mg/dL 112  71    LDL Cholesterol Calculated      <=100 mg/dL 96  88    Non HDL Cholesterol      <130 mg/dL 113  116       Component      Latest Ref Rng 8/17/2022  11:05 PM 10/5/2023  9:11 AM 1/22/2024  3:13 PM   WBC      4.0 - 11.0 10e3/uL 6.1  4.9  5.5    RBC Count      3.80 - 5.20 10e6/uL 4.61  4.51  4.57    Hemoglobin      11.7 - 15.7 g/dL 14.0  13.9  14.2    Hematocrit      35.0 - 47.0 % 43.3  43.4  42.2    MCV      78 - 100 fL 94  96  92    MCH      26.5 - 33.0 pg 30.4  30.8  31.1    MCHC      31.5 - 36.5 g/dL 32.3  32.0  33.6    RDW      10.0 - 15.0 % 13.1  13.0  13.2    Platelet Count      150 - 450 10e3/uL 219  203  260        Plan:  Nuclear stress test to address mild cardiomyopathy/wall motion abnormalities on echo -will call with results and if significantly abnormal, discussed with Dr. Rollins  Otherwise, annual follow-up with in-office device interrogation    Assessment/Plan:    Paroxysmal AFib, SSS  As above, had pauses when she spontaneously converted to SR and recommended for pacemaker implantation to allow adequate treatment of her RVR  S/p dual-chamber [employer] pacemaker 10/2022.  Remains on Diltiazem 180 mg daily and metoprolol tartrate 100 mg BID    Had recurrent AF after steroid injection 5/2024 and spontaneously converted in < 1/2 day.  No recurrence that she is aware of    Continues AC for [id number] 6 (HTN, CVA, age, sex). Dose appropriate for age/weight/creatinine.  Last hemoglobin 1/2024 normal    PLAN:  Continue device checks  Continue AC with Eliquis    Mild cardiomyopathy   Routine echo 6/2024 showed mild drop in LVEF to 50-55%.  She had mild HK of the mid/basal anterolateral and inferolateral walls.  Last checked in 2022, EF was normal and no RWMA noted  Unsure of etiology  No complaints of chest  pain, but minimal activity due to back discomfort  Minimal ventricular pacing 2024 (3%)  Remains on Valsartan 320  No recurrent arrhythmias lasting >48h    PLAN:  Nuclear stress test to assess for drop in EF compared with echo done 2022  Continue ARB.      3.  HTN  Remains on Diltiazem  mg daily, metoprolol tartrate 100 BID and valsartan/HCTZ 320/25  BMP as above    PLAN:  Continue current medications    Gema Potter PA-C, MSPAS      Orders Placed This Encounter   Procedures    Follow-Up with Cardiology [facility]     Orders Placed This Encounter   Medications    mometasone (ELOCON) 0.1 % external cream     Si-3 times per week     Dispense:  45 g     Refill:  11     Medications Discontinued During This Encounter   Medication Reason    VIACTIV 500-100-40 OR CHEW Stopped by Patient (No AVS)    mometasone (ELOCON) 0.1 % external cream          Encounter Diagnoses   Name Primary?    Atrial fibrillation, unspecified type (H)     Secondary cardiomyopathy (H) Yes    Lichen sclerosus et atrophicus of the vulva     Paroxysmal atrial fibrillation (H)          CURRENT MEDICATIONS:  Current Outpatient Medications   Medication Sig Dispense Refill    apixaban ANTICOAGULANT (ELIQUIS ANTICOAGULANT) 5 MG tablet Take 1 tablet (5 mg) by mouth 2 times daily 180 tablet 3    diltiazem ER COATED BEADS (CARDIZEM CD/CARTIA XT) 240 MG 24 hr capsule Take 1 capsule (240 mg) by mouth daily 90 capsule 3    Evening Primrose Oil 1000 MG CAPS Take 1 capsule by mouth daily One daily      Fish Oil-Cholecalciferol (OMEGA-3 + D PO) Take 1 capsule by mouth daily      meloxicam (MOBIC) 15 MG tablet Take 1 tablet (15 mg) by mouth daily as needed for moderate pain 30 tablet 11    metoprolol tartrate (LOPRESSOR) 100 MG tablet Take 1 tablet (100 mg) by mouth 2 times daily 180 tablet 1    mometasone (ELOCON) 0.1 % external cream 2-3 times per week 45 g 11    pravastatin (PRAVACHOL) 80 MG tablet Take 1 tablet (80 mg) by mouth daily 90 tablet 3     THERATEARS 0.25 % OP SOLN Place 2 drops into both eyes as needed      valsartan-hydrochlorothiazide (DIOVAN HCT) 320-25 MG tablet Take 1 tablet by mouth daily 90 tablet 3       ALLERGIES     Allergies   Allergen Reactions    Atorvastatin Other (See Comments)     Muscle Pain    Ezetimibe Other (See Comments)     Severe muscle aches    Irbesartan Cramps    Lisinopril     Omeprazole      Other reaction(s): *Unknown    Prilosec [Omeprazole]     Rosuvastatin Other (See Comments)     Muscle Pain    Zestril [Ace Inhibitors] Cough         Review of Systems:  Skin:        Eyes:       ENT:       Respiratory:  Negative shortness of breath;dyspnea on exertion;dyspnea at rest  Cardiovascular:  Negative;chest pain;syncope or near-syncope Positive for;palpitations;edema;dizziness;lightheadedness;fatigue  Gastroenterology:      Genitourinary:       Musculoskeletal:       Neurologic:       Psychiatric:       Heme/Lymph/Imm:       Endocrine:         Physical Exam:  Vitals: /72 (BP Location: Right arm, Patient Position: Sitting)   Pulse 60   Resp 20   Wt 96.2 kg (212 lb)   SpO2 98%   BMI 37.55 kg/m      Constitutional:  cooperative, alert and oriented, well developed, well nourished, in no acute distress        Skin:  warm and dry to the touch, no apparent skin lesions or masses noted        Head:  normocephalic, no masses or lesions        Eyes:  pupils equal and round;conjunctivae and lids unremarkable;sclera white        ENT:  no pallor or cyanosis, dentition good        Neck:  JVP normal;no carotid bruit        Chest:  normal breath sounds, clear to auscultation, normal A-P diameter, normal symmetry, normal respiratory excursion, no use of accessory muscles        Cardiac: regular rhythm, normal S1/S2, no S3 or S4, apical impulse not displaced, no murmurs, gallops or rubs                  Abdomen:  abdomen soft obese      Vascular: pulses full and equal                                      Extremities and Back:  no  deformities, clubbing, cyanosis, erythema observed        Neurological:  no gross motor deficits            PAST MEDICAL HISTORY:  Past Medical History:   Diagnosis Date    Chronic airway obstruction (H)     Diastolic dysfunction 6/28/2022    Gastroesophageal reflux disease     Hiatal hernia     Hypertension     Malignant neoplasm of ovary (H)     Ovarian cancer, unspecified laterality (H) 3/10/2021    Personal history of contact with and (suspected) exposure to asbestos     Primary osteoarthritis of right hip 7/9/2020       PAST SURGICAL HISTORY:  Past Surgical History:   Procedure Laterality Date    BIOPSY BREAST Left     BREAST BIOPSY, CORE RT/LT  1994    CHOLECYSTECTOMY  1995    COLONOSCOPY  05/2010    Diverticulosis, colon polyps; repeat 5 yrs    COLONOSCOPY N/A 11/16/2015    Procedure: COLONOSCOPY;  Surgeon: Alejandro Matos MD;  Location: [facility]    COLONOSCOPY N/A 09/17/2021    Procedure: COLONOSCOPY;  Surgeon: Aayush George MD;  Location: [facility]    Colonoscopy, hyperplastic polyps, repeat in 5 yrs  2004    EP PACEMAKER DEVICE & LEAD IMPLANT- RIGHT ATRIAL & RIGHT VENTRICULAR Left 10/24/2022    Procedure: Pacemaker Device & Lead Implant - Right Atrial & Right Ventricular;  Surgeon: Demond Meyer MD;  Location:  [facility]    ESOPHAGOSCOPY, GASTROSCOPY, DUODENOSCOPY (EGD), COMBINED  09/30/2013    Procedure: COMBINED ESOPHAGOSCOPY, GASTROSCOPY, DUODENOSCOPY (EGD), BIOPSY SINGLE OR MULTIPLE;  Gastroscopy;  Surgeon: Blaise Gill MD;  Location: [facility]    EYE SURGERY  2012    R/L Cataracts    HC REMOVE TONSILS/ADENOIDS,13+ Y/O      T & A 13+y.o.    HERNIORRHAPHY INCISIONAL (LOCATION) N/A 03/24/2021    Procedure: HERNIORRHAPHY, INCISIONAL, OPEN WITH MESH;  Surgeon: Aayush George MD;  Location: [facility]    HYSTERECTOMY, PAP NO LONGER INDICATED      LAPAROSCOPIC SALPINGO-OOPHORECTOMY N/A 07/24/2020    Procedure: Laparoscopy, removal or pelvic mass, both tubes and ovaries, omentectomy,  anterior culvectomy, pelvic and para aortic lymph node dissection, laparotomy;  Surgeon: Felipe Corona MD;  Location: [facility]    MS ANESTH,LUMBAR SPINE,CORD SURGERY  10/2020    L3-4 L4-5 TRANSFORAMINAL INTERBODY FUSION L3-5, POSTERIOR INSTRUMENTED FUSION AND DECOMPRESSION    TVH for DUB, ovaries in      VASCULAR SURGERY      Euless closure    ZZC ANESTH,KNEE VEINS SURGERY  2014       FAMILY HISTORY:  Family History   Problem Relation Age of Onset    Cancer Mother         uterine cancer,     Respiratory Mother         COPD    Cardiovascular Mother         AAA  age 80    Eye Disorder Father         cataracts    Depression Father     Snoring Father     Breast Cancer Sister     Breast Cancer Sister         X2    Breast Cancer Maternal Grandmother     Cancer Maternal Grandfather         cancer unknown type    Depression Son     Depression Son     Cancer - colorectal No family hx of         no ovarian cancer       SOCIAL HISTORY:  Social History     Socioeconomic History    Marital status:      Spouse name: Nelson Blackman    Number of children: 2    Years of education: 13+    Highest education level: None   Occupational History    Occupation:      Comment: [employer]   Tobacco Use    Smoking status: Never    Smokeless tobacco: Never   Vaping Use    Vaping status: Never Used   Substance and Sexual Activity    Alcohol use: No    Drug use: No    Sexual activity: Not Currently     Partners: Male     Birth control/protection: Surgical   Other Topics Concern    Parent/sibling w/ CABG, MI or angioplasty before 65F 55M? No     Social Determinants of Health     Financial Resource Strain: Low Risk  (2024)    Financial Resource Strain     Within the past 12 months, have you or your family members you live with been unable to get utilities (heat, electricity) when it was really needed?: No   Food Insecurity: Low Risk  (2024)    Food Insecurity     Within the past 12  months, did you worry that your food would run out before you got money to buy more?: No     Within the past 12 months, did the food you bought just not last and you didn t have money to get more?: No   Transportation Needs: Low Risk  (1/26/2024)    Transportation Needs     Within the past 12 months, has lack of transportation kept you from medical appointments, getting your medicines, non-medical meetings or appointments, work, or from getting things that you need?: No   Interpersonal Safety: Low Risk  (1/26/2024)    Interpersonal Safety     Do you feel physically and emotionally safe where you currently live?: Yes     Within the past 12 months, have you been hit, slapped, kicked or otherwise physically hurt by someone?: No     Within the past 12 months, have you been humiliated or emotionally abused in other ways by your partner or ex-partner?: No   Housing Stability: Low Risk  (1/26/2024)    Housing Stability     Do you have housing? : Yes     Are you worried about losing your housing?: No

## 2024-06-18 ENCOUNTER — OFFICE VISIT (OUTPATIENT)
Dept: CARDIOLOGY | Facility: CLINIC | Age: 84
End: 2024-06-18
Attending: PHYSICIAN ASSISTANT
Payer: MEDICARE

## 2024-06-18 VITALS
BODY MASS INDEX: 37.55 KG/M2 | OXYGEN SATURATION: 98 % | SYSTOLIC BLOOD PRESSURE: 116 MMHG | HEART RATE: 60 BPM | RESPIRATION RATE: 20 BRPM | WEIGHT: 212 LBS | DIASTOLIC BLOOD PRESSURE: 72 MMHG

## 2024-06-18 DIAGNOSIS — N90.4 LICHEN SCLEROSUS ET ATROPHICUS OF THE VULVA: ICD-10-CM

## 2024-06-18 DIAGNOSIS — I48.0 PAROXYSMAL ATRIAL FIBRILLATION (H): ICD-10-CM

## 2024-06-18 DIAGNOSIS — I42.9 SECONDARY CARDIOMYOPATHY (H): Primary | ICD-10-CM

## 2024-06-18 DIAGNOSIS — I48.91 ATRIAL FIBRILLATION, UNSPECIFIED TYPE (H): ICD-10-CM

## 2024-06-18 PROCEDURE — 99214 OFFICE O/P EST MOD 30 MIN: CPT | Performed by: PHYSICIAN ASSISTANT

## 2024-06-18 RX ORDER — MOMETASONE FUROATE 1 MG/G
CREAM TOPICAL
Qty: 45 G | Refills: 11 | Status: ON HOLD | OUTPATIENT
Start: 2024-06-18 | End: 2024-07-03

## 2024-06-18 NOTE — PATIENT INSTRUCTIONS
Rosie - it was nice to see you and meet Nelson today!     At your visit today we reviewed:    Back in normal rhythm shortly after the injection  No relief of discomfort with steroid injection   Pumping strength (ejection fraction of heart) on echo has dropped a bit! Unsure why  Blocked arteries?  Doubt due to pacing as it's very low (3%)  Doubt due to arrhythmias as very infrequent     Medication Changes:    NONE    Recommendations:    Nuclear stress testing to ensure no evidence of blockages as a cause of drop in pumping function  Let us know if Ortho doc needs anything!    Follow-up:    See me for cardiology follow up in 1 year with pacer check but CALL Cardiology nurses Janet & Jessie @ [phone] for any issues, questions or concerns in the interim.      To schedule a future appointment, we kindly ask that you call cardiology scheduling at [phone] three months prior to requested visit date.    Important Phone Numbers for [facility]):    [facility] Janet Roman: [phone]  Cardiology [phone]  [facility] [phone]  [facility] [phone]  [facility]: [phone]

## 2024-06-18 NOTE — LETTER
6/18/2024    Kathya Escalera, DO  [address] [facility] [address]    RE: Rosie Blackman       Dear Colleague,     I had the pleasure of seeing Rosie Blackman in the [facility].  [facility]    I had the pleasure of seeing Rosie when she came for follow up of AFib.  This 83 year old sees Dr. Rollins for her history of:    1.  H/o TIA - 3/2022  2.  Paroxysmal AFib, SSS/post conversion pauses- first noted 8/2022 (not seen on monitoring following TIA 3/2022).  Noted to have significant postconversion pause when spontaneously converted after DCCV x3 were not successful.  S/p dual-chamber [employer] PPM 10/2022 with Dr. Meyer.  Has continued to have pAFib on  device interrogations   3.  HTN  4. R Ovarian cancer - sees Dr. Sinclair. S/p BSO with LN dissection  5.  Abnl Echo -echo 6/2024 showed mild drop in LVEF to 50-55%.  There was also mild hypokinesis of the mid/basal anteroseptal and inferolateral walls, which were new compared with 2022.      Last Visit & Interval History:  I saw Rosie 12/2023 at which time she continued to complain of sleepiness, as well as palpitations for AF despite increase in metoprolol.  She also noted 6 m h/o RECIO thought due to deconditioning/weight gain after a back surgery.  No changes were made, with plans for updated echo in 6 months and routine device interrogations.    In 4/2024, she contacted us noting she had a lumbar myelogram coming up and Eliquis needed to be held, then held again 5/2024 when she required an injection in the spine.    On 5/23, she contacted us noting recurrent AF after her steroid injection the day prior.  Updated echo 6/12 showed she was in SR.  EF appeared lower (50-55%) with increased LV filling pressures and mild HK of the mid/basal anteroseptal and inferolateral walls.  This was new compared with 6/2022    Today's Visit:  Rosie has done well overall without recurrent AFib since spinal injection. She didn't  "feel \"awful\" with AFib but definitely knew \"it was there.\"  We reviewed pacemaker interrogation 5/2024 showing no evidence of AF lasting longer than 7 minutes    Her RECIO hasn't changed but notes she's not been able to exercise d/t continued back pain. Steroid injection didn't help anything,  And she meets with her orthopedic surgeon on Friday to review her significant back discomfort and options for treatment.    No CP, pressure, tightness. Mild lightheadedness when standing up quickly from bed. No falls/faints. No palpitations.     BPs 80s-180s (after steroid injection), typically 130s/60s.     Nelson agrees things seem to be going well as far as he can observe.    VITALS:  Vitals: /72 (BP Location: Right arm, Patient Position: Sitting)   Pulse 60   Resp 20   Wt 96.2 kg (212 lb)   SpO2 98%   BMI 37.55 kg/m      Diagnostic Testing:  Echo 6/12/2024 LVEF 50-55%, with mild HK of mid/basal anterolateral/inferolateral walls.  No thrombus.  Normal RV.  Mildly dilated LA.  1-2+ MR.  1+ TR.  RVSP 34+ RAP.  Mild aortic sclerosis.  1+ PI.  SR.  Normal aorta  Device interrogation 5/9/2024 62% AP, 3%  in DDDR 60/130.  Underlying  bpm per histogram 7% mode switching, for AF, longest episode lasting 71 minutes with rate  bpm.  Had 260 VHR's with 7 EGM showing AF/RVR lasting up to 7 minutes with rates to 200 bpm  EKG 12/2023 showed SR 63 bpm  EKG 11/10/2023 showed coarse AFib vs atypical AFlutter 116 bpm  Nuc Stress Test 4/2023 small area of infarction in distal anterior segment. No ischemia.  Echocardiogram 8/2022-LVEF 60-65%. G2 DD.  Normal RV.  1+ MR.  RVSP 30+ RAP.  Aortic sclerosis  Component      Latest Ref Rng 11/10/2023  10:55 AM 1/22/2024  3:13 PM   Sodium      135 - 145 mmol/L 140  141    Potassium      3.4 - 5.3 mmol/L 3.7  4.1    Chloride      98 - 107 mmol/L 104  105    Carbon Dioxide (CO2)      22 - 29 mmol/L 23  28    Anion Gap      7 - 15 mmol/L 13  8    Urea Nitrogen      8.0 - 23.0 mg/dL " 12.2  19.0    Creatinine      0.51 - 0.95 mg/dL 0.98 (H)  0.92    GFR Estimate      >60 mL/min/1.73m2 57 (L)  61    Calcium      8.8 - 10.2 mg/dL 9.9  9.4    Glucose      70 - 99 mg/dL 109 (H)  98       Component      Latest Ref Rng 12/4/2021  10:44 PM 1/22/2024  3:13 PM   Cholesterol      <200 mg/dL 225 (H)  187    Triglycerides      <150 mg/dL 85  138    HDL Cholesterol      >=50 mg/dL 112  71    LDL Cholesterol Calculated      <=100 mg/dL 96  88    Non HDL Cholesterol      <130 mg/dL 113  116       Component      Latest Ref Rng 8/17/2022  11:05 PM 10/5/2023  9:11 AM 1/22/2024  3:13 PM   WBC      4.0 - 11.0 10e3/uL 6.1  4.9  5.5    RBC Count      3.80 - 5.20 10e6/uL 4.61  4.51  4.57    Hemoglobin      11.7 - 15.7 g/dL 14.0  13.9  14.2    Hematocrit      35.0 - 47.0 % 43.3  43.4  42.2    MCV      78 - 100 fL 94  96  92    MCH      26.5 - 33.0 pg 30.4  30.8  31.1    MCHC      31.5 - 36.5 g/dL 32.3  32.0  33.6    RDW      10.0 - 15.0 % 13.1  13.0  13.2    Platelet Count      150 - 450 10e3/uL 219  203  260        Plan:  Nuclear stress test to address mild cardiomyopathy/wall motion abnormalities on echo -will call with results and if significantly abnormal, discussed with Dr. Rollins  Otherwise, annual follow-up with in-office device interrogation    Assessment/Plan:    Paroxysmal AFib, SSS  As above, had pauses when she spontaneously converted to SR and recommended for pacemaker implantation to allow adequate treatment of her RVR  S/p dual-chamber [employer] pacemaker 10/2022.  Remains on Diltiazem 180 mg daily and metoprolol tartrate 100 mg BID    Had recurrent AF after steroid injection 5/2024 and spontaneously converted in < 1/2 day.  No recurrence that she is aware of    Continues AC for [id number] 6 (HTN, CVA, age, sex). Dose appropriate for age/weight/creatinine.  Last hemoglobin 1/2024 normal    PLAN:  Continue device checks  Continue AC with Eliquis    Mild cardiomyopathy   Routine echo 6/2024 showed  mild drop in LVEF to 50-55%.  She had mild HK of the mid/basal anterolateral and inferolateral walls.  Last checked in , EF was normal and no RWMA noted  Unsure of etiology  No complaints of chest pain, but minimal activity due to back discomfort  Minimal ventricular pacing 2024 (3%)  Remains on Valsartan 320  No recurrent arrhythmias lasting >48h    PLAN:  Nuclear stress test to assess for drop in EF compared with echo done 2022  Continue ARB.      3.  HTN  Remains on Diltiazem  mg daily, metoprolol tartrate 100 BID and valsartan/HCTZ 320/25  BMP as above    PLAN:  Continue current medications    Gema Potter PA-C, MSPAS      Orders Placed This Encounter   Procedures    Follow-Up with Cardiology [facility]     Orders Placed This Encounter   Medications    mometasone (ELOCON) 0.1 % external cream     Si-3 times per week     Dispense:  45 g     Refill:  11     Medications Discontinued During This Encounter   Medication Reason    VIACTIV 500-100-40 OR CHEW Stopped by Patient (No AVS)    mometasone (ELOCON) 0.1 % external cream          Encounter Diagnoses   Name Primary?    Atrial fibrillation, unspecified type (H)     Secondary cardiomyopathy (H) Yes    Lichen sclerosus et atrophicus of the vulva     Paroxysmal atrial fibrillation (H)          CURRENT MEDICATIONS:  Current Outpatient Medications   Medication Sig Dispense Refill    apixaban ANTICOAGULANT (ELIQUIS ANTICOAGULANT) 5 MG tablet Take 1 tablet (5 mg) by mouth 2 times daily 180 tablet 3    diltiazem ER COATED BEADS (CARDIZEM CD/CARTIA XT) 240 MG 24 hr capsule Take 1 capsule (240 mg) by mouth daily 90 capsule 3    Evening Primrose Oil 1000 MG CAPS Take 1 capsule by mouth daily One daily      Fish Oil-Cholecalciferol (OMEGA-3 + D PO) Take 1 capsule by mouth daily      meloxicam (MOBIC) 15 MG tablet Take 1 tablet (15 mg) by mouth daily as needed for moderate pain 30 tablet 11    metoprolol tartrate (LOPRESSOR) 100 MG tablet Take 1 tablet (100 mg) by  mouth 2 times daily 180 tablet 1    mometasone (ELOCON) 0.1 % external cream 2-3 times per week 45 g 11    pravastatin (PRAVACHOL) 80 MG tablet Take 1 tablet (80 mg) by mouth daily 90 tablet 3    THERATEARS 0.25 % OP SOLN Place 2 drops into both eyes as needed      valsartan-hydrochlorothiazide (DIOVAN HCT) 320-25 MG tablet Take 1 tablet by mouth daily 90 tablet 3       ALLERGIES     Allergies   Allergen Reactions    Atorvastatin Other (See Comments)     Muscle Pain    Ezetimibe Other (See Comments)     Severe muscle aches    Irbesartan Cramps    Lisinopril     Omeprazole      Other reaction(s): *Unknown    Prilosec [Omeprazole]     Rosuvastatin Other (See Comments)     Muscle Pain    Zestril [Ace Inhibitors] Cough         Review of Systems:  Skin:        Eyes:       ENT:       Respiratory:  Negative shortness of breath;dyspnea on exertion;dyspnea at rest  Cardiovascular:  Negative;chest pain;syncope or near-syncope Positive for;palpitations;edema;dizziness;lightheadedness;fatigue  Gastroenterology:      Genitourinary:       Musculoskeletal:       Neurologic:       Psychiatric:       Heme/Lymph/Imm:       Endocrine:         Physical Exam:  Vitals: /72 (BP Location: Right arm, Patient Position: Sitting)   Pulse 60   Resp 20   Wt 96.2 kg (212 lb)   SpO2 98%   BMI 37.55 kg/m      Constitutional:  cooperative, alert and oriented, well developed, well nourished, in no acute distress        Skin:  warm and dry to the touch, no apparent skin lesions or masses noted        Head:  normocephalic, no masses or lesions        Eyes:  pupils equal and round;conjunctivae and lids unremarkable;sclera white        ENT:  no pallor or cyanosis, dentition good        Neck:  JVP normal;no carotid bruit        Chest:  normal breath sounds, clear to auscultation, normal A-P diameter, normal symmetry, normal respiratory excursion, no use of accessory muscles        Cardiac: regular rhythm, normal S1/S2, no S3 or S4, apical  impulse not displaced, no murmurs, gallops or rubs                  Abdomen:  abdomen soft obese      Vascular: pulses full and equal                                      Extremities and Back:  no deformities, clubbing, cyanosis, erythema observed        Neurological:  no gross motor deficits            PAST MEDICAL HISTORY:  Past Medical History:   Diagnosis Date    Chronic airway obstruction (H)     Diastolic dysfunction 6/28/2022    Gastroesophageal reflux disease     Hiatal hernia     Hypertension     Malignant neoplasm of ovary (H)     Ovarian cancer, unspecified laterality (H) 3/10/2021    Personal history of contact with and (suspected) exposure to asbestos     Primary osteoarthritis of right hip 7/9/2020       PAST SURGICAL HISTORY:  Past Surgical History:   Procedure Laterality Date    BIOPSY BREAST Left     BREAST BIOPSY, CORE RT/LT  1994    CHOLECYSTECTOMY  1995    COLONOSCOPY  05/2010    Diverticulosis, colon polyps; repeat 5 yrs    COLONOSCOPY N/A 11/16/2015    Procedure: COLONOSCOPY;  Surgeon: Alejandro Matos MD;  Location: [facility]    COLONOSCOPY N/A 09/17/2021    Procedure: COLONOSCOPY;  Surgeon: Aayush George MD;  Location: [facility]    Colonoscopy, hyperplastic polyps, repeat in 5 yrs  2004    EP PACEMAKER DEVICE & LEAD IMPLANT- RIGHT ATRIAL & RIGHT VENTRICULAR Left 10/24/2022    Procedure: Pacemaker Device & Lead Implant - Right Atrial & Right Ventricular;  Surgeon: Demond Meyer MD;  Location:  [facility]    ESOPHAGOSCOPY, GASTROSCOPY, DUODENOSCOPY (EGD), COMBINED  09/30/2013    Procedure: COMBINED ESOPHAGOSCOPY, GASTROSCOPY, DUODENOSCOPY (EGD), BIOPSY SINGLE OR MULTIPLE;  Gastroscopy;  Surgeon: Blaise Gill MD;  Location: [facility]    EYE SURGERY  2012    R/L Cataracts    HC REMOVE TONSILS/ADENOIDS,13+ Y/O      T & A 13+y.o.    HERNIORRHAPHY INCISIONAL (LOCATION) N/A 03/24/2021    Procedure: HERNIORRHAPHY, INCISIONAL, OPEN WITH MESH;  Surgeon: Aayush George MD;   Location: [facility]    HYSTERECTOMY, PAP NO LONGER INDICATED      LAPAROSCOPIC SALPINGO-OOPHORECTOMY N/A 2020    Procedure: Laparoscopy, removal or pelvic mass, both tubes and ovaries, omentectomy, anterior culvectomy, pelvic and para aortic lymph node dissection, laparotomy;  Surgeon: Felipe Corona MD;  Location: [facility]    MA ANESTH,LUMBAR SPINE,CORD SURGERY  10/2020    L3-4 L4-5 TRANSFORAMINAL INTERBODY FUSION L3-5, POSTERIOR INSTRUMENTED FUSION AND DECOMPRESSION    TVH for DUB, ovaries in      VASCULAR SURGERY      Taylor closure    ZZC ANESTH,KNEE VEINS SURGERY  2014       FAMILY HISTORY:  Family History   Problem Relation Age of Onset    Cancer Mother         uterine cancer,     Respiratory Mother         COPD    Cardiovascular Mother         AAA  age 80    Eye Disorder Father         cataracts    Depression Father     Snoring Father     Breast Cancer Sister     Breast Cancer Sister         X2    Breast Cancer Maternal Grandmother     Cancer Maternal Grandfather         cancer unknown type    Depression Son     Depression Son     Cancer - colorectal No family hx of         no ovarian cancer       SOCIAL HISTORY:  Social History     Socioeconomic History    Marital status:      Spouse name: Nelson MORGAN Forrestpauline    Number of children: 2    Years of education: 13+    Highest education level: None   Occupational History    Occupation:      Comment: [employer]   Tobacco Use    Smoking status: Never    Smokeless tobacco: Never   Vaping Use    Vaping status: Never Used   Substance and Sexual Activity    Alcohol use: No    Drug use: No    Sexual activity: Not Currently     Partners: Male     Birth control/protection: Surgical   Other Topics Concern    Parent/sibling w/ CABG, MI or angioplasty before 65F 55M? No     Social Determinants of Health     Financial Resource Strain: Low Risk  (2024)    Financial Resource Strain     Within the past 12 months, have you or  your family members you live with been unable to get utilities (heat, electricity) when it was really needed?: No   Food Insecurity: Low Risk  (1/26/2024)    Food Insecurity     Within the past 12 months, did you worry that your food would run out before you got money to buy more?: No     Within the past 12 months, did the food you bought just not last and you didn t have money to get more?: No   Transportation Needs: Low Risk  (1/26/2024)    Transportation Needs     Within the past 12 months, has lack of transportation kept you from medical appointments, getting your medicines, non-medical meetings or appointments, work, or from getting things that you need?: No   Interpersonal Safety: Low Risk  (1/26/2024)    Interpersonal Safety     Do you feel physically and emotionally safe where you currently live?: Yes     Within the past 12 months, have you been hit, slapped, kicked or otherwise physically hurt by someone?: No     Within the past 12 months, have you been humiliated or emotionally abused in other ways by your partner or ex-partner?: No   Housing Stability: Low Risk  (1/26/2024)    Housing Stability     Do you have housing? : Yes     Are you worried about losing your housing?: No         Thank you for allowing me to participate in the care of your patient.    Sincerely,   Jinny Potter PA-C   [facility]  cc:   Jinny Potter PA-C  [address]  [place] [address]

## 2024-06-21 ENCOUNTER — TRANSFERRED RECORDS (OUTPATIENT)
Dept: HEALTH INFORMATION MANAGEMENT | Facility: CLINIC | Age: 84
End: 2024-06-21
Payer: MEDICARE

## 2024-06-25 ENCOUNTER — MYC MEDICAL ADVICE (OUTPATIENT)
Dept: FAMILY MEDICINE | Facility: CLINIC | Age: 84
End: 2024-06-25
Payer: MEDICARE

## 2024-06-25 ENCOUNTER — TELEPHONE (OUTPATIENT)
Dept: CARDIOLOGY | Facility: CLINIC | Age: 84
End: 2024-06-25
Payer: MEDICARE

## 2024-06-25 NOTE — TELEPHONE ENCOUNTER
Routing call to device nurses-    Pt called in and left message stating that she will be having back surgery on 7/3/24 and will be away from her heart monitor for 3 days. So there will be no transmissions for these days   If you have any questions or concerns you can call her at [phone].    Janet Taylor RN

## 2024-06-26 ENCOUNTER — HOSPITAL ENCOUNTER (OUTPATIENT)
Dept: NUCLEAR MEDICINE | Facility: CLINIC | Age: 84
Setting detail: NUCLEAR MEDICINE
Discharge: HOME | End: 2024-06-26
Attending: PHYSICIAN ASSISTANT
Payer: MEDICARE

## 2024-06-26 ENCOUNTER — HOSPITAL ENCOUNTER (OUTPATIENT)
Dept: CARDIOLOGY | Facility: CLINIC | Age: 84
Discharge: HOME | End: 2024-06-26
Attending: PHYSICIAN ASSISTANT
Payer: MEDICARE

## 2024-06-26 DIAGNOSIS — I48.91 ATRIAL FIBRILLATION, UNSPECIFIED TYPE (H): ICD-10-CM

## 2024-06-26 DIAGNOSIS — I42.9 SECONDARY CARDIOMYOPATHY (H): ICD-10-CM

## 2024-06-26 LAB
CV STRESS MAX HR HE: 69
NUC STRESS EJECTION FRACTION: 64 %
RATE PRESSURE PRODUCT: 7590
STRESS ECHO BASELINE DIASTOLIC HE: 74
STRESS ECHO BASELINE HR: 60 BPM
STRESS ECHO BASELINE SYSTOLIC BP: 136
STRESS ECHO CALCULATED PERCENT HR: 50 %
STRESS ECHO LAST STRESS DIASTOLIC BP: 56
STRESS ECHO LAST STRESS SYSTOLIC BP: 110
STRESS ECHO TARGET HR: 137

## 2024-06-26 PROCEDURE — 93018 CV STRESS TEST I&R ONLY: CPT | Performed by: INTERNAL MEDICINE

## 2024-06-26 PROCEDURE — 93016 CV STRESS TEST SUPVJ ONLY: CPT | Performed by: INTERNAL MEDICINE

## 2024-06-26 PROCEDURE — 78452 HT MUSCLE IMAGE SPECT MULT: CPT | Mod: MG

## 2024-06-26 PROCEDURE — A9502 TC99M TETROFOSMIN: HCPCS | Performed by: PHYSICIAN ASSISTANT

## 2024-06-26 PROCEDURE — 250N000011 HC RX IP 250 OP 636: Mod: JZ | Performed by: PHYSICIAN ASSISTANT

## 2024-06-26 PROCEDURE — 93017 CV STRESS TEST TRACING ONLY: CPT

## 2024-06-26 PROCEDURE — 343N000001 HC RX 343: Performed by: PHYSICIAN ASSISTANT

## 2024-06-26 PROCEDURE — 78452 HT MUSCLE IMAGE SPECT MULT: CPT | Mod: 26 | Performed by: INTERNAL MEDICINE

## 2024-06-26 PROCEDURE — G1010 CDSM STANSON: HCPCS | Performed by: INTERNAL MEDICINE

## 2024-06-26 RX ORDER — REGADENOSON 0.08 MG/ML
0.4 INJECTION, SOLUTION INTRAVENOUS ONCE
Status: COMPLETED | OUTPATIENT
Start: 2024-06-26 | End: 2024-06-26

## 2024-06-26 RX ADMIN — TETROFOSMIN 10.3 MILLICURIE: 1.38 INJECTION, POWDER, LYOPHILIZED, FOR SOLUTION INTRAVENOUS at 12:10

## 2024-06-26 RX ADMIN — REGADENOSON 0.4 MG: 0.4 INJECTION INTRAVENOUS at 13:33

## 2024-06-26 RX ADMIN — TETROFOSMIN 33.1 MILLICURIE: 1.38 INJECTION, POWDER, LYOPHILIZED, FOR SOLUTION INTRAVENOUS at 13:34

## 2024-06-27 ENCOUNTER — DOCUMENTATION ONLY (OUTPATIENT)
Dept: CARDIOLOGY | Facility: CLINIC | Age: 84
End: 2024-06-27
Payer: MEDICARE

## 2024-06-27 ENCOUNTER — MYC REFILL (OUTPATIENT)
Dept: CARDIOLOGY | Facility: CLINIC | Age: 84
End: 2024-06-27
Payer: MEDICARE

## 2024-06-27 DIAGNOSIS — I48.91 ATRIAL FIBRILLATION WITH RVR (H): ICD-10-CM

## 2024-06-27 RX ORDER — METOPROLOL TARTRATE 100 MG
100 TABLET ORAL 2 TIMES DAILY
Qty: 180 TABLET | Refills: 3 | OUTPATIENT
Start: 2024-06-27

## 2024-06-27 RX ORDER — METOPROLOL TARTRATE 100 MG
100 TABLET ORAL 2 TIMES DAILY
Qty: 180 TABLET | Refills: 3 | Status: SHIPPED | OUTPATIENT
Start: 2024-06-27

## 2024-06-27 NOTE — PROGRESS NOTES
Faxed final results over to Dr. Patel at [facility] on 6/27/24 @ 10:30am, per request of patient.

## 2024-06-27 NOTE — TELEPHONE ENCOUNTER
Pending Prescriptions:                       Disp   Refills    metoprolol tartrate (LOPRESSOR) 100 MG ta*180 ta*3            Sig: Take 1 tablet (100 mg) by mouth 2 times daily

## 2024-07-01 ENCOUNTER — TELEPHONE (OUTPATIENT)
Dept: CARDIOLOGY | Facility: CLINIC | Age: 84
End: 2024-07-01
Payer: MEDICARE

## 2024-07-01 NOTE — TELEPHONE ENCOUNTER
I sent Dr. Rollins a message earlier today, but unless he thinks differently, would expect just medical mgmt as defect is small and LVEF on stress test is nl. Do not think this small defect would delay surgery.    You can check my Staff Messages as it gets closer to end of day, but he may not respond until tomorrow given he's just back today.    Gema  July 1, 2024 at 3:20 PM

## 2024-07-02 ENCOUNTER — OFFICE VISIT (OUTPATIENT)
Dept: FAMILY MEDICINE | Facility: CLINIC | Age: 84
End: 2024-07-02
Payer: MEDICARE

## 2024-07-02 VITALS
DIASTOLIC BLOOD PRESSURE: 68 MMHG | WEIGHT: 209 LBS | HEART RATE: 66 BPM | HEIGHT: 63 IN | SYSTOLIC BLOOD PRESSURE: 126 MMHG | RESPIRATION RATE: 20 BRPM | TEMPERATURE: 97.2 F | BODY MASS INDEX: 37.03 KG/M2 | OXYGEN SATURATION: 98 %

## 2024-07-02 DIAGNOSIS — E78.5 HYPERLIPIDEMIA LDL GOAL <130: ICD-10-CM

## 2024-07-02 DIAGNOSIS — Z95.0 CARDIAC PACEMAKER IN SITU: ICD-10-CM

## 2024-07-02 DIAGNOSIS — Z01.818 PREOP GENERAL PHYSICAL EXAM: Primary | ICD-10-CM

## 2024-07-02 DIAGNOSIS — M48.061 SPINAL STENOSIS OF LUMBAR REGION, UNSPECIFIED WHETHER NEUROGENIC CLAUDICATION PRESENT: ICD-10-CM

## 2024-07-02 DIAGNOSIS — I48.91 ATRIAL FIBRILLATION WITH RVR (H): ICD-10-CM

## 2024-07-02 DIAGNOSIS — I49.5 SSS (SICK SINUS SYNDROME) (H): ICD-10-CM

## 2024-07-02 LAB
ANION GAP SERPL CALCULATED.3IONS-SCNC: 6 MMOL/L (ref 7–15)
BUN SERPL-MCNC: 18 MG/DL (ref 8–23)
CALCIUM SERPL-MCNC: 9.6 MG/DL (ref 8.8–10.2)
CHLORIDE SERPL-SCNC: 105 MMOL/L (ref 98–107)
CREAT SERPL-MCNC: 0.83 MG/DL (ref 0.51–0.95)
DEPRECATED HCO3 PLAS-SCNC: 30 MMOL/L (ref 22–29)
EGFRCR SERPLBLD CKD-EPI 2021: 70 ML/MIN/1.73M2
ERYTHROCYTE [DISTWIDTH] IN BLOOD BY AUTOMATED COUNT: 13 % (ref 10–15)
GLUCOSE SERPL-MCNC: 96 MG/DL (ref 70–99)
HCT VFR BLD AUTO: 42.9 % (ref 35–47)
HGB BLD-MCNC: 13.7 G/DL (ref 11.7–15.7)
HOLD SPECIMEN: NORMAL
MCH RBC QN AUTO: 31.1 PG (ref 26.5–33)
MCHC RBC AUTO-ENTMCNC: 31.9 G/DL (ref 31.5–36.5)
MCV RBC AUTO: 97 FL (ref 78–100)
PLATELET # BLD AUTO: 209 10E3/UL (ref 150–450)
POTASSIUM SERPL-SCNC: 4.1 MMOL/L (ref 3.4–5.3)
RBC # BLD AUTO: 4.41 10E6/UL (ref 3.8–5.2)
SODIUM SERPL-SCNC: 141 MMOL/L (ref 135–145)
WBC # BLD AUTO: 5.5 10E3/UL (ref 4–11)

## 2024-07-02 PROCEDURE — 80048 BASIC METABOLIC PNL TOTAL CA: CPT | Performed by: NURSE PRACTITIONER

## 2024-07-02 PROCEDURE — 36415 COLL VENOUS BLD VENIPUNCTURE: CPT | Performed by: NURSE PRACTITIONER

## 2024-07-02 PROCEDURE — 99214 OFFICE O/P EST MOD 30 MIN: CPT | Performed by: NURSE PRACTITIONER

## 2024-07-02 PROCEDURE — 85027 COMPLETE CBC AUTOMATED: CPT | Performed by: NURSE PRACTITIONER

## 2024-07-02 ASSESSMENT — PAIN SCALES - GENERAL: PAINLEVEL: EXTREME PAIN (9)

## 2024-07-02 NOTE — PATIENT INSTRUCTIONS
How to Take Your Medication Before Surgery  Preoperative Medication Instructions   Antiplatelet or Anticoagulation Medication Instructions   - apixaban (Eliquis), edoxaban (Savaysa), rivaroxaban (Xarelto): Neuraxial or regional block anticipated AND CrCL (>=) 50mL/min. DO NOT TAKE 3 days before surgery.     Additional Medication Instructions   - ACE/ARB: DO NOT TAKE on day of surgery (minimum 11 hours for general anesthesia).   - Beta Blockers: Continue taking on the day of surgery.   - Calcium Channel Blockers: May be continued on the day of surgery.   - Diuretics: DO NOT TAKE on the day of surgery.   - Statins: Continue taking on the day of surgery.        Patient Education   Preparing for Your Surgery  Getting started  A nurse will call you to review your health history and instructions. They will give you an arrival time based on your scheduled surgery time. Please be ready to share:  Your doctor's clinic name and phone number  Your medical, surgical, and anesthesia history  A list of allergies and sensitivities  A list of medicines, including herbal treatments and over-the-counter drugs  Whether the patient has a legal guardian (ask how to send us the papers in advance)  Please tell us if you're pregnant--or if there's any chance you might be pregnant. Some surgeries may injure a fetus (unborn baby), so they require a pregnancy test. Surgeries that are safe for a fetus don't always need a test, and you can choose whether to have one.   If you have a child who's having surgery, please ask for a copy of Preparing for Your Child's Surgery.    Preparing for surgery  Within 10 to 30 days of surgery: Have a pre-op exam (sometimes called an H&P, or History and Physical). This can be done at a clinic or pre-operative center.  If you're having a , you may not need this exam. Talk to your care team.  At your pre-op exam, talk to your care team about all medicines you take. If you need to stop any medicines  before surgery, ask when to start taking them again.  We do this for your safety. Many medicines can make you bleed too much during surgery. Some change how well surgery (anesthesia) drugs work.  Call your insurance company to let them know you're having surgery. (If you don't have insurance, call [phone].)  Call your clinic if there's any change in your health. This includes signs of a cold or flu (sore throat, runny nose, cough, rash, fever). It also includes a scrape or scratch near the surgery site.  If you have questions on the day of surgery, call your hospital or surgery center.  Eating and drinking guidelines  For your safety: Unless your surgeon tells you otherwise, follow the guidelines below.  Eat and drink as usual until 8 hours before you arrive for surgery. After that, no food or milk.  Drink clear liquids until 2 hours before you arrive. These are liquids you can see through, like water, Gatorade, and Propel Water. They also include plain black coffee and tea (no cream or milk), candy, and breath mints. You can spit out gum when you arrive.  If you drink alcohol: Stop drinking it the night before surgery.  If your care team tells you to take medicine on the morning of surgery, it's okay to take it with a sip of water.  Preventing infection  Shower or bathe the night before and morning of your surgery. Follow the instructions your clinic gave you. (If no instructions, use regular soap.)  Don't shave or clip hair near your surgery site. We'll remove the hair if needed.  Don't smoke or vape the morning of surgery. You may chew nicotine gum up to 2 hours before surgery. A nicotine patch is okay.  Note: Some surgeries require you to completely quit smoking and nicotine. Check with your surgeon.  Your care team will make every effort to keep you safe from infection. We will:  Clean our hands often with soap and water (or an alcohol-based hand rub).  Clean the skin at your surgery site with a special  soap that kills germs.  Give you a special gown to keep you warm. (Cold raises the risk of infection.)  Wear special hair covers, masks, gowns and gloves during surgery.  Give antibiotic medicine, if prescribed. Not all surgeries need antibiotics.  What to bring on the day of surgery  Photo ID and insurance card  Copy of your health care directive, if you have one  Glasses and hearing aids (bring cases)  You can't wear contacts during surgery  Inhaler and eye drops, if you use them (tell us about these when you arrive)  CPAP machine or breathing device, if you use them  A few personal items, if spending the night  If you have . . .  A pacemaker, ICD (cardiac defibrillator) or other implant: Bring the ID card.  An implanted stimulator: Bring the remote control.  A legal guardian: Bring a copy of the certified (court-stamped) guardianship papers.  Please remove any jewelry, including body piercings. Leave jewelry and other valuables at home.  If you're going home the day of surgery  You must have a responsible adult drive you home. They should stay with you overnight as well.  If you don't have someone to stay with you, and you aren't safe to go home alone, we may keep you overnight. Insurance often won't pay for this.  After surgery  If it's hard to control your pain or you need more pain medicine, please call your surgeon's office.  Questions?   If you have any questions for your care team, list them here: _________________________________________________________________________________________________________________________________________________________________________ ____________________________________ ____________________________________ ____________________________________  For informational purposes only. Not to replace the advice of your health care provider. Copyright   2003, 2019 [facility]. All rights reserved. Clinically reviewed by Rima Floyd MD. [employer] [id number] - REV 12/22.

## 2024-07-02 NOTE — H&P (VIEW-ONLY)
Preoperative Evaluation  [facility]  [address]  [facility] [address]  Phone: [phone]  Primary Provider: Kathya Escalera DO  Pre-op Performing Provider: DELMY Davis CNP  Jul 2, 2024 6/27/2024   Surgical Information   What procedure is being done?  LUMBAR 1- LUMBAR 2 TRANSFORAMINAL INTERBODY FUSION, POSTERIOR INSTRUMENTED FUSION, DECOMPRESSION, WITH O-ARM AND STEALTH NAVIGATION    Facility or Hospital where procedure/surgery will be performed: [facility]   Who is doing the procedure / surgery? Chavo Patel MD    Date of surgery / procedure: July 3, 2024   Time of surgery / procedure: 2:00 p m   Where do you plan to recover after surgery? at home with family        Fax number for surgical facility: Fax to [facility] : [phone]  Note does not need to be faxed, will be available electronically in Epic.    Assessment & Plan     The proposed surgical procedure is considered INTERMEDIATE risk.    Preop general physical exam  Spinal stenosis of lumbar region, unspecified whether neurogenic claudication present  Preoperative exam completed today.  History of spinal stenosis has failed conservative management.  Recommended surgical procedure to help with disabling symptoms.  She has been cleared from a cardiology standpoint with an echocardiogram and Lexiscan stress test.  Reviewed medication recommendations.  Okay to continue on procedure without further clinical clarification.  - CBC with Platelets and Reflex to Iron Studies; Future  - Basic metabolic panel  (Ca, Cl, CO2, Creat, Gluc, K, Na, BUN); Future  - CBC with Platelets and Reflex to Iron Studies  - Basic metabolic panel  (Ca, Cl, CO2, Creat, Gluc, K, Na, BUN)    Atrial fibrillation with RVR (H)  Hyperlipidemia LDL goal <130  Cardiac pacemaker in situ  SSS (sick sinus syndrome) (H)  Doing well and cleared from a cardiology standpoint for upcoming surgery. Labs updated  today. Echocardiogram and Lexiscan stress test completed June 2024.   - CBC with Platelets and Reflex to Iron Studies; Future  - Basic metabolic panel  (Ca, Cl, CO2, Creat, Gluc, K, Na, BUN); Future  - CBC with Platelets and Reflex to Iron Studies  - Basic metabolic panel  (Ca, Cl, CO2, Creat, Gluc, K, Na, BUN)       Implanted Device   - Type of device: [employer] Pacemaker Patient advised to bring device information on day of surgery.       - No identified additional risk factors other than previously addressed    Preoperative Medication Instructions  Antiplatelet or Anticoagulation Medication Instructions   - apixaban (Eliquis), edoxaban (Savaysa), rivaroxaban (Xarelto): Neuraxial or regional block anticipated AND CrCL (>=) 50mL/min. DO NOT TAKE 3 days before surgery.     Additional Medication Instructions   - ACE/ARB: DO NOT TAKE on day of surgery (minimum 11 hours for general anesthesia).   - Beta Blockers: Continue taking on the day of surgery.   - Calcium Channel Blockers: May be continued on the day of surgery.   - Diuretics: DO NOT TAKE on the day of surgery.   - Statins: Continue taking on the day of surgery.     Recommendation  Approval given to proceed with proposed procedure, without further diagnostic evaluation.-- She has been cleared by cardiology.     Elle Velez is a 84 year old, presenting for the following:  Pre-Op Exam          7/2/2024     9:40 AM   Additional Questions   Roomed by Lizbeth WILEY related to upcoming procedure:   History of back surgery 4 years ago. Had some worsening pain in her left low back and hip. Tried physical therapy without improvement, made it worse for her. She has been using a daily heat pad due to the pain. Sitting she is good. Standing and walking causing pain in left lower back and radiates to the hip. She has failed conservative management. Determined next treatment option for symptom improvement to be surgical.         6/27/2024   Pre-Op  Questionnaire   Have you ever had a heart attack or stroke? No   Have you ever had surgery on your heart or blood vessels, such as a stent placement, a coronary artery bypass, or surgery on an artery in your head, neck, heart, or legs? (!) YES has a pacemaker   Do you have chest pain with activity? No   Do you have a history of heart failure? No   Do you currently have a cold, bronchitis or symptoms of other infection? No   Do you have a cough, shortness of breath, or wheezing? No   Do you or anyone in your family have previous history of blood clots? No   Do you or does anyone in your family have a serious bleeding problem such as prolonged bleeding following surgeries or cuts? No   Have you ever had problems with anemia or been told to take iron pills? No   Have you had any abnormal blood loss such as black, tarry or bloody stools, or abnormal vaginal bleeding? No   Have you ever had a blood transfusion? No   Are you willing to have a blood transfusion if it is medically needed before, during, or after your surgery? Yes   Have you or any of your relatives ever had problems with anesthesia? No   Do you have sleep apnea, excessive snoring or daytime drowsiness? No   Do you have any artifical heart valves or other implanted medical devices like a pacemaker, defibrillator, or continuous glucose monitor? (!) YES   What type of device do you have? [employer] pacemaker   Name of the clinic that manages your device [facility]   Do you have artificial joints? No   Are you allergic to latex? No        Health Care Directive  Patient has a Health Care Directive on file      Preoperative Review of    reviewed - no record of controlled substances prescribed.      Status of Chronic Conditions:  A-FIB - Patient has a longstanding history of chronic A-fib currently on rate control. Current treatment regimen includes Apixaban for stroke prevention and denies significant symptoms of lightheadedness,  palpitations or dyspnea.     HYPERLIPIDEMIA - Patient has a long history of significant Hyperlipidemia requiring medication for treatment with recent good control. Patient reports no problems or side effects with the medication.     HYPERTENSION - Patient has longstanding history of HTN , currently denies any symptoms referable to elevated blood pressure. Specifically denies chest pain, palpitations, dyspnea, orthopnea, PND or peripheral edema. Blood pressure readings have been in normal range. Current medication regimen is as listed below. Patient denies any side effects of medication.     Patient Active Problem List    Diagnosis Date Noted    SSS (sick sinus syndrome) (H) 01/24/2023     Priority: Medium     S/p pacer 10/22      Cardiac pacemaker in situ 01/24/2023     Priority: Medium     Due to SSS 10/2022      Paroxysmal atrial fibrillation (H) 08/18/2022     Priority: Medium     TIA 3/2022.  Tachy-soha s/p PPM 10/2022;  On Eliquis indefinitely      Diastolic dysfunction 06/28/2022     Priority: Medium     Seen on Echo 6/2022      TIA (transient ischemic attack) 04/27/2022     Priority: Medium     Probable 3/22. sudden onset left mouth drooping, right hand weakness, trouble speaking, lasted 30 sec.  Did not seek medical care.  Did not tolerate ASA - GI upset and bleeding/bruising.  Likely due to occult A. fib.  Recommend Eliquis indefinitely      Incisional hernia of anterior abdominal wall without obstruction or gangrene 02/22/2021     Priority: Medium     Added automatically from request for surgery [id number]      S/P exploratory laparotomy 07/24/2020     Priority: Medium    Ovarian cancer, right (H) 07/24/2020     Priority: Medium     7/2020 Right ovary with ENDOMETRIOID ADENOCARCINOMA, FIGO grade 1       Tear of gluteus medius tendon 07/09/2020     Priority: Medium    Primary osteoarthritis of right hip 07/09/2020     Priority: Medium    Spinal stenosis of lumbar region, unspecified whether neurogenic  claudication present 07/09/2020     Priority: Medium    Obesity (BMI 35.0-39.9) with comorbidity (H) 11/28/2018     Priority: Medium    Lichen sclerosus of female genitalia 07/03/2018     Priority: Medium    Gastroesophageal reflux disease, esophagitis presence not specified 10/12/2017     Priority: Medium    Hiatal hernia 10/21/2013     Priority: Medium     Large; found on gastroscopy 10/2013; No GERD sx; chronic throat clearing; + H pylori      Plantar fasciitis 04/02/2012     Priority: Medium    Hyperlipidemia LDL goal <130 10/31/2010     Priority: Medium     Intolerant to all other statins (2013)      Prediabetes 09/03/2010     Priority: Medium    Diverticulosis of colon 05/24/2010     Priority: Medium     Noted on colonoscopy 5/10      Colon polyps 05/24/2010     Priority: Medium    Pes planus      Priority: Medium    Personal history of contact with and (suspected) exposure to asbestos      Priority: Medium             exposed 12 years in childhood      Donor of other blood      Priority: Medium           has false + syphyllis  Problem list name updated by automated process. Provider to review      Irritable bowel syndrome      Priority: Medium    ALLERGIC RHINITIS       Priority: Medium     Resolving with dietary changes; stopping gluten      History of recurrent UTIs      Priority: Medium             recurrent  Problem list name updated by automated process. Provider to review      Essential hypertension      Priority: Medium      Past Medical History:   Diagnosis Date    Chronic airway obstruction (H)     Diastolic dysfunction 06/28/2022    Gastroesophageal reflux disease     Hiatal hernia     Hypertension     Irregular heart beat     Malignant neoplasm of ovary (H)     Ovarian cancer, unspecified laterality (H) 03/10/2021    Personal history of contact with and (suspected) exposure to asbestos     Primary osteoarthritis of right hip 07/09/2020     Past Surgical History:   Procedure Laterality Date     BIOPSY BREAST Left     BREAST BIOPSY, CORE RT/LT  1994    CHOLECYSTECTOMY  1995    COLONOSCOPY  05/2010    Diverticulosis, colon polyps; repeat 5 yrs    COLONOSCOPY N/A 11/16/2015    Procedure: COLONOSCOPY;  Surgeon: Alejandro Matos MD;  Location: [facility]    COLONOSCOPY N/A 09/17/2021    Procedure: COLONOSCOPY;  Surgeon: Aayush George MD;  Location: [facility]    Colonoscopy, hyperplastic polyps, repeat in 5 yrs  2004    EP PACEMAKER DEVICE & LEAD IMPLANT- RIGHT ATRIAL & RIGHT VENTRICULAR Left 10/24/2022    Procedure: Pacemaker Device & Lead Implant - Right Atrial & Right Ventricular;  Surgeon: Demond Meyer MD;  [facility]    ESOPHAGOSCOPY, GASTROSCOPY, DUODENOSCOPY (EGD), COMBINED  09/30/2013    Procedure: COMBINED ESOPHAGOSCOPY, GASTROSCOPY, DUODENOSCOPY (EGD), BIOPSY SINGLE OR MULTIPLE;  Gastroscopy;  Surgeon: Blaise Gill MD;  Location: [facility]    EYE SURGERY  2012    R/L Cataracts    HC REMOVE TONSILS/ADENOIDS,13+ Y/O      T & A 13+y.o.    HERNIORRHAPHY INCISIONAL (LOCATION) N/A 03/24/2021    Procedure: HERNIORRHAPHY, INCISIONAL, OPEN WITH MESH;  Surgeon: Aayush George MD;  Location: [facility]    HYSTERECTOMY, PAP NO LONGER INDICATED      LAPAROSCOPIC SALPINGO-OOPHORECTOMY N/A 07/24/2020    Procedure: Laparoscopy, removal or pelvic mass, both tubes and ovaries, omentectomy, anterior culvectomy, pelvic and para aortic lymph node dissection, laparotomy;  Surgeon: Felipe Corona MD;  Location: [facility]    PA ANESTH,LUMBAR SPINE,CORD SURGERY  10/2020    L3-4 L4-5 TRANSFORAMINAL INTERBODY FUSION L3-5, POSTERIOR INSTRUMENTED FUSION AND DECOMPRESSION    TVH for DUB, ovaries in      VASCULAR SURGERY  2014    [place] closure    ZZC ANESTH,KNEE VEINS SURGERY  08/20/2014     Current Outpatient Medications   Medication Sig Dispense Refill    diltiazem ER COATED BEADS (CARDIZEM CD/CARTIA XT) 240 MG 24 hr capsule Take 1 capsule (240 mg) by mouth daily 90 capsule 3    metoprolol  tartrate (LOPRESSOR) 100 MG tablet Take 1 tablet (100 mg) by mouth 2 times daily 180 tablet 3    pravastatin (PRAVACHOL) 80 MG tablet Take 1 tablet (80 mg) by mouth daily 90 tablet 3    THERATEARS 0.25 % OP SOLN Place 2 drops into both eyes as needed      valsartan-hydrochlorothiazide (DIOVAN HCT) 320-25 MG tablet Take 1 tablet by mouth daily 90 tablet 3    apixaban ANTICOAGULANT (ELIQUIS ANTICOAGULANT) 5 MG tablet Take 1 tablet (5 mg) by mouth 2 times daily (Patient not taking: Reported on 2024) 180 tablet 3    Evening Primrose Oil 1000 MG CAPS Take 1 capsule by mouth daily One daily (Patient not taking: Reported on 2024)      Fish Oil-Cholecalciferol (OMEGA-3 + D PO) Take 1 capsule by mouth daily (Patient not taking: Reported on 2024)      meloxicam (MOBIC) 15 MG tablet Take 1 tablet (15 mg) by mouth daily as needed for moderate pain (Patient not taking: Reported on 2024) 30 tablet 11    mometasone (ELOCON) 0.1 % external cream 2-3 times per week (Patient not taking: Reported on 2024) 45 g 11       Allergies   Allergen Reactions    Atorvastatin Other (See Comments)     Muscle Pain    Ezetimibe Other (See Comments)     Severe muscle aches    Irbesartan Cramps    Lisinopril     Omeprazole      Other reaction(s): *Unknown    Prilosec [Omeprazole]     Rosuvastatin Other (See Comments)     Muscle Pain    Zestril [Ace Inhibitors] Cough        Social History     Tobacco Use    Smoking status: Never    Smokeless tobacco: Never   Substance Use Topics    Alcohol use: No     Family History   Problem Relation Age of Onset    Cancer Mother         uterine cancer,     Respiratory Mother         COPD    Cardiovascular Mother         AAA  age 80    Eye Disorder Father         cataracts    Depression Father     Snoring Father     Breast Cancer Sister     Breast Cancer Sister         X2    Breast Cancer Maternal Grandmother     Cancer Maternal Grandfather         cancer unknown type    Depression Son      "Depression Son     Cancer - colorectal No family hx of         no ovarian cancer     History   Drug Use No             Review of Systems  Constitutional, neuro, ENT, endocrine, pulmonary, cardiac, gastrointestinal, genitourinary, musculoskeletal, integument and psychiatric systems are negative, except as otherwise noted.    Objective    /68   Pulse 66   Temp 97.2  F (36.2  C) (Tympanic)   Resp 20   Ht 1.6 m (5' 3\")   Wt 94.8 kg (209 lb)   SpO2 98%   BMI 37.02 kg/m     Estimated body mass index is 37.02 kg/m  as calculated from the following:    Height as of this encounter: 1.6 m (5' 3\").    Weight as of this encounter: 94.8 kg (209 lb).    Physical Exam  GENERAL: alert and no distress  EYES: Eyes grossly normal to inspection, PERRL and conjunctivae and sclerae normal  HENT: ear canals and TM's normal, nose and mouth without ulcers or lesions  NECK: no adenopathy, no asymmetry, masses, or scars  RESP: lungs clear to auscultation - no rales, rhonchi or wheezes  CV: regular rate and rhythm, normal S1 S2, no S3 or S4, no murmur, click or rub, no peripheral edema  ABDOMEN: soft, nontender, no hepatosplenomegaly, no masses and bowel sounds normal  MS: no gross musculoskeletal defects noted, no edema  SKIN: no suspicious lesions or rashes  NEURO: Normal strength and tone, mentation intact and speech normal  PSYCH: mentation appears normal, affect normal/bright    Recent Labs   Lab Test 01/22/24  1513 11/10/23  1055 10/05/23  0911   HGB 14.2  --  13.9     --  203    140 142   POTASSIUM 4.1 3.7 3.9   CR 0.92 0.98* 0.85        Diagnostics  No labs were ordered during this visit.   No EKG this visit, completed in the last 90 days.    Revised Cardiac Risk Index (RCRI)  The patient has the following serious cardiovascular risks for perioperative complications:   - Coronary Artery Disease (MI, positive stress test, angina, Qs on EKG) = 1 point   - Cerebrovascular Disease (TIA or CVA) = 1 point     RCRI " Interpretation: 2 points: Class III (moderate risk - 6.6% complication rate)     Estimated Functional Capacity: CANNOT perform 4 METS without symptoms           Signed Electronically by: DELMY Davis CNP  Copy of this evaluation report is provided to requesting physician.

## 2024-07-02 NOTE — TELEPHONE ENCOUNTER
Pls let Rosie know that I reviewed her stress test with Dr. Rollins.  This was done to ensure major ischemia was not a cause of her mild reduction in LVEF seen on echo 6/2024 (mild drop 50-55%)      1.  Stress test 6/26 overall looks stable compared to that done 4/2023.  Small area of infarct in the distal apical/anterior segments - no major areas of ischemia    2.  Stress test also showed improvement in pumping function back to normal 64%.  Stress test are not as accurate as echo is in determining if EF, but certainly looks better.    PLAN:  1.  No changes needed -will continue BB and statin therapy.  No ASA due to Eliquis use  2.  Okay to proceed with surgery as planned with no additional cardiac testing required    I have cc'd Cora Sams, whom she sees later this morning for preop with this update as well    Thanks-Gema  July 2, 2024 at 7:17 AM

## 2024-07-02 NOTE — PROGRESS NOTES
Preoperative Evaluation  [facility]  [address]  [facility] [address]  Phone: [phone]  Primary Provider: Kathya Escalera DO  Pre-op Performing Provider: DELMY Davis CNP  Jul 2, 2024 6/27/2024   Surgical Information   What procedure is being done?  LUMBAR 1- LUMBAR 2 TRANSFORAMINAL INTERBODY FUSION, POSTERIOR INSTRUMENTED FUSION, DECOMPRESSION, WITH O-ARM AND STEALTH NAVIGATION    Facility or Hospital where procedure/surgery will be performed: [facility]   Who is doing the procedure / surgery? Chavo Patel MD    Date of surgery / procedure: July 3, 2024   Time of surgery / procedure: 2:00 p m   Where do you plan to recover after surgery? at home with family        Fax number for surgical facility: Fax to [facility] : [phone]  Note does not need to be faxed, will be available electronically in Epic.    Assessment & Plan     The proposed surgical procedure is considered INTERMEDIATE risk.    Preop general physical exam  Spinal stenosis of lumbar region, unspecified whether neurogenic claudication present  Preoperative exam completed today.  History of spinal stenosis has failed conservative management.  Recommended surgical procedure to help with disabling symptoms.  She has been cleared from a cardiology standpoint with an echocardiogram and Lexiscan stress test.  Reviewed medication recommendations.  Okay to continue on procedure without further clinical clarification.  - CBC with Platelets and Reflex to Iron Studies; Future  - Basic metabolic panel  (Ca, Cl, CO2, Creat, Gluc, K, Na, BUN); Future  - CBC with Platelets and Reflex to Iron Studies  - Basic metabolic panel  (Ca, Cl, CO2, Creat, Gluc, K, Na, BUN)    Atrial fibrillation with RVR (H)  Hyperlipidemia LDL goal <130  Cardiac pacemaker in situ  SSS (sick sinus syndrome) (H)  Doing well and cleared from a cardiology standpoint for upcoming surgery. Labs updated  today. Echocardiogram and Lexiscan stress test completed June 2024.   - CBC with Platelets and Reflex to Iron Studies; Future  - Basic metabolic panel  (Ca, Cl, CO2, Creat, Gluc, K, Na, BUN); Future  - CBC with Platelets and Reflex to Iron Studies  - Basic metabolic panel  (Ca, Cl, CO2, Creat, Gluc, K, Na, BUN)       Implanted Device   - Type of device: [employer] Pacemaker Patient advised to bring device information on day of surgery.       - No identified additional risk factors other than previously addressed    Preoperative Medication Instructions  Antiplatelet or Anticoagulation Medication Instructions   - apixaban (Eliquis), edoxaban (Savaysa), rivaroxaban (Xarelto): Neuraxial or regional block anticipated AND CrCL (>=) 50mL/min. DO NOT TAKE 3 days before surgery.     Additional Medication Instructions   - ACE/ARB: DO NOT TAKE on day of surgery (minimum 11 hours for general anesthesia).   - Beta Blockers: Continue taking on the day of surgery.   - Calcium Channel Blockers: May be continued on the day of surgery.   - Diuretics: DO NOT TAKE on the day of surgery.   - Statins: Continue taking on the day of surgery.     Recommendation  Approval given to proceed with proposed procedure, without further diagnostic evaluation.-- She has been cleared by cardiology.     Elle Velez is a 84 year old, presenting for the following:  Pre-Op Exam          7/2/2024     9:40 AM   Additional Questions   Roomed by Lizbeth WILEY related to upcoming procedure:   History of back surgery 4 years ago. Had some worsening pain in her left low back and hip. Tried physical therapy without improvement, made it worse for her. She has been using a daily heat pad due to the pain. Sitting she is good. Standing and walking causing pain in left lower back and radiates to the hip. She has failed conservative management. Determined next treatment option for symptom improvement to be surgical.         6/27/2024   Pre-Op  Questionnaire   Have you ever had a heart attack or stroke? No   Have you ever had surgery on your heart or blood vessels, such as a stent placement, a coronary artery bypass, or surgery on an artery in your head, neck, heart, or legs? (!) YES has a pacemaker   Do you have chest pain with activity? No   Do you have a history of heart failure? No   Do you currently have a cold, bronchitis or symptoms of other infection? No   Do you have a cough, shortness of breath, or wheezing? No   Do you or anyone in your family have previous history of blood clots? No   Do you or does anyone in your family have a serious bleeding problem such as prolonged bleeding following surgeries or cuts? No   Have you ever had problems with anemia or been told to take iron pills? No   Have you had any abnormal blood loss such as black, tarry or bloody stools, or abnormal vaginal bleeding? No   Have you ever had a blood transfusion? No   Are you willing to have a blood transfusion if it is medically needed before, during, or after your surgery? Yes   Have you or any of your relatives ever had problems with anesthesia? No   Do you have sleep apnea, excessive snoring or daytime drowsiness? No   Do you have any artifical heart valves or other implanted medical devices like a pacemaker, defibrillator, or continuous glucose monitor? (!) YES   What type of device do you have? [employer] pacemaker   Name of the clinic that manages your device [facility]   Do you have artificial joints? No   Are you allergic to latex? No        Health Care Directive  Patient has a Health Care Directive on file      Preoperative Review of    reviewed - no record of controlled substances prescribed.      Status of Chronic Conditions:  A-FIB - Patient has a longstanding history of chronic A-fib currently on rate control. Current treatment regimen includes Apixaban for stroke prevention and denies significant symptoms of lightheadedness,  palpitations or dyspnea.     HYPERLIPIDEMIA - Patient has a long history of significant Hyperlipidemia requiring medication for treatment with recent good control. Patient reports no problems or side effects with the medication.     HYPERTENSION - Patient has longstanding history of HTN , currently denies any symptoms referable to elevated blood pressure. Specifically denies chest pain, palpitations, dyspnea, orthopnea, PND or peripheral edema. Blood pressure readings have been in normal range. Current medication regimen is as listed below. Patient denies any side effects of medication.     Patient Active Problem List    Diagnosis Date Noted    SSS (sick sinus syndrome) (H) 01/24/2023     Priority: Medium     S/p pacer 10/22      Cardiac pacemaker in situ 01/24/2023     Priority: Medium     Due to SSS 10/2022      Paroxysmal atrial fibrillation (H) 08/18/2022     Priority: Medium     TIA 3/2022.  Tachy-soha s/p PPM 10/2022;  On Eliquis indefinitely      Diastolic dysfunction 06/28/2022     Priority: Medium     Seen on Echo 6/2022      TIA (transient ischemic attack) 04/27/2022     Priority: Medium     Probable 3/22. sudden onset left mouth drooping, right hand weakness, trouble speaking, lasted 30 sec.  Did not seek medical care.  Did not tolerate ASA - GI upset and bleeding/bruising.  Likely due to occult A. fib.  Recommend Eliquis indefinitely      Incisional hernia of anterior abdominal wall without obstruction or gangrene 02/22/2021     Priority: Medium     Added automatically from request for surgery [id number]      S/P exploratory laparotomy 07/24/2020     Priority: Medium    Ovarian cancer, right (H) 07/24/2020     Priority: Medium     7/2020 Right ovary with ENDOMETRIOID ADENOCARCINOMA, FIGO grade 1       Tear of gluteus medius tendon 07/09/2020     Priority: Medium    Primary osteoarthritis of right hip 07/09/2020     Priority: Medium    Spinal stenosis of lumbar region, unspecified whether neurogenic  claudication present 07/09/2020     Priority: Medium    Obesity (BMI 35.0-39.9) with comorbidity (H) 11/28/2018     Priority: Medium    Lichen sclerosus of female genitalia 07/03/2018     Priority: Medium    Gastroesophageal reflux disease, esophagitis presence not specified 10/12/2017     Priority: Medium    Hiatal hernia 10/21/2013     Priority: Medium     Large; found on gastroscopy 10/2013; No GERD sx; chronic throat clearing; + H pylori      Plantar fasciitis 04/02/2012     Priority: Medium    Hyperlipidemia LDL goal <130 10/31/2010     Priority: Medium     Intolerant to all other statins (2013)      Prediabetes 09/03/2010     Priority: Medium    Diverticulosis of colon 05/24/2010     Priority: Medium     Noted on colonoscopy 5/10      Colon polyps 05/24/2010     Priority: Medium    Pes planus      Priority: Medium    Personal history of contact with and (suspected) exposure to asbestos      Priority: Medium             exposed 12 years in childhood      Donor of other blood      Priority: Medium           has false + syphyllis  Problem list name updated by automated process. Provider to review      Irritable bowel syndrome      Priority: Medium    ALLERGIC RHINITIS       Priority: Medium     Resolving with dietary changes; stopping gluten      History of recurrent UTIs      Priority: Medium             recurrent  Problem list name updated by automated process. Provider to review      Essential hypertension      Priority: Medium      Past Medical History:   Diagnosis Date    Chronic airway obstruction (H)     Diastolic dysfunction 06/28/2022    Gastroesophageal reflux disease     Hiatal hernia     Hypertension     Irregular heart beat     Malignant neoplasm of ovary (H)     Ovarian cancer, unspecified laterality (H) 03/10/2021    Personal history of contact with and (suspected) exposure to asbestos     Primary osteoarthritis of right hip 07/09/2020     Past Surgical History:   Procedure Laterality Date     BIOPSY BREAST Left     BREAST BIOPSY, CORE RT/LT  1994    CHOLECYSTECTOMY  1995    COLONOSCOPY  05/2010    Diverticulosis, colon polyps; repeat 5 yrs    COLONOSCOPY N/A 11/16/2015    Procedure: COLONOSCOPY;  Surgeon: Alejandro Matos MD;  Location: [facility]    COLONOSCOPY N/A 09/17/2021    Procedure: COLONOSCOPY;  Surgeon: Aayush George MD;  Location: [facility]    Colonoscopy, hyperplastic polyps, repeat in 5 yrs  2004    EP PACEMAKER DEVICE & LEAD IMPLANT- RIGHT ATRIAL & RIGHT VENTRICULAR Left 10/24/2022    Procedure: Pacemaker Device & Lead Implant - Right Atrial & Right Ventricular;  Surgeon: Demond Meyer MD;  [facility]    ESOPHAGOSCOPY, GASTROSCOPY, DUODENOSCOPY (EGD), COMBINED  09/30/2013    Procedure: COMBINED ESOPHAGOSCOPY, GASTROSCOPY, DUODENOSCOPY (EGD), BIOPSY SINGLE OR MULTIPLE;  Gastroscopy;  Surgeon: Blaise Gill MD;  Location: [facility]    EYE SURGERY  2012    R/L Cataracts    HC REMOVE TONSILS/ADENOIDS,13+ Y/O      T & A 13+y.o.    HERNIORRHAPHY INCISIONAL (LOCATION) N/A 03/24/2021    Procedure: HERNIORRHAPHY, INCISIONAL, OPEN WITH MESH;  Surgeon: Aayush George MD;  Location: [facility]    HYSTERECTOMY, PAP NO LONGER INDICATED      LAPAROSCOPIC SALPINGO-OOPHORECTOMY N/A 07/24/2020    Procedure: Laparoscopy, removal or pelvic mass, both tubes and ovaries, omentectomy, anterior culvectomy, pelvic and para aortic lymph node dissection, laparotomy;  Surgeon: Felipe Corona MD;  Location: [facility]    MD ANESTH,LUMBAR SPINE,CORD SURGERY  10/2020    L3-4 L4-5 TRANSFORAMINAL INTERBODY FUSION L3-5, POSTERIOR INSTRUMENTED FUSION AND DECOMPRESSION    TVH for DUB, ovaries in      VASCULAR SURGERY  2014    [place] closure    ZZC ANESTH,KNEE VEINS SURGERY  08/20/2014     Current Outpatient Medications   Medication Sig Dispense Refill    diltiazem ER COATED BEADS (CARDIZEM CD/CARTIA XT) 240 MG 24 hr capsule Take 1 capsule (240 mg) by mouth daily 90 capsule 3    metoprolol  tartrate (LOPRESSOR) 100 MG tablet Take 1 tablet (100 mg) by mouth 2 times daily 180 tablet 3    pravastatin (PRAVACHOL) 80 MG tablet Take 1 tablet (80 mg) by mouth daily 90 tablet 3    THERATEARS 0.25 % OP SOLN Place 2 drops into both eyes as needed      valsartan-hydrochlorothiazide (DIOVAN HCT) 320-25 MG tablet Take 1 tablet by mouth daily 90 tablet 3    apixaban ANTICOAGULANT (ELIQUIS ANTICOAGULANT) 5 MG tablet Take 1 tablet (5 mg) by mouth 2 times daily (Patient not taking: Reported on 2024) 180 tablet 3    Evening Primrose Oil 1000 MG CAPS Take 1 capsule by mouth daily One daily (Patient not taking: Reported on 2024)      Fish Oil-Cholecalciferol (OMEGA-3 + D PO) Take 1 capsule by mouth daily (Patient not taking: Reported on 2024)      meloxicam (MOBIC) 15 MG tablet Take 1 tablet (15 mg) by mouth daily as needed for moderate pain (Patient not taking: Reported on 2024) 30 tablet 11    mometasone (ELOCON) 0.1 % external cream 2-3 times per week (Patient not taking: Reported on 2024) 45 g 11       Allergies   Allergen Reactions    Atorvastatin Other (See Comments)     Muscle Pain    Ezetimibe Other (See Comments)     Severe muscle aches    Irbesartan Cramps    Lisinopril     Omeprazole      Other reaction(s): *Unknown    Prilosec [Omeprazole]     Rosuvastatin Other (See Comments)     Muscle Pain    Zestril [Ace Inhibitors] Cough        Social History     Tobacco Use    Smoking status: Never    Smokeless tobacco: Never   Substance Use Topics    Alcohol use: No     Family History   Problem Relation Age of Onset    Cancer Mother         uterine cancer,     Respiratory Mother         COPD    Cardiovascular Mother         AAA  age 80    Eye Disorder Father         cataracts    Depression Father     Snoring Father     Breast Cancer Sister     Breast Cancer Sister         X2    Breast Cancer Maternal Grandmother     Cancer Maternal Grandfather         cancer unknown type    Depression Son      "Depression Son     Cancer - colorectal No family hx of         no ovarian cancer     History   Drug Use No             Review of Systems  Constitutional, neuro, ENT, endocrine, pulmonary, cardiac, gastrointestinal, genitourinary, musculoskeletal, integument and psychiatric systems are negative, except as otherwise noted.    Objective    /68   Pulse 66   Temp 97.2  F (36.2  C) (Tympanic)   Resp 20   Ht 1.6 m (5' 3\")   Wt 94.8 kg (209 lb)   SpO2 98%   BMI 37.02 kg/m     Estimated body mass index is 37.02 kg/m  as calculated from the following:    Height as of this encounter: 1.6 m (5' 3\").    Weight as of this encounter: 94.8 kg (209 lb).    Physical Exam  GENERAL: alert and no distress  EYES: Eyes grossly normal to inspection, PERRL and conjunctivae and sclerae normal  HENT: ear canals and TM's normal, nose and mouth without ulcers or lesions  NECK: no adenopathy, no asymmetry, masses, or scars  RESP: lungs clear to auscultation - no rales, rhonchi or wheezes  CV: regular rate and rhythm, normal S1 S2, no S3 or S4, no murmur, click or rub, no peripheral edema  ABDOMEN: soft, nontender, no hepatosplenomegaly, no masses and bowel sounds normal  MS: no gross musculoskeletal defects noted, no edema  SKIN: no suspicious lesions or rashes  NEURO: Normal strength and tone, mentation intact and speech normal  PSYCH: mentation appears normal, affect normal/bright    Recent Labs   Lab Test 01/22/24  1513 11/10/23  1055 10/05/23  0911   HGB 14.2  --  13.9     --  203    140 142   POTASSIUM 4.1 3.7 3.9   CR 0.92 0.98* 0.85        Diagnostics  No labs were ordered during this visit.   No EKG this visit, completed in the last 90 days.    Revised Cardiac Risk Index (RCRI)  The patient has the following serious cardiovascular risks for perioperative complications:   - Coronary Artery Disease (MI, positive stress test, angina, Qs on EKG) = 1 point   - Cerebrovascular Disease (TIA or CVA) = 1 point     RCRI " Interpretation: 2 points: Class III (moderate risk - 6.6% complication rate)     Estimated Functional Capacity: CANNOT perform 4 METS without symptoms           Signed Electronically by: DELMY Davis CNP  Copy of this evaluation report is provided to requesting physician.

## 2024-07-03 ENCOUNTER — APPOINTMENT (OUTPATIENT)
Dept: RADIOLOGY | Facility: CLINIC | Age: 84
DRG: 454 | End: 2024-07-03
Attending: ORTHOPAEDIC SURGERY
Payer: MEDICARE

## 2024-07-03 ENCOUNTER — ANESTHESIA EVENT (OUTPATIENT)
Dept: SURGERY | Facility: CLINIC | Age: 84
DRG: 454 | End: 2024-07-03
Payer: MEDICARE

## 2024-07-03 ENCOUNTER — ANESTHESIA (OUTPATIENT)
Dept: SURGERY | Facility: CLINIC | Age: 84
DRG: 454 | End: 2024-07-03
Payer: MEDICARE

## 2024-07-03 ENCOUNTER — HOSPITAL ENCOUNTER (INPATIENT)
Facility: CLINIC | Age: 84
LOS: 3 days | Discharge: HOME | DRG: 454 | End: 2024-07-06
Attending: ORTHOPAEDIC SURGERY | Admitting: ORTHOPAEDIC SURGERY
Payer: MEDICARE

## 2024-07-03 DIAGNOSIS — M48.061 SPINAL STENOSIS OF LUMBAR REGION, UNSPECIFIED WHETHER NEUROGENIC CLAUDICATION PRESENT: Primary | ICD-10-CM

## 2024-07-03 DIAGNOSIS — I48.0 PAROXYSMAL ATRIAL FIBRILLATION (H): ICD-10-CM

## 2024-07-03 PROBLEM — Z79.01 CHRONIC ANTICOAGULATION: Status: ACTIVE | Noted: 2024-07-03

## 2024-07-03 PROBLEM — E66.01 MORBID OBESITY (H): Status: ACTIVE | Noted: 2018-11-28

## 2024-07-03 PROBLEM — M54.50 LUMBAR BACK PAIN: Status: ACTIVE | Noted: 2024-07-03

## 2024-07-03 LAB — GLUCOSE BLDC GLUCOMTR-MCNC: 110 MG/DL (ref 70–99)

## 2024-07-03 PROCEDURE — 250N000011 HC RX IP 250 OP 636: Performed by: ANESTHESIOLOGY

## 2024-07-03 PROCEDURE — 01NB0ZZ RELEASE LUMBAR NERVE, OPEN APPROACH: ICD-10-PCS | Performed by: ORTHOPAEDIC SURGERY

## 2024-07-03 PROCEDURE — 99207 PR APP CREDIT; MD BILLING SHARED VISIT: CPT

## 2024-07-03 PROCEDURE — 8E0WXBZ COMPUTER ASSISTED PROCEDURE OF TRUNK REGION: ICD-10-PCS | Performed by: ORTHOPAEDIC SURGERY

## 2024-07-03 PROCEDURE — 99222 1ST HOSP IP/OBS MODERATE 55: CPT | Performed by: EMERGENCY MEDICINE

## 2024-07-03 PROCEDURE — 250N000009 HC RX 250: Performed by: ANESTHESIOLOGY

## 2024-07-03 PROCEDURE — 999N000141 HC STATISTIC PRE-PROCEDURE NURSING ASSESSMENT: Performed by: ORTHOPAEDIC SURGERY

## 2024-07-03 PROCEDURE — 250N000013 HC RX MED GY IP 250 OP 250 PS 637

## 2024-07-03 PROCEDURE — 0SB20ZZ EXCISION OF LUMBAR VERTEBRAL DISC, OPEN APPROACH: ICD-10-PCS | Performed by: ORTHOPAEDIC SURGERY

## 2024-07-03 PROCEDURE — 258N000003 HC RX IP 258 OP 636: Performed by: ANESTHESIOLOGY

## 2024-07-03 PROCEDURE — 258N000003 HC RX IP 258 OP 636

## 2024-07-03 PROCEDURE — 360N000079 HC SURGERY LEVEL 6, PER MIN: Performed by: ORTHOPAEDIC SURGERY

## 2024-07-03 PROCEDURE — 0SG0071 FUSION OF LUMBAR VERTEBRAL JOINT WITH AUTOLOGOUS TISSUE SUBSTITUTE, POSTERIOR APPROACH, POSTERIOR COLUMN, OPEN APPROACH: ICD-10-PCS | Performed by: ORTHOPAEDIC SURGERY

## 2024-07-03 PROCEDURE — 250N000005 HC OR RX SURGIFLO HEMOSTATIC MATRIX 10ML 199102S OPNP: Performed by: ORTHOPAEDIC SURGERY

## 2024-07-03 PROCEDURE — 250N000009 HC RX 250: Performed by: ORTHOPAEDIC SURGERY

## 2024-07-03 PROCEDURE — 272N000004 HC RX 272: Performed by: ORTHOPAEDIC SURGERY

## 2024-07-03 PROCEDURE — 999N000182 XR SURGERY OARM: Mod: TC

## 2024-07-03 PROCEDURE — 370N000017 HC ANESTHESIA TECHNICAL FEE, PER MIN: Performed by: ORTHOPAEDIC SURGERY

## 2024-07-03 PROCEDURE — 120N000001 HC R&B MED SURG/OB

## 2024-07-03 PROCEDURE — 0SG00AJ FUSION OF LUMBAR VERTEBRAL JOINT WITH INTERBODY FUSION DEVICE, POSTERIOR APPROACH, ANTERIOR COLUMN, OPEN APPROACH: ICD-10-PCS | Performed by: ORTHOPAEDIC SURGERY

## 2024-07-03 PROCEDURE — 250N000013 HC RX MED GY IP 250 OP 250 PS 637: Performed by: ORTHOPAEDIC SURGERY

## 2024-07-03 PROCEDURE — 710N000010 HC RECOVERY PHASE 1, LEVEL 2, PER MIN: Performed by: ORTHOPAEDIC SURGERY

## 2024-07-03 PROCEDURE — C1762 CONN TISS, HUMAN(INC FASCIA): HCPCS | Performed by: ORTHOPAEDIC SURGERY

## 2024-07-03 PROCEDURE — C1713 ANCHOR/SCREW BN/BN,TIS/BN: HCPCS | Performed by: ORTHOPAEDIC SURGERY

## 2024-07-03 PROCEDURE — 250N000025 HC SEVOFLURANE, PER MIN: Performed by: ORTHOPAEDIC SURGERY

## 2024-07-03 PROCEDURE — 272N000001 HC OR GENERAL SUPPLY STERILE: Performed by: ORTHOPAEDIC SURGERY

## 2024-07-03 PROCEDURE — 250N000011 HC RX IP 250 OP 636

## 2024-07-03 PROCEDURE — 250N000011 HC RX IP 250 OP 636: Performed by: ORTHOPAEDIC SURGERY

## 2024-07-03 DEVICE — SPACER 6068073 CATALYFT PL SHORT 7MM
Type: IMPLANTABLE DEVICE | Site: SPINE LUMBAR | Status: FUNCTIONAL
Brand: CATALYFT PL EXPANDABLE INTERBODY SYSTEM

## 2024-07-03 DEVICE — IMP ROD MEDT 3.5CM 1475501030: Type: IMPLANTABLE DEVICE | Site: SPINE LUMBAR | Status: FUNCTIONAL

## 2024-07-03 DEVICE — IMP ROD MEDT 4.0CM 1475501040: Type: IMPLANTABLE DEVICE | Site: SPINE LUMBAR | Status: FUNCTIONAL

## 2024-07-03 DEVICE — IMP SCR MEDT BREAK-OFF SET SOLERA 4.75MM TI 5440030: Type: IMPLANTABLE DEVICE | Site: SPINE LUMBAR | Status: FUNCTIONAL

## 2024-07-03 DEVICE — IMP SCR MEDT 4.75MM SOLERA 5.5X55MM MA 54840005555: Type: IMPLANTABLE DEVICE | Site: SPINE LUMBAR | Status: FUNCTIONAL

## 2024-07-03 DEVICE — IMP SCR MEDT 4.75MM SOLERA 5.5X50MM MA 54840005550: Type: IMPLANTABLE DEVICE | Site: SPINE LUMBAR | Status: FUNCTIONAL

## 2024-07-03 DEVICE — GRAFT BONE FIBERS GRAFTON DBM DBF 3ML T50103: Type: IMPLANTABLE DEVICE | Site: SPINE LUMBAR | Status: FUNCTIONAL

## 2024-07-03 RX ORDER — OXYCODONE HYDROCHLORIDE 5 MG/1
5 TABLET ORAL EVERY 4 HOURS PRN
Status: DISCONTINUED | OUTPATIENT
Start: 2024-07-03 | End: 2024-07-06 | Stop reason: HOSPADM

## 2024-07-03 RX ORDER — AMOXICILLIN 250 MG
1 CAPSULE ORAL 2 TIMES DAILY
Status: DISCONTINUED | OUTPATIENT
Start: 2024-07-03 | End: 2024-07-06 | Stop reason: HOSPADM

## 2024-07-03 RX ORDER — LIDOCAINE 40 MG/G
CREAM TOPICAL
Status: DISCONTINUED | OUTPATIENT
Start: 2024-07-03 | End: 2024-07-03 | Stop reason: HOSPADM

## 2024-07-03 RX ORDER — SODIUM CHLORIDE 9 MG/ML
INJECTION, SOLUTION INTRAVENOUS CONTINUOUS
Status: DISCONTINUED | OUTPATIENT
Start: 2024-07-03 | End: 2024-07-06 | Stop reason: HOSPADM

## 2024-07-03 RX ORDER — ACETAMINOPHEN 325 MG/1
650 TABLET ORAL EVERY 4 HOURS PRN
Status: DISCONTINUED | OUTPATIENT
Start: 2024-07-06 | End: 2024-07-06 | Stop reason: HOSPADM

## 2024-07-03 RX ORDER — DEXAMETHASONE SODIUM PHOSPHATE 4 MG/ML
INJECTION, SOLUTION INTRA-ARTICULAR; INTRALESIONAL; INTRAMUSCULAR; INTRAVENOUS; SOFT TISSUE PRN
Status: DISCONTINUED | OUTPATIENT
Start: 2024-07-03 | End: 2024-07-03

## 2024-07-03 RX ORDER — HYDROMORPHONE HCL IN WATER/PF 6 MG/30 ML
0.4 PATIENT CONTROLLED ANALGESIA SYRINGE INTRAVENOUS EVERY 5 MIN PRN
Status: DISCONTINUED | OUTPATIENT
Start: 2024-07-03 | End: 2024-07-03 | Stop reason: HOSPADM

## 2024-07-03 RX ORDER — DILTIAZEM HYDROCHLORIDE 120 MG/1
240 CAPSULE, COATED, EXTENDED RELEASE ORAL DAILY
Status: DISCONTINUED | OUTPATIENT
Start: 2024-07-04 | End: 2024-07-06 | Stop reason: HOSPADM

## 2024-07-03 RX ORDER — GABAPENTIN 100 MG/1
100 CAPSULE ORAL
Status: COMPLETED | OUTPATIENT
Start: 2024-07-03 | End: 2024-07-03

## 2024-07-03 RX ORDER — ONDANSETRON 4 MG/1
4 TABLET, ORALLY DISINTEGRATING ORAL EVERY 30 MIN PRN
Status: DISCONTINUED | OUTPATIENT
Start: 2024-07-03 | End: 2024-07-03 | Stop reason: HOSPADM

## 2024-07-03 RX ORDER — ONDANSETRON 4 MG/1
4 TABLET, ORALLY DISINTEGRATING ORAL EVERY 6 HOURS PRN
Status: DISCONTINUED | OUTPATIENT
Start: 2024-07-03 | End: 2024-07-06 | Stop reason: HOSPADM

## 2024-07-03 RX ORDER — BUPIVACAINE HYDROCHLORIDE AND EPINEPHRINE 2.5; 5 MG/ML; UG/ML
INJECTION, SOLUTION EPIDURAL; INFILTRATION; INTRACAUDAL; PERINEURAL PRN
Status: DISCONTINUED | OUTPATIENT
Start: 2024-07-03 | End: 2024-07-03 | Stop reason: HOSPADM

## 2024-07-03 RX ORDER — MULTIVITAMIN,THERAPEUTIC
1 TABLET ORAL DAILY
Status: DISCONTINUED | OUTPATIENT
Start: 2024-07-04 | End: 2024-07-06 | Stop reason: HOSPADM

## 2024-07-03 RX ORDER — LIDOCAINE HYDROCHLORIDE 10 MG/ML
INJECTION, SOLUTION INFILTRATION; PERINEURAL PRN
Status: DISCONTINUED | OUTPATIENT
Start: 2024-07-03 | End: 2024-07-03

## 2024-07-03 RX ORDER — PRAVASTATIN SODIUM 20 MG
80 TABLET ORAL DAILY
Status: DISCONTINUED | OUTPATIENT
Start: 2024-07-04 | End: 2024-07-06 | Stop reason: HOSPADM

## 2024-07-03 RX ORDER — PROCHLORPERAZINE MALEATE 5 MG
5 TABLET ORAL EVERY 6 HOURS PRN
Status: DISCONTINUED | OUTPATIENT
Start: 2024-07-03 | End: 2024-07-06 | Stop reason: HOSPADM

## 2024-07-03 RX ORDER — LORATADINE 10 MG/1
10 TABLET ORAL DAILY PRN
Status: DISCONTINUED | OUTPATIENT
Start: 2024-07-03 | End: 2024-07-06 | Stop reason: HOSPADM

## 2024-07-03 RX ORDER — NALOXONE HYDROCHLORIDE 0.4 MG/ML
0.4 INJECTION, SOLUTION INTRAMUSCULAR; INTRAVENOUS; SUBCUTANEOUS
Status: DISCONTINUED | OUTPATIENT
Start: 2024-07-03 | End: 2024-07-06 | Stop reason: HOSPADM

## 2024-07-03 RX ORDER — PROPOFOL 10 MG/ML
INJECTION, EMULSION INTRAVENOUS PRN
Status: DISCONTINUED | OUTPATIENT
Start: 2024-07-03 | End: 2024-07-03

## 2024-07-03 RX ORDER — CEFAZOLIN SODIUM 2 G/100ML
2 INJECTION, SOLUTION INTRAVENOUS EVERY 8 HOURS
Status: COMPLETED | OUTPATIENT
Start: 2024-07-03 | End: 2024-07-04

## 2024-07-03 RX ORDER — ACETAMINOPHEN 325 MG/1
975 TABLET ORAL EVERY 8 HOURS
Status: COMPLETED | OUTPATIENT
Start: 2024-07-03 | End: 2024-07-06

## 2024-07-03 RX ORDER — CEFAZOLIN SODIUM/WATER 2 G/20 ML
2 SYRINGE (ML) INTRAVENOUS SEE ADMIN INSTRUCTIONS
Status: DISCONTINUED | OUTPATIENT
Start: 2024-07-03 | End: 2024-07-03 | Stop reason: HOSPADM

## 2024-07-03 RX ORDER — HYDROMORPHONE HCL IN WATER/PF 6 MG/30 ML
0.2 PATIENT CONTROLLED ANALGESIA SYRINGE INTRAVENOUS EVERY 5 MIN PRN
Status: DISCONTINUED | OUTPATIENT
Start: 2024-07-03 | End: 2024-07-03 | Stop reason: HOSPADM

## 2024-07-03 RX ORDER — ONDANSETRON 2 MG/ML
4 INJECTION INTRAMUSCULAR; INTRAVENOUS EVERY 30 MIN PRN
Status: DISCONTINUED | OUTPATIENT
Start: 2024-07-03 | End: 2024-07-03 | Stop reason: HOSPADM

## 2024-07-03 RX ORDER — NALOXONE HYDROCHLORIDE 0.4 MG/ML
0.1 INJECTION, SOLUTION INTRAMUSCULAR; INTRAVENOUS; SUBCUTANEOUS
Status: DISCONTINUED | OUTPATIENT
Start: 2024-07-03 | End: 2024-07-03 | Stop reason: HOSPADM

## 2024-07-03 RX ORDER — FENTANYL CITRATE 50 UG/ML
50 INJECTION, SOLUTION INTRAMUSCULAR; INTRAVENOUS EVERY 5 MIN PRN
Status: DISCONTINUED | OUTPATIENT
Start: 2024-07-03 | End: 2024-07-03 | Stop reason: HOSPADM

## 2024-07-03 RX ORDER — FENTANYL CITRATE 50 UG/ML
INJECTION, SOLUTION INTRAMUSCULAR; INTRAVENOUS PRN
Status: DISCONTINUED | OUTPATIENT
Start: 2024-07-03 | End: 2024-07-03

## 2024-07-03 RX ORDER — SODIUM CHLORIDE, SODIUM LACTATE, POTASSIUM CHLORIDE, CALCIUM CHLORIDE 600; 310; 30; 20 MG/100ML; MG/100ML; MG/100ML; MG/100ML
INJECTION, SOLUTION INTRAVENOUS CONTINUOUS
Status: DISCONTINUED | OUTPATIENT
Start: 2024-07-03 | End: 2024-07-03 | Stop reason: HOSPADM

## 2024-07-03 RX ORDER — HYDROMORPHONE HCL IN WATER/PF 6 MG/30 ML
0.4 PATIENT CONTROLLED ANALGESIA SYRINGE INTRAVENOUS
Status: DISCONTINUED | OUTPATIENT
Start: 2024-07-03 | End: 2024-07-05

## 2024-07-03 RX ORDER — ONDANSETRON 2 MG/ML
INJECTION INTRAMUSCULAR; INTRAVENOUS PRN
Status: DISCONTINUED | OUTPATIENT
Start: 2024-07-03 | End: 2024-07-03

## 2024-07-03 RX ORDER — FENTANYL CITRATE 50 UG/ML
25 INJECTION, SOLUTION INTRAMUSCULAR; INTRAVENOUS EVERY 5 MIN PRN
Status: DISCONTINUED | OUTPATIENT
Start: 2024-07-03 | End: 2024-07-03 | Stop reason: HOSPADM

## 2024-07-03 RX ORDER — NALOXONE HYDROCHLORIDE 0.4 MG/ML
0.2 INJECTION, SOLUTION INTRAMUSCULAR; INTRAVENOUS; SUBCUTANEOUS
Status: DISCONTINUED | OUTPATIENT
Start: 2024-07-03 | End: 2024-07-06 | Stop reason: HOSPADM

## 2024-07-03 RX ORDER — OXYCODONE HYDROCHLORIDE 5 MG/1
10 TABLET ORAL EVERY 4 HOURS PRN
Status: DISCONTINUED | OUTPATIENT
Start: 2024-07-03 | End: 2024-07-06 | Stop reason: HOSPADM

## 2024-07-03 RX ORDER — GABAPENTIN 100 MG/1
100 CAPSULE ORAL AT BEDTIME
Status: DISCONTINUED | OUTPATIENT
Start: 2024-07-03 | End: 2024-07-06 | Stop reason: HOSPADM

## 2024-07-03 RX ORDER — METHOCARBAMOL 500 MG/1
500 TABLET, FILM COATED ORAL EVERY 6 HOURS PRN
Status: DISCONTINUED | OUTPATIENT
Start: 2024-07-03 | End: 2024-07-06 | Stop reason: HOSPADM

## 2024-07-03 RX ORDER — BISACODYL 10 MG
10 SUPPOSITORY, RECTAL RECTAL DAILY PRN
Status: DISCONTINUED | OUTPATIENT
Start: 2024-07-06 | End: 2024-07-06 | Stop reason: HOSPADM

## 2024-07-03 RX ORDER — VALSARTAN 80 MG/1
320 TABLET ORAL DAILY
Status: DISCONTINUED | OUTPATIENT
Start: 2024-07-04 | End: 2024-07-06 | Stop reason: HOSPADM

## 2024-07-03 RX ORDER — BUPIVACAINE HYDROCHLORIDE AND EPINEPHRINE 2.5; 5 MG/ML; UG/ML
INJECTION, SOLUTION EPIDURAL; INFILTRATION; INTRACAUDAL; PERINEURAL
Status: DISCONTINUED
Start: 2024-07-03 | End: 2024-07-03 | Stop reason: HOSPADM

## 2024-07-03 RX ORDER — HYDROMORPHONE HCL IN WATER/PF 6 MG/30 ML
0.2 PATIENT CONTROLLED ANALGESIA SYRINGE INTRAVENOUS
Status: DISCONTINUED | OUTPATIENT
Start: 2024-07-03 | End: 2024-07-05

## 2024-07-03 RX ORDER — CEFAZOLIN SODIUM/WATER 2 G/20 ML
2 SYRINGE (ML) INTRAVENOUS
Status: DISCONTINUED | OUTPATIENT
Start: 2024-07-03 | End: 2024-07-03 | Stop reason: HOSPADM

## 2024-07-03 RX ORDER — ONDANSETRON 2 MG/ML
4 INJECTION INTRAMUSCULAR; INTRAVENOUS EVERY 6 HOURS PRN
Status: DISCONTINUED | OUTPATIENT
Start: 2024-07-03 | End: 2024-07-06 | Stop reason: HOSPADM

## 2024-07-03 RX ORDER — METOPROLOL TARTRATE 100 MG
100 TABLET ORAL 2 TIMES DAILY
Status: DISCONTINUED | OUTPATIENT
Start: 2024-07-03 | End: 2024-07-06 | Stop reason: HOSPADM

## 2024-07-03 RX ORDER — DEXAMETHASONE SODIUM PHOSPHATE 4 MG/ML
4 INJECTION, SOLUTION INTRA-ARTICULAR; INTRALESIONAL; INTRAMUSCULAR; INTRAVENOUS; SOFT TISSUE
Status: DISCONTINUED | OUTPATIENT
Start: 2024-07-03 | End: 2024-07-03 | Stop reason: HOSPADM

## 2024-07-03 RX ORDER — POLYETHYLENE GLYCOL 3350 17 G/17G
17 POWDER, FOR SOLUTION ORAL DAILY
Status: DISCONTINUED | OUTPATIENT
Start: 2024-07-04 | End: 2024-07-06 | Stop reason: HOSPADM

## 2024-07-03 RX ORDER — HYDROCHLOROTHIAZIDE 25 MG/1
25 TABLET ORAL DAILY
Status: DISCONTINUED | OUTPATIENT
Start: 2024-07-04 | End: 2024-07-06 | Stop reason: HOSPADM

## 2024-07-03 RX ADMIN — HYDROMORPHONE HYDROCHLORIDE 0.4 MG: 0.2 INJECTION, SOLUTION INTRAMUSCULAR; INTRAVENOUS; SUBCUTANEOUS at 17:27

## 2024-07-03 RX ADMIN — PHENYLEPHRINE HYDROCHLORIDE 0.2 MCG/KG/MIN: 10 INJECTION INTRAVENOUS at 13:48

## 2024-07-03 RX ADMIN — SODIUM CHLORIDE: 9 INJECTION, SOLUTION INTRAVENOUS at 21:23

## 2024-07-03 RX ADMIN — LIDOCAINE HYDROCHLORIDE 5 ML: 10 INJECTION, SOLUTION INFILTRATION; PERINEURAL at 13:22

## 2024-07-03 RX ADMIN — FENTANYL CITRATE 50 MCG: 50 INJECTION, SOLUTION INTRAMUSCULAR; INTRAVENOUS at 17:15

## 2024-07-03 RX ADMIN — ONDANSETRON 4 MG: 2 INJECTION INTRAMUSCULAR; INTRAVENOUS at 15:47

## 2024-07-03 RX ADMIN — GABAPENTIN 100 MG: 100 CAPSULE ORAL at 11:38

## 2024-07-03 RX ADMIN — PHENYLEPHRINE HYDROCHLORIDE 100 MCG: 10 INJECTION INTRAVENOUS at 15:36

## 2024-07-03 RX ADMIN — OXYCODONE 5 MG: 5 TABLET ORAL at 18:46

## 2024-07-03 RX ADMIN — SUGAMMADEX 200 MG: 100 INJECTION, SOLUTION INTRAVENOUS at 15:47

## 2024-07-03 RX ADMIN — SODIUM CHLORIDE, POTASSIUM CHLORIDE, SODIUM LACTATE AND CALCIUM CHLORIDE: 600; 310; 30; 20 INJECTION, SOLUTION INTRAVENOUS at 15:47

## 2024-07-03 RX ADMIN — HYDROMORPHONE HYDROCHLORIDE 0.5 MG: 1 INJECTION, SOLUTION INTRAMUSCULAR; INTRAVENOUS; SUBCUTANEOUS at 14:23

## 2024-07-03 RX ADMIN — DEXAMETHASONE SODIUM PHOSPHATE 4 MG: 4 INJECTION, SOLUTION INTRA-ARTICULAR; INTRALESIONAL; INTRAMUSCULAR; INTRAVENOUS; SOFT TISSUE at 13:30

## 2024-07-03 RX ADMIN — METOPROLOL TARTRATE 100 MG: 100 TABLET, FILM COATED ORAL at 21:23

## 2024-07-03 RX ADMIN — SODIUM CHLORIDE, POTASSIUM CHLORIDE, SODIUM LACTATE AND CALCIUM CHLORIDE: 600; 310; 30; 20 INJECTION, SOLUTION INTRAVENOUS at 11:46

## 2024-07-03 RX ADMIN — ACETAMINOPHEN 975 MG: 325 TABLET ORAL at 18:45

## 2024-07-03 RX ADMIN — PHENYLEPHRINE HYDROCHLORIDE 200 MCG: 10 INJECTION INTRAVENOUS at 13:44

## 2024-07-03 RX ADMIN — FENTANYL CITRATE 50 MCG: 50 INJECTION, SOLUTION INTRAMUSCULAR; INTRAVENOUS at 17:07

## 2024-07-03 RX ADMIN — HYDROMORPHONE HYDROCHLORIDE 0.4 MG: 0.2 INJECTION, SOLUTION INTRAMUSCULAR; INTRAVENOUS; SUBCUTANEOUS at 17:46

## 2024-07-03 RX ADMIN — HYDROMORPHONE HYDROCHLORIDE 0.5 MG: 1 INJECTION, SOLUTION INTRAMUSCULAR; INTRAVENOUS; SUBCUTANEOUS at 15:31

## 2024-07-03 RX ADMIN — PROPOFOL 200 MG: 10 INJECTION, EMULSION INTRAVENOUS at 13:22

## 2024-07-03 RX ADMIN — GABAPENTIN 100 MG: 100 CAPSULE ORAL at 21:23

## 2024-07-03 RX ADMIN — PHENYLEPHRINE HYDROCHLORIDE 200 MCG: 10 INJECTION INTRAVENOUS at 13:31

## 2024-07-03 RX ADMIN — ROCURONIUM BROMIDE 60 MG: 10 INJECTION, SOLUTION INTRAVENOUS at 13:22

## 2024-07-03 RX ADMIN — Medication 2 G: at 13:14

## 2024-07-03 RX ADMIN — PHENYLEPHRINE HYDROCHLORIDE 100 MCG: 10 INJECTION INTRAVENOUS at 13:37

## 2024-07-03 RX ADMIN — FENTANYL CITRATE 100 MCG: 50 INJECTION INTRAMUSCULAR; INTRAVENOUS at 13:22

## 2024-07-03 RX ADMIN — SENNOSIDES AND DOCUSATE SODIUM 1 TABLET: 50; 8.6 TABLET ORAL at 21:23

## 2024-07-03 RX ADMIN — METHOCARBAMOL 500 MG: 500 TABLET ORAL at 18:46

## 2024-07-03 RX ADMIN — CEFAZOLIN SODIUM 2 G: 2 INJECTION, SOLUTION INTRAVENOUS at 21:23

## 2024-07-03 ASSESSMENT — ACTIVITIES OF DAILY LIVING (ADL)
ADLS_ACUITY_SCORE: 35
ADLS_ACUITY_SCORE: 32
ADLS_ACUITY_SCORE: 35

## 2024-07-03 ASSESSMENT — ENCOUNTER SYMPTOMS: DYSRHYTHMIAS: 1

## 2024-07-03 NOTE — ANESTHESIA POSTPROCEDURE EVALUATION
Patient: Rosie Blackman    Procedure: Procedure(s):  LUMBAR 1- LUMBAR 2 TRANSFORAMINAL INTERBODY FUSION, POSTERIOR INSTRUMENTED FUSION, DECOMPRESSION, WITH O-ARM AND STEALTH NAVIGATION       Anesthesia Type:  General    Note:  Disposition: Inpatient   Postop Pain Control: Uneventful            Sign Out: Well controlled pain   PONV: No   Neuro/Psych: Uneventful            Sign Out: Acceptable/Baseline neuro status   Airway/Respiratory: Uneventful            Sign Out: Acceptable/Baseline resp. status   CV/Hemodynamics: Uneventful            Sign Out: Acceptable CV status; No obvious hypovolemia; No obvious fluid overload   Other NRE: NONE   DID A NON-ROUTINE EVENT OCCUR? No           Last vitals:  Vitals Value Taken Time   /60 07/03/24 1650   Temp 36.6  C (97.8  F) 07/03/24 1607   Pulse 63 07/03/24 1655   Resp 16 07/03/24 1655   SpO2 100 % 07/03/24 1655   Vitals shown include unfiled device data.    Electronically Signed By: Mil Taylor MD  July 3, 2024  4:58 PM

## 2024-07-03 NOTE — INTERVAL H&P NOTE
"I have reviewed the surgical (or preoperative) H&P that is linked to this encounter, and examined the patient. There are no significant changes    Clinical Conditions Present on Arrival:  Clinically Significant Risk Factors Present on Admission                # Drug Induced Coagulation Defect: home medication list includes an anticoagulant medication       # Obesity: Estimated body mass index is 37.2 kg/m  as calculated from the following:    Height as of this encounter: 1.6 m (5' 3\").    Weight as of this encounter: 95.3 kg (210 lb).       "

## 2024-07-03 NOTE — ANESTHESIA PREPROCEDURE EVALUATION
Anesthesia Pre-Procedure Evaluation    Patient: Rosie Blackman   MRN: [id number] : 1940        Procedure : Procedure(s):  LUMBAR 1- LUMBAR 2 TRANSFORAMINAL INTERBODY FUSION, POSTERIOR INSTRUMENTED FUSION, DECOMPRESSION, WITH O-ARM AND STEALTH NAVIGATION          Past Medical History:   Diagnosis Date    Chronic airway obstruction (H)     Diastolic dysfunction 2022    Gastroesophageal reflux disease     Hiatal hernia     Hypertension     Irregular heart beat     Malignant neoplasm of ovary (H)     Ovarian cancer, unspecified laterality (H) 03/10/2021    Personal history of contact with and (suspected) exposure to asbestos     Primary osteoarthritis of right hip 2020      Past Surgical History:   Procedure Laterality Date    BIOPSY BREAST Left     BREAST BIOPSY, CORE RT/LT      CHOLECYSTECTOMY      COLONOSCOPY  2010    Diverticulosis, colon polyps; repeat 5 yrs    COLONOSCOPY N/A 2015    Procedure: COLONOSCOPY;  Surgeon: Alejandro Matos MD;  Location: [facility]    COLONOSCOPY N/A 2021    Procedure: COLONOSCOPY;  Surgeon: Aayush George MD;  Location: [facility]    Colonoscopy, hyperplastic polyps, repeat in 5 yrs      EP PACEMAKER DEVICE & LEAD IMPLANT- RIGHT ATRIAL & RIGHT VENTRICULAR Left 10/24/2022    Procedure: Pacemaker Device & Lead Implant - Right Atrial & Right Ventricular;  Surgeon: Demond Meyer MD;  Location:  [facility]    ESOPHAGOSCOPY, GASTROSCOPY, DUODENOSCOPY (EGD), COMBINED  2013    Procedure: COMBINED ESOPHAGOSCOPY, GASTROSCOPY, DUODENOSCOPY (EGD), BIOPSY SINGLE OR MULTIPLE;  Gastroscopy;  Surgeon: Blaise Gill MD;  Location: [facility]    EYE SURGERY      R/L Cataracts    HC REMOVE TONSILS/ADENOIDS,13+ Y/O      T & A 13+y.o.    HERNIORRHAPHY INCISIONAL (LOCATION) N/A 2021    Procedure: HERNIORRHAPHY, INCISIONAL, OPEN WITH MESH;  Surgeon: Aayush George MD;  Location: [facility]    HYSTERECTOMY, PAP NO LONGER  INDICATED      LAPAROSCOPIC SALPINGO-OOPHORECTOMY N/A 07/24/2020    Procedure: Laparoscopy, removal or pelvic mass, both tubes and ovaries, omentectomy, anterior culvectomy, pelvic and para aortic lymph node dissection, laparotomy;  Surgeon: Felipe Corona MD;  Location: [facility]    RI ANESTH,LUMBAR SPINE,CORD SURGERY  10/2020    L3-4 L4-5 TRANSFORAMINAL INTERBODY FUSION L3-5, POSTERIOR INSTRUMENTED FUSION AND DECOMPRESSION    TVH for DUB, ovaries in      VASCULAR SURGERY  2014    [place] closure    ZZC ANESTH,KNEE VEINS SURGERY  08/20/2014      Allergies   Allergen Reactions    Atorvastatin Other (See Comments)     Muscle Pain    Ezetimibe Other (See Comments)     Severe muscle aches    Irbesartan Cramps    Lisinopril     Omeprazole      Other reaction(s): *Unknown    Prilosec [Omeprazole]     Rosuvastatin Other (See Comments)     Muscle Pain    Zestril [Ace Inhibitors] Cough      Social History     Tobacco Use    Smoking status: Never    Smokeless tobacco: Never   Substance Use Topics    Alcohol use: No      Wt Readings from Last 1 Encounters:   07/03/24 95.3 kg (210 lb)        Anesthesia Evaluation   Pt has had prior anesthetic.     No history of anesthetic complications       ROS/MED HX  ENT/Pulmonary:  - neg pulmonary ROS     Neurologic:  - neg neurologic ROS     Cardiovascular:     (+)  hypertension- -   -  - -              pacemaker (~65% paced), Reason placed: SSS.         dysrhythmias, a-fib and Sick Sinus Syndrome,             METS/Exercise Tolerance:     Hematologic: Comments: Eliquis, off for >72 hrs      Musculoskeletal:       GI/Hepatic:     (+) GERD,     hiatal hernia,              Renal/Genitourinary:  - neg Renal ROS     Endo:     (+)               Obesity,       Psychiatric/Substance Use:       Infectious Disease:       Malignancy:       Other:            Physical Exam    Airway        Mallampati: II   TM distance: > 3 FB   Neck ROM: full   Mouth opening: > 3 cm    Respiratory Devices and  Support         Dental       (+) Modest Abnormalities - crowns, retainers, 1 or 2 missing teeth      Cardiovascular          Rhythm and rate: regular and normal     Pulmonary           breath sounds clear to auscultation           OUTSIDE LABS:  CBC:   Lab Results   Component Value Date    WBC 5.5 07/02/2024    WBC 5.5 01/22/2024    HGB 13.7 07/02/2024    HGB 14.2 01/22/2024    HCT 42.9 07/02/2024    HCT 42.2 01/22/2024     07/02/2024     01/22/2024     BMP:   Lab Results   Component Value Date     07/02/2024     01/22/2024    POTASSIUM 4.1 07/02/2024    POTASSIUM 4.1 01/22/2024    CHLORIDE 105 07/02/2024    CHLORIDE 105 01/22/2024    CO2 30 (H) 07/02/2024    CO2 28 01/22/2024    BUN 18.0 07/02/2024    BUN 19.0 01/22/2024    CR 0.83 07/02/2024    CR 0.92 01/22/2024     (H) 07/03/2024    GLC 96 07/02/2024     COAGS:   Lab Results   Component Value Date    PTT 26 12/04/2021    INR 1.00 08/21/2022     POC:   Lab Results   Component Value Date     (H) 07/25/2020     HEPATIC:   Lab Results   Component Value Date    ALBUMIN 4.1 10/05/2023    PROTTOTAL 6.7 10/05/2023    ALT 21 10/05/2023    AST 24 10/05/2023    ALKPHOS 73 10/05/2023    BILITOTAL 0.5 10/05/2023     OTHER:   Lab Results   Component Value Date    A1C 5.8 (H) 12/04/2021    ANISA 9.6 07/02/2024    MAG 1.9 08/17/2022    TSH 2.49 08/23/2022    T4 1.04 08/17/2022    SED 18 10/01/2004       Anesthesia Plan    ASA Status:  3    NPO Status:  NPO Appropriate    Anesthesia Type: General.     - Airway: ETT   Induction: Intravenous.           Consents    Anesthesia Plan(s) and associated risks, benefits, and realistic alternatives discussed. Questions answered and patient/representative(s) expressed understanding.     - Discussed: Risks, Benefits and Alternatives for the PROCEDURE were discussed     - Discussed with:  Patient, Spouse            Postoperative Care    Pain management: IV analgesics, Oral pain medications.   PONV  "prophylaxis: Ondansetron (or other 5HT-3), Dexamethasone or Solumedrol     Comments:    Other Comments: Have magnet available in case of issues with pacer suppression due to CT           Rom Sinclair MD    I have reviewed the pertinent notes and labs in the chart from the past 30 days and (re)examined the patient.  Any updates or changes from those notes are reflected in this note.            # Drug Induced Coagulation Defect: home medication list includes an anticoagulant medication   # Obesity: Estimated body mass index is 37.2 kg/m  as calculated from the following:    Height as of this encounter: 1.6 m (5' 3\").    Weight as of this encounter: 95.3 kg (210 lb).      "

## 2024-07-03 NOTE — PROCEDURES
Procedure     DATE OF PROCEDURE: July 3, 2024        ATTENDING SURGEON:  Chavo Patel M.D.      FIRST ASSISTANT:  Janey Allen PA-C was critical and important in the safe performance of the procedure including positioning, soft tissue retraction, and safe performance of the procedure including the diskectomy, interbody fusion, and posterior instrumentation. Her expertise and his experience in spinal assisting was critical and important in the safe performance of the procedure particularly with neural retraction and help with placement of the screws. This would not have been possible with the assistance of a scrub tech.      PREOPERATIVE DIAGNOSES:  1.  L1-L2 degenerative spondylolisthesis  2.  L1-L2 spinal stenosis with neurogenic claudication.  3. Failure of conservative management.      POSTOPERATIVE DIAGNOSES:  1.  L1-L2 degenerative spondylolisthesis  2.  L1-L2 spinal stenosis with neurogenic claudication.  3. Failure of conservative management.         PROCEDURES PERFORMED:  1.  L1-L2 discectomy and transforaminal lumbar interbody fusion  utilizing a combination of demineralized bone matrix and local autograft bone.  2. Interbody device placement at  L1-L2 utilizing [place] [employer] titanium spacer, size 23 mm by 7 mm.  3. Posterior instrumentation L1-L2  utilizing Med[employer] Solera screws and rods, size 5.5 x 50 mm for both L1 pedicles, 5.5 x 55  mm for both L2 pedicles.  4. Right L1-L2 facet fusion utilizing local autograft bone   5.  L1 and L2 laminectomy .  6.  L1-L2 bilateral medial facetectomy and lateral recess decompression with bilateral L 1 and L2 foraminotomies.  7. Stealth Navigation.      ESTIMATED BLOOD LOSS:  100 cc.     COMPLICATIONS:  None.     INTRAOPERATIVE FINDINGS:  L1-L2 degenerative spondylolisthesis and spinal stenosis.     INTRODUCTION:  The patient is a very pleasant 84 year old female who came to the clinic with a longstanding history of low back pain and left thigh pain consistent with  imaging studies showing degenerative L1-L2 spondylolisthesis and stenosis.The patient had exhausted nonoperative measures and continued to have debilitating symptoms. Therefore, I had a discussion with her regarding surgery, and specifically, we discussed the risks including but not limited to death, infection, dural tear, nerve injury, and nonresolution of symptoms among others, and the patient accepted and wished to proceed.           DETAILS OF PROCEDURE:     The patient was brought to the operating room and placed under general endotracheal anesthetic without complications. A Damian catheter was placed. Intravenous antibiotics were given within one hour of incision. The patient was then placed prone on the Jere table ensuring that all nerve areas and bony areas were well padded and free of pressure. The low back area was then prepped and draped in the routine sterile manner. After the appropriate timeout, I made an incision overlying what I felt to be the L1 and L2 spinous processes and carried it down onto the fascia and subperiosteally to expose the posterior elements. I then placed a reference frame over the L2 spinous process and brought in the O-arm and performed intraoperative scans which were then registered onto the [place] [employer] Station. Utilizing Stealth Navigation, I then was able to create tracts into the pedicles of L1 and L2 bilaterally. I then performed navigated tapping and navigated placement of appropriately sized screws. Next, I excised the facet joint of L1-L2 on the left side utilizing osteotomes and Kerrison rongeurs. This allowed access to the disk space. I then used a 15 blade to perform an annulotomy and utilized melissa and curettes to perform a near total diskectomy. I then packed the disk space with a combination of demineralized bone matrix and local autograft bone. I then obtained the appropriately sized interbody device and impacted this into the disk space with a good fit.  I then took intraoperative O-arm scans which revealed good placement of the implants. I then used a rongeur to remove the spinous processes of L1 and L2 and used a Kerrison rongeur to perform a laminectomy of L1 and L2.  I then carried out a bilateral medial facetectomy and lateral recess decompression all the way to the medial aspect of the L1 and L2 pedicle bilaterally.  I then performed foraminotomies for the L1 and L2 nerves bilaterally.  This medial facetectomy and decompression was distinct and separate from the facetectomy required to do the fusion. At the end of the procedure, there was a capacious canal with no neural impingement.  I then decorticated the L1-L2 facet joint on the right side and placed local autograft bone over the decorticated bone to perform a fusion. I then placed rods through the screw heads bilaterally and placed set screws to secure the construct. I then performed copious irrigation and hemostasis. I performed layer-by-layer closure utilizing Vicryl 0 suture over the fascia over a subfacial drain, Vicryl 2-0 for the subcutaneous tissues, and Vicryl 3-0 for the skin. 14 cc 0.25% Marcaine was injected into the skin and subcutaneous tissues. Steri-Strips were applied followed by a sterile dressing. She was then placed supine on her bed and extubated without complications and brought to the recovery room in satisfactory condition.           POSTOPERATIVE PLAN:     The patient will be mobilized as tolerated. She will be sent home on oxycodone for pain. No bending, twisting, or lifting greater than 10 pounds for the next six weeks. After discharge, the patient will be seen after 6 weeks.

## 2024-07-03 NOTE — PHARMACY-ADMISSION MEDICATION HISTORY
Pharmacist Admission Medication History    Admission medication history is complete. The information provided in this note is only as accurate as the sources available at the time of the update.    Information Source(s): Patient and CareEverywhere/SureScripts via in-person    Pertinent Information:      Changes made to PTA medication list:  Added: None  Deleted: None  Changed: None    Allergies reviewed with patient and updates made in EHR: yes    Medication History Completed By: Harsh Suarez [facility] 7/3/2024 11:15 AM    PTA Med List   Medication Sig Last Dose    apixaban ANTICOAGULANT (ELIQUIS ANTICOAGULANT) 5 MG tablet Take 1 tablet (5 mg) by mouth 2 times daily 6/28/2024 at am    diltiazem ER COATED BEADS (CARDIZEM CD/CARTIA XT) 240 MG 24 hr capsule Take 1 capsule (240 mg) by mouth daily 7/3/2024 at am    meloxicam (MOBIC) 15 MG tablet Take 1 tablet (15 mg) by mouth daily as needed for moderate pain More than a month at prn    metoprolol tartrate (LOPRESSOR) 100 MG tablet Take 1 tablet (100 mg) by mouth 2 times daily 7/3/2024 at am    pravastatin (PRAVACHOL) 80 MG tablet Take 1 tablet (80 mg) by mouth daily 7/3/2024 at am    THERATEARS 0.25 % OP SOLN Place 2 drops into both eyes as needed prn at not with    valsartan-hydrochlorothiazide (DIOVAN HCT) 320-25 MG tablet Take 1 tablet by mouth daily 7/3/2024 at am

## 2024-07-03 NOTE — ANESTHESIA PROCEDURE NOTES
Airway       Patient location during procedure: OR       Procedure Start/Stop Times: 7/3/2024 1:25 PM  Staff -        CRNA: Josh Carrion APRN CRNA       Performed By: CRNAIndications and Patient Condition       Indications for airway management: leni-procedural       Induction type:intravenous       Mask difficulty assessment: 1 - vent by mask    Final Airway Details       Final airway type: endotracheal airway       Successful airway: ETT - single  Endotracheal Airway Details        ETT size (mm): 6.5       Cuffed: yes       Cuff volume (mL): 8       Successful intubation technique: direct laryngoscopy       DL Blade Type: Rodriguez 2       Grade View of Cords: 1       Adjucts: stylet       Position: Right       Measured from: gums/teeth       Secured at (cm): 21       Bite block used: None    Post intubation assessment        Placement verified by: capnometry, equal breath sounds and chest rise        Number of attempts at approach: 1       Secured with: silk tape       Ease of procedure: easy       Dentition: Intact and Unchanged       Dental guard used and removed. Dental Guard Type: Standard White.    Medication(s) Administered   Medication Administration Time: 7/3/2024 1:25 PM

## 2024-07-03 NOTE — ANESTHESIA CARE TRANSFER NOTE
Patient: Rosie Blackman    Procedure: Procedure(s):  LUMBAR 1- LUMBAR 2 TRANSFORAMINAL INTERBODY FUSION, POSTERIOR INSTRUMENTED FUSION, DECOMPRESSION, WITH O-ARM AND STEALTH NAVIGATION       Diagnosis: Degenerative spondylolisthesis [M43.10]  Lumbar radiculopathy [M54.16]  Lumbar stenosis [M48.061]  Diagnosis Additional Information: No value filed.    Anesthesia Type:   General     Note:    Oropharynx: oropharynx clear of all foreign objects  Level of Consciousness: drowsy  Oxygen Supplementation: face mask  Level of Supplemental Oxygen (L/min / FiO2): 6  Independent Airway: airway patency satisfactory and stable  Dentition: dentition unchanged  Vital Signs Stable: post-procedure vital signs reviewed and stable  Report to RN Given: handoff report given  Patient transferred to: PACU    Handoff Report: Identifed the Patient, Identified the Reponsible Provider, Reviewed the pertinent medical history, Discussed the surgical course, Reviewed Intra-OP anesthesia mangement and issues during anesthesia, Set expectations for post-procedure period and Allowed opportunity for questions and acknowledgement of understanding      Vitals:  Vitals Value Taken Time   /53 07/03/24 1607   Temp     Pulse 61 07/03/24 1608   Resp 14 07/03/24 1608   SpO2 97 % 07/03/24 1608   Vitals shown include unfiled device data.    Electronically Signed By: DELMY Mondragon CRNA  July 3, 2024  4:10 PM

## 2024-07-04 ENCOUNTER — APPOINTMENT (OUTPATIENT)
Dept: OCCUPATIONAL THERAPY | Facility: CLINIC | Age: 84
DRG: 454 | End: 2024-07-04
Payer: MEDICARE

## 2024-07-04 ENCOUNTER — APPOINTMENT (OUTPATIENT)
Dept: RADIOLOGY | Facility: CLINIC | Age: 84
DRG: 454 | End: 2024-07-04
Attending: EMERGENCY MEDICINE
Payer: MEDICARE

## 2024-07-04 ENCOUNTER — DOCUMENTATION ONLY (OUTPATIENT)
Dept: CARDIOLOGY | Facility: CLINIC | Age: 84
End: 2024-07-04

## 2024-07-04 ENCOUNTER — APPOINTMENT (OUTPATIENT)
Dept: PHYSICAL THERAPY | Facility: CLINIC | Age: 84
DRG: 454 | End: 2024-07-04
Attending: ORTHOPAEDIC SURGERY
Payer: MEDICARE

## 2024-07-04 LAB
ANION GAP SERPL CALCULATED.3IONS-SCNC: 10 MMOL/L (ref 7–15)
BUN SERPL-MCNC: 15.9 MG/DL (ref 8–23)
CALCIUM SERPL-MCNC: 8.8 MG/DL (ref 8.8–10.2)
CHLORIDE SERPL-SCNC: 102 MMOL/L (ref 98–107)
CREAT SERPL-MCNC: 0.81 MG/DL (ref 0.51–0.95)
DEPRECATED HCO3 PLAS-SCNC: 26 MMOL/L (ref 22–29)
EGFRCR SERPLBLD CKD-EPI 2021: 72 ML/MIN/1.73M2
GLUCOSE SERPL-MCNC: 190 MG/DL (ref 70–99)
HGB BLD-MCNC: 12.2 G/DL (ref 11.7–15.7)
HOLD SPECIMEN: NORMAL
HOLD SPECIMEN: NORMAL
NT-PROBNP SERPL-MCNC: 864 PG/ML (ref 0–1800)
POTASSIUM SERPL-SCNC: 3.7 MMOL/L (ref 3.4–5.3)
SODIUM SERPL-SCNC: 138 MMOL/L (ref 135–145)

## 2024-07-04 PROCEDURE — 85018 HEMOGLOBIN: CPT

## 2024-07-04 PROCEDURE — 99232 SBSQ HOSP IP/OBS MODERATE 35: CPT | Performed by: EMERGENCY MEDICINE

## 2024-07-04 PROCEDURE — 93005 ELECTROCARDIOGRAM TRACING: CPT | Performed by: EMERGENCY MEDICINE

## 2024-07-04 PROCEDURE — 120N000001 HC R&B MED SURG/OB

## 2024-07-04 PROCEDURE — 93005 ELECTROCARDIOGRAM TRACING: CPT

## 2024-07-04 PROCEDURE — 97535 SELF CARE MNGMENT TRAINING: CPT | Mod: GO

## 2024-07-04 PROCEDURE — 250N000013 HC RX MED GY IP 250 OP 250 PS 637

## 2024-07-04 PROCEDURE — 36415 COLL VENOUS BLD VENIPUNCTURE: CPT

## 2024-07-04 PROCEDURE — 83880 ASSAY OF NATRIURETIC PEPTIDE: CPT | Performed by: EMERGENCY MEDICINE

## 2024-07-04 PROCEDURE — 97165 OT EVAL LOW COMPLEX 30 MIN: CPT | Mod: GO

## 2024-07-04 PROCEDURE — 250N000013 HC RX MED GY IP 250 OP 250 PS 637: Performed by: ORTHOPAEDIC SURGERY

## 2024-07-04 PROCEDURE — 80048 BASIC METABOLIC PNL TOTAL CA: CPT | Performed by: EMERGENCY MEDICINE

## 2024-07-04 PROCEDURE — 97161 PT EVAL LOW COMPLEX 20 MIN: CPT | Mod: GP | Performed by: PHYSICAL THERAPIST

## 2024-07-04 PROCEDURE — 250N000011 HC RX IP 250 OP 636: Mod: JZ

## 2024-07-04 PROCEDURE — 93010 ELECTROCARDIOGRAM REPORT: CPT | Mod: HIP | Performed by: INTERNAL MEDICINE

## 2024-07-04 PROCEDURE — 250N000009 HC RX 250: Performed by: EMERGENCY MEDICINE

## 2024-07-04 PROCEDURE — 36415 COLL VENOUS BLD VENIPUNCTURE: CPT | Performed by: EMERGENCY MEDICINE

## 2024-07-04 PROCEDURE — 97530 THERAPEUTIC ACTIVITIES: CPT | Mod: GP | Performed by: PHYSICAL THERAPIST

## 2024-07-04 PROCEDURE — 97116 GAIT TRAINING THERAPY: CPT | Mod: GP | Performed by: PHYSICAL THERAPIST

## 2024-07-04 PROCEDURE — 71045 X-RAY EXAM CHEST 1 VIEW: CPT

## 2024-07-04 RX ORDER — METOPROLOL TARTRATE 1 MG/ML
5 INJECTION, SOLUTION INTRAVENOUS EVERY 5 MIN PRN
Status: DISCONTINUED | OUTPATIENT
Start: 2024-07-04 | End: 2024-07-06 | Stop reason: HOSPADM

## 2024-07-04 RX ADMIN — SENNOSIDES AND DOCUSATE SODIUM 1 TABLET: 50; 8.6 TABLET ORAL at 21:06

## 2024-07-04 RX ADMIN — CEFAZOLIN SODIUM 2 G: 2 INJECTION, SOLUTION INTRAVENOUS at 04:24

## 2024-07-04 RX ADMIN — SENNOSIDES AND DOCUSATE SODIUM 1 TABLET: 50; 8.6 TABLET ORAL at 09:01

## 2024-07-04 RX ADMIN — VALSARTAN 320 MG: 80 TABLET, FILM COATED ORAL at 09:00

## 2024-07-04 RX ADMIN — HYDROCHLOROTHIAZIDE 25 MG: 25 TABLET ORAL at 09:01

## 2024-07-04 RX ADMIN — GABAPENTIN 100 MG: 100 CAPSULE ORAL at 21:05

## 2024-07-04 RX ADMIN — METOPROLOL TARTRATE 100 MG: 100 TABLET, FILM COATED ORAL at 09:01

## 2024-07-04 RX ADMIN — THERA TABS 1 TABLET: TAB at 09:00

## 2024-07-04 RX ADMIN — ACETAMINOPHEN 975 MG: 325 TABLET ORAL at 04:22

## 2024-07-04 RX ADMIN — PRAVASTATIN SODIUM 80 MG: 20 TABLET ORAL at 09:01

## 2024-07-04 RX ADMIN — ACETAMINOPHEN 975 MG: 325 TABLET ORAL at 21:04

## 2024-07-04 RX ADMIN — ACETAMINOPHEN 975 MG: 325 TABLET ORAL at 11:42

## 2024-07-04 RX ADMIN — POLYETHYLENE GLYCOL 3350 17 G: 17 POWDER, FOR SOLUTION ORAL at 09:00

## 2024-07-04 RX ADMIN — DILTIAZEM HYDROCHLORIDE 240 MG: 120 CAPSULE, EXTENDED RELEASE ORAL at 09:00

## 2024-07-04 RX ADMIN — METOPROLOL TARTRATE 5 MG: 1 INJECTION, SOLUTION INTRAVENOUS at 17:21

## 2024-07-04 ASSESSMENT — ACTIVITIES OF DAILY LIVING (ADL)
ADLS_ACUITY_SCORE: 39
ADLS_ACUITY_SCORE: 38
ADLS_ACUITY_SCORE: 39
ADLS_ACUITY_SCORE: 39
ADLS_ACUITY_SCORE: 38
ADLS_ACUITY_SCORE: 39
ADLS_ACUITY_SCORE: 39
ADLS_ACUITY_SCORE: 38
ADLS_ACUITY_SCORE: 38
ADLS_ACUITY_SCORE: 39
ADLS_ACUITY_SCORE: 38
ADLS_ACUITY_SCORE: 38
ADLS_ACUITY_SCORE: 39
ADLS_ACUITY_SCORE: 39
ADLS_ACUITY_SCORE: 38
ADLS_ACUITY_SCORE: 38
ADLS_ACUITY_SCORE: 39

## 2024-07-04 NOTE — PROGRESS NOTES
07/04/24 1106   Appointment Info   Signing Clinician's Name / Credentials (OT) Erin Castorena OTD OTR/L   Living Environment   People in Home spouse   Current Living Arrangements house   Home Accessibility stairs to enter home   Number of Stairs, Main Entrance 2   Living Environment Comments Walk in shower with grab bars, comfort height toilets, toilet riser with arm rests   Self-Care   Equipment Currently Used at Home none   Fall history within last six months no   Activity/Exercise/Self-Care Comment Typically ind with all ADLs and IADLs   General Information   Onset of Illness/Injury or Date of Surgery 07/03/24   Referring Physician Chavo Patel MD   Patient/Family Therapy Goal Statement (OT) To return home   Additional Occupational Profile Info/Pertinent History of Current Problem Rosie Blackman is a 84 year old female with PMHx significant for paroxysmal atrial fibrillation on chronic anticoagulation with apixaban, hypertension, sick sinus syndrome s/p pacemaker placement, hyperlipidemia, history of ovarian cancer, obesity admitted on 7/3/2024 following L1-2 transforaminal interbody fusion, posterior instrumented fusion, decompression, with O-arm and Stealth navigation.   Existing Precautions/Restrictions spinal   Left Lower Extremity (Weight-bearing Status) weight-bearing as tolerated (WBAT)   Right Lower Extremity (Weight-bearing Status) weight-bearing as tolerated (WBAT)   Cognitive Status Examination   Orientation Status orientation to person, place and time   Affect/Mental Status (Cognitive) WFL   Follows Commands WFL   Visual Perception   Visual Impairment/Limitations WFL   Posture   Posture not impaired   Range of Motion Comprehensive   General Range of Motion no range of motion deficits identified   Strength Comprehensive (MMT)   General Manual Muscle Testing (MMT) Assessment no strength deficits identified   Coordination   Upper Extremity Coordination No deficits were identified   Bed Mobility   Bed  Mobility supine-sit   Supine-Sit [place] (Bed Mobility) minimum assist (75% patient effort)   Transfers   Transfers sit-stand transfer;toilet transfer   Sit-Stand Transfer   Sit-Stand [place] (Transfers) contact guard   Assistive Device (Sit-Stand Transfers) walker, front-wheeled   Toilet Transfer   [place] Level (Toilet Transfer) contact guard   Assistive Device (Toilet Transfer) walker, front-wheeled   Activities of Daily Living   BADL Assessment/Intervention lower body dressing;toileting   Lower Body Dressing Assessment/Training   [place] Level (Lower Body Dressing) moderate assist (50% patient effort)   Toileting   [place] Level (Toileting) contact guard assist   Clinical Impression   Criteria for Skilled Therapeutic Interventions Met (OT) Yes, treatment indicated   OT Diagnosis Decreased ind with ADLs and safety   Influenced by the following impairments S/p lumbar fusion   OT Problem List-Impairments impacting ADL pain;post-surgical precautions;mobility   Assessment of Occupational Performance 3-5 Performance Deficits   Identified Performance Deficits dressing, toileting, bathing, fxl transfers, bed mobility   Planned Therapy Interventions (OT) ADL retraining;bed mobility training;progressive activity/exercise;risk factor education;transfer training   Clinical Decision Making Complexity (OT) problem focused assessment/low complexity   Risk & Benefits of therapy have been explained evaluation/treatment results reviewed;care plan/treatment goals reviewed;risks/benefits reviewed;current/potential barriers reviewed;patient   OT Total Evaluation Time   OT Eval, Low Complexity Minutes (58169) 10   OT Goals   Therapy Frequency (OT) One time eval and treatment   OT Predicted Duration/Target Date for Goal Attainment 07/11/24   OT Goals Lower Body Dressing;Toilet Transfer/Toileting;Bed Mobility   OT: Lower Body Dressing Supervision/stand-by assist;within precautions;Goal Met;Completed   OT: Bed  Mobility Supervision/stand-by assist;supine to/from sitting;within precautions;Goal Met;Completed   OT: Toilet Transfer/Toileting Supervision/stand-by assist;toilet transfer;cleaning and garment management;within precautions;Goal Met;Completed   Self-Care/Home Management   Self-Care/Home Mgmt/ADL, Compensatory, Meal Prep Minutes (24087) 24   Symptoms Noted During/After Treatment (Meal Preparation/Planning Training) none   Treatment Detail/Skilled Intervention Eval completed, treatment initiated. Instructed on spinal precautions related to ADLs, handout provided. Pt demos understanding. Fxl mob within room using FWW SBA, min cueing for walker safety and set up with transfers, cueing for hand placement. Completed toileting SBA following cueing, simulated home set up, instruction for posterior pericares with decreased twisting. Visual demo of shower transfer, demos understanding. Returned to bedside, simulated set up for home completed bed mobility following instruction for log roll. Donned shorts from EOB within precautions SBA instructed on use of a reacher for LB dressing, pt demos understanding.  able to assist as needed with LB dressing. Returned to bedside chair, reviewed car transfer. All questions answered, call light in reach.   OT Discharge Planning   OT Plan D/c OT   OT Discharge Recommendation (DC Rec) home with assist   OT Rationale for DC Rec Pt mobilizing well and met all OT goals, has good support from  at home   OT Brief overview of current status SBA fxl mob and ADLs within precautions   Total Session Time   Timed Code Treatment Minutes 24   Total Session Time (sum of timed and untimed services) 34

## 2024-07-04 NOTE — PLAN OF CARE
Problem: Adult Inpatient Plan of Care  Goal: Plan of Care Review  Description: The Plan of Care Review/Shift note should be completed every shift.  The Outcome Evaluation is a brief statement about your assessment that the patient is improving, declining, or no change.  This information will be displayed automatically on your shift  note.  Outcome: Not Progressing   Goal Outcome Evaluation:  Remains in AFIB with RVR.     Patient vital signs are at baseline: No,  Reason:  AFIB with RVR   Patient able to ambulate as they were prior to admission or with assist devices provided by therapies during their stay:  Yes  Patient MUST void prior to discharge:  Yes  Patient able to tolerate oral intake:  Yes  Pain has adequate pain control using Oral analgesics:  Yes  Does patient have an identified :  Yes  Has goal D/C date and time been discussed with patient:  Yes

## 2024-07-04 NOTE — PROVIDER NOTIFICATION
Text paged Dr. Gomez 9:00 AM 07/04/24 r/t pt's -140 bpm with irregularity (asymptomatic).     Pending response     **ADDENDUM 10:05 AM 07/04/24 - Dr. Gomez at bedside to eval patient. New orders placed for BMP, proBNP, Chest XR, and telemetry monitoring. EKG completed at bedside and resulted - MD reviewed results.

## 2024-07-04 NOTE — CONSULTS
[facility]  Consult Note - Hospitalist Service  Date of Admission:  7/3/2024  Consult Requested by: Orthopedic Spine surgery  Reason for Consult: Postoperative medical management    Assessment & Plan   Rosie Blackman is a 84 year old female with PMHx significant for paroxysmal atrial fibrillation on chronic anticoagulation with apixaban, hypertension, sick sinus syndrome s/p pacemaker placement, hyperlipidemia, history of ovarian cancer, obesity admitted on 7/3/2024 following L1-2 transforaminal interbody fusion, posterior instrumented fusion, decompression, with O-arm and Stealth navigation.   mL.       Paroxysmal atrial fibrillation  Sick sinus syndrome s/p pacemaker placement  Diastolic dysfunction  Chronically anticoagulated with apixaban.  Currently in sinus rhythm, HR 60s. Echocardiogram performed recently on 6/12/2024 with EF 50-55%.  - Resume home metoprolol and diltiazem with hold parameters  - Hold home apixaban for now, could likely resume POD#1 or 2, will discuss with surgery team  - VTE prophylaxis per surgery team    Hypertension  Blood pressure is in appropriate range currently.  - Resume home valsartan-hydrochlorothiazide with hold parameters  - Monitor blood pressure    Hyperlipidemia  - Resume home pravastatin    History of ovarian cancer  S/p BSO, PPLND 7/2020. Follows with [facility] for surveillance.     Obesity  BMI 37.    S/p L1-2 transforaminal interbody fusion, posterior instrumented fusion, decompression, with O-arm and Stealth navigation  Reviewed pre-op labs (7/2): BMP and CBC grossly unremarkable, Hgb 13.7.  - Analgesics, antiemetics, DVT prophylaxis, and therapies per surgery team       The patient's care was discussed with the  specialist, Dr. Nisa Gomez .    Clinically Significant Risk Factors Present on Admission               # Drug Induced Coagulation Defect: home medication list includes an anticoagulant  "medication    # Hypertension: Noted on problem list             # Obesity: Estimated body mass index is 37.2 kg/m  as calculated from the following:    Height as of this encounter: 1.6 m (5' 3\").    Weight as of this encounter: 95.3 kg (210 lb).        # Pacemaker present       Parul Gallardo PA-C  Hospitalist Service  Securely message with [employer] (more info)  Text page via [employer] Paging/Directory   ______________________________________________________________________    Chief Complaint   Postoperative medical management    History is obtained from the patient    History of Present Illness   Rosie Blackman is a 84 year old female with PMHx significant for paroxysmal atrial fibrillation on chronic anticoagulation with apixaban, hypertension, sick sinus syndrome s/p pacemaker placement, hyperlipidemia, history of ovarian cancer, obesity admitted on 7/3/2024 following L1-2 transforaminal interbody fusion, posterior instrumented fusion, decompression, with O-arm and Stealth navigation.   mL.     Currently, patient feels that pain is adequately controlled with current regimen and she is resting comfortably in bed, pain rated 2/10. Patient has tolerated initial oral intake well without significant nausea or vomiting. Denies chest pain, palpitations, shortness of breath, new numbness or tingling, lightheadedness, dizziness.     Patient reports paroxysmal atrial fibrillation and notes that she usually switches into A-fib overnight while sleeping.  This happens every couple of weeks or so and lasts anywhere between 10 minutes to several hours before spontaneously converting back to sinus rhythm.  She has been holding her apixaban for 5 days in anticipation of surgery.      Past Medical History    Past Medical History:   Diagnosis Date    Chronic airway obstruction (H)     Diastolic dysfunction 06/28/2022    Gastroesophageal reflux disease     Hiatal hernia     Hypertension     Irregular heart beat     Malignant " neoplasm of ovary (H)     Ovarian cancer, unspecified laterality (H) 03/10/2021    Personal history of contact with and (suspected) exposure to asbestos     Primary osteoarthritis of right hip 07/09/2020       Past Surgical History   Past Surgical History:   Procedure Laterality Date    BIOPSY BREAST Left     BREAST BIOPSY, CORE RT/LT  1994    CHOLECYSTECTOMY  1995    COLONOSCOPY  05/2010    Diverticulosis, colon polyps; repeat 5 yrs    COLONOSCOPY N/A 11/16/2015    Procedure: COLONOSCOPY;  Surgeon: Alejandro Matos MD;  Location: [facility]    COLONOSCOPY N/A 09/17/2021    Procedure: COLONOSCOPY;  Surgeon: Aayush George MD;  Location: [facility]    Colonoscopy, hyperplastic polyps, repeat in 5 yrs  2004    EP PACEMAKER DEVICE & LEAD IMPLANT- RIGHT ATRIAL & RIGHT VENTRICULAR Left 10/24/2022    Procedure: Pacemaker Device & Lead Implant - Right Atrial & Right Ventricular;  Surgeon: Demond Meyer MD;  Location:  [facility]    ESOPHAGOSCOPY, GASTROSCOPY, DUODENOSCOPY (EGD), COMBINED  09/30/2013    Procedure: COMBINED ESOPHAGOSCOPY, GASTROSCOPY, DUODENOSCOPY (EGD), BIOPSY SINGLE OR MULTIPLE;  Gastroscopy;  Surgeon: Blaise Gill MD;  Location: [facility]    EYE SURGERY  2012    R/L Cataracts    HC REMOVE TONSILS/ADENOIDS,12+ Y/O      T & A 13+y.o.    HERNIORRHAPHY INCISIONAL (LOCATION) N/A 03/24/2021    Procedure: HERNIORRHAPHY, INCISIONAL, OPEN WITH MESH;  Surgeon: Aayush George MD;  Location: [facility]    HYSTERECTOMY, PAP NO LONGER INDICATED      LAPAROSCOPIC SALPINGO-OOPHORECTOMY N/A 07/24/2020    Procedure: Laparoscopy, removal or pelvic mass, both tubes and ovaries, omentectomy, anterior culvectomy, pelvic and para aortic lymph node dissection, laparotomy;  Surgeon: Felipe Corona MD;  Location: [facility]    FL ANESTH,LUMBAR SPINE,CORD SURGERY  10/2020    L3-4 L4-5 TRANSFORAMINAL INTERBODY FUSION L3-5, POSTERIOR INSTRUMENTED FUSION AND DECOMPRESSION    TVH for DUB, ovaries in       VASCULAR SURGERY  2014    [place] closure    [facility] ANESTH,KNEE VEINS SURGERY  08/20/2014       Medications   I have reviewed this patient's current medications       Review of Systems    The 10 point Review of Systems is negative other than noted in the HPI or here.     Social History   I have reviewed this patient's social history and updated it with pertinent information if needed.  Social History     Tobacco Use    Smoking status: Never    Smokeless tobacco: Never   Vaping Use    Vaping status: Never Used   Substance Use Topics    Alcohol use: No    Drug use: No         Allergies   Allergies   Allergen Reactions    Atorvastatin Other (See Comments)     Muscle Pain    Ezetimibe Other (See Comments)     Severe muscle aches    Irbesartan Cramps    Lisinopril     Omeprazole      Other reaction(s): *Unknown    Prilosec [Omeprazole]     Rosuvastatin Other (See Comments)     Muscle Pain    Zestril [Ace Inhibitors] Cough        Physical Exam   Vital Signs: Temp: 97.4  F (36.3  C) Temp src: Oral BP: 134/63 Pulse: 62   Resp: 14 SpO2: 100 % O2 Device: Nasal cannula Oxygen Delivery: 4 LPM  Weight: 210 lbs 0 oz    General Appearance: Awake, alert, in no acute distress.  Respiratory: Nasal cannula in place. Clear to auscultation bilaterally. Normal respiratory effort.  Cardiovascular: Regular rate and rhythm, no murmur. Extremities are warm and well-perfused.  GI: Soft, non-tender, non-distended. Normal bowel sounds.  Skin: No obvious rashes or lesions on observed skin.  Musculoskeletal: Able to move all extremities freely. No lower extremity edema.  Neurologic: Alert and oriented x 3. Strength and sensation are grossly equal and intact in bilateral upper and lower extremities.  Psychiatric: Calm, pleasant, cooperative with exam.      Medical Decision Making       55 MINUTES SPENT BY ME on the date of service doing chart review, history, exam, documentation & further activities per the note.      Data         Imaging results  reviewed over the past 24 hrs:   Recent Results (from the past 24 hour(s))   XR Surgery OARM    Narrative    This exam was marked as non-reportable because it will not be read by a   radiologist or a [place] non-radiologist provider.

## 2024-07-04 NOTE — CONSULTS
Care Management Follow Up    Length of Stay (days): 1    Expected Discharge Date: 07/05/2024     Concerns to be Addressed: no discharge needs identified     Patient plan of care discussed at interdisciplinary rounds: Yes    Anticipated Discharge Disposition: Home     Anticipated Discharge Services: None  Anticipated Discharge DME: None    Patient/family educated on Medicare website which has current facility and service quality ratings: no  Education Provided on the Discharge Plan: No  Patient/Family in Agreement with the Plan: yes    Referrals Placed by CM/SW:  None   Private pay costs discussed: Not applicable    Additional Information:  Chart reviewed. No CM needs identified. Pt lives with spouse who can assist.  Family to transport.     EDINSON Gan

## 2024-07-04 NOTE — PROGRESS NOTES
"Orthopedic Progress Note      Assessment: 1 Day Post-Op  S/P Procedure(s):  LUMBAR 1- LUMBAR 2 TRANSFORAMINAL INTERBODY FUSION, POSTERIOR INSTRUMENTED FUSION, DECOMPRESSION, WITH O-ARM AND STEALTH NAVIGATION     Plan:   - Continue PT/OT  - Weightbearing status: WBAT can use brace for comfort  - No bending, twisting or lifting more than 10 pounds for 6 weeks.  - Drain can be removed when output is <30cc for 2 consecutive shifts or POD3  - Anticoagulation: no chemical prophylaxis in addition to SCDs, salvador stockings and early ambulation.  - Orthosis consult placed for LSO brace, wear as needed.   - Discharge planning: pending drain output, pain control and progression in therapy.      Subjective:  Pain: mild  Nausea, Vomiting:  No  Lightheadedness, Dizziness:  No  Neuro:  Patient denies new onset numbness or paresthesias    Patient is feeling well today. Pain well controlled. Tolerating oral intake. Voiding and passing gas. She did have an incidence of rapid heart rate that she states can occasionally happen with her pacemaker. Felling well right now. All questions answered.     Objective:  /63 (BP Location: Right arm)   Pulse (!) 145   Temp 97.3  F (36.3  C) (Oral)   Resp 16   Ht 1.6 m (5' 3\")   Wt 95.3 kg (210 lb)   SpO2 95%   BMI 37.20 kg/m    The patient is A&Ox3. Appears comfortable.   Sensation is intact.  Dorsiflexion and plantar flexion is intact.  Dorsalis pedis pulse intact.  Calves are soft and non-tender. Negative Mihir's.  The incision is covered. Dressing C/D/I.    Drain output 70cc last shift.     Pertinent Labs   Lab Results: personally reviewed.   Lab Results   Component Value Date    INR 1.00 08/21/2022    INR 1.10 08/18/2022    INR 1.02 12/04/2021     Lab Results   Component Value Date    WBC 5.5 07/02/2024    HGB 12.2 07/04/2024    HCT 42.9 07/02/2024    MCV 97 07/02/2024     07/02/2024     Lab Results   Component Value Date     07/02/2024    CO2 30 (H) 07/02/2024 "         Report completed by:  Jinny Rendon PA-C, Dr Patel.   Date: 7/4/2024  Time: 10:16 AM

## 2024-07-04 NOTE — PROGRESS NOTES
[facility] MEDICINE PROGRESS NOTE      Summary: 84 year old female into [facility] on 7/3/2024 after  Presenting for an elective L1-2 transforaminal fusion and decompression.  Set of blood loss of 100 mL.  She has a drain in place.  She is being followed by  [facility]    Past medical history includes hypertension, atrial fibrillation, permanent pacemaker, heart failure preserved ejection fraction, dyslipidemia.    This morning on interview the patient has a narrow complex tachycardia.  On EKG she has atrial fibrillation with RVR.  No additional medications were given.  She self resolved.  She will be kept on telemetry.  Chest x-ray is unremarkable.  BNP is 864.  She has no chest symptoms.     Hospital day #1    Problem list:     POD #1 : L1-L2 fusion: Per [facility]    Atrial fibrillation with RVR; continue patient's home regimen of high-dose beta-blockade and calcium channel blocker  PPM  HTN, essential:  on 4 drug regimen with cardizem 240 mg daily,  Metoprolol tartrate 100 mg twice daily; valsartan 320 mg  Daily and thiazide 25 mg daily;   5.  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    Nisa Gomez MD  [facility] Medicine  [facility]  Phone: #[phone]  Securely message with the Vocera Web Console (learn more here)  Text page via [employer] Paging/Directory     Interval History/Subjective:  My pain is controlled;         Physical Exam/Objective:  Temp:  [97.2  F (36.2  C)-98.9  F (37.2  C)] 97.3  F (36.3  C)  Pulse:  [] 119  Resp:  [12-28] 16  BP: (101-169)/(53-83) 103/71  SpO2:  [93 %-100 %] 95 %  Body mass index is 37.2 kg/m .    GENERAL:  Awake, alert, oriented; no distresss; HR of 108   HEAD:  Normocephalic, without obvious abnormality, atraumatic   EYES:  PERRL, conjunctiva/corneas clear, no scleral icterus, EOM's intact    NOSE: Nares normal, septum midline, mucosa normal, no drainage   THROAT: Lips, mucosa, and tongue normal; teeth and gums normal, mouth moist   NECK: Supple, symmetrical, trachea midline   BACK:   Symmetric, no curvature, ROM normal   LUNGS:   Clear to auscultation bilaterally, no rales, rhonchi, or wheezing, symmetric chest rise on inhalation, respirations unlabored   CHEST WALL:  No tenderness or deformity   HEART:  Irregularly irregular; no murmur   ABDOMEN:   Soft, non-tender, bowel sounds active all four quadrants, no masses, no organomegaly, no rebound or guarding   EXTREMITIES: Extremities normal, atraumatic, no cyanosis or edema    SKIN: Dry to touch, no exanthems in the visualized areas   NEURO: Alert, oriented x3, moves all four extremities freely   PSYCH: Cooperative, behavior is appropriate      Data reviewed today: I personally reviewed all new medications, labs, imaging/diagnostics reports over the past 24 hours. Pertinent findings include:    Imaging:   Recent Results (from the past 24 hour(s))   XR Surgery OARM    Narrative    This exam was marked as non-reportable because it will not be read by a   radiologist or a [place] non-radiologist provider.         XR Chest Port 1 View    Narrative    EXAM: XR CHEST PORT 1 VIEW  LOCATION: [facility]  DATE: 7/4/2024    INDICATION: cough  COMPARISON: 10/24/2022.      Impression    IMPRESSION: Cardiac silhouette appears mildly prominent which is likely secondary to portable technique. Aortic arch calcification. Left subclavian approach cardiac pacemaker. Prominence of the pulmonary vasculature. No focal airspace consolidation. No   pleural effusion or pneumothorax. Partially imaged lumbar spine fusion hardware.       Labs:      Medications:   Personally Reviewed.  Medications   Current Facility-Administered Medications   Medication Dose Route Frequency Provider Last Rate Last Admin    sodium chloride 0.9 % infusion   Intravenous  Continuous Janey Allen PA-C   Stopped at 07/04/24 0700     Current Facility-Administered Medications   Medication Dose Route Frequency Provider Last Rate Last Admin    acetaminophen (TYLENOL) tablet 975 mg  975 mg Oral Q8H Janey Allen PA-C   975 mg at 07/04/24 1142    diltiazem ER COATED BEADS (CARDIZEM CD/CARTIA XT) 24 hr capsule 240 mg  240 mg Oral Daily Parul Gallardo PA-C   240 mg at 07/04/24 0900    gabapentin (NEURONTIN) capsule 100 mg  100 mg Oral At Bedtime Janey Allen PA-C   100 mg at 07/03/24 2123    valsartan (DIOVAN) tablet 320 mg  320 mg Oral Daily Chavo Patel MD   320 mg at 07/04/24 0900    And    hydrochlorothiazide (HYDRODIURIL) tablet 25 mg  25 mg Oral Daily Chavo Patel MD   25 mg at 07/04/24 0901    metoprolol tartrate (LOPRESSOR) tablet 100 mg  100 mg Oral BID Parul Gallardo PA-C   100 mg at 07/04/24 0901    multivitamin, therapeutic (THERA-VIT) tablet 1 tablet  1 tablet Oral Daily Janey Allen PA-C   1 tablet at 07/04/24 0900    polyethylene glycol (MIRALAX) Packet 17 g  17 g Oral Daily Janey Allen PA-C   17 g at 07/04/24 0900    pravastatin (PRAVACHOL) tablet 80 mg  80 mg Oral Daily Parul Gallardo PA-C   80 mg at 07/04/24 0901    senna-docusate (SENOKOT-S/PERICOLACE) 8.6-50 MG per tablet 1 tablet  1 tablet Oral BID Janey Allen PA-C   1 tablet at 07/04/24 0901

## 2024-07-04 NOTE — PROGRESS NOTES
07/04/24 1030   Appointment Info   Signing Clinician's Name / Credentials (PT) Errol Duran PT   Living Environment   People in Home spouse   Current Living Arrangements house   Home Accessibility stairs to enter home   Number of Stairs, Main Entrance 2   Stair Railings, Main Entrance railing on left side (ascending)   Transportation Anticipated family or friend will provide   Self-Care   Usual Activity Tolerance good   Current Activity Tolerance fair   Equipment Currently Used at Home cane, straight;raised toilet seat;walker, rolling   Fall history within last six months no   Activity/Exercise/Self-Care Comment Patient uses RW at home.   General Information   Onset of Illness/Injury or Date of Surgery 07/03/24   Referring Physician Chavo Patel MD   Patient/Family Therapy Goals Statement (PT) d/c home, get stronger   Pertinent History of Current Problem (include personal factors and/or comorbidities that impact the POC) s/p Lumbar fusion   Existing Precautions/Restrictions spinal;no pivoting or twisting;lifting   Weight-Bearing Status - LLE weight-bearing as tolerated   Weight-Bearing Status - RLE weight-bearing as tolerated   General Observations Lumbar drain   Cognition   Affect/Mental Status (Cognition) WNL   Pain Assessment   Patient Currently in Pain Yes, see Vital Sign flowsheet   Range of Motion (ROM)   ROM Comment WFL   Bed Mobility   Comment, (Bed Mobility) demonstration, discussed log roll technique   Transfers   Transfers sit-stand transfer   Sit-Stand Transfer   Sit-Stand [place] (Transfers) contact guard   Assistive Device (Sit-Stand Transfers) walker, front-wheeled   Comment, (Sit-Stand Transfer) posture cues, sequencing for hand placement   Gait/Stairs (Locomotion)   [place] Level (Gait) contact guard   Assistive Device (Gait) walker, front-wheeled   Distance in Feet (Gait) 10   Pattern (Gait) step-through   Deviations/Abnormal Patterns (Gait) stride length decreased;veronica decreased    Clinical Impression   Criteria for Skilled Therapeutic Intervention Yes, treatment indicated   PT Diagnosis (PT) impaired functional mobility   Influenced by the following impairments pain, impaired balance, weakness   Functional limitations due to impairments gait, transfers, steps   Clinical Presentation (PT Evaluation Complexity) stable   Clinical Presentation Rationale pt presents as medically diagnosed   Clinical Decision Making (Complexity) low complexity   Planned Therapy Interventions (PT) balance training;bed mobility training;gait training;home exercise program;patient/family education;ROM (range of motion);stair training;strengthening;transfer training   Risk & Benefits of therapy have been explained care plan/treatment goals reviewed;patient   PT Total Evaluation Time   PT Eval, Low Complexity Minutes (57099) 10   Physical Therapy Goals   PT Frequency Daily  (BID)   PT Predicted Duration/Target Date for Goal Attainment 07/08/24   PT Goals PT Goal 1;Gait;Transfers;Stairs   PT: Transfers Modified independent;Sit to/from stand;Assistive device   PT: Gait Modified independent;Rolling walker;150 feet   PT: Stairs Supervision/stand-by assist;2 stairs;Rail on left   PT: Goal 1 Independent with written HEP for LE strengthening   Interventions   Interventions Quick Adds Therapeutic Procedure;Therapeutic Activity;Gait Training   Therapeutic Activity   Treatment Detail/Skilled Intervention sit to/from stand, cueing for safe hand placement, reviewed spinal precautions   Gait Training   Gait Training Minutes (13713) 10   Symptoms Noted During/After Treatment (Gait Training) fatigue   Treatment Detail/Skilled Intervention heel strike cues, reciprocal steps   Distance in Feet 100   [place] Level (Gait Training) contact guard   Physical Assistance Level (Gait Training) verbal cues;1 person assist   Weight Bearing (Gait Training) weight-bearing as tolerated   Assistive Device (Gait Training) rolling walker    Pattern Analysis (Gait Training) other (see comments)  (step-through)   Gait Analysis Deviations decreased stride length;decreased step length;decreased veronica   Impairments (Gait Analysis/Training) pain;strength decreased   PT Discharge Planning   PT Plan transfers, gait, steps and standing back exercises/HEP   PT Discharge Recommendation (DC Rec) (S)  home with assist   PT Rationale for DC Rec Good support, anticipate mobility to progress in 1-2 days   PT Brief overview of current status sit to stand and walked x100 feet with RW with CGA   PT Equipment Needed at Discharge walker, rolling   Total Session Time   Timed Code Treatment Minutes 10   Total Session Time (sum of timed and untimed services) 20

## 2024-07-04 NOTE — PLAN OF CARE
Patient vital signs are at baseline: Yes  Patient able to ambulate as they were prior to admission or with assist devices provided by therapies during their stay:  No  Patient MUST void prior to discharge:  Yes  Patient able to tolerate oral intake:  Yes  Pain has adequate pain control using Oral analgesics:  Yes  Does patient have an identified :  Yes  Has goal D/C date and time been discussed with patient:  Yes    Pt is A&O, VSS, and CMS intact. Pt has not been out of bed yet. Damian patent. Tolerating oral intake. Pain is 2/10 and is being managed per MAR. Uses call light appropriately. Pt is able to make needs known and is resting between cares.   Minna Rendon, Student Nurse Intern      Problem: Adult Inpatient Plan of Care  Goal: Plan of Care Review  Description: The Plan of Care Review/Shift note should be completed every shift.  The Outcome Evaluation is a brief statement about your assessment that the patient is improving, declining, or no change.  This information will be displayed automatically on your shift  note.  Outcome: Progressing     Problem: Adult Inpatient Plan of Care  Goal: Absence of Hospital-Acquired Illness or Injury  Intervention: Prevent and Manage VTE (Venous Thromboembolism) Risk  Recent Flowsheet Documentation  Taken 7/3/2024 2015 by Minna Rendon  VTE Prevention/Management: SCDs on (sequential compression devices)     Problem: Adult Inpatient Plan of Care  Goal: Absence of Hospital-Acquired Illness or Injury  Intervention: Prevent Infection  Recent Flowsheet Documentation  Taken 7/3/2024 2015 by Minna Rendon  Infection Prevention:   rest/sleep promoted   single patient room provided   hand hygiene promoted     Problem: Adult Inpatient Plan of Care  Goal: Optimal Comfort and Wellbeing  Outcome: Progressing

## 2024-07-04 NOTE — PROVIDER NOTIFICATION
Text paged Dr. Gomez 4:57 PM 07/04/24 r/t sustained AFIB with RVR > 110 bpm (110 - 155 bpm intermittently). Requesting PRN if appropriate.     Pending response     **ADDENDUM 5:03 PM 07/04/24 - PRN IV Metoprolol ordered (refer MAR).

## 2024-07-04 NOTE — PLAN OF CARE
Problem: Adult Inpatient Plan of Care  Goal: Optimal Comfort and Wellbeing  Outcome: Progressing  Intervention: Monitor Pain and Promote Comfort  Problem: Spinal Surgery  Goal: Optimal Functional Ability  Outcome: Progressing  Intervention: Optimize Functional Status     Problem: Spinal Surgery  Goal: Optimal Pain Control and Function  Outcome: Progressing  Intervention: Prevent or Manage Pain   Goal Outcome Evaluation:       Patient vital signs are at baseline: Yes  Patient able to ambulate as they were prior to admission or with assist devices provided by therapies during their stay:  Yes  Patient MUST void prior to discharge:  No, Damian out this AM. Due to void.   Patient able to tolerate oral intake:  Yes  Pain has adequate pain control using Oral analgesics:  Yes  Does patient have an identified :  Yes  Has goal D/C date and time been discussed with patient:  Yes    Pt is alert and oriented. VSS. CMS intact. Reports mild pain 2/10, managed with scheduled Tylenol. Hemovac in place. Up with A X 1, GB, and walker. Calls appropriately. Bed alarm is on for safety.

## 2024-07-05 ENCOUNTER — APPOINTMENT (OUTPATIENT)
Dept: PHYSICAL THERAPY | Facility: CLINIC | Age: 84
DRG: 454 | End: 2024-07-05
Attending: ORTHOPAEDIC SURGERY
Payer: MEDICARE

## 2024-07-05 LAB
ATRIAL RATE - MUSE: 115 BPM
DIASTOLIC BLOOD PRESSURE - MUSE: NORMAL MMHG
HGB BLD-MCNC: 11.2 G/DL (ref 11.7–15.7)
INTERPRETATION ECG - MUSE: NORMAL
P AXIS - MUSE: NORMAL DEGREES
PR INTERVAL - MUSE: NORMAL MS
QRS DURATION - MUSE: 90 MS
QT - MUSE: 320 MS
QTC - MUSE: 441 MS
R AXIS - MUSE: -2 DEGREES
SYSTOLIC BLOOD PRESSURE - MUSE: NORMAL MMHG
T AXIS - MUSE: 5 DEGREES
VENTRICULAR RATE- MUSE: 114 BPM

## 2024-07-05 PROCEDURE — 85018 HEMOGLOBIN: CPT

## 2024-07-05 PROCEDURE — 250N000013 HC RX MED GY IP 250 OP 250 PS 637

## 2024-07-05 PROCEDURE — 97530 THERAPEUTIC ACTIVITIES: CPT | Mod: GP | Performed by: PHYSICAL THERAPIST

## 2024-07-05 PROCEDURE — 250N000013 HC RX MED GY IP 250 OP 250 PS 637: Performed by: EMERGENCY MEDICINE

## 2024-07-05 PROCEDURE — 36415 COLL VENOUS BLD VENIPUNCTURE: CPT

## 2024-07-05 PROCEDURE — 97116 GAIT TRAINING THERAPY: CPT | Mod: GP | Performed by: PHYSICAL THERAPIST

## 2024-07-05 PROCEDURE — 99232 SBSQ HOSP IP/OBS MODERATE 35: CPT | Performed by: EMERGENCY MEDICINE

## 2024-07-05 PROCEDURE — 250N000013 HC RX MED GY IP 250 OP 250 PS 637: Performed by: ORTHOPAEDIC SURGERY

## 2024-07-05 PROCEDURE — 120N000001 HC R&B MED SURG/OB

## 2024-07-05 RX ADMIN — METOPROLOL TARTRATE 100 MG: 100 TABLET, FILM COATED ORAL at 20:40

## 2024-07-05 RX ADMIN — POLYETHYLENE GLYCOL 3350 17 G: 17 POWDER, FOR SOLUTION ORAL at 09:46

## 2024-07-05 RX ADMIN — ACETAMINOPHEN 975 MG: 325 TABLET ORAL at 12:01

## 2024-07-05 RX ADMIN — ACETAMINOPHEN 975 MG: 325 TABLET ORAL at 05:19

## 2024-07-05 RX ADMIN — GABAPENTIN 100 MG: 100 CAPSULE ORAL at 20:40

## 2024-07-05 RX ADMIN — VALSARTAN 320 MG: 80 TABLET, FILM COATED ORAL at 09:46

## 2024-07-05 RX ADMIN — ACETAMINOPHEN 975 MG: 325 TABLET ORAL at 20:39

## 2024-07-05 RX ADMIN — SENNOSIDES AND DOCUSATE SODIUM 1 TABLET: 50; 8.6 TABLET ORAL at 20:39

## 2024-07-05 RX ADMIN — HYDROCHLOROTHIAZIDE 25 MG: 25 TABLET ORAL at 09:46

## 2024-07-05 RX ADMIN — PRAVASTATIN SODIUM 80 MG: 20 TABLET ORAL at 09:46

## 2024-07-05 RX ADMIN — DILTIAZEM HYDROCHLORIDE 240 MG: 120 CAPSULE, EXTENDED RELEASE ORAL at 09:46

## 2024-07-05 RX ADMIN — METOPROLOL TARTRATE 100 MG: 100 TABLET, FILM COATED ORAL at 09:46

## 2024-07-05 RX ADMIN — APIXABAN 5 MG: 5 TABLET, FILM COATED ORAL at 20:49

## 2024-07-05 RX ADMIN — SENNOSIDES AND DOCUSATE SODIUM 1 TABLET: 50; 8.6 TABLET ORAL at 09:46

## 2024-07-05 RX ADMIN — THERA TABS 1 TABLET: TAB at 09:46

## 2024-07-05 ASSESSMENT — ACTIVITIES OF DAILY LIVING (ADL)
ADLS_ACUITY_SCORE: 39
ADLS_ACUITY_SCORE: 26
ADLS_ACUITY_SCORE: 39
ADLS_ACUITY_SCORE: 38
ADLS_ACUITY_SCORE: 39
ADLS_ACUITY_SCORE: 39
ADLS_ACUITY_SCORE: 38
ADLS_ACUITY_SCORE: 38
ADLS_ACUITY_SCORE: 26
ADLS_ACUITY_SCORE: 38
ADLS_ACUITY_SCORE: 38
ADLS_ACUITY_SCORE: 26
ADLS_ACUITY_SCORE: 39
ADLS_ACUITY_SCORE: 26
ADLS_ACUITY_SCORE: 39
ADLS_ACUITY_SCORE: 26
ADLS_ACUITY_SCORE: 38
ADLS_ACUITY_SCORE: 38

## 2024-07-05 NOTE — PROGRESS NOTES
[facility] MEDICINE PROGRESS NOTE      Summary: 84 year old female into [facility] on 7/3/2024 after  Presenting for an elective L1-2 transforaminal fusion and decompression.  Set of blood loss of 100 mL.  She has a drain in place.  She is being followed by  [facility]    Past medical history includes hypertension, atrial fibrillation, permanent pacemaker, heart failure preserved ejection fraction, dyslipidemia.    Yesterday she had issues with narrow complex tachycardia. She  Received 1 additional dose of metoprolol iv.    Overnight she feels improved.  Pts vitals reviewed.  She is improved since yesterday.  On my exam she has a pulse of 90 and irregular while sitting  In her chair.      Hospital day #2    Problem list:     POD #2 : L1-L2 fusion: Per [facility]    Atrial fibrillation with RVR; continue patient's home regimen of high-dose beta-blockade and calcium channel blocker  PPM  HTN, essential:  on 4 drug regimen with cardizem 240 mg daily,  Metoprolol tartrate 100 mg twice daily; valsartan 320 mg  Daily and thiazide 25 mg daily;   5.  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  Dvt prevention:  resume timboquis    Nisa Gomez M.D.   [facility]  [facility]  Phone: #[phone]  Securely message with the Vocera Web Console (learn more here)  Text page via [employer] Paging/Directory     Interval History/Subjective:  I am feeling improved.         Physical Exam/Objective:  Temp:  [97.2  F (36.2  C)-98.1  F (36.7  C)] 97.5  F (36.4  C)  Pulse:  [] 85  Resp:  [14-18] 14  BP: ()/(50-69) 126/58  SpO2:  [91 %-99 %] 97 %  Body mass index is 37.41 kg/m .    GENERAL:  Awake, alert, oriented; no distresss; HR of 90   HEAD:  Normocephalic, without obvious abnormality, atraumatic   EYES:  PERRL, conjunctiva/corneas clear, no scleral icterus, EOM's intact   NOSE: Nares normal, septum midline, mucosa normal, no  drainage   THROAT: Lips, mucosa, and tongue normal; teeth and gums normal, mouth moist   NECK: Supple, symmetrical, trachea midline   BACK:   Symmetric, no curvature, ROM normal   LUNGS:   Clear to auscultation bilaterally, no rales, rhonchi, or wheezing, symmetric chest rise on inhalation, respirations unlabored   CHEST WALL:  No tenderness or deformity   HEART:  Irregularly irregular; no murmur   ABDOMEN:   Soft, non-tender, bowel sounds active all four quadrants, no masses, no organomegaly, no rebound or guarding   EXTREMITIES: Extremities normal, atraumatic, no cyanosis or edema    SKIN: Dry to touch, no exanthems in the visualized areas   NEURO: Alert, oriented x3, moves all four extremities freely   PSYCH: Cooperative, behavior is appropriate      Data reviewed today: I personally reviewed all new medications, labs, imaging/diagnostics reports over the past 24 hours. Pertinent findings include:    Imaging:   No results found for this or any previous visit (from the past 24 hour(s)).      Labs:      Medications:   Personally Reviewed.  Medications   Current Facility-Administered Medications   Medication Dose Route Frequency Provider Last Rate Last Admin    sodium chloride 0.9 % infusion   Intravenous Continuous Janey Allen PA-C   Stopped at 07/04/24 0700     Current Facility-Administered Medications   Medication Dose Route Frequency Provider Last Rate Last Admin    acetaminophen (TYLENOL) tablet 975 mg  975 mg Oral Q8H Janey Allen PA-C   975 mg at 07/05/24 1201    diltiazem ER COATED BEADS (CARDIZEM CD/CARTIA XT) 24 hr capsule 240 mg  240 mg Oral Daily Parul Gallardo PA-C   240 mg at 07/05/24 0946    gabapentin (NEURONTIN) capsule 100 mg  100 mg Oral At Bedtime Janey Allen PA-C   100 mg at 07/04/24 2105    valsartan (DIOVAN) tablet 320 mg  320 mg Oral Daily Chavo Patel MD   320 mg at 07/05/24 0946    And    hydrochlorothiazide (HYDRODIURIL) tablet 25 mg  25 mg Oral Daily  Chavo Patel MD   25 mg at 07/05/24 0946    metoprolol tartrate (LOPRESSOR) tablet 100 mg  100 mg Oral BID Parul Gallardo PA-C   100 mg at 07/05/24 0946    multivitamin, therapeutic (THERA-VIT) tablet 1 tablet  1 tablet Oral Daily Janey Allen PA-C   1 tablet at 07/05/24 0946    polyethylene glycol (MIRALAX) Packet 17 g  17 g Oral Daily Janey Allen PA-C   17 g at 07/05/24 0946    pravastatin (PRAVACHOL) tablet 80 mg  80 mg Oral Daily Parul Gallardo PA-C   80 mg at 07/05/24 0946    senna-docusate (SENOKOT-S/PERICOLACE) 8.6-50 MG per tablet 1 tablet  1 tablet Oral BID Janey Allen PA-C   1 tablet at 07/05/24 0946

## 2024-07-05 NOTE — PROGRESS NOTES
Orthopedic Surgery progress note:  Rosie is overall doing well today.  She states that she knows she is in atrial fibrillation but denies any lightheadedness, chest pain, shortness of breath.  She also denies any new onset numbness, ting or weakness        Vitals reviewed demonstrating tachycardia    Full examination of the back demonstrates a dressing that is clean, dry and intact      5 5 strength within the quadriceps, EHL, tibialis anterior, and gastrosoleus complex.  Sensation intact to light touch in the L2-S1 distribution    Drain: 90 cc, and 30 cc over the last 2 shifts.        84-year-old female status post L1-2 transforaminal interbody fusion that is overall doing well.  We will see how she mobilizes today and how her drain output continues to trend downward.      Weightbearing as tolerated, no bending, twisting, or lifting more than 10 pounds  Drain to remain until output less than 30 cc for 2 consecutive shifts or until postoperative day 3  Discharge pending drain output and mobilization with therapy    Onofre Casarez MD  [facility]

## 2024-07-05 NOTE — PLAN OF CARE
Problem: Spinal Surgery  Goal: Optimal Functional Ability  Outcome: Progressing  Intervention: Optimize Functional Status   Goal Outcome Evaluation:       Patient vital signs are at baseline: yes  Patient able to ambulate as they were prior to admission or with assist devices provided by therapies during their stay:  Yes  Patient MUST void prior to discharge:  Yes  Patient able to tolerate oral intake:  Yes  Pain has adequate pain control using Oral analgesics:  Yes  Does patient have an identified :  Yes  Has goal D/C date and time been discussed with patient:  Yes    Pt is alert and oriented X 4. Pt  intermittently tachycardic (). CMS intact. Mild pain, managed with scheduled Tylenol. Tele reading: A-fib. Hemovac intact.

## 2024-07-05 NOTE — PLAN OF CARE
Problem: Adult Inpatient Plan of Care  Goal: Plan of Care Review  Description: The Plan of Care Review/Shift note should be completed every shift.  The Outcome Evaluation is a brief statement about your assessment that the patient is improving, declining, or no change.  This information will be displayed automatically on your shift  note.  Outcome: Progressing  Patient vital signs are at baseline: Yes  Patient able to ambulate as they were prior to admission or with assist devices provided by therapies during their stay:  Yes  Patient MUST void prior to discharge:  Yes  Patient able to tolerate oral intake:  Yes  Pain has adequate pain control using Oral analgesics:  Yes  Does patient have an identified :  Yes  Has goal D/C date and time been discussed with patient:  Yes    Hemovac output of 30 cc from 0700 - 1500. Discharging home once Hemovac output is < 30 ml x 2 shifts.

## 2024-07-06 ENCOUNTER — APPOINTMENT (OUTPATIENT)
Dept: PHYSICAL THERAPY | Facility: CLINIC | Age: 84
DRG: 454 | End: 2024-07-06
Attending: ORTHOPAEDIC SURGERY
Payer: MEDICARE

## 2024-07-06 VITALS
DIASTOLIC BLOOD PRESSURE: 68 MMHG | WEIGHT: 211.2 LBS | HEART RATE: 89 BPM | SYSTOLIC BLOOD PRESSURE: 98 MMHG | BODY MASS INDEX: 37.42 KG/M2 | OXYGEN SATURATION: 96 % | HEIGHT: 63 IN | TEMPERATURE: 97.9 F | RESPIRATION RATE: 18 BRPM

## 2024-07-06 PROCEDURE — 250N000013 HC RX MED GY IP 250 OP 250 PS 637: Performed by: EMERGENCY MEDICINE

## 2024-07-06 PROCEDURE — 97530 THERAPEUTIC ACTIVITIES: CPT | Mod: GP | Performed by: PHYSICAL THERAPIST

## 2024-07-06 PROCEDURE — 97116 GAIT TRAINING THERAPY: CPT | Mod: GP | Performed by: PHYSICAL THERAPIST

## 2024-07-06 PROCEDURE — 250N000013 HC RX MED GY IP 250 OP 250 PS 637: Performed by: ORTHOPAEDIC SURGERY

## 2024-07-06 PROCEDURE — 250N000013 HC RX MED GY IP 250 OP 250 PS 637

## 2024-07-06 RX ORDER — GABAPENTIN 100 MG/1
100 CAPSULE ORAL AT BEDTIME
Qty: 30 CAPSULE | Refills: 0 | Status: SHIPPED | OUTPATIENT
Start: 2024-07-06 | End: 2024-10-01

## 2024-07-06 RX ORDER — OXYCODONE HYDROCHLORIDE 5 MG/1
5 TABLET ORAL EVERY 4 HOURS PRN
Qty: 30 TABLET | Refills: 0 | Status: SHIPPED | OUTPATIENT
Start: 2024-07-06 | End: 2024-07-23

## 2024-07-06 RX ORDER — ACETAMINOPHEN 325 MG/1
975 TABLET ORAL EVERY 8 HOURS
Qty: 100 TABLET | Refills: 0 | Status: SHIPPED | OUTPATIENT
Start: 2024-07-06

## 2024-07-06 RX ORDER — METHOCARBAMOL 500 MG/1
500 TABLET, FILM COATED ORAL EVERY 6 HOURS PRN
Qty: 30 TABLET | Refills: 0 | Status: SHIPPED | OUTPATIENT
Start: 2024-07-06 | End: 2024-10-01

## 2024-07-06 RX ORDER — AMOXICILLIN 250 MG
1 CAPSULE ORAL 2 TIMES DAILY
Qty: 30 TABLET | Refills: 0 | Status: SHIPPED | OUTPATIENT
Start: 2024-07-06 | End: 2024-07-23

## 2024-07-06 RX ADMIN — APIXABAN 5 MG: 5 TABLET, FILM COATED ORAL at 08:08

## 2024-07-06 RX ADMIN — THERA TABS 1 TABLET: TAB at 08:10

## 2024-07-06 RX ADMIN — HYDROCHLOROTHIAZIDE 25 MG: 25 TABLET ORAL at 08:07

## 2024-07-06 RX ADMIN — PRAVASTATIN SODIUM 80 MG: 20 TABLET ORAL at 08:08

## 2024-07-06 RX ADMIN — ACETAMINOPHEN 975 MG: 325 TABLET ORAL at 05:15

## 2024-07-06 RX ADMIN — ACETAMINOPHEN 975 MG: 325 TABLET ORAL at 11:52

## 2024-07-06 ASSESSMENT — ACTIVITIES OF DAILY LIVING (ADL)
ADLS_ACUITY_SCORE: 26
ADLS_ACUITY_SCORE: 25
ADLS_ACUITY_SCORE: 26
ADLS_ACUITY_SCORE: 25
ADLS_ACUITY_SCORE: 26

## 2024-07-06 NOTE — DISCHARGE SUMMARY
ORTHOPEDIC DISCHARGE SUMMARY       Rosie Blackman,  1940, MRN [id number]    Admission Date: 7/3/2024  Admission Diagnoses: Degenerative spondylolisthesis [M43.10]  Lumbar radiculopathy [M54.16]  Lumbar stenosis [M48.061]     Discharge Date:  2024     Post-operative Day:  3 Days Post-Op    Reason for Admission: The patient was admitted for the following:  Procedure(s):  LUMBAR 1- LUMBAR 2 TRANSFORAMINAL INTERBODY FUSION, POSTERIOR INSTRUMENTED FUSION, DECOMPRESSION, WITH O-ARM AND STEALTH NAVIGATION      BRIEF HOSPITAL COURSE   This 84 year old female underwent the aforementioned procedure with Dr. Patel on 24. There were no intraoperative complications and the patient was transferred to the recovery room and later the orthopedic unit in stable condition. Once the patient reached the orthopedic floor our orthopedic pain protocol was implemented along with the following:    Anticoagulation Medications: None  Therapy: PT and OT  Activity: WBAT  Bracing: None    Consultations during Admission: Hospitalist service for medical management     COMPLICATIONS/SIGNIFICANT FINDINGS    none    DISCHARGE INFORMATION   Condition at discharge: Good  Discharge destination: Home  Patient was seen by myself on the date of discharge.    FOLLOW UP CARE   Follow up with orthopedics in 6 weeks or sooner should the need arise. Ortho will continue to manage pain control, post op anticoagulation and incision care.     Follow up with your PCP for management of chronic medical problems and to evaluate for post op medical complications including constipation, nausea/vomiting, DVT/PE, anemia, changes in blood pressure, fevers/chills, urinary retention and atelectasis/pneumonia.     Subjective   Patient is doing well today. Patient has passed her therapies. She is using Tylenol and Oxycodone for pain which she is tolerating well. She feels ready to discharge home. No other complaints. All questions answered.       Physical  "Exam   The patient is A&Ox3. Appears comfortable.   Bilateral lower extremity sensation is intact.  Bilateral lower extremity strength is intact.  Dorsiflexion and plantar flexion is intact.  Dorsalis pedis pulse intact.  Calves are soft and non-tender. Negative Mihir's.  The incision is covered. Dressing C/D/I.    /54   Pulse 109   Temp 97.8  F (36.6  C)   Resp 16   Ht 1.6 m (5' 3\")   Wt 95.8 kg (211 lb 3.2 oz)   SpO2 91%   BMI 37.41 kg/m      Pertinent Results at Discharge     Hemoglobin   Date/Time Value Ref Range Status   07/05/2024 06:05 AM 11.2 (L) 11.7 - 15.7 g/dL Final   07/04/2024 05:39 AM 12.2 11.7 - 15.7 g/dL Final   07/02/2024 10:47 AM 13.7 11.7 - 15.7 g/dL Final   10/05/2020 01:40 PM 14.5 11.7 - 15.7 g/dL Final   07/26/2020 05:41 AM 12.0 11.7 - 15.7 g/dL Final   07/25/2020 03:50 AM 12.5 11.7 - 15.7 g/dL Final     INR   Date/Time Value Ref Range Status   08/21/2022 01:21 AM 1.00 0.85 - 1.15 Final   08/18/2022 02:42 PM 1.10 0.85 - 1.15 Final   12/04/2021 10:45 PM 1.02 0.85 - 1.15 Final   10/05/2020 01:40 PM 1.07 0.86 - 1.14 Final     Platelet Count   Date/Time Value Ref Range Status   07/02/2024 10:47  150 - 450 10e3/uL Final   01/22/2024 03:13  150 - 450 10e3/uL Final   10/05/2023 09:11  150 - 450 10e3/uL Final   07/26/2020 05:41  150 - 450 10e9/L Final   07/25/2020 03:50  150 - 450 10e9/L Final   07/09/2020 10:17  150 - 450 10e9/L Final       Problem List   Active Problems:    Essential hypertension    Obesity (BMI 35.0-39.9) with comorbidity (H)    Ovarian cancer, right (H)    Diastolic dysfunction    Paroxysmal atrial fibrillation (H)    SSS (sick sinus syndrome) (H)    Cardiac pacemaker in situ    Lumbar back pain    Chronic anticoagulation        Filiberto Castillo PA-C  Date: 7/6/2024  Time: 8:47 AM    "

## 2024-07-06 NOTE — PLAN OF CARE
Patient vital signs are at baseline: Yes  Patient able to ambulate as they were prior to admission or with assist devices provided by therapies during their stay:  Yes  Patient MUST void prior to discharge:  Yes  Patient able to tolerate oral intake:  Yes  Pain has adequate pain control using Oral analgesics:  Yes  Does patient have an identified :  Yes  Has goal D/C date and time been discussed with patient:  Yes  Pending drain for discharge.      Problem: Risk for Delirium  Goal: Improved Attention and Thought Clarity  Outcome: Progressing  Goal: Improved Sleep  Outcome: Progressing     Problem: Spinal Surgery  Goal: Absence of Bleeding  Outcome: Progressing  Intervention: Monitor and Manage Bleeding  Recent Flowsheet Documentation  Taken 7/6/2024 0000 by Alexis Saab RN  Bleeding Management: dressing monitored  Taken 7/5/2024 2000 by Alexis Saab RN  Bleeding Management: dressing monitored  Goal: Optimal Neurologic Function  Outcome: Progressing  Intervention: Optimize Neurologic Function  Recent Flowsheet Documentation  Taken 7/6/2024 0000 by Alexis Saab RN  Body Position: weight shifting  Taken 7/5/2024 2000 by Alexis Saab RN  Body Position: weight shifting  Goal: Anesthesia/Sedation Recovery  Outcome: Progressing  Intervention: Optimize Anesthesia Recovery  Recent Flowsheet Documentation  Taken 7/6/2024 0500 by Alexis Saab RN  Safety Promotion/Fall Prevention: safety round/check completed  Taken 7/6/2024 0300 by Alxeis Saab RN  Safety Promotion/Fall Prevention: safety round/check completed  Taken 7/6/2024 0100 by Alexis Saab RN  Safety Promotion/Fall Prevention: safety round/check completed  Taken 7/6/2024 0000 by Alexis Saab RN  Safety Promotion/Fall Prevention:   safety round/check completed   room near nurse's station   room organization consistent   room door open   nonskid shoes/slippers when out of bed   clutter free environment maintained   assistive  device/personal items within reach   activity supervised   increased rounding and observation   increase visualization of patient   lighting adjusted   mobility aid in reach   patient and family education   supervised activity  Administration (IS): self-administered  Patient Tolerance (IS):   good   no adverse signs/symptoms present  Taken 7/5/2024 2000 by Alexis Saab RN  Safety Promotion/Fall Prevention:   safety round/check completed   room near nurse's station   room organization consistent   room door open   nonskid shoes/slippers when out of bed   clutter free environment maintained   assistive device/personal items within reach   activity supervised   increased rounding and observation   increase visualization of patient   lighting adjusted   mobility aid in reach   patient and family education   supervised activity  Administration (IS): self-administered  Patient Tolerance (IS):   good   no adverse signs/symptoms present  Goal: Optimal Pain Control and Function  Outcome: Progressing  Intervention: Prevent or Manage Pain  Recent Flowsheet Documentation  Taken 7/5/2024 2040 by Alexis Saab, RN  Pain Management Interventions: medication (see MAR)  Goal: Nausea and Vomiting Relief  Outcome: Progressing  Goal: Effective Urinary Elimination  Outcome: Progressing

## 2024-07-06 NOTE — PROGRESS NOTES
"Orthopedic Progress Note      Assessment: 3 Days Post-Op  S/P Procedure(s):  LUMBAR 1- LUMBAR 2 TRANSFORAMINAL INTERBODY FUSION, POSTERIOR INSTRUMENTED FUSION, DECOMPRESSION, WITH O-ARM AND STEALTH NAVIGATION     Plan:   - Continue PT/OT  - Weightbearing status: WBAT can use brace for comfort  - No bending, twisting or lifting more than 10 pounds for 6 weeks.  - Drain will be removed today.  - Anticoagulation: no chemical prophylaxis in addition to SCDs, salvador stockings and early ambulation.  - Orthosis consult placed for LSO brace, wear as needed.   - Discharge planning: Plan for discharge home today as long as there is no residual drainage from hemovac site.      Subjective:  Pain: moderate  Nausea, Vomiting:  No  Lightheadedness, Dizziness:  No  Neuro:  Patient denies new onset numbness or paresthesias    Patient reports she is doing okay today. Pain flares with movement. No acute events overnight. Feeling ready for discharge.    Objective:  /54   Pulse 109   Temp 97.8  F (36.6  C)   Resp 16   Ht 1.6 m (5' 3\")   Wt 95.8 kg (211 lb 3.2 oz)   SpO2 91%   BMI 37.41 kg/m    The patient is A&Ox3. Appears comfortable.   Bilateral lower extremity sensation is intact.  Bilateral lower extremity strength is intact.  Dorsiflexion and plantar flexion is intact.  Dorsalis pedis pulse intact.  Calves are soft and non-tender. Negative Mihir's.  The incision is covered. Dressing C/D/I.    Hemovac intact with scant drainage. 5cc out overnight. Plan for removal today.    Pertinent Labs   Lab Results: personally reviewed.   Lab Results   Component Value Date    INR 1.00 08/21/2022    INR 1.10 08/18/2022    INR 1.02 12/04/2021     Lab Results   Component Value Date    WBC 5.5 07/02/2024    HGB 11.2 (L) 07/05/2024    HCT 42.9 07/02/2024    MCV 97 07/02/2024     07/02/2024     Lab Results   Component Value Date     07/04/2024    CO2 26 07/04/2024         Report completed by:  Filiberto Castillo PASoumyaC, " ESPERANZA  Date: 7/6/2024  Time: 8:45 AM

## 2024-07-08 ENCOUNTER — PATIENT OUTREACH (OUTPATIENT)
Dept: FAMILY MEDICINE | Facility: CLINIC | Age: 84
End: 2024-07-08
Payer: MEDICARE

## 2024-07-08 ENCOUNTER — TELEPHONE (OUTPATIENT)
Dept: CARDIOLOGY | Facility: CLINIC | Age: 84
End: 2024-07-08
Payer: MEDICARE

## 2024-07-08 DIAGNOSIS — I10 ESSENTIAL HYPERTENSION, BENIGN: ICD-10-CM

## 2024-07-08 NOTE — TELEPHONE ENCOUNTER
Transitions of Care Outreach  Chief Complaint   Patient presents with    Hospital F/U       Most Recent Admission Date: 7/3/2024   Most Recent Admission Diagnosis: Degenerative spondylolisthesis - M43.10  Lumbar radiculopathy - M54.16  Lumbar stenosis - M48.061     Most Recent Discharge Date: 7/6/2024   Most Recent Discharge Diagnosis: Paroxysmal atrial fibrillation (H) - I48.0  Spinal stenosis of lumbar region, unspecified whether neurogenic claudication present - M48.061     Transitions of Care Assessment    Discharge Assessment  How are you doing now that you are home?: better  How are your symptoms? (Red Flag symptoms escalate to triage hotline per guidelines): Improved  Do you know how to contact your clinic care team if you have future questions or changes to your health status? : Yes  Does the patient have their discharge instructions? : Yes  Does the patient have questions regarding their discharge instructions? : No  Were you started on any new medications or were there changes to any of your previous medications? : Yes  Does the patient have all of their medications?: Yes  Do you have questions regarding any of your medications? : No  Do you have all of your needed medical supplies or equipment (DME)?  (i.e. oxygen tank, CPAP, cane, etc.): Yes    Follow up Plan     Discharge Follow-Up  Discharge follow up appointment scheduled in alignment with recommended follow up timeframe or Transitions of Risk Category? (Low = within 30 days; Moderate= within 14 days; High= within 7 days): Yes  Discharge Follow Up Appointment Date: 07/23/24    Future Appointments   Date Time Provider Department Center   7/23/2024  9:00 AM Kathya Escalera DO [facility]   7/30/2024 10:30 AM Kerry Sinclair APRN CNP [facility]   7/30/2024 11:15 AM [facility]   8/29/2024 12:00 AM [facility] TECH1 [facility]       Outpatient Plan as outlined on AVS reviewed with patient.    For any urgent concerns,  please contact our 24 hour nurse triage line: [phone] ([phone])       Socorro Mathur RN

## 2024-07-08 NOTE — TELEPHONE ENCOUNTER
It looks like HR is very fast, if I am reading this right, with HR >160s for the most part, correct??    If this is correct:    Please have her HOLD valsartan/HCTZ 320/25 to allow more room for HR meds  INCREASE metoprolol to tartrate to 150 BID and Diltiazem to 360 mg daily    ** Pls updated EPIC med list/send Rx if needed    Could you please get a remote check on Monday or Tuesday of next week?  If she is still out of rhythm, will likely need ELADIO/DCCV (assuming she had to hold her Eliquis for back surgery).    THANKS!  Gema  July 8, 2024 at 5:16 PM

## 2024-07-08 NOTE — TELEPHONE ENCOUNTER
Alert for afib burden with EGM showing afib with V-rates in the 70-170bpm. Pt has been in aflutter since 7/4/24.  She does take Metoprolol tartrate 100mg BID and also Cardizem 240mg daily.        Called pt and she stated that she thought that she was in afib, but denies any symptoms.  Stated that she took her HR and bp this am.  HR was 72bpm and bp 96/57.  She has had a concern about her bp since after surgery because it has been on the lower side (running 100/60, when it usually runs 120's/60).      She is currently not taking any narcotics, she is not running a fever and she is not in a lot of pain. She is currently taking all of her heart medications.  (Metoprolol, Diovan and also her diltiazam).  She states that she is staying well hydrated and she denies any dizziness or lightheadedness.     Will route to Gema Ptoter for further recommendations.

## 2024-07-09 RX ORDER — VALSARTAN AND HYDROCHLOROTHIAZIDE 320; 25 MG/1; MG/1
1 TABLET, FILM COATED ORAL DAILY
COMMUNITY
Start: 2024-07-09 | End: 2024-07-23 | Stop reason: DRUGHIGH

## 2024-07-09 NOTE — TELEPHONE ENCOUNTER
Thanks for update - I'm glad she converted!    As no longer in rapid AFib, would not make those changes.    PLAN:  Continue lower dose metoprolol 100 mg BID and Diltiazem 240 mg daily - do not increase.  HOLD valsartan/hydrochlorothiazide 320/25 for SBP <100mmHg.      Gema  July 9, 2024 at 12:41 PM

## 2024-07-23 ENCOUNTER — OFFICE VISIT (OUTPATIENT)
Dept: FAMILY MEDICINE | Facility: CLINIC | Age: 84
End: 2024-07-23
Payer: MEDICARE

## 2024-07-23 VITALS
SYSTOLIC BLOOD PRESSURE: 98 MMHG | DIASTOLIC BLOOD PRESSURE: 66 MMHG | WEIGHT: 212.5 LBS | TEMPERATURE: 96.9 F | RESPIRATION RATE: 14 BRPM | HEART RATE: 60 BPM | OXYGEN SATURATION: 99 % | HEIGHT: 63 IN | BODY MASS INDEX: 37.65 KG/M2

## 2024-07-23 DIAGNOSIS — M48.061 SPINAL STENOSIS OF LUMBAR REGION, UNSPECIFIED WHETHER NEUROGENIC CLAUDICATION PRESENT: ICD-10-CM

## 2024-07-23 DIAGNOSIS — I10 ESSENTIAL HYPERTENSION, BENIGN: Primary | ICD-10-CM

## 2024-07-23 DIAGNOSIS — I48.0 PAROXYSMAL ATRIAL FIBRILLATION (H): ICD-10-CM

## 2024-07-23 PROCEDURE — 99214 OFFICE O/P EST MOD 30 MIN: CPT | Performed by: INTERNAL MEDICINE

## 2024-07-23 RX ORDER — VALSARTAN AND HYDROCHLOROTHIAZIDE 80; 12.5 MG/1; MG/1
1 TABLET, FILM COATED ORAL DAILY
Qty: 90 TABLET | Refills: 3 | Status: SHIPPED | OUTPATIENT
Start: 2024-07-23 | End: 2024-08-15

## 2024-07-23 ASSESSMENT — PAIN SCALES - GENERAL: PAINLEVEL: MODERATE PAIN (5)

## 2024-07-23 NOTE — PATIENT INSTRUCTIONS
Hypertension  Decrease valsartan-hydrochlorothiazide from 320-25 to 80-12.5 mg once daily  Goal is blood pressure 100-130s.

## 2024-07-23 NOTE — PROGRESS NOTES
Assessment & Plan     Essential hypertension, benign  Given lower end of normal blood pressure, will decrease the valsartan.  Continue beta-blocker and calcium channel blocker at current doses due to A-fib with RVR history.  Goal blood pressure 100s-130s  - valsartan-hydrochlorothiazide (DIOVAN HCT) 80-12.5 MG tablet; Take 1 tablet by mouth daily    Paroxysmal atrial fibrillation (H)  Contributes to medical complexity    Spinal stenosis of lumbar region, unspecified whether neurogenic claudication present  Recovering well from her recent surgery.  Ambulating with a cane and doing home exercises.  Has follow-up scheduled with surgeon        MED REC REQUIRED  Post Medication Reconciliation Status: discharge medications reconciled and changed, per note/orders        Patient Instructions   Hypertension  Decrease valsartan-hydrochlorothiazide from 320-25 to 80-12.5 mg once daily  Goal is blood pressure 100-130s.    Elle Velez is a 84 year old, presenting for the following health issues:  ER F/U and Health Maintenance (Would like to know if should wait for new covid shot)        7/23/2024     7:14 AM   Additional Questions   Roomed by fabricio   Accompanied by self         7/23/2024     7:14 AM   Patient Reported Additional Medications   Patient reports taking the following new medications none     HPI     Chief Complaint   Patient presents with    ER F/U    Health Maintenance     Would like to know if should wait for new covid shot             Hospital Follow-up Visit:    Hospital/Nursing Home/IP Rehab Facility: [facility]  Date of Admission: 7/3/24  Date of Discharge: 7/6/24  Reason(s) for Admission:  Degenerative spondylolisthesis   Was the patient in the ICU or did the patient experience delirium during hospitalization?  No  Do you have any other stressors you would like to discuss with your provider? No    Problems taking medications regularly:  None  Medication changes since  discharge: acetaminophen (TYLENOL)  gabapentin (NEURONTIN)  methocarbamol (ROBAXIN)  oxyCODONE (ROXICODONE)  senna-docusate (SENOKOT-S/PERICOLACE)    STOP taking:  meloxicam 15 MG tablet (MOBIC)    Problems adhering to non-medication therapy:  None    Summary of hospitalization:  [facility] discharge summary reviewed  Diagnostic Tests/Treatments reviewed.  Follow up needed: Surgeon  Other Healthcare Providers Involved in Patient s Care:         None  Update since discharge: improved.         Plan of care communicated with patient             Back surgery  --her preop pain has resolved.  --incisional pain has resolved  --back is a bit sore, but to be expected  --she has follow-up with surgeon 6 weeks post-op  --is doing home exercises she was given at discharge from the hospital  --never needed any oxycodone or senna; managed with Tylenol and gabapentin    Afib  --steroid shot she had preop caused her to go into afib  --she was in afib occ during hosp stay  --cardiology clinic was aware and recommended she follow-up with me  --blood pressure can run on lower end at times.  --cards recommend she hold valsartan if blood pressure is < 100; she has not yet had to skip it.  --sometimes she can detect if she is in/out of afib  --is asypmtomatic when blood pressure is at this level like today  --checks blood pressure in AM before she takes her meds  --she brings her blood pressure log, , most 100-110s      BP Readings from Last 6 Encounters:   07/23/24 98/66   07/06/24 98/68   07/02/24 126/68   06/18/24 116/72   05/23/24 (!) 85/59   01/26/24 120/72         Current Outpatient Medications   Medication Sig Dispense Refill    acetaminophen (TYLENOL) 325 MG tablet Take 3 tablets (975 mg) by mouth every 8 hours 100 tablet 0    apixaban ANTICOAGULANT (ELIQUIS ANTICOAGULANT) 5 MG tablet Take 1 tablet (5 mg) by mouth 2 times daily 180 tablet 3    diltiazem ER COATED BEADS (CARDIZEM CD/CARTIA XT) 240 MG 24 hr  "capsule Take 1 capsule (240 mg) by mouth daily 90 capsule 3    gabapentin (NEURONTIN) 100 MG capsule Take 1 capsule (100 mg) by mouth at bedtime 30 capsule 0    methocarbamol (ROBAXIN) 500 MG tablet Take 1 tablet (500 mg) by mouth every 6 hours as needed for muscle spasms 30 tablet 0    metoprolol tartrate (LOPRESSOR) 100 MG tablet Take 1 tablet (100 mg) by mouth 2 times daily 180 tablet 3    oxyCODONE (ROXICODONE) 5 MG tablet Take 1 tablet (5 mg) by mouth every 4 hours as needed for moderate pain 30 tablet 0    pravastatin (PRAVACHOL) 80 MG tablet Take 1 tablet (80 mg) by mouth daily 90 tablet 3    THERATEARS 0.25 % OP SOLN Place 2 drops into both eyes as needed      valsartan-hydrochlorothiazide (DIOVAN HCT) 320-25 MG tablet Take 1 tablet by mouth daily      senna-docusate (SENOKOT-S/PERICOLACE) 8.6-50 MG tablet Take 1 tablet by mouth 2 times daily 30 tablet 0           Review of Systems  Constitutional, HEENT, cardiovascular, pulmonary, gi and gu systems are negative, except as otherwise noted.      Objective    BP 98/66 (BP Location: Right arm, Patient Position: Sitting, Cuff Size: Adult Regular)   Pulse 60   Temp 96.9  F (36.1  C) (Tympanic)   Resp 14   Ht 1.6 m (5' 3\")   Wt 96.4 kg (212 lb 8 oz)   SpO2 99%   BMI 37.64 kg/m    Body mass index is 37.64 kg/m .  Physical Exam   GENERAL: alert and no distress  RESP: lungs clear to auscultation - no rales, rhonchi or wheezes  CV: regular rate and rhythm, normal S1 S2, no S3 or S4, no murmur, click or rub, no peripheral edema   SKIN: Well-healed lumbar surgical incision            Signed Electronically by: Kathya Escalera DO    "

## 2024-08-19 ENCOUNTER — TRANSFERRED RECORDS (OUTPATIENT)
Dept: HEALTH INFORMATION MANAGEMENT | Facility: CLINIC | Age: 84
End: 2024-08-19
Payer: MEDICARE

## 2024-08-20 ENCOUNTER — TRANSCRIBE ORDERS (OUTPATIENT)
Dept: ORTHOPEDICS | Facility: CLINIC | Age: 84
End: 2024-08-20

## 2024-08-20 DIAGNOSIS — M54.16 LUMBAR RADICULITIS: Primary | ICD-10-CM

## 2024-08-20 DIAGNOSIS — Z98.1 S/P LUMBAR FUSION: ICD-10-CM

## 2024-08-21 NOTE — PROGRESS NOTES
Latitude consult received from [facility] on 7/4/24.  Patient admitted for elective L1-2 transforaminal fusion and decompression and PPM interrogation requested.      Presenting rhythm: AS/VS rates 60-70s  Battery: 12.5 years remaining   Lead Measurements: [facility]  Atrial Arrhythmias: 615 mode switch episodes logged comprising 8% of the time. EGMs show As>Vs for AT/AF longest episode lasted 62h50m, ventricular rates controlled. Taking eliquis.   Ventricular Arrhythmias: 62 ventricular high rates logged. EGMs show afib w/ RVR longest episode lasted 3m5s, rates 150-215bpm. Taking metoprolol tartrate.    Follow-up Care: Continue with routine device f/up.    ARLINE Mendoza

## 2024-08-29 ENCOUNTER — ANCILLARY PROCEDURE (OUTPATIENT)
Dept: CARDIOLOGY | Facility: CLINIC | Age: 84
End: 2024-08-29
Attending: INTERNAL MEDICINE
Payer: MEDICARE

## 2024-08-29 DIAGNOSIS — Z95.0 CARDIAC PACEMAKER IN SITU: ICD-10-CM

## 2024-08-29 PROCEDURE — 93294 REM INTERROG EVL PM/LDLS PM: CPT | Performed by: INTERNAL MEDICINE

## 2024-08-29 PROCEDURE — 93296 REM INTERROG EVL PM/IDS: CPT | Performed by: INTERNAL MEDICINE

## 2024-09-02 ENCOUNTER — MYC MEDICAL ADVICE (OUTPATIENT)
Dept: FAMILY MEDICINE | Facility: CLINIC | Age: 84
End: 2024-09-02
Payer: MEDICARE

## 2024-09-04 ENCOUNTER — THERAPY VISIT (OUTPATIENT)
Dept: PHYSICAL THERAPY | Facility: CLINIC | Age: 84
End: 2024-09-04
Attending: ORTHOPAEDIC SURGERY
Payer: MEDICARE

## 2024-09-04 DIAGNOSIS — Z98.1 S/P LUMBAR FUSION: Primary | ICD-10-CM

## 2024-09-04 PROCEDURE — 97161 PT EVAL LOW COMPLEX 20 MIN: CPT | Mod: GP | Performed by: PHYSICAL THERAPIST

## 2024-09-04 PROCEDURE — 97110 THERAPEUTIC EXERCISES: CPT | Mod: GP | Performed by: PHYSICAL THERAPIST

## 2024-09-04 NOTE — PROGRESS NOTES
"PHYSICAL THERAPY EVALUATION  Type of Visit: Evaluation             Subjective       Presenting condition or subjective complaint: Post lower back surgery.  Pt s/p L1/ 2 fusion / decompression on 7/3/24.  Pt states her symptoms will fluctuate depending on weather.   Pt notes LB soreness intermittently 6/10.  No leg pain, numbness/ tingling.   Meds: tylenol for other reasons.  Pt notes her balance is not real good.  Pt unable to walk for exercise due to B ankle issues.    Worse: sweeping, vacuuming, ironing.Lifting/ carrying gallon of milk.  Unable to do gardening  Better: heat  Date of onset: 07/03/24    Relevant medical history: Cancer; Heart problems; High blood pressure; Overweight; Pacemaker Ovarian cancer.  B ankle injuries w/ ongoing problems.   Dates & types of surgery: 07/2024. Lower back.  L3-5 fusion 2020, hernia, hysterectomy    Prior diagnostic imaging/testing results: X-ray     Prior therapy history for the same diagnosis, illness or injury: Yes Therapy immediately after surgery    Prior Level of Function  Transfers: Independent  Ambulation: Independent  ADL: Independent  IADL: Housekeeping    Living Environment  Social support: With a significant other or spouse   Type of home: House   Stairs to enter the home: Yes 3 Is there a railing: Yes     Ramp: No   Stairs inside the home: No       Help at home: None  Equipment owned:   has walker/ cane    Employment: No    Hobbies/Interests:  gardening.      Patient goals for therapy: Stairs  Objective   LUMBAR SPINE EVALUATION    INTEGUMENTARY (edema, incisions): Well healed incision  GAIT: significant pronation.  Slight limp, toe out B.  Tug 17.3\"  ROM: flex 75%, SB R 80%, L 90%  STRENGTH: Hip flex 4/5 B noted back irritation.  B quad/ HS/ ankle DF 5/5.    FLEXIBILITY: SLR WFL's B  FUNCTIONAL TESTS: STS X 5 = 23\" w/o UE assist,  does push LE into chair.        Assessment & Plan   CLINICAL IMPRESSIONS  Medical Diagnosis: S/P lumbar fusion    Treatment Diagnosis: " deconditioning following lumbar fusion   Impression/Assessment: Patient is a 84 year old female with LB complaints.  The following significant findings have been identified: Pain, Decreased ROM/flexibility, Decreased strength, Impaired gait, Impaired muscle performance, and Decreased activity tolerance. These impairments interfere with their ability to perform recreational activities, household chores, and community mobility as compared to previous level of function.     Clinical Decision Making (Complexity):  Clinical Presentation: Stable/Uncomplicated  Clinical Presentation Rationale: based on medical and personal factors listed in PT evaluation  Clinical Decision Making (Complexity): Low complexity    PLAN OF CARE  Treatment Interventions:  Interventions: Manual Therapy, Neuromuscular Re-education, Therapeutic Activity, Therapeutic Exercise    Long Term Goals     PT Goal 1  Goal Identifier: 1.  Goal Description: Pt will be able to lift/carry a gallon of milk w/o complaints  Target Date: 10/16/24  PT Goal 2  Goal Identifier: 2.  Goal Description: Pt will tolerate sweeping/ ironing w/ soreness no > 2/10  Target Date: 10/30/24  PT Goal 3  Goal Identifier: 3.  Goal Description: Pt will be independent and consistent  w/ HEP to manage symptoms and improve function  Target Date: 10/30/24      Frequency of Treatment: 1X/ 2 weeks X 4 visits  Duration of Treatment: 4 visits / 8 weeks    Recommended Referrals to Other Professionals:  none  Education Assessment:   Learner/Method: Patient;Listening;Reading;Demonstration;Pictures/Video;No Barriers to Learning    Risks and benefits of evaluation/treatment have been explained.   Patient/Family/caregiver agrees with Plan of Care.     Evaluation Time:     PT Eval, Low Complexity Minutes (95743): 25       Signing Clinician: Renee Webber PT        [facility]                                                                                   OUTPATIENT  PHYSICAL THERAPY      PLAN OF TREATMENT FOR OUTPATIENT REHABILITATION   Patient's Last Name, First Name, Rosie Diaz YOB: 1940   Provider's Name   [facility]   Medical Record No.  [id number]     Onset Date: 07/03/24  Start of Care Date: 09/04/24     Medical Diagnosis:  S/P lumbar fusion      PT Treatment Diagnosis:  deconditioning following lumbar fusion Plan of Treatment  Frequency/Duration: 1X/ 2 weeks X 4 visits/ 4 visits / 8 weeks    Certification date from 09/04/24 to 10/30/24         See note for plan of treatment details and functional goals     Renee Webber, PT                         I CERTIFY THE NEED FOR THESE SERVICES FURNISHED UNDER        THIS PLAN OF TREATMENT AND WHILE UNDER MY CARE .             Physician Signature               Date    X_____________________________________________________                  Referring Provider:  Chavo Patel    Initial Assessment  See Epic Evaluation- Start of Care Date: 09/04/24

## 2024-09-10 LAB
MDC_IDC_EPISODE_DTM: NORMAL
MDC_IDC_EPISODE_DURATION: 103 S
MDC_IDC_EPISODE_DURATION: 108 S
MDC_IDC_EPISODE_DURATION: 108 S
MDC_IDC_EPISODE_DURATION: 12 S
MDC_IDC_EPISODE_DURATION: 13 S
MDC_IDC_EPISODE_DURATION: 13 S
MDC_IDC_EPISODE_DURATION: 14 S
MDC_IDC_EPISODE_DURATION: 1585 S
MDC_IDC_EPISODE_DURATION: 1634 S
MDC_IDC_EPISODE_DURATION: 1883 S
MDC_IDC_EPISODE_DURATION: 20 S
MDC_IDC_EPISODE_DURATION: 206 S
MDC_IDC_EPISODE_DURATION: 21 S
MDC_IDC_EPISODE_DURATION: 235 S
MDC_IDC_EPISODE_DURATION: 26 S
MDC_IDC_EPISODE_DURATION: 28 S
MDC_IDC_EPISODE_DURATION: 29 S
MDC_IDC_EPISODE_DURATION: 29 S
MDC_IDC_EPISODE_DURATION: 31 S
MDC_IDC_EPISODE_DURATION: 31 S
MDC_IDC_EPISODE_DURATION: 32 S
MDC_IDC_EPISODE_DURATION: 41 S
MDC_IDC_EPISODE_DURATION: 43 S
MDC_IDC_EPISODE_DURATION: 44 S
MDC_IDC_EPISODE_DURATION: 45 S
MDC_IDC_EPISODE_DURATION: 454 S
MDC_IDC_EPISODE_DURATION: 55 S
MDC_IDC_EPISODE_DURATION: 553 S
MDC_IDC_EPISODE_DURATION: 56 S
MDC_IDC_EPISODE_DURATION: 6145 S
MDC_IDC_EPISODE_DURATION: 65 S
MDC_IDC_EPISODE_DURATION: 72 S
MDC_IDC_EPISODE_DURATION: 77 S
MDC_IDC_EPISODE_DURATION: 8254 S
MDC_IDC_EPISODE_DURATION: 87 S
MDC_IDC_EPISODE_DURATION: 90 S
MDC_IDC_EPISODE_DURATION: 983 S
MDC_IDC_EPISODE_ID: NORMAL
MDC_IDC_EPISODE_TYPE: NORMAL
MDC_IDC_EPISODE_TYPE_INDUCED: NO
MDC_IDC_EPISODE_VENDOR_TYPE: NORMAL
MDC_IDC_LEAD_CONNECTION_STATUS: NORMAL
MDC_IDC_LEAD_CONNECTION_STATUS: NORMAL
MDC_IDC_LEAD_IMPLANT_DT: NORMAL
MDC_IDC_LEAD_IMPLANT_DT: NORMAL
MDC_IDC_LEAD_LOCATION: NORMAL
MDC_IDC_LEAD_LOCATION: NORMAL
MDC_IDC_LEAD_LOCATION_DETAIL_1: NORMAL
MDC_IDC_LEAD_LOCATION_DETAIL_1: NORMAL
MDC_IDC_LEAD_MFG: NORMAL
MDC_IDC_LEAD_MFG: NORMAL
MDC_IDC_LEAD_MODEL: NORMAL
MDC_IDC_LEAD_MODEL: NORMAL
MDC_IDC_LEAD_POLARITY_TYPE: NORMAL
MDC_IDC_LEAD_POLARITY_TYPE: NORMAL
MDC_IDC_LEAD_SERIAL: NORMAL
MDC_IDC_LEAD_SERIAL: NORMAL
MDC_IDC_MSMT_BATTERY_DTM: NORMAL
MDC_IDC_MSMT_BATTERY_REMAINING_LONGEVITY: 144 MO
MDC_IDC_MSMT_BATTERY_REMAINING_PERCENTAGE: 100 %
MDC_IDC_MSMT_BATTERY_STATUS: NORMAL
MDC_IDC_MSMT_LEADCHNL_RA_IMPEDANCE_VALUE: 584 OHM
MDC_IDC_MSMT_LEADCHNL_RA_PACING_THRESHOLD_AMPLITUDE: 0.7 V
MDC_IDC_MSMT_LEADCHNL_RA_PACING_THRESHOLD_PULSEWIDTH: 0.4 MS
MDC_IDC_MSMT_LEADCHNL_RV_IMPEDANCE_VALUE: 600 OHM
MDC_IDC_MSMT_LEADCHNL_RV_PACING_THRESHOLD_AMPLITUDE: 1 V
MDC_IDC_MSMT_LEADCHNL_RV_PACING_THRESHOLD_PULSEWIDTH: 0.4 MS
MDC_IDC_PG_IMPLANT_DTM: NORMAL
MDC_IDC_PG_MFG: NORMAL
MDC_IDC_PG_MODEL: NORMAL
MDC_IDC_PG_SERIAL: NORMAL
MDC_IDC_PG_TYPE: NORMAL
MDC_IDC_SESS_CLINIC_NAME: NORMAL
MDC_IDC_SESS_DTM: NORMAL
MDC_IDC_SESS_TYPE: NORMAL
MDC_IDC_SET_BRADY_AT_MODE_SWITCH_MODE: NORMAL
MDC_IDC_SET_BRADY_AT_MODE_SWITCH_RATE: 170 {BEATS}/MIN
MDC_IDC_SET_BRADY_LOWRATE: 60 {BEATS}/MIN
MDC_IDC_SET_BRADY_MAX_SENSOR_RATE: 130 {BEATS}/MIN
MDC_IDC_SET_BRADY_MAX_TRACKING_RATE: 130 {BEATS}/MIN
MDC_IDC_SET_BRADY_MODE: NORMAL
MDC_IDC_SET_BRADY_PAV_DELAY_HIGH: 200 MS
MDC_IDC_SET_BRADY_PAV_DELAY_LOW: 300 MS
MDC_IDC_SET_BRADY_SAV_DELAY_HIGH: 200 MS
MDC_IDC_SET_BRADY_SAV_DELAY_LOW: 300 MS
MDC_IDC_SET_LEADCHNL_RA_PACING_AMPLITUDE: 2 V
MDC_IDC_SET_LEADCHNL_RA_PACING_CAPTURE_MODE: NORMAL
MDC_IDC_SET_LEADCHNL_RA_PACING_POLARITY: NORMAL
MDC_IDC_SET_LEADCHNL_RA_PACING_PULSEWIDTH: 0.4 MS
MDC_IDC_SET_LEADCHNL_RA_SENSING_ADAPTATION_MODE: NORMAL
MDC_IDC_SET_LEADCHNL_RA_SENSING_POLARITY: NORMAL
MDC_IDC_SET_LEADCHNL_RA_SENSING_SENSITIVITY: 0.15 MV
MDC_IDC_SET_LEADCHNL_RV_PACING_AMPLITUDE: 1.4 V
MDC_IDC_SET_LEADCHNL_RV_PACING_CAPTURE_MODE: NORMAL
MDC_IDC_SET_LEADCHNL_RV_PACING_POLARITY: NORMAL
MDC_IDC_SET_LEADCHNL_RV_PACING_PULSEWIDTH: 0.4 MS
MDC_IDC_SET_LEADCHNL_RV_SENSING_ADAPTATION_MODE: NORMAL
MDC_IDC_SET_LEADCHNL_RV_SENSING_POLARITY: NORMAL
MDC_IDC_SET_LEADCHNL_RV_SENSING_SENSITIVITY: 1.5 MV
MDC_IDC_SET_ZONE_DETECTION_INTERVAL: 375 MS
MDC_IDC_SET_ZONE_STATUS: NORMAL
MDC_IDC_SET_ZONE_TYPE: NORMAL
MDC_IDC_SET_ZONE_VENDOR_TYPE: NORMAL
MDC_IDC_STAT_AT_BURDEN_PERCENT: 10 %
MDC_IDC_STAT_AT_DTM_END: NORMAL
MDC_IDC_STAT_AT_DTM_START: NORMAL
MDC_IDC_STAT_BRADY_DTM_END: NORMAL
MDC_IDC_STAT_BRADY_DTM_START: NORMAL
MDC_IDC_STAT_BRADY_RA_PERCENT_PACED: 53 %
MDC_IDC_STAT_BRADY_RV_PERCENT_PACED: 3 %
MDC_IDC_STAT_EPISODE_RECENT_COUNT: 0
MDC_IDC_STAT_EPISODE_RECENT_COUNT: 1
MDC_IDC_STAT_EPISODE_RECENT_COUNT: 654
MDC_IDC_STAT_EPISODE_RECENT_COUNT_DTM_END: NORMAL
MDC_IDC_STAT_EPISODE_RECENT_COUNT_DTM_START: NORMAL
MDC_IDC_STAT_EPISODE_TYPE: NORMAL
MDC_IDC_STAT_EPISODE_VENDOR_TYPE: NORMAL
MDC_IDC_STAT_EPISODE_VENDOR_TYPE: NORMAL

## 2024-09-18 ENCOUNTER — THERAPY VISIT (OUTPATIENT)
Dept: PHYSICAL THERAPY | Facility: CLINIC | Age: 84
End: 2024-09-18
Attending: ORTHOPAEDIC SURGERY
Payer: MEDICARE

## 2024-09-18 DIAGNOSIS — Z98.1 S/P LUMBAR FUSION: Primary | ICD-10-CM

## 2024-09-18 PROCEDURE — 97110 THERAPEUTIC EXERCISES: CPT | Mod: GP | Performed by: PHYSICAL THERAPIST

## 2024-09-30 NOTE — PROGRESS NOTES
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  She is s/p BSO, PPLND 2020 which served as her treatment.  She is here today for a surveillance visit.      Oncology History:  She originally presented with a complaint of hip pain.  The work-up of this included an MRI which has demonstrated a large pelvic mass, her  was elevated (108, CEA 19-9 were normal).     2020: Exploratory laparoscopy followed by laparotomy, bilateral salpingo-oophorectomy, omentectomy, pelvic and periaortic lymph node dissection, anterior culdectomy.    FINAL DIAGNOSIS:   A. OVARIES, LEFT AND RIGHT, BILATERAL OOPHORECTOMY:   - Right ovary with ENDOMETRIOID ADENOCARCINOMA, FIGO grade 1   - Left ovary with benign inclusion cysts, negative for malignancy   - Unremarkable bilateral fallopian tubes, negative for malignancy   B. MESENTERY, BIOPSY:   - Fibrovascular adhesions, negative for malignancy   C. OMENTUM, OMENTECTOMY:   - Adipose tissue, negative for malignancy   D. ANTERIOR CUL-DE-SAC, BIOPSY:   - Fibroadipose tissue with foci of endometriosis   - Negative for malignancy   E. POSTERIOR CUL-DE-SAC, BIOPSY:   - Fibrovascular tissue, negative for malignancy   F. LYMPH NODE, LEFT PELVIC, EXCISION:   - Eleven reactive lymph nodes, negative for malignancy (0/11)   G. LYMPH NODE, RIGHT PELVIC, EXCISION:   - Nine reactive lymph nodes, negative for malignancy (0/9)   H. LYMPH NODE, RIGHT PARA AORTIC, EXCISION:   - Three reactive lymph nodes, negative for malignancy (0/3)     2020:  20.  2/:  8.  5/:  7.  9:  <5.  1/:  6.   4/:  6.  7/:  9.  1:  7.  7/10/23:  8.    10/10/23: CT AP LLQ  pain  IMPRESSION:   1.  No acute pathology through the abdomen and pelvis.  2.  Descending and sigmoid diverticulosis without evidence of  diverticulitis.  3.  Small fat-containing left inguinal hernia is unchanged. The  adjacent sigmoid colon does not extend into the hernia.  4.  Two large duodenal diverticula.    1/22/24:  7.  10/1/24:  pending.              Today she reports feeling well overall and is without concern.  She is having no vaginal bleeding or discharge.  No abdominal pain or bloating.  Normal appetite.  No unintended weight loss.  Normal bowel and bladder habits.  Reports she has a longstanding history with IBS and so bowels will fluctuate but no significant changes.  She has bilateral lower leg swelling at baseline which is unchanged.  She had a lumbar spinal fusion over the summer and feels as though she is recovering well.  She is current with her annual physical, mammogram, and colon cancer screening.                Health Maintenance  Colonoscopy: 11/16/15, repeat in 10 years  Mammogram: 3/5/24  Annual physical: 1/26/24    Past Medical History:    Past Medical History:   Diagnosis Date    Chronic airway obstruction (H)     Diastolic dysfunction 06/28/2022    Gastroesophageal reflux disease     Hiatal hernia     Hypertension     Irregular heart beat     Malignant neoplasm of ovary (H)     Ovarian cancer, unspecified laterality (H) 03/10/2021    Personal history of contact with and (suspected) exposure to asbestos     Primary osteoarthritis of right hip 07/09/2020         Past Surgical History:    Past Surgical History:   Procedure Laterality Date    BIOPSY BREAST Left     BREAST BIOPSY, CORE RT/LT  1994    CHOLECYSTECTOMY  1995    COLONOSCOPY  05/2010    Diverticulosis, colon polyps; repeat 5 yrs    COLONOSCOPY N/A 11/16/2015    Procedure: COLONOSCOPY;  Surgeon: Alejandro Matos MD;  Location: [facility]    COLONOSCOPY N/A 09/17/2021    Procedure: COLONOSCOPY;  Surgeon: Aayush George MD;   Location: [facility]    Colonoscopy, hyperplastic polyps, repeat in 5 yrs  2004    EP PACEMAKER DEVICE & LEAD IMPLANT- RIGHT ATRIAL & RIGHT VENTRICULAR Left 10/24/2022    Procedure: Pacemaker Device & Lead Implant - Right Atrial & Right Ventricular;  Surgeon: Demond Meyer MD;  Location:  [facility]    ESOPHAGOSCOPY, GASTROSCOPY, DUODENOSCOPY (EGD), COMBINED  09/30/2013    Procedure: COMBINED ESOPHAGOSCOPY, GASTROSCOPY, DUODENOSCOPY (EGD), BIOPSY SINGLE OR MULTIPLE;  Gastroscopy;  Surgeon: Blaise Gill MD;  Location: [facility]    EYE SURGERY  2012    R/L Cataracts    FUSION TRANSFORAMINAL LUMBAR INTERBODY, 1 LEVEL, POSTERIOR APPROACH, USING OTS SURG IMAGING GUIDANCE N/A 7/3/2024    Procedure: LUMBAR 1- LUMBAR 2 TRANSFORAMINAL INTERBODY FUSION, POSTERIOR INSTRUMENTED FUSION, DECOMPRESSION, WITH O-ARM AND STEALTH NAVIGATION;  Surgeon: Chavo Patel MD;  Location: [facility] OR     REMOVE TONSILS/ADENOIDS,12+ Y/O      T & A 13+y.o.    HERNIORRHAPHY INCISIONAL (LOCATION) N/A 03/24/2021    Procedure: HERNIORRHAPHY, INCISIONAL, OPEN WITH MESH;  Surgeon: Aayush George MD;  Location: [facility]    HYSTERECTOMY, PAP NO LONGER INDICATED      LAPAROSCOPIC SALPINGO-OOPHORECTOMY N/A 07/24/2020    Procedure: Laparoscopy, removal or pelvic mass, both tubes and ovaries, omentectomy, anterior culvectomy, pelvic and para aortic lymph node dissection, laparotomy;  Surgeon: Felipe Corona MD;  Location: [facility] OR    NC ANESTH,LUMBAR SPINE,CORD SURGERY  10/2020    L3-4 L4-5 TRANSFORAMINAL INTERBODY FUSION L3-5, POSTERIOR INSTRUMENTED FUSION AND DECOMPRESSION    TVH for DUB, ovaries in      VASCULAR SURGERY  2014    Taylor closure    ZZC ANESTH,KNEE VEINS SURGERY  08/20/2014         Health Maintenance Due   Topic Date Due    INFLUENZA VACCINE (1) 09/01/2024    COVID-19 Vaccine (7 - 2024-25 season) 09/01/2024       Current Medications:     Current Outpatient Medications   Medication  Sig Dispense Refill    acetaminophen (TYLENOL) 325 MG tablet Take 3 tablets (975 mg) by mouth every 8 hours 100 tablet 0    apixaban ANTICOAGULANT (ELIQUIS ANTICOAGULANT) 5 MG tablet Take 1 tablet (5 mg) by mouth 2 times daily 180 tablet 3    diltiazem ER COATED BEADS (CARDIZEM CD/CARTIA XT) 240 MG 24 hr capsule Take 1 capsule (240 mg) by mouth daily 90 capsule 3    hydroCHLOROthiazide (HYDRODIURIL) 25 MG tablet Take 1 tablet (25 mg) by mouth daily 90 tablet 3    metoprolol tartrate (LOPRESSOR) 100 MG tablet Take 1 tablet (100 mg) by mouth 2 times daily 180 tablet 3    pravastatin (PRAVACHOL) 80 MG tablet Take 1 tablet (80 mg) by mouth daily 90 tablet 3    THERATEARS 0.25 % OP SOLN Place 2 drops into both eyes as needed      valsartan (DIOVAN) 80 MG tablet Take 1 tablet (80 mg) by mouth daily 90 tablet 3         Allergies:        Allergies   Allergen Reactions    Atorvastatin Other (See Comments)     Muscle Pain    Ezetimibe Other (See Comments)     Severe muscle aches    Irbesartan Cramps    Lisinopril     Omeprazole      Other reaction(s): *Unknown    Prilosec [Omeprazole]     Rosuvastatin Other (See Comments)     Muscle Pain    Zestril [Ace Inhibitors] Cough        Social History:     Social History     Tobacco Use    Smoking status: Never    Smokeless tobacco: Never   Substance Use Topics    Alcohol use: No       History   Drug Use No         Family History:     The patient's family history is notable for:    Family History   Problem Relation Age of Onset    Cancer Mother         uterine cancer,     Respiratory Mother         COPD    Cardiovascular Mother         AAA  age 80    Eye Disorder Father         cataracts    Depression Father     Snoring Father     Breast Cancer Sister     Breast Cancer Sister         X2    Breast Cancer Maternal Grandmother     Cancer Maternal Grandfather         cancer unknown type    Depression Son     Depression Son     Cancer - colorectal No family hx of         no ovarian  cancer         Physical Exam:     /68   Pulse 60   Resp 16   Wt 96.2 kg (212 lb)   SpO2 97%   BMI 37.55 kg/m    Body mass index is 37.55 kg/m .    General Appearance:  alert, no distress     HEENT: no palpable nodules or masses        Cardiovascular: regular rate and rhythm, no gallops, rubs or murmurs     Respiratory: lungs clear, no rales, rhonchi or wheezes    Musculoskeletal: extremities non tender and without edema    Skin: no lesions or rashes     Neurological: normal gait, no gross defects     Psychiatric: appropriate mood and affect                               Hematological: normal cervical, supraclavicular and inguinal lymph nodes     Gastrointestinal:       abdomen soft, non-tender, non-distended, no organomegaly or masses    Genitourinary: External genitalia and urethral meatus appears normal.  Vagina is smooth without nodularity or masses.  Cervix is surgically absent.  Bimanual exam reveal no masses, nodularity or fullness.  Recto-vaginal exam confirms these findings.      No results found for this or any previous visit (from the past 24 hour(s)).       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  She is s/p BSO, PPLND 7/24/2020 which served as her treatment.  She is here today for a surveillance visit.     20 minutes spent on the date of the encounter doing chart review, history and exam, documentation, and further activities as noted above.      Plan:     1.)        Ovarian cancer:  IAN on exam.  RTC in 6 months for next surveillance visit and .  Plan for surveillance visits every 6 months until she is 5 years out from treatment (7/2025), then annually.  Reviewed signs and symptoms for when she should contact the clinic or seek additional care.  Patient to contact the clinic with any questions or concerns in the interim.      Genetic risk factors were assessed and she is negative for mutations in the  ADALID, BRCA1, BRCA2, BRIP1, CDH1, CHEK2, EPCAM, MLH1, MSH2, MSH6, NBN, NF1, PALB2, PMS2, PTEN, RAD51C, RAD51D, STK11, and TP53 genes.     Labs and/or tests ordered include:  .                2.)        Health maintenance:  Issues addressed today include following up with PCP for annual health maintenance and non-gynecologic issues.       Kerry Sinclair DNP, APRN, FNP-C, AOCNP  Oncology Nurse Practitioner  Division of Gynecologic Oncology  Pager: [phone]     CC  Patient Care Team:  Kathya Escalera DO as PCP - General (Internal Medicine)  Kathya Escalera DO as Assigned PCP  Fatimah Clement MD as MD (Neurology)  Kerry Sinclair APRN CNP as Assigned Cancer Care Provider  Jai Lam MD as MD (Cardiology)  Jinny Potter PA-C as Assigned Heart and Vascular Provider  Giovany Houser DO as Assigned Musculoskeletal Provider  Walter Byrne DO as Assigned Sleep Provider  Felipe Corona MD as MD (Gynecologic Oncology)  SELF, REFERRED

## 2024-10-01 ENCOUNTER — LAB (OUTPATIENT)
Dept: INFUSION THERAPY | Facility: HOSPITAL | Age: 84
End: 2024-10-01
Payer: MEDICARE

## 2024-10-01 ENCOUNTER — ONCOLOGY VISIT (OUTPATIENT)
Dept: ONCOLOGY | Facility: HOSPITAL | Age: 84
End: 2024-10-01
Attending: NURSE PRACTITIONER
Payer: MEDICARE

## 2024-10-01 VITALS
SYSTOLIC BLOOD PRESSURE: 129 MMHG | DIASTOLIC BLOOD PRESSURE: 68 MMHG | BODY MASS INDEX: 37.55 KG/M2 | RESPIRATION RATE: 16 BRPM | OXYGEN SATURATION: 97 % | HEART RATE: 60 BPM | WEIGHT: 212 LBS

## 2024-10-01 DIAGNOSIS — Z85.43 ENCOUNTER FOR FOLLOW-UP SURVEILLANCE OF OVARIAN CANCER: ICD-10-CM

## 2024-10-01 DIAGNOSIS — Z08 ENCOUNTER FOR FOLLOW-UP SURVEILLANCE OF OVARIAN CANCER: ICD-10-CM

## 2024-10-01 DIAGNOSIS — C56.1 OVARIAN CANCER, RIGHT (H): Primary | ICD-10-CM

## 2024-10-01 DIAGNOSIS — C56.1 OVARIAN CANCER, RIGHT (H): ICD-10-CM

## 2024-10-01 PROCEDURE — 36415 COLL VENOUS BLD VENIPUNCTURE: CPT

## 2024-10-01 PROCEDURE — 86304 IMMUNOASSAY TUMOR CA 125: CPT

## 2024-10-01 PROCEDURE — 99213 OFFICE O/P EST LOW 20 MIN: CPT | Performed by: NURSE PRACTITIONER

## 2024-10-01 PROCEDURE — G0463 HOSPITAL OUTPT CLINIC VISIT: HCPCS | Performed by: NURSE PRACTITIONER

## 2024-10-01 ASSESSMENT — PAIN SCALES - GENERAL: PAINLEVEL: NO PAIN (0)

## 2024-10-01 NOTE — NURSING NOTE
"Oncology Rooming Note    October 1, 2024 2:30 PM   Rosie Blackman is a 84 year old female who presents for:    Chief Complaint   Patient presents with    Oncology Clinic Visit     Gyn Onc follow up     Initial Vitals: /68   Pulse 60   Resp 16   Wt 96.2 kg (212 lb)   SpO2 97%   BMI 37.55 kg/m   Estimated body mass index is 37.55 kg/m  as calculated from the following:    Height as of 7/23/24: 1.6 m (5' 3\").    Weight as of this encounter: 96.2 kg (212 lb). Body surface area is 2.07 meters squared.  No Pain (0) Comment: Data Unavailable   No LMP recorded. Patient has had a hysterectomy.  Allergies reviewed: Yes  Medications reviewed: Yes    Medications: Medication refills not needed today.  Pharmacy name entered into [employer]:    [id number] - [place], [place] - [address] - MAIL ORDER MAINT MEDS - NON-EPRESCRIBE  [id number] - [place] [id number]    Frailty Screening:   Is the patient here for a new oncology consult visit in cancer care? 2. No      Clinical concerns: Back surgery in July, denies pain but moves more tentatively.   ROXY Payne was notified.      Esther Kilpatrick RN              "

## 2024-10-01 NOTE — LETTER
10/1/2024      Rosie Blackman  [address] [facility] [id number]      Dear Colleague,    Thank you for referring your patient, Rosie Blackman, to the [facility]. Please see a copy of my visit note below.    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  She is s/p BSO, PPLND 2020 which served as her treatment.  She is here today for a surveillance visit.      Oncology History:  She originally presented with a complaint of hip pain.  The work-up of this included an MRI which has demonstrated a large pelvic mass, her  was elevated (108, CEA 19-9 were normal).     2020: Exploratory laparoscopy followed by laparotomy, bilateral salpingo-oophorectomy, omentectomy, pelvic and periaortic lymph node dissection, anterior culdectomy.    FINAL DIAGNOSIS:   A. OVARIES, LEFT AND RIGHT, BILATERAL OOPHORECTOMY:   - Right ovary with ENDOMETRIOID ADENOCARCINOMA, FIGO grade 1   - Left ovary with benign inclusion cysts, negative for malignancy   - Unremarkable bilateral fallopian tubes, negative for malignancy   B. MESENTERY, BIOPSY:   - Fibrovascular adhesions, negative for malignancy   C. OMENTUM, OMENTECTOMY:   - Adipose tissue, negative for malignancy   D. ANTERIOR CUL-DE-SAC, BIOPSY:   - Fibroadipose tissue with foci of endometriosis   - Negative for malignancy   E. POSTERIOR CUL-DE-SAC, BIOPSY:   - Fibrovascular tissue, negative for malignancy   F. LYMPH NODE, LEFT PELVIC, EXCISION:   - Eleven reactive lymph nodes, negative for malignancy (0/11)   G. LYMPH NODE, RIGHT PELVIC, EXCISION:   - Nine reactive lymph nodes, negative for malignancy (0/9)   H. LYMPH NODE, RIGHT PARA AORTIC, EXCISION:   - Three reactive lymph  nodes, negative for malignancy (0/3)     11/9/2020:  20.  2/1/21:  8.  5/17/21:  7.  9/20/21:  <5.  1/4/22:  6.   4/5/22:  6.  7/5/22:  9.  1/5/23:  7.  7/10/23:  8.    10/10/23: CT AP LLQ pain  IMPRESSION:   1.  No acute pathology through the abdomen and pelvis.  2.  Descending and sigmoid diverticulosis without evidence of  diverticulitis.  3.  Small fat-containing left inguinal hernia is unchanged. The  adjacent sigmoid colon does not extend into the hernia.  4.  Two large duodenal diverticula.    1/22/24:  7.  10/1/24:  pending.              Today she reports feeling well overall and is without concern.  She is having no vaginal bleeding or discharge.  No abdominal pain or bloating.  Normal appetite.  No unintended weight loss.  Normal bowel and bladder habits.  Reports she has a longstanding history with IBS and so bowels will fluctuate but no significant changes.  She has bilateral lower leg swelling at baseline which is unchanged.  She had a lumbar spinal fusion over the summer and feels as though she is recovering well.  She is current with her annual physical, mammogram, and colon cancer screening.                Health Maintenance  Colonoscopy: 11/16/15, repeat in 10 years  Mammogram: 3/5/24  Annual physical: 1/26/24    Past Medical History:    Past Medical History:   Diagnosis Date     Chronic airway obstruction (H)      Diastolic dysfunction 06/28/2022     Gastroesophageal reflux disease      Hiatal hernia      Hypertension      Irregular heart beat      Malignant neoplasm of ovary (H)      Ovarian cancer, unspecified laterality (H) 03/10/2021     Personal history of contact with and (suspected) exposure to asbestos      Primary osteoarthritis of right hip 07/09/2020         Past Surgical History:    Past Surgical History:   Procedure Laterality Date     BIOPSY BREAST Left      BREAST BIOPSY, CORE RT/LT  1994     CHOLECYSTECTOMY  1995      COLONOSCOPY  05/2010    Diverticulosis, colon polyps; repeat 5 yrs     COLONOSCOPY N/A 11/16/2015    Procedure: COLONOSCOPY;  Surgeon: Alejandro Matos MD;  Location: [facility]     COLONOSCOPY N/A 09/17/2021    Procedure: COLONOSCOPY;  Surgeon: Aayush George MD;  Location: [facility]     Colonoscopy, hyperplastic polyps, repeat in 5 yrs  2004     EP PACEMAKER DEVICE & LEAD IMPLANT- RIGHT ATRIAL & RIGHT VENTRICULAR Left 10/24/2022    Procedure: Pacemaker Device & Lead Implant - Right Atrial & Right Ventricular;  Surgeon: Demond Meyer MD;  Location:  [facility]     ESOPHAGOSCOPY, GASTROSCOPY, DUODENOSCOPY (EGD), COMBINED  09/30/2013    Procedure: COMBINED ESOPHAGOSCOPY, GASTROSCOPY, DUODENOSCOPY (EGD), BIOPSY SINGLE OR MULTIPLE;  Gastroscopy;  Surgeon: Blaise Gill MD;  Location: [facility]     EYE SURGERY  2012    R/L Cataracts     FUSION TRANSFORAMINAL LUMBAR INTERBODY, 1 LEVEL, POSTERIOR APPROACH, USING OTS SURG IMAGING GUIDANCE N/A 7/3/2024    Procedure: LUMBAR 1- LUMBAR 2 TRANSFORAMINAL INTERBODY FUSION, POSTERIOR INSTRUMENTED FUSION, DECOMPRESSION, WITH O-ARM AND STEALTH NAVIGATION;  Surgeon: Chavo Patel MD;  Location: [facility] OR      REMOVE TONSILS/ADENOIDS,12+ Y/O      T & A 13+y.o.     HERNIORRHAPHY INCISIONAL (LOCATION) N/A 03/24/2021    Procedure: HERNIORRHAPHY, INCISIONAL, OPEN WITH MESH;  Surgeon: Aayush George MD;  Location: [facility]     HYSTERECTOMY, PAP NO LONGER INDICATED       LAPAROSCOPIC SALPINGO-OOPHORECTOMY N/A 07/24/2020    Procedure: Laparoscopy, removal or pelvic mass, both tubes and ovaries, omentectomy, anterior culvectomy, pelvic and para aortic lymph node dissection, laparotomy;  Surgeon: Felipe Corona MD;  Location: [facility] OR     MD ANESTH,LUMBAR SPINE,CORD SURGERY  10/2020    L3-4 L4-5 TRANSFORAMINAL INTERBODY FUSION L3-5, POSTERIOR INSTRUMENTED FUSION AND DECOMPRESSION     TVH for DUB, ovaries in       VASCULAR SURGERY  2014     Taylor closure     [facility] ANESTH,KNEE VEINS SURGERY  2014         Health Maintenance Due   Topic Date Due     INFLUENZA VACCINE (1) 2024     COVID-19 Vaccine (2024- season) 2024       Current Medications:     Current Outpatient Medications   Medication Sig Dispense Refill     acetaminophen (TYLENOL) 325 MG tablet Take 3 tablets (975 mg) by mouth every 8 hours 100 tablet 0     apixaban ANTICOAGULANT (ELIQUIS ANTICOAGULANT) 5 MG tablet Take 1 tablet (5 mg) by mouth 2 times daily 180 tablet 3     diltiazem ER COATED BEADS (CARDIZEM CD/CARTIA XT) 240 MG 24 hr capsule Take 1 capsule (240 mg) by mouth daily 90 capsule 3     hydroCHLOROthiazide (HYDRODIURIL) 25 MG tablet Take 1 tablet (25 mg) by mouth daily 90 tablet 3     metoprolol tartrate (LOPRESSOR) 100 MG tablet Take 1 tablet (100 mg) by mouth 2 times daily 180 tablet 3     pravastatin (PRAVACHOL) 80 MG tablet Take 1 tablet (80 mg) by mouth daily 90 tablet 3     THERATEARS 0.25 % OP SOLN Place 2 drops into both eyes as needed       valsartan (DIOVAN) 80 MG tablet Take 1 tablet (80 mg) by mouth daily 90 tablet 3         Allergies:        Allergies   Allergen Reactions     Atorvastatin Other (See Comments)     Muscle Pain     Ezetimibe Other (See Comments)     Severe muscle aches     Irbesartan Cramps     Lisinopril      Omeprazole      Other reaction(s): *Unknown     Prilosec [Omeprazole]      Rosuvastatin Other (See Comments)     Muscle Pain     Zestril [Ace Inhibitors] Cough        Social History:     Social History     Tobacco Use     Smoking status: Never     Smokeless tobacco: Never   Substance Use Topics     Alcohol use: No       History   Drug Use No         Family History:     The patient's family history is notable for:    Family History   Problem Relation Age of Onset     Cancer Mother         uterine cancer,      Respiratory Mother         COPD     Cardiovascular Mother         AAA  age 80     Eye Disorder Father         cataracts      Depression Father      Snoring Father      Breast Cancer Sister      Breast Cancer Sister         X2     Breast Cancer Maternal Grandmother      Cancer Maternal Grandfather         cancer unknown type     Depression Son      Depression Son      Cancer - colorectal No family hx of         no ovarian cancer         Physical Exam:     /68   Pulse 60   Resp 16   Wt 96.2 kg (212 lb)   SpO2 97%   BMI 37.55 kg/m    Body mass index is 37.55 kg/m .    General Appearance:  alert, no distress     HEENT: no palpable nodules or masses        Cardiovascular: regular rate and rhythm, no gallops, rubs or murmurs     Respiratory: lungs clear, no rales, rhonchi or wheezes    Musculoskeletal: extremities non tender and without edema    Skin: no lesions or rashes     Neurological: normal gait, no gross defects     Psychiatric: appropriate mood and affect                               Hematological: normal cervical, supraclavicular and inguinal lymph nodes     Gastrointestinal:       abdomen soft, non-tender, non-distended, no organomegaly or masses    Genitourinary: External genitalia and urethral meatus appears normal.  Vagina is smooth without nodularity or masses.  Cervix is surgically absent.  Bimanual exam reveal no masses, nodularity or fullness.  Recto-vaginal exam confirms these findings.      No results found for this or any previous visit (from the past 24 hour(s)).       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  She is s/p BSO, PPLND 7/24/2020 which served as her treatment.  She is here today for a surveillance visit.     20 minutes spent on the date of the encounter doing chart review, history and exam, documentation, and further activities as noted above.      Plan:     1.)        Ovarian cancer:  IAN on exam.  RTC in 6 months for next surveillance visit and .  Plan for surveillance visits every 6 months until she is  5 years out from treatment (7/2025), then annually.  Reviewed signs and symptoms for when she should contact the clinic or seek additional care.  Patient to contact the clinic with any questions or concerns in the interim.      Genetic risk factors were assessed and she is negative for mutations in the ADALID, BRCA1, BRCA2, BRIP1, CDH1, CHEK2, EPCAM, MLH1, MSH2, MSH6, NBN, NF1, PALB2, PMS2, PTEN, RAD51C, RAD51D, STK11, and TP53 genes.     Labs and/or tests ordered include:  .                2.)        Health maintenance:  Issues addressed today include following up with PCP for annual health maintenance and non-gynecologic issues.       Kerry Sinclair DNP, DELMY, FNP-C, AOCNP  Oncology Nurse Practitioner  Division of Gynecologic Oncology  Pager: [phone]     CC  Patient Care Team:  Kathya Escalera DO as PCP - General (Internal Medicine)  Kathya Escalera DO as Assigned PCP  Fatimah Clement MD as MD (Neurology)  Kerry Sinclair APRN CNP as Assigned Cancer Care Provider  Jai Lam MD as MD (Cardiology)  Jinny Potter PA-C as Assigned Heart and Vascular Provider  Giovany Houser DO as Assigned Musculoskeletal Provider  Walter Byrne DO as Assigned Sleep Provider  Felipe Corona MD as MD (Gynecologic Oncology)  SELF, REFERRED      Again, thank you for allowing me to participate in the care of your patient.        Sincerely,        DELMY Gagnon CNP

## 2024-10-02 ENCOUNTER — THERAPY VISIT (OUTPATIENT)
Dept: PHYSICAL THERAPY | Facility: CLINIC | Age: 84
End: 2024-10-02
Attending: ORTHOPAEDIC SURGERY
Payer: MEDICARE

## 2024-10-02 DIAGNOSIS — Z98.1 S/P LUMBAR FUSION: Primary | ICD-10-CM

## 2024-10-02 LAB — CANCER AG125 SERPL-ACNC: 9 U/ML

## 2024-10-02 PROCEDURE — 97110 THERAPEUTIC EXERCISES: CPT | Mod: GP | Performed by: PHYSICAL THERAPIST

## 2024-10-16 ENCOUNTER — THERAPY VISIT (OUTPATIENT)
Dept: PHYSICAL THERAPY | Facility: CLINIC | Age: 84
End: 2024-10-16
Attending: ORTHOPAEDIC SURGERY
Payer: MEDICARE

## 2024-10-16 DIAGNOSIS — Z98.1 S/P LUMBAR FUSION: Primary | ICD-10-CM

## 2024-10-16 PROCEDURE — 97110 THERAPEUTIC EXERCISES: CPT | Mod: GP | Performed by: PHYSICAL THERAPIST

## 2024-11-06 DIAGNOSIS — I48.0 PAROXYSMAL ATRIAL FIBRILLATION (H): ICD-10-CM

## 2024-11-06 RX ORDER — DILTIAZEM HYDROCHLORIDE 240 MG/1
240 CAPSULE, COATED, EXTENDED RELEASE ORAL DAILY
Qty: 90 CAPSULE | Refills: 2 | Status: SHIPPED | OUTPATIENT
Start: 2024-11-06

## 2024-12-05 ENCOUNTER — ANCILLARY PROCEDURE (OUTPATIENT)
Dept: CARDIOLOGY | Facility: CLINIC | Age: 84
End: 2024-12-05
Attending: INTERNAL MEDICINE
Payer: MEDICARE

## 2024-12-05 DIAGNOSIS — Z95.0 CARDIAC PACEMAKER IN SITU: ICD-10-CM

## 2024-12-05 LAB
MDC_IDC_EPISODE_DTM: NORMAL
MDC_IDC_EPISODE_DURATION: 12 S
MDC_IDC_EPISODE_DURATION: 13 S
MDC_IDC_EPISODE_DURATION: 140 S
MDC_IDC_EPISODE_DURATION: 15 S
MDC_IDC_EPISODE_DURATION: 162 S
MDC_IDC_EPISODE_DURATION: 167 S
MDC_IDC_EPISODE_DURATION: 17 S
MDC_IDC_EPISODE_DURATION: 18 S
MDC_IDC_EPISODE_DURATION: 20 S
MDC_IDC_EPISODE_DURATION: 21 S
MDC_IDC_EPISODE_DURATION: 22 S
MDC_IDC_EPISODE_DURATION: 23 S
MDC_IDC_EPISODE_DURATION: 24 S
MDC_IDC_EPISODE_DURATION: 27 S
MDC_IDC_EPISODE_DURATION: 28 S
MDC_IDC_EPISODE_DURATION: 31 S
MDC_IDC_EPISODE_DURATION: 32 S
MDC_IDC_EPISODE_DURATION: 32 S
MDC_IDC_EPISODE_DURATION: 35 S
MDC_IDC_EPISODE_DURATION: 37 S
MDC_IDC_EPISODE_DURATION: 38 S
MDC_IDC_EPISODE_DURATION: 40 S
MDC_IDC_EPISODE_DURATION: 45 S
MDC_IDC_EPISODE_DURATION: 47 S
MDC_IDC_EPISODE_DURATION: 50 S
MDC_IDC_EPISODE_DURATION: 53 S
MDC_IDC_EPISODE_DURATION: 60 S
MDC_IDC_EPISODE_DURATION: 65 S
MDC_IDC_EPISODE_DURATION: 68 S
MDC_IDC_EPISODE_DURATION: 69 S
MDC_IDC_EPISODE_DURATION: 6901 S
MDC_IDC_EPISODE_DURATION: 716 S
MDC_IDC_EPISODE_DURATION: 75 S
MDC_IDC_EPISODE_DURATION: 79 S
MDC_IDC_EPISODE_DURATION: 83 S
MDC_IDC_EPISODE_DURATION: 84 S
MDC_IDC_EPISODE_ID: NORMAL
MDC_IDC_EPISODE_TYPE: NORMAL
MDC_IDC_EPISODE_TYPE_INDUCED: NO
MDC_IDC_EPISODE_VENDOR_TYPE: NORMAL
MDC_IDC_LEAD_CONNECTION_STATUS: NORMAL
MDC_IDC_LEAD_CONNECTION_STATUS: NORMAL
MDC_IDC_LEAD_IMPLANT_DT: NORMAL
MDC_IDC_LEAD_IMPLANT_DT: NORMAL
MDC_IDC_LEAD_LOCATION: NORMAL
MDC_IDC_LEAD_LOCATION: NORMAL
MDC_IDC_LEAD_LOCATION_DETAIL_1: NORMAL
MDC_IDC_LEAD_LOCATION_DETAIL_1: NORMAL
MDC_IDC_LEAD_MFG: NORMAL
MDC_IDC_LEAD_MFG: NORMAL
MDC_IDC_LEAD_MODEL: NORMAL
MDC_IDC_LEAD_MODEL: NORMAL
MDC_IDC_LEAD_POLARITY_TYPE: NORMAL
MDC_IDC_LEAD_POLARITY_TYPE: NORMAL
MDC_IDC_LEAD_SERIAL: NORMAL
MDC_IDC_LEAD_SERIAL: NORMAL
MDC_IDC_MSMT_BATTERY_DTM: NORMAL
MDC_IDC_MSMT_BATTERY_REMAINING_LONGEVITY: 144 MO
MDC_IDC_MSMT_BATTERY_REMAINING_PERCENTAGE: 100 %
MDC_IDC_MSMT_BATTERY_STATUS: NORMAL
MDC_IDC_MSMT_LEADCHNL_RA_IMPEDANCE_VALUE: 534 OHM
MDC_IDC_MSMT_LEADCHNL_RA_PACING_THRESHOLD_AMPLITUDE: 0.7 V
MDC_IDC_MSMT_LEADCHNL_RA_PACING_THRESHOLD_PULSEWIDTH: 0.4 MS
MDC_IDC_MSMT_LEADCHNL_RV_IMPEDANCE_VALUE: 593 OHM
MDC_IDC_MSMT_LEADCHNL_RV_PACING_THRESHOLD_AMPLITUDE: 1.1 V
MDC_IDC_MSMT_LEADCHNL_RV_PACING_THRESHOLD_PULSEWIDTH: 0.4 MS
MDC_IDC_PG_IMPLANT_DTM: NORMAL
MDC_IDC_PG_MFG: NORMAL
MDC_IDC_PG_MODEL: NORMAL
MDC_IDC_PG_SERIAL: NORMAL
MDC_IDC_PG_TYPE: NORMAL
MDC_IDC_SESS_CLINIC_NAME: NORMAL
MDC_IDC_SESS_DTM: NORMAL
MDC_IDC_SESS_TYPE: NORMAL
MDC_IDC_SET_BRADY_AT_MODE_SWITCH_MODE: NORMAL
MDC_IDC_SET_BRADY_AT_MODE_SWITCH_RATE: 170 {BEATS}/MIN
MDC_IDC_SET_BRADY_LOWRATE: 60 {BEATS}/MIN
MDC_IDC_SET_BRADY_MAX_SENSOR_RATE: 130 {BEATS}/MIN
MDC_IDC_SET_BRADY_MAX_TRACKING_RATE: 130 {BEATS}/MIN
MDC_IDC_SET_BRADY_MODE: NORMAL
MDC_IDC_SET_BRADY_PAV_DELAY_HIGH: 200 MS
MDC_IDC_SET_BRADY_PAV_DELAY_LOW: 300 MS
MDC_IDC_SET_BRADY_SAV_DELAY_HIGH: 200 MS
MDC_IDC_SET_BRADY_SAV_DELAY_LOW: 300 MS
MDC_IDC_SET_LEADCHNL_RA_PACING_AMPLITUDE: 2 V
MDC_IDC_SET_LEADCHNL_RA_PACING_CAPTURE_MODE: NORMAL
MDC_IDC_SET_LEADCHNL_RA_PACING_POLARITY: NORMAL
MDC_IDC_SET_LEADCHNL_RA_PACING_PULSEWIDTH: 0.4 MS
MDC_IDC_SET_LEADCHNL_RA_SENSING_ADAPTATION_MODE: NORMAL
MDC_IDC_SET_LEADCHNL_RA_SENSING_POLARITY: NORMAL
MDC_IDC_SET_LEADCHNL_RA_SENSING_SENSITIVITY: 0.15 MV
MDC_IDC_SET_LEADCHNL_RV_PACING_AMPLITUDE: 1.5 V
MDC_IDC_SET_LEADCHNL_RV_PACING_CAPTURE_MODE: NORMAL
MDC_IDC_SET_LEADCHNL_RV_PACING_POLARITY: NORMAL
MDC_IDC_SET_LEADCHNL_RV_PACING_PULSEWIDTH: 0.4 MS
MDC_IDC_SET_LEADCHNL_RV_SENSING_ADAPTATION_MODE: NORMAL
MDC_IDC_SET_LEADCHNL_RV_SENSING_POLARITY: NORMAL
MDC_IDC_SET_LEADCHNL_RV_SENSING_SENSITIVITY: 1.5 MV
MDC_IDC_SET_ZONE_DETECTION_INTERVAL: 375 MS
MDC_IDC_SET_ZONE_STATUS: NORMAL
MDC_IDC_SET_ZONE_TYPE: NORMAL
MDC_IDC_SET_ZONE_VENDOR_TYPE: NORMAL
MDC_IDC_STAT_AT_BURDEN_PERCENT: 11 %
MDC_IDC_STAT_AT_DTM_END: NORMAL
MDC_IDC_STAT_AT_DTM_START: NORMAL
MDC_IDC_STAT_BRADY_DTM_END: NORMAL
MDC_IDC_STAT_BRADY_DTM_START: NORMAL
MDC_IDC_STAT_BRADY_RA_PERCENT_PACED: 51 %
MDC_IDC_STAT_BRADY_RV_PERCENT_PACED: 4 %
MDC_IDC_STAT_EPISODE_RECENT_COUNT: 0
MDC_IDC_STAT_EPISODE_RECENT_COUNT: 1
MDC_IDC_STAT_EPISODE_RECENT_COUNT: 846
MDC_IDC_STAT_EPISODE_RECENT_COUNT_DTM_END: NORMAL
MDC_IDC_STAT_EPISODE_RECENT_COUNT_DTM_START: NORMAL
MDC_IDC_STAT_EPISODE_TYPE: NORMAL
MDC_IDC_STAT_EPISODE_VENDOR_TYPE: NORMAL
MDC_IDC_STAT_EPISODE_VENDOR_TYPE: NORMAL

## 2024-12-05 PROCEDURE — 93294 REM INTERROG EVL PM/LDLS PM: CPT | Performed by: INTERNAL MEDICINE

## 2024-12-05 PROCEDURE — 93296 REM INTERROG EVL PM/IDS: CPT | Performed by: INTERNAL MEDICINE

## 2024-12-20 DIAGNOSIS — E78.5 HYPERLIPIDEMIA LDL GOAL <130: ICD-10-CM

## 2024-12-23 RX ORDER — PRAVASTATIN SODIUM 80 MG/1
80 TABLET ORAL DAILY
Qty: 90 TABLET | Refills: 0 | Status: SHIPPED | OUTPATIENT
Start: 2024-12-23

## 2025-02-03 ENCOUNTER — PATIENT OUTREACH (OUTPATIENT)
Dept: CARE COORDINATION | Facility: CLINIC | Age: 85
End: 2025-02-03
Payer: MEDICARE

## 2025-02-19 DIAGNOSIS — I10 ESSENTIAL HYPERTENSION: Primary | ICD-10-CM

## 2025-02-19 DIAGNOSIS — E78.5 HYPERLIPIDEMIA LDL GOAL <130: ICD-10-CM

## 2025-02-19 NOTE — PROGRESS NOTES
Rosie Thibodeaux has an upcoming lab appointment with us. Please review and place orders if needed.  Thank you,  [facility]

## 2025-02-25 ENCOUNTER — LAB (OUTPATIENT)
Dept: LAB | Facility: CLINIC | Age: 85
End: 2025-02-25
Payer: MEDICARE

## 2025-02-25 ENCOUNTER — OFFICE VISIT (OUTPATIENT)
Dept: FAMILY MEDICINE | Facility: CLINIC | Age: 85
End: 2025-02-25
Attending: INTERNAL MEDICINE
Payer: MEDICARE

## 2025-02-25 VITALS
HEART RATE: 67 BPM | WEIGHT: 215.7 LBS | SYSTOLIC BLOOD PRESSURE: 112 MMHG | HEIGHT: 62 IN | OXYGEN SATURATION: 97 % | TEMPERATURE: 96.9 F | DIASTOLIC BLOOD PRESSURE: 80 MMHG | BODY MASS INDEX: 39.69 KG/M2 | RESPIRATION RATE: 16 BRPM

## 2025-02-25 DIAGNOSIS — L03.115 CELLULITIS OF RIGHT LOWER EXTREMITY: ICD-10-CM

## 2025-02-25 DIAGNOSIS — I42.9 SECONDARY CARDIOMYOPATHY (H): ICD-10-CM

## 2025-02-25 DIAGNOSIS — I10 ESSENTIAL HYPERTENSION: ICD-10-CM

## 2025-02-25 DIAGNOSIS — M79.89 LEG SWELLING: ICD-10-CM

## 2025-02-25 DIAGNOSIS — R73.03 PREDIABETES: ICD-10-CM

## 2025-02-25 DIAGNOSIS — E78.5 HYPERLIPIDEMIA LDL GOAL <130: ICD-10-CM

## 2025-02-25 DIAGNOSIS — C56.9 OVARIAN CANCER, UNSPECIFIED LATERALITY (H): ICD-10-CM

## 2025-02-25 DIAGNOSIS — E66.01 CLASS 2 SEVERE OBESITY DUE TO EXCESS CALORIES WITH SERIOUS COMORBIDITY AND BODY MASS INDEX (BMI) OF 37.0 TO 37.9 IN ADULT (H): ICD-10-CM

## 2025-02-25 DIAGNOSIS — E66.812 CLASS 2 SEVERE OBESITY DUE TO EXCESS CALORIES WITH SERIOUS COMORBIDITY AND BODY MASS INDEX (BMI) OF 37.0 TO 37.9 IN ADULT (H): ICD-10-CM

## 2025-02-25 DIAGNOSIS — I49.5 SSS (SICK SINUS SYNDROME) (H): ICD-10-CM

## 2025-02-25 DIAGNOSIS — Z00.00 ENCOUNTER FOR MEDICARE ANNUAL WELLNESS EXAM: Primary | ICD-10-CM

## 2025-02-25 LAB
ANION GAP SERPL CALCULATED.3IONS-SCNC: 9 MMOL/L (ref 7–15)
BUN SERPL-MCNC: 11.6 MG/DL (ref 8–23)
CALCIUM SERPL-MCNC: 9.8 MG/DL (ref 8.8–10.4)
CHLORIDE SERPL-SCNC: 106 MMOL/L (ref 98–107)
CHOLEST SERPL-MCNC: 184 MG/DL
CREAT SERPL-MCNC: 0.82 MG/DL (ref 0.51–0.95)
EGFRCR SERPLBLD CKD-EPI 2021: 70 ML/MIN/1.73M2
ERYTHROCYTE [DISTWIDTH] IN BLOOD BY AUTOMATED COUNT: 13.1 % (ref 10–15)
EST. AVERAGE GLUCOSE BLD GHB EST-MCNC: 123 MG/DL
FASTING STATUS PATIENT QL REPORTED: YES
FASTING STATUS PATIENT QL REPORTED: YES
GLUCOSE SERPL-MCNC: 112 MG/DL (ref 70–99)
HBA1C MFR BLD: 5.9 % (ref 0–5.6)
HCO3 SERPL-SCNC: 27 MMOL/L (ref 22–29)
HCT VFR BLD AUTO: 43.5 % (ref 35–47)
HDLC SERPL-MCNC: 87 MG/DL
HGB BLD-MCNC: 14.4 G/DL (ref 11.7–15.7)
LDLC SERPL CALC-MCNC: 82 MG/DL
MCH RBC QN AUTO: 31.3 PG (ref 26.5–33)
MCHC RBC AUTO-ENTMCNC: 33.1 G/DL (ref 31.5–36.5)
MCV RBC AUTO: 95 FL (ref 78–100)
NONHDLC SERPL-MCNC: 97 MG/DL
PLATELET # BLD AUTO: 247 10E3/UL (ref 150–450)
POTASSIUM SERPL-SCNC: 4 MMOL/L (ref 3.4–5.3)
RBC # BLD AUTO: 4.6 10E6/UL (ref 3.8–5.2)
SODIUM SERPL-SCNC: 142 MMOL/L (ref 135–145)
TRIGL SERPL-MCNC: 75 MG/DL
WBC # BLD AUTO: 5.2 10E3/UL (ref 4–11)

## 2025-02-25 PROCEDURE — 83036 HEMOGLOBIN GLYCOSYLATED A1C: CPT

## 2025-02-25 PROCEDURE — 85027 COMPLETE CBC AUTOMATED: CPT

## 2025-02-25 PROCEDURE — 80061 LIPID PANEL: CPT

## 2025-02-25 PROCEDURE — 80048 BASIC METABOLIC PNL TOTAL CA: CPT

## 2025-02-25 PROCEDURE — 36415 COLL VENOUS BLD VENIPUNCTURE: CPT

## 2025-02-25 RX ORDER — CEPHALEXIN 500 MG/1
500 CAPSULE ORAL 3 TIMES DAILY
Qty: 21 CAPSULE | Refills: 0 | Status: SHIPPED | OUTPATIENT
Start: 2025-02-25 | End: 2025-03-04

## 2025-02-25 RX ORDER — PRAVASTATIN SODIUM 80 MG/1
80 TABLET ORAL DAILY
Qty: 90 TABLET | Refills: 3 | Status: SHIPPED | OUTPATIENT
Start: 2025-02-25

## 2025-02-25 SDOH — HEALTH STABILITY: PHYSICAL HEALTH: ON AVERAGE, HOW MANY MINUTES DO YOU ENGAGE IN EXERCISE AT THIS LEVEL?: PATIENT DECLINED

## 2025-02-25 SDOH — HEALTH STABILITY: PHYSICAL HEALTH
ON AVERAGE, HOW MANY DAYS PER WEEK DO YOU ENGAGE IN MODERATE TO STRENUOUS EXERCISE (LIKE A BRISK WALK)?: PATIENT DECLINED

## 2025-02-25 ASSESSMENT — SOCIAL DETERMINANTS OF HEALTH (SDOH): HOW OFTEN DO YOU GET TOGETHER WITH FRIENDS OR RELATIVES?: THREE TIMES A WEEK

## 2025-02-25 ASSESSMENT — PAIN SCALES - GENERAL: PAINLEVEL_OUTOF10: MODERATE PAIN (6)

## 2025-02-25 NOTE — PATIENT INSTRUCTIONS
Hand/foot swelling  Contact Gema Xiaoy about hand/foot swelling, I suspect it is related to higher dose of diltiazem   I would recommend edema therapy - referral placed    Possible infection  Because wound is slow to heal, and there is redness, I am worried about an infection.  Start Keflex 3x day for 1 week  Please contact your Dermatologist about possible infection, ideally you should be seen this week        Exercise Resources:    For Seniors or Those with Pain/Mobility Issues:  Silver Sneakers  https://tools."[employer]"/  [employer] Fitness, [employer] Fitness, [facility], [facility]  2. [employer] Stretch (airs on PBS)  https://Sense Platform/  3. Your [employer] - small group classes (in person or online) that help you stay active. Free or low cost  [employer].org  4. Yoga for Seniors - free, online  https://www.Tvoop/content/yoga-seniors  5. Sit and Be Fit https://www.sitandbefit.org/  6. James Chi  James Chi is a great option for balance.  Consider James Chi or Qi Gong  James Ji Nba: moving for Better Balance  [facility] - videos for balance        Patient Education   Preventive Care Advice   This is general advice given by our system to help you stay healthy. However, your care team may have specific advice just for you. Please talk to your care team about your preventive care needs.  Nutrition  Eat 5 or more servings of fruits and vegetables each day.  Try wheat bread, brown rice and whole grain pasta (instead of white bread, rice, and pasta).  Get enough calcium and vitamin D. Check the label on foods and aim for 100% of the RDA (recommended daily allowance).  Lifestyle  Exercise at least 150 minutes each week  (30 minutes a day, 5 days a week).  Do muscle strengthening activities 2 days a week. These help control your weight and prevent disease.  No smoking.  Wear sunscreen to prevent skin cancer.  Have a dental exam and cleaning every 6  months.  Yearly exams  See your health care team every year to talk about:  Any changes in your health.  Any medicines your care team has prescribed.  Preventive care, family planning, and ways to prevent chronic diseases.  Shots (vaccines)   HPV shots (up to age 26), if you've never had them before.  Hepatitis B shots (up to age 59), if you've never had them before.  COVID-19 shot: Get this shot when it's due.  Flu shot: Get a flu shot every year.  Tetanus shot: Get a tetanus shot every 10 years.  Pneumococcal, hepatitis A, and RSV shots: Ask your care team if you need these based on your risk.  Shingles shot (for age 50 and up)  General health tests  Diabetes screening:  Starting at age 35, Get screened for diabetes at least every 3 years.  If you are younger than age 35, ask your care team if you should be screened for diabetes.  Cholesterol test: At age 39, start having a cholesterol test every 5 years, or more often if advised.  Bone density scan (DEXA): At age 50, ask your care team if you should have this scan for osteoporosis (brittle bones).  Hepatitis C: Get tested at least once in your life.  STIs (sexually transmitted infections)  Before age 24: Ask your care team if you should be screened for STIs.  After age 24: Get screened for STIs if you're at risk. You are at risk for STIs (including HIV) if:  You are sexually active with more than one person.  You don't use condoms every time.  You or a partner was diagnosed with a sexually transmitted infection.  If you are at risk for HIV, ask about PrEP medicine to prevent HIV.  Get tested for HIV at least once in your life, whether you are at risk for HIV or not.  Cancer screening tests  Cervical cancer screening: If you have a cervix, begin getting regular cervical cancer screening tests starting at age 21.  Breast cancer scan (mammogram): If you've ever had breasts, begin having regular mammograms starting at age 40. This is a scan to check for breast  cancer.  Colon cancer screening: It is important to start screening for colon cancer at age 45.  Have a colonoscopy test every 10 years (or more often if you're at risk) Or, ask your provider about stool tests like a FIT test every year or Cologuard test every 3 years.  To learn more about your testing options, visit:   .  For help making a decision, visit:   https://bit.ly/bb07879.  Prostate cancer screening test: If you have a prostate, ask your care team if a prostate cancer screening test (PSA) at age 55 is right for you.  Lung cancer screening: If you are a current or former smoker ages 50 to 80, ask your care team if ongoing lung cancer screenings are right for you.  For informational purposes only. Not to replace the advice of your health care provider. Copyright   2023 [employer]. All rights reserved. Clinically reviewed by the [facility] Transitions Program. [employer] 723906 - REV 01/24.  Learning About Activities of Daily Living  What are activities of daily living?     Activities of daily living (ADLs) are the basic self-care tasks you do every day. These include eating, bathing, dressing, and moving around.  As you age, and if you have health problems, you may find that it's harder to do some of these tasks. If so, your doctor can suggest ideas that may help.  To measure what kind of help you may need, your doctor will ask how well you are able to do ADLs. Let your doctor know if there are any tasks that you are having trouble doing. This is an important first step to getting help. And when you have the help you need, you can stay as independent as possible.  How will a doctor assess your ADLs?  Asking about ADLs is part of a routine health checkup your doctor will likely do as you age. Your health check might be done in a doctor's office, in your home, or at a hospital. The goal is to find out if you are having any problems that could make it hard to care for yourself or that make  it unsafe for you to be on your own.  To measure your ADLs, your doctor will ask how hard it is for you to do routine tasks. Your doctor may also want to know if you have changed the way you do a task because of a health problem. Your doctor may watch how you:  Walk back and forth.  Keep your balance while you stand or walk.  Move from sitting to standing or from a bed to a chair.  Button or unbutton a shirt or sweater.  Remove and put on your shoes.  It's common to feel a little worried or anxious if you find you can't do all the things you used to be able to do. Talking with your doctor about ADLs is a way to make sure you're as safe as possible and able to care for yourself as well as you can. You may want to bring a caregiver, friend, or family member to your checkup. They can help you talk to your doctor.  Follow-up care is a key part of your treatment and safety. Be sure to make and go to all appointments, and call your doctor if you are having problems. It's also a good idea to know your test results and keep a list of the medicines you take.  Current as of: October 24, 2023  Content Version: 14.3    2024 [employer].   Care instructions adapted under license by your healthcare professional. If you have questions about a medical condition or this instruction, always ask your healthcare professional. [employer] disclaims any warranty or liability for your use of this information.    Preventing Falls: Care Instructions  Injuries and health problems such as trouble walking or poor eyesight can increase your risk of falling. So can some medicines. But there are things you can do to help prevent falls. You can exercise to get stronger. You can also arrange your home to make it safer.    Talk to your doctor about the medicines you take. Ask if any of them increase the risk of falls and whether they can be changed or stopped.   Try to exercise regularly. It can help improve your strength and  "balance. This can help lower your risk of falling.         Practice fall safety and prevention.   Wear low-heeled shoes that fit well and give your feet good support. Talk to your doctor if you have foot problems that make this hard.  Carry a cellphone or wear a medical alert device that you can use to call for help.  Use stepladders instead of chairs to reach high objects. Don't climb if you're at risk for falls. Ask for help, if needed.  Wear the correct eyeglasses, if you need them.        Make your home safer.   Remove rugs, cords, clutter, and furniture from walkways.  Keep your house well lit. Use night-lights in hallways and bathrooms.  Install and use sturdy handrails on stairways.  Wear nonskid footwear, even inside. Don't walk barefoot or in socks without shoes.        Be safe outside.   Use handrails, curb cuts, and ramps whenever possible.  Keep your hands free by using a shoulder bag or backpack.  Try to walk in well-lit areas. Watch out for uneven ground, changes in pavement, and debris.  Be careful in the winter. Walk on the grass or gravel when sidewalks are slippery. Use de-icer on steps and walkways. Add non-slip devices to shoes.    Put grab bars and nonskid mats in your shower or tub and near the toilet. Try to use a shower chair or bath bench when bathing.   Get into a tub or shower by putting in your weaker leg first. Get out with your strong side first. Have a phone or medical alert device in the bathroom with you.   Where can you learn more?  Go to https://www.GetFresh.net/patiented  Enter G117 in the search box to learn more about \"Preventing Falls: Care Instructions.\"  Current as of: July 31, 2024  Content Version: 14.3    2024 [employer].   Care instructions adapted under license by your healthcare professional. If you have questions about a medical condition or this instruction, always ask your healthcare professional. [employer] disclaims any warranty or " liability for your use of this information.

## 2025-02-25 NOTE — PROGRESS NOTES
"Preventive Care Visit  [facility]  Kathya Escalera DO, Internal Medicine  Feb 25, 2025      Assessment & Plan     Encounter for Medicare annual wellness exam    Hyperlipidemia LDL goal <130   - stable, refill provided  - pravastatin (PRAVACHOL) 80 MG tablet; Take 1 tablet (80 mg) by mouth daily.    Prediabetes  A1c still in the prediabetic range  - Hemoglobin A1c; Future    Secondary cardiomyopathy (H)  Known issue that I take into account for their medical decisions, no current exacerbations or new concerns  .  Follows with cardiology    Ovarian cancer, unspecified laterality (H)  Known issue that I take into account for their medical decisions, no current exacerbations or new concerns.  Follows with oncology    SSS (sick sinus syndrome) (H)  Diltiazem may be contributing to hand and foot swelling.  She does have chronic lymphedema it is not well-controlled and contributing to the cellulitis she developed below.  Recommend she contact her cardiology team to see about lowering the diltiazem?    Class 2 severe obesity due to excess calories with serious comorbidity and body mass index (BMI) of 37.0 to 37.9 in adult (H)  Contributes to medical complexity    Leg swelling  Recommend edema therapy  - Lymphedema Therapy  Referral; Future    Cellulitis of right lower extremity  Concerned that the skin biopsy site is infected and causing cellulitis.  Start antibiotic.  Advise she contact her dermatologist to be seen in the next few days  - cephALEXin (KEFLEX) 500 MG capsule; Take 1 capsule (500 mg) by mouth 3 times daily for 7 days.    Patient has been advised of split billing requirements and indicates understanding: Yes        BMI  Estimated body mass index is 39.45 kg/m  as calculated from the following:    Height as of this encounter: 1.575 m (5' 2\").    Weight as of this encounter: 97.8 kg (215 lb 11.2 oz).   Weight management plan: Discussed healthy diet and exercise " guidelines    Counseling  Appropriate preventive services were addressed with this patient via screening, questionnaire, or discussion as appropriate for fall prevention, nutrition, physical activity, Tobacco-use cessation, social engagement, weight loss and cognition.  Checklist reviewing preventive services available has been given to the patient.  Reviewed patient's diet, addressing concerns and/or questions.   Updated plan of care.  Patient reported difficulty with activities of daily living were addressed today.        Elle Velez is a 85 year old, presenting for the following:  Physical, Hypertension, and Lipids        2/25/2025    10:47 AM   Additional Questions   Roomed by fabricio   Accompanied by self         2/25/2025    10:47 AM   Patient Reported Additional Medications   Patient reports taking the following new medications Memetasone furoate cream 0.1 % mon wed fri HPI      Chief Complaint   Patient presents with    Physical    Hypertension    Lipids     Swelling  --notes swelling in bilateral hands/feet  --rings are hard to put on  --diltiazem dose was increased ~1 year ago by cards  --has a hard time putting socks on, so worries about compression   --had a biopsy on right lower leg that is slow to heal.  Is noting redness of the right lower leg    Back pain  --had surgery this past summer; has follow-up with surgeon later this week; doing physical therapy   --unable to walk long distances due to pain;  balance is not ideal;  uses cane; stairs are challenging   --still doing home exercises daily from physical therapy   --no formal exercise     Hyperlipidemia Follow-Up    Are you regularly taking any medication or supplement to lower your cholesterol?   Yes- am  Are you having muscle aches or other side effects that you think could be caused by your cholesterol lowering medication?  No    Hypertension Follow-up    Do you check your blood pressure regularly outside of the clinic? Yes    Are you following a low salt diet? Yes  Are your blood pressures ever more than 140 on the top number (systolic) OR more than 90 on the bottom number (diastolic), for example 140/90? No        Health Care Directive  Patient has a Health Care Directive on file  Advance care planning document is on file and is current.      2/25/2025   General Health   How would you rate your overall physical health? (!) FAIR   Feel stress (tense, anxious, or unable to sleep) Not at all         2/25/2025   Nutrition   Diet: Regular (no restrictions)    Breakfast skipped       Multiple values from one day are sorted in reverse-chronological order         2/25/2025   Exercise   Days per week of moderate/strenous exercise Patient declined   Average minutes spent exercising at this level Patient declined         2/25/2025   Social Factors   Frequency of gathering with friends or relatives Three times a week   Worry food won't last until get money to buy more No   Food not last or not have enough money for food? No   Do you have housing? (Housing is defined as stable permanent housing and does not include staying ouside in a car, in a tent, in an abandoned building, in an overnight shelter, or couch-surfing.) Yes   Are you worried about losing your housing? No   Lack of transportation? No   Unable to get utilities (heat,electricity)? No         2/25/2025   Fall Risk   Fallen 2 or more times in the past year? No   Trouble with walking or balance? Yes   Reason Gait Speed Test Not Completed Unable to complete test, patient reported symptoms          2/25/2025   Activities of Daily Living- Home Safety   Needs help with the following daily activites Shopping    Housework   Safety concerns in the home None of the above       Multiple values from one day are sorted in reverse-chronological order         2/25/2025   Dental   Dentist two times every year? Yes         2/25/2025   Hearing Screening   Hearing concerns? None of the above          2/25/2025   Driving Risk Screening   Patient/family members have concerns about driving No         2/25/2025   General Alertness/Fatigue Screening   Have you been more tired than usual lately? No         2/25/2025   Urinary Incontinence Screening   Bothered by leaking urine in past 6 months No            Today's PHQ-2 Score:       2/25/2025    10:39 AM   PHQ-2 ( 1999 Pf[employer])   Q1: Little interest or pleasure in doing things 0   Q2: Feeling down, depressed or hopeless 0   PHQ-2 Score 0    Q1: Little interest or pleasure in doing things Not at all   Q2: Feeling down, depressed or hopeless Not at all   PHQ-2 Score 0       Patient-reported           2/25/2025   Substance Use   Alcohol more than 3/day or more than 7/wk Not Applicable   Do you have a current opioid prescription? No   How severe/bad is pain from 1 to 10? 6/10   Do you use any other substances recreationally? No     Social History     Tobacco Use    Smoking status: Never    Smokeless tobacco: Never   Vaping Use    Vaping status: Never Used   Substance Use Topics    Alcohol use: No    Drug use: No           3/5/2024   LAST FHS-7 RESULTS   1st degree relative breast or ovarian cancer Yes   Any relative bilateral breast cancer No   Any male have breast cancer No   Any ONE woman have BOTH breast AND ovarian cancer No   Any woman with breast cancer before 50yrs Yes    Yes   2 or more relatives with breast AND/OR ovarian cancer Yes       Multiple values from one day are sorted in reverse-chronological order        Mammogram Screening - After age 74- determine frequency with patient based on health status, life expectancy and patient goals              Reviewed and updated as needed this visit by Provider                    Current Outpatient Medications   Medication Sig Dispense Refill    acetaminophen (TYLENOL) 325 MG tablet Take 3 tablets (975 mg) by mouth every 8 hours 100 tablet 0    apixaban ANTICOAGULANT (ELIQUIS ANTICOAGULANT) 5 MG tablet Take 1 tablet (5  mg) by mouth 2 times daily 180 tablet 3    diltiazem ER COATED BEADS (CARDIZEM CD/CARTIA XT) 240 MG 24 hr capsule Take 1 capsule (240 mg) by mouth daily. 90 capsule 2    hydroCHLOROthiazide (HYDRODIURIL) 25 MG tablet Take 1 tablet (25 mg) by mouth daily 90 tablet 3    metoprolol tartrate (LOPRESSOR) 100 MG tablet Take 1 tablet (100 mg) by mouth 2 times daily 180 tablet 3    pravastatin (PRAVACHOL) 80 MG tablet TAKE 1 TABLET BY MOUTH EVERY DAY 90 tablet 0    THERATEARS 0.25 % OP SOLN Place 2 drops into both eyes as needed      valsartan (DIOVAN) 80 MG tablet Take 1 tablet (80 mg) by mouth daily 90 tablet 3     Current providers sharing in care for this patient include:  Patient Care Team:  Kathya Escalera DO as PCP - General (Internal Medicine)  Kathya Escalera DO as Assigned PCP  Fatimah Clement MD as MD (Neurology)  Kerry Sinclair APRN CNP as Assigned Cancer Care Provider  Jai Lam MD as MD (Cardiology)  Jinny Potter PA-C as Assigned Heart and Vascular Provider  Giovany Houser DO as Assigned Musculoskeletal Provider  Walter Byrne DO as Assigned Sleep Provider  Felipe Corona MD as MD (Gynecologic Oncology)    The following health maintenance items are reviewed in Epic and correct as of today:  Health Maintenance   Topic Date Due    MEDICARE ANNUAL WELLNESS VISIT  01/26/2025    MAMMO SCREENING  03/05/2025    COVID-19 Vaccine (8 - 2024-25 season) 04/08/2025    ANNUAL REVIEW OF HM ORDERS  02/20/2026    BMP  02/25/2026    LIPID  02/25/2026    FALL RISK ASSESSMENT  02/25/2026    ADVANCE CARE PLANNING  01/26/2029    DTAP/TDAP/TD IMMUNIZATION (3 - Td or Tdap) 04/11/2033    DEXA  03/07/2038    PHQ-2 (once per calendar year)  Completed    INFLUENZA VACCINE  Completed    Pneumococcal Vaccine: 50+ Years  Completed    ZOSTER IMMUNIZATION  Completed    RSV VACCINE  Completed    HPV IMMUNIZATION  Aged Out    MENINGITIS IMMUNIZATION  Aged Out    COLORECTAL CANCER  "SCREENING  Discontinued         Review of Systems  Constitutional, neuro, ENT, endocrine, pulmonary, cardiac, gastrointestinal, genitourinary, musculoskeletal, integument and psychiatric systems are negative, except as otherwise noted.     Objective    Exam  /80 (BP Location: Right arm, Patient Position: Sitting, Cuff Size: Adult Regular)   Pulse 67   Temp 96.9  F (36.1  C) (Tympanic)   Resp 16   Ht 1.575 m (5' 2\")   Wt 97.8 kg (215 lb 11.2 oz)   SpO2 97%   BMI 39.45 kg/m     Estimated body mass index is 39.45 kg/m  as calculated from the following:    Height as of this encounter: 1.575 m (5' 2\").    Weight as of this encounter: 97.8 kg (215 lb 11.2 oz).    Physical Exam  GENERAL: alert and no distress  RESP: lungs clear to auscultation - no rales, rhonchi or wheezes  CV: Irreg irreg, no murmur  SKIN:   Biopsy site of right lower leg with fibrinous mucoid drainage and spreading erythema distally         2/25/2025   Mini Cog   Clock Draw Score 2 Normal   3 Item Recall 3 objects recalled   Mini Cog Total Score 5              Signed Electronically by: Kathya Escalera DO    "

## 2025-02-28 ENCOUNTER — TRANSFERRED RECORDS (OUTPATIENT)
Dept: HEALTH INFORMATION MANAGEMENT | Facility: CLINIC | Age: 85
End: 2025-02-28
Payer: MEDICARE

## 2025-03-04 ENCOUNTER — HOSPITAL ENCOUNTER (OUTPATIENT)
Dept: CARDIOLOGY | Facility: CLINIC | Age: 85
Discharge: HOME | End: 2025-03-04
Attending: INTERNAL MEDICINE
Payer: MEDICARE

## 2025-03-04 DIAGNOSIS — Z95.0 CARDIAC PACEMAKER IN SITU: ICD-10-CM

## 2025-03-04 DIAGNOSIS — I49.5 SSS (SICK SINUS SYNDROME) (H): Primary | ICD-10-CM

## 2025-03-04 PROCEDURE — 93280 PM DEVICE PROGR EVAL DUAL: CPT

## 2025-03-05 LAB
MDC_IDC_LEAD_CONNECTION_STATUS: NORMAL
MDC_IDC_LEAD_CONNECTION_STATUS: NORMAL
MDC_IDC_LEAD_IMPLANT_DT: NORMAL
MDC_IDC_LEAD_IMPLANT_DT: NORMAL
MDC_IDC_LEAD_LOCATION: NORMAL
MDC_IDC_LEAD_LOCATION: NORMAL
MDC_IDC_LEAD_LOCATION_DETAIL_1: NORMAL
MDC_IDC_LEAD_LOCATION_DETAIL_1: NORMAL
MDC_IDC_LEAD_MFG: NORMAL
MDC_IDC_LEAD_MFG: NORMAL
MDC_IDC_LEAD_MODEL: NORMAL
MDC_IDC_LEAD_MODEL: NORMAL
MDC_IDC_LEAD_POLARITY_TYPE: NORMAL
MDC_IDC_LEAD_POLARITY_TYPE: NORMAL
MDC_IDC_LEAD_SERIAL: NORMAL
MDC_IDC_LEAD_SERIAL: NORMAL
MDC_IDC_MSMT_BATTERY_REMAINING_LONGEVITY: 130 MO
MDC_IDC_MSMT_BATTERY_REMAINING_PERCENTAGE: 100 %
MDC_IDC_MSMT_BATTERY_STATUS: NORMAL
MDC_IDC_MSMT_LEADCHNL_RA_IMPEDANCE_VALUE: 547 OHM
MDC_IDC_MSMT_LEADCHNL_RA_PACING_THRESHOLD_AMPLITUDE: 1.1 V
MDC_IDC_MSMT_LEADCHNL_RA_PACING_THRESHOLD_PULSEWIDTH: 0.4 MS
MDC_IDC_MSMT_LEADCHNL_RA_SENSING_INTR_AMPL: 5 MV
MDC_IDC_MSMT_LEADCHNL_RV_IMPEDANCE_VALUE: 669 OHM
MDC_IDC_MSMT_LEADCHNL_RV_PACING_THRESHOLD_AMPLITUDE: 0.9 V
MDC_IDC_MSMT_LEADCHNL_RV_PACING_THRESHOLD_PULSEWIDTH: 0.4 MS
MDC_IDC_MSMT_LEADCHNL_RV_SENSING_INTR_AMPL: 5.3 MV
MDC_IDC_PG_IMPLANT_DTM: NORMAL
MDC_IDC_PG_MFG: NORMAL
MDC_IDC_PG_MODEL: NORMAL
MDC_IDC_PG_SERIAL: NORMAL
MDC_IDC_PG_TYPE: NORMAL
MDC_IDC_SESS_CLINIC_NAME: NORMAL
MDC_IDC_SESS_DTM: NORMAL
MDC_IDC_SESS_TYPE: NORMAL
MDC_IDC_SET_BRADY_AT_MODE_SWITCH_MODE: NORMAL
MDC_IDC_SET_BRADY_AT_MODE_SWITCH_RATE: 170 {BEATS}/MIN
MDC_IDC_SET_BRADY_LOWRATE: 60 {BEATS}/MIN
MDC_IDC_SET_BRADY_MAX_SENSOR_RATE: 130 {BEATS}/MIN
MDC_IDC_SET_BRADY_MAX_TRACKING_RATE: 130 {BEATS}/MIN
MDC_IDC_SET_BRADY_MODE: NORMAL
MDC_IDC_SET_BRADY_PAV_DELAY_HIGH: 200 MS
MDC_IDC_SET_BRADY_PAV_DELAY_LOW: 300 MS
MDC_IDC_SET_BRADY_SAV_DELAY_HIGH: 200 MS
MDC_IDC_SET_BRADY_SAV_DELAY_LOW: 300 MS
MDC_IDC_SET_LEADCHNL_RA_PACING_AMPLITUDE: 2 V
MDC_IDC_SET_LEADCHNL_RA_PACING_CAPTURE_MODE: NORMAL
MDC_IDC_SET_LEADCHNL_RA_PACING_POLARITY: NORMAL
MDC_IDC_SET_LEADCHNL_RA_PACING_PULSEWIDTH: 0.4 MS
MDC_IDC_SET_LEADCHNL_RA_SENSING_ADAPTATION_MODE: NORMAL
MDC_IDC_SET_LEADCHNL_RA_SENSING_POLARITY: NORMAL
MDC_IDC_SET_LEADCHNL_RA_SENSING_SENSITIVITY: 0.15 MV
MDC_IDC_SET_LEADCHNL_RV_PACING_AMPLITUDE: 1.6 V
MDC_IDC_SET_LEADCHNL_RV_PACING_CAPTURE_MODE: NORMAL
MDC_IDC_SET_LEADCHNL_RV_PACING_POLARITY: NORMAL
MDC_IDC_SET_LEADCHNL_RV_PACING_PULSEWIDTH: 0.4 MS
MDC_IDC_SET_LEADCHNL_RV_SENSING_ADAPTATION_MODE: NORMAL
MDC_IDC_SET_LEADCHNL_RV_SENSING_POLARITY: NORMAL
MDC_IDC_SET_LEADCHNL_RV_SENSING_SENSITIVITY: 1.5 MV
MDC_IDC_SET_ZONE_DETECTION_INTERVAL: 375 MS
MDC_IDC_SET_ZONE_STATUS: NORMAL
MDC_IDC_SET_ZONE_TYPE: NORMAL
MDC_IDC_SET_ZONE_VENDOR_TYPE: NORMAL
MDC_IDC_STAT_BRADY_DTM_END: NORMAL
MDC_IDC_STAT_BRADY_DTM_START: NORMAL
MDC_IDC_STAT_BRADY_RA_PERCENT_PACED: 45 %
MDC_IDC_STAT_BRADY_RV_PERCENT_PACED: 5 %
MDC_IDC_STAT_EPISODE_RECENT_COUNT: 1042
MDC_IDC_STAT_EPISODE_RECENT_COUNT_DTM_END: NORMAL
MDC_IDC_STAT_EPISODE_RECENT_COUNT_DTM_START: NORMAL
MDC_IDC_STAT_EPISODE_TOTAL_COUNT: 3763
MDC_IDC_STAT_EPISODE_TOTAL_COUNT_DTM_END: NORMAL
MDC_IDC_STAT_EPISODE_TYPE: NORMAL
MDC_IDC_STAT_EPISODE_TYPE: NORMAL
MDC_IDC_STAT_EPISODE_VENDOR_TYPE: NORMAL
MDC_IDC_STAT_EPISODE_VENDOR_TYPE: NORMAL

## 2025-03-24 NOTE — PROGRESS NOTES
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  She is s/p BSO, PPLND 2020 which served as her treatment.  She is here today for a surveillance visit.      Oncology History:  She originally presented with a complaint of hip pain.  The work-up of this included an MRI which has demonstrated a large pelvic mass, her  was elevated (108, CEA 19-9 were normal).     2020: Exploratory laparoscopy followed by laparotomy, bilateral salpingo-oophorectomy, omentectomy, pelvic and periaortic lymph node dissection, anterior culdectomy.    FINAL DIAGNOSIS:   A. OVARIES, LEFT AND RIGHT, BILATERAL OOPHORECTOMY:   - Right ovary with ENDOMETRIOID ADENOCARCINOMA, FIGO grade 1   - Left ovary with benign inclusion cysts, negative for malignancy   - Unremarkable bilateral fallopian tubes, negative for malignancy     B. MESENTERY, BIOPSY:   - Fibrovascular adhesions, negative for malignancy     C. OMENTUM, OMENTECTOMY:   - Adipose tissue, negative for malignancy     D. ANTERIOR CUL-DE-SAC, BIOPSY:   - Fibroadipose tissue with foci of endometriosis   - Negative for malignancy     E. POSTERIOR CUL-DE-SAC, BIOPSY:   - Fibrovascular tissue, negative for malignancy     F. LYMPH NODE, LEFT PELVIC, EXCISION:   - Eleven reactive lymph nodes, negative for malignancy (0/11)     G. LYMPH NODE, RIGHT PELVIC, EXCISION:   - Nine reactive lymph nodes, negative for malignancy (0/9)     H. LYMPH NODE, RIGHT PARA AORTIC, EXCISION:   - Three reactive lymph nodes, negative for malignancy (0/3)     2020:  20.  2/:  8.  5/:  7.  9:  <5.  21:  pending.                      Today she reports feeling well overall and is without concern.  She recently had an  MEDICARE WELLNESS VISIT + SOAP NOTE    Patient Care Team:  Junior Wang DO as PCP - General (Family Practice)  Jewels Camp MD as Endocrinologist (Internal Medicine - Endocrinology,Diabetes,Metabolism)    Corin presents for her Subsequent Annual Medicare Wellness Visit with Medicare Wellness Visit (Subsequent/Patient reports lump on right side of groin. Denies any pain)   Acute and chronic problems were discussed separately below.    Status MWV Topics Details (Refresh Note to Update)    Depression Screening (Trend)   PHQ2 Interpretation Negative  PHQ9 Interpretation = Minimal Depression without suicidal thoughts.   How difficult is it to do your work, take care of things at home, or get along with other people? Somewhat difficult PHQ2: Score = 0  PHQ9: Score = 2  Depression screening is negative no further plan needed.      Blood Pressure  Weight  BMI     /74  Current Weight 208 lbs  body mass index is 36.76 kg/m².  BMI is in obese range.    Caloric restriction         Advanced Directives on File Yes on file.        Health Risk Assessment  (Click to Review or Edit)   Completed      Falls Risk  Have you had a fall in the past year?.................................Yes.  Do you feel unsteady when standing or walking?........ No  Do you worry about falling?.................................................. No  Gait/Balance: Normal      Tobacco/Alcohol/Drugs Every Day Smoker   Tobacco Pack Years 40   reports that she does not currently use alcohol after a past usage of about 1.0 standard drink of alcohol per week.   reports no history of drug use.      Opioid Review (Update Meds)      No opioid on med list.  is not taking opioid medications.      Cognitive/Functional Status  (Optional MOCA  Mini Cog)   No evidence of cognitive dysfunction by direct observation.    Vision and Hearing Screens     Both screenings were performed and reviewed      Care Gaps/Screenings  (Click to Review and Order) See patient  episode of transient global amnesia and will be following up with neurology on this but reports no identified residual effects.  She has been having significant hot flashes since she went through menopause many years ago.  She recently started evening primrose and now her hot flashes are significantly decreased.  She has about at night and 3 during the day.  She is very pleased with this response.  She does check her BP at home and usually this runs 130s/70s.  She denies any vaginal bleeding, no changes in her bowel or bladder habits, no nausea/emesis, no lower extremity edema, and no difficulties eating or sleeping. She denies any abdominal discomfort/bloating, no fevers or chills, and no chest pain or shortness of breath.  She is scheduled for her annual physical this month, is current with her colon cancer screening, due for her mammogram, and she is fully vaccinated against COVID.              Health Maintenance  Colonoscopy: 11/16/15, repeat in 10 years  Mammogram: 12/29/2020   Annual physical: 12/15/2020, scheduled 1/13/22  COVID vaccine: 2/4/21, 2/25/21, 9/30/21 [employer]      Review of Systems:    Systemic           no weight changes; no fever; no chills; no night sweats; no appetite changes  Skin           no rashes, or lesions  Eye           no irritation; no changes in vision  Isabelle-Laryngeal           no dysphagia; no hoarseness   Pulmonary    no cough; no shortness of breath  Cardiovascular    no chest pain; no palpitations  Gastrointestinal    no diarrhea; no constipation; no abdominal pain; no changes in bowel habits; no blood in stool  Genitourinary   no urinary frequency; no urinary urgency; no dysuria; no pain; no abnormal vaginal discharge; no abnormal vaginal bleeding  Breast   no breast discharge; no breast changes; no breast pain  Musculoskeletal    no myalgias; no arthralgias; no back pain  Psychiatric           no depressed mood; no anxiety    Hematologic   no tender lymph nodes; no noticeable  instructions and orders         The following items on the Medicare Health Risk Assessment were found to be positive  5.) Do you do moderate to strenuous exercise (brisk walk) for about 20 minutes for 3 or more days per week?: No, I usually do not exercise this much     7a.) Have you had a fall in the past year?: Yes         I reviewed and updated the past Medical, Surgical, Family, Social History, Allergies and Medications in Epic: Yes    Family History   Problem Relation Age of Onset    BRCA2 Positive Mother     Cancer Mother         breast    Cancer Father         lymphoma    Cancer, Ovarian Sister     Cancer Sister         ovarian    Cancer, Colon Maternal Grandmother     Cancer Maternal Grandmother         colon         SOAP NOTE       Subjective     Corin is a 73 year old here for Medicare Wellness Visit (Subsequent/Patient reports lump on right side of groin. Denies any pain)    The patient is a 73-year-old female who presents today for a subsequent Medicare wellness visit.     She has mild major depression, currently managed with citalopram 10 mg daily. She does worry about her son's health issues. She is stable at this time, though she does get some seasonal affective changes. She is sleeping well.     She has chronic tobacco dependence with more than 40 pack-year history and has had lung CT screening, last done on 04/05/2024, showing few stable lung nodules in the upper lobes less than 2 mm with no growth from prior lung CT. She continues annual screening.     She has osteopenia at multiple sites with bone DEXA scan in 2020 and repeated on 04/05/2024 that was stable and remains in the osteopenia range. She has no fracture history and remains on calcium and vitamin D3.     She has acquired hyperthyroid, managed with levothyroxine 112 mcg daily, and is seen by Dr. Camp in endocrinology. She also has a history of hyperparathyroidism with hypercalcemia, status post right parathyroidectomy with right thyroid  lobectomy, and lymphocytic thyroiditis/Hashimoto's noted on 06/25/2022. She remains on vitamin D supplementation. A thyroid ultrasound on 03/28/2023 showed no left-sided lobe nodules. Her calcium level is stable at 8.9.     She has hypertriglyceridemia with severe obesity, BMI greater than 35, managed with atorvastatin 20 mg daily with stable lipids. She continues to have some hypertriglyceridemia, though decreasing annually from 253 a few years ago to 189 currently. She has normal LDL of 51, , and ratio 2.9.     She has benign essential hypertension with CKD 3A, managed with triamterene HCTZ 37.5/25 mg daily. She has stable urine microalbumin, stable electrolytes, and CKD improved now to CKD 2 with GFR 61. She has no chronic anemia.     She has seborrheic keratosis on the trunk, torso, and shoulders, which is stable and has been seen by dermatology.    For health maintenance, she had a lung CT on 04/05/2024 and will repeat annually. Her mammogram was done on 04/11/2023 and is due again on 04/20/2025. She has been undergoing FIT testing annually since 2018, all of which have been negative, and will continue annual screening. Vaccines for influenza, COVID-19, and shingles are due.    She was diagnosed with ringworm in her toenail by Dr. Go, who sent scrapings of her toe to the lab. She recalls having similar symptoms as a child, manifesting as circles on the inside of her elbow. She suspects the current infection may have been contracted while walking barefoot in her garden. Dr. Go provided several treatment options, including the possibility of leaving the condition untreated until it resolves naturally. She opted for this approach as the condition is not causing her any discomfort. The infection has affected half of her toenail, and she is prepared for the possibility of the toenail detaching.    MEDICATIONS  Current: Citalopram, levothyroxine, atorvastatin, triamterene HCTZ, hydrocortisone  swellings or lumps   Endocrine    no hot flashes; no heat/cold intolerance         Neurological   no tremor; no numbness and tingling; no headaches; no difficulty sleeping      Past Medical History:    Past Medical History:   Diagnosis Date     Chronic airway obstruction (H)      Gastroesophageal reflux disease      Hiatal hernia      Hypertension      Ovarian cancer, unspecified laterality (H) 3/10/2021     Personal history of contact with and (suspected) exposure to asbestos      Primary osteoarthritis of right hip 7/9/2020         Past Surgical History:    Past Surgical History:   Procedure Laterality Date     BREAST BIOPSY, CORE RT/LT  1994     CHOLECYSTECTOMY  1995     COLONOSCOPY  05/2010    Diverticulosis, colon polyps; repeat 5 yrs     COLONOSCOPY N/A 11/16/2015    Procedure: COLONOSCOPY;  Surgeon: Alejandro Matos MD;  Location: [facility]     COLONOSCOPY N/A 9/17/2021    Procedure: COLONOSCOPY;  Surgeon: Aayush George MD;  Location: [facility]     Colonoscopy, hyperplastic polyps, repeat in 5 yrs  2004     ESOPHAGOSCOPY, GASTROSCOPY, DUODENOSCOPY (EGD), COMBINED  09/30/2013    Procedure: COMBINED ESOPHAGOSCOPY, GASTROSCOPY, DUODENOSCOPY (EGD), BIOPSY SINGLE OR MULTIPLE;  Gastroscopy;  Surgeon: Blaise Gill MD;  Location: [facility]     EYE SURGERY  2012    R/L Cataracts     HC REMOVE TONSILS/ADENOIDS,13+ Y/O      T & A 13+y.o.     HERNIORRHAPHY INCISIONAL (LOCATION) N/A 3/24/2021    Procedure: HERNIORRHAPHY, INCISIONAL, OPEN WITH MESH;  Surgeon: Aayush George MD;  Location: [facility]     HYSTERECTOMY, PAP NO LONGER INDICATED       LAPAROSCOPIC SALPINGO-OOPHORECTOMY N/A 07/24/2020    Procedure: Laparoscopy, removal or pelvic mass, both tubes and ovaries, omentectomy, anterior culvectomy, pelvic and para aortic lymph node dissection, laparotomy;  Surgeon: Felipe Corona MD;  Location: [facility]     [place] ANESTH,LUMBAR SPINE,CORD SURGERY  10/2020    L3-4 L4-5 TRANSFORAMINAL INTERBODY FUSION L3-5, POSTERIOR  INSTRUMENTED FUSION AND DECOMPRESSION     TVH for DUB, ovaries in       VASCULAR SURGERY      Halsey closure     C ANESTH,KNEE VEINS SURGERY  2014         Health Maintenance Due   Topic Date Due     FALL RISK ASSESSMENT  2020     PHQ-2  2022     MEDICARE ANNUAL WELLNESS VISIT  12/15/2021     MAMMO SCREENING  2021       Current Medications:     Current Outpatient Medications   Medication Sig Dispense Refill     Evening Primrose Oil 1000 MG CAPS One daily       meloxicam (MOBIC) 15 MG tablet        mometasone (ELOCON) 0.1 % external cream Apply sparingly to affected area twice daily for 2 weeks then decrease to 1 time daily for 30 days then decrease to 2-3 times per week 45 g 11     omega 3 1000 MG CAPS Take by mouth daily        pravastatin (PRAVACHOL) 80 MG tablet Take 1 tablet (80 mg) by mouth daily 90 tablet 3     THERATEARS 0.25 % OP SOLN BID       valsartan-hydrochlorothiazide (DIOVAN HCT) 160-12.5 MG tablet TAKE 1 TABLET BY MOUTH EVERY DAY 90 tablet 0     VIACTIV 500-100-40 OR CHEW once daily           Allergies:        Allergies   Allergen Reactions     Ezetimibe Other (See Comments)     Severe muscle aches     Lisinopril      Omeprazole      Other reaction(s): *Unknown     Prilosec [Omeprazole]      Zestril [Ace Inhibitors] Cough     Atorvastatin Other (See Comments)     Muscle Pain     Irbesartan Cramps     Rosuvastatin Other (See Comments)     Muscle Pain        Social History:     Social History     Tobacco Use     Smoking status: Never Smoker     Smokeless tobacco: Never Used   Substance Use Topics     Alcohol use: No       History   Drug Use No         Family History:     The patient's family history is notable for:    Family History   Problem Relation Age of Onset     Cancer Mother         uterine cancer,      Respiratory Mother         COPD     Cardiovascular Mother         AAA  age 80     Breast Cancer Maternal Grandmother      Cancer Maternal Grandfather          ointment.    Review of Systems  10 point negative ROS obtained except for what is noted in HPI    SDOH Every Day Smoker   Tobacco Pack Years 40     Objective   Vitals:    03/24/25 1326   BP: 134/74   Pulse: 73   SpO2: 98%   Weight: 94.1 kg (207 lb 8 oz)   Height: 5' 3\" (1.6 m)   BMI (Calculated): 36.76     Physical Exam  Vitals:    03/24/25 1326   BP: 134/74   Pulse: 73      Constitutional: All vitals are stable. Pleasant, alert, oriented to person, place, time and date. Appears stated age. No apparent distress, Well developed, well nourished, well groomed.   HEENT:   Head: Normocephalic, atraumatic.  Eyes:PERRLA, EOMI. Normal funduscopic exam. Conjunctivae, sclerae, and lids normal. No nystagmus.   Nares: Patent and clear.  Neck: Supple. No bruits, JVD, tenderness, lymphadenopathy or thyromegaly. Normal range of motion.  Lymphatic: No cervical adenopathy  Respiratory/Lungs: Clear to auscultation throughout. Good air exchange. No wheezing, rales, or rhonchi.   Heart: Normal S1 and S2. Regular rate and rhythm. No murmur, rubs or gallops.  GI: Abdomen soft and non tender. Non distended. Normal/Normoactive bowel sounds.No hepatosplenomegaly/No hepatomegaly, No flank pain. No rebound or guarding. No CVAT.   Musculoskeletal:   Back: vertebrae straight, midline and non tender. Normal curvature. No scoliosis, No joint swelling,erythema, or synovitis.   Extremities:No clubbing, cyanosis or edema, Normal muscle tone.   Neurologic: CN/Cranial nerves ll through Xll grossly intact. Normal motor, strength, and sensory/sensorium and gait. Normal reflexes.  Symmetric lowe extremities. Alert, oriented. Normal tandem gait. Symmetric brisk reflexes throughout.   Psychiatric: Pleasant, appropriate and oriented. Congruent mood. Normal affect.  Skin: Warm, smooth, dey, No erythema, bruising, or rash.              ASSESSMENT AND PLAN        1. Subsequent Medicare wellness visit.  Her renal function tests are within normal limits. The  cancer unknown type     Breast Cancer Sister      Eye Disorder Father         cataracts     Depression Father      Depression Son      Depression Son      Breast Cancer Sister         X2     Cancer - colorectal No family hx of         no ovarian cancer         Physical Exam:     BP (!) 146/83   Pulse 71   Temp 97.5  F (36.4  C) (Oral)   Wt 94.7 kg (208 lb 11.2 oz)   SpO2 100%   BMI 36.39 kg/m    Body mass index is 36.39 kg/m .    General Appearance: healthy and alert, no distress     HEENT: no palpable nodules or masses        Cardiovascular: regular rate and rhythm, no gallops, rubs or murmurs     Respiratory: lungs clear, no rales, rhonchi or wheezes    Musculoskeletal: extremities non tender and without edema    Skin: no lesions or rashes     Neurological: normal gait, no gross defects     Psychiatric: appropriate mood and affect                               Hematological: normal cervical, supraclavicular and inguinal lymph nodes     Gastrointestinal:       abdomen soft, non-tender, non-distended, no organomegaly or masses    Genitourinary: External genitalia and urethral meatus appears normal.  Vagina is smooth without nodularity or masses.  Cervix is not easily visualized.  Bimanual exam reveal no masses, nodularity or fullness.  Recto-vaginal exam confirms these findings.      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  She is s/p BSO, PPLND 7/24/2020 which served as her treatment.  She is here today for a surveillance visit.        40 minutes spent on the date of the encounter doing chart review, history and exam, documentation, and further activities as noted above.      Plan:     1.) Ovarian cancer:  IAN on exam.  RTC in 3 months for next surveillance visit and .  Plan for surveillance visits and  levels every 3 months for the first 2 years post treatment (7/2022), then every 6 months for 3 years  lipid panel is satisfactory, with total cholesterol, HDL, and LDL levels all within the normal range. Although her triglycerides are elevated, they have shown a decreasing trend from 253 a few years ago to the current level of 189. The ratio remains within the normal range. Electrolyte levels are normal. Diabetes screening results are normal. Renal function has improved, transitioning from stage 3A to stage 2 chronic kidney disease (CKD), with an increase in GFR from 61 to 70. Liver function tests are all within normal limits. Complete blood count (CBC) results are within normal limits, with no indications of anemia. Thyroid and parathyroid functions are intact. A lung CT scan and mammogram have been ordered for her to be conducted after April 2025. She is advised to schedule these appointments at her earliest convenience. The continuation of atorvastatin therapy is recommended.    2. Eye swelling.  She reports waking up with swollen eyes frequently. Hydrocortisone ointment will be prescribed to reduce the swelling and redness.    3. Ringworm.  She was previously diagnosed with ringworm on her toenail. Zinc oxide ointment is not recommended for ringworm as it is more for inflammation rather than fungal infections.    1. Medicare annual wellness visit, subsequent  2. Screening for colon cancer  -     Occult Blood - iFOB (aka FIT); Future  -     Occult Blood - iFOB (aka FIT); Future  3. Hypercalcemia  4. Hypertriglyceridemia  -     Microalbumin Urine Random  5. Essential hypertension  6. Combined hyperlipidemia  7. Severe obesity (BMI 35.0-39.9) with comorbidity  (CMD)  8. Hyperparathyroidism  (CMD)  9. Acquired hypothyroidism  10. Mild major depression (CMD)  11. Osteopenia of multiple sites  12. Tobacco dependence  13. CKD (chronic kidney disease) stage 2, GFR 60-89 ml/min  14. Screening mammogram for breast cancer  -     MAMMO SCREENING BILATERAL; Future  15. History of tobacco use, presenting hazards to health  -      CT Lung Cancer Screening Low Dose WO Contrast; Future  16. Nicotine dependence, cigarettes, uncomplicated  -     CT Lung Cancer Screening Low Dose WO Contrast; Future  17. Personal history of smoking  -     CT Lung Cancer Screening Low Dose WO Contrast; Future  Other orders  -     hydroCORTisone (CORTIZONE) 2.5 % cream; Apply 1 application. topically in the morning and 1 application. in the evening. UNTIL REDNESS DISAPPEARS  -     hydroCORTisone (CORTIZONE) 2.5 % ointment; Apply 1 Application topically 2 times daily as needed for Rash or Itching.      Return in about 1 year (around 3/24/2026).             thereafter (7/2025), then annually.  Reviewed signs and symptoms for when she should contact the clinic or seek additional care.  Patient to contact the clinic with any questions or concerns in the interim.        Genetic risk factors were assessed and she is negative for mutations in the ADALID, BRCA1, BRCA2, BRIP1, CDH1, CHEK2, EPCAM, MLH1, MSH2, MSH6, NBN, NF1, PALB2, PMS2, PTEN, RAD51C, RAD51D, STK11, and TP53 genes.      Labs and/or tests ordered include:  .     2.) Health maintenance:  Issues addressed today include following up with PCP for annual health maintenance and non-gynecologic issues.  Encouraged to schedule her mammogram.     3.) HTN: Encouraged to check her BP at home and follow up with her PCP if her readings remain >130/80.     4.) Oncology treatment summary:  Her oncology treatment summary was reviewed with her today and she was provided a paper and electronic copy through My Chart.       Kerry Sinclair, DNP, APRN, FNP-C  Nurse Practitioner  Division of Gynecologic Oncology  Pager: [phone]     CC  Patient Care Team:  Kathya Escalera DO as PCP - General (Internal Medicine)  Kathya Escalera DO as Assigned PCP  Felipe Corona MD as Assigned Cancer Care Provider  Aayush George MD as Assigned Surgical Provider  Tiffanie Carter GC as Assigned OBGYN Provider  Fatimah Clement MD as MD (Neurology)  SELF, REFERRED

## 2025-03-31 NOTE — PROGRESS NOTES
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  She is s/p BSO, PPLND 2020 which served as her treatment.  She is here today for a surveillance visit.      Oncology History:  She originally presented with a complaint of hip pain.  The work-up of this included an MRI which has demonstrated a large pelvic mass, her  was elevated (108, CEA 19-9 were normal).     2020: Exploratory laparoscopy followed by laparotomy, bilateral salpingo-oophorectomy, omentectomy, pelvic and periaortic lymph node dissection, anterior culdectomy.    FINAL DIAGNOSIS:   A. OVARIES, LEFT AND RIGHT, BILATERAL OOPHORECTOMY:   - Right ovary with ENDOMETRIOID ADENOCARCINOMA, FIGO grade 1   - Left ovary with benign inclusion cysts, negative for malignancy   - Unremarkable bilateral fallopian tubes, negative for malignancy   B. MESENTERY, BIOPSY:   - Fibrovascular adhesions, negative for malignancy   C. OMENTUM, OMENTECTOMY:   - Adipose tissue, negative for malignancy   D. ANTERIOR CUL-DE-SAC, BIOPSY:   - Fibroadipose tissue with foci of endometriosis   - Negative for malignancy   E. POSTERIOR CUL-DE-SAC, BIOPSY:   - Fibrovascular tissue, negative for malignancy   F. LYMPH NODE, LEFT PELVIC, EXCISION:   - Eleven reactive lymph nodes, negative for malignancy (0/11)   G. LYMPH NODE, RIGHT PELVIC, EXCISION:   - Nine reactive lymph nodes, negative for malignancy (0/9)   H. LYMPH NODE, RIGHT PARA AORTIC, EXCISION:   - Three reactive lymph nodes, negative for malignancy (0/3)     2020:  20.  2/:  8.  5/:  7.  9:  <5.  1/:  6.   4/:  6.  7/:  9.  1:  7.  7/10/23:  8.     10/10/23: CT AP LLQ  "pain  IMPRESSION:   1.  No acute pathology through the abdomen and pelvis.  2.  Descending and sigmoid diverticulosis without evidence of  diverticulitis.  3.  Small fat-containing left inguinal hernia is unchanged. The  adjacent sigmoid colon does not extend into the hernia.  4.  Two large duodenal diverticula.     1/22/24:  7.  10/1/24:  9.  4/1/25:  pending.              Today she comes to clinic feeling well overall and is without gynecologic concern.  She is having no vaginal bleeding or discharge.  No abdominal pain or bloating.  Appetite has been normal.  No unintended weight loss.  Normal bowel and bladder habits.  She does have leg swelling that she has had \"pretty much her whole life\" and is working with her cardiologist to adjust medications which have been helpful.  She is current with her annual physical.  Is scheduled for her mammogram later this week.  And current with her colon cancer screening.            Health Maintenance  Colonoscopy: 11/16/15, repeat in 10 years  Mammogram: 3/5/24, scheduled 4/3/25  Annual physical: 2/25/25    Past Medical History:    Past Medical History:   Diagnosis Date    Chronic airway obstruction (H)     Diastolic dysfunction 06/28/2022    Gastroesophageal reflux disease     Hiatal hernia     Hypertension     Irregular heart beat     Malignant neoplasm of ovary (H)     Ovarian cancer, unspecified laterality (H) 03/10/2021    Personal history of contact with and (suspected) exposure to asbestos     Primary osteoarthritis of right hip 07/09/2020         Past Surgical History:    Past Surgical History:   Procedure Laterality Date    BIOPSY BREAST Left     BREAST BIOPSY, CORE RT/LT  1994    CHOLECYSTECTOMY  1995    COLONOSCOPY  05/2010    Diverticulosis, colon polyps; repeat 5 yrs    COLONOSCOPY N/A 11/16/2015    Procedure: COLONOSCOPY;  Surgeon: Alejandro Matos MD;  Location: [facility]    COLONOSCOPY N/A 09/17/2021    Procedure: COLONOSCOPY;  Surgeon: Doris" Aayush MENESES MD;  Location: [facility]    Colonoscopy, hyperplastic polyps, repeat in 5 yrs  2004    EP PACEMAKER DEVICE & LEAD IMPLANT- RIGHT ATRIAL & RIGHT VENTRICULAR Left 10/24/2022    Procedure: Pacemaker Device & Lead Implant - Right Atrial & Right Ventricular;  Surgeon: Demond Meyer MD;  Location:  [facility]    ESOPHAGOSCOPY, GASTROSCOPY, DUODENOSCOPY (EGD), COMBINED  09/30/2013    Procedure: COMBINED ESOPHAGOSCOPY, GASTROSCOPY, DUODENOSCOPY (EGD), BIOPSY SINGLE OR MULTIPLE;  Gastroscopy;  Surgeon: Blaise Gill MD;  Location: [facility]    EYE SURGERY  2012    R/L Cataracts    FUSION TRANSFORAMINAL LUMBAR INTERBODY, 1 LEVEL, POSTERIOR APPROACH, USING OTS SURG IMAGING GUIDANCE N/A 7/3/2024    Procedure: LUMBAR 1- LUMBAR 2 TRANSFORAMINAL INTERBODY FUSION, POSTERIOR INSTRUMENTED FUSION, DECOMPRESSION, WITH O-ARM AND STEALTH NAVIGATION;  Surgeon: Chavo Patel MD;  Location: [facility] OR     REMOVE TONSILS/ADENOIDS,12+ Y/O      T & A 13+y.o.    HERNIORRHAPHY INCISIONAL (LOCATION) N/A 03/24/2021    Procedure: HERNIORRHAPHY, INCISIONAL, OPEN WITH MESH;  Surgeon: Aayush George MD;  Location: [facility]    HYSTERECTOMY, PAP NO LONGER INDICATED      LAPAROSCOPIC SALPINGO-OOPHORECTOMY N/A 07/24/2020    Procedure: Laparoscopy, removal or pelvic mass, both tubes and ovaries, omentectomy, anterior culvectomy, pelvic and para aortic lymph node dissection, laparotomy;  Surgeon: Felipe oCrona MD;  Location: [facility] OR    NE ANESTH,LUMBAR SPINE,CORD SURGERY  10/2020    L3-4 L4-5 TRANSFORAMINAL INTERBODY FUSION L3-5, POSTERIOR INSTRUMENTED FUSION AND DECOMPRESSION    TVH for DUB, ovaries in      VASCULAR SURGERY  2014    Taylor closure    ZZC ANESTH,KNEE VEINS SURGERY  08/20/2014         Health Maintenance Due   Topic Date Due    MAMMO SCREENING  03/05/2025    COVID-19 Vaccine (8 - 2024-25 season) 04/08/2025       Current Medications:     Current Outpatient Medications    Medication Sig Dispense Refill    acetaminophen (TYLENOL) 325 MG tablet Take 3 tablets (975 mg) by mouth every 8 hours 100 tablet 0    apixaban ANTICOAGULANT (ELIQUIS ANTICOAGULANT) 5 MG tablet Take 1 tablet (5 mg) by mouth 2 times daily 180 tablet 3    diltiazem ER COATED BEADS (CARDIZEM CD/CARTIA XT) 240 MG 24 hr capsule Take 1 capsule (240 mg) by mouth daily. 90 capsule 2    hydroCHLOROthiazide (HYDRODIURIL) 25 MG tablet Take 1 tablet (25 mg) by mouth daily 90 tablet 3    meloxicam (MOBIC) 15 MG tablet Take 15 mg by mouth as needed for moderate pain.      metoprolol succinate ER (TOPROL XL) 100 MG 24 hr tablet Take 1.5 tablets (150 mg) by mouth 2 times daily. 270 tablet 3    pravastatin (PRAVACHOL) 80 MG tablet Take 1 tablet (80 mg) by mouth daily. 90 tablet 3    THERATEARS 0.25 % OP SOLN Place 2 drops into both eyes as needed      valsartan (DIOVAN) 80 MG tablet Take 1 tablet (80 mg) by mouth daily 90 tablet 3         Allergies:        Allergies   Allergen Reactions    Atorvastatin Other (See Comments)     Muscle Pain    Ezetimibe Other (See Comments)     Severe muscle aches    Irbesartan Cramps    Lisinopril     Omeprazole      Other reaction(s): *Unknown    Prilosec [Omeprazole]     Rosuvastatin Other (See Comments)     Muscle Pain    Zestril [Ace Inhibitors] Cough        Social History:     Social History     Tobacco Use    Smoking status: Never    Smokeless tobacco: Never   Substance Use Topics    Alcohol use: No       History   Drug Use No         Family History:     The patient's family history is notable for:    Family History   Problem Relation Age of Onset    Cancer Mother         uterine cancer,     Respiratory Mother         COPD    Cardiovascular Mother         AAA  age 80    Eye Disorder Father         cataracts    Depression Father     Snoring Father     Breast Cancer Sister     Breast Cancer Sister         X2    Breast Cancer Maternal Grandmother     Cancer Maternal Grandfather          cancer unknown type    Depression Son     Depression Son     Cancer - colorectal No family hx of         no ovarian cancer         Physical Exam:     /67 (BP Location: Right arm, Patient Position: Sitting, Cuff Size: Adult Large)   Pulse 60   Resp 16   Wt 97.4 kg (214 lb 12.8 oz)   SpO2 99%   BMI 39.29 kg/m    Body mass index is 39.29 kg/m .    General Appearance:  alert, no distress     HEENT: no palpable nodules or masses        Cardiovascular: regular rate and rhythm, no gallops, rubs or murmurs     Respiratory: lungs clear, no rales, rhonchi or wheezes    Musculoskeletal: extremities non tender and without edema    Skin: no lesions or rashes     Neurological: normal gait, no gross defects     Psychiatric: appropriate mood and affect                               Hematological: normal cervical, supraclavicular and inguinal lymph nodes     Gastrointestinal:       abdomen soft, non-tender, non-distended, no organomegaly or masses    Genitourinary: External genitalia and urethral meatus appears normal.  Vagina is smooth without nodularity or masses.  Cervix is surgically absent.  Bimanual exam reveal no masses, nodularity or fullness.  Recto-vaginal exam confirms these findings.      No results found for this or any previous visit (from the past 24 hours).       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  She is s/p BSO, PPLND 7/24/2020 which served as her treatment.  She is here today for a surveillance visit.     20 minutes spent on the date of the encounter doing chart review, history and exam, documentation, and further activities as noted above.      Plan:     1.)        Ovarian cancer:  IAN on exam,  pending.  As she is now close to 5 years post treatment okay to begin extending visits to annually.  RTC in 1 year for next surveillance visit and  level.  Reviewed signs and symptoms for when she should  contact the clinic or seek additional care.  Patient to contact the clinic with any questions or concerns in the interim.      Genetic risk factors were assessed and she is negative for mutations in the ADALID, BRCA1, BRCA2, BRIP1, CDH1, CHEK2, EPCAM, MLH1, MSH2, MSH6, NBN, NF1, PALB2, PMS2, PTEN, RAD51C, RAD51D, STK11, and TP53 genes.     Labs and/or tests ordered include:  .                2.)        Health maintenance:  Issues addressed today include following up with PCP for annual health maintenance and non-gynecologic issues.        This note was created in part by the use of Dragon voice recognition dictation system. Inadvertent grammatical errors and typographical errors may exist.      Felicia Bradford, STEFANY, APRBETZAIDA, FNP-C, AOCNP  Oncology Nurse Practitioner  Division of Gynecologic Oncology  Pager: [phone]     CC  Patient Care Team:  Kathya Escalera DO as PCP - General (Internal Medicine)  Kathya Escalera DO as Assigned PCP  Fatimah Clement MD as MD (Neurology)  Felicia Bradford APRN CNP as Assigned Cancer Care Provider  Jai Lam MD as MD (Cardiology)  Jinny Potter PA-C as Assigned Heart and Vascular Provider  Giovany Houser DO as Assigned Musculoskeletal Provider  Walter Byrne DO as Assigned Sleep Provider  Felipe Corona MD as MD (Gynecologic Oncology)  FELICIA BRADFORD

## 2025-04-01 ENCOUNTER — ONCOLOGY VISIT (OUTPATIENT)
Dept: ONCOLOGY | Facility: HOSPITAL | Age: 85
End: 2025-04-01
Attending: NURSE PRACTITIONER
Payer: MEDICARE

## 2025-04-01 ENCOUNTER — LAB (OUTPATIENT)
Dept: INFUSION THERAPY | Facility: HOSPITAL | Age: 85
End: 2025-04-01
Attending: NURSE PRACTITIONER
Payer: MEDICARE

## 2025-04-01 VITALS
OXYGEN SATURATION: 99 % | WEIGHT: 214.8 LBS | DIASTOLIC BLOOD PRESSURE: 67 MMHG | RESPIRATION RATE: 16 BRPM | HEART RATE: 60 BPM | BODY MASS INDEX: 39.29 KG/M2 | SYSTOLIC BLOOD PRESSURE: 119 MMHG

## 2025-04-01 DIAGNOSIS — C56.1 OVARIAN CANCER, RIGHT (H): ICD-10-CM

## 2025-04-01 DIAGNOSIS — Z85.43 ENCOUNTER FOR FOLLOW-UP SURVEILLANCE OF OVARIAN CANCER: ICD-10-CM

## 2025-04-01 DIAGNOSIS — Z08 ENCOUNTER FOR FOLLOW-UP SURVEILLANCE OF OVARIAN CANCER: ICD-10-CM

## 2025-04-01 DIAGNOSIS — Z85.43 ENCOUNTER FOR FOLLOW-UP SURVEILLANCE OF OVARIAN CANCER: Primary | ICD-10-CM

## 2025-04-01 DIAGNOSIS — Z08 ENCOUNTER FOR FOLLOW-UP SURVEILLANCE OF OVARIAN CANCER: Primary | ICD-10-CM

## 2025-04-01 LAB — CANCER AG125 SERPL-ACNC: 8 U/ML

## 2025-04-01 PROCEDURE — 86304 IMMUNOASSAY TUMOR CA 125: CPT

## 2025-04-01 PROCEDURE — G0463 HOSPITAL OUTPT CLINIC VISIT: HCPCS | Performed by: NURSE PRACTITIONER

## 2025-04-01 PROCEDURE — 99213 OFFICE O/P EST LOW 20 MIN: CPT | Performed by: NURSE PRACTITIONER

## 2025-04-01 PROCEDURE — 36415 COLL VENOUS BLD VENIPUNCTURE: CPT

## 2025-04-01 RX ORDER — MELOXICAM 15 MG/1
15 TABLET ORAL PRN
COMMUNITY
Start: 2025-03-30

## 2025-04-01 ASSESSMENT — PAIN SCALES - GENERAL: PAINLEVEL_OUTOF10: MILD PAIN (2)

## 2025-04-01 NOTE — NURSING NOTE
"Oncology Rooming Note    April 1, 2025 11:19 AM   Rosie Blackman is a 85 year old female who presents for:    Chief Complaint   Patient presents with    Oncology Clinic Visit     Gyn Onc follow up     Initial Vitals: /67 (BP Location: Right arm, Patient Position: Sitting, Cuff Size: Adult Large)   Pulse 60   Resp 16   Wt 97.4 kg (214 lb 12.8 oz)   SpO2 99%   BMI 39.29 kg/m   Estimated body mass index is 39.29 kg/m  as calculated from the following:    Height as of 2/25/25: 1.575 m (5' 2\").    Weight as of this encounter: 97.4 kg (214 lb 12.8 oz). Body surface area is 2.06 meters squared.  Mild Pain (2) Comment: Data Unavailable   No LMP recorded. Patient has had a hysterectomy.  Allergies reviewed: Yes  Medications reviewed: Yes    Medications: Medication refills not needed today.  Pharmacy name entered into Chalkboard:    [id number] - [place], [place] - [address] [facility] MAIL ORDER MAIN MEDS - NON-EPRESCRIBE  [facility] [id number] [facility] - [place], [place] - [address] [place] [address]    Frailty Screening:   Is the patient here for a new oncology consult visit in cancer care? 2. No    PHQ9:  Did this patient require a PHQ9?: No      Clinical concerns: No new concerns,   Kerry RAMSEY was notified.      Esther Kilpatrick RN              "

## 2025-04-01 NOTE — LETTER
2025      Rosie Blackman  [address] [address] [facility] [id number]      Dear Colleague,    Thank you for referring your patient, Rosie Blackman, to the [facility]. Please see a copy of my visit note below.    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  She is s/p BSO, PPLND 2020 which served as her treatment.  She is here today for a surveillance visit.      Oncology History:  She originally presented with a complaint of hip pain.  The work-up of this included an MRI which has demonstrated a large pelvic mass, her  was elevated (108, CEA 19-9 were normal).     2020: Exploratory laparoscopy followed by laparotomy, bilateral salpingo-oophorectomy, omentectomy, pelvic and periaortic lymph node dissection, anterior culdectomy.    FINAL DIAGNOSIS:   A. OVARIES, LEFT AND RIGHT, BILATERAL OOPHORECTOMY:   - Right ovary with ENDOMETRIOID ADENOCARCINOMA, FIGO grade 1   - Left ovary with benign inclusion cysts, negative for malignancy   - Unremarkable bilateral fallopian tubes, negative for malignancy   B. MESENTERY, BIOPSY:   - Fibrovascular adhesions, negative for malignancy   C. OMENTUM, OMENTECTOMY:   - Adipose tissue, negative for malignancy   D. ANTERIOR CUL-DE-SAC, BIOPSY:   - Fibroadipose tissue with foci of endometriosis   - Negative for malignancy   E. POSTERIOR CUL-DE-SAC, BIOPSY:   - Fibrovascular tissue, negative for malignancy   F. LYMPH NODE, LEFT PELVIC, EXCISION:   - Eleven reactive lymph nodes, negative for malignancy (0/11)   G. LYMPH NODE, RIGHT PELVIC, EXCISION:   - Nine reactive lymph nodes, negative for malignancy (0/9)   H. LYMPH NODE, RIGHT PARA AORTIC, EXCISION:   - Three reactive lymph  "nodes, negative for malignancy (0/3)     11/9/2020:  20.  2/1/21:  8.  5/17/21:  7.  9/20/21:  <5.  1/4/22:  6.   4/5/22:  6.  7/5/22:  9.  1/5/23:  7.  7/10/23:  8.     10/10/23: CT AP LLQ pain  IMPRESSION:   1.  No acute pathology through the abdomen and pelvis.  2.  Descending and sigmoid diverticulosis without evidence of  diverticulitis.  3.  Small fat-containing left inguinal hernia is unchanged. The  adjacent sigmoid colon does not extend into the hernia.  4.  Two large duodenal diverticula.     1/22/24:  7.  10/1/24:  9.  4/1/25:  pending.              Today she comes to clinic feeling well overall and is without gynecologic concern.  She is having no vaginal bleeding or discharge.  No abdominal pain or bloating.  Appetite has been normal.  No unintended weight loss.  Normal bowel and bladder habits.  She does have leg swelling that she has had \"pretty much her whole life\" and is working with her cardiologist to adjust medications which have been helpful.  She is current with her annual physical.  Is scheduled for her mammogram later this week.  And current with her colon cancer screening.            Health Maintenance  Colonoscopy: 11/16/15, repeat in 10 years  Mammogram: 3/5/24, scheduled 4/3/25  Annual physical: 2/25/25    Past Medical History:    Past Medical History:   Diagnosis Date     Chronic airway obstruction (H)      Diastolic dysfunction 06/28/2022     Gastroesophageal reflux disease      Hiatal hernia      Hypertension      Irregular heart beat      Malignant neoplasm of ovary (H)      Ovarian cancer, unspecified laterality (H) 03/10/2021     Personal history of contact with and (suspected) exposure to asbestos      Primary osteoarthritis of right hip 07/09/2020         Past Surgical History:    Past Surgical History:   Procedure Laterality Date     BIOPSY BREAST Left      BREAST BIOPSY, CORE RT/LT  1994     CHOLECYSTECTOMY  " 1995     COLONOSCOPY  05/2010    Diverticulosis, colon polyps; repeat 5 yrs     COLONOSCOPY N/A 11/16/2015    Procedure: COLONOSCOPY;  Surgeon: Alejandro Matos MD;  Location: [facility]     COLONOSCOPY N/A 09/17/2021    Procedure: COLONOSCOPY;  Surgeon: Aayush George MD;  Location: [facility]     Colonoscopy, hyperplastic polyps, repeat in 5 yrs  2004     EP PACEMAKER DEVICE & LEAD IMPLANT- RIGHT ATRIAL & RIGHT VENTRICULAR Left 10/24/2022    Procedure: Pacemaker Device & Lead Implant - Right Atrial & Right Ventricular;  Surgeon: Demond Meyer MD;  Location:  [facility]     ESOPHAGOSCOPY, GASTROSCOPY, DUODENOSCOPY (EGD), COMBINED  09/30/2013    Procedure: COMBINED ESOPHAGOSCOPY, GASTROSCOPY, DUODENOSCOPY (EGD), BIOPSY SINGLE OR MULTIPLE;  Gastroscopy;  Surgeon: Blaise Gill MD;  Location: [facility]     EYE SURGERY  2012    R/L Cataracts     FUSION TRANSFORAMINAL LUMBAR INTERBODY, 1 LEVEL, POSTERIOR APPROACH, USING OTS SURG IMAGING GUIDANCE N/A 7/3/2024    Procedure: LUMBAR 1- LUMBAR 2 TRANSFORAMINAL INTERBODY FUSION, POSTERIOR INSTRUMENTED FUSION, DECOMPRESSION, WITH O-ARM AND STEALTH NAVIGATION;  Surgeon: Chavo Patel MD;  Location: [facility] OR      REMOVE TONSILS/ADENOIDS,12+ Y/O      T & A 13+y.o.     HERNIORRHAPHY INCISIONAL (LOCATION) N/A 03/24/2021    Procedure: HERNIORRHAPHY, INCISIONAL, OPEN WITH MESH;  Surgeon: Aayush George MD;  Location: [facility]     HYSTERECTOMY, PAP NO LONGER INDICATED       LAPAROSCOPIC SALPINGO-OOPHORECTOMY N/A 07/24/2020    Procedure: Laparoscopy, removal or pelvic mass, both tubes and ovaries, omentectomy, anterior culvectomy, pelvic and para aortic lymph node dissection, laparotomy;  Surgeon: Felipe Corona MD;  Location: [facility] OR     NH ANESTH,LUMBAR SPINE,CORD SURGERY  10/2020    L3-4 L4-5 TRANSFORAMINAL INTERBODY FUSION L3-5, POSTERIOR INSTRUMENTED FUSION AND DECOMPRESSION     TVH for DUB, ovaries in       VASCULAR  SURGERY  2014    Taylor closure     [facility] ANESTH,KNEE VEINS SURGERY  08/20/2014         Health Maintenance Due   Topic Date Due     MAMMO SCREENING  03/05/2025     COVID-19 Vaccine (8 - 2024-25 season) 04/08/2025       Current Medications:     Current Outpatient Medications   Medication Sig Dispense Refill     acetaminophen (TYLENOL) 325 MG tablet Take 3 tablets (975 mg) by mouth every 8 hours 100 tablet 0     apixaban ANTICOAGULANT (ELIQUIS ANTICOAGULANT) 5 MG tablet Take 1 tablet (5 mg) by mouth 2 times daily 180 tablet 3     diltiazem ER COATED BEADS (CARDIZEM CD/CARTIA XT) 240 MG 24 hr capsule Take 1 capsule (240 mg) by mouth daily. 90 capsule 2     hydroCHLOROthiazide (HYDRODIURIL) 25 MG tablet Take 1 tablet (25 mg) by mouth daily 90 tablet 3     meloxicam (MOBIC) 15 MG tablet Take 15 mg by mouth as needed for moderate pain.       metoprolol succinate ER (TOPROL XL) 100 MG 24 hr tablet Take 1.5 tablets (150 mg) by mouth 2 times daily. 270 tablet 3     pravastatin (PRAVACHOL) 80 MG tablet Take 1 tablet (80 mg) by mouth daily. 90 tablet 3     THERATEARS 0.25 % OP SOLN Place 2 drops into both eyes as needed       valsartan (DIOVAN) 80 MG tablet Take 1 tablet (80 mg) by mouth daily 90 tablet 3         Allergies:        Allergies   Allergen Reactions     Atorvastatin Other (See Comments)     Muscle Pain     Ezetimibe Other (See Comments)     Severe muscle aches     Irbesartan Cramps     Lisinopril      Omeprazole      Other reaction(s): *Unknown     Prilosec [Omeprazole]      Rosuvastatin Other (See Comments)     Muscle Pain     Zestril [Ace Inhibitors] Cough        Social History:     Social History     Tobacco Use     Smoking status: Never     Smokeless tobacco: Never   Substance Use Topics     Alcohol use: No       History   Drug Use No         Family History:     The patient's family history is notable for:    Family History   Problem Relation Age of Onset     Cancer Mother         uterine cancer,      Respiratory  Mother         COPD     Cardiovascular Mother         AAA  age 80     Eye Disorder Father         cataracts     Depression Father      Snoring Father      Breast Cancer Sister      Breast Cancer Sister         X2     Breast Cancer Maternal Grandmother      Cancer Maternal Grandfather         cancer unknown type     Depression Son      Depression Son      Cancer - colorectal No family hx of         no ovarian cancer         Physical Exam:     /67 (BP Location: Right arm, Patient Position: Sitting, Cuff Size: Adult Large)   Pulse 60   Resp 16   Wt 97.4 kg (214 lb 12.8 oz)   SpO2 99%   BMI 39.29 kg/m    Body mass index is 39.29 kg/m .    General Appearance:  alert, no distress     HEENT: no palpable nodules or masses        Cardiovascular: regular rate and rhythm, no gallops, rubs or murmurs     Respiratory: lungs clear, no rales, rhonchi or wheezes    Musculoskeletal: extremities non tender and without edema    Skin: no lesions or rashes     Neurological: normal gait, no gross defects     Psychiatric: appropriate mood and affect                               Hematological: normal cervical, supraclavicular and inguinal lymph nodes     Gastrointestinal:       abdomen soft, non-tender, non-distended, no organomegaly or masses    Genitourinary: External genitalia and urethral meatus appears normal.  Vagina is smooth without nodularity or masses.  Cervix is surgically absent.  Bimanual exam reveal no masses, nodularity or fullness.  Recto-vaginal exam confirms these findings.      No results found for this or any previous visit (from the past 24 hours).       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  She is s/p BSO, PPLND 2020 which served as her treatment.  She is here today for a surveillance visit.     20 minutes spent on the date of the encounter doing chart review, history and exam, documentation, and further  activities as noted above.      Plan:     1.)        Ovarian cancer:  IAN on exam,  pending.  As she is now close to 5 years post treatment okay to begin extending visits to annually.  RTC in 1 year for next surveillance visit and  level.  Reviewed signs and symptoms for when she should contact the clinic or seek additional care.  Patient to contact the clinic with any questions or concerns in the interim.      Genetic risk factors were assessed and she is negative for mutations in the ADALID, BRCA1, BRCA2, BRIP1, CDH1, CHEK2, EPCAM, MLH1, MSH2, MSH6, NBN, NF1, PALB2, PMS2, PTEN, RAD51C, RAD51D, STK11, and TP53 genes.     Labs and/or tests ordered include:  .                2.)        Health maintenance:  Issues addressed today include following up with PCP for annual health maintenance and non-gynecologic issues.        This note was created in part by the use of Dragon voice recognition dictation system. Inadvertent grammatical errors and typographical errors may exist.      Felicia Bradford DNP, APRN, FNP-C, AOCNP  Oncology Nurse Practitioner  Division of Gynecologic Oncology  Pager: [phone]     CC  Patient Care Team:  Kathya Escalera DO as PCP - General (Internal Medicine)  Kathya Escalera DO as Assigned PCP  Fatimah Clement MD as MD (Neurology)  Felicia Bradford APRN CNP as Assigned Cancer Care Provider  Jai Lam MD as MD (Cardiology)  Jinny Potter PA-C as Assigned Heart and Vascular Provider  Giovany Houser DO as Assigned Musculoskeletal Provider  Walter Byrne DO as Assigned Sleep Provider  Felipe Corona MD as MD (Gynecologic Oncology)  FELICIA BRADFORD      Again, thank you for allowing me to participate in the care of your patient.        Sincerely,        DELMY Gagnon CNP    Electronically signed

## 2025-04-02 ENCOUNTER — TRANSFERRED RECORDS (OUTPATIENT)
Dept: HEALTH INFORMATION MANAGEMENT | Facility: CLINIC | Age: 85
End: 2025-04-02
Payer: MEDICARE

## 2025-04-03 ENCOUNTER — HOSPITAL ENCOUNTER (OUTPATIENT)
Dept: MAMMOGRAPHY | Facility: CLINIC | Age: 85
Discharge: HOME | End: 2025-04-03
Attending: INTERNAL MEDICINE
Payer: MEDICARE

## 2025-04-03 DIAGNOSIS — Z12.31 VISIT FOR SCREENING MAMMOGRAM: ICD-10-CM

## 2025-04-03 PROCEDURE — 77063 BREAST TOMOSYNTHESIS BI: CPT

## 2025-04-07 ENCOUNTER — THERAPY VISIT (OUTPATIENT)
Dept: PHYSICAL THERAPY | Facility: CLINIC | Age: 85
End: 2025-04-07
Attending: INTERNAL MEDICINE
Payer: MEDICARE

## 2025-04-07 DIAGNOSIS — I89.0 LYMPHEDEMA: Primary | Chronic | ICD-10-CM

## 2025-04-07 DIAGNOSIS — M79.89 LEG SWELLING: ICD-10-CM

## 2025-04-07 PROCEDURE — 97161 PT EVAL LOW COMPLEX 20 MIN: CPT | Mod: GP

## 2025-04-07 PROCEDURE — 97140 MANUAL THERAPY 1/> REGIONS: CPT | Mod: GP

## 2025-04-07 NOTE — PROGRESS NOTES
PHYSICAL THERAPY EVALUATION  Type of Visit: Evaluation       Fall Risk Screen:  Fall screen completed by: PT  Have you fallen 2 or more times in the past year?: No  Have you fallen and had an injury in the past year?: No  Is patient a fall risk?: No    Subjective         Presenting condition or subjective complaint:    Date of onset: 02/25/25    Relevant medical history:   Obesity, HTN, R ovarian cancer, a-fib, prediabetes, GERD, colon polyps, OA of R hip, spinal stenosis of lumbar region, TIA, diastolic dysfunction, cardiomyopathy  Dates & types of surgery:  Lumbar fusion July 2024, pacemaker placed 2022, hysterectomy 2020, vascular sx (venus closure) in 2014    Prior diagnostic imaging/testing results:       Prior therapy history for the same diagnosis, illness or injury:        Prior Level of Function  Transfers: Independent  Ambulation: Independent  ADL: Independent  IADL: Driving, Finances, Housekeeping, Laundry, Meal preparation, Medication management    Living Environment  Social support:     Type of home:     Stairs to enter the home:         Ramp:     Stairs inside the home:         Help at home:    Equipment owned:       Employment:      Hobbies/Interests:      Patient goals for therapy:      Pain assessment: Pain denied     Objective       EDEMA EVALUATION  Additional history:  Body part affected by edema:    If cancer related, treatment:    If not cancer related, problems with veins or cause of swelling:    Distance able to walk:    Time able to stand:    Sensation problems in hands/feet:      Edema etiology: Surgery, Pt noted increased swelling since back sx last Summer, obesity, heart conditions    FUNCTIONAL SCALES   Lymphedema Life Impact Scale (LLIS): 15    Cognitive Status Examination  Orientation: Oriented to person, place and time   Level of Consciousness: Alert  Follows Commands and Answers Questions: 100% of the time  Personal Safety and Judgement: Intact  Memory: Intact    EDEMA  Skin  Condition: Dryness, Hemosiderin deposits, Lipodermatosclerosis, Non-pitting, Pitting  Scar: No  Capillary Refill: Symmetrical  Radial Pulse: Symmetrical  Dorsal Pedal Pulse: Symmetrical  Stemmer Sign: +  Ulceration: Yes  Wounds: Wound 1: Location: R shin  Size: round 2cm x 2cm  Color: Red  White  Slough/Eschar: White/grey non-viable  Slough (% of wound bed): 80%  Drainage: none - dry  Drainage Amount: None    GIRTH MEASUREMENTS: Refer to separate girth measurement flowsheet for specific measurements.    VOLUME LE  Right LE Total Volume (mL): 5262.61  Left LE Total Volume (mL): 4935.12  LE Limb Comparison:  Identify AFFECTED Limb Volume-Vi (ml): 5262.6  Identify UNAFFECTED Limb Volume-Vu (ml): 4935.1  % LE Swellin.2  LE Limb % Difference: 6.64    RANGE OF MOTION: LE ROM WFL  STRENGTH: LE Strength WFL  POSTURE: WFL  PALPATION: pitting edema in B LEs  ACTIVITIES OF DAILY LIVING: pt is mostly IND or modIND with all I/ADLs except needs help from spouse with LBD, difficult reaching her feet ever since her spinal fusion last Summer  BED MOBILITY: WFL  TRANSFERS: Independent  GAIT/LOCOMOTION: Pt can walk for 1-2 blocks at one time, but tires more easily since her swelling has gotten progressively worse over the past year  BALANCE: WFL  SENSATION: LE Sensation WNL  VASCULAR:  no concerns  COORDINATION: WFL  MUSCLE TONE: WFL    Assessment & Plan   CLINICAL IMPRESSIONS  Medical Diagnosis: Leg swelling    Treatment Diagnosis: B LE lipo-lymphedema   Impression/Assessment: Patient is a 85 year old female with worsening complaints of leg swelling since last summer.  The following significant findings have been identified: Edema, Impaired gait, Impaired muscle performance, and Decreased activity tolerance. These impairments interfere with their ability to perform self care tasks and recreational activities as compared to previous level of function.     Clinical Decision Making (Complexity):  Clinical Presentation:  Evolving/Changing  Clinical Presentation Rationale: based on medical and personal factors listed in PT evaluation  Clinical Decision Making (Complexity): Low complexity    PLAN OF CARE  Treatment Interventions:  Interventions: Gait Training, Manual Therapy, Neuromuscular Re-education, Therapeutic Activity, Therapeutic Exercise, Self-Care/Home Management, Gradient Compression Bandaging    Long Term Goals     PT Goal 1  Goal Identifier: 1  Goal Description: Pt and caregiver to be IND in CGB technique for self-care of B LE lymphedema mgmt  Rationale: to maximize safety and independence with performance of ADLs and functional tasks  Target Date: 05/05/25  PT Goal 2  Goal Identifier: 2  Goal Description: Pt will show an improvement in skin integrity and complete wound healing in order to prevent infection  Rationale: to maximize safety and independence with performance of ADLs and functional tasks  Goal Progress: Eval= R shin wound - 2cm x 2cm  Target Date: 06/02/25  PT Goal 3  Goal Identifier: LTG  Goal Description: Pt will independetly manage don/doff and long-term mgmt of compression garments to promote IND return to previous recreational activities in the community.  Rationale: to maximize safety and independence with performance of ADLs and functional tasks  Target Date: 06/30/25  PT Goal 4  Goal Identifier: LTG - LLIS  Goal Description: Pt will show a statistically significant decrease in her LLIS score of at least 8pts indicating improved overall QOL and max return to PLOF.  Rationale: to maximize safety and independence with performance of ADLs and functional tasks  Goal Progress: Eval= score of 15  Target Date: 06/30/25      Frequency of Treatment: 3x/wk  Duration of Treatment: 12wks    Recommended Referrals to Other Professionals: Physical Therapy  Education Assessment:   Learner/Method: Patient;Listening;Reading;Demonstration;Pictures/Video;No Barriers to Learning    Risks and benefits of evaluation/treatment  have been explained.   Patient/Family/caregiver agrees with Plan of Care.     Evaluation Time:     PT Eval, Low Complexity Minutes (41414): 25   Present: Not applicable     Signing Clinician: Nisha Chacon, PT        [facility]                                                                                   OUTPATIENT PHYSICAL THERAPY      PLAN OF TREATMENT FOR OUTPATIENT REHABILITATION   Patient's Last Name, First Name, Rosie Diaz YOB: 1940   Provider's Name   [facility]   Medical Record No.  [id number]     Onset Date: 02/25/25  Start of Care Date: 04/07/25     Medical Diagnosis:  Leg swelling      PT Treatment Diagnosis:  B LE lipo-lymphedema Plan of Treatment  Frequency/Duration: 3x/wk/ 12wks    Certification date from 04/07/25 to 06/30/25         See note for plan of treatment details and functional goals     Nisha Chacon, PT                         I CERTIFY THE NEED FOR THESE SERVICES FURNISHED UNDER        THIS PLAN OF TREATMENT AND WHILE UNDER MY CARE     (Physician attestation of this document indicates review and certification of the therapy plan).              Referring Provider:  Kathya Escalera    Initial Assessment  See Epic Evaluation- Start of Care Date: 04/07/25

## 2025-04-09 PROBLEM — I89.0 LYMPHEDEMA: Status: ACTIVE | Noted: 2025-04-09

## 2025-04-10 ENCOUNTER — THERAPY VISIT (OUTPATIENT)
Dept: PHYSICAL THERAPY | Facility: CLINIC | Age: 85
End: 2025-04-10
Attending: INTERNAL MEDICINE
Payer: MEDICARE

## 2025-04-10 DIAGNOSIS — I89.0 LYMPHEDEMA: Primary | Chronic | ICD-10-CM

## 2025-04-10 PROCEDURE — 97140 MANUAL THERAPY 1/> REGIONS: CPT | Mod: GP

## 2025-04-14 ENCOUNTER — THERAPY VISIT (OUTPATIENT)
Dept: PHYSICAL THERAPY | Facility: CLINIC | Age: 85
End: 2025-04-14
Attending: INTERNAL MEDICINE
Payer: MEDICARE

## 2025-04-14 ENCOUNTER — HOSPITAL ENCOUNTER (OUTPATIENT)
Dept: CARDIOLOGY | Facility: CLINIC | Age: 85
Discharge: HOME | End: 2025-04-14
Attending: PHYSICIAN ASSISTANT | Admitting: PHYSICIAN ASSISTANT
Payer: MEDICARE

## 2025-04-14 DIAGNOSIS — I48.0 PAROXYSMAL ATRIAL FIBRILLATION (H): ICD-10-CM

## 2025-04-14 DIAGNOSIS — I89.0 LYMPHEDEMA: Primary | Chronic | ICD-10-CM

## 2025-04-14 LAB — LVEF ECHO: NORMAL

## 2025-04-14 PROCEDURE — 97140 MANUAL THERAPY 1/> REGIONS: CPT | Mod: GP

## 2025-04-14 PROCEDURE — 255N000002 HC RX 255 OP 636: Performed by: PHYSICIAN ASSISTANT

## 2025-04-14 PROCEDURE — 999N000208 ECHOCARDIOGRAM COMPLETE

## 2025-04-14 RX ADMIN — HUMAN ALBUMIN MICROSPHERES AND PERFLUTREN 2 ML: 10; .22 INJECTION, SOLUTION INTRAVENOUS at 11:05

## 2025-04-16 ENCOUNTER — THERAPY VISIT (OUTPATIENT)
Dept: PHYSICAL THERAPY | Facility: CLINIC | Age: 85
End: 2025-04-16
Attending: INTERNAL MEDICINE
Payer: MEDICARE

## 2025-04-16 DIAGNOSIS — I89.0 LYMPHEDEMA: Primary | Chronic | ICD-10-CM

## 2025-04-16 PROCEDURE — 97140 MANUAL THERAPY 1/> REGIONS: CPT | Mod: GP

## 2025-04-21 ENCOUNTER — THERAPY VISIT (OUTPATIENT)
Dept: PHYSICAL THERAPY | Facility: CLINIC | Age: 85
End: 2025-04-21
Attending: INTERNAL MEDICINE
Payer: MEDICARE

## 2025-04-21 DIAGNOSIS — I89.0 LYMPHEDEMA: Primary | Chronic | ICD-10-CM

## 2025-04-21 PROCEDURE — 97140 MANUAL THERAPY 1/> REGIONS: CPT | Mod: GP | Performed by: REHABILITATION PRACTITIONER

## 2025-04-23 ENCOUNTER — THERAPY VISIT (OUTPATIENT)
Dept: PHYSICAL THERAPY | Facility: CLINIC | Age: 85
End: 2025-04-23
Attending: INTERNAL MEDICINE
Payer: MEDICARE

## 2025-04-23 DIAGNOSIS — I89.0 LYMPHEDEMA: Primary | Chronic | ICD-10-CM

## 2025-04-23 PROCEDURE — 97140 MANUAL THERAPY 1/> REGIONS: CPT | Mod: GP

## 2025-04-28 ENCOUNTER — THERAPY VISIT (OUTPATIENT)
Dept: PHYSICAL THERAPY | Facility: CLINIC | Age: 85
End: 2025-04-28
Attending: INTERNAL MEDICINE
Payer: MEDICARE

## 2025-04-28 DIAGNOSIS — I89.0 LYMPHEDEMA: Primary | Chronic | ICD-10-CM

## 2025-04-28 PROCEDURE — 97140 MANUAL THERAPY 1/> REGIONS: CPT | Mod: GP | Performed by: REHABILITATION PRACTITIONER

## 2025-04-30 ENCOUNTER — THERAPY VISIT (OUTPATIENT)
Dept: PHYSICAL THERAPY | Facility: CLINIC | Age: 85
End: 2025-04-30
Attending: INTERNAL MEDICINE
Payer: MEDICARE

## 2025-04-30 DIAGNOSIS — I89.0 LYMPHEDEMA: Primary | Chronic | ICD-10-CM

## 2025-04-30 PROCEDURE — 97140 MANUAL THERAPY 1/> REGIONS: CPT | Mod: GP

## 2025-04-30 NOTE — PROGRESS NOTES
PLAN  Continue therapy per current plan of care.    Beginning/End Dates of Progress Note Reporting Period:  04/07/25 to 04/30/2025    Referring Provider:  Kathya Escalera    04/30/25 0500   Appointment Info   Signing clinician's name / credentials Nisha Chacon, PT, DPT, CLT   Total/Authorized Visits Medicare/BCBS of [place] Supplement   Visits Used 10   Medical Diagnosis Leg swelling   PT Tx Diagnosis B LE lipo-lymphedema   Precautions/Limitations HTN   Progress Note/Certification   Start of Care Date 04/07/25   Onset of illness/injury or Date of Surgery 02/25/25   Therapy Frequency 3x/wk   Predicted Duration 12wks   Certification date from 04/07/25   Certification date to 06/30/25   Progress Note Completed Date 04/07/25   Supervision   PT Assistant Visit Number 2   PT Goal 1   Goal Identifier 1   Goal Description Pt and caregiver to be IND in CGB technique for self-care of B LE lymphedema mgmt   Rationale to maximize safety and independence with performance of ADLs and functional tasks   Goal Progress Although she tried, pt has great difficulty doing the GCB herself and her spouse cannot either d/t ROM issues reaching feet   Target Date 05/05/25   PT Goal 2   Goal Identifier 2   Goal Description Pt will show an improvement in skin integrity and complete wound healing in order to prevent infection   Rationale to maximize safety and independence with performance of ADLs and functional tasks   Goal Progress Met - R shin wound healed 100%   Target Date 06/02/25   Date Met 04/30/25   PT Goal 3   Goal Identifier LTG   Goal Description Pt will independetly manage don/doff and long-term mgmt of compression garments to promote IND return to previous recreational activities in the community.   Rationale to maximize safety and independence with performance of ADLs and functional tasks   Target Date 06/30/25   PT Goal 4   Goal Identifier LTG - LLIS   Goal Description Pt will show a statistically significant decrease in  her LLIS score of at least 8pts indicating improved overall QOL and max return to PLOF.   Rationale to maximize safety and independence with performance of ADLs and functional tasks   Goal Progress Eval= score of 15   Target Date 06/30/25   Subjective Report   Subjective Report could you take measurements of my legs Nisha usually takes them on Mondays   Objective Measures   Objective Measures Objective Measure 1   Objective Measure 1   Objective Measure Ordered (4/16): 2 separate B LE garments: The JUZO Sensation Isabel , size Medium, in beige; a pair of Bilateral Mediven PLUS knee-hi stocking with silicone topband, size 4, 20-30mmHg, standard length, OPEN-TOE, beige color   Objective Measure 2   Objective Measure (4/28) B LE girth measurements   Details R LE -4.8%, L LE +1.2%   Manual Therapy   Manual Therapy: Mobilization, MFR, MLD, friction massage minutes (25705) 40   Manual Therapy 1 - Details pt arrived w/o GCB's in place due to showered prior to appt. B LE's cleansed at home, therapist applied Sween 24 to B LEs.GCB's to B LE  applied as such: from MTPs to just below knee as follows: yellow tubifast, felt padding, 2 SS rolls 8cm and 10cm at 50% compression and 50% overlap,  garments have been ordered and expected to arrive by end of next week   Skilled Intervention Partial CDT   Patient Response/Progress pt pleased with how legs felt with bandages on B LE's   Plan   Home program pt may remove GCB's before next session and shower, pt to bring clean bandages to next session   Plan for next session continue with GCB's and skin care until new garments arrive   Total Session Time   Timed Code Treatment Minutes 40   Total Treatment Time (sum of timed and untimed services) 40

## 2025-05-05 ENCOUNTER — THERAPY VISIT (OUTPATIENT)
Dept: PHYSICAL THERAPY | Facility: CLINIC | Age: 85
End: 2025-05-05
Attending: INTERNAL MEDICINE
Payer: MEDICARE

## 2025-05-05 DIAGNOSIS — I89.0 LYMPHEDEMA: Primary | Chronic | ICD-10-CM

## 2025-05-05 PROCEDURE — 97140 MANUAL THERAPY 1/> REGIONS: CPT | Mod: GP

## 2025-05-08 ENCOUNTER — TELEPHONE (OUTPATIENT)
Dept: CARDIOLOGY | Facility: CLINIC | Age: 85
End: 2025-05-08

## 2025-05-08 NOTE — TELEPHONE ENCOUNTER
Sebastien Ribeiro,    Sending you updates from pt's courtesy device check today for AF burden and HR's. You are seeing this pt on 5/13/25. Metoprolol tartrate was changed to Metoprolol XL 150mg BID on 3/5/25.  AF burden is 47% from 3/4/25 - 5/8/25 (64 days). Rates during AF ranges from 70-160bpm, >100 about 25-30% of the time.    Thank you,  Jaye, Device Specialist      V rates during AT/AF from 3/4/25 - Today      Compared to:       V rates during AT/AF from 1/30/24 - 3/4/25        [employer] L331 Accolade (D) Remote PPM Device Check - Courtesy check for AF burden and HR.     Mode: DDDR 60/130  Presenting Rhythm: afib w/  Last In-Clinic Underlying Rhythm/Date: AF/Aflutter in the 50-90's (fluctuated between AF and AFlutter during exam) as of 3/4/25    Pacing/Histogram Data since: 3/4/25  AP: 16%  : 20%  Heart Rate: Ranges from 70-160bpm, rates >100 approximately 25-30% of the time during AT/AF.    Sensing: stable  Pacing Threshold: stable  Impedance: stable  Battery Status: 9.5 years    Arrhythmia Data Since: 3/4/25  Atrial Arrhythmia: 135 mode switch episodes comprising 47% of the time. 4 EGMs show As>Vs for AF/AFL, longest lasted 68 hours 1 minute. 1 Episode in progress since 5/6/25. Ventricular rates 80-140bpm. Taking Eliquis.   Ventricular Arrhythmia: 53 ventricular high rates. 4 EGMs show As>Vs for AF with RVR, longest lasted 38 seconds, average ventricular rates 140-170bpm. Metoprolol tartrate changed to Toprol Xl 150mg BID on 3/5/25. Continue on Diltiazem.     Care Plan: Remote check scheduled 6/3/25, Will update Gema Potter PA-C with results. Jaye, Device Specialist.    I have reviewed and interpreted the device interrogation, settings, programming and nurse's summary. The device is functioning within normal device parameters, unless otherwise noted above. I agree with the current findings, assessment, and plan.

## 2025-05-09 NOTE — PROGRESS NOTES
"[facility]    I had the pleasure of seeing Rosie when she came for follow up of AFib.  This 85 year old sees Dr. Rollins for her history of:    1.  H/o TIA - 3/2022  2.  Paroxysmal AFib, SSS/post conversion pauses- first noted 8/2022 (not seen on monitoring following TIA 3/2022).  Noted to have significant postconversion pause when spontaneously converted after DCCV x3 were not successful.  S/p dual-chamber [employer] PPM 10/2022 with Dr. Meyer.  Has continued to have pAFib on  device interrogations, with increasing burden (47% March--May 2025)  3.  HTN  4. R Ovarian cancer - sees Dr. Sinclair. S/p BSO with LN dissection  5.  Mild CM - EF ~40% on echo 2025, felt similar to EF 2024 (listed as 50%) d/t beat-beat variability.      Last Visit & Interval History:  I saw Rosie Damon 6/2024 at which time she had done well without recurrent AF.  RECIO continued, but due to back pain requiring injections, she had not been able to exercise.  She was due to meet with the orthopedic surgeon to discuss options for treatment after our visit.  Given mild cardiomyopathy/WMA on echocardiogram, recommended stress test, with plans for annual follow-up and in-office device interrogation.  Stress test 6/2024 reviewed with Dr. Rollins and as no major ischemia, recommended to continue medical mgmt. .      We have continue to monitor arrhythmias, and burden has been increasing, most recently 15% 3/2025.  EGM showed both AFib and AFlutter. I adjusted Metoprolol XL dosing and asked for updated echo given c/o increased LE edema.     Updated echo was 4/2025 was actually read with an EF of 40% (had been 50-55% in 6/2024).  When compared [id number], reader felt that this was due to ucjp-ea-uuie variability with AF.    Courtesy check 5/8 showed AF burden was 47% (3/4--5/8) with rates >100 bpm 25-30% of the time.     Today's Visit:  Rosie underwent back surgery 7/2024, which improved her back \"nerve pain\" but now has more " "aching. She limits her meloxican use d/t Eliquis use. She's not been able to exercise much. She can go grocery shopping but looks for a \"close spot.\"     We reviewed her echo 4/2025 showing she was in AFib. Device check 5/8 showed she was in AFib starting 5/6 so reviewed it doesn't look like it's been persistent yet, though EKG today shows AFib/RVR.  Denies dizziness, lightheadedness and rarely has palpitations, so \"doesn't feel\" like she's been in it for a long time.     Her LE edema has been helped with [facility] and she typically uses waist-high compression garments.     BPs 110s/70s at home. No dizziness, lightheadedness. No falls but walks with a cane post back surgery b/c balance is not as good.    VITALS:  Vitals: /62   Pulse 109   Resp 16   Ht 1.575 m (5' 2\")   Wt 94 kg (207 lb 3.2 oz)   SpO2 93%   BMI 37.90 kg/m      Diagnostic Testing:  EKG today, which I overread, showed AFib 114 bpm on Diltiazem 240 and Metoprolol  mg BID   Device check 5/8/2025 underlying AF with .  16% AP and 20%  in DDDR 60/130.  HR >100 bpm 25-30% of the time.  This episode was in progress since 5/6 with rates .  Longest episode 68 hours  Echo 4/2025 EF 40% with general HK.  Mild ABELINO.  Mild-moderate MR.  Mild/moderate TR.  No significant change compared with 2024, noting evht-fj-shni review showed ipvz-di-kqom variability with AF  Nuclear stress test 6/2024 small area of nontransmural infarct in distal apical/anterior segment of LV with mild degree of leni-infarct ischemia.  Normal EF.  Compared with 2023, defect was similar but now with mild reversibility  Echo 6/12/2024 LVEF 50-55%, with mild HK of mid/basal anterolateral/inferolateral walls.  No thrombus.  Normal RV.  Mildly dilated LA.  1-2+ MR.  1+ TR.  RVSP 34+ RAP.  Mild aortic sclerosis.  1+ PI.  SR.  Normal aorta  Nuc Stress Test 4/2023 small area of infarction in distal anterior segment. No ischemia.  Echocardiogram 8/2022-LVEF 60-65%. G2 " DD.  Normal RV.  1+ MR.  RVSP 30+ RAP.  Aortic sclerosis  Component      Latest Ref Rng 7/4/2024  10:26 AM 2/25/2025  10:31 AM   Sodium      135 - 145 mmol/L 138  142    Potassium      3.4 - 5.3 mmol/L 3.7  4.0    Chloride      98 - 107 mmol/L 102  106    Carbon Dioxide (CO2)      22 - 29 mmol/L 26  27    Anion Gap      7 - 15 mmol/L 10  9    Urea Nitrogen      8.0 - 23.0 mg/dL 15.9  11.6    Creatinine      0.51 - 0.95 mg/dL 0.81  0.82    GFR Estimate      >60 mL/min/1.73m2 72  70    Calcium      8.8 - 10.4 mg/dL 8.8  9.8    Glucose      70 - 99 mg/dL 190 (H)  112 (H)       Legend:  Component      Latest Ref Rng 2/25/2025  10:31 AM   WBC      4.0 - 11.0 10e3/uL 5.2    RBC Count      3.80 - 5.20 10e6/uL 4.60    Hemoglobin      11.7 - 15.7 g/dL 14.4    Hematocrit      35.0 - 47.0 % 43.5    MCV      78 - 100 fL 95    MCH      26.5 - 33.0 pg 31.3    MCHC      31.5 - 36.5 g/dL 33.1    RDW      10.0 - 15.0 % 13.1    Platelet Count      150 - 450 10e3/uL 247        Plan:  Increase Diltiazem to 360 mg daily for rate control  Routine device checks  See me next available 1-2 months    Assessment/Plan:    Paroxysmal AFib, SSS  As above, had pauses when she spontaneously converted to SR and recommended for pacemaker implantation to allow adequate treatment of her RVR  S/p dual-chamber [employer] pacemaker 10/2022.  Remains on Diltiazem 240 mg daily and Metoprolol  mg BID    Had recurrent AF after steroid injection 5/2024 and spontaneously converted in < 1/2 day.  We have been able to see increasing episodes of AF, now almost 50% burden in the last 2 months.  Rates overall are well-controlled ( >100 bpm ~25% of the time) on Diltiazem 240 mg daily and Metoprolol  mg BID and she's relatively asymptomatic overall.  Echo showed EF ~40% d/t beat-beat variability in AFib     Continues AC for [id number] 6 (HTN, CVA, age, sex). Dose appropriate for age/weight/creatinine.  Last hemoglobin 2/2025  wnl    PLAN:  Discussed options for rate vs rhythm control. She opted for rate control with close follow-up, noting that if she has more symptomatic AFib and/or EF continues to drop, may switch to rhythm control.  Increase Diltiazem to 360 mg daily.  Watch for low BPs (more lightheadedness, dizziness)  Watch for increased swelling    Continue AC without interruption  Will see back in ~1-2 months next available to see how she's doing. Briefly discussed starting amiodarone with DCCV if AFib is found to be more persistent    Mild cardiomyopathy   Routine echo 6/2024 showed mild drop in LVEF to 50-55%.  She had mild HK of the mid/basal anterolateral and inferolateral walls.  Last checked in 2022, EF was normal and no RWMA noted.  Updated echo 4/2025 showed EF 40%, but reader felt actually very similar to the echo done 6/2024 and zjsr-jh-qcwb variability was likely cause of it looking lower.   Stress test 6/2024 showed no major areas of ischemia  Reviewed that she's been having more AFib  Only 20%   Euvolemic on exam    PLAN:  Continue valsartan 80 and Metoprolol  mg BID  Watch LVEF carefully with more AFib - likely echo again Fall 2025 (not ordered)    3.  HTN  Remains on Diltiazem  mg daily, Metoprolol  mg BID and valsartan 80 mg daily  BMP as above    PLAN:  Increase Diltiazem to 360 as planned; watch for hypotension       The longitudinal plan of care for the diagnosis(es)/condition(s) as documented were addressed during this visit. Due to the added complexity in care, I will continue to support Rosie in the subsequent management and with ongoing continuity of care.      Gema Potter PA-C, MSPAS      Orders Placed This Encounter   Procedures    Follow-Up with Cardiology [facility]    EKG 12-lead complete w/read - Clinics (performed today)     Orders Placed This Encounter   Medications    Vitamin D, Cholecalciferol, 25 MCG (1000 UT) CAPS     Sig: Take by mouth.    diltiazem ER (TIAZAC) 360 MG 24 hr ER  beaded capsule     Sig: Take 1 capsule (360 mg) by mouth daily.     Dispense:  90 capsule     Refill:  0     Replaces 240 mg capsules     Medications Discontinued During This Encounter   Medication Reason    diltiazem ER COATED BEADS (CARDIZEM CD/CARTIA XT) 240 MG 24 hr capsule Dose adjustment           Encounter Diagnosis   Name Primary?    Paroxysmal atrial fibrillation (H)            CURRENT MEDICATIONS:  Current Outpatient Medications   Medication Sig Dispense Refill    acetaminophen (TYLENOL) 325 MG tablet Take 3 tablets (975 mg) by mouth every 8 hours 100 tablet 0    apixaban ANTICOAGULANT (ELIQUIS ANTICOAGULANT) 5 MG tablet Take 1 tablet (5 mg) by mouth 2 times daily 180 tablet 3    diltiazem ER (TIAZAC) 360 MG 24 hr ER beaded capsule Take 1 capsule (360 mg) by mouth daily. 90 capsule 0    hydroCHLOROthiazide (HYDRODIURIL) 25 MG tablet Take 1 tablet (25 mg) by mouth daily 90 tablet 3    meloxicam (MOBIC) 15 MG tablet Take 15 mg by mouth as needed for moderate pain.      metoprolol succinate ER (TOPROL XL) 100 MG 24 hr tablet Take 1.5 tablets (150 mg) by mouth 2 times daily. 270 tablet 3    pravastatin (PRAVACHOL) 80 MG tablet Take 1 tablet (80 mg) by mouth daily. 90 tablet 3    THERATEARS 0.25 % OP SOLN Place 2 drops into both eyes as needed      valsartan (DIOVAN) 80 MG tablet Take 1 tablet (80 mg) by mouth daily 90 tablet 3    Vitamin D, Cholecalciferol, 25 MCG (1000 UT) CAPS Take by mouth.         ALLERGIES     Allergies   Allergen Reactions    Atorvastatin Other (See Comments)     Muscle Pain    Ezetimibe Other (See Comments)     Severe muscle aches    Irbesartan Cramps    Lisinopril     Omeprazole      Other reaction(s): *Unknown    Prilosec [Omeprazole]     Rosuvastatin Other (See Comments)     Muscle Pain    Zestril [Ace Inhibitors] Cough         Review of Systems:  Skin:        Eyes:       ENT:       Respiratory:  Positive for dyspnea on exertion  Cardiovascular:    Positive for, lower extremity  "symptoms, edema, fatigue  Gastroenterology:      Genitourinary:       Musculoskeletal:       Neurologic:       Psychiatric:       Heme/Lymph/Imm:       Endocrine:         Physical Exam:  Vitals: /62   Pulse 109   Resp 16   Ht 1.575 m (5' 2\")   Wt 94 kg (207 lb 3.2 oz)   SpO2 93%   BMI 37.90 kg/m      Constitutional:  cooperative, alert and oriented, well developed, well nourished, in no acute distress        Skin:  warm and dry to the touch, no apparent skin lesions or masses noted        Head:  normocephalic, no masses or lesions        Eyes:  pupils equal and round, conjunctivae and lids unremarkable, sclera white        ENT:  no pallor or cyanosis, dentition good        Neck:  JVP normal, no carotid bruit        Chest:  normal breath sounds, clear to auscultation, normal A-P diameter, normal symmetry, normal respiratory excursion, no use of accessory muscles        Cardiac:   irregularly irregular rhythm, tachycardic                Abdomen:  abdomen soft obese      Vascular: not assessed this visit                                      Extremities and Back:  no deformities, clubbing, cyanosis, erythema observed        Neurological:  no gross motor deficits            PAST MEDICAL HISTORY:  Past Medical History:   Diagnosis Date    Chronic airway obstruction (H)     Diastolic dysfunction 06/28/2022    Gastroesophageal reflux disease     Hiatal hernia     Hypertension     Irregular heart beat     Malignant neoplasm of ovary (H)     Ovarian cancer, unspecified laterality (H) 03/10/2021    Personal history of contact with and (suspected) exposure to asbestos     Primary osteoarthritis of right hip 07/09/2020       PAST SURGICAL HISTORY:  Past Surgical History:   Procedure Laterality Date    BIOPSY BREAST Left     BREAST BIOPSY, CORE RT/LT  1994    CHOLECYSTECTOMY  1995    COLONOSCOPY  05/2010    Diverticulosis, colon polyps; repeat 5 yrs    COLONOSCOPY N/A 11/16/2015    Procedure: COLONOSCOPY;  Surgeon: " Alejandro Matos MD;  Location: [facility]    COLONOSCOPY N/A 09/17/2021    Procedure: COLONOSCOPY;  Surgeon: Aayush George MD;  Location: [facility]    Colonoscopy, hyperplastic polyps, repeat in 5 yrs  2004    EP PACEMAKER DEVICE & LEAD IMPLANT- RIGHT ATRIAL & RIGHT VENTRICULAR Left 10/24/2022    Procedure: Pacemaker Device & Lead Implant - Right Atrial & Right Ventricular;  Surgeon: Demond Meyer MD;  Location:  [facility]    ESOPHAGOSCOPY, GASTROSCOPY, DUODENOSCOPY (EGD), COMBINED  09/30/2013    Procedure: COMBINED ESOPHAGOSCOPY, GASTROSCOPY, DUODENOSCOPY (EGD), BIOPSY SINGLE OR MULTIPLE;  Gastroscopy;  Surgeon: Blaise Gill MD;  Location: [facility]    EYE SURGERY  2012    R/L Cataracts    FUSION TRANSFORAMINAL LUMBAR INTERBODY, 1 LEVEL, POSTERIOR APPROACH, USING OTS SURG IMAGING GUIDANCE N/A 7/3/2024    Procedure: LUMBAR 1- LUMBAR 2 TRANSFORAMINAL INTERBODY FUSION, POSTERIOR INSTRUMENTED FUSION, DECOMPRESSION, WITH O-ARM AND STEALTH NAVIGATION;  Surgeon: Chavo Patel MD;  Location: [facility] OR     REMOVE TONSILS/ADENOIDS,12+ Y/O      T & A 13+y.o.    HERNIORRHAPHY INCISIONAL (LOCATION) N/A 03/24/2021    Procedure: HERNIORRHAPHY, INCISIONAL, OPEN WITH MESH;  Surgeon: Aayush George MD;  Location: [facility]    HYSTERECTOMY, PAP NO LONGER INDICATED      LAPAROSCOPIC SALPINGO-OOPHORECTOMY N/A 07/24/2020    Procedure: Laparoscopy, removal or pelvic mass, both tubes and ovaries, omentectomy, anterior culvectomy, pelvic and para aortic lymph node dissection, laparotomy;  Surgeon: Felipe Corona MD;  Location: [facility] OR    [place] ANESTH,LUMBAR SPINE,CORD SURGERY  10/2020    L3-4 L4-5 TRANSFORAMINAL INTERBODY FUSION L3-5, POSTERIOR INSTRUMENTED FUSION AND DECOMPRESSION    TVH for DUB, ovaries in      VASCULAR SURGERY  2014    [place] closure    ZZC ANESTH,KNEE VEINS SURGERY  08/20/2014       FAMILY HISTORY:  Family History   Problem Relation Age of Onset    Cancer Mother          uterine cancer,     Respiratory Mother         COPD    Cardiovascular Mother         AAA  age 80    Eye Disorder Father         cataracts    Depression Father     Snoring Father     Breast Cancer Sister     Breast Cancer Sister         X2    Breast Cancer Maternal Grandmother     Cancer Maternal Grandfather         cancer unknown type    Depression Son     Depression Son     Cancer - colorectal No family hx of         no ovarian cancer       SOCIAL HISTORY:  Social History     Socioeconomic History    Marital status:      Spouse name: Nelson Blackman    Number of children: 2    Years of education: 13+   Occupational History    Occupation:      Comment: [employer]   Tobacco Use    Smoking status: Never    Smokeless tobacco: Never   Vaping Use    Vaping status: Never Used   Substance and Sexual Activity    Alcohol use: No    Drug use: No    Sexual activity: Not Currently     Partners: Male     Birth control/protection: Surgical   Other Topics Concern    Parent/sibling w/ CABG, MI or angioplasty before 65F 55M? No     Social Drivers of Health     Financial Resource Strain: Low Risk  (2025)    Financial Resource Strain     Within the past 12 months, have you or your family members you live with been unable to get utilities (heat, electricity) when it was really needed?: No   Food Insecurity: Low Risk  (2025)    Food Insecurity     Within the past 12 months, did you worry that your food would run out before you got money to buy more?: No     Within the past 12 months, did the food you bought just not last and you didn t have money to get more?: No   Transportation Needs: Low Risk  (2025)    Transportation Needs     Within the past 12 months, has lack of transportation kept you from medical appointments, getting your medicines, non-medical meetings or appointments, work, or from getting things that you need?: No   Physical Activity: Patient Declined (2025)     Exercise Vital Sign     Days of Exercise per Week: Patient declined     Minutes of Exercise per Session: Patient declined   Stress: No Stress Concern Present (2/25/2025)    [employer] - Occupational Stress Questionnaire     Feeling of Stress : Not at all   Social Connections: Unknown (2/25/2025)    Social Connection and Isolation Panel [NHANES]     Frequency of Social Gatherings with Friends and Family: Three times a week   Interpersonal Safety: Low Risk  (4/7/2025)    Interpersonal Safety     Do you feel physically and emotionally safe where you currently live?: Yes     Within the past 12 months, have you been hit, slapped, kicked or otherwise physically hurt by someone?: No     Within the past 12 months, have you been humiliated or emotionally abused in other ways by your partner or ex-partner?: No   Housing Stability: Low Risk  (2/25/2025)    Housing Stability     Do you have housing? : Yes     Are you worried about losing your housing?: No

## 2025-05-12 ENCOUNTER — THERAPY VISIT (OUTPATIENT)
Dept: PHYSICAL THERAPY | Facility: CLINIC | Age: 85
End: 2025-05-12
Attending: INTERNAL MEDICINE
Payer: MEDICARE

## 2025-05-12 DIAGNOSIS — I89.0 LYMPHEDEMA: Primary | Chronic | ICD-10-CM

## 2025-05-12 PROCEDURE — 97140 MANUAL THERAPY 1/> REGIONS: CPT | Mod: GP

## 2025-05-13 ENCOUNTER — OFFICE VISIT (OUTPATIENT)
Dept: CARDIOLOGY | Facility: CLINIC | Age: 85
End: 2025-05-13
Attending: PHYSICIAN ASSISTANT
Payer: MEDICARE

## 2025-05-13 ENCOUNTER — RESULTS FOLLOW-UP (OUTPATIENT)
Dept: CARDIOLOGY | Facility: CLINIC | Age: 85
End: 2025-05-13

## 2025-05-13 VITALS
BODY MASS INDEX: 38.13 KG/M2 | RESPIRATION RATE: 16 BRPM | HEART RATE: 109 BPM | SYSTOLIC BLOOD PRESSURE: 100 MMHG | HEIGHT: 62 IN | WEIGHT: 207.2 LBS | OXYGEN SATURATION: 93 % | DIASTOLIC BLOOD PRESSURE: 62 MMHG

## 2025-05-13 DIAGNOSIS — I48.0 PAROXYSMAL ATRIAL FIBRILLATION (H): ICD-10-CM

## 2025-05-13 RX ORDER — FAMOTIDINE 20 MG
TABLET ORAL
COMMUNITY

## 2025-05-13 RX ORDER — DILTIAZEM HYDROCHLORIDE 360 MG/1
360 CAPSULE, EXTENDED RELEASE ORAL DAILY
Qty: 90 CAPSULE | Refills: 0 | Status: SHIPPED | OUTPATIENT
Start: 2025-05-13

## 2025-05-13 NOTE — PATIENT INSTRUCTIONS
Rosie - it was nice to see you today!     At your visit today we reviewed:    EKG today showed AFib and HR too fast 110s  Echo showed possible drop in pumping strength (40%, but hard to tell in AFib) from 50% back in 2024  Device check 5/8 showed AFib started on 5/6 ... Unsure if you've been in it steady since 5/6 (but you don't think you have based on how you feel)     Medication Changes:    Increase Diltiazem to 360 mg daily - new Rx sent in  Watch for low BPs (more lightheadedness, dizziness)  Watch for increased swelling  Continue AC with Eliquis 5 mg 2x/day without interruption    Recommendations:    We'll continue to watch the AFib with device checks  We can keep you in AFib, having more and more episodes as long as you feel OK, you're on Eliquis and HR is under good control (too fast now, so we'll increase Diltiazem)    If you start having problems with AFib .. Feeling more palpitations, more SOB, more swelling, lightheadedness, etc then I think we need to try to keep you out of AFib with medication called amiodarone ... May need cardioversion as well     Follow-up:    See me for cardiology follow up in 3 m  but CALL Cardiology nurses Janet & Jessie @ [phone] for any issues, questions or concerns in the interim.      To schedule a future appointment, we kindly ask that you call cardiology scheduling at [phone] three months prior to requested visit date.    Important Phone Numbers for [facility]):    [place] [facility] Janet & Jessie: [phone]

## 2025-05-13 NOTE — LETTER
5/13/2025    Kathya Escalera, DO  [address] [facility] [address]    RE: Rosie Blackman       Dear Colleague,     I had the pleasure of seeing Rosie Blackman in the [facility].  [facility]    I had the pleasure of seeing Rosie when she came for follow up of AFib.  This 85 year old sees Dr. Rollins for her history of:    1.  H/o TIA - 3/2022  2.  Paroxysmal AFib, SSS/post conversion pauses- first noted 8/2022 (not seen on monitoring following TIA 3/2022).  Noted to have significant postconversion pause when spontaneously converted after DCCV x3 were not successful.  S/p dual-chamber [employer] PPM 10/2022 with Dr. Meyer.  Has continued to have pAFib on  device interrogations, with increasing burden (47% March--May 2025)  3.  HTN  4. R Ovarian cancer - sees Dr. Sinclair. S/p BSO with LN dissection  5.  Mild CM - EF ~40% on echo 2025, felt similar to EF 2024 (listed as 50%) d/t beat-beat variability.      Last Visit & Interval History:  I saw Rosie Damon 6/2024 at which time she had done well without recurrent AF.  RECIO continued, but due to back pain requiring injections, she had not been able to exercise.  She was due to meet with the orthopedic surgeon to discuss options for treatment after our visit.  Given mild cardiomyopathy/WMA on echocardiogram, recommended stress test, with plans for annual follow-up and in-office device interrogation.  Stress test 6/2024 reviewed with Dr. Rollins and as no major ischemia, recommended to continue medical mgmt. .      We have continue to monitor arrhythmias, and burden has been increasing, most recently 15% 3/2025.  EGM showed both AFib and AFlutter. I adjusted Metoprolol XL dosing and asked for updated echo given c/o increased LE edema.     Updated echo was 4/2025 was actually read with an EF of 40% (had been 50-55% in 6/2024).  When compared ldxt-gr-ugmp, reader felt that this was due to jrxx-nn-erqe variability with  "AF.    Courtesy check 5/8 showed AF burden was 47% (3/4--5/8) with rates >100 bpm 25-30% of the time.     Today's Visit:  Rosie underwent back surgery 7/2024, which improved her back \"nerve pain\" but now has more aching. She limits her meloxican use d/t Eliquis use. She's not been able to exercise much. She can go grocery shopping but looks for a \"close spot.\"     We reviewed her echo 4/2025 showing she was in AFib. Device check 5/8 showed she was in AFib starting 5/6 so reviewed it doesn't look like it's been persistent yet, though EKG today shows AFib/RVR.  Denies dizziness, lightheadedness and rarely has palpitations, so \"doesn't feel\" like she's been in it for a long time.     Her LE edema has been helped with [facility] and she typically uses waist-high compression garments.     BPs 110s/70s at home. No dizziness, lightheadedness. No falls but walks with a cane post back surgery b/c balance is not as good.    VITALS:  Vitals: /62   Pulse 109   Resp 16   Ht 1.575 m (5' 2\")   Wt 94 kg (207 lb 3.2 oz)   SpO2 93%   BMI 37.90 kg/m      Diagnostic Testing:  EKG today, which I overread, showed AFib 114 bpm on Diltiazem 240 and Metoprolol  mg BID   Device check 5/8/2025 underlying AF with .  16% AP and 20%  in DDDR 60/130.  HR >100 bpm 25-30% of the time.  This episode was in progress since 5/6 with rates .  Longest episode 68 hours  Echo 4/2025 EF 40% with general HK.  Mild ABELINO.  Mild-moderate MR.  Mild/moderate TR.  No significant change compared with 2024, noting nzyx-rq-nsnv review showed bgbq-nt-qxjo variability with AF  Nuclear stress test 6/2024 small area of nontransmural infarct in distal apical/anterior segment of LV with mild degree of leni-infarct ischemia.  Normal EF.  Compared with 2023, defect was similar but now with mild reversibility  Echo 6/12/2024 LVEF 50-55%, with mild HK of mid/basal anterolateral/inferolateral walls.  No thrombus.  Normal RV.  Mildly dilated LA. "  1-2+ MR.  1+ TR.  RVSP 34+ RAP.  Mild aortic sclerosis.  1+ PI.  SR.  Normal aorta  Nuc Stress Test 4/2023 small area of infarction in distal anterior segment. No ischemia.  Echocardiogram 8/2022-LVEF 60-65%. G2 DD.  Normal RV.  1+ MR.  RVSP 30+ RAP.  Aortic sclerosis  Component      Latest Ref Rng 7/4/2024  10:26 AM 2/25/2025  10:31 AM   Sodium      135 - 145 mmol/L 138  142    Potassium      3.4 - 5.3 mmol/L 3.7  4.0    Chloride      98 - 107 mmol/L 102  106    Carbon Dioxide (CO2)      22 - 29 mmol/L 26  27    Anion Gap      7 - 15 mmol/L 10  9    Urea Nitrogen      8.0 - 23.0 mg/dL 15.9  11.6    Creatinine      0.51 - 0.95 mg/dL 0.81  0.82    GFR Estimate      >60 mL/min/1.73m2 72  70    Calcium      8.8 - 10.4 mg/dL 8.8  9.8    Glucose      70 - 99 mg/dL 190 (H)  112 (H)       Legend:  Component      Latest Ref Rng 2/25/2025  10:31 AM   WBC      4.0 - 11.0 10e3/uL 5.2    RBC Count      3.80 - 5.20 10e6/uL 4.60    Hemoglobin      11.7 - 15.7 g/dL 14.4    Hematocrit      35.0 - 47.0 % 43.5    MCV      78 - 100 fL 95    MCH      26.5 - 33.0 pg 31.3    MCHC      31.5 - 36.5 g/dL 33.1    RDW      10.0 - 15.0 % 13.1    Platelet Count      150 - 450 10e3/uL 247        Plan:  Increase Diltiazem to 360 mg daily for rate control  Routine device checks  See me next available 1-2 months    Assessment/Plan:    Paroxysmal AFib, SSS  As above, had pauses when she spontaneously converted to SR and recommended for pacemaker implantation to allow adequate treatment of her RVR  S/p dual-chamber [employer] pacemaker 10/2022.  Remains on Diltiazem 240 mg daily and Metoprolol  mg BID    Had recurrent AF after steroid injection 5/2024 and spontaneously converted in < 1/2 day.  We have been able to see increasing episodes of AF, now almost 50% burden in the last 2 months.  Rates overall are well-controlled ( >100 bpm ~25% of the time) on Diltiazem 240 mg daily and Metoprolol  mg BID and she's relatively  asymptomatic overall.  Echo showed EF ~40% d/t beat-beat variability in AFib     Continues AC for [id number] 6 (HTN, CVA, age, sex). Dose appropriate for age/weight/creatinine.  Last hemoglobin 2/2025 wnl    PLAN:  Discussed options for rate vs rhythm control. She opted for rate control with close follow-up, noting that if she has more symptomatic AFib and/or EF continues to drop, may switch to rhythm control.  Increase Diltiazem to 360 mg daily.  Watch for low BPs (more lightheadedness, dizziness)  Watch for increased swelling    Continue AC without interruption  Will see back in ~1-2 months next available to see how she's doing. Briefly discussed starting amiodarone with DCCV if AFib is found to be more persistent    Mild cardiomyopathy   Routine echo 6/2024 showed mild drop in LVEF to 50-55%.  She had mild HK of the mid/basal anterolateral and inferolateral walls.  Last checked in 2022, EF was normal and no RWMA noted.  Updated echo 4/2025 showed EF 40%, but reader felt actually very similar to the echo done 6/2024 and hqrm-ab-ytfr variability was likely cause of it looking lower.   Stress test 6/2024 showed no major areas of ischemia  Reviewed that she's been having more AFib  Only 20%   Euvolemic on exam    PLAN:  Continue valsartan 80 and Metoprolol  mg BID  Watch LVEF carefully with more AFib - likely echo again Fall 2025 (not ordered)    3.  HTN  Remains on Diltiazem  mg daily, Metoprolol  mg BID and valsartan 80 mg daily  BMP as above    PLAN:  Increase Diltiazem to 360 as planned; watch for hypotension       The longitudinal plan of care for the diagnosis(es)/condition(s) as documented were addressed during this visit. Due to the added complexity in care, I will continue to support Rosie in the subsequent management and with ongoing continuity of care.      Gema Potter PA-C, MSPAS      Orders Placed This Encounter   Procedures     Follow-Up with Cardiology [facility]     EKG 12-lead complete  w/read - Clinics (performed today)     Orders Placed This Encounter   Medications     Vitamin D, Cholecalciferol, 25 MCG (1000 UT) CAPS     Sig: Take by mouth.     diltiazem ER (TIAZAC) 360 MG 24 hr ER beaded capsule     Sig: Take 1 capsule (360 mg) by mouth daily.     Dispense:  90 capsule     Refill:  0     Replaces 240 mg capsules     Medications Discontinued During This Encounter   Medication Reason     diltiazem ER COATED BEADS (CARDIZEM CD/CARTIA XT) 240 MG 24 hr capsule Dose adjustment           Encounter Diagnosis   Name Primary?     Paroxysmal atrial fibrillation (H)            CURRENT MEDICATIONS:  Current Outpatient Medications   Medication Sig Dispense Refill     acetaminophen (TYLENOL) 325 MG tablet Take 3 tablets (975 mg) by mouth every 8 hours 100 tablet 0     apixaban ANTICOAGULANT (ELIQUIS ANTICOAGULANT) 5 MG tablet Take 1 tablet (5 mg) by mouth 2 times daily 180 tablet 3     diltiazem ER (TIAZAC) 360 MG 24 hr ER beaded capsule Take 1 capsule (360 mg) by mouth daily. 90 capsule 0     hydroCHLOROthiazide (HYDRODIURIL) 25 MG tablet Take 1 tablet (25 mg) by mouth daily 90 tablet 3     meloxicam (MOBIC) 15 MG tablet Take 15 mg by mouth as needed for moderate pain.       metoprolol succinate ER (TOPROL XL) 100 MG 24 hr tablet Take 1.5 tablets (150 mg) by mouth 2 times daily. 270 tablet 3     pravastatin (PRAVACHOL) 80 MG tablet Take 1 tablet (80 mg) by mouth daily. 90 tablet 3     THERATEARS 0.25 % OP SOLN Place 2 drops into both eyes as needed       valsartan (DIOVAN) 80 MG tablet Take 1 tablet (80 mg) by mouth daily 90 tablet 3     Vitamin D, Cholecalciferol, 25 MCG (1000 UT) CAPS Take by mouth.         ALLERGIES     Allergies   Allergen Reactions     Atorvastatin Other (See Comments)     Muscle Pain     Ezetimibe Other (See Comments)     Severe muscle aches     Irbesartan Cramps     Lisinopril      Omeprazole      Other reaction(s): *Unknown     Prilosec [Omeprazole]      Rosuvastatin Other (See  "Comments)     Muscle Pain     Zestril [Ace Inhibitors] Cough         Review of Systems:  Skin:        Eyes:       ENT:       Respiratory:  Positive for dyspnea on exertion  Cardiovascular:    Positive for, lower extremity symptoms, edema, fatigue  Gastroenterology:      Genitourinary:       Musculoskeletal:       Neurologic:       Psychiatric:       Heme/Lymph/Imm:       Endocrine:         Physical Exam:  Vitals: /62   Pulse 109   Resp 16   Ht 1.575 m (5' 2\")   Wt 94 kg (207 lb 3.2 oz)   SpO2 93%   BMI 37.90 kg/m      Constitutional:  cooperative, alert and oriented, well developed, well nourished, in no acute distress        Skin:  warm and dry to the touch, no apparent skin lesions or masses noted        Head:  normocephalic, no masses or lesions        Eyes:  pupils equal and round, conjunctivae and lids unremarkable, sclera white        ENT:  no pallor or cyanosis, dentition good        Neck:  JVP normal, no carotid bruit        Chest:  normal breath sounds, clear to auscultation, normal A-P diameter, normal symmetry, normal respiratory excursion, no use of accessory muscles        Cardiac:   irregularly irregular rhythm, tachycardic                Abdomen:  abdomen soft obese      Vascular: not assessed this visit                                      Extremities and Back:  no deformities, clubbing, cyanosis, erythema observed        Neurological:  no gross motor deficits            PAST MEDICAL HISTORY:  Past Medical History:   Diagnosis Date     Chronic airway obstruction (H)      Diastolic dysfunction 06/28/2022     Gastroesophageal reflux disease      Hiatal hernia      Hypertension      Irregular heart beat      Malignant neoplasm of ovary (H)      Ovarian cancer, unspecified laterality (H) 03/10/2021     Personal history of contact with and (suspected) exposure to asbestos      Primary osteoarthritis of right hip 07/09/2020       PAST SURGICAL HISTORY:  Past Surgical History:   Procedure " Laterality Date     BIOPSY BREAST Left      BREAST BIOPSY, CORE RT/LT  1994     CHOLECYSTECTOMY  1995     COLONOSCOPY  05/2010    Diverticulosis, colon polyps; repeat 5 yrs     COLONOSCOPY N/A 11/16/2015    Procedure: COLONOSCOPY;  Surgeon: Alejandro Matos MD;  Location: [facility]     COLONOSCOPY N/A 09/17/2021    Procedure: COLONOSCOPY;  Surgeon: Aayush George MD;  Location: [facility]     Colonoscopy, hyperplastic polyps, repeat in 5 yrs  2004     EP PACEMAKER DEVICE & LEAD IMPLANT- RIGHT ATRIAL & RIGHT VENTRICULAR Left 10/24/2022    Procedure: Pacemaker Device & Lead Implant - Right Atrial & Right Ventricular;  Surgeon: Demond Meyer MD;  Location:  [facility]ARD[facility] CATH LAB     ESOPHAGOSCOPY, GASTROSCOPY, DUODENOSCOPY (EGD), COMBINED  09/30/2013    Procedure: COMBINED ESOPHAGOSCOPY, GASTROSCOPY, DUODENOSCOPY (EGD), BIOPSY SINGLE OR MULTIPLE;  Gastroscopy;  Surgeon: Blaise Gill MD;  Location: [facility]     EYE SURGERY  2012    R/L Cataracts     FUSION TRANSFORAMINAL LUMBAR INTERBODY, 1 LEVEL, POSTERIOR APPROACH, USING OTS SURG IMAGING GUIDANCE N/A 7/3/2024    Procedure: LUMBAR 1- LUMBAR 2 TRANSFORAMINAL INTERBODY FUSION, POSTERIOR INSTRUMENTED FUSION, DECOMPRESSION, WITH O-ARM AND STEALTH NAVIGATION;  Surgeon: Chavo Patel MD;  Location: [facility] OR      REMOVE TONSILS/ADENOIDS,12+ Y/O      T & A 13+y.o.     HERNIORRHAPHY INCISIONAL (LOCATION) N/A 03/24/2021    Procedure: HERNIORRHAPHY, INCISIONAL, OPEN WITH MESH;  Surgeon: Aayush George MD;  Location: [facility]     HYSTERECTOMY, PAP NO LONGER INDICATED       LAPAROSCOPIC SALPINGO-OOPHORECTOMY N/A 07/24/2020    Procedure: Laparoscopy, removal or pelvic mass, both tubes and ovaries, omentectomy, anterior culvectomy, pelvic and para aortic lymph node dissection, laparotomy;  Surgeon: Felipe Corona MD;  Location: [facility] OR     [place] ANESTH,LUMBAR SPINE,CORD SURGERY  10/2020    L3-4 L4-5 TRANSFORAMINAL INTERBODY FUSION L3-5,  POSTERIOR INSTRUMENTED FUSION AND DECOMPRESSION     TVH for DUB, ovaries in       VASCULAR SURGERY      Guttenberg closure     ZZC ANESTH,KNEE VEINS SURGERY  2014       FAMILY HISTORY:  Family History   Problem Relation Age of Onset     Cancer Mother         uterine cancer,      Respiratory Mother         COPD     Cardiovascular Mother         AAA  age 80     Eye Disorder Father         cataracts     Depression Father      Snoring Father      Breast Cancer Sister      Breast Cancer Sister         X2     Breast Cancer Maternal Grandmother      Cancer Maternal Grandfather         cancer unknown type     Depression Son      Depression Son      Cancer - colorectal No family hx of         no ovarian cancer       SOCIAL HISTORY:  Social History     Socioeconomic History     Marital status:      Spouse name: Nelson Blackman     Number of children: 2     Years of education: 13+   Occupational History     Occupation:      Comment: [employer] D[employer]   Tobacco Use     Smoking status: Never     Smokeless tobacco: Never   Vaping Use     Vaping status: Never Used   Substance and Sexual Activity     Alcohol use: No     Drug use: No     Sexual activity: Not Currently     Partners: Male     Birth control/protection: Surgical   Other Topics Concern     Parent/sibling w/ CABG, MI or angioplasty before 65F 55M? No     Social Drivers of Health     Financial Resource Strain: Low Risk  (2025)    Financial Resource Strain      Within the past 12 months, have you or your family members you live with been unable to get utilities (heat, electricity) when it was really needed?: No   Food Insecurity: Low Risk  (2025)    Food Insecurity      Within the past 12 months, did you worry that your food would run out before you got money to buy more?: No      Within the past 12 months, did the food you bought just not last and you didn t have money to get more?: No   Transportation Needs: Low Risk  (2025)     Transportation Needs      Within the past 12 months, has lack of transportation kept you from medical appointments, getting your medicines, non-medical meetings or appointments, work, or from getting things that you need?: No   Physical Activity: Patient Declined (2/25/2025)    Exercise Vital Sign      Days of Exercise per Week: Patient declined      Minutes of Exercise per Session: Patient declined   Stress: No Stress Concern Present (2/25/2025)    [place] [employer] - Occupational Stress Questionnaire      Feeling of Stress : Not at all   Social Connections: Unknown (2/25/2025)    Social Connection and Isolation Panel [NHANES]      Frequency of Social Gatherings with Friends and Family: Three times a week   Interpersonal Safety: Low Risk  (4/7/2025)    Interpersonal Safety      Do you feel physically and emotionally safe where you currently live?: Yes      Within the past 12 months, have you been hit, slapped, kicked or otherwise physically hurt by someone?: No      Within the past 12 months, have you been humiliated or emotionally abused in other ways by your partner or ex-partner?: No   Housing Stability: Low Risk  (2/25/2025)    Housing Stability      Do you have housing? : Yes      Are you worried about losing your housing?: No               Thank you for allowing me to participate in the care of your patient.      Sincerely,     Jinny Potter PA-C     [facility]  cc:   Jinny Potter PA-C  [address]  [place] [address]

## 2025-05-27 ENCOUNTER — THERAPY VISIT (OUTPATIENT)
Dept: PHYSICAL THERAPY | Facility: CLINIC | Age: 85
End: 2025-05-27
Attending: INTERNAL MEDICINE
Payer: MEDICARE

## 2025-05-27 DIAGNOSIS — I89.0 LYMPHEDEMA: Primary | Chronic | ICD-10-CM

## 2025-05-27 PROCEDURE — 97140 MANUAL THERAPY 1/> REGIONS: CPT | Mod: GP | Performed by: REHABILITATION PRACTITIONER

## 2025-06-03 ENCOUNTER — THERAPY VISIT (OUTPATIENT)
Dept: PHYSICAL THERAPY | Facility: CLINIC | Age: 85
End: 2025-06-03
Attending: INTERNAL MEDICINE
Payer: MEDICARE

## 2025-06-03 ENCOUNTER — ANCILLARY PROCEDURE (OUTPATIENT)
Dept: CARDIOLOGY | Facility: CLINIC | Age: 85
End: 2025-06-03
Attending: INTERNAL MEDICINE
Payer: MEDICARE

## 2025-06-03 DIAGNOSIS — I89.0 LYMPHEDEMA: Primary | Chronic | ICD-10-CM

## 2025-06-03 DIAGNOSIS — Z95.0 CARDIAC PACEMAKER IN SITU: ICD-10-CM

## 2025-06-03 DIAGNOSIS — I49.5 SSS (SICK SINUS SYNDROME) (H): ICD-10-CM

## 2025-06-03 LAB
MDC_IDC_EPISODE_DTM: NORMAL
MDC_IDC_EPISODE_DURATION: 1149 S
MDC_IDC_EPISODE_DURATION: 25 S
MDC_IDC_EPISODE_DURATION: 37 S
MDC_IDC_EPISODE_DURATION: 70 S
MDC_IDC_EPISODE_DURATION: NORMAL S
MDC_IDC_EPISODE_ID: NORMAL
MDC_IDC_EPISODE_TYPE: NORMAL
MDC_IDC_LEAD_CONNECTION_STATUS: NORMAL
MDC_IDC_LEAD_CONNECTION_STATUS: NORMAL
MDC_IDC_LEAD_IMPLANT_DT: NORMAL
MDC_IDC_LEAD_IMPLANT_DT: NORMAL
MDC_IDC_LEAD_LOCATION: NORMAL
MDC_IDC_LEAD_LOCATION: NORMAL
MDC_IDC_LEAD_LOCATION_DETAIL_1: NORMAL
MDC_IDC_LEAD_LOCATION_DETAIL_1: NORMAL
MDC_IDC_LEAD_MFG: NORMAL
MDC_IDC_LEAD_MFG: NORMAL
MDC_IDC_LEAD_MODEL: NORMAL
MDC_IDC_LEAD_MODEL: NORMAL
MDC_IDC_LEAD_POLARITY_TYPE: NORMAL
MDC_IDC_LEAD_POLARITY_TYPE: NORMAL
MDC_IDC_LEAD_SERIAL: NORMAL
MDC_IDC_LEAD_SERIAL: NORMAL
MDC_IDC_MSMT_BATTERY_DTM: NORMAL
MDC_IDC_MSMT_BATTERY_REMAINING_LONGEVITY: 108 MO
MDC_IDC_MSMT_BATTERY_REMAINING_PERCENTAGE: 100 %
MDC_IDC_MSMT_BATTERY_STATUS: NORMAL
MDC_IDC_MSMT_LEADCHNL_RA_IMPEDANCE_VALUE: 506 OHM
MDC_IDC_MSMT_LEADCHNL_RV_IMPEDANCE_VALUE: 632 OHM
MDC_IDC_MSMT_LEADCHNL_RV_PACING_THRESHOLD_AMPLITUDE: 1 V
MDC_IDC_MSMT_LEADCHNL_RV_PACING_THRESHOLD_PULSEWIDTH: 0.4 MS
MDC_IDC_PG_IMPLANT_DTM: NORMAL
MDC_IDC_PG_MFG: NORMAL
MDC_IDC_PG_MODEL: NORMAL
MDC_IDC_PG_SERIAL: NORMAL
MDC_IDC_PG_TYPE: NORMAL
MDC_IDC_SESS_CLINIC_NAME: NORMAL
MDC_IDC_SESS_DTM: NORMAL
MDC_IDC_SESS_TYPE: NORMAL
MDC_IDC_SET_BRADY_AT_MODE_SWITCH_MODE: NORMAL
MDC_IDC_SET_BRADY_AT_MODE_SWITCH_RATE: 170 {BEATS}/MIN
MDC_IDC_SET_BRADY_LOWRATE: 60 {BEATS}/MIN
MDC_IDC_SET_BRADY_MAX_SENSOR_RATE: 130 {BEATS}/MIN
MDC_IDC_SET_BRADY_MAX_TRACKING_RATE: 130 {BEATS}/MIN
MDC_IDC_SET_BRADY_MODE: NORMAL
MDC_IDC_SET_BRADY_PAV_DELAY_HIGH: 200 MS
MDC_IDC_SET_BRADY_PAV_DELAY_LOW: 300 MS
MDC_IDC_SET_BRADY_SAV_DELAY_HIGH: 200 MS
MDC_IDC_SET_BRADY_SAV_DELAY_LOW: 300 MS
MDC_IDC_SET_LEADCHNL_RA_PACING_AMPLITUDE: 5 V
MDC_IDC_SET_LEADCHNL_RA_PACING_CAPTURE_MODE: NORMAL
MDC_IDC_SET_LEADCHNL_RA_PACING_POLARITY: NORMAL
MDC_IDC_SET_LEADCHNL_RA_PACING_PULSEWIDTH: 0.4 MS
MDC_IDC_SET_LEADCHNL_RA_SENSING_ADAPTATION_MODE: NORMAL
MDC_IDC_SET_LEADCHNL_RA_SENSING_POLARITY: NORMAL
MDC_IDC_SET_LEADCHNL_RA_SENSING_SENSITIVITY: 0.15 MV
MDC_IDC_SET_LEADCHNL_RV_PACING_AMPLITUDE: 1.5 V
MDC_IDC_SET_LEADCHNL_RV_PACING_CAPTURE_MODE: NORMAL
MDC_IDC_SET_LEADCHNL_RV_PACING_POLARITY: NORMAL
MDC_IDC_SET_LEADCHNL_RV_PACING_PULSEWIDTH: 0.4 MS
MDC_IDC_SET_LEADCHNL_RV_SENSING_ADAPTATION_MODE: NORMAL
MDC_IDC_SET_LEADCHNL_RV_SENSING_POLARITY: NORMAL
MDC_IDC_SET_LEADCHNL_RV_SENSING_SENSITIVITY: 1.5 MV
MDC_IDC_SET_ZONE_DETECTION_INTERVAL: 375 MS
MDC_IDC_SET_ZONE_STATUS: NORMAL
MDC_IDC_SET_ZONE_TYPE: NORMAL
MDC_IDC_SET_ZONE_VENDOR_TYPE: NORMAL
MDC_IDC_STAT_AT_BURDEN_PERCENT: 58 %
MDC_IDC_STAT_AT_DTM_END: NORMAL
MDC_IDC_STAT_AT_DTM_START: NORMAL
MDC_IDC_STAT_BRADY_DTM_END: NORMAL
MDC_IDC_STAT_BRADY_DTM_START: NORMAL
MDC_IDC_STAT_BRADY_RA_PERCENT_PACED: 10 %
MDC_IDC_STAT_BRADY_RV_PERCENT_PACED: 26 %
MDC_IDC_STAT_EPISODE_RECENT_COUNT: 0
MDC_IDC_STAT_EPISODE_RECENT_COUNT: 0
MDC_IDC_STAT_EPISODE_RECENT_COUNT: 51
MDC_IDC_STAT_EPISODE_RECENT_COUNT_DTM_END: NORMAL
MDC_IDC_STAT_EPISODE_RECENT_COUNT_DTM_START: NORMAL
MDC_IDC_STAT_EPISODE_TYPE: NORMAL
MDC_IDC_STAT_EPISODE_VENDOR_TYPE: NORMAL
MDC_IDC_STAT_EPISODE_VENDOR_TYPE: NORMAL

## 2025-06-03 PROCEDURE — 93294 REM INTERROG EVL PM/LDLS PM: CPT | Performed by: INTERNAL MEDICINE

## 2025-06-03 PROCEDURE — 97140 MANUAL THERAPY 1/> REGIONS: CPT | Mod: GP | Performed by: REHABILITATION PRACTITIONER

## 2025-06-03 PROCEDURE — 93296 REM INTERROG EVL PM/IDS: CPT | Performed by: INTERNAL MEDICINE

## 2025-06-20 ENCOUNTER — THERAPY VISIT (OUTPATIENT)
Dept: PHYSICAL THERAPY | Facility: CLINIC | Age: 85
End: 2025-06-20
Attending: INTERNAL MEDICINE
Payer: MEDICARE

## 2025-06-20 DIAGNOSIS — I89.0 LYMPHEDEMA: Primary | Chronic | ICD-10-CM

## 2025-06-20 PROCEDURE — 97140 MANUAL THERAPY 1/> REGIONS: CPT | Mod: GP

## 2025-06-26 ENCOUNTER — THERAPY VISIT (OUTPATIENT)
Dept: PHYSICAL THERAPY | Facility: CLINIC | Age: 85
End: 2025-06-26
Attending: INTERNAL MEDICINE
Payer: MEDICARE

## 2025-06-26 DIAGNOSIS — I89.0 LYMPHEDEMA: Primary | Chronic | ICD-10-CM

## 2025-06-26 PROCEDURE — 97140 MANUAL THERAPY 1/> REGIONS: CPT | Mod: GP | Performed by: PHYSICAL THERAPIST

## 2025-06-26 NOTE — PROGRESS NOTES
Discharge Note  Pt progress on 2 goals and met other goals  Pt limited in dami doff by lumbar back surgery  Pt agreeable to home management with spouse assist.  Will follow up with MD as needed in future.     06/26/25 0500   Appointment Info   Signing clinician's name / credentials pradeep berrios PT CLT   Total/Authorized Visits Medicare/BCBS of [place] Supplement   Visits Used 19   Medical Diagnosis Leg swelling   PT Tx Diagnosis B LE lipo-lymphedema   Precautions/Limitations HTN   Progress Note/Certification   Start of Care Date 04/07/25   Onset of illness/injury or Date of Surgery 02/25/25   Therapy Frequency 3x/wk   Predicted Duration 12wks   Certification date from 04/07/25   Certification date to 06/30/25   Progress Note Completed Date 04/07/25   Supervision   PT Assistant Visit Number 5   PT Goal 1   Goal Identifier 1   Goal Description Pt and caregiver to be IND in CGB technique for self-care of B LE lymphedema mgmt   Rationale to maximize safety and independence with performance of ADLs and functional tasks   Goal Progress Goal discontinued: Although she tried, pt has great difficulty doing the GCB herself and her spouse cannot either d/t ROM issues reaching feet   Target Date 05/05/25   Date Met 05/05/25   PT Goal 2   Goal Identifier 2   Goal Description Pt will show an improvement in skin integrity and complete wound healing in order to prevent infection   Rationale to maximize safety and independence with performance of ADLs and functional tasks   Goal Progress Met - R shin wound healed 100%   Target Date 06/02/25   Date Met 04/30/25   PT Goal 3   Goal Identifier LTG   Goal Description Pt will independetly manage don/doff and long-term mgmt of compression garments to promote IND return to previous recreational activities in the community.   Rationale to maximize safety and independence with performance of ADLs and functional tasks   Goal Progress pt switching to inelastic velcro lower leg garments with  hybrid liners and they have not arrived yet.  once arrived will educate pt on dami doff fit and reassess goal   Target Date 06/30/25   Date Met   (partially met with spouse assisting with velcro wraps due to lumbar fusion pt unable bend far enough)   PT Goal 4   Goal Identifier LTG - LLIS   Goal Description Pt will show a statistically significant decrease in her LLIS score of at least 8pts indicating improved overall QOL and max return to PLOF.   Rationale to maximize safety and independence with performance of ADLs and functional tasks   Goal Progress Eval= score of 15   Target Date 06/30/25   Date Met   (partially met as score 9 with improvement)   Subjective Report   Subjective Report pt tought hybrid liner liner would be too hot in warm weather   Objective Measure 1   Objective Measure vitals   Details O2 sats room air 93%, pulse 70, BP 98/67   Objective Measure 2   Objective Measure girth   Details reduction BLE R since last visit -7.4%, L-4.1 %.  RLE since initial visit - 12.5%, L - 8.3%   Objective Measure 3   Objective Measure see tissue in objective note   Manual Therapy   Manual Therapy: Mobilization, MFR, MLD, friction massage minutes ([id number]) 56   Manual Therapy 1 - Details girth BLE, garment colleen , dami  attempted to teach dami of velcro inelastic with foot on opposite knee but pt refused stating wants spouse to do it so she doesn't damage her back.  pt agreed to use hybrid liner 20-30 mm HG which are at home after education on benefit to toe edema with closed toe hybrid with velcros vs using open toe circular knit compression sock she arrived in with no toe compression.  education on wear care replacement, elevation , LE toe curls and ankle pumps.  no nighttime compression  zack compression garments if willing as pt did not like them  gave info on taupe colored hybrid liners as pt dislikes black   Skilled Intervention education  compression garments   Patient Response/Progress agreed   Education    Learner/Method Patient;Significant Other;Listening;Demonstration;No Barriers to Learning   Plan   Updates to plan of care discharge

## 2025-07-07 ENCOUNTER — TRANSFERRED RECORDS (OUTPATIENT)
Dept: HEALTH INFORMATION MANAGEMENT | Facility: CLINIC | Age: 85
End: 2025-07-07
Payer: MEDICARE

## 2025-07-26 DIAGNOSIS — I10 ESSENTIAL HYPERTENSION: ICD-10-CM

## 2025-07-28 ENCOUNTER — TELEPHONE (OUTPATIENT)
Dept: CARDIOLOGY | Facility: CLINIC | Age: 85
End: 2025-07-28

## 2025-07-28 ENCOUNTER — ANCILLARY PROCEDURE (OUTPATIENT)
Dept: CARDIOLOGY | Facility: CLINIC | Age: 85
End: 2025-07-28
Attending: INTERNAL MEDICINE
Payer: MEDICARE

## 2025-07-28 DIAGNOSIS — Z95.0 CARDIAC PACEMAKER IN SITU: ICD-10-CM

## 2025-07-28 DIAGNOSIS — I49.5 SSS (SICK SINUS SYNDROME) (H): ICD-10-CM

## 2025-07-28 RX ORDER — VALSARTAN 80 MG/1
80 TABLET ORAL DAILY
Qty: 90 TABLET | Refills: 1 | Status: SHIPPED | OUTPATIENT
Start: 2025-07-28

## 2025-07-28 RX ORDER — HYDROCHLOROTHIAZIDE 25 MG/1
25 TABLET ORAL DAILY
Qty: 90 TABLET | Refills: 1 | Status: SHIPPED | OUTPATIENT
Start: 2025-07-28

## 2025-07-28 NOTE — PROGRESS NOTES
[facility]    I had the pleasure of seeing Rosie when she came for follow up of AFib.  This 85 year old sees Dr. Rollins for her history of:    1.  H/o TIA - 3/2022  2.  Persistent AFib, SSS/post conversion pauses- first noted 8/2022 (not seen on monitoring following TIA 3/2022).  Noted to have significant postconversion pause when spontaneously converted after DCCV x3 were not successful.  S/p dual-chamber [employer] PPM 10/2022 with Dr. Meyer.  Has continued to have pAFib on device interrogations, with increasing burden and has now been persistent since 6/2025.   3.  HTN  4. R Ovarian cancer - sees Dr. Sinclair. S/p BSO with LN dissection  5.  Mild CM - EF ~40% on echo 2025, felt similar to EF 2024 (listed as 50%) d/t beat-beat variability.      Last Visit & Interval History:  I saw Raymundo 5/2025 at which time she was doing well overall, but continued to recover from back surgery.  We reviewed increasing arrhythmias, up to 47% 5/2025, with HR  >100 bpm ~25% of the time.  I increased Diltiazem to 360 mg daily for rate control. Follow-up check a few weeks later indicated she was still having paroxysms of this.    Most recent device check 6/2025 showed she had been in AFib since 5/22. Updated device check 7/28/2025 showed persistent AFib/flutter since 6/3/25. Controlled HR between 60s-150s, ventricular rates controlled.       Today's Visit:  Rosie is doing well overall. States that she does not feel that she in AFib. Denies any palpitations, lightheadedness, or dizziness. No pre-syncope or syncope. Reviewed now persistent arrhythmias seen on device checks since at least 6/2.     Recovery from back surgery on 7/3/2024 has gone ok but it has taken her a while to get back to her normal. Still taking meloxicam on occasion for back pain. States that she has felt somewhat more weak the last few months with activities around the house and grocery shopping. Unsure if this decrease in stamina  "is due to her back surgery recovery, atrial fibrillation, or something else.     States that she has also felt more short of breath and wheezy for a few months. Believes that her worsened breathing may be d/t air quality or phlegm in the back of her throat. Denies fevers or chills. Again, unsure if related to now persistent AFib     Takes blood pressures at home regularly and these have been stable around 110s/60s.    Reports chronic lower extremity lymphedema. States that this has been much better of late compared to her previous.     VITALS:  Vitals: /69   Pulse 70   Resp 16   Ht 1.575 m (5' 2\")   Wt 98.7 kg (217 lb 8 oz)   SpO2 97%   BMI 39.78 kg/m      Diagnostic Testing:  Courtesy device check 7/28/2025 5% AP and 38%  in DDDR 60/130. Underlying rhythm was AF/AFlutter. Episode has been in progress since 6/3. Ventricular rates controlled and no ventricular arrhythmias. HR ranging from  bpm.   Device Check 6/3/2025 10% AP and 26%  in DDDR 60/130.  Underlying was AF.  Episode has been in progress since 5/22 with rates >100 bpm ~25% of the time.   Device check 5/8/2025 underlying AF with .  16% AP and 20%  in DDDR 60/130.  HR >100 bpm 25-30% of the time.  This episode was in progress since 5/6 with rates .  Longest episode 68 hours  Echo 4/2025 EF 40% with general HK.  Mild ABELINO.  Mild-moderate MR.  Mild/moderate TR.  No significant change compared with 2024, noting cdyg-le-inur review showed pggt-hb-aixm variability with AF  Nuclear stress test 6/2024 small area of nontransmural infarct in distal apical/anterior segment of LV with mild degree of leni-infarct ischemia.  Normal EF.  Compared with 2023, defect was similar but now with mild reversibility  Echo 6/12/2024 LVEF 50-55%, with mild HK of mid/basal anterolateral/inferolateral walls.  No thrombus.  Normal RV.  Mildly dilated LA.  1-2+ MR.  1+ TR.  RVSP 34+ RAP.  Mild aortic sclerosis.  1+ PI.  SR.  Normal aorta  Nuc Stress Test " 4/2023 small area of infarction in distal anterior segment. No ischemia.  Echocardiogram 8/2022-LVEF 60-65%. G2 DD.  Normal RV.  1+ MR.  RVSP 30+ RAP.  Aortic sclerosis  Component      Latest Ref Rng 7/4/2024  10:26 AM 2/25/2025  10:31 AM   Sodium      135 - 145 mmol/L 138  142    Potassium      3.4 - 5.3 mmol/L 3.7  4.0    Chloride      98 - 107 mmol/L 102  106    Carbon Dioxide (CO2)      22 - 29 mmol/L 26  27    Anion Gap      7 - 15 mmol/L 10  9    Urea Nitrogen      8.0 - 23.0 mg/dL 15.9  11.6    Creatinine      0.51 - 0.95 mg/dL 0.81  0.82    GFR Estimate      >60 mL/min/1.73m2 72  70    Calcium      8.8 - 10.4 mg/dL 8.8  9.8    Glucose      70 - 99 mg/dL 190 (H)  112 (H)       Legend:  Component      Latest Ref Rng 2/25/2025  10:31 AM   WBC      4.0 - 11.0 10e3/uL 5.2    RBC Count      3.80 - 5.20 10e6/uL 4.60    Hemoglobin      11.7 - 15.7 g/dL 14.4    Hematocrit      35.0 - 47.0 % 43.5    MCV      78 - 100 fL 95    MCH      26.5 - 33.0 pg 31.3    MCHC      31.5 - 36.5 g/dL 33.1    RDW      10.0 - 15.0 % 13.1    Platelet Count      150 - 450 10e3/uL 247        Plan:  Initiate amiodarone therapy with load  Card[facility]version at [facility]  Continue AC with Eliquis   Follow-up with Dr. Rollins or me to determine if symptoms of RECIO/wheezing and stamina improved with restoration of SR  Plan to order updated echocardiogram 2-3 months following cardioversion to see if EF improved with restoration of SR    Assessment/Plan:    Now persistent AFib,SSS  S/p dual-chamber [employer] pacemaker 10/2022 for post-conversion pauses.  Remains on Diltiazem 360 mg daily and Metoprolol  mg BID.  Courtesy device check just yesterday showed 5% AP and 38%  in DDDR 60/130. Underlying rhythm was AF/AFlutter. Episode has been in progress since 6/3. Ventricular rates controlled and no ventricular arrhythmias. Rates ranging from  bpm.    Echo showed EF ~40% d/t beat-beat variability in AFib     Reviewed that unsure if EF has  dropped since 2024 d/t increase in AFib burden. Also unsure if breathing/wheezing and stamina issues are due to her persistent arrhythmia    Continues AC for [id number] 6 (HTN, CVA, age, sex). Dose appropriate for age/weight/creatinine.  Last hemoglobin 2/2025 wnl  Confirms no recent missed doses of Eliquis    PLAN:  Will attempt restoration of SR to see if breathing/wheezing, stamina, and EF improve  Amiodarone 200 mg BID x 2 weeks, then 200 mg daily  DCCV in ~3-4 weeks. Will get device check a few days prior to ensure no chemical conversion.  We discussed Risk, Benefits and Indication of proceeding with direct current cardioversion (DCCV), including but not limited to use of anesthesia, surface burns, gtqot-hj-hraln arrhythmias requiring further treatment, discomfort and stroke.  The patient voiced understanding and understands that a  will be needed given the use of conscious sedation. A consent form will be signed by the procedural physician.  Cannot miss any doses of Eliquis  HOLD hydrochlorothiazide AM of procedure  Has [employer] device  Home same day and will require a     Continue AC without interruption    Patient will follow up post-cardioversion with me or Dr. Rollins to reevaluate and see if she is feeling improved should she convert back into SR.   If so, will continue amiodarone and assess LVEF.   If no improvement in stamina, RECIO/wheezing, or EF, will likely stop amiodarone     Mild cardiomyopathy   Routine echo 6/2024 showed mild drop in LVEF to 50-55%.  She had mild HK of the mid/basal anterolateral and inferolateral walls.  Last checked in 2022, EF was normal and no RWMA noted.  Updated echo 4/2025 showed EF 40%, but reader felt actually very similar to the echo done 6/2024 and rthd-kj-xtpa variability was likely cause of it looking lower.   Stress test 6/2024 showed no major areas of ischemia  Reviewed that she's been having more AFib, confirmed on most recent device courtesy  check on 7/28/25 showing AFib/flutter since 6/3/25  Only 20%   Euvolemic on exam    PLAN:  Continue Valsartan 80 and Metoprolol  mg BID  Watch LVEF carefully with more AFib - plan to order updated echocardiogram 2-3 months after SR is restored    3.  HTN  Remains on Diltiazem  mg daily, Metoprolol  mg BID and valsartan 80 mg daily  BPs stable at home and here in clinic    PLAN:  Continue medications as above.      4. On amiodarone therapy  Discussed risks and benefits of initiating amiodarone therapy today due to persistent atrial fibrillation and patient verbalizes understanding and agreement with the plan.   Discussed plan for short-term treatment with amiodarone to evaluate whether or not she is truly symptomatic with her AFib and if her LVEF improves once she converts back into SR.  Instructed her to contact the clinic should she experience any unusual side effects after initiating amiodarone.      Plan:  Initiate amiodarone therapy and loading doses prior to cardioversion at [facility]  Will check baseline amiodarone labs at time of cardioversion.      5. Shortness of breath, wheezing, decreased stamina  Rosie reports a few months of new shortness of breath, wheezing, and weakness. States that she thinks her breathing may be due to the air quality or phlegm building up in the back of her throat and is unsure if her decreased stamina may be due to her recovery from her back surgery last summer.   Lungs clear on exam today without crackles, rhonchi, or wheezing. She denies any recent fevers or chills.   Will reevaluate these symptoms following initiation of amiodarone therapy and subsequent cardioversion to see if her AFib was indeed causing these symptoms or if she needs separate workup.      Plan:  Monitor and reevaluate symptoms following amiodarone therapy and cardioversion.       SAÚL Callahan     I  was present with the GUILLE student who participated in the service and in the  documentation of the note.  I have verified the history and personally performed the physical exam and medical decision making.  I agree with the assessment and plan of care as documented in the note.       Items personally reviewed: vitals, labs, and device check and agree with the interpretation documented in the note.  I agree with the interpretation documented in the note and the assessment and plan as above.      The longitudinal plan of care for the diagnosis(es)/condition(s) as documented were addressed during this visit. Due to the added complexity in care, I will continue to support Rosie in the subsequent management and with ongoing continuity of care.    Gema Potter PA-C, MSPAS      No orders of the defined types were placed in this encounter.    Orders Placed This Encounter   Medications    omega 3 1000 MG CAPS     Sig: Take by mouth.    amiodarone (PACERONE) 200 MG tablet     Sig: Take 1 tablet (200 mg) by mouth 2 times daily for 14 days, THEN 1 tablet (200 mg) daily.     Dispense:  60 tablet     Refill:  3     Medications Discontinued During This Encounter   Medication Reason    acetaminophen (TYLENOL) 325 MG tablet        Encounter Diagnoses   Name Primary?    Paroxysmal atrial fibrillation (H)     Persistent atrial fibrillation (H) Yes       CURRENT MEDICATIONS:  Current Outpatient Medications   Medication Sig Dispense Refill    amiodarone (PACERONE) 200 MG tablet Take 1 tablet (200 mg) by mouth 2 times daily for 14 days, THEN 1 tablet (200 mg) daily. 60 tablet 3    apixaban ANTICOAGULANT (ELIQUIS ANTICOAGULANT) 5 MG tablet Take 1 tablet (5 mg) by mouth 2 times daily 180 tablet 3    diltiazem ER (TIAZAC) 360 MG 24 hr ER beaded capsule Take 1 capsule (360 mg) by mouth daily. 90 capsule 0    hydrochlorothiazide (HYDRODIURIL) 25 MG tablet TAKE 1 TABLET BY MOUTH EVERY DAY 90 tablet 1    meloxicam (MOBIC) 15 MG tablet Take 15 mg by mouth as needed for moderate pain.      metoprolol succinate ER (TOPROL  "XL) 100 MG 24 hr tablet Take 1.5 tablets (150 mg) by mouth 2 times daily. 270 tablet 3    omega 3 1000 MG CAPS Take by mouth.      pravastatin (PRAVACHOL) 80 MG tablet Take 1 tablet (80 mg) by mouth daily. 90 tablet 3    THERATEARS 0.25 % OP SOLN Place 2 drops into both eyes as needed      valsartan (DIOVAN) 80 MG tablet TAKE 1 TABLET BY MOUTH EVERY DAY 90 tablet 1    Vitamin D, Cholecalciferol, 25 MCG (1000 UT) CAPS Take by mouth.         ALLERGIES     Allergies   Allergen Reactions    Atorvastatin Other (See Comments)     Muscle Pain    Ezetimibe Other (See Comments)     Severe muscle aches    Irbesartan Cramps    Lisinopril     Omeprazole      Other reaction(s): *Unknown    Prilosec [Omeprazole]     Rosuvastatin Other (See Comments)     Muscle Pain    Zestril [Ace Inhibitors] Cough         Review of Systems:  Skin:        Eyes:       ENT:       Respiratory:  Positive for dyspnea on exertion, wheezing  Cardiovascular:    Positive for, lower extremity symptoms, edema, lightheadedness  Gastroenterology:      Genitourinary:       Musculoskeletal:       Neurologic:       Psychiatric:       Heme/Lymph/Imm:       Endocrine:         Physical Exam:  Vitals: /69   Pulse 70   Resp 16   Ht 1.575 m (5' 2\")   Wt 98.7 kg (217 lb 8 oz)   SpO2 97%   BMI 39.78 kg/m      Constitutional:  cooperative, alert and oriented, well developed, well nourished, in no acute distress        Skin:  warm and dry to the touch, no apparent skin lesions or masses noted        Head:  normocephalic, no masses or lesions        Eyes:  pupils equal and round, conjunctivae and lids unremarkable, sclera white        ENT:  no pallor or cyanosis        Neck:  not assessed this visit        Chest:  normal breath sounds, clear to auscultation, normal A-P diameter, normal symmetry, normal respiratory excursion, no use of accessory muscles        Cardiac:   irregularly irregular rhythm                Abdomen:  not assessed this visit    "     Vascular: pulses full and equal                                      Extremities and Back:  no deformities, clubbing, cyanosis, erythema observed        Neurological:  no gross motor deficits            PAST MEDICAL HISTORY:  Past Medical History:   Diagnosis Date    Chronic airway obstruction (H)     Diastolic dysfunction 06/28/2022    Gastroesophageal reflux disease     Hiatal hernia     Hypertension     Irregular heart beat     Malignant neoplasm of ovary (H)     Ovarian cancer, unspecified laterality (H) 03/10/2021    Personal history of contact with and (suspected) exposure to asbestos     Primary osteoarthritis of right hip 07/09/2020       PAST SURGICAL HISTORY:  Past Surgical History:   Procedure Laterality Date    BIOPSY BREAST Left     BREAST BIOPSY, CORE RT/LT  1994    CHOLECYSTECTOMY  1995    COLONOSCOPY  05/2010    Diverticulosis, colon polyps; repeat 5 yrs    COLONOSCOPY N/A 11/16/2015    Procedure: COLONOSCOPY;  Surgeon: Alejandro Matos MD;  Location: [facility]    COLONOSCOPY N/A 09/17/2021    Procedure: COLONOSCOPY;  Surgeon: Aayush George MD;  Location: [facility]    Colonoscopy, hyperplastic polyps, repeat in 5 yrs  2004    EP PACEMAKER DEVICE & LEAD IMPLANT- RIGHT ATRIAL & RIGHT VENTRICULAR Left 10/24/2022    Procedure: Pacemaker Device & Lead Implant - Right Atrial & Right Ventricular;  Surgeon: Demond Meyer MD;  Location:  HEART CARDIAC CATH LAB    ESOPHAGOSCOPY, GASTROSCOPY, DUODENOSCOPY (EGD), COMBINED  09/30/2013    Procedure: COMBINED ESOPHAGOSCOPY, GASTROSCOPY, DUODENOSCOPY (EGD), BIOPSY SINGLE OR MULTIPLE;  Gastroscopy;  Surgeon: Blaise Gill MD;  Location: [facility]    EYE SURGERY  2012    R/L Cataracts    FUSION TRANSFORAMINAL LUMBAR INTERBODY, 1 LEVEL, POSTERIOR APPROACH, USING OTS SURG IMAGING GUIDANCE N/A 7/3/2024    Procedure: LUMBAR 1- LUMBAR 2 TRANSFORAMINAL INTERBODY FUSION, POSTERIOR INSTRUMENTED FUSION, DECOMPRESSION, WITH O-ARM AND STEALTH NAVIGATION;   Surgeon: Chavo Patel MD;  Location: [facility] Main OR    HC REMOVE TONSILS/ADENOIDS,13+ Y/O      T & A 13+y.o.    HERNIORRHAPHY INCISIONAL (LOCATION) N/A 2021    Procedure: HERNIORRHAPHY, INCISIONAL, OPEN WITH MESH;  Surgeon: Aayush George MD;  Location: [facility]    HYSTERECTOMY, PAP NO LONGER INDICATED      LAPAROSCOPIC SALPINGO-OOPHORECTOMY N/A 2020    Procedure: Laparoscopy, removal or pelvic mass, both tubes and ovaries, omentectomy, anterior culvectomy, pelvic and para aortic lymph node dissection, laparotomy;  Surgeon: Felipe Corona MD;  Location: [facility] OR    DC ANESTH,LUMBAR SPINE,CORD SURGERY  10/2020    L3-4 L4-5 TRANSFORAMINAL INTERBODY FUSION L3-5, POSTERIOR INSTRUMENTED FUSION AND DECOMPRESSION    TVH for DUB, ovaries in      VASCULAR SURGERY      Taylor closure    ZZC ANESTH,KNEE VEINS SURGERY  2014       FAMILY HISTORY:  Family History   Problem Relation Age of Onset    Cancer Mother         uterine cancer,     Respiratory Mother         COPD    Cardiovascular Mother         AAA  age 80    Eye Disorder Father         cataracts    Depression Father     Snoring Father     Breast Cancer Sister     Breast Cancer Sister         X2    Breast Cancer Maternal Grandmother     Cancer Maternal Grandfather         cancer unknown type    Depression Son     Depression Son     Cancer - colorectal No family hx of         no ovarian cancer       SOCIAL HISTORY:  Social History     Socioeconomic History    Marital status:      Spouse name: Nelson Blackman    Number of children: 2    Years of education: 13+    Highest education level: None   Occupational History    Occupation:      Comment: [employer]   Tobacco Use    Smoking status: Never    Smokeless tobacco: Never   Vaping Use    Vaping status: Never Used   Substance and Sexual Activity    Alcohol use: No    Drug use: No    Sexual activity: Not Currently     Partners: Male     Birth  control/protection: Surgical   Other Topics Concern    Parent/sibling w/ CABG, MI or angioplasty before 65F 55M? No     Social Drivers of Health     Financial Resource Strain: Low Risk  (2/25/2025)    Financial Resource Strain     Within the past 12 months, have you or your family members you live with been unable to get utilities (heat, electricity) when it was really needed?: No   Food Insecurity: Low Risk  (2/25/2025)    Food Insecurity     Within the past 12 months, did you worry that your food would run out before you got money to buy more?: No     Within the past 12 months, did the food you bought just not last and you didn t have money to get more?: No   Transportation Needs: Low Risk  (2/25/2025)    Transportation Needs     Within the past 12 months, has lack of transportation kept you from medical appointments, getting your medicines, non-medical meetings or appointments, work, or from getting things that you need?: No   Physical Activity: Patient Declined (2/25/2025)    Exercise Vital Sign     Days of Exercise per Week: Patient declined     Minutes of Exercise per Session: Patient declined   Stress: No Stress Concern Present (2/25/2025)    [employer] [place] [employer] - Occupational Stress Questionnaire     Feeling of Stress : Not at all   Social Connections: Unknown (2/25/2025)    Social Connection and Isolation Panel [NHANES]     Frequency of Social Gatherings with Friends and Family: Three times a week   Interpersonal Safety: Low Risk  (4/7/2025)    Interpersonal Safety     Do you feel physically and emotionally safe where you currently live?: Yes     Within the past 12 months, have you been hit, slapped, kicked or otherwise physically hurt by someone?: No     Within the past 12 months, have you been humiliated or emotionally abused in other ways by your partner or ex-partner?: No   Housing Stability: Low Risk  (2/25/2025)    Housing Stability     Do you have housing? : Yes     Are you  worried about losing your housing?: No

## 2025-07-28 NOTE — TELEPHONE ENCOUNTER
Gema,     CLAUDIA transmission received. Pt has been in afib since 6/3/25. Ventricular rates controlled. Taking eliquis. No ventricular arrhythmias logged.    Presenting EGM      Heart Rate Histograms      Reji,Device Specialist

## 2025-07-29 ENCOUNTER — OFFICE VISIT (OUTPATIENT)
Dept: CARDIOLOGY | Facility: CLINIC | Age: 85
End: 2025-07-29
Attending: PHYSICIAN ASSISTANT
Payer: MEDICARE

## 2025-07-29 VITALS
BODY MASS INDEX: 40.03 KG/M2 | SYSTOLIC BLOOD PRESSURE: 113 MMHG | RESPIRATION RATE: 16 BRPM | WEIGHT: 217.5 LBS | HEIGHT: 62 IN | HEART RATE: 70 BPM | OXYGEN SATURATION: 97 % | DIASTOLIC BLOOD PRESSURE: 69 MMHG

## 2025-07-29 DIAGNOSIS — I48.0 PAROXYSMAL ATRIAL FIBRILLATION (H): ICD-10-CM

## 2025-07-29 DIAGNOSIS — I48.19 PERSISTENT ATRIAL FIBRILLATION (H): Primary | ICD-10-CM

## 2025-07-29 RX ORDER — AMIODARONE HYDROCHLORIDE 200 MG/1
TABLET ORAL
Qty: 60 TABLET | Refills: 3 | Status: SHIPPED | OUTPATIENT
Start: 2025-07-29 | End: 2025-09-11

## 2025-07-29 RX ORDER — OMEGA-3 FATTY ACIDS/FISH OIL 300-1000MG
CAPSULE ORAL
COMMUNITY

## 2025-07-29 NOTE — PATIENT INSTRUCTIONS
Roise - it was nice to see you and Nelson today!     At your visit today we reviewed:    You're still in AFib based on device check  ?? Could this be causing more trouble breathing/wheezing?  ?? Could this causing the weakening of the heart muscle?     Medication Changes:    Start amiodarone - a strong rhythm medication to help get you/keep you back in normal rhythm   Start amiodarone 200 mg TWICE a day ( -- ), then reduce to 200 mg ONCE a day (starting )    Recommendations:    Cardioversion at [facility] will call to set up ... Want to load up the amiodarone first   Do not miss any doses of Eliquis before or after the procedure     Follow-up:    See me or Dr. Rollins  for cardiology follow up after cardioversion but CALL Cardiology nurses Janet & Jessie @ [phone] for any issues, questions or concerns in the interim.      To schedule a future appointment, we kindly ask that you call cardiology scheduling at [phone] three months prior to requested visit date.    Important Phone Numbers for [facility]):    [place] [facility] Janet [facility] Jessie: [phone]  Cardiology [phone]

## 2025-07-29 NOTE — LETTER
7/29/2025    Kathya Debra Ramoson, DO  [address] [facility] [address]    RE: Rosie Blackman       Dear Colleague,     I had the pleasure of seeing Rosie Blackman in the [facility].  [facility]    I had the pleasure of seeing Rosie when she came for follow up of AFib.  This 85 year old sees Dr. Rollins for her history of:    1.  H/o TIA - 3/2022  2.  Persistent AFib, SSS/post conversion pauses- first noted 8/2022 (not seen on monitoring following TIA 3/2022).  Noted to have significant postconversion pause when spontaneously converted after DCCV x3 were not successful.  S/p dual-chamber [employer] PPM 10/2022 with Dr. Meyer.  Has continued to have pAFib on device interrogations, with increasing burden and has now been persistent since 6/2025.   3.  HTN  4. R Ovarian cancer - sees Dr. Sinclair. S/p BSO with LN dissection  5.  Mild CM - EF ~40% on echo 2025, felt similar to EF 2024 (listed as 50%) d/t beat-beat variability.      Last Visit & Interval History:  I saw Rosie and Nelson 5/2025 at which time she was doing well overall, but continued to recover from back surgery.  We reviewed increasing arrhythmias, up to 47% 5/2025, with HR  >100 bpm ~25% of the time.  I increased Diltiazem to 360 mg daily for rate control. Follow-up check a few weeks later indicated she was still having paroxysms of this.    Most recent device check 6/2025 showed she had been in AFib since 5/22. Updated device check 7/28/2025 showed persistent AFib/flutter since 6/3/25. Controlled HR between 60s-150s, ventricular rates controlled.       Today's Visit:  Rosie is doing well overall. States that she does not feel that she in AFib. Denies any palpitations, lightheadedness, or dizziness. No pre-syncope or syncope. Reviewed now persistent arrhythmias seen on device checks since at least 6/2.     Recovery from back surgery on 7/3/2024 has gone ok but it has taken her a while to get back to her  "normal. Still taking meloxicam on occasion for back pain. States that she has felt somewhat more weak the last few months with activities around the house and grocery shopping. Unsure if this decrease in stamina is due to her back surgery recovery, atrial fibrillation, or something else.     States that she has also felt more short of breath and wheezy for a few months. Believes that her worsened breathing may be d/t air quality or phlegm in the back of her throat. Denies fevers or chills. Again, unsure if related to now persistent AFib     Takes blood pressures at home regularly and these have been stable around 110s/60s.    Reports chronic lower extremity lymphedema. States that this has been much better of late compared to her previous.     VITALS:  Vitals: /69   Pulse 70   Resp 16   Ht 1.575 m (5' 2\")   Wt 98.7 kg (217 lb 8 oz)   SpO2 97%   BMI 39.78 kg/m      Diagnostic Testing:  Courtesy device check 7/28/2025 5% AP and 38%  in DDDR 60/130. Underlying rhythm was AF/AFlutter. Episode has been in progress since 6/3. Ventricular rates controlled and no ventricular arrhythmias. HR ranging from  bpm.   Device Check 6/3/2025 10% AP and 26%  in DDDR 60/130.  Underlying was AF.  Episode has been in progress since 5/22 with rates >100 bpm ~25% of the time.   Device check 5/8/2025 underlying AF with .  16% AP and 20%  in DDDR 60/130.  HR >100 bpm 25-30% of the time.  This episode was in progress since 5/6 with rates .  Longest episode 68 hours  Echo 4/2025 EF 40% with general HK.  Mild ABELINO.  Mild-moderate MR.  Mild/moderate TR.  No significant change compared with 2024, noting iovh-ax-[id number] review showed tpki-zr-[id number] variability with AF  Nuclear stress test 6/2024 small area of nontransmural infarct in distal apical/anterior segment of LV with mild degree of leni-infarct ischemia.  Normal EF.  Compared with 2023, defect was similar but now with mild reversibility  Echo 6/12/2024 LVEF " 50-55%, with mild HK of mid/basal anterolateral/inferolateral walls.  No thrombus.  Normal RV.  Mildly dilated LA.  1-2+ MR.  1+ TR.  RVSP 34+ RAP.  Mild aortic sclerosis.  1+ PI.  SR.  Normal aorta  Nuc Stress Test 4/2023 small area of infarction in distal anterior segment. No ischemia.  Echocardiogram 8/2022-LVEF 60-65%. G2 DD.  Normal RV.  1+ MR.  RVSP 30+ RAP.  Aortic sclerosis  Component      Latest Ref Rng 7/4/2024  10:26 AM 2/25/2025  10:31 AM   Sodium      135 - 145 mmol/L 138  142    Potassium      3.4 - 5.3 mmol/L 3.7  4.0    Chloride      98 - 107 mmol/L 102  106    Carbon Dioxide (CO2)      22 - 29 mmol/L 26  27    Anion Gap      7 - 15 mmol/L 10  9    Urea Nitrogen      8.0 - 23.0 mg/dL 15.9  11.6    Creatinine      0.51 - 0.95 mg/dL 0.81  0.82    GFR Estimate      >60 mL/min/1.73m2 72  70    Calcium      8.8 - 10.4 mg/dL 8.8  9.8    Glucose      70 - 99 mg/dL 190 (H)  112 (H)       Legend:  Component      Latest Ref Rng 2/25/2025  10:31 AM   WBC      4.0 - 11.0 10e3/uL 5.2    RBC Count      3.80 - 5.20 10e6/uL 4.60    Hemoglobin      11.7 - 15.7 g/dL 14.4    Hematocrit      35.0 - 47.0 % 43.5    MCV      78 - 100 fL 95    MCH      26.5 - 33.0 pg 31.3    MCHC      31.5 - 36.5 g/dL 33.1    RDW      10.0 - 15.0 % 13.1    Platelet Count      150 - 450 10e3/uL 247        Plan:  Initiate amiodarone therapy with load  Card[facility]version at [facility]  Continue AC with Eliquis   Follow-up with Dr. Rollins or me to determine if symptoms of RECIO/wheezing and stamina improved with restoration of SR  Plan to order updated echocardiogram 2-3 months following cardioversion to see if EF improved with restoration of SR    Assessment/Plan:    Now persistent AFib,SSS  S/p dual-chamber [employer] pacemaker 10/2022 for post-conversion pauses.  Remains on Diltiazem 360 mg daily and Metoprolol  mg BID.  Courtesy device check just yesterday showed 5% AP and 38%  in DDDR 60/130. Underlying rhythm was AF/AFlutter. Episode  has been in progress since 6/3. Ventricular rates controlled and no ventricular arrhythmias. Rates ranging from  bpm.    Echo showed EF ~40% d/t beat-beat variability in AFib     Reviewed that unsure if EF has dropped since 2024 d/t increase in AFib burden. Also unsure if breathing/wheezing and stamina issues are due to her persistent arrhythmia    Continues AC for [id number] (HTN, CVA, age, sex). Dose appropriate for age/weight/creatinine.  Last hemoglobin 2/2025 wnl  Confirms no recent missed doses of Eliquis    PLAN:  Will attempt restoration of SR to see if breathing/wheezing, stamina, and EF improve  Amiodarone 200 mg BID x 2 weeks, then 200 mg daily  DCCV in ~3-4 weeks. Will get device check a few days prior to ensure no chemical conversion.  We discussed Risk, Benefits and Indication of proceeding with direct current cardioversion (DCCV), including but not limited to use of anesthesia, surface burns, [id number]-cj-[id number] arrhythmias requiring further treatment, discomfort and stroke.  The patient voiced understanding and understands that a  will be needed given the use of conscious sedation. A consent form will be signed by the procedural physician.  Cannot miss any doses of Eliquis  HOLD hydrochlorothiazide AM of procedure  Has [employer] device  Home same day and will require a     Continue AC without interruption    Patient will follow up post-cardioversion with me or Dr. Rollins to reevaluate and see if she is feeling improved should she convert back into SR.   If so, will continue amiodarone and assess LVEF.   If no improvement in stamina, RECIO/wheezing, or EF, will likely stop amiodarone     Mild cardiomyopathy   Routine echo 6/2024 showed mild drop in LVEF to 50-55%.  She had mild HK of the mid/basal anterolateral and inferolateral walls.  Last checked in 2022, EF was normal and no RWMA noted.  Updated echo 4/2025 showed EF 40%, but reader felt actually very similar to the echo done  6/2024 and ujly-po-[id number] variability was likely cause of it looking lower.   Stress test 6/2024 showed no major areas of ischemia  Reviewed that she's been having more AFib, confirmed on most recent device courtesy check on 7/28/25 showing AFib/flutter since 6/3/25  Only 20%   Euvolemic on exam    PLAN:  Continue Valsartan 80 and Metoprolol  mg BID  Watch LVEF carefully with more AFib - plan to order updated echocardiogram 2-3 months after SR is restored    3.  HTN  Remains on Diltiazem  mg daily, Metoprolol  mg BID and valsartan 80 mg daily  BPs stable at home and here in clinic    PLAN:  Continue medications as above.      4. On amiodarone therapy  Discussed risks and benefits of initiating amiodarone therapy today due to persistent atrial fibrillation and patient verbalizes understanding and agreement with the plan.   Discussed plan for short-term treatment with amiodarone to evaluate whether or not she is truly symptomatic with her AFib and if her LVEF improves once she converts back into SR.  Instructed her to contact the clinic should she experience any unusual side effects after initiating amiodarone.      Plan:  Initiate amiodarone therapy and loading doses prior to cardioversion at [facility]  Will check baseline amiodarone labs at time of cardioversion.      5. Shortness of breath, wheezing, decreased stamina  Rosie reports a few months of new shortness of breath, wheezing, and weakness. States that she thinks her breathing may be due to the air quality or phlegm building up in the back of her throat and is unsure if her decreased stamina may be due to her recovery from her back surgery last summer.   Lungs clear on exam today without crackles, rhonchi, or wheezing. She denies any recent fevers or chills.   Will reevaluate these symptoms following initiation of amiodarone therapy and subsequent cardioversion to see if her AFib was indeed causing these symptoms or if she needs separate  workup.      Plan:  Monitor and reevaluate symptoms following amiodarone therapy and cardioversion.       SAÚL Callahan     I  was present with the GUIL[employer] student who participated in the service and in the documentation of the note.  I have verified the history and personally performed the physical exam and medical decision making.  I agree with the assessment and plan of care as documented in the note.       Items personally reviewed: vitals, labs, and device check and agree with the interpretation documented in the note.  I agree with the interpretation documented in the note and the assessment and plan as above.      The longitudinal plan of care for the diagnosis(es)/condition(s) as documented were addressed during this visit. Due to the added complexity in care, I will continue to support Rosie in the subsequent management and with ongoing continuity of care.    Gema Potter PA-C, MSPAS      No orders of the defined types were placed in this encounter.    Orders Placed This Encounter   Medications     omega 3 1000 MG CAPS     Sig: Take by mouth.     amiodarone (PACERONE) 200 MG tablet     Sig: Take 1 tablet (200 mg) by mouth 2 times daily for 14 days, THEN 1 tablet (200 mg) daily.     Dispense:  60 tablet     Refill:  3     Medications Discontinued During This Encounter   Medication Reason     acetaminophen (TYLENOL) 325 MG tablet        Encounter Diagnoses   Name Primary?     Paroxysmal atrial fibrillation (H)      Persistent atrial fibrillation (H) Yes       CURRENT MEDICATIONS:  Current Outpatient Medications   Medication Sig Dispense Refill     amiodarone (PACERONE) 200 MG tablet Take 1 tablet (200 mg) by mouth 2 times daily for 14 days, THEN 1 tablet (200 mg) daily. 60 tablet 3     apixaban ANTICOAGULANT (ELIQUIS ANTICOAGULANT) 5 MG tablet Take 1 tablet (5 mg) by mouth 2 times daily 180 tablet 3     diltiazem ER (TIAZAC) 360 MG 24 hr ER beaded capsule Take 1 capsule (360 mg) by mouth daily. 90 capsule 0  "    hydrochlorothiazide (HYDRODIURIL) 25 MG tablet TAKE 1 TABLET BY MOUTH EVERY DAY 90 tablet 1     meloxicam (MOBIC) 15 MG tablet Take 15 mg by mouth as needed for moderate pain.       metoprolol succinate ER (TOPROL XL) 100 MG 24 hr tablet Take 1.5 tablets (150 mg) by mouth 2 times daily. 270 tablet 3     omega 3 1000 MG CAPS Take by mouth.       pravastatin (PRAVACHOL) 80 MG tablet Take 1 tablet (80 mg) by mouth daily. 90 tablet 3     THERATEARS 0.25 % OP SOLN Place 2 drops into both eyes as needed       valsartan (DIOVAN) 80 MG tablet TAKE 1 TABLET BY MOUTH EVERY DAY 90 tablet 1     Vitamin D, Cholecalciferol, 25 MCG (1000 UT) CAPS Take by mouth.         ALLERGIES     Allergies   Allergen Reactions     Atorvastatin Other (See Comments)     Muscle Pain     Ezetimibe Other (See Comments)     Severe muscle aches     Irbesartan Cramps     Lisinopril      Omeprazole      Other reaction(s): *Unknown     Prilosec [Omeprazole]      Rosuvastatin Other (See Comments)     Muscle Pain     Zestril [Ace Inhibitors] Cough         Review of Systems:  Skin:        Eyes:       ENT:       Respiratory:  Positive for dyspnea on exertion, wheezing  Cardiovascular:    Positive for, lower extremity symptoms, edema, lightheadedness  Gastroenterology:      Genitourinary:       Musculoskeletal:       Neurologic:       Psychiatric:       Heme/Lymph/Imm:       Endocrine:         Physical Exam:  Vitals: /69   Pulse 70   Resp 16   Ht 1.575 m (5' 2\")   Wt 98.7 kg (217 lb 8 oz)   SpO2 97%   BMI 39.78 kg/m      Constitutional:  cooperative, alert and oriented, well developed, well nourished, in no acute distress        Skin:  warm and dry to the touch, no apparent skin lesions or masses noted        Head:  normocephalic, no masses or lesions        Eyes:  pupils equal and round, conjunctivae and lids unremarkable, sclera white        ENT:  no pallor or cyanosis        Neck:  not assessed this visit        Chest:  normal breath " sounds, clear to auscultation, normal A-P diameter, normal symmetry, normal respiratory excursion, no use of accessory muscles        Cardiac:   irregularly irregular rhythm                Abdomen:  not assessed this visit        Vascular: pulses full and equal                                      Extremities and Back:  no deformities, clubbing, cyanosis, erythema observed        Neurological:  no gross motor deficits            PAST MEDICAL HISTORY:  Past Medical History:   Diagnosis Date     Chronic airway obstruction (H)      Diastolic dysfunction 06/28/2022     Gastroesophageal reflux disease      Hiatal hernia      Hypertension      Irregular heart beat      Malignant neoplasm of ovary (H)      Ovarian cancer, unspecified laterality (H) 03/10/2021     Personal history of contact with and (suspected) exposure to asbestos      Primary osteoarthritis of right hip 07/09/2020       PAST SURGICAL HISTORY:  Past Surgical History:   Procedure Laterality Date     BIOPSY BREAST Left      BREAST BIOPSY, CORE RT/LT  1994     CHOLECYSTECTOMY  1995     COLONOSCOPY  05/2010    Diverticulosis, colon polyps; repeat 5 yrs     COLONOSCOPY N/A 11/16/2015    Procedure: COLONOSCOPY;  Surgeon: Alejandro Matos MD;  Location: [facility]     COLONOSCOPY N/A 09/17/2021    Procedure: COLONOSCOPY;  Surgeon: Aayush George MD;  Location: [facility]     Colonoscopy, hyperplastic polyps, repeat in 5 yrs  2004     EP PACEMAKER DEVICE & LEAD IMPLANT- RIGHT ATRIAL & RIGHT VENTRICULAR Left 10/24/2022    Procedure: Pacemaker Device & Lead Implant - Right Atrial & Right Ventricular;  Surgeon: Demond Meyer MD;  Location:  HEART CARDIAC CATH LAB     ESOPHAGOSCOPY, GASTROSCOPY, DUODENOSCOPY (EGD), COMBINED  09/30/2013    Procedure: COMBINED ESOPHAGOSCOPY, GASTROSCOPY, DUODENOSCOPY (EGD), BIOPSY SINGLE OR MULTIPLE;  Gastroscopy;  Surgeon: Blaise Gill MD;  Location: [facility]     EYE SURGERY  2012    R/L Cataracts     FUSION  TRANSFORAMINAL LUMBAR INTERBODY, 1 LEVEL, POSTERIOR APPROACH, USING OTS SURG IMAGING GUIDANCE N/A 7/3/2024    Procedure: LUMBAR 1- LUMBAR 2 TRANSFORAMINAL INTERBODY FUSION, POSTERIOR INSTRUMENTED FUSION, DECOMPRESSION, WITH O-ARM AND STEALTH NAVIGATION;  Surgeon: Chavo Patel MD;  Location: [facility] Main OR     HC REMOVE TONSILS/ADENOIDS,13+ Y/O      T & A 13+y.o.     HERNIORRHAPHY INCISIONAL (LOCATION) N/A 2021    Procedure: HERNIORRHAPHY, INCISIONAL, OPEN WITH MESH;  Surgeon: Aayush George MD;  Location: [facility]     HYSTERECTOMY, PAP NO LONGER INDICATED       LAPAROSCOPIC SALPINGO-OOPHORECTOMY N/A 2020    Procedure: Laparoscopy, removal or pelvic mass, both tubes and ovaries, omentectomy, anterior culvectomy, pelvic and para aortic lymph node dissection, laparotomy;  Surgeon: Felipe Corona MD;  Location: [facility] OR     OR ANESTH,LUMBAR SPINE,CORD SURGERY  10/2020    L3-4 L4-5 TRANSFORAMINAL INTERBODY FUSION L3-5, POSTERIOR INSTRUMENTED FUSION AND DECOMPRESSION     TVH for DUB, ovaries in       VASCULAR SURGERY      Taylor closure     ZZC ANESTH,KNEE VEINS SURGERY  2014       FAMILY HISTORY:  Family History   Problem Relation Age of Onset     Cancer Mother         uterine cancer,      Respiratory Mother         COPD     Cardiovascular Mother         AAA  age 80     Eye Disorder Father         cataracts     Depression Father      Snoring Father      Breast Cancer Sister      Breast Cancer Sister         X2     Breast Cancer Maternal Grandmother      Cancer Maternal Grandfather         cancer unknown type     Depression Son      Depression Son      Cancer - colorectal No family hx of         no ovarian cancer       SOCIAL HISTORY:  Social History     Socioeconomic History     Marital status:      Spouse name: Nelson Blackman     Number of children: 2     Years of education: 13+     Highest education level: None   Occupational History     Occupation: Office  Support     Comment: [employer] D[employer]   Tobacco Use     Smoking status: Never     Smokeless tobacco: Never   Vaping Use     Vaping status: Never Used   Substance and Sexual Activity     Alcohol use: No     Drug use: No     Sexual activity: Not Currently     Partners: Male     Birth control/protection: Surgical   Other Topics Concern     Parent/sibling w/ CABG, MI or angioplasty before 65F 55M? No     Social Drivers of Health     Financial Resource Strain: Low Risk  (2/25/2025)    Financial Resource Strain      Within the past 12 months, have you or your family members you live with been unable to get utilities (heat, electricity) when it was really needed?: No   Food Insecurity: Low Risk  (2/25/2025)    Food Insecurity      Within the past 12 months, did you worry that your food would run out before you got money to buy more?: No      Within the past 12 months, did the food you bought just not last and you didn t have money to get more?: No   Transportation Needs: Low Risk  (2/25/2025)    Transportation Needs      Within the past 12 months, has lack of transportation kept you from medical appointments, getting your medicines, non-medical meetings or appointments, work, or from getting things that you need?: No   Physical Activity: Patient Declined (2/25/2025)    Exercise Vital Sign      Days of Exercise per Week: Patient declined      Minutes of Exercise per Session: Patient declined   Stress: No Stress Concern Present (2/25/2025)    [employer] - Occupational Stress Questionnaire      Feeling of Stress : Not at all   Social Connections: Unknown (2/25/2025)    Social Connection and Isolation Panel [NHANES]      Frequency of Social Gatherings with Friends and Family: Three times a week   Interpersonal Safety: Low Risk  (4/7/2025)    Interpersonal Safety      Do you feel physically and emotionally safe where you currently live?: Yes      Within the past 12 months, have you been hit,  slapped, kicked or otherwise physically hurt by someone?: No      Within the past 12 months, have you been humiliated or emotionally abused in other ways by your partner or ex-partner?: No   Housing Stability: Low Risk  (2/25/2025)    Housing Stability      Do you have housing? : Yes      Are you worried about losing your housing?: No               Thank you for allowing me to participate in the care of your patient.      Sincerely,     Jinny Potter PA-C     [facility]  cc:   Jinyn Potter PA-C  [address]  [place] [address]

## 2025-07-30 ENCOUNTER — HOSPITAL ENCOUNTER (OUTPATIENT)
Facility: CLINIC | Age: 85
End: 2025-07-30
Payer: MEDICARE

## 2025-07-30 LAB
MDC_IDC_EPISODE_DTM: NORMAL
MDC_IDC_EPISODE_DURATION: 1 S
MDC_IDC_EPISODE_DURATION: 18 S
MDC_IDC_EPISODE_DURATION: 28 S
MDC_IDC_EPISODE_DURATION: 38 S
MDC_IDC_EPISODE_DURATION: 5 S
MDC_IDC_EPISODE_DURATION: 568 S
MDC_IDC_EPISODE_ID: NORMAL
MDC_IDC_EPISODE_TYPE: NORMAL
MDC_IDC_LEAD_CONNECTION_STATUS: NORMAL
MDC_IDC_LEAD_CONNECTION_STATUS: NORMAL
MDC_IDC_LEAD_IMPLANT_DT: NORMAL
MDC_IDC_LEAD_IMPLANT_DT: NORMAL
MDC_IDC_LEAD_LOCATION: NORMAL
MDC_IDC_LEAD_LOCATION: NORMAL
MDC_IDC_LEAD_LOCATION_DETAIL_1: NORMAL
MDC_IDC_LEAD_LOCATION_DETAIL_1: NORMAL
MDC_IDC_LEAD_MFG: NORMAL
MDC_IDC_LEAD_MFG: NORMAL
MDC_IDC_LEAD_MODEL: NORMAL
MDC_IDC_LEAD_MODEL: NORMAL
MDC_IDC_LEAD_POLARITY_TYPE: NORMAL
MDC_IDC_LEAD_POLARITY_TYPE: NORMAL
MDC_IDC_LEAD_SERIAL: NORMAL
MDC_IDC_LEAD_SERIAL: NORMAL
MDC_IDC_MSMT_BATTERY_DTM: NORMAL
MDC_IDC_MSMT_BATTERY_REMAINING_LONGEVITY: 102 MO
MDC_IDC_MSMT_BATTERY_REMAINING_PERCENTAGE: 100 %
MDC_IDC_MSMT_BATTERY_STATUS: NORMAL
MDC_IDC_MSMT_LEADCHNL_RA_IMPEDANCE_VALUE: 516 OHM
MDC_IDC_MSMT_LEADCHNL_RV_IMPEDANCE_VALUE: 637 OHM
MDC_IDC_MSMT_LEADCHNL_RV_PACING_THRESHOLD_AMPLITUDE: 1 V
MDC_IDC_MSMT_LEADCHNL_RV_PACING_THRESHOLD_PULSEWIDTH: 0.4 MS
MDC_IDC_PG_IMPLANT_DTM: NORMAL
MDC_IDC_PG_MFG: NORMAL
MDC_IDC_PG_MODEL: NORMAL
MDC_IDC_PG_SERIAL: NORMAL
MDC_IDC_PG_TYPE: NORMAL
MDC_IDC_SESS_CLINIC_NAME: NORMAL
MDC_IDC_SESS_DTM: NORMAL
MDC_IDC_SESS_TYPE: NORMAL
MDC_IDC_SET_BRADY_AT_MODE_SWITCH_MODE: NORMAL
MDC_IDC_SET_BRADY_AT_MODE_SWITCH_RATE: 170 {BEATS}/MIN
MDC_IDC_SET_BRADY_LOWRATE: 60 {BEATS}/MIN
MDC_IDC_SET_BRADY_MAX_SENSOR_RATE: 130 {BEATS}/MIN
MDC_IDC_SET_BRADY_MAX_TRACKING_RATE: 130 {BEATS}/MIN
MDC_IDC_SET_BRADY_MODE: NORMAL
MDC_IDC_SET_BRADY_PAV_DELAY_HIGH: 200 MS
MDC_IDC_SET_BRADY_PAV_DELAY_LOW: 300 MS
MDC_IDC_SET_BRADY_SAV_DELAY_HIGH: 200 MS
MDC_IDC_SET_BRADY_SAV_DELAY_LOW: 300 MS
MDC_IDC_SET_LEADCHNL_RA_PACING_AMPLITUDE: 5 V
MDC_IDC_SET_LEADCHNL_RA_PACING_CAPTURE_MODE: NORMAL
MDC_IDC_SET_LEADCHNL_RA_PACING_POLARITY: NORMAL
MDC_IDC_SET_LEADCHNL_RA_PACING_PULSEWIDTH: 0.4 MS
MDC_IDC_SET_LEADCHNL_RA_SENSING_ADAPTATION_MODE: NORMAL
MDC_IDC_SET_LEADCHNL_RA_SENSING_POLARITY: NORMAL
MDC_IDC_SET_LEADCHNL_RA_SENSING_SENSITIVITY: 0.15 MV
MDC_IDC_SET_LEADCHNL_RV_PACING_AMPLITUDE: 1.3 V
MDC_IDC_SET_LEADCHNL_RV_PACING_CAPTURE_MODE: NORMAL
MDC_IDC_SET_LEADCHNL_RV_PACING_POLARITY: NORMAL
MDC_IDC_SET_LEADCHNL_RV_PACING_PULSEWIDTH: 0.4 MS
MDC_IDC_SET_LEADCHNL_RV_SENSING_ADAPTATION_MODE: NORMAL
MDC_IDC_SET_LEADCHNL_RV_SENSING_POLARITY: NORMAL
MDC_IDC_SET_LEADCHNL_RV_SENSING_SENSITIVITY: 1.5 MV
MDC_IDC_SET_ZONE_DETECTION_INTERVAL: 375 MS
MDC_IDC_SET_ZONE_STATUS: NORMAL
MDC_IDC_SET_ZONE_TYPE: NORMAL
MDC_IDC_SET_ZONE_VENDOR_TYPE: NORMAL
MDC_IDC_STAT_AT_BURDEN_PERCENT: 74 %
MDC_IDC_STAT_AT_DTM_END: NORMAL
MDC_IDC_STAT_AT_DTM_START: NORMAL
MDC_IDC_STAT_BRADY_DTM_END: NORMAL
MDC_IDC_STAT_BRADY_DTM_START: NORMAL
MDC_IDC_STAT_BRADY_RA_PERCENT_PACED: 5 %
MDC_IDC_STAT_BRADY_RV_PERCENT_PACED: 38 %
MDC_IDC_STAT_EPISODE_RECENT_COUNT: 0
MDC_IDC_STAT_EPISODE_RECENT_COUNT: 0
MDC_IDC_STAT_EPISODE_RECENT_COUNT: 51
MDC_IDC_STAT_EPISODE_RECENT_COUNT_DTM_END: NORMAL
MDC_IDC_STAT_EPISODE_RECENT_COUNT_DTM_START: NORMAL
MDC_IDC_STAT_EPISODE_TYPE: NORMAL
MDC_IDC_STAT_EPISODE_VENDOR_TYPE: NORMAL
MDC_IDC_STAT_EPISODE_VENDOR_TYPE: NORMAL

## 2025-08-04 DIAGNOSIS — I48.0 PAROXYSMAL ATRIAL FIBRILLATION (H): ICD-10-CM

## 2025-08-04 RX ORDER — DILTIAZEM HYDROCHLORIDE 360 MG/1
360 CAPSULE, EXTENDED RELEASE ORAL DAILY
Qty: 90 CAPSULE | Refills: 1 | Status: SHIPPED | OUTPATIENT
Start: 2025-08-04

## 2025-08-25 ENCOUNTER — TELEPHONE (OUTPATIENT)
Dept: CARDIOLOGY | Facility: CLINIC | Age: 85
End: 2025-08-25

## 2025-08-26 ENCOUNTER — ANESTHESIA (OUTPATIENT)
Dept: SURGERY | Facility: CLINIC | Age: 85
End: 2025-08-26
Payer: MEDICARE

## 2025-08-26 ENCOUNTER — HOSPITAL ENCOUNTER (OUTPATIENT)
Dept: MEDSURG UNIT | Facility: CLINIC | Age: 85
Discharge: HOME | End: 2025-08-26
Attending: PHYSICIAN ASSISTANT | Admitting: INTERNAL MEDICINE
Payer: MEDICARE

## 2025-08-26 ENCOUNTER — ANESTHESIA EVENT (OUTPATIENT)
Dept: SURGERY | Facility: CLINIC | Age: 85
End: 2025-08-26
Payer: MEDICARE

## 2025-08-26 ENCOUNTER — HOSPITAL ENCOUNTER (OUTPATIENT)
Facility: CLINIC | Age: 85
Discharge: HOME | End: 2025-08-26
Admitting: INTERNAL MEDICINE
Payer: MEDICARE

## 2025-08-26 VITALS
SYSTOLIC BLOOD PRESSURE: 118 MMHG | DIASTOLIC BLOOD PRESSURE: 64 MMHG | WEIGHT: 219 LBS | TEMPERATURE: 97.3 F | OXYGEN SATURATION: 100 % | HEIGHT: 64 IN | HEART RATE: 61 BPM | RESPIRATION RATE: 16 BRPM | BODY MASS INDEX: 37.39 KG/M2

## 2025-08-26 DIAGNOSIS — I48.19 PERSISTENT ATRIAL FIBRILLATION (H): ICD-10-CM

## 2025-08-26 DIAGNOSIS — Z79.899 ON AMIODARONE THERAPY: ICD-10-CM

## 2025-08-26 DIAGNOSIS — I48.19 PERSISTENT ATRIAL FIBRILLATION (H): Primary | ICD-10-CM

## 2025-08-26 LAB
ALBUMIN SERPL BCG-MCNC: 4 G/DL (ref 3.5–5.2)
ALP SERPL-CCNC: 83 U/L (ref 40–150)
ALT SERPL W P-5'-P-CCNC: 29 U/L (ref 0–50)
ANION GAP SERPL CALCULATED.3IONS-SCNC: 13 MMOL/L (ref 7–15)
AST SERPL W P-5'-P-CCNC: 33 U/L (ref 0–45)
BILIRUB SERPL-MCNC: 1 MG/DL
BUN SERPL-MCNC: 14.3 MG/DL (ref 8–23)
CALCIUM SERPL-MCNC: 9.5 MG/DL (ref 8.8–10.4)
CHLORIDE SERPL-SCNC: 104 MMOL/L (ref 98–107)
CREAT SERPL-MCNC: 0.88 MG/DL (ref 0.51–0.95)
EGFRCR SERPLBLD CKD-EPI 2021: 64 ML/MIN/1.73M2
GLUCOSE SERPL-MCNC: 108 MG/DL (ref 70–99)
HCO3 SERPL-SCNC: 26 MMOL/L (ref 22–29)
MAGNESIUM SERPL-MCNC: 1.9 MG/DL (ref 1.7–2.3)
POTASSIUM SERPL-SCNC: 3.9 MMOL/L (ref 3.4–5.3)
POTASSIUM SERPL-SCNC: 4 MMOL/L (ref 3.4–5.3)
PROT SERPL-MCNC: 6.9 G/DL (ref 6.4–8.3)
SODIUM SERPL-SCNC: 143 MMOL/L (ref 135–145)
T4 FREE SERPL-MCNC: 1.51 NG/DL (ref 0.9–1.7)
TSH SERPL DL<=0.005 MIU/L-ACNC: 9.06 UIU/ML (ref 0.3–4.2)

## 2025-08-26 PROCEDURE — 999N000184 HC STATISTIC TELEMETRY

## 2025-08-26 PROCEDURE — 250N000011 HC RX IP 250 OP 636

## 2025-08-26 PROCEDURE — 92960 CARDIOVERSION ELECTRIC EXT: CPT

## 2025-08-26 PROCEDURE — 84443 ASSAY THYROID STIM HORMONE: CPT

## 2025-08-26 PROCEDURE — 370N000017 HC ANESTHESIA TECHNICAL FEE, PER MIN

## 2025-08-26 PROCEDURE — 83735 ASSAY OF MAGNESIUM: CPT | Performed by: INTERNAL MEDICINE

## 2025-08-26 PROCEDURE — 999N000010 HC STATISTIC ANES STAT CODE-CRNA PER MINUTE

## 2025-08-26 PROCEDURE — 82040 ASSAY OF SERUM ALBUMIN: CPT

## 2025-08-26 PROCEDURE — 36415 COLL VENOUS BLD VENIPUNCTURE: CPT | Performed by: INTERNAL MEDICINE

## 2025-08-26 PROCEDURE — 258N000003 HC RX IP 258 OP 636: Performed by: INTERNAL MEDICINE

## 2025-08-26 PROCEDURE — 84439 ASSAY OF FREE THYROXINE: CPT

## 2025-08-26 PROCEDURE — 84132 ASSAY OF SERUM POTASSIUM: CPT | Performed by: INTERNAL MEDICINE

## 2025-08-26 PROCEDURE — 92960 CARDIOVERSION ELECTRIC EXT: CPT | Performed by: INTERNAL MEDICINE

## 2025-08-26 RX ORDER — LIDOCAINE 40 MG/G
CREAM TOPICAL
Status: DISCONTINUED | OUTPATIENT
Start: 2025-08-26 | End: 2025-08-26 | Stop reason: HOSPADM

## 2025-08-26 RX ORDER — BENZOCAINE/MENTHOL 6 MG-10 MG
LOZENGE MUCOUS MEMBRANE 3 TIMES DAILY PRN
Status: DISCONTINUED | OUTPATIENT
Start: 2025-08-26 | End: 2025-08-26 | Stop reason: HOSPADM

## 2025-08-26 RX ORDER — POTASSIUM CHLORIDE 1500 MG/1
20 TABLET, EXTENDED RELEASE ORAL
Status: DISCONTINUED | OUTPATIENT
Start: 2025-08-26 | End: 2025-08-26 | Stop reason: HOSPADM

## 2025-08-26 RX ORDER — PROPOFOL 10 MG/ML
INJECTION, EMULSION INTRAVENOUS PRN
Status: DISCONTINUED | OUTPATIENT
Start: 2025-08-26 | End: 2025-08-26

## 2025-08-26 RX ORDER — SODIUM CHLORIDE 9 MG/ML
INJECTION, SOLUTION INTRAVENOUS CONTINUOUS
Status: DISCONTINUED | OUTPATIENT
Start: 2025-08-26 | End: 2025-08-26 | Stop reason: HOSPADM

## 2025-08-26 RX ORDER — BENZOCAINE/MENTHOL 6 MG-10 MG
LOZENGE MUCOUS MEMBRANE 3 TIMES DAILY PRN
Status: CANCELLED | OUTPATIENT
Start: 2025-08-26 | End: 2025-08-28

## 2025-08-26 RX ORDER — MAGNESIUM SULFATE HEPTAHYDRATE 40 MG/ML
2 INJECTION, SOLUTION INTRAVENOUS
Status: DISCONTINUED | OUTPATIENT
Start: 2025-08-26 | End: 2025-08-26 | Stop reason: HOSPADM

## 2025-08-26 RX ADMIN — SODIUM CHLORIDE: 0.9 INJECTION, SOLUTION INTRAVENOUS at 10:26

## 2025-08-26 RX ADMIN — PROPOFOL 70 MG: 10 INJECTION, EMULSION INTRAVENOUS at 10:32

## 2025-08-26 ASSESSMENT — ACTIVITIES OF DAILY LIVING (ADL)
ADLS_ACUITY_SCORE: 64

## 2025-08-26 ASSESSMENT — ENCOUNTER SYMPTOMS: DYSRHYTHMIAS: 1

## 2025-08-26 ASSESSMENT — COPD QUESTIONNAIRES: COPD: 1

## 2025-08-27 ENCOUNTER — DOCUMENTATION ONLY (OUTPATIENT)
Dept: CARDIOLOGY | Facility: CLINIC | Age: 85
End: 2025-08-27
Payer: MEDICARE

## 2025-08-28 LAB
ATRIAL RATE - MUSE: 306 BPM
ATRIAL RATE - MUSE: 57 BPM
DIASTOLIC BLOOD PRESSURE - MUSE: NORMAL MMHG
DIASTOLIC BLOOD PRESSURE - MUSE: NORMAL MMHG
INTERPRETATION ECG - MUSE: NORMAL
INTERPRETATION ECG - MUSE: NORMAL
P AXIS - MUSE: NORMAL DEGREES
P AXIS - MUSE: NORMAL DEGREES
PR INTERVAL - MUSE: 232 MS
PR INTERVAL - MUSE: NORMAL MS
QRS DURATION - MUSE: 92 MS
QRS DURATION - MUSE: 94 MS
QT - MUSE: 388 MS
QT - MUSE: 432 MS
QTC - MUSE: 432 MS
QTC - MUSE: 447 MS
R AXIS - MUSE: -24 DEGREES
R AXIS - MUSE: 1 DEGREES
SYSTOLIC BLOOD PRESSURE - MUSE: NORMAL MMHG
SYSTOLIC BLOOD PRESSURE - MUSE: NORMAL MMHG
T AXIS - MUSE: 76 DEGREES
T AXIS - MUSE: 85 DEGREES
VENTRICULAR RATE- MUSE: 60 BPM
VENTRICULAR RATE- MUSE: 80 BPM

## (undated) DEVICE — SUTURE VICRYL+ 2-0 27IN CT-1 UND VCP259H

## (undated) DEVICE — ESU LIGASURE LAPAROSCOPIC BLUNT TIP SEALER 5MMX37CM LF1837

## (undated) DEVICE — PACK PCMKR PERM SRG PROC LF SAN32PC573

## (undated) DEVICE — LINEN TOWEL PACK X6 WHITE 5487

## (undated) DEVICE — DRSG ADAPTIC 3X3" 6112

## (undated) DEVICE — SURGICEL HEMOSTAT 4X8" 1952

## (undated) DEVICE — DRAPE STERI TOWEL LG 1010

## (undated) DEVICE — ESU HOLSTER PLASTIC DISP E2400

## (undated) DEVICE — DRSG GAUZE 4X4" TRAY 6939

## (undated) DEVICE — GLOVE SURG PI ULTRA TOUCH M SZ 8 LF

## (undated) DEVICE — TUBING SUCTION 10'X3/16" N510

## (undated) DEVICE — SPONGE RAY-TEC 4X8" 7318

## (undated) DEVICE — SOL NACL 0.9% IRRIG 1000ML BOTTLE 2F7124

## (undated) DEVICE — RAD INTRODUCER KIT MICRO 5FRX10CM .018 NITINOL G/W

## (undated) DEVICE — SU VICRYL 3-0 SH 27" UND J416H

## (undated) DEVICE — GLOVE SURG PI ULTRA TOUCH M SZ 7 LF 42670

## (undated) DEVICE — DEFIB PRO-PADZ LVP LQD GEL ADULT 8900-2105-01

## (undated) DEVICE — MARKER SURG SKIN 2 TIP STRL SPP99DT2AA

## (undated) DEVICE — GLOVE PROTEXIS W/NEU-THERA 8.0  2D73TE80

## (undated) DEVICE — WRAP B-COOL TERI LUMBAR 08143380

## (undated) DEVICE — SU VICRYL 0 TIE 54" J608H

## (undated) DEVICE — CONNECTOR WATER VALVE PERFUSION PACK STR 020272801

## (undated) DEVICE — GLOVE GAMMEX DERMAPRENE ULTRA SZ 8 LF 8516

## (undated) DEVICE — SU MONOCRYL 4-0 PS-2 18" UND Y496G

## (undated) DEVICE — POSITIONER ARM CRADLE LAMINECTOMY DISP

## (undated) DEVICE — GOWN XLG DISP 9545

## (undated) DEVICE — DRAIN JACKSON PRATT RESERVOIR 100ML SU130-1305

## (undated) DEVICE — ESU GROUND PAD ADULT W/CORD E7507

## (undated) DEVICE — GLOVE BIOGEL PI INDICATOR 8.0 LF 41680

## (undated) DEVICE — PREP CHLORAPREP 26ML TINTED ORANGE  260815

## (undated) DEVICE — PACK 9X6IN THRP HOT COLD CMPR  MED GEL 80104

## (undated) DEVICE — BLADE KNIFE SURG 15 371115

## (undated) DEVICE — SU PDS II 0 TP-1 60" Z991G

## (undated) DEVICE — SU ETHILON 3-0 FS-1 18" 669H

## (undated) DEVICE — SU VICRYL 2-0 SH 27" J317H

## (undated) DEVICE — ENDO TROCAR FIRST ENTRY KII FIOS Z-THRD 12X100MM CTF73

## (undated) DEVICE — SYR BULB IRRIG 50ML LATEX FREE 0035280

## (undated) DEVICE — CUSHION INSERT LG PRONE VIEW JACKSON TABLE

## (undated) DEVICE — SUCTION IRR STRYKERFLOW II W/TIP 250-070-520

## (undated) DEVICE — TUBING IRRIG CYSTO/BLADDER SET 81" LF 2C4040

## (undated) DEVICE — SYR 20ML LL W/O NDL 302830

## (undated) DEVICE — ADH SKIN CLOSURE PREMIERPRO EXOFIN 1.0ML 3470

## (undated) DEVICE — ENDO POUCH UNIVERSAL RETRIEVAL SYSTEM INZII 12/15MM CD004

## (undated) DEVICE — SU VICRYL 2-0 CTX 36" J369H

## (undated) DEVICE — SUTURE VICRYL+ 0 27IN CT-1 UND VCP260H

## (undated) DEVICE — ELECTRODE PATIENT RETURN ADULT L10 FT 2 PLATE CORD 0855C

## (undated) DEVICE — CABLE PACING ALLIGATOR CLIP 301-CG

## (undated) DEVICE — RX SURGIFLO HEMOSTATIC MATRIX 8ML 2991

## (undated) DEVICE — SUCTION MANIFOLD DORNOCH ULTRA CART UL-CL500

## (undated) DEVICE — LINEN TOWEL PACK X30 5481

## (undated) DEVICE — SUCTION TIP YANKAUER STR K87

## (undated) DEVICE — TOOL DISSECT MIDAS MR8 14CM MATCH HEAD 3MM MR8-14MH30

## (undated) DEVICE — CATH TRAY FOLEY SURESTEP 16FR DRAIN BAG STATOCK A899916

## (undated) DEVICE — DEVICE SUTURE PASSER 14GA WECK EFX EFXSP2

## (undated) DEVICE — ESU PENCIL SMOKE EVAC W/ROCKER SWITCH 0703-047-000

## (undated) DEVICE — Device

## (undated) DEVICE — SOL WATER IRRIG 1000ML BOTTLE 2F7114

## (undated) DEVICE — DRAPE BACK TABLE PADDED 60X90

## (undated) DEVICE — DECANTER VIAL 2006S

## (undated) DEVICE — ENDO SCOPE WARMER SEAL  C3101

## (undated) DEVICE — DRAIN JACKSON PRATT 10FR ROUND SU130-1321

## (undated) DEVICE — PACK MINOR SINGLE BASIN SSK3001

## (undated) DEVICE — SU VICRYL 0 UR-6 27" J603H

## (undated) DEVICE — CUSTOM PACK LUMBAR FUSION SNE5BLFHEA

## (undated) DEVICE — SYR 03ML LL W/O NDL 309657

## (undated) DEVICE — DRAPE SHEET MED 44X70" 9355

## (undated) DEVICE — CATH IV 14GA 2IN REM FLASHPLUG DEHP-FR PVC FR 4251717-02

## (undated) DEVICE — KIT DRAIN CLOSED WOUND SUCTION MED 400ML RESVR

## (undated) DEVICE — ENDO TROCAR FIRST ENTRY KII FIOS Z-THRD 05X100MM CTF03

## (undated) DEVICE — KIT PATIENT POSITIONING PIGAZZI LATEX FREE 40580

## (undated) DEVICE — SU ETHIBOND 0 CT-1 CR 8X18" CX21D

## (undated) DEVICE — SPECIMEN TRAP MUCOUS 40ML LUKI C30200A

## (undated) DEVICE — PACK LAPAROTOMY CUSTOM LAKES

## (undated) DEVICE — SHEATH PRELUDE SNAP 13CM 6FR

## (undated) DEVICE — PREP POVIDONE IODINE SOLUTION 10% 4OZ

## (undated) DEVICE — WIPES FOLEY CARE SURESTEP PROVON DFC100

## (undated) DEVICE — MARKER SPHERES PASSIVE MEDT PACK 5 8801075

## (undated) DEVICE — SU MONOCRYL 4-0 PS-2 27" UND Y426H

## (undated) DEVICE — NDL INSUFFLATION 13GA 120MM C2201

## (undated) DEVICE — SOL NACL 0.9% IRRIG 1000ML BOTTLE 07138-09

## (undated) DEVICE — SU VICRYL 0 CT-1 CR 8X27" UND JJ41G

## (undated) DEVICE — DRSG ABD TNDRSRB WET PRUF 8IN X 10IN STRL  9194A

## (undated) DEVICE — SOL NACL 0.9% INJ 1000ML BAG 2B1324X

## (undated) DEVICE — SUTURE VICRYL+ 3-0 18IN PS-2 UND VCP497H

## (undated) DEVICE — DRSG STERI STRIP 1/2X4" R1547

## (undated) DEVICE — ENDO TROCAR SLEEVE KII Z-THREADED 05X100MM CTS02

## (undated) DEVICE — SPONGE LAP 18X18" X8435

## (undated) DEVICE — DRAPE SHEET REV FOLD 3/4 9349

## (undated) DEVICE — GLOVE UNDER INDICATOR PI SZ 7.0 LF 41670

## (undated) DEVICE — SUCTION MANIFOLD NEPTUNE 2 SYS 1 PORT 702-025-000

## (undated) DEVICE — DRAPE O ARM TUBE 9732722

## (undated) DEVICE — SOL WATER IRRIG 1000ML BOTTLE 07139-09

## (undated) RX ORDER — LIDOCAINE HYDROCHLORIDE 10 MG/ML
INJECTION, SOLUTION EPIDURAL; INFILTRATION; INTRACAUDAL; PERINEURAL
Status: DISPENSED
Start: 2021-03-24

## (undated) RX ORDER — PROPOFOL 10 MG/ML
INJECTION, EMULSION INTRAVENOUS
Status: DISPENSED
Start: 2024-07-03

## (undated) RX ORDER — FENTANYL CITRATE-0.9 % NACL/PF 10 MCG/ML
PLASTIC BAG, INJECTION (ML) INTRAVENOUS
Status: DISPENSED
Start: 2020-07-24

## (undated) RX ORDER — ACETAMINOPHEN 325 MG/1
TABLET ORAL
Status: DISPENSED
Start: 2020-07-24

## (undated) RX ORDER — OXYCODONE HYDROCHLORIDE 5 MG/1
TABLET ORAL
Status: DISPENSED
Start: 2020-07-24

## (undated) RX ORDER — ATROPINE SULFATE 0.4 MG/ML
AMPUL (ML) INJECTION
Status: DISPENSED
Start: 2020-07-24

## (undated) RX ORDER — FENTANYL CITRATE 50 UG/ML
INJECTION, SOLUTION INTRAMUSCULAR; INTRAVENOUS
Status: DISPENSED
Start: 2024-07-03

## (undated) RX ORDER — FENTANYL CITRATE 50 UG/ML
INJECTION, SOLUTION INTRAMUSCULAR; INTRAVENOUS
Status: DISPENSED
Start: 2020-07-24

## (undated) RX ORDER — PROPOFOL 10 MG/ML
INJECTION, EMULSION INTRAVENOUS
Status: DISPENSED
Start: 2020-07-24

## (undated) RX ORDER — CEFAZOLIN SODIUM 2 G/100ML
INJECTION, SOLUTION INTRAVENOUS
Status: DISPENSED
Start: 2022-10-24

## (undated) RX ORDER — SODIUM CHLORIDE, SODIUM LACTATE, POTASSIUM CHLORIDE, CALCIUM CHLORIDE 600; 310; 30; 20 MG/100ML; MG/100ML; MG/100ML; MG/100ML
INJECTION, SOLUTION INTRAVENOUS
Status: DISPENSED
Start: 2020-07-24

## (undated) RX ORDER — PROPOFOL 10 MG/ML
INJECTION, EMULSION INTRAVENOUS
Status: DISPENSED
Start: 2021-03-24

## (undated) RX ORDER — DEXAMETHASONE SODIUM PHOSPHATE 4 MG/ML
INJECTION, SOLUTION INTRA-ARTICULAR; INTRALESIONAL; INTRAMUSCULAR; INTRAVENOUS; SOFT TISSUE
Status: DISPENSED
Start: 2024-07-03

## (undated) RX ORDER — CEFAZOLIN SODIUM 2 G/100ML
INJECTION, SOLUTION INTRAVENOUS
Status: DISPENSED
Start: 2020-07-24

## (undated) RX ORDER — DEXAMETHASONE SODIUM PHOSPHATE 4 MG/ML
INJECTION, SOLUTION INTRA-ARTICULAR; INTRALESIONAL; INTRAMUSCULAR; INTRAVENOUS; SOFT TISSUE
Status: DISPENSED
Start: 2020-07-24

## (undated) RX ORDER — FENTANYL CITRATE 50 UG/ML
INJECTION, SOLUTION INTRAMUSCULAR; INTRAVENOUS
Status: DISPENSED
Start: 2022-10-24

## (undated) RX ORDER — BUPIVACAINE HYDROCHLORIDE 2.5 MG/ML
INJECTION, SOLUTION EPIDURAL; INFILTRATION; INTRACAUDAL
Status: DISPENSED
Start: 2020-07-24

## (undated) RX ORDER — REGADENOSON 0.08 MG/ML
INJECTION, SOLUTION INTRAVENOUS
Status: DISPENSED
Start: 2024-06-26

## (undated) RX ORDER — ESMOLOL HYDROCHLORIDE 10 MG/ML
INJECTION INTRAVENOUS
Status: DISPENSED
Start: 2020-07-24

## (undated) RX ORDER — LIDOCAINE HYDROCHLORIDE 10 MG/ML
INJECTION, SOLUTION EPIDURAL; INFILTRATION; INTRACAUDAL; PERINEURAL
Status: DISPENSED
Start: 2022-10-24

## (undated) RX ORDER — OXYCODONE HYDROCHLORIDE 5 MG/1
TABLET ORAL
Status: DISPENSED
Start: 2021-03-24

## (undated) RX ORDER — BUPIVACAINE HYDROCHLORIDE AND EPINEPHRINE 5; 5 MG/ML; UG/ML
INJECTION, SOLUTION EPIDURAL; INTRACAUDAL; PERINEURAL
Status: DISPENSED
Start: 2021-03-24

## (undated) RX ORDER — CEFAZOLIN SODIUM 2 G/100ML
INJECTION, SOLUTION INTRAVENOUS
Status: DISPENSED
Start: 2021-03-24

## (undated) RX ORDER — GLYCOPYRROLATE 0.2 MG/ML
INJECTION, SOLUTION INTRAMUSCULAR; INTRAVENOUS
Status: DISPENSED
Start: 2020-07-24

## (undated) RX ORDER — PHENAZOPYRIDINE HYDROCHLORIDE 200 MG/1
TABLET, FILM COATED ORAL
Status: DISPENSED
Start: 2020-07-24

## (undated) RX ORDER — PROPOFOL 10 MG/ML
INJECTION, EMULSION INTRAVENOUS
Status: DISPENSED
Start: 2022-08-18

## (undated) RX ORDER — ACETAMINOPHEN 325 MG/1
TABLET ORAL
Status: DISPENSED
Start: 2022-10-24

## (undated) RX ORDER — DEXAMETHASONE SODIUM PHOSPHATE 4 MG/ML
INJECTION, SOLUTION INTRA-ARTICULAR; INTRALESIONAL; INTRAMUSCULAR; INTRAVENOUS; SOFT TISSUE
Status: DISPENSED
Start: 2021-03-24

## (undated) RX ORDER — ONDANSETRON 2 MG/ML
INJECTION INTRAMUSCULAR; INTRAVENOUS
Status: DISPENSED
Start: 2020-07-24

## (undated) RX ORDER — FENTANYL CITRATE 50 UG/ML
INJECTION, SOLUTION INTRAMUSCULAR; INTRAVENOUS
Status: DISPENSED
Start: 2021-03-24

## (undated) RX ORDER — METOPROLOL TARTRATE 1 MG/ML
INJECTION, SOLUTION INTRAVENOUS
Status: DISPENSED
Start: 2023-11-10

## (undated) RX ORDER — LIDOCAINE HYDROCHLORIDE 20 MG/ML
INJECTION, SOLUTION EPIDURAL; INFILTRATION; INTRACAUDAL; PERINEURAL
Status: DISPENSED
Start: 2020-07-24

## (undated) RX ORDER — ONDANSETRON 2 MG/ML
INJECTION INTRAMUSCULAR; INTRAVENOUS
Status: DISPENSED
Start: 2021-03-24

## (undated) RX ORDER — HYDROCODONE BITARTRATE AND ACETAMINOPHEN 5; 325 MG/1; MG/1
TABLET ORAL
Status: DISPENSED
Start: 2021-03-24

## (undated) RX ORDER — LIDOCAINE HYDROCHLORIDE 10 MG/ML
INJECTION, SOLUTION EPIDURAL; INFILTRATION; INTRACAUDAL; PERINEURAL
Status: DISPENSED
Start: 2024-07-03

## (undated) RX ORDER — CEFAZOLIN SODIUM 1 G/3ML
INJECTION, POWDER, FOR SOLUTION INTRAMUSCULAR; INTRAVENOUS
Status: DISPENSED
Start: 2020-07-24

## (undated) RX ORDER — GLYCOPYRROLATE 0.2 MG/ML
INJECTION, SOLUTION INTRAMUSCULAR; INTRAVENOUS
Status: DISPENSED
Start: 2021-03-24

## (undated) RX ORDER — REGADENOSON 0.08 MG/ML
INJECTION, SOLUTION INTRAVENOUS
Status: DISPENSED
Start: 2023-04-19

## (undated) RX ORDER — KETOROLAC TROMETHAMINE 30 MG/ML
INJECTION, SOLUTION INTRAMUSCULAR; INTRAVENOUS
Status: DISPENSED
Start: 2021-03-24

## (undated) RX ORDER — BUPIVACAINE HYDROCHLORIDE 2.5 MG/ML
INJECTION, SOLUTION EPIDURAL; INFILTRATION; INTRACAUDAL
Status: DISPENSED
Start: 2022-10-24

## (undated) RX ORDER — EPHEDRINE SULFATE 50 MG/ML
INJECTION, SOLUTION INTRAMUSCULAR; INTRAVENOUS; SUBCUTANEOUS
Status: DISPENSED
Start: 2020-07-24